# Patient Record
Sex: FEMALE | Race: BLACK OR AFRICAN AMERICAN | NOT HISPANIC OR LATINO | Employment: OTHER | URBAN - METROPOLITAN AREA
[De-identification: names, ages, dates, MRNs, and addresses within clinical notes are randomized per-mention and may not be internally consistent; named-entity substitution may affect disease eponyms.]

---

## 2017-08-01 ENCOUNTER — HOSPITAL ENCOUNTER (EMERGENCY)
Facility: OTHER | Age: 62
Discharge: HOME | End: 2017-08-02
Attending: EMERGENCY MEDICINE
Payer: MEDICAID

## 2017-08-01 DIAGNOSIS — M25.532 LEFT WRIST PAIN: Primary | ICD-10-CM

## 2017-08-01 PROCEDURE — 99283 EMERGENCY DEPT VISIT LOW MDM: CPT

## 2017-08-02 VITALS
OXYGEN SATURATION: 100 % | WEIGHT: 105 LBS | SYSTOLIC BLOOD PRESSURE: 110 MMHG | BODY MASS INDEX: 21.17 KG/M2 | TEMPERATURE: 99 F | HEART RATE: 86 BPM | DIASTOLIC BLOOD PRESSURE: 82 MMHG | HEIGHT: 59 IN | RESPIRATION RATE: 16 BRPM

## 2017-08-02 RX ORDER — OXAPROZIN 600 MG/1
600 TABLET, FILM COATED ORAL DAILY
Qty: 10 TABLET | Refills: 0 | Status: ON HOLD | OUTPATIENT
Start: 2017-08-02 | End: 2018-02-01 | Stop reason: HOSPADM

## 2017-08-02 NOTE — ED PROVIDER NOTES
Encounter Date: 8/1/2017       History     Chief Complaint   Patient presents with    Joint Swelling     swelling, redness, and warmth to L wrist .      Patient is a 62 y.o. female with a past medical history of cirrhosis, polysubstance abuse, presenting to the emergency department with complaints of left wrist pain. The patient reports it has been persistent for the past 4 days. She admits that she did a lot of cooking this weekend and thinks she aggravated it. She denies any fall or injury. She denies numbness, tingling, weakness.  She states her pain as an 8/10.  She states that she has tried taking 600 mg of ibuprofen with no relief.  She denies previous episode.      The history is provided by the patient.     Review of patient's allergies indicates:  No Known Allergies  Past Medical History:   Diagnosis Date    Cirrhosis      History reviewed. No pertinent surgical history.  History reviewed. No pertinent family history.  Social History   Substance Use Topics    Smoking status: Current Every Day Smoker     Types: Cigarettes    Smokeless tobacco: Never Used      Comment: 2 cigarettes a day    Alcohol use Yes      Comment: 1 pint a day     Review of Systems   Constitutional: Negative for activity change, appetite change, chills, fatigue and fever.   HENT: Negative for congestion, rhinorrhea and sore throat.    Eyes: Negative for photophobia and visual disturbance.   Respiratory: Negative for cough, shortness of breath and wheezing.    Cardiovascular: Negative for chest pain.   Gastrointestinal: Negative for abdominal pain, diarrhea, nausea and vomiting.   Genitourinary: Negative for dysuria, hematuria and urgency.   Musculoskeletal: Positive for arthralgias and joint swelling. Negative for back pain and neck pain.   Skin: Negative for color change and wound.   Neurological: Negative for weakness and headaches.   Psychiatric/Behavioral: Negative for agitation and confusion.       Physical Exam     Initial  Vitals [08/01/17 2129]   BP Pulse Resp Temp SpO2   100/79 85 18 99.1 °F (37.3 °C) 100 %      MAP       86         Physical Exam    Nursing note and vitals reviewed.  Constitutional: Vital signs are normal. She appears well-developed and well-nourished. She is not diaphoretic. She is cooperative.  Non-toxic appearance. She does not have a sickly appearance. She does not appear ill. No distress.   Elderly, -American female accompanied by her granddaughter who is also a patient in the emergency department.  She speaking clear and full sentences.  She is in no acute distress.  She ambulates without difficulty.   HENT:   Head: Normocephalic and atraumatic.   Right Ear: External ear normal.   Left Ear: External ear normal.   Nose: Nose normal.   Mouth/Throat: Oropharynx is clear and moist.   Eyes: Conjunctivae and EOM are normal.   Neck: Normal range of motion. Neck supple.   Cardiovascular: Normal rate, regular rhythm and normal heart sounds.   Pulmonary/Chest: Breath sounds normal. No respiratory distress. She has no wheezes.   Musculoskeletal: Normal range of motion.   Tenderness to palpation with mild edema of the left upper extremity along the ulnar aspect.  No palpable deformity, crepitus, step-off. Decreased range of motion.  Normal strength, sensation, pulses.  No overlying skin changes.   Neurological: She is alert and oriented to person, place, and time. GCS eye subscore is 4. GCS verbal subscore is 5. GCS motor subscore is 6.   Skin: Skin is warm.   Psychiatric: She has a normal mood and affect. Her behavior is normal. Judgment and thought content normal.         ED Course   Procedures  Labs Reviewed - No data to display     Imaging Results          X-Ray Wrist Complete Left (Final result)  Result time 08/01/17 22:30:44    Final result by Amandeep Figueroa MD (08/01/17 22:30:44)                 Impression:       1.  No acute process in the left wrist.  Diffuse osteopenia.    2.  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              Electronically signed by: EDWIGE OLEARY MD  Date:     08/01/17  Time:    22:30              Narrative:    Exam: [id number]  08/01/17  22:15:06 [id number] ([facility]) : XR WRIST COMPLETE 3 VIEWS LEFT    Technique:    Frontal, lateral, and oblique radiographs of the left wrist.    Comparison:     None     Findings:      There is diffuse demineralization of the osseous structures.  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  The reminder of the joint spaces are maintained.  No significant osteophytosis is noted.  No erosive changes are noted.  There is no evidence of a fracture or dislocation.  The soft tissues are within normal limits.                             X-Rays:   Independently Interpreted Readings:   Other Readings:  X-ray left wrist-no acute fracture or dislocation    Medical Decision Making:   Initial Assessment:   Urgent evaluation of a 62-year-old female with a past medical history of cirrhosis, presenting to the emergency department with complaints of pain to her left wrist.  Patient is afebrile, nontoxic appearing, hemodynamically stable, and neurovascularly intact.  Physical exam reveals tenderness to palpation with edema of the left wrist along the ulnar aspect.  Will plan for x-ray.   Independently Interpreted Test(s):   I have ordered and independently interpreted X-rays - see prior notes.  Clinical Tests:   Radiological Study: Ordered and Reviewed  ED Management:  X-ray shows no acute fracture dislocation.  Patient's signs are likely secondary to musculoskeletal etiology.  Will plan for Ace wrap.  Patient will be discharged home with a prescription for oxaprozin.  She did request a prescription for narcotics multiple times, however her chart review, patient has a history of polysubstance abuse.  I do not feel that she warrants narcotic medication at this time.  Stable for discharge home. The patient was instructed to follow up with a primary care provider in 2 days or  to return to the emergency department for worsening symptoms. The treatment plan was discussed with the patient who demonstrated understanding and comfort with plan. The patient's history, physical exam, and plan of care was discussed with and agreed upon with my supervising physician.    Other:   I have discussed this case with another health care provider.       <> Summary of the Discussion: Dr. Goodwni  This note was created using Dragon Medical Dictation. There may be typographical errors secondary to dictation.                    ED Course     Clinical Impression:     1. Left wrist pain         Disposition:   Disposition: Discharged  Condition: Stable                        Stacey Beth PA-C  08/02/17 0153

## 2017-08-02 NOTE — ED TRIAGE NOTES
Pt c/o swelling and pain L wrist.  States she woke up with it swollen on Sunday.  Denies any trauma, states she has been cooking a lot.  Pt states she used a cream, rubbing alcohol, and wrapped.  No relief.

## 2018-01-25 ENCOUNTER — HOSPITAL ENCOUNTER (INPATIENT)
Facility: OTHER | Age: 63
LOS: 7 days | Discharge: HOME | DRG: 432 | End: 2018-02-01
Attending: EMERGENCY MEDICINE | Admitting: EMERGENCY MEDICINE
Payer: MEDICAID

## 2018-01-25 DIAGNOSIS — D72.829 LEUKOCYTOSIS, UNSPECIFIED TYPE: ICD-10-CM

## 2018-01-25 DIAGNOSIS — D50.0 IRON DEFICIENCY ANEMIA DUE TO CHRONIC BLOOD LOSS: ICD-10-CM

## 2018-01-25 DIAGNOSIS — K70.31 ASCITES DUE TO ALCOHOLIC CIRRHOSIS: ICD-10-CM

## 2018-01-25 DIAGNOSIS — R18.8 ASCITES: ICD-10-CM

## 2018-01-25 DIAGNOSIS — F10.930 ALCOHOL WITHDRAWAL SYNDROME WITHOUT COMPLICATION: ICD-10-CM

## 2018-01-25 DIAGNOSIS — K92.2 ACUTE LOWER GI BLEEDING: ICD-10-CM

## 2018-01-25 DIAGNOSIS — R10.9 ABDOMINAL PAIN: Primary | ICD-10-CM

## 2018-01-25 DIAGNOSIS — K70.31 ALCOHOLIC CIRRHOSIS OF LIVER WITH ASCITES: ICD-10-CM

## 2018-01-25 DIAGNOSIS — E43 SEVERE MALNUTRITION: ICD-10-CM

## 2018-01-25 LAB
ABO + RH BLD: NORMAL
ALBUMIN SERPL BCP-MCNC: 1.9 G/DL
ALBUMIN SERPL BCP-MCNC: 1.9 G/DL
ALBUMIN SERPL BCP-MCNC: 2.1 G/DL
ALP SERPL-CCNC: 108 U/L
ALP SERPL-CCNC: 94 U/L
ALT SERPL W/O P-5'-P-CCNC: 11 U/L
ALT SERPL W/O P-5'-P-CCNC: 15 U/L
AMMONIA PLAS-SCNC: 29 UMOL/L
AMPHET+METHAMPHET UR QL: NEGATIVE
ANION GAP SERPL CALC-SCNC: 12 MMOL/L
ANION GAP SERPL CALC-SCNC: 7 MMOL/L
ANISOCYTOSIS BLD QL SMEAR: ABNORMAL
ANISOCYTOSIS BLD QL SMEAR: ABNORMAL
APPEARANCE FLD: NORMAL
APTT BLDCRRT: <21 SEC
AST SERPL-CCNC: 23 U/L
AST SERPL-CCNC: 24 U/L
BARBITURATES UR QL SCN>200 NG/ML: NEGATIVE
BASOPHILS # BLD AUTO: 0.01 K/UL
BASOPHILS # BLD AUTO: 0.04 K/UL
BASOPHILS NFR BLD: 0.1 %
BASOPHILS NFR BLD: 0.2 %
BENZODIAZ UR QL SCN>200 NG/ML: NEGATIVE
BILIRUB SERPL-MCNC: 0.3 MG/DL
BILIRUB SERPL-MCNC: 2.2 MG/DL
BILIRUB UR QL STRIP: NEGATIVE
BLD GP AB SCN CELLS X3 SERPL QL: NORMAL
BLD PROD TYP BPU: NORMAL
BLD PROD TYP BPU: NORMAL
BLOOD UNIT EXPIRATION DATE: NORMAL
BLOOD UNIT EXPIRATION DATE: NORMAL
BLOOD UNIT TYPE CODE: 5100
BLOOD UNIT TYPE CODE: 9500
BLOOD UNIT TYPE: NORMAL
BLOOD UNIT TYPE: NORMAL
BNP SERPL-MCNC: <10 PG/ML
BODY FLD TYPE: NORMAL
BUN SERPL-MCNC: 19 MG/DL
BUN SERPL-MCNC: 24 MG/DL
BURR CELLS BLD QL SMEAR: ABNORMAL
BZE UR QL SCN: NORMAL
CALCIUM SERPL-MCNC: 7.9 MG/DL
CALCIUM SERPL-MCNC: 8 MG/DL
CANNABINOIDS UR QL SCN: NEGATIVE
CHLORIDE SERPL-SCNC: 107 MMOL/L
CHLORIDE SERPL-SCNC: 111 MMOL/L
CLARITY UR: CLEAR
CO2 SERPL-SCNC: 19 MMOL/L
CO2 SERPL-SCNC: 21 MMOL/L
CODING SYSTEM: NORMAL
CODING SYSTEM: NORMAL
COLOR FLD: NORMAL
COLOR UR: YELLOW
CORTIS SERPL-MCNC: 18.9 UG/DL
CREAT SERPL-MCNC: 0.8 MG/DL
CREAT SERPL-MCNC: 1.1 MG/DL
CREAT UR-MCNC: 210.9 MG/DL
DIFFERENTIAL METHOD: ABNORMAL
DIFFERENTIAL METHOD: ABNORMAL
DISPENSE STATUS: NORMAL
DISPENSE STATUS: NORMAL
EOSINOPHIL # BLD AUTO: 0.1 K/UL
EOSINOPHIL # BLD AUTO: 0.1 K/UL
EOSINOPHIL NFR BLD: 0.3 %
EOSINOPHIL NFR BLD: 0.4 %
ERYTHROCYTE [DISTWIDTH] IN BLOOD BY AUTOMATED COUNT: 21.4 %
ERYTHROCYTE [DISTWIDTH] IN BLOOD BY AUTOMATED COUNT: 24.2 %
EST. GFR  (AFRICAN AMERICAN): >60 ML/MIN/1.73 M^2
EST. GFR  (AFRICAN AMERICAN): >60 ML/MIN/1.73 M^2
EST. GFR  (NON AFRICAN AMERICAN): 55 ML/MIN/1.73 M^2
EST. GFR  (NON AFRICAN AMERICAN): >60 ML/MIN/1.73 M^2
ETHANOL SERPL-MCNC: <10 MG/DL
GIANT PLATELETS BLD QL SMEAR: PRESENT
GIANT PLATELETS BLD QL SMEAR: PRESENT
GLUCOSE SERPL-MCNC: 108 MG/DL
GLUCOSE SERPL-MCNC: 136 MG/DL
GLUCOSE UR QL STRIP: NEGATIVE
GRAM STN SPEC: NORMAL
HCT VFR BLD AUTO: 13.2 %
HCT VFR BLD AUTO: 28.8 %
HGB BLD-MCNC: 10.1 G/DL
HGB BLD-MCNC: 4.4 G/DL
HGB UR QL STRIP: ABNORMAL
HYALINE CASTS #/AREA URNS LPF: 30 /LPF
HYPOCHROMIA BLD QL SMEAR: ABNORMAL
HYPOCHROMIA BLD QL SMEAR: ABNORMAL
INR PPP: 1.2
KETONES UR QL STRIP: ABNORMAL
LACTATE SERPL-SCNC: 2.9 MMOL/L
LEUKOCYTE ESTERASE UR QL STRIP: ABNORMAL
LIPASE SERPL-CCNC: 57 U/L
LYMPHOCYTES # BLD AUTO: 2.1 K/UL
LYMPHOCYTES # BLD AUTO: 2.8 K/UL
LYMPHOCYTES NFR BLD: 12.8 %
LYMPHOCYTES NFR BLD: 13.1 %
LYMPHOCYTES NFR FLD MANUAL: 45 %
MAGNESIUM SERPL-MCNC: 1.3 MG/DL
MCH RBC QN AUTO: 20.9 PG
MCH RBC QN AUTO: 25.8 PG
MCHC RBC AUTO-ENTMCNC: 33.3 G/DL
MCHC RBC AUTO-ENTMCNC: 35.1 G/DL
MCV RBC AUTO: 63 FL
MCV RBC AUTO: 74 FL
METHADONE UR QL SCN>300 NG/ML: NEGATIVE
MICROSCOPIC COMMENT: ABNORMAL
MONOCYTES # BLD AUTO: 0.8 K/UL
MONOCYTES # BLD AUTO: 1.1 K/UL
MONOCYTES NFR BLD: 3.7 %
MONOCYTES NFR BLD: 6.4 %
MONOS+MACROS NFR FLD MANUAL: 22 %
NEUTROPHILS # BLD AUTO: 13 K/UL
NEUTROPHILS # BLD AUTO: 17.1 K/UL
NEUTROPHILS NFR BLD: 80.3 %
NEUTROPHILS NFR BLD: 82.7 %
NEUTROPHILS NFR FLD MANUAL: 33 %
NITRITE UR QL STRIP: NEGATIVE
NUM UNITS TRANS PACKED RBC: NORMAL
OPIATES UR QL SCN: NEGATIVE
OVALOCYTES BLD QL SMEAR: ABNORMAL
OVALOCYTES BLD QL SMEAR: ABNORMAL
PCP UR QL SCN>25 NG/ML: NEGATIVE
PH UR STRIP: 6 [PH] (ref 5–8)
PHOSPHATE SERPL-MCNC: 3.1 MG/DL
PLATELET # BLD AUTO: 153 K/UL
PLATELET # BLD AUTO: 155 K/UL
PLATELET BLD QL SMEAR: ABNORMAL
PLATELET BLD QL SMEAR: ABNORMAL
PMV BLD AUTO: 9 FL
PMV BLD AUTO: ABNORMAL FL
POIKILOCYTOSIS BLD QL SMEAR: ABNORMAL
POIKILOCYTOSIS BLD QL SMEAR: ABNORMAL
POLYCHROMASIA BLD QL SMEAR: ABNORMAL
POLYCHROMASIA BLD QL SMEAR: ABNORMAL
POTASSIUM SERPL-SCNC: 3.9 MMOL/L
POTASSIUM SERPL-SCNC: 4.1 MMOL/L
PROCALCITONIN SERPL IA-MCNC: 0.23 NG/ML
PROT SERPL-MCNC: 6.6 G/DL
PROT SERPL-MCNC: 7 G/DL
PROT SERPL-MCNC: 7.2 G/DL
PROT UR QL STRIP: NEGATIVE
PROTHROMBIN TIME: 13 SEC
RBC # BLD AUTO: 2.11 M/UL
RBC # BLD AUTO: 3.91 M/UL
RBC #/AREA URNS HPF: 4 /HPF (ref 0–4)
SCHISTOCYTES BLD QL SMEAR: PRESENT
SCHISTOCYTES BLD QL SMEAR: PRESENT
SODIUM SERPL-SCNC: 138 MMOL/L
SODIUM SERPL-SCNC: 139 MMOL/L
SP GR UR STRIP: 1.02 (ref 1–1.03)
T4 FREE SERPL-MCNC: 1.19 NG/DL
TARGETS BLD QL SMEAR: ABNORMAL
TARGETS BLD QL SMEAR: ABNORMAL
TOXIC GRANULES BLD QL SMEAR: PRESENT
TOXIC GRANULES BLD QL SMEAR: PRESENT
TOXICOLOGY INFORMATION: NORMAL
TRANS ERYTHROCYTES VOL PATIENT: NORMAL ML
TROPONIN I SERPL DL<=0.01 NG/ML-MCNC: <0.006 NG/ML
TSH SERPL DL<=0.005 MIU/L-ACNC: 4.81 UIU/ML
URN SPEC COLLECT METH UR: ABNORMAL
UROBILINOGEN UR STRIP-ACNC: NEGATIVE EU/DL
WBC # BLD AUTO: 16.45 K/UL
WBC # BLD AUTO: 20.88 K/UL
WBC # BLD AUTO: 21.1 K/UL
WBC # FLD: 123 /CU MM
WBC #/AREA URNS HPF: 6 /HPF (ref 0–5)

## 2018-01-25 PROCEDURE — 87086 URINE CULTURE/COLONY COUNT: CPT

## 2018-01-25 PROCEDURE — 87070 CULTURE OTHR SPECIMN AEROBIC: CPT

## 2018-01-25 PROCEDURE — 63600175 PHARM REV CODE 636 W HCPCS: Performed by: EMERGENCY MEDICINE

## 2018-01-25 PROCEDURE — P9021 RED BLOOD CELLS UNIT: HCPCS

## 2018-01-25 PROCEDURE — 99900035 HC TECH TIME PER 15 MIN (STAT)

## 2018-01-25 PROCEDURE — 82140 ASSAY OF AMMONIA: CPT

## 2018-01-25 PROCEDURE — 84100 ASSAY OF PHOSPHORUS: CPT

## 2018-01-25 PROCEDURE — 84145 PROCALCITONIN (PCT): CPT

## 2018-01-25 PROCEDURE — 96375 TX/PRO/DX INJ NEW DRUG ADDON: CPT

## 2018-01-25 PROCEDURE — 89051 BODY FLUID CELL COUNT: CPT

## 2018-01-25 PROCEDURE — 80307 DRUG TEST PRSMV CHEM ANLYZR: CPT

## 2018-01-25 PROCEDURE — P9040 RBC LEUKOREDUCED IRRADIATED: HCPCS

## 2018-01-25 PROCEDURE — 99223 1ST HOSP IP/OBS HIGH 75: CPT | Mod: ,,, | Performed by: INTERNAL MEDICINE

## 2018-01-25 PROCEDURE — 63600175 PHARM REV CODE 636 W HCPCS: Performed by: INTERNAL MEDICINE

## 2018-01-25 PROCEDURE — 83880 ASSAY OF NATRIURETIC PEPTIDE: CPT

## 2018-01-25 PROCEDURE — 93010 ELECTROCARDIOGRAM REPORT: CPT | Mod: ,,, | Performed by: INTERNAL MEDICINE

## 2018-01-25 PROCEDURE — 82533 TOTAL CORTISOL: CPT

## 2018-01-25 PROCEDURE — 80320 DRUG SCREEN QUANTALCOHOLS: CPT

## 2018-01-25 PROCEDURE — 84443 ASSAY THYROID STIM HORMONE: CPT

## 2018-01-25 PROCEDURE — 25000003 PHARM REV CODE 250

## 2018-01-25 PROCEDURE — 85610 PROTHROMBIN TIME: CPT

## 2018-01-25 PROCEDURE — C9113 INJ PANTOPRAZOLE SODIUM, VIA: HCPCS | Performed by: EMERGENCY MEDICINE

## 2018-01-25 PROCEDURE — 36430 TRANSFUSION BLD/BLD COMPNT: CPT

## 2018-01-25 PROCEDURE — 87075 CULTR BACTERIA EXCEPT BLOOD: CPT

## 2018-01-25 PROCEDURE — 87205 SMEAR GRAM STAIN: CPT

## 2018-01-25 PROCEDURE — 25000003 PHARM REV CODE 250: Performed by: INTERNAL MEDICINE

## 2018-01-25 PROCEDURE — 81000 URINALYSIS NONAUTO W/SCOPE: CPT

## 2018-01-25 PROCEDURE — 20000000 HC ICU ROOM

## 2018-01-25 PROCEDURE — 83690 ASSAY OF LIPASE: CPT

## 2018-01-25 PROCEDURE — 84439 ASSAY OF FREE THYROXINE: CPT

## 2018-01-25 PROCEDURE — 36415 COLL VENOUS BLD VENIPUNCTURE: CPT

## 2018-01-25 PROCEDURE — 85730 THROMBOPLASTIN TIME PARTIAL: CPT

## 2018-01-25 PROCEDURE — 25500020 PHARM REV CODE 255: Performed by: EMERGENCY MEDICINE

## 2018-01-25 PROCEDURE — 80053 COMPREHEN METABOLIC PANEL: CPT

## 2018-01-25 PROCEDURE — 86920 COMPATIBILITY TEST SPIN: CPT

## 2018-01-25 PROCEDURE — 84484 ASSAY OF TROPONIN QUANT: CPT

## 2018-01-25 PROCEDURE — 84155 ASSAY OF PROTEIN SERUM: CPT

## 2018-01-25 PROCEDURE — 87040 BLOOD CULTURE FOR BACTERIA: CPT

## 2018-01-25 PROCEDURE — 83605 ASSAY OF LACTIC ACID: CPT

## 2018-01-25 PROCEDURE — 85025 COMPLETE CBC W/AUTO DIFF WBC: CPT | Mod: 91

## 2018-01-25 PROCEDURE — 25000003 PHARM REV CODE 250: Performed by: EMERGENCY MEDICINE

## 2018-01-25 PROCEDURE — 96361 HYDRATE IV INFUSION ADD-ON: CPT

## 2018-01-25 PROCEDURE — 82040 ASSAY OF SERUM ALBUMIN: CPT

## 2018-01-25 PROCEDURE — 85048 AUTOMATED LEUKOCYTE COUNT: CPT

## 2018-01-25 PROCEDURE — 80053 COMPREHEN METABOLIC PANEL: CPT | Mod: 91

## 2018-01-25 PROCEDURE — 86850 RBC ANTIBODY SCREEN: CPT

## 2018-01-25 PROCEDURE — 83735 ASSAY OF MAGNESIUM: CPT

## 2018-01-25 PROCEDURE — 96374 THER/PROPH/DIAG INJ IV PUSH: CPT

## 2018-01-25 PROCEDURE — 99291 CRITICAL CARE FIRST HOUR: CPT | Mod: 25

## 2018-01-25 RX ORDER — TRIPROLIDINE/PSEUDOEPHEDRINE 2.5MG-60MG
600 TABLET ORAL EVERY 8 HOURS PRN
Status: DISCONTINUED | OUTPATIENT
Start: 2018-01-25 | End: 2018-02-01 | Stop reason: HOSPADM

## 2018-01-25 RX ORDER — LANOLIN ALCOHOL/MO/W.PET/CERES
100 CREAM (GRAM) TOPICAL DAILY
COMMUNITY
End: 2018-04-13

## 2018-01-25 RX ORDER — SODIUM CHLORIDE 9 MG/ML
INJECTION, SOLUTION INTRAVENOUS CONTINUOUS
Status: DISCONTINUED | OUTPATIENT
Start: 2018-01-25 | End: 2018-01-31

## 2018-01-25 RX ORDER — MULTIVITAMIN
1 TABLET ORAL DAILY
Status: ON HOLD | COMMUNITY
End: 2019-11-26

## 2018-01-25 RX ORDER — MORPHINE SULFATE 2 MG/ML
6 INJECTION, SOLUTION INTRAMUSCULAR; INTRAVENOUS
Status: ACTIVE | OUTPATIENT
Start: 2018-01-25 | End: 2018-01-25

## 2018-01-25 RX ORDER — PANTOPRAZOLE SODIUM 40 MG/10ML
40 INJECTION, POWDER, LYOPHILIZED, FOR SOLUTION INTRAVENOUS ONCE
Status: DISCONTINUED | OUTPATIENT
Start: 2018-01-25 | End: 2018-01-25

## 2018-01-25 RX ORDER — IBUPROFEN 600 MG/1
600 TABLET ORAL 2 TIMES DAILY PRN
Status: ON HOLD | COMMUNITY
End: 2018-02-01 | Stop reason: HOSPADM

## 2018-01-25 RX ORDER — HYDROCODONE BITARTRATE AND ACETAMINOPHEN 500; 5 MG/1; MG/1
TABLET ORAL
Status: DISCONTINUED | OUTPATIENT
Start: 2018-01-25 | End: 2018-02-01 | Stop reason: HOSPADM

## 2018-01-25 RX ORDER — FOLIC ACID 1 MG/1
1 TABLET ORAL DAILY
COMMUNITY
End: 2018-04-13

## 2018-01-25 RX ORDER — THIAMINE HCL 100 MG
100 TABLET ORAL DAILY
Status: DISCONTINUED | OUTPATIENT
Start: 2018-01-26 | End: 2018-02-01 | Stop reason: HOSPADM

## 2018-01-25 RX ORDER — PANTOPRAZOLE SODIUM 40 MG/10ML
80 INJECTION, POWDER, LYOPHILIZED, FOR SOLUTION INTRAVENOUS
Status: COMPLETED | OUTPATIENT
Start: 2018-01-25 | End: 2018-01-25

## 2018-01-25 RX ORDER — FUROSEMIDE 40 MG/1
40 TABLET ORAL DAILY
COMMUNITY
End: 2019-04-04 | Stop reason: SDUPTHER

## 2018-01-25 RX ORDER — SPIRONOLACTONE 50 MG/1
50 TABLET, FILM COATED ORAL DAILY
Status: ON HOLD | COMMUNITY
End: 2018-02-01 | Stop reason: HOSPADM

## 2018-01-25 RX ORDER — IBUPROFEN 400 MG/1
800 TABLET ORAL EVERY 8 HOURS PRN
Status: DISCONTINUED | OUTPATIENT
Start: 2018-01-25 | End: 2018-01-25

## 2018-01-25 RX ORDER — AMOXICILLIN AND CLAVULANATE POTASSIUM 875; 125 MG/1; MG/1
1 TABLET, FILM COATED ORAL
Status: ON HOLD | COMMUNITY
End: 2018-02-01 | Stop reason: HOSPADM

## 2018-01-25 RX ORDER — SODIUM CHLORIDE 9 MG/ML
1000 INJECTION, SOLUTION INTRAVENOUS
Status: COMPLETED | OUTPATIENT
Start: 2018-01-25 | End: 2018-01-25

## 2018-01-25 RX ORDER — SODIUM CHLORIDE 0.9 % (FLUSH) 0.9 %
3 SYRINGE (ML) INJECTION
Status: DISCONTINUED | OUTPATIENT
Start: 2018-01-25 | End: 2018-02-01 | Stop reason: HOSPADM

## 2018-01-25 RX ORDER — ONDANSETRON 4 MG/1
4 TABLET, FILM COATED ORAL EVERY 6 HOURS PRN
COMMUNITY
End: 2018-04-13

## 2018-01-25 RX ORDER — LACTULOSE 10 G/15ML
10 SOLUTION ORAL; RECTAL 3 TIMES DAILY
COMMUNITY
End: 2018-04-13

## 2018-01-25 RX ORDER — IBUPROFEN 200 MG
1 TABLET ORAL DAILY
Status: DISCONTINUED | OUTPATIENT
Start: 2018-01-26 | End: 2018-02-01 | Stop reason: HOSPADM

## 2018-01-25 RX ORDER — FOLIC ACID 1 MG/1
1 TABLET ORAL DAILY
Status: DISCONTINUED | OUTPATIENT
Start: 2018-01-26 | End: 2018-02-01 | Stop reason: HOSPADM

## 2018-01-25 RX ORDER — ONDANSETRON 2 MG/ML
8 INJECTION INTRAMUSCULAR; INTRAVENOUS
Status: COMPLETED | OUTPATIENT
Start: 2018-01-25 | End: 2018-01-25

## 2018-01-25 RX ADMIN — IOHEXOL 75 ML: 350 INJECTION, SOLUTION INTRAVENOUS at 05:01

## 2018-01-25 RX ADMIN — CEFTRIAXONE 2 G: 2 INJECTION, SOLUTION INTRAVENOUS at 11:01

## 2018-01-25 RX ADMIN — SODIUM CHLORIDE 1000 ML: 0.9 INJECTION, SOLUTION INTRAVENOUS at 06:01

## 2018-01-25 RX ADMIN — SODIUM CHLORIDE 500 ML: 0.9 INJECTION, SOLUTION INTRAVENOUS at 04:01

## 2018-01-25 RX ADMIN — PANTOPRAZOLE SODIUM 80 MG: 40 INJECTION, POWDER, FOR SOLUTION INTRAVENOUS at 04:01

## 2018-01-25 RX ADMIN — OCTREOTIDE ACETATE 50 MCG/HR: 500 INJECTION, SOLUTION INTRAVENOUS; SUBCUTANEOUS at 02:01

## 2018-01-25 RX ADMIN — IBUPROFEN 600 MG: 100 SUSPENSION ORAL at 11:01

## 2018-01-25 RX ADMIN — SODIUM CHLORIDE 125 ML/HR: 0.9 INJECTION, SOLUTION INTRAVENOUS at 08:01

## 2018-01-25 RX ADMIN — OCTREOTIDE ACETATE 50 MCG/HR: 500 INJECTION, SOLUTION INTRAVENOUS; SUBCUTANEOUS at 11:01

## 2018-01-25 RX ADMIN — SODIUM CHLORIDE: 0.9 INJECTION, SOLUTION INTRAVENOUS at 05:01

## 2018-01-25 RX ADMIN — ONDANSETRON 8 MG: 2 INJECTION, SOLUTION INTRAMUSCULAR; INTRAVENOUS at 04:01

## 2018-01-25 NOTE — HPI
61 yo female with a PMH of alcoholic cirrhosis presents to the ED c/o bilateral lower abdominal pain and bloody bowel movements for 2 days.  Patient was admitted at [facility] the previous, underwent paracentesis, and was discharged with Augmentin.  She reports last hospitalization for paracentesis was 2-3 years before that.  In the ED she was found to be hypotensive with a systolic in the 80's, tachycardic at 124, and a hemoglobin of 4.4.  After 1 unit of blood, heart rate improved to 115 and systolic blood pressure improved to 100-110.  Patient states that pain has improved but still very tired.

## 2018-01-25 NOTE — OP NOTE
[facility]  Interventional Radiology  Procedure - Inpatient    Date: 01/25/2018 Time: 11:43 AM    Pre-Op Diagnosis: Ascites    Post-Op Diagnosis: Ascites    Procedure Performed by: Amor Millard MD    Assistant: none    Procedure: U/S guided paracentesis    Specimen/Tissue Removed: 2800 mL of dark maroon thin fluid    Estimated Blood Loss: Less than 5 mL    Procedure Note/Findings: U/S guided paracentesis performed with 5 Albanian centesis catheter with 2800 mL of thin dark maroon fluid removed.  No immediate post-procedure complications noted.    Please refer to dictated report for additional details.

## 2018-01-25 NOTE — SUBJECTIVE & OBJECTIVE
Past Medical History:   Diagnosis Date    Cirrhosis        History reviewed. No pertinent surgical history.    Review of patient's allergies indicates:  No Known Allergies    No current facility-administered medications on file prior to encounter.      Current Outpatient Prescriptions on File Prior to Encounter   Medication Sig    oxaprozin (DAYPRO) 600 mg tablet Take 1 tablet (600 mg total) by mouth once daily.     Family History     None        Social History Main Topics    Smoking status: Current Every Day Smoker     Types: Cigarettes    Smokeless tobacco: Never Used      Comment: 2 cigarettes a day    Alcohol use Yes      Comment: 1 pint a day    Drug use: Yes     Types: Cocaine    Sexual activity: Not on file     Review of Systems   Constitutional: Positive for appetite change and fatigue.   HENT: Negative.    Eyes: Negative.    Respiratory: Negative for chest tightness and shortness of breath.    Cardiovascular: Negative for chest pain and palpitations.   Gastrointestinal: Positive for abdominal distention, abdominal pain and blood in stool.   Endocrine: Negative.    Genitourinary: Negative for difficulty urinating and frequency.   Musculoskeletal: Negative for arthralgias.   Neurological: Negative for syncope and speech difficulty.   Psychiatric/Behavioral: Negative for agitation, behavioral problems and confusion.     Objective:     Vital Signs (Most Recent):  Temp: 98.5 °F (36.9 °C) (01/25/18 1500)  Pulse: 86 (01/25/18 1500)  Resp: 15 (01/25/18 1500)  BP: 100/67 (01/25/18 1500)  SpO2: 99 % (01/25/18 1500) Vital Signs (24h Range):  Temp:  [97.7 °F (36.5 °C)-98.5 °F (36.9 °C)] 98.5 °F (36.9 °C)  Pulse:  [] 86  Resp:  [11-30] 15  SpO2:  [99 %-100 %] 99 %  BP: ()/(51-80) 100/67     Weight: 40.4 kg (89 lb 1.1 oz)  Body mass index is 17.99 kg/m².    Physical Exam   Constitutional: She is oriented to person, place, and time. She appears cachectic. She has a sickly appearance. She appears ill.    HENT:   Head: Normocephalic and atraumatic.   Eyes: EOM are normal.   Neck: Normal range of motion.   Cardiovascular: Normal rate and regular rhythm.    Pulmonary/Chest: Effort normal and breath sounds normal.   Abdominal: Soft. Bowel sounds are normal. There is no tenderness. There is no guarding.   Musculoskeletal: Normal range of motion.   Neurological: She is alert and oriented to person, place, and time.   Skin: Skin is warm.   Psychiatric: She has a normal mood and affect. Her behavior is normal.   Vitals reviewed.        CRANIAL NERVES     CN III, IV, VI   Extraocular motions are normal.        Significant Labs:   CBC:   Recent Labs  Lab 01/25/18  0411 01/25/18  1214   WBC 16.45* 21.10*  20.88*   HGB 4.4* 10.1*   HCT 13.2* 28.8*    153     CMP:   Recent Labs  Lab 01/25/18  0411 01/25/18  1214    139   K 3.9 4.1    111*   CO2 19* 21*   * 108   BUN 24* 19   CREATININE 1.1 0.8   CALCIUM 7.9* 8.0*   PROT 7.0 7.2  6.6   ALBUMIN 1.9* 2.1*  1.9*   BILITOT 0.3 2.2*   ALKPHOS 94 108   AST 23 24   ALT 15 11   ANIONGAP 12 7*   EGFRNONAA 55* >60     Coagulation:   Recent Labs  Lab 01/25/18  0411   INR 1.2   APTT <21.0     Lactic Acid:   Recent Labs  Lab 01/25/18 0411   LACTATE 2.9*     Lipase:   Recent Labs  Lab 01/25/18  0411   LIPASE 57     All pertinent labs within the past 24 hours have been reviewed.    Significant Imaging: I have reviewed all pertinent imaging results/findings within the past 24 hours.   Imaging Results          IR Paracentesis with Imaging (Final result)  Result time 01/25/18 12:03:45    Final result by Amor Millard MD (01/25/18 12:03:45)                 Impression:      Ultrasound-guided paracentesis performed with 2800 mL of thin dark maroon fluid removed.  A sample of fluid was sent for the requested studies.      Electronically signed by: AMOR MILLARD MD  Date:     01/25/18  Time:    12:03              Narrative:    Procedure: Ultrasound-guided  paracentesis dated 1/25/18.    After obtaining informed consent from the patient, using sterile technique, 1% lidocaine local anesthetic, and ultrasound guidance a 5 Gabonese centesis needle was advanced into the ascites via a left lower quadrant abdominal approach.  2800 mL of thin dark maroon fluid was removed.  The catheter was then removed and hemostasis was achieved.  No immediate post procedure complications are noted.                             CT Abdomen Pelvis With Contrast (Final result)  Result time 01/25/18 06:49:19    Final result by Shelli Swift MD (01/25/18 06:49:19)                 Impression:        No acute intra-abdominal/pelvic CT abnormalities to account for the reported history of abdominal and back pain.    Findings include:  - Partially visualized airspace opacity within the left lower lobe concerning for aspiration or pneumonia.  - CT findings of cirrhosis.  - Suspected portal hypertension noting a large volume of abdominal ascites and a small recanalized umbilical vein.  - Cholelithiasis.      Electronically signed by: SHELLI SWIFT MD  Date:     01/25/18  Time:    06:49              Narrative:    Technique: 5-mm axial images were obtained through the abdomen and pelvis following administration of 75 cc of Omnipaque 350 IV contrast.  Images were submitted for coronal, and sagittal reformats.    Comparison: CT 02/03/2015.    Findings:    Visualized heart demonstrates trace coronary artery atherosclerosis.  No pericardial effusion.    Visualized lungs demonstrate a partially visualized airspace opacity within the superior segment of the left lower lobe, possibly pneumonia or aspiration.  Partially visualized nodular opacity within the right lower lobe not well characterized.  No pleural effusions.    The liver demonstrates a nodular contour and widened fissures.  Hepatic parenchyma demonstrates diffusely heterogeneous enhancement without discrete lesions.  No intrahepatic bile duct  dilatation.  Portal vein, splenic vein and SMV are patent.  There is a small recanalized umbilical vein.    Calcified stones noted within the gallbladder lumen.  No surrounding inflammatory changes.  Common bile duct is at the upper limit of normal in caliber with tip at the level of the ampulla.    There is a small sliding type hiatal hernia.  The stomach is otherwise decompressed and not well evaluated.    Duodenum, spleen, pancreas and adrenal glands are within normal limits.    Kidneys are normal in size and location.  No enhancing renal masses.  Subcentimeter nonenhancing right cortical lesion likely represents a cyst.  No nephrolithiasis.  No hydronephrosis or hydroureter.  Bladder is decompressed around a Garcia catheter and demonstrates scattered intraluminal gas.    The uterus and ovaries are within normal limits.    The small bowel is normal in caliber.  The colon is unremarkable.  The appendix is normal.  No obstruction or inflammatory changes.    There is a large volume of low attenuation abdominal and pelvic ascites.  There is diffuse mesenteric and omental edema.    No intra-abdominal free air. No abdominal or pelvic lymph node enlargement.  No focal mesenteric masses.    The abdominal aorta tapers normally with prominent atherosclerotic calcification.  IVC and common iliac veins are patent.    Subcutaneous soft tissues are within normal limits.    No acute fractures or osseous destruction.  Degenerative changes of the spine noted.                             X-Ray Chest AP Portable (Final result)  Result time 01/25/18 04:16:59    Final result by Fabian Fam MD (01/25/18 04:16:59)                 Impression:        Left basilar airspace disease or subsegmental atelectasis. Findings could reflect developing pneumonia or aspiration in the setting of sepsis.      Electronically signed by: Fabian Fam  Date:     01/25/18  Time:    04:16              Narrative:    CLINICAL INFORMATION:  Sepsis.    COMPARISON: None available.    FINDINGS: One view of the chest was obtained.  Cardiac wires overlie the chest. Cardiac silhouette is not enlarged.  Lungs demonstrate bilateral increased interstitial attenuation, with more focal airspace disease or subsegmental atelectasis at the left lung base.  No large pleural effusion. No pneumothorax.

## 2018-01-25 NOTE — H&P
[facility]  History & Physical - Short Stay  Interventional Radiology    SUBJECTIVE:     Chief Complaint/Reason for Admission: Cirrhosis, GI bleed, ascites    Informant(s):  self and Electronic Health Record    History of Present Illness:  Neena Marks is a 62 y.o. female with a history of ascites.    Patient presents for paracentesis.    Scheduled Meds:    cefTRIAXone (ROCEPHIN) IVPB  2 g Intravenous Q24H    morphine  6 mg Intravenous ED 1 Time     Continuous Infusions:    sodium chloride 0.9% 125 mL/hr (01/25/18 0831)    octreotide (SANDOSTATIN) infusion       PRN Meds: sodium chloride, sodium chloride 0.9%    Review of patient's allergies indicates:  No Known Allergies    Past Medical History:   Diagnosis Date    Cirrhosis      History reviewed. No pertinent surgical history.  History reviewed. No pertinent family history.  Social History   Substance Use Topics    Smoking status: Current Every Day Smoker     Types: Cigarettes    Smokeless tobacco: Never Used      Comment: 2 cigarettes a day    Alcohol use Yes      Comment: 1 pint a day        Review of Systems:  ROS not obtained    OBJECTIVE:     Vital Signs (Most Recent):  Temp: 98.3 °F (36.8 °C) (01/25/18 1115)  Pulse: 84 (01/25/18 1130)  Resp: 17 (01/25/18 1130)  BP: 109/65 (01/25/18 1130)  SpO2: 100 % (01/25/18 1130)    Physical Exam:  Lungs: No respiratory distress  Cardiac: regular rate and rhythm  Abdomen: distended    Laboratory  CBC:   Lab Results   Component Value Date/Time    WBC 16.45 (H) 01/25/2018 04:11 AM    RBC 2.11 (L) 01/25/2018 04:11 AM    HGB 4.4 (LL) 01/25/2018 04:11 AM    HCT 13.2 (LL) 01/25/2018 04:11 AM     01/25/2018 04:11 AM    MCV 63 (L) 01/25/2018 04:11 AM    MCH 20.9 (L) 01/25/2018 04:11 AM    MCHC 33.3 01/25/2018 04:11 AM     Coagulation:   Lab Results   Component Value Date/Time    INR 1.2 01/25/2018 04:11 AM    APTT <21.0 01/25/2018 04:11 AM         ASSESSMENT/PLAN:     Ascites.    Patient will  undergo paracentesis.    Sedation Plan: 1% lidocaine local anesthesia

## 2018-01-25 NOTE — PROCEDURES
Stat EKG ordered but not done. Per Nurse Dee Dee, EKG was done in ED at 06:30 and there was no need for another. RT was never called for STAT EKG.

## 2018-01-25 NOTE — PLAN OF CARE
Discharge Planning:  Patient admitted on 1-25-18  LOS-day 0  Chart reviewed  Care plan discussed  Discussed care plan with treatment team  Discussed care plan with the attending Dr White  Current dispo - pending  Paracentesis.(3 liters removed per ICU bedside nurse)  Spouse not at bedside at the time of assessment  Case management to follow  Consults following are: IR, case mgt., case mgt., GI

## 2018-01-25 NOTE — ED TRIAGE NOTES
"Pt presents to the ED with c/o abdominal pain starting today. Pt reports pain is 9/10, described as "cramping", + blood, + nausea, - vomiting. Denies any other symptoms. Pt denies hx of GI bleed. Pt has hx of cirrhosis.  "

## 2018-01-25 NOTE — PLAN OF CARE
01/25/18 1447   Readmission Questionnaire   At the time of your discharge, did someone talk to you about what your health problems were? Yes  (at [facility])   At the time of discharge, did someone talk to you about what to watch out for regarding worsening of your health problem? Yes   At the time of discharge, did someone talk to you about what to do if you experienced worsening of your health problem? Yes   At the time of discharge, did someone talk to you about which medication to take when you left the hospital and which ones to stop taking? Yes   At the time of discharge, did someone talk to you about when and where to follow up with a doctor after you left the hospital? Yes

## 2018-01-25 NOTE — PLAN OF CARE
01/25/18 1444   Discharge Assessment   Assessment Type Discharge Planning Assessment   Confirmed/corrected address and phone number on facesheet? Yes   Assessment information obtained from? Patient;Caregiver;Medical Record   Communicated expected length of stay with patient/caregiver no   Prior to hospitilization cognitive status: Alert/Oriented   Prior to hospitalization functional status: Independent   Current cognitive status: Not Oriented to Place;Not Oriented to Time   Current Functional Status: Assistive Equipment;Needs Assistance;Partially Dependent   Lives With significant other   Able to Return to Prior Arrangements unable to determine at this time (comments)   Is patient able to care for self after discharge? Unable to determine at this time (comments)   Do you have any problems affording any of your prescribed medications? TBD   Is the patient taking medications as prescribed? yes   Does the patient have transportation home? Yes   Transportation Available family or friend will provide   Discharge Plan A Home with family   Patient/Family In Agreement With Plan yes

## 2018-01-25 NOTE — ASSESSMENT & PLAN NOTE
-Suspect lower GI bleed  -GI consulted  -s/p transfusion of 2 Units of PRBC's  -Will check cbc 1 hour post transfusion for appropriate response

## 2018-01-25 NOTE — ASSESSMENT & PLAN NOTE
-secondary to alcohol  -Patient still drinks alcohol and uses cocaine  -will  on cessation when status improves

## 2018-01-25 NOTE — ASSESSMENT & PLAN NOTE
-PPI/Octreotide started  -GI may scope as patient becomes more stable  -No active bleeding at this time

## 2018-01-25 NOTE — ED NOTES
Report given to ICU RN. Called Dr. White and informed there are no orders for ICU. She will place orders within 5 min. Pt stable at this. Will transfer to ICU with blood infusing.

## 2018-01-25 NOTE — H&P
"[facility] Medicine  History & Physical    Patient Name: Neena Marks  MRN: [id number]  Admission Date: 1/25/2018  Attending Physician: Courtney Rodriguez, *   Primary Care Provider: [facility]         Patient information was obtained from patient and ER records.     Subjective:     Principal Problem:Acute lower GI bleeding    Chief Complaint:   Chief Complaint   Patient presents with    Abdominal Pain     Patient family called EMS for patient c/o abd pain, dizziness, lethargy, altered mental status and not "feeling well"        HPI: 61 yo female with a PMH of alcoholic cirrhosis presents to the ED c/o bilateral lower abdominal pain x 2 days.  Patient was admitted at [facility] last week, underwent paracentesis, and discharged with Augmentin.  Patient reports last hospitalization or paracentesis was 2-3 years ago. She admits to bloody bowel movements prior to this admission.  In Er, patient found to be hypotensive with a systolic in the 80's, tachycardic at 124, and a hemoglobin of 4.4.  After 1 pint of blood, heart rate improved to 115 and systolic blood pressure improved to 100-110.  Patient states that pain has improved but still very tired.     Past Medical History:   Diagnosis Date    Cirrhosis        History reviewed. No pertinent surgical history.    Review of patient's allergies indicates:  No Known Allergies    No current facility-administered medications on file prior to encounter.      Current Outpatient Prescriptions on File Prior to Encounter   Medication Sig    oxaprozin (DAYPRO) 600 mg tablet Take 1 tablet (600 mg total) by mouth once daily.     Family History     None        Social History Main Topics    Smoking status: Current Every Day Smoker     Types: Cigarettes    Smokeless tobacco: Never Used      Comment: 2 cigarettes a day    Alcohol use Yes      Comment: 1 pint a day    Drug use: Yes     Types: Cocaine    " Sexual activity: Not on file     Review of Systems   Constitutional: Positive for appetite change and fatigue.   HENT: Negative.    Eyes: Negative.    Respiratory: Negative for chest tightness and shortness of breath.    Cardiovascular: Negative for chest pain and palpitations.   Gastrointestinal: Positive for abdominal distention, abdominal pain and blood in stool.   Endocrine: Negative.    Genitourinary: Negative for difficulty urinating and frequency.   Musculoskeletal: Negative for arthralgias.   Neurological: Negative for syncope and speech difficulty.   Psychiatric/Behavioral: Negative for agitation, behavioral problems and confusion.     Objective:     Vital Signs (Most Recent):  Temp: 98.5 °F (36.9 °C) (01/25/18 1500)  Pulse: 86 (01/25/18 1500)  Resp: 15 (01/25/18 1500)  BP: 100/67 (01/25/18 1500)  SpO2: 99 % (01/25/18 1500) Vital Signs (24h Range):  Temp:  [97.7 °F (36.5 °C)-98.5 °F (36.9 °C)] 98.5 °F (36.9 °C)  Pulse:  [] 86  Resp:  [11-30] 15  SpO2:  [99 %-100 %] 99 %  BP: ()/(51-80) 100/67     Weight: 40.4 kg (89 lb 1.1 oz)  Body mass index is 17.99 kg/m².    Physical Exam   Constitutional: She is oriented to person, place, and time. She appears cachectic. She has a sickly appearance. She appears ill.   HENT:   Head: Normocephalic and atraumatic.   Eyes: EOM are normal.   Neck: Normal range of motion.   Cardiovascular: Normal rate and regular rhythm.    Pulmonary/Chest: Effort normal and breath sounds normal.   Abdominal: Soft. Bowel sounds are normal. There is no tenderness. There is no guarding.   Musculoskeletal: Normal range of motion.   Neurological: She is alert and oriented to person, place, and time.   Skin: Skin is warm.   Psychiatric: She has a normal mood and affect. Her behavior is normal.   Vitals reviewed.        CRANIAL NERVES     CN III, IV, VI   Extraocular motions are normal.        Significant Labs:   CBC:   Recent Labs  Lab 01/25/18  0411 01/25/18  1214   WBC 16.45* 21.10*   20.88*   HGB 4.4* 10.1*   HCT 13.2* 28.8*    153     CMP:   Recent Labs  Lab 01/25/18 0411 01/25/18  1214    139   K 3.9 4.1    111*   CO2 19* 21*   * 108   BUN 24* 19   CREATININE 1.1 0.8   CALCIUM 7.9* 8.0*   PROT 7.0 7.2  6.6   ALBUMIN 1.9* 2.1*  1.9*   BILITOT 0.3 2.2*   ALKPHOS 94 108   AST 23 24   ALT 15 11   ANIONGAP 12 7*   EGFRNONAA 55* >60     Coagulation:   Recent Labs  Lab 01/25/18 0411   INR 1.2   APTT <21.0     Lactic Acid:   Recent Labs  Lab 01/25/18 0411   LACTATE 2.9*     Lipase:   Recent Labs  Lab 01/25/18 0411   LIPASE 57     All pertinent labs within the past 24 hours have been reviewed.    Significant Imaging: I have reviewed all pertinent imaging results/findings within the past 24 hours.   Imaging Results          IR Paracentesis with Imaging (Final result)  Result time 01/25/18 12:03:45    Final result by Amor Millard MD (01/25/18 12:03:45)                 Impression:      Ultrasound-guided paracentesis performed with 2800 mL of thin dark maroon fluid removed.  A sample of fluid was sent for the requested studies.      Electronically signed by: AMOR MILLARD MD  Date:     01/25/18  Time:    12:03              Narrative:    Procedure: Ultrasound-guided paracentesis dated 1/25/18.    After obtaining informed consent from the patient, using sterile technique, 1% lidocaine local anesthetic, and ultrasound guidance a 5 Macedonian centesis needle was advanced into the ascites via a left lower quadrant abdominal approach.  2800 mL of thin dark maroon fluid was removed.  The catheter was then removed and hemostasis was achieved.  No immediate post procedure complications are noted.                             CT Abdomen Pelvis With Contrast (Final result)  Result time 01/25/18 06:49:19    Final result by Reed Swift MD (01/25/18 06:49:19)                 Impression:        No acute intra-abdominal/pelvic CT abnormalities to account for the reported history of  abdominal and back pain.    Findings include:  - Partially visualized airspace opacity within the left lower lobe concerning for aspiration or pneumonia.  - CT findings of cirrhosis.  - Suspected portal hypertension noting a large volume of abdominal ascites and a small recanalized umbilical vein.  - Cholelithiasis.      Electronically signed by: SHELLI MEZA MD  Date:     01/25/18  Time:    06:49              Narrative:    Technique: 5-mm axial images were obtained through the abdomen and pelvis following administration of 75 cc of Omnipaque 350 IV contrast.  Images were submitted for coronal, and sagittal reformats.    Comparison: CT 02/03/2015.    Findings:    Visualized heart demonstrates trace coronary artery atherosclerosis.  No pericardial effusion.    Visualized lungs demonstrate a partially visualized airspace opacity within the superior segment of the left lower lobe, possibly pneumonia or aspiration.  Partially visualized nodular opacity within the right lower lobe not well characterized.  No pleural effusions.    The liver demonstrates a nodular contour and widened fissures.  Hepatic parenchyma demonstrates diffusely heterogeneous enhancement without discrete lesions.  No intrahepatic bile duct dilatation.  Portal vein, splenic vein and SMV are patent.  There is a small recanalized umbilical vein.    Calcified stones noted within the gallbladder lumen.  No surrounding inflammatory changes.  Common bile duct is at the upper limit of normal in caliber with tip at the level of the ampulla.    There is a small sliding type hiatal hernia.  The stomach is otherwise decompressed and not well evaluated.    Duodenum, spleen, pancreas and adrenal glands are within normal limits.    Kidneys are normal in size and location.  No enhancing renal masses.  Subcentimeter nonenhancing right cortical lesion likely represents a cyst.  No nephrolithiasis.  No hydronephrosis or hydroureter.  Bladder is decompressed around a  Garcia catheter and demonstrates scattered intraluminal gas.    The uterus and ovaries are within normal limits.    The small bowel is normal in caliber.  The colon is unremarkable.  The appendix is normal.  No obstruction or inflammatory changes.    There is a large volume of low attenuation abdominal and pelvic ascites.  There is diffuse mesenteric and omental edema.    No intra-abdominal free air. No abdominal or pelvic lymph node enlargement.  No focal mesenteric masses.    The abdominal aorta tapers normally with prominent atherosclerotic calcification.  IVC and common iliac veins are patent.    Subcutaneous soft tissues are within normal limits.    No acute fractures or osseous destruction.  Degenerative changes of the spine noted.                             X-Ray Chest AP Portable (Final result)  Result time 01/25/18 04:16:59    Final result by Fabian Fam MD (01/25/18 04:16:59)                 Impression:        Left basilar airspace disease or subsegmental atelectasis. Findings could reflect developing pneumonia or aspiration in the setting of sepsis.      Electronically signed by: Fabian Fam  Date:     01/25/18  Time:    04:16              Narrative:    CLINICAL INFORMATION: Sepsis.    COMPARISON: None available.    FINDINGS: One view of the chest was obtained.  Cardiac wires overlie the chest. Cardiac silhouette is not enlarged.  Lungs demonstrate bilateral increased interstitial attenuation, with more focal airspace disease or subsegmental atelectasis at the left lung base.  No large pleural effusion. No pneumothorax.                            Assessment/Plan:     * Acute lower GI bleeding    -PPI/Octreotide started  -GI may scope as patient becomes more stable  -No active bleeding at this time        Anemia    -Suspect lower GI bleed  -GI consulted  -s/p transfusion of 2 Units of PRBC's  -Will check cbc 1 hour post transfusion for appropriate response          Ascites    -Patient noted to  have large amount of ascites on imaging  -Will go for paracentesis today  -Will test to rule out SBP        Cirrhosis    -secondary to alcohol  -Patient still drinks alcohol and uses cocaine  -will  on cessation when status improves          VTE Risk Mitigation         Ordered     Reason for No Pharmacological VTE Prophylaxis  Once      01/25/18 0725     Medium Risk of VTE  Once      01/25/18 0725     Place sequential compression device  Until discontinued      01/25/18 0725             Courtney Rodriguez MD  Department of Hospital Medicine   [facility]

## 2018-01-25 NOTE — CONSULTS
"Reason for Consult:  Anemia, ?GI bleed, cirrhosis    HPI:  Pt is a 62 y.o. female  w a h/o ETOH cirrhosis complicated by ascites. She presents to the ED c/o worsening abdominal pain over 2 days. She describes it as severe and diffuse. No aggravating or alleviating factors. She has noticed that her abdomen is increasingly protuberant. She has little appetite. She was recently seen at [facility] and had a paracentesis. Prior to that she had a paracentesis at this faciltiy almost 3 yrs ago. She is on no meds. She was rx diuretics at [facility], but she "ran out". On the day of admission she states that she had 4 bm with red blood. No melena. No vomiting.  She had a severe microcytic anemia upon presentation. She has a lifelong h/o anemia she states. She has never been told that she had sickle cell trait nor thalassemia.     Past Medical History:   Diagnosis Date    Cirrhosis        History reviewed. No pertinent surgical history.    History reviewed. No pertinent family history.    Social History     Social History    Marital status: Single     Spouse name: N/A    Number of children: N/A    Years of education: N/A     Occupational History    Not on file.     Social History Main Topics    Smoking status: Current Every Day Smoker     Types: Cigarettes    Smokeless tobacco: Never Used      Comment: 2 cigarettes a day    Alcohol use Yes      Comment: 1 pint a day    Drug use: Yes     Types: Cocaine    Sexual activity: Not on file     Other Topics Concern    Not on file     Social History Narrative    No narrative on file       Endoscopic History:  none    Review of patient's allergies indicates:  No Known Allergies    No current facility-administered medications on file prior to encounter.      Current Outpatient Prescriptions on File Prior to Encounter   Medication Sig Dispense Refill    oxaprozin (DAYPRO) 600 mg tablet Take 1 tablet (600 mg total) by mouth once daily. 10 tablet 0     Scheduled Meds:   morphine  6 " mg Intravenous ED 1 Time     Continuous Infusions:   sodium chloride 0.9% 125 mL/hr (01/25/18 0831)     PRN Meds:.sodium chloride, sodium chloride 0.9%    Review of Systems:  CONSTITUTIONAL: positive for weakness, no fever, weight loss, weight gain.  HEENT: Eyes: Negative for double/blurred vision. Ears: Negative pain or loss of hearing. Nose:Negative for nasal congestion. Negative for rhinorrhea Mouth: Negative for dry mouth/pain Throat: Negative for masses or hoarseness.  CARDIOVASCULAR: Negative for chest pain or palpitations.  RESPIRATORY: Negative for SOB, wheezes  GASTROINTESTINAL: See HPI  GENITOURINARY: Negative for dysuria/hematuria.  MUSCULOSKELETAL: Negative for osteoarthritis, muscle pain.  SKIN: Negative for rashes/lesions.  NEUROLOGIC: Negative for headaches, numbness/tingling.  ENDOCRINE: Negative for diabetes or thyroid abnormalities.  HEMATOLOGIC: positive for anemia or no blood dyscrasias.    Patient Vitals for the past 24 hrs:   BP Temp Temp src Pulse Resp SpO2 Height Weight   01/25/18 0823 - 98.2 °F (36.8 °C) Oral 100 (!) 21 100 % - -   01/25/18 0815 (!) 95/54 - - 94 (!) 28 100 % - -   01/25/18 0805 (!) 100/54 98.1 °F (36.7 °C) Oral 96 20 100 % - -   01/25/18 0745 (!) 110/58 98.3 °F (36.8 °C) Oral (!) 112 (!) 30 100 % - -   01/25/18 0714 - - - 106 15 100 % - -   01/25/18 0708 - 97.9 °F (36.6 °C) Oral - - - - -   01/25/18 0704 (!) 98/56 - - 106 15 100 % - -   01/25/18 0647 (!) 89/55 - - 106 11 100 % - -   01/25/18 0617 123/66 97.8 °F (36.6 °C) Oral (!) 114 18 100 % - -   01/25/18 0610 (!) 100/57 - - - - - - -   01/25/18 0536 120/65 97.8 °F (36.6 °C) Oral (!) 124 20 100 % - -   01/25/18 0519 (!) 97/52 97.7 °F (36.5 °C) Oral (!) 116 18 100 % - -   01/25/18 0433 (!) 83/52 - - (!) 114 20 100 % - -   01/25/18 0401 (!) 86/55 - - (!) 118 19 100 % - -   01/25/18 0345 - - - (!) 124 - - - -   01/25/18 0302 (!) 86/53 97.8 °F (36.6 °C) Oral (!) 124 16 100 % 5' (1.524 m) 45.4 kg (100 lb)       Gen: thin,  chronically ill appearing, no apparent distress, cooperative  HEENT: Anicteric, PERRLA, normocephalic, neck symmetrical  CV: S1, S2, no murmers/rubs, non-displaced PMI  Lungs: CTA-B, normal excursion  Abd: Soft but protuberant, TTP throughout, dull flanks   Ext: No c/c/e, 1+ DP pulses to BLE's  Neuro: CN II-XII grossly intact, no asterixis.  Skin: No rashes/lesions, normal texture  Psych: AA&O x 4, normal affect    Labs:    Recent Labs  Lab 01/25/18  0411   CALCIUM 7.9*   PROT 7.0      K 3.9   CO2 19*      BUN 24*   CREATININE 1.1   ALKPHOS 94   ALT 15   AST 23   BILITOT 0.3     Recent Results (from the past 336 hour(s))   CBC auto differential    Collection Time: 01/25/18  4:11 AM   Result Value Ref Range    WBC 16.45 (H) 3.90 - 12.70 K/uL    Hemoglobin 4.4 (LL) 12.0 - 16.0 g/dL    Hematocrit 13.2 (LL) 37.0 - 48.5 %    Platelets 155 150 - 350 K/uL       Recent Labs  Lab 01/25/18 0411   INR 1.2   APTT <21.0       Imaging:  CT abd  Impression         No acute intra-abdominal/pelvic CT abnormalities to account for the reported history of abdominal and back pain.    Findings include:  - Partially visualized airspace opacity within the left lower lobe concerning for aspiration or pneumonia.  - CT findings of cirrhosis.  - Suspected portal hypertension noting a large volume of abdominal ascites and a small recanalized umbilical vein.  - Cholelithiasis.         Assessment:  Pt. Is a 62 y.o. female with   1. ETOH cirrhosis  2. Ascites- concern for SBP with this degree of discomfort  3. Severe anemia  4. GI bleeding- not currently. Suspect lower source, also suspect  A significant amount is chronic in light of her relatively stable VS    Recommendations:  1. Paracentesis  2. Empiric antibx  3. No plans to scope today  unlesss therapeutic intervention anticipated as she needs a significant improvement in her Hgb  4. PPI/ octreotide    I would like to take this opportunity to thank you for this consult.  If you  have any questions or concerns, please do not hesitate to contact me.

## 2018-01-25 NOTE — ED NOTES
Received report from ASHA Lynch. First unit of blood infusing at 125 ml/hr to right forearm and  ml/hr infusing to left hand. Pt remains normothermic. Garcia in place secured to right leg with clear yellow urine noted to bag. Pt resting in stretcher but easily arouses to verbal stimulation. Pt denies any complaints at this time. Bed in lowest, locked position, side rails up x 2. Call light within reach.

## 2018-01-25 NOTE — ED PROVIDER NOTES
"Encounter Date: 1/25/2018    SCRIBE #1 NOTE: I, Anny Landrum, am scribing for, and in the presence of, Dr. Ruano.       History     Chief Complaint   Patient presents with    Abdominal Pain     Patient family called EMS for patient c/o abd pain, dizziness, lethargy, altered mental status and not "feeling well"     Time seen by provider: 3:39 AM    This is a 62 y.o. Female, with history of cirrhosis, who presents via EMS with complaint of abdominal pain that began two days ago. Her significant other states the pain worsened today and patient also was dizzy and had difficulty standing this evening when prompted this visit.  The patient's is over the entire abdomen and radiates to her back.  She has associated nausea, vomiting, and blood in stool that began prior to arrival.  Her  says that she isn't [employer] last week and had a paracentesis performed; she was also started on Augmentin 1/9/18.   Patient denies fever, chills, blood in vomitus, SOB, myalgias, or any urinary symptoms.  As per her partner she ran out of all her medications 2 days ago and they were not able to have them refilled. There are no additional complaints.     Additional past medical, surgical, and social history as outlined in the nursing assessment was reviewed by me.        The history is provided by a significant other and the patient.     Review of patient's allergies indicates:  No Known Allergies  Past Medical History:   Diagnosis Date    Cirrhosis      History reviewed. No pertinent surgical history.  History reviewed. No pertinent family history.  Social History   Substance Use Topics    Smoking status: Current Every Day Smoker     Types: Cigarettes    Smokeless tobacco: Never Used      Comment: 2 cigarettes a day    Alcohol use Yes      Comment: 1 pint a day     Review of Systems   Constitutional: Positive for appetite change. Negative for chills and fever.   HENT: Negative for congestion and sore throat.    Respiratory: " Negative for cough and shortness of breath.    Cardiovascular: Negative for chest pain.   Gastrointestinal: Positive for abdominal distention, abdominal pain, blood in stool, nausea and vomiting.        Negative for blood in vomitus.    Genitourinary: Negative for decreased urine volume, difficulty urinating, dysuria, frequency, hematuria and urgency.   Musculoskeletal: Positive for back pain. Negative for myalgias.   Skin: Negative for rash.   Neurological: Positive for dizziness. Negative for weakness, light-headedness and headaches.   Psychiatric/Behavioral: Negative for confusion.       Physical Exam     Initial Vitals   BP Pulse Resp Temp SpO2   -- -- -- -- --      MAP       --         Physical Exam    Nursing note and vitals reviewed.  Constitutional: She appears well-developed. She is not diaphoretic. She appears distressed (due to pain).   HENT:   Head: Normocephalic and atraumatic.   Right Ear: External ear normal.   Left Ear: External ear normal.   Mucous membranes are dry.   Eyes: Conjunctivae and EOM are normal. Right eye exhibits no discharge. Left eye exhibits no discharge. No scleral icterus.   Pupils are 2 mm and reactive.    Neck: Normal range of motion. Neck supple. No tracheal deviation present.   Cardiovascular: Regular rhythm, normal heart sounds and intact distal pulses. Tachycardia present.  Exam reveals no gallop and no friction rub.    No murmur heard.  Pulmonary/Chest: Breath sounds normal. No stridor. No respiratory distress. She has no wheezes. She has no rhonchi. She has no rales.   Lungs are clear with auscultation of the anterior chest wall.   Abdominal: Soft. She exhibits distension. There is tenderness. There is no rebound and no guarding.   Mildly distended abdomen. Diffuse tenderness. No caput medusae.   Musculoskeletal: Normal range of motion. She exhibits no edema or tenderness.   Neurological: She is alert and oriented to person, place, and time. No cranial nerve deficit.    Skin: Skin is warm and dry. Capillary refill takes less than 2 seconds. No rash noted. No erythema. No pallor.   Poor skin turgor.    Psychiatric: She has a normal mood and affect. Her behavior is normal. Judgment and thought content normal.         ED Course   Critical Care  Date/Time: 1/25/2018 4:40 AM  Performed by: TAYE GOVEA  Authorized by: TAYE GOVEA   Direct patient critical care time: 15 minutes  Additional history critical care time: 8 minutes  Ordering / reviewing critical care time: 10 minutes  Documentation critical care time: 10 minutes  Consulting other physicians critical care time: 2 minutes  Consult with family critical care time: 4 minutes  Total critical care time (exclusive of procedural time) : 49 minutes  Critical care time was exclusive of separately billable procedures and treating other patients.  Critical care was necessary to treat or prevent imminent or life-threatening deterioration of the following conditions: circulatory failure.  Critical care was time spent personally by me on the following activities: examination of patient, obtaining history from patient or surrogate, evaluation of patient's response to treatment, interpretation of cardiac output measurements, ordering and performing treatments and interventions, ordering and review of laboratory studies, ordering and review of radiographic studies, re-evaluation of patient's condition, review of old charts, discussions with consultants and pulse oximetry.        Labs Reviewed   CBC W/ AUTO DIFFERENTIAL - Abnormal; Notable for the following:        Result Value    WBC 16.45 (*)     RBC 2.11 (*)     Hemoglobin 4.4 (*)     Hematocrit 13.2 (*)     MCV 63 (*)     MCH 20.9 (*)     RDW 21.4 (*)     MPV 9.0 (*)     Gran # 13.0 (*)     Mono # 1.1 (*)     Gran% 80.3 (*)     Lymph% 12.8 (*)     All other components within normal limits    Narrative:      Hemoglobin & Hematocrit  critical result(s) called and verbal   readback  obtained from Dee Dee Brown, 01/25/2018 04:37   COMPREHENSIVE METABOLIC PANEL - Abnormal; Notable for the following:     CO2 19 (*)     Glucose 136 (*)     BUN, Bld 24 (*)     Calcium 7.9 (*)     Albumin 1.9 (*)     eGFR if non  55 (*)     All other components within normal limits   LACTIC ACID, PLASMA - Abnormal; Notable for the following:     Lactate (Lactic Acid) 2.9 (*)     All other components within normal limits   MAGNESIUM - Abnormal; Notable for the following:     Magnesium 1.3 (*)     All other components within normal limits   PROTIME-INR - Abnormal; Notable for the following:     Prothrombin Time 13.0 (*)     All other components within normal limits   TSH - Abnormal; Notable for the following:     TSH 4.812 (*)     All other components within normal limits   URINALYSIS - Abnormal; Notable for the following:     Ketones, UA Trace (*)     Occult Blood UA 2+ (*)     Leukocytes, UA Trace (*)     All other components within normal limits   URINALYSIS MICROSCOPIC - Abnormal; Notable for the following:     WBC, UA 6 (*)     Hyaline Casts, UA 30 (*)     All other components within normal limits   CULTURE, BLOOD   CULTURE, BLOOD   CULTURE, URINE   APTT   B-TYPE NATRIURETIC PEPTIDE   LIPASE   PHOSPHORUS   TROPONIN I   AMMONIA   ALCOHOL,MEDICAL (ETHANOL)   DRUG SCREEN PANEL, URINE EMERGENCY   T4, FREE   CORTISOL, RANDOM   PROCALCITONIN   TYPE & SCREEN   PREPARE RBC SOFT      Imaging Results          CT Abdomen Pelvis With Contrast (In process)                X-Ray Chest AP Portable (Final result)  Result time 01/25/18 04:16:59    Final result by Fabian Fam MD (01/25/18 04:16:59)                 Impression:        Left basilar airspace disease or subsegmental atelectasis. Findings could reflect developing pneumonia or aspiration in the setting of sepsis.      Electronically signed by: Fabian Fam  Date:     01/25/18  Time:    04:16              Narrative:    CLINICAL INFORMATION:  Sepsis.    COMPARISON: None available.    FINDINGS: One view of the chest was obtained.  Cardiac wires overlie the chest. Cardiac silhouette is not enlarged.  Lungs demonstrate bilateral increased interstitial attenuation, with more focal airspace disease or subsegmental atelectasis at the left lung base.  No large pleural effusion. No pneumothorax.                              EKG Readings: (Independently Interpreted)   Initial Reading: No STEMI.   Sinus tachycardia at 118.        X-Rays:   Independently Interpreted Readings:   Chest X-Ray: No consolidation or effusion. Perihilar cuffing. No pneumothorax.      Medical Decision Making:   Initial Assessment:   Patient presents with abdominal pain and lower GI bleed. Review of her medical records shows a history of cirrhosis. She does not appear to be encephalopathic at this time. I will check hemoglobin, look for signs of infection, give IV fluid, and monitor her BP closely. I will reassess.  Clinical Tests:   Lab Tests: Ordered and Reviewed  Radiological Study: Ordered and Reviewed  Medical Tests: Ordered and Reviewed  ED Management:  5:21 AM - patient is resting comfortably.  Her hemoglobin is 4.4.  She is provided verbal and written consent for transfusion of PRBCs.  They are running at this time.  Her platelets are within normal limits and her PT is minimally above normal.  There is no indication for FFP or Vitamin K at this time. Her lactate is 2.9 - I suspect this is probably due to her severe anemia more so than sepsis; however, urinalysis is still pending. She remains afebrile but tachycardic (though it has improved slightly with IVF). I will continue to monitor.    6:16 AM - Patient's diagnostic workup shows no signs of infection. I will admit for continued transfusion and evaluation by GI. Case discussed with Dr. Christopher Donohue of the hospitalist service who has accepted this admission and will assume care of the patient at this time. Patient still has CT  results pending - Dr. Smith will follow this up  to ensure no acute surgical process.               Scribe Attestation:   Scribe #1: I performed the above scribed service and the documentation accurately describes the services I performed. I attest to the accuracy of the note.    Attending Attestation:           Physician Attestation for Scribe:  Physician Attestation Statement for Scribe #1: I, Dr. Ruano , reviewed documentation, as scribed by Anny Landrum in my presence, and it is both accurate and complete.                 ED Course      Clinical Impression:     1. Abdominal pain    2. Acute lower GI bleeding                               Dee Dee Ruano MD  01/25/18 0622

## 2018-01-26 ENCOUNTER — ANESTHESIA (OUTPATIENT)
Dept: ENDOSCOPY | Facility: OTHER | Age: 63
DRG: 432 | End: 2018-01-26
Payer: MEDICAID

## 2018-01-26 ENCOUNTER — SURGERY (OUTPATIENT)
Age: 63
End: 2018-01-26

## 2018-01-26 ENCOUNTER — ANESTHESIA EVENT (OUTPATIENT)
Dept: ENDOSCOPY | Facility: OTHER | Age: 63
DRG: 432 | End: 2018-01-26
Payer: MEDICAID

## 2018-01-26 PROBLEM — Q39.4: Status: ACTIVE | Noted: 2018-01-26

## 2018-01-26 PROBLEM — R10.9 ABDOMINAL PAIN: Status: ACTIVE | Noted: 2018-01-26

## 2018-01-26 PROBLEM — E43 SEVERE MALNUTRITION: Status: ACTIVE | Noted: 2018-01-26

## 2018-01-26 LAB
ALBUMIN SERPL BCP-MCNC: 1.6 G/DL
ALP SERPL-CCNC: 85 U/L
ALT SERPL W/O P-5'-P-CCNC: 11 U/L
AMMONIA PLAS-SCNC: 56 UMOL/L
ANION GAP SERPL CALC-SCNC: 7 MMOL/L
AST SERPL-CCNC: 24 U/L
BACTERIA UR CULT: NORMAL
BILIRUB SERPL-MCNC: 0.9 MG/DL
BUN SERPL-MCNC: 13 MG/DL
CALCIUM SERPL-MCNC: 7.1 MG/DL
CHLORIDE SERPL-SCNC: 112 MMOL/L
CO2 SERPL-SCNC: 17 MMOL/L
CREAT SERPL-MCNC: 0.8 MG/DL
EST. GFR  (AFRICAN AMERICAN): >60 ML/MIN/1.73 M^2
EST. GFR  (NON AFRICAN AMERICAN): >60 ML/MIN/1.73 M^2
GLUCOSE SERPL-MCNC: 152 MG/DL
POTASSIUM SERPL-SCNC: 4.5 MMOL/L
PROT SERPL-MCNC: 5.7 G/DL
SODIUM SERPL-SCNC: 136 MMOL/L

## 2018-01-26 PROCEDURE — 25000003 PHARM REV CODE 250: Performed by: INTERNAL MEDICINE

## 2018-01-26 PROCEDURE — C9113 INJ PANTOPRAZOLE SODIUM, VIA: HCPCS | Performed by: INTERNAL MEDICINE

## 2018-01-26 PROCEDURE — 63600175 PHARM REV CODE 636 W HCPCS: Performed by: NURSE ANESTHETIST, CERTIFIED REGISTERED

## 2018-01-26 PROCEDURE — 99900035 HC TECH TIME PER 15 MIN (STAT)

## 2018-01-26 PROCEDURE — 97802 MEDICAL NUTRITION INDIV IN: CPT

## 2018-01-26 PROCEDURE — 82140 ASSAY OF AMMONIA: CPT

## 2018-01-26 PROCEDURE — 37000008 HC ANESTHESIA 1ST 15 MINUTES: Performed by: INTERNAL MEDICINE

## 2018-01-26 PROCEDURE — 25000003 PHARM REV CODE 250: Performed by: NURSE ANESTHETIST, CERTIFIED REGISTERED

## 2018-01-26 PROCEDURE — 43235 EGD DIAGNOSTIC BRUSH WASH: CPT | Performed by: INTERNAL MEDICINE

## 2018-01-26 PROCEDURE — S4991 NICOTINE PATCH NONLEGEND: HCPCS | Performed by: INTERNAL MEDICINE

## 2018-01-26 PROCEDURE — 80053 COMPREHEN METABOLIC PANEL: CPT

## 2018-01-26 PROCEDURE — 0D718ZZ DILATION OF UPPER ESOPHAGUS, VIA NATURAL OR ARTIFICIAL OPENING ENDOSCOPIC: ICD-10-PCS | Performed by: INTERNAL MEDICINE

## 2018-01-26 PROCEDURE — 63600175 PHARM REV CODE 636 W HCPCS: Performed by: INTERNAL MEDICINE

## 2018-01-26 PROCEDURE — 37000009 HC ANESTHESIA EA ADD 15 MINS: Performed by: INTERNAL MEDICINE

## 2018-01-26 PROCEDURE — 20000000 HC ICU ROOM

## 2018-01-26 PROCEDURE — 94761 N-INVAS EAR/PLS OXIMETRY MLT: CPT

## 2018-01-26 PROCEDURE — 36415 COLL VENOUS BLD VENIPUNCTURE: CPT

## 2018-01-26 PROCEDURE — 99233 SBSQ HOSP IP/OBS HIGH 50: CPT | Mod: ,,, | Performed by: INTERNAL MEDICINE

## 2018-01-26 RX ORDER — PROPOFOL 10 MG/ML
VIAL (ML) INTRAVENOUS
Status: DISCONTINUED | OUTPATIENT
Start: 2018-01-26 | End: 2018-01-26

## 2018-01-26 RX ORDER — SODIUM CHLORIDE, SODIUM LACTATE, POTASSIUM CHLORIDE, CALCIUM CHLORIDE 600; 310; 30; 20 MG/100ML; MG/100ML; MG/100ML; MG/100ML
INJECTION, SOLUTION INTRAVENOUS CONTINUOUS PRN
Status: DISCONTINUED | OUTPATIENT
Start: 2018-01-26 | End: 2018-01-26

## 2018-01-26 RX ORDER — PANTOPRAZOLE SODIUM 40 MG/10ML
40 INJECTION, POWDER, LYOPHILIZED, FOR SOLUTION INTRAVENOUS DAILY
Status: DISCONTINUED | OUTPATIENT
Start: 2018-01-26 | End: 2018-02-01 | Stop reason: HOSPADM

## 2018-01-26 RX ORDER — LIDOCAINE HCL/PF 100 MG/5ML
SYRINGE (ML) INTRAVENOUS
Status: DISCONTINUED | OUTPATIENT
Start: 2018-01-26 | End: 2018-01-26

## 2018-01-26 RX ADMIN — PROPOFOL 20 MG: 10 INJECTION, EMULSION INTRAVENOUS at 07:01

## 2018-01-26 RX ADMIN — Medication 100 MG: at 08:01

## 2018-01-26 RX ADMIN — SODIUM CHLORIDE, SODIUM LACTATE, POTASSIUM CHLORIDE, AND CALCIUM CHLORIDE: 600; 310; 30; 20 INJECTION, SOLUTION INTRAVENOUS at 07:01

## 2018-01-26 RX ADMIN — PROPOFOL 30 MG: 10 INJECTION, EMULSION INTRAVENOUS at 07:01

## 2018-01-26 RX ADMIN — PANTOPRAZOLE SODIUM 40 MG: 40 INJECTION, POWDER, FOR SOLUTION INTRAVENOUS at 08:01

## 2018-01-26 RX ADMIN — NICOTINE 1 PATCH: 21 PATCH, EXTENDED RELEASE TRANSDERMAL at 08:01

## 2018-01-26 RX ADMIN — FOLIC ACID 1 MG: 1 TABLET ORAL at 08:01

## 2018-01-26 RX ADMIN — CEFTRIAXONE 2 G: 2 INJECTION, SOLUTION INTRAVENOUS at 10:01

## 2018-01-26 RX ADMIN — LIDOCAINE HYDROCHLORIDE 50 MG: 20 INJECTION, SOLUTION INTRAVENOUS at 07:01

## 2018-01-26 RX ADMIN — IBUPROFEN 600 MG: 100 SUSPENSION ORAL at 06:01

## 2018-01-26 RX ADMIN — SODIUM CHLORIDE: 0.9 INJECTION, SOLUTION INTRAVENOUS at 02:01

## 2018-01-26 RX ADMIN — OCTREOTIDE ACETATE 50 MCG/HR: 500 INJECTION, SOLUTION INTRAVENOUS; SUBCUTANEOUS at 02:01

## 2018-01-26 NOTE — ANESTHESIA PREPROCEDURE EVALUATION
01/26/2018  Neena Marks is a 62 y.o., female.    Anesthesia Evaluation    I have reviewed the Patient Summary Reports.    I have reviewed the Nursing Notes.   I have reviewed the Medications.     Review of Systems  Social:  Smoker A few cigs per day.  Cocaine abuse.  Alcohol abuse   Cardiovascular:   Exercise tolerance: poor    Hepatic/GI:  Liver Disease, Alcoholic Liver Disease , Cirrhosis Portal Hypertension, Ascites, Esophageal Varices        Physical Exam  General:  Malnutrition, Cachexia      Dental:  Dental Findings: Edentulous             Anesthesia Plan  Type of Anesthesia, risks & benefits discussed:  Anesthesia Type:  general  Patient's Preference:   Intra-op Monitoring Plan:   Intra-op Monitoring Plan Comments:   Post Op Pain Control Plan:   Post Op Pain Control Plan Comments:   Induction:   IV  Beta Blocker:         Informed Consent: Patient understands risks and agrees with Anesthesia plan.  Questions answered. Anesthesia consent signed with patient.  ASA Score: 4     Day of Surgery Review of History & Physical:    H&P update referred to the surgeon.         Ready For Surgery From Anesthesia Perspective.

## 2018-01-26 NOTE — CHAPLAIN
Spiritual Care has been following this patient.  I was able to meet with her today and offer prayer, assessing her concerns.  SC will continue to follow.  Monet Ureña  [phone]

## 2018-01-26 NOTE — PLAN OF CARE
Problem: Patient Care Overview  Goal: Plan of Care Review  Outcome: Ongoing (interventions implemented as appropriate)  Recommendation/Intervention:   1. When medically feasible, advance to low Na mechanical soft diet as tolerated   2. When diet is advanced, recommend Boost Plus bid (chocolate or strawberry)   3. Assess diet education needs when diet is advanced     Goals:   1. Meet >85% EEN and EPN   2. Prevent further dry wt loss   3. Promote nutrition related labs wnl

## 2018-01-26 NOTE — CONSULTS
[facility]  Adult Nutrition  Consult Note    SUMMARY     Recommendations    Recommendation/Intervention:   1. When medically feasible, advance to low Na mechanical soft diet as tolerated   2. When diet is advanced, recommend Boost Plus bid (chocolate or strawberry)   3. Assess diet education needs when diet is advanced    Goals:   1. Meet >85% EEN and EPN   2. Prevent further dry wt loss   3. Promote nutrition related labs wnl    Nutrition Goal Status: new  Communication of RD Recs: other (comment) (care plan)    Reason for Assessment    Reason for Assessment: nurse/nurse practitioner consult  Diagnosis:   1. Abdominal pain    2. Acute lower GI bleeding      Past Medical History:   Diagnosis Date    Cirrhosis         Interdisciplinary Rounds: attended     General Information Comments: Pt is s/p paracentesis yesterday 2/2 ascites upon admission, s/p EGD today, esophageal varices noted. Pt endorses poor appetite PTA since October. Currently on clear liquids, tolerating. , 15% wt loss x 6 months per chart, confirmed with pt. Nutrition focused physical exam performed. Pt has severe temporal depletion and moderate muscle/fat depletion of triceps and clavicle.    Nutrition Discharge Planning: pending medical course    Nutrition Prescription Ordered    Current Diet Order: clear liquid    Evaluation of Received Nutrients/Fluid Intake    IV Fluid (mL): 1297  Other Fluid (mL): 300   Total Fluid Intake (mL): 1597  Fluid Required: meeting needs     % Intake of Estimated Energy Needs: 25 - 50 %  % Meal Intake: 25%     Nutrition Risk Screen     Nutrition Risk Screen: no indicators present    Nutrition/Diet History     Food Preferences: No cultural/spiritual prefs noted  Factors Affecting Nutritional Intake: altered gastrointestinal function, decreased appetite     Labs/Tests/Procedures/Meds    Diagnostic Test/Procedure Review: reviewed  Pertinent Labs Reviewed: reviewed  Lab Results   Component Value  "Date     2018    K 4.5 2018     (H) 2018    CO2 17 (L) 2018    BUN 13 2018    CREATININE 0.8 2018    CALCIUM 7.1 (L) 2018    PHOS 3.1 2018    MG 1.3 (L) 2018    ESTGFRAFRICA >60 2018    EGFRNONAA >60 2018    ALBUMIN 1.6 (L) 2018   *corrected Ca 9.02    Lab Results   Component Value Date    HCT 28.8 (L) 2018     Lab Results   Component Value Date    HGB 10.1 (L) 2018      Pertinent Medications Reviewed: reviewed     Scheduled Meds:   cefTRIAXone (ROCEPHIN) IVPB  2 g Intravenous Q24H    folic acid  1 mg Oral Daily    nicotine  1 patch Transdermal Daily    pantoprazole  40 mg Intravenous Daily    thiamine  100 mg Oral Daily     Continuous Infusions:   sodium chloride 0.9% 125 mL/hr at 18 0221     Physical Findings    Overall Physical Appearance: loss of muscle mass, loss of subcutaneous fat  Oral/Mouth Cavity: tooth/teeth missing  Skin: intact    Anthropometrics    Temp: 98.5 °F (36.9 °C)  Height: 4' 11" (149.9 cm)  Weight Method: Bed Scale  Weight: 40.4 kg (89 lb 1.1 oz)  Ideal Body Weight (IBW), Female: 95 lb  % Ideal Body Weight, Female (lb): 93.76 lb  BMI (Calculated): 18  BMI Grade: 17 - 18.4 protein-energy malnutrition grade I  Usual Body Weight (UBW), k.7 kg  % Usual Body Weight: 84.87  % Weight Change From Usual Weight: -15.3 %    Estimated/Assessed Needs    Weight Used For Calorie Calculations: 40.4 kg (89 lb 1.1 oz)   Energy Calorie Requirements (kcal): 0182-2319  Energy Need Method: Kcal/kg (30-35 kcal/kg)  RMR (Rockwell-St. Jeor Equation): 879.62     Weight Used For Protein Calculations: 40.4 kg (89 lb 1.1 oz)  Protein Requirements: 49-61 gm/d (1.2-1.5 gm/kg)    Fluid Requirements (mL): 1215 (or per team)  Fluid Need Method: RDA Method    Assessment and Plan    Severe malnutrition    Malnutrition in the context of Chronic Illness/Injury    Related to (etiology):  etoh cirrhosis    Signs and " Symptoms (as evidenced by):  15% wt loss x 6 months, severe temporal depletion and moderate muscle/fat depletion of triceps and clavicle, and ascites upon admission requiring paracentesis    Interventions/Recommendations (treatment strategy):  When medically feasible, advance to low Na mechanical soft diet as tolerated. When diet is advanced, recommend Boost Plus bid (chocolate or strawberry). See RD note 1/26/2018 for full recs.    Nutrition Diagnosis Status:  New          Monitor and Evaluation    Food and Nutrient Intake: energy intake, food and beverage intake  Food and Nutrient Adminstration: diet order  Physical Activity and Function: nutrition-related ADLs and IADLs  Anthropometric Measurements: weight, weight change  Biochemical Data, Medical Tests and Procedures: electrolyte and renal panel, gastrointestinal profile, glucose/endocrine profile, inflammatory profile  Nutrition-Focused Physical Findings: overall appearance, extremities, muscles and bones, skin    Nutrition Risk    Level of Risk: other (see comments) (f/u 2x/wk)    Nutrition Follow-Up    RD Follow-up?: Yes    Makayla Humphrey, MS, RD, LDN   Dietitian, [facility]  [phone]

## 2018-01-26 NOTE — TRANSFER OF CARE
"Anesthesia Transfer of Care Note    Patient: Neena Marks    Procedure(s) Performed: Procedure(s) (LRB):  ESOPHAGOGASTRODUODENOSCOPY (EGD) (N/A)    Patient location: ICU    Anesthesia Type: general    Transport from OR: Transported from OR on room air with adequate spontaneous ventilation    Post pain: adequate analgesia    Post assessment: no apparent anesthetic complications and tolerated procedure well    Post vital signs: stable    Level of consciousness: awake, alert and oriented    Nausea/Vomiting: no nausea/vomiting    Complications: none    Transfer of care protocol was followed      Last vitals:   Visit Vitals  BP (!) 82/52   Pulse 74   Temp 36.8 °C (98.2 °F) (Oral)   Resp 13   Ht 4' 11" (1.499 m)   Wt 40.4 kg (89 lb 1.1 oz)   SpO2 98%   Breastfeeding? No   BMI 17.99 kg/m²     "

## 2018-01-26 NOTE — ASSESSMENT & PLAN NOTE
Malnutrition in the context of Chronic Illness/Injury    Related to (etiology):  etoh cirrhosis    Signs and Symptoms (as evidenced by):  15% wt loss x 6 months, severe temporal depletion and moderate muscle/fat depletion of triceps and clavicle, and ascites upon admission requiring paracentesis    Interventions/Recommendations (treatment strategy):  When medically feasible, advance to low Na mechanical soft diet as tolerated. When diet is advanced, recommend Boost Plus bid (chocolate or strawberry). See RD note 1/26/2018 for full recs.    Nutrition Diagnosis Status:  New

## 2018-01-26 NOTE — PLAN OF CARE
Problem: Patient Care Overview  Goal: Plan of Care Review  Outcome: Ongoing (interventions implemented as appropriate)  Pt on RA. Sats 99%. No distress noted. Will continue to monitor.

## 2018-01-26 NOTE — PROGRESS NOTES
EGD today.  See Provation for full report.    Findings:  -cervical esophageal web s/p autodilation with the gastroscope  -Grade 2 esophageal varices without stigmata of bleeding  -normal stomach  -normal small bowel  -no source of bleeding identified.    Plan:  -Daily PPI  -Stop Octreotide  -Serial Hb/Hct, transfuse as needed with goal Hb 8-9  -No evidence of SBP on current ascites analysis, although bloody tap.  Can continue with empiric abx pending findal culture  -Daily non selective B blocker when able  -If continues to bleed, cscope on Monday.

## 2018-01-26 NOTE — ANESTHESIA POSTPROCEDURE EVALUATION
"Anesthesia Post Evaluation    Patient: Neena Marks    Procedure(s) Performed: Procedure(s) (LRB):  ESOPHAGOGASTRODUODENOSCOPY (EGD) (N/A)    Final Anesthesia Type: general  Patient location during evaluation: ICU  Patient participation: Yes- Able to Participate  Level of consciousness: awake  Post-procedure vital signs: reviewed and stable  Pain management: adequate  Airway patency: patent  PONV status at discharge: No PONV  Anesthetic complications: no      Cardiovascular status: blood pressure returned to baseline and stable  Respiratory status: unassisted and spontaneous ventilation  Hydration status: euvolemic  Follow-up not needed.        Visit Vitals  /74   Pulse 72   Temp 36.9 °C (98.5 °F) (Oral)   Resp (!) 22   Ht 4' 11" (1.499 m)   Wt 40.4 kg (89 lb 1.1 oz)   SpO2 100%   Breastfeeding? No   BMI 17.99 kg/m²       Pain/Kelly Score: Pain Assessment Performed: Yes (1/26/2018 11:15 AM)  Presence of Pain: denies (1/26/2018 11:15 AM)  Pain Rating Prior to Med Admin: 4 (1/25/2018 11:56 PM)  Pain Rating Post Med Admin: 3 (1/26/2018 12:37 AM)  Kelly Score: 9 (1/26/2018  7:43 AM)      "

## 2018-01-27 LAB
ALBUMIN SERPL BCP-MCNC: 1.8 G/DL
ALP SERPL-CCNC: 92 U/L
ALT SERPL W/O P-5'-P-CCNC: 15 U/L
AMMONIA PLAS-SCNC: 25 UMOL/L
ANION GAP SERPL CALC-SCNC: 6 MMOL/L
AST SERPL-CCNC: 27 U/L
BILIRUB SERPL-MCNC: 0.6 MG/DL
BUN SERPL-MCNC: 9 MG/DL
CALCIUM SERPL-MCNC: 7.9 MG/DL
CHLORIDE SERPL-SCNC: 113 MMOL/L
CO2 SERPL-SCNC: 19 MMOL/L
CREAT SERPL-MCNC: 0.8 MG/DL
EST. GFR  (AFRICAN AMERICAN): >60 ML/MIN/1.73 M^2
EST. GFR  (NON AFRICAN AMERICAN): >60 ML/MIN/1.73 M^2
GLUCOSE SERPL-MCNC: 107 MG/DL
POTASSIUM SERPL-SCNC: 4 MMOL/L
PROT SERPL-MCNC: 6.5 G/DL
SODIUM SERPL-SCNC: 138 MMOL/L

## 2018-01-27 PROCEDURE — 99233 SBSQ HOSP IP/OBS HIGH 50: CPT | Mod: ,,, | Performed by: INTERNAL MEDICINE

## 2018-01-27 PROCEDURE — S4991 NICOTINE PATCH NONLEGEND: HCPCS | Performed by: INTERNAL MEDICINE

## 2018-01-27 PROCEDURE — 11000001 HC ACUTE MED/SURG PRIVATE ROOM

## 2018-01-27 PROCEDURE — 80053 COMPREHEN METABOLIC PANEL: CPT

## 2018-01-27 PROCEDURE — 63600175 PHARM REV CODE 636 W HCPCS: Performed by: INTERNAL MEDICINE

## 2018-01-27 PROCEDURE — 25000003 PHARM REV CODE 250: Performed by: INTERNAL MEDICINE

## 2018-01-27 PROCEDURE — C9113 INJ PANTOPRAZOLE SODIUM, VIA: HCPCS | Performed by: INTERNAL MEDICINE

## 2018-01-27 PROCEDURE — 36415 COLL VENOUS BLD VENIPUNCTURE: CPT

## 2018-01-27 PROCEDURE — 82140 ASSAY OF AMMONIA: CPT

## 2018-01-27 RX ADMIN — FOLIC ACID 1 MG: 1 TABLET ORAL at 11:01

## 2018-01-27 RX ADMIN — IBUPROFEN 600 MG: 100 SUSPENSION ORAL at 05:01

## 2018-01-27 RX ADMIN — Medication 100 MG: at 11:01

## 2018-01-27 RX ADMIN — CEFTRIAXONE 2 G: 2 INJECTION, SOLUTION INTRAVENOUS at 11:01

## 2018-01-27 RX ADMIN — PANTOPRAZOLE SODIUM 40 MG: 40 INJECTION, POWDER, FOR SOLUTION INTRAVENOUS at 11:01

## 2018-01-27 RX ADMIN — NICOTINE 1 PATCH: 21 PATCH, EXTENDED RELEASE TRANSDERMAL at 11:01

## 2018-01-27 NOTE — PLAN OF CARE
Problem: Patient Care Overview  Goal: Plan of Care Review  Outcome: Ongoing (interventions implemented as appropriate)  Patient on RA. [id number]%. Pt. in no distress, will continue to monitor.

## 2018-01-27 NOTE — ASSESSMENT & PLAN NOTE
-Patient noted to have large amount of ascites on imaging  -s/p paracentesis  -No evidence of SBP  -Will cont abx empirically

## 2018-01-27 NOTE — PLAN OF CARE
Problem: Patient Care Overview  Goal: Plan of Care Review  Outcome: Ongoing (interventions implemented as appropriate)  Pt had an uneventful night. VSS. One brown bowel movement noted. PRN pain medication given for lower abdominal pain. Pt changed positions independently. Family remained at bedside. Q 2 hour rounds performed. Safety maintained.

## 2018-01-27 NOTE — PROGRESS NOTES
"Subjective:       Patient ID: Neena Marks is a 62 y.o. female.    Chief Complaint:  Abdominal Pain (Patient family called EMS for patient c/o abd pain, dizziness, lethargy, altered mental status and not "feeling well")       History of Present Illness  Pt denies any overt gi bleed    Review of Systems  Respiratory: negative      Objective:     Vitals:    01/27/18 0400 01/27/18 0500 01/27/18 0600 01/27/18 0700   BP: 131/78 133/75 (!) 135/91 (!) 147/67   Pulse: 76 80 94 66   Resp: (!) 23 (!) 26 (!) 25 17   Temp:       TempSrc:       SpO2: 100% 97% 98% 99%   Weight:       Height:          Abdomen: soft, non-tender; bowel sounds normal; no masses,  no organomegaly    Data Review   Recent Results (from the past 336 hour(s))   CBC auto differential    Collection Time: 01/25/18 12:14 PM   Result Value Ref Range    WBC 21.10 (H) 3.90 - 12.70 K/uL    Hemoglobin 10.1 (L) 12.0 - 16.0 g/dL    Hematocrit 28.8 (L) 37.0 - 48.5 %    Platelets 153 150 - 350 K/uL   CBC auto differential    Collection Time: 01/25/18  4:11 AM   Result Value Ref Range    WBC 16.45 (H) 3.90 - 12.70 K/uL    Hemoglobin 4.4 (LL) 12.0 - 16.0 g/dL    Hematocrit 13.2 (LL) 37.0 - 48.5 %    Platelets 155 150 - 350 K/uL        Recent Labs  Lab 01/25/18  0411   APTT <21.0   INR 1.2       Recent Labs  Lab 01/25/18  1214 01/26/18  0325 01/27/18  0318    136 138   K 4.1 4.5 4.0   * 112* 113*   CO2 21* 17* 19*   BUN 19 13 9   CREATININE 0.8 0.8 0.8   CALCIUM 8.0* 7.1* 7.9*   PROT 7.2  6.6 5.7* 6.5   BILITOT 2.2* 0.9 0.6   ALKPHOS 108 85 92   ALT 11 11 15   AST 24 24 27    Lab Results   Component Value Date    HEPAIGM Negative 02/04/2015    HEPBIGM Negative 02/04/2015    HEPCAB Negative 02/04/2015      Lab Results   Component Value Date    AFP 21 (H) 02/05/2015      Lab Results   Component Value Date    CEA 5.9 (H) 02/06/2015        Recent Labs  Lab 01/25/18  0411   APTT <21.0   INR 1.2        Assessment:     1. Abdominal pain    2. Acute lower GI " bleeding        Plan:     1) Serial h/h  2) Elective colonoscopy

## 2018-01-27 NOTE — PLAN OF CARE
Problem: Patient Care Overview  Goal: Plan of Care Review  Outcome: Ongoing (interventions implemented as appropriate)  Pt AAOx4. VSS. Underwent EGD this shift. Pt tolerating clear liquid diet as ordered; will to advance to low sodium dental soft per MD orders. BM x 1 this shift post EGD with no signs of occult blood visible. Will continue to monitor. Awaiting MS bed.

## 2018-01-27 NOTE — ASSESSMENT & PLAN NOTE
-PPI  -Octreotide discontinued today  -GI may scope as patient becomes more stable  -No active bleeding at this time

## 2018-01-27 NOTE — PROGRESS NOTES
[facility] Hospital Medicine  Progress Note    Patient Name: Neena Marks  MRN: [id number]  Patient Class: IP- Inpatient   Admission Date: 1/25/2018  Length of Stay: 1 days  Attending Physician: Courtney Rodriguez, *  Primary Care Provider: [facility]        Subjective:     Principal Problem:Acute lower GI bleeding    HPI:  63 yo female with a PMH of alcoholic cirrhosis presents to the ED c/o bilateral lower abdominal pain x 2 days.  Patient was admitted at [facility] last week, underwent paracentesis, and discharged with Augmentin.  Patient reports last hospitalization or paracentesis was 2-3 years ago. She admits to bloody bowel movements prior to this admission.  In Er, patient found to be hypotensive with a systolic in the 80's, tachycardic at 124, and a hemoglobin of 4.4.  After 1 pint of blood, heart rate improved to 115 and systolic blood pressure improved to 100-110.  Patient states that pain has improved but still very tired.     Hospital Course:  No notes on file    Interval History: Feels much better today, discussed plan of care and patient in agreement. Denies abdominal pain.     Review of Systems   Constitutional: Positive for appetite change and fatigue.   HENT: Negative.    Eyes: Negative.    Respiratory: Negative for chest tightness and shortness of breath.    Cardiovascular: Negative for chest pain and palpitations.   Gastrointestinal: Positive for abdominal distention and blood in stool. Negative for abdominal pain.   Endocrine: Negative.    Genitourinary: Negative for difficulty urinating and frequency.   Musculoskeletal: Negative for arthralgias.   Neurological: Negative for syncope and speech difficulty.   Psychiatric/Behavioral: Negative for agitation, behavioral problems and confusion.     Objective:     Vital Signs (Most Recent):  Temp: 100 °F (37.8 °C) (01/26/18 2000)  Pulse: 86 (01/26/18 2200)  Resp: (!) 23 (01/26/18  2200)  BP: 103/64 (01/26/18 2200)  SpO2: 100 % (01/26/18 2200) Vital Signs (24h Range):  Temp:  [97.9 °F (36.6 °C)-100 °F (37.8 °C)] 100 °F (37.8 °C)  Pulse:  [] 86  Resp:  [13-35] 23  SpO2:  [97 %-100 %] 100 %  BP: ()/(50-84) 103/64     Weight: 40.4 kg (89 lb 1.1 oz)  Body mass index is 17.99 kg/m².    Intake/Output Summary (Last 24 hours) at 01/26/18 2243  Last data filed at 01/26/18 1930   Gross per 24 hour   Intake             1735 ml   Output             1575 ml   Net              160 ml      Physical Exam   Constitutional: She is oriented to person, place, and time. She appears cachectic. She has a sickly appearance. She appears ill.   HENT:   Head: Normocephalic and atraumatic.   Eyes: EOM are normal.   Neck: Normal range of motion.   Cardiovascular: Normal rate and regular rhythm.    Pulmonary/Chest: Effort normal and breath sounds normal.   Abdominal: Soft. Bowel sounds are normal. There is no tenderness. There is no guarding.   Musculoskeletal: Normal range of motion.   Neurological: She is alert and oriented to person, place, and time.   Skin: Skin is warm.   Psychiatric: She has a normal mood and affect. Her behavior is normal.   Vitals reviewed.      Significant Labs:   CBC:   Recent Labs  Lab 01/25/18  0411 01/25/18  1214   WBC 16.45* 21.10*  20.88*   HGB 4.4* 10.1*   HCT 13.2* 28.8*    153     CMP:   Recent Labs  Lab 01/25/18  0411 01/25/18  1214 01/26/18  0325    139 136   K 3.9 4.1 4.5    111* 112*   CO2 19* 21* 17*   * 108 152*   BUN 24* 19 13   CREATININE 1.1 0.8 0.8   CALCIUM 7.9* 8.0* 7.1*   PROT 7.0 7.2  6.6 5.7*   ALBUMIN 1.9* 2.1*  1.9* 1.6*   BILITOT 0.3 2.2* 0.9   ALKPHOS 94 108 85   AST 23 24 24   ALT 15 11 11   ANIONGAP 12 7* 7*   EGFRNONAA 55* >60 >60     All pertinent labs within the past 24 hours have been reviewed.    Significant Imaging: I have reviewed all pertinent imaging results/findings within the past 24 hours.   Imaging Results           IR Paracentesis with Imaging (Final result)  Result time 01/25/18 12:03:45    Final result by Amor Millard MD (01/25/18 12:03:45)                 Impression:      Ultrasound-guided paracentesis performed with 2800 mL of thin dark maroon fluid removed.  A sample of fluid was sent for the requested studies.      Electronically signed by: AMOR MILLARD MD  Date:     01/25/18  Time:    12:03              Narrative:    Procedure: Ultrasound-guided paracentesis dated 1/25/18.    After obtaining informed consent from the patient, using sterile technique, 1% lidocaine local anesthetic, and ultrasound guidance a 5 Ecuadorean centesis needle was advanced into the ascites via a left lower quadrant abdominal approach.  2800 mL of thin dark maroon fluid was removed.  The catheter was then removed and hemostasis was achieved.  No immediate post procedure complications are noted.                             CT Abdomen Pelvis With Contrast (Final result)  Result time 01/25/18 06:49:19    Final result by Reed Swift MD (01/25/18 06:49:19)                 Impression:        No acute intra-abdominal/pelvic CT abnormalities to account for the reported history of abdominal and back pain.    Findings include:  - Partially visualized airspace opacity within the left lower lobe concerning for aspiration or pneumonia.  - CT findings of cirrhosis.  - Suspected portal hypertension noting a large volume of abdominal ascites and a small recanalized umbilical vein.  - Cholelithiasis.      Electronically signed by: REED SWIFT MD  Date:     01/25/18  Time:    06:49              Narrative:    Technique: 5-mm axial images were obtained through the abdomen and pelvis following administration of 75 cc of Omnipaque 350 IV contrast.  Images were submitted for coronal, and sagittal reformats.    Comparison: CT 02/03/2015.    Findings:    Visualized heart demonstrates trace coronary artery atherosclerosis.  No pericardial  effusion.    Visualized lungs demonstrate a partially visualized airspace opacity within the superior segment of the left lower lobe, possibly pneumonia or aspiration.  Partially visualized nodular opacity within the right lower lobe not well characterized.  No pleural effusions.    The liver demonstrates a nodular contour and widened fissures.  Hepatic parenchyma demonstrates diffusely heterogeneous enhancement without discrete lesions.  No intrahepatic bile duct dilatation.  Portal vein, splenic vein and SMV are patent.  There is a small recanalized umbilical vein.    Calcified stones noted within the gallbladder lumen.  No surrounding inflammatory changes.  Common bile duct is at the upper limit of normal in caliber with tip at the level of the ampulla.    There is a small sliding type hiatal hernia.  The stomach is otherwise decompressed and not well evaluated.    Duodenum, spleen, pancreas and adrenal glands are within normal limits.    Kidneys are normal in size and location.  No enhancing renal masses.  Subcentimeter nonenhancing right cortical lesion likely represents a cyst.  No nephrolithiasis.  No hydronephrosis or hydroureter.  Bladder is decompressed around a Garcia catheter and demonstrates scattered intraluminal gas.    The uterus and ovaries are within normal limits.    The small bowel is normal in caliber.  The colon is unremarkable.  The appendix is normal.  No obstruction or inflammatory changes.    There is a large volume of low attenuation abdominal and pelvic ascites.  There is diffuse mesenteric and omental edema.    No intra-abdominal free air. No abdominal or pelvic lymph node enlargement.  No focal mesenteric masses.    The abdominal aorta tapers normally with prominent atherosclerotic calcification.  IVC and common iliac veins are patent.    Subcutaneous soft tissues are within normal limits.    No acute fractures or osseous destruction.  Degenerative changes of the spine noted.                              X-Ray Chest AP Portable (Final result)  Result time 01/25/18 04:16:59    Final result by Fabian Fam MD (01/25/18 04:16:59)                 Impression:        Left basilar airspace disease or subsegmental atelectasis. Findings could reflect developing pneumonia or aspiration in the setting of sepsis.      Electronically signed by: Fabian Fam  Date:     01/25/18  Time:    04:16              Narrative:    CLINICAL INFORMATION: Sepsis.    COMPARISON: None available.    FINDINGS: One view of the chest was obtained.  Cardiac wires overlie the chest. Cardiac silhouette is not enlarged.  Lungs demonstrate bilateral increased interstitial attenuation, with more focal airspace disease or subsegmental atelectasis at the left lung base.  No large pleural effusion. No pneumothorax.                                Assessment/Plan:      * Acute lower GI bleeding    -PPI  -Octreotide discontinued today  -GI may scope as patient becomes more stable  -No active bleeding at this time        Anemia    -Suspect lower GI bleed  -GI consulted  -s/p transfusion of 2 Units of PRBC's  -Hgb now at 10.1          Severe malnutrition              Abdominal pain              Esophageal web determined by endoscopy              Ascites    -Patient noted to have large amount of ascites on imaging  -s/p paracentesis  -No evidence of SBP  -Will cont abx empirically        Cirrhosis    -secondary to alcohol  -Patient still drinks alcohol and uses cocaine  -will  on cessation when status improves          VTE Risk Mitigation         Ordered     Reason for No Pharmacological VTE Prophylaxis  Once      01/25/18 0725     Medium Risk of VTE  Once      01/25/18 0725     Place sequential compression device  Until discontinued      01/25/18 0725              Courtney Rodriguez MD  Department of Hospital Medicine   [facility]

## 2018-01-28 PROBLEM — D72.829 LEUKOCYTOSIS: Status: ACTIVE | Noted: 2018-01-28

## 2018-01-28 LAB
ALBUMIN SERPL BCP-MCNC: 1.6 G/DL
ALP SERPL-CCNC: 116 U/L
ALT SERPL W/O P-5'-P-CCNC: 14 U/L
AMMONIA PLAS-SCNC: 51 UMOL/L
ANION GAP SERPL CALC-SCNC: 6 MMOL/L
AST SERPL-CCNC: 29 U/L
BILIRUB SERPL-MCNC: 0.7 MG/DL
BUN SERPL-MCNC: 8 MG/DL
CALCIUM SERPL-MCNC: 7.4 MG/DL
CHLORIDE SERPL-SCNC: 113 MMOL/L
CO2 SERPL-SCNC: 17 MMOL/L
CREAT SERPL-MCNC: 0.7 MG/DL
ERYTHROCYTE [DISTWIDTH] IN BLOOD BY AUTOMATED COUNT: 26.2 %
ERYTHROCYTE [DISTWIDTH] IN BLOOD BY AUTOMATED COUNT: 26.4 %
EST. GFR  (AFRICAN AMERICAN): >60 ML/MIN/1.73 M^2
EST. GFR  (NON AFRICAN AMERICAN): >60 ML/MIN/1.73 M^2
GLUCOSE SERPL-MCNC: 90 MG/DL
HCT VFR BLD AUTO: 27.5 %
HCT VFR BLD AUTO: 27.9 %
HGB BLD-MCNC: 9.2 G/DL
HGB BLD-MCNC: 9.4 G/DL
LACTATE SERPL-SCNC: 2.1 MMOL/L
MCH RBC QN AUTO: 25.3 PG
MCH RBC QN AUTO: 25.3 PG
MCHC RBC AUTO-ENTMCNC: 33.5 G/DL
MCHC RBC AUTO-ENTMCNC: 33.7 G/DL
MCV RBC AUTO: 75 FL
MCV RBC AUTO: 76 FL
PLATELET # BLD AUTO: 148 K/UL
PLATELET # BLD AUTO: 172 K/UL
PMV BLD AUTO: ABNORMAL FL
PMV BLD AUTO: ABNORMAL FL
POTASSIUM SERPL-SCNC: 3.9 MMOL/L
PROT SERPL-MCNC: 5.8 G/DL
RBC # BLD AUTO: 3.64 M/UL
RBC # BLD AUTO: 3.72 M/UL
SODIUM SERPL-SCNC: 136 MMOL/L
WBC # BLD AUTO: 29 K/UL
WBC # BLD AUTO: 30.9 K/UL

## 2018-01-28 PROCEDURE — 82140 ASSAY OF AMMONIA: CPT

## 2018-01-28 PROCEDURE — 11000001 HC ACUTE MED/SURG PRIVATE ROOM

## 2018-01-28 PROCEDURE — 99900035 HC TECH TIME PER 15 MIN (STAT)

## 2018-01-28 PROCEDURE — 25000003 PHARM REV CODE 250: Performed by: PHYSICIAN ASSISTANT

## 2018-01-28 PROCEDURE — 63600175 PHARM REV CODE 636 W HCPCS: Performed by: INTERNAL MEDICINE

## 2018-01-28 PROCEDURE — 63600175 PHARM REV CODE 636 W HCPCS: Performed by: PHYSICIAN ASSISTANT

## 2018-01-28 PROCEDURE — 94799 UNLISTED PULMONARY SVC/PX: CPT

## 2018-01-28 PROCEDURE — 85027 COMPLETE CBC AUTOMATED: CPT | Mod: 91

## 2018-01-28 PROCEDURE — 36415 COLL VENOUS BLD VENIPUNCTURE: CPT

## 2018-01-28 PROCEDURE — 25000003 PHARM REV CODE 250: Performed by: INTERNAL MEDICINE

## 2018-01-28 PROCEDURE — 83605 ASSAY OF LACTIC ACID: CPT

## 2018-01-28 PROCEDURE — S4991 NICOTINE PATCH NONLEGEND: HCPCS | Performed by: INTERNAL MEDICINE

## 2018-01-28 PROCEDURE — C9113 INJ PANTOPRAZOLE SODIUM, VIA: HCPCS | Performed by: INTERNAL MEDICINE

## 2018-01-28 PROCEDURE — 80053 COMPREHEN METABOLIC PANEL: CPT

## 2018-01-28 PROCEDURE — 85027 COMPLETE CBC AUTOMATED: CPT

## 2018-01-28 PROCEDURE — 94761 N-INVAS EAR/PLS OXIMETRY MLT: CPT

## 2018-01-28 PROCEDURE — 99233 SBSQ HOSP IP/OBS HIGH 50: CPT | Mod: ,,, | Performed by: INTERNAL MEDICINE

## 2018-01-28 RX ORDER — ACETAMINOPHEN 10 MG/ML
15 INJECTION, SOLUTION INTRAVENOUS ONCE
Status: COMPLETED | OUTPATIENT
Start: 2018-01-28 | End: 2018-01-28

## 2018-01-28 RX ADMIN — Medication 100 MG: at 09:01

## 2018-01-28 RX ADMIN — ACETAMINOPHEN 610 MG: 10 INJECTION, SOLUTION INTRAVENOUS at 12:01

## 2018-01-28 RX ADMIN — CEFTRIAXONE 2 G: 2 INJECTION, SOLUTION INTRAVENOUS at 11:01

## 2018-01-28 RX ADMIN — SODIUM CHLORIDE: 0.9 INJECTION, SOLUTION INTRAVENOUS at 10:01

## 2018-01-28 RX ADMIN — PANTOPRAZOLE SODIUM 40 MG: 40 INJECTION, POWDER, FOR SOLUTION INTRAVENOUS at 09:01

## 2018-01-28 RX ADMIN — IBUPROFEN 600 MG: 100 SUSPENSION ORAL at 10:01

## 2018-01-28 RX ADMIN — NICOTINE 1 PATCH: 21 PATCH, EXTENDED RELEASE TRANSDERMAL at 09:01

## 2018-01-28 RX ADMIN — FOLIC ACID 1 MG: 1 TABLET ORAL at 09:01

## 2018-01-28 RX ADMIN — IBUPROFEN 600 MG: 100 SUSPENSION ORAL at 12:01

## 2018-01-28 RX ADMIN — SODIUM CHLORIDE: 0.9 INJECTION, SOLUTION INTRAVENOUS at 12:01

## 2018-01-28 NOTE — PROGRESS NOTES
"Subjective:       Patient ID: Neena Marks is a 62 y.o. female.    Chief Complaint:  Abdominal Pain (Patient family called EMS for patient c/o abd pain, dizziness, lethargy, altered mental status and not "feeling well")       History of Present Illness  Pt with fever overnight, no overt gi bleed    Review of Systems  Respiratory: negative      Objective:     Vitals:    01/28/18 0053 01/28/18 0216 01/28/18 0342 01/28/18 0736   BP:    (!) 91/52   BP Location:       Patient Position:    Lying   Pulse:   103 65   Resp:   18 18   Temp: (!) 103.4 °F (39.7 °C) (!) 101.2 °F (38.4 °C) 99.4 °F (37.4 °C) 98.8 °F (37.1 °C)   TempSrc:  Axillary Oral Oral   SpO2:   (!) 94% 100%   Weight:       Height:          Abdomen: soft mild tenderness  mild distesnion    Data Review   Recent Results (from the past 336 hour(s))   CBC auto differential    Collection Time: 01/25/18 12:14 PM   Result Value Ref Range    WBC 21.10 (H) 3.90 - 12.70 K/uL    Hemoglobin 10.1 (L) 12.0 - 16.0 g/dL    Hematocrit 28.8 (L) 37.0 - 48.5 %    Platelets 153 150 - 350 K/uL   CBC auto differential    Collection Time: 01/25/18  4:11 AM   Result Value Ref Range    WBC 16.45 (H) 3.90 - 12.70 K/uL    Hemoglobin 4.4 (LL) 12.0 - 16.0 g/dL    Hematocrit 13.2 (LL) 37.0 - 48.5 %    Platelets 155 150 - 350 K/uL        Recent Labs  Lab 01/25/18  0411   APTT <21.0   INR 1.2       Recent Labs  Lab 01/26/18  0325 01/27/18  0318 01/28/18  0453    138 136   K 4.5 4.0 3.9   * 113* 113*   CO2 17* 19* 17*   BUN 13 9 8   CREATININE 0.8 0.8 0.7   CALCIUM 7.1* 7.9* 7.4*   PROT 5.7* 6.5 5.8*   BILITOT 0.9 0.6 0.7   ALKPHOS 85 92 116   ALT 11 15 14   AST 24 27 29    Lab Results   Component Value Date    HEPAIGM Negative 02/04/2015    HEPBIGM Negative 02/04/2015    HEPCAB Negative 02/04/2015      Lab Results   Component Value Date    AFP 21 (H) 02/05/2015      Lab Results   Component Value Date    CEA 5.9 (H) 02/06/2015        Recent Labs  Lab 01/25/18  0411   APTT <21.0 "   INR 1.2        Assessment:     1. Abdominal pain    2. Acute lower GI bleeding        Plan:     1) Previous Ascites tap no evidence of SBP  2) check KUB  3) Discussed with Dr Donohue  4) Check ultrasound ? Repeat Tap

## 2018-01-28 NOTE — PLAN OF CARE
Problem: Patient Care Overview  Goal: Individualization & Mutuality  Outcome: Ongoing (interventions implemented as appropriate)  Bed in low and locked position and able to reposition per self and up with assist.  Remains free of injury during shift.

## 2018-01-28 NOTE — ASSESSMENT & PLAN NOTE
-Suspect lower GI bleed  -GI consulted  -s/p transfusion of 2 Units of PRBC's  -Hgb now at 10.1  -Check in AM and daily

## 2018-01-28 NOTE — PLAN OF CARE
Problem: Patient Care Overview  Goal: Plan of Care Review  Outcome: Ongoing (interventions implemented as appropriate)  Plan of care reviewed with pt. and all concerns addressed. NAD noted at this time. Denies any cp or sob. Resp even and unlabored. Incentive spirometer encouraged while awake. VSS, afebrile. Denies N/V. AAO x 4. PPP harriet. SCD's remain in place. Pain controlled with current pain meds. Garcia to bedside drainage. IVF infusing. Remains free from injury. Safety precautions remain in place. Call light in reach. Will continue to monitor.

## 2018-01-28 NOTE — ASSESSMENT & PLAN NOTE
-PPI  -Octreotide discontinued yesterday  -GI may scope as patient becomes more stable  -No active bleeding at this time

## 2018-01-28 NOTE — PROGRESS NOTES
[facility] Hospital Medicine  Progress Note    Patient Name: Neena Marks  MRN: [id number]  Patient Class: IP- Inpatient   Admission Date: 1/25/2018  Length of Stay: 2 days  Attending Physician: Courtney Rodriguez, *  [facility] Provider: [facility]        Subjective:     Principal Problem:Acute lower GI bleeding    HPI:  63 yo female with a PMH of alcoholic cirrhosis presents to the ED c/o bilateral lower abdominal pain x 2 days.  Patient was admitted at [facility] last week, underwent paracentesis, and discharged with Augmentin.  Patient reports last hospitalization or paracentesis was 2-3 years ago. She admits to bloody bowel movements prior to this admission.  In Er, patient found to be hypotensive with a systolic in the 80's, tachycardic at 124, and a hemoglobin of 4.4.  After 1 pint of blood, heart rate improved to 115 and systolic blood pressure improved to 100-110.  Patient states that pain has improved but still very tired.     Hospital Course:  No notes on file    Interval History: Alert and sitting up in bed.  Family at bedside.  Denies pain today.  Discussed plan of care and patient in agreement.    Review of Systems   Constitutional: Positive for appetite change and fatigue.   HENT: Negative.    Eyes: Negative.    Respiratory: Negative for chest tightness and shortness of breath.    Cardiovascular: Negative for chest pain and palpitations.   Gastrointestinal: Positive for abdominal distention and blood in stool. Negative for abdominal pain.   Endocrine: Negative.    Genitourinary: Negative for difficulty urinating and frequency.   Musculoskeletal: Negative for arthralgias.   Neurological: Negative for syncope and speech difficulty.   Psychiatric/Behavioral: Negative for agitation, behavioral problems and confusion.     Objective:     Vital Signs (Most Recent):  Temp: (!) 103.1 °F (39.5 °C) (01/27/18 2321)  Pulse: 103 (01/27/18  2321)  Resp: 18 (01/27/18 2321)  BP: 131/89 (01/27/18 2321)  SpO2: (!) 94 % (01/27/18 2321) Vital Signs (24h Range):  Temp:  [98.6 °F (37 °C)-103.1 °F (39.5 °C)] 103.1 °F (39.5 °C)  Pulse:  [] 103  Resp:  [17-33] 18  SpO2:  [94 %-100 %] 94 %  BP: (112-147)/(63-91) 131/89     Weight: 40.4 kg (89 lb 1.1 oz)  Body mass index is 17.99 kg/m².    Intake/Output Summary (Last 24 hours) at 01/27/18 2323  Last data filed at 01/27/18 2200   Gross per 24 hour   Intake              650 ml   Output             1580 ml   Net             -930 ml      Physical Exam   Constitutional: She is oriented to person, place, and time. She appears cachectic. Ill appearance: however, appearance has improved    HENT:   Head: Normocephalic and atraumatic.   Eyes: EOM are normal.   Neck: Normal range of motion.   Cardiovascular: Normal rate and regular rhythm.    Pulmonary/Chest: Effort normal and breath sounds normal.   Abdominal: Soft. Bowel sounds are normal. There is no tenderness. There is no guarding.   Musculoskeletal: Normal range of motion.   Neurological: She is alert and oriented to person, place, and time.   Skin: Skin is warm.   Psychiatric: She has a normal mood and affect. Her behavior is normal.   Vitals reviewed.      Significant Labs:   CBC: No results for input(s): WBC, HGB, HCT, PLT in the last 48 hours.  CMP:   Recent Labs  Lab 01/26/18  0325 01/27/18  0318    138   K 4.5 4.0   * 113*   CO2 17* 19*   * 107   BUN 13 9   CREATININE 0.8 0.8   CALCIUM 7.1* 7.9*   PROT 5.7* 6.5   ALBUMIN 1.6* 1.8*   BILITOT 0.9 0.6   ALKPHOS 85 92   AST 24 27   ALT 11 15   ANIONGAP 7* 6*   EGFRNONAA >60 >60     All pertinent labs within the past 24 hours have been reviewed.    Significant Imaging: I have reviewed all pertinent imaging results/findings within the past 24 hours.   Imaging Results          IR Paracentesis with Imaging (Final result)  Result time 01/25/18 12:03:45    Final result by Amor Millard MD  (01/25/18 12:03:45)                 Impression:      Ultrasound-guided paracentesis performed with 2800 mL of thin dark maroon fluid removed.  A sample of fluid was sent for the requested studies.      Electronically signed by: MYRON FAITH MD  Date:     01/25/18  Time:    12:03              Narrative:    Procedure: Ultrasound-guided paracentesis dated 1/25/18.    After obtaining informed consent from the patient, using sterile technique, 1% lidocaine local anesthetic, and ultrasound guidance a 5 Hebrew centesis needle was advanced into the ascites via a left lower quadrant abdominal approach.  2800 mL of thin dark maroon fluid was removed.  The catheter was then removed and hemostasis was achieved.  No immediate post procedure complications are noted.                             CT Abdomen Pelvis With Contrast (Final result)  Result time 01/25/18 06:49:19    Final result by Reed Swift MD (01/25/18 06:49:19)                 Impression:        No acute intra-abdominal/pelvic CT abnormalities to account for the reported history of abdominal and back pain.    Findings include:  - Partially visualized airspace opacity within the left lower lobe concerning for aspiration or pneumonia.  - CT findings of cirrhosis.  - Suspected portal hypertension noting a large volume of abdominal ascites and a small recanalized umbilical vein.  - Cholelithiasis.      Electronically signed by: REED SWIFT MD  Date:     01/25/18  Time:    06:49              Narrative:    Technique: 5-mm axial images were obtained through the abdomen and pelvis following administration of 75 cc of Omnipaque 350 IV contrast.  Images were submitted for coronal, and sagittal reformats.    Comparison: CT 02/03/2015.    Findings:    Visualized heart demonstrates trace coronary artery atherosclerosis.  No pericardial effusion.    Visualized lungs demonstrate a partially visualized airspace opacity within the superior segment of the left lower lobe,  possibly pneumonia or aspiration.  Partially visualized nodular opacity within the right lower lobe not well characterized.  No pleural effusions.    The liver demonstrates a nodular contour and widened fissures.  Hepatic parenchyma demonstrates diffusely heterogeneous enhancement without discrete lesions.  No intrahepatic bile duct dilatation.  Portal vein, splenic vein and SMV are patent.  There is a small recanalized umbilical vein.    Calcified stones noted within the gallbladder lumen.  No surrounding inflammatory changes.  Common bile duct is at the upper limit of normal in caliber with tip at the level of the ampulla.    There is a small sliding type hiatal hernia.  The stomach is otherwise decompressed and not well evaluated.    Duodenum, spleen, pancreas and adrenal glands are within normal limits.    Kidneys are normal in size and location.  No enhancing renal masses.  Subcentimeter nonenhancing right cortical lesion likely represents a cyst.  No nephrolithiasis.  No hydronephrosis or hydroureter.  Bladder is decompressed around a Garcia catheter and demonstrates scattered intraluminal gas.    The uterus and ovaries are within normal limits.    The small bowel is normal in caliber.  The colon is unremarkable.  The appendix is normal.  No obstruction or inflammatory changes.    There is a large volume of low attenuation abdominal and pelvic ascites.  There is diffuse mesenteric and omental edema.    No intra-abdominal free air. No abdominal or pelvic lymph node enlargement.  No focal mesenteric masses.    The abdominal aorta tapers normally with prominent atherosclerotic calcification.  IVC and common iliac veins are patent.    Subcutaneous soft tissues are within normal limits.    No acute fractures or osseous destruction.  Degenerative changes of the spine noted.                             X-Ray Chest AP Portable (Final result)  Result time 01/25/18 04:16:59    Final result by Fabian Fam MD  (01/25/18 04:16:59)                 Impression:        Left basilar airspace disease or subsegmental atelectasis. Findings could reflect developing pneumonia or aspiration in the setting of sepsis.      Electronically signed by: Fabina Fam  Date:     01/25/18  Time:    04:16              Narrative:    CLINICAL INFORMATION: Sepsis.    COMPARISON: None available.    FINDINGS: One view of the chest was obtained.  Cardiac wires overlie the chest. Cardiac silhouette is not enlarged.  Lungs demonstrate bilateral increased interstitial attenuation, with more focal airspace disease or subsegmental atelectasis at the left lung base.  No large pleural effusion. No pneumothorax.                                Assessment/Plan:      * Acute lower GI bleeding    -PPI  -Octreotide discontinued yesterday  -GI may scope as patient becomes more stable  -No active bleeding at this time        Anemia    -Suspect lower GI bleed  -GI consulted  -s/p transfusion of 2 Units of PRBC's  -Hgb now at 10.1  -Check in AM and daily        Severe malnutrition              Abdominal pain              Esophageal web determined by endoscopy              Ascites    -Patient noted to have large amount of ascites on imaging  -s/p paracentesis  -No evidence of SBP  -Will cont abx empirically        Cirrhosis    -secondary to alcohol  -Patient still drinks alcohol and uses cocaine  -will  on cessation when status improves          VTE Risk Mitigation         Ordered     Reason for No Pharmacological VTE Prophylaxis  Once      01/25/18 0725     Medium Risk of VTE  Once      01/25/18 0725     Place sequential compression device  Until discontinued      01/25/18 0725              Courtney Rodriguez MD  Department of Hospital Medicine   [facility]

## 2018-01-28 NOTE — SIGNIFICANT EVENT
RN called an patient had oral temp 103.1F. Ibuprofen 600 mg suspension given at midnight. Repeat axillary temp was 103.4F. The patient was administered tylenol 15 mg/kg IV (as normal 1g dose was noted to be too high given patient's weight), and her IV fluid rate was increased to 200mL/hour. Will plan for repeat temp 1 hour after tylenol administered. Also noted at this time that WBC count had increased from 21.10 to 30.90. Lactic acid ordered.

## 2018-01-28 NOTE — PLAN OF CARE
Problem: Patient Care Overview  Goal: Plan of Care Review  Outcome: Ongoing (interventions implemented as appropriate)  Pt on RA sat's 98% with no distress;IS instructed with fair effort.

## 2018-01-28 NOTE — PLAN OF CARE
Problem: Patient Care Overview  Goal: Plan of Care Review  Outcome: Ongoing (interventions implemented as appropriate)  Pt in no distress on Ra, no changes at this time.

## 2018-01-28 NOTE — SUBJECTIVE & OBJECTIVE
Interval History: Alert and sitting up in bed.  Family at bedside.  Denies pain today.  Discussed plan of care and patient in agreement.    Review of Systems   Constitutional: Positive for appetite change and fatigue.   HENT: Negative.    Eyes: Negative.    Respiratory: Negative for chest tightness and shortness of breath.    Cardiovascular: Negative for chest pain and palpitations.   Gastrointestinal: Positive for abdominal distention and blood in stool. Negative for abdominal pain.   Endocrine: Negative.    Genitourinary: Negative for difficulty urinating and frequency.   Musculoskeletal: Negative for arthralgias.   Neurological: Negative for syncope and speech difficulty.   Psychiatric/Behavioral: Negative for agitation, behavioral problems and confusion.     Objective:     Vital Signs (Most Recent):  Temp: (!) 103.1 °F (39.5 °C) (01/27/18 2321)  Pulse: 103 (01/27/18 2321)  Resp: 18 (01/27/18 2321)  BP: 131/89 (01/27/18 2321)  SpO2: (!) 94 % (01/27/18 2321) Vital Signs (24h Range):  Temp:  [98.6 °F (37 °C)-103.1 °F (39.5 °C)] 103.1 °F (39.5 °C)  Pulse:  [] 103  Resp:  [17-33] 18  SpO2:  [94 %-100 %] 94 %  BP: (112-147)/(63-91) 131/89     Weight: 40.4 kg (89 lb 1.1 oz)  Body mass index is 17.99 kg/m².    Intake/Output Summary (Last 24 hours) at 01/27/18 2323  Last data filed at 01/27/18 2200   Gross per 24 hour   Intake              650 ml   Output             1580 ml   Net             -930 ml      Physical Exam   Constitutional: She is oriented to person, place, and time. She appears cachectic. Ill appearance: however, appearance has improved    HENT:   Head: Normocephalic and atraumatic.   Eyes: EOM are normal.   Neck: Normal range of motion.   Cardiovascular: Normal rate and regular rhythm.    Pulmonary/Chest: Effort normal and breath sounds normal.   Abdominal: Soft. Bowel sounds are normal. There is no tenderness. There is no guarding.   Musculoskeletal: Normal range of motion.   Neurological: She is  alert and oriented to person, place, and time.   Skin: Skin is warm.   Psychiatric: She has a normal mood and affect. Her behavior is normal.   Vitals reviewed.      Significant Labs:   CBC: No results for input(s): WBC, HGB, HCT, PLT in the last 48 hours.  CMP:   Recent Labs  Lab 01/26/18  0325 01/27/18  0318    138   K 4.5 4.0   * 113*   CO2 17* 19*   * 107   BUN 13 9   CREATININE 0.8 0.8   CALCIUM 7.1* 7.9*   PROT 5.7* 6.5   ALBUMIN 1.6* 1.8*   BILITOT 0.9 0.6   ALKPHOS 85 92   AST 24 27   ALT 11 15   ANIONGAP 7* 6*   EGFRNONAA >60 >60     All pertinent labs within the past 24 hours have been reviewed.    Significant Imaging: I have reviewed all pertinent imaging results/findings within the past 24 hours.   Imaging Results          IR Paracentesis with Imaging (Final result)  Result time 01/25/18 12:03:45    Final result by Amor Millard MD (01/25/18 12:03:45)                 Impression:      Ultrasound-guided paracentesis performed with 2800 mL of thin dark maroon fluid removed.  A sample of fluid was sent for the requested studies.      Electronically signed by: AMOR MILLARD MD  Date:     01/25/18  Time:    12:03              Narrative:    Procedure: Ultrasound-guided paracentesis dated 1/25/18.    After obtaining informed consent from the patient, using sterile technique, 1% lidocaine local anesthetic, and ultrasound guidance a 5 Georgian centesis needle was advanced into the ascites via a left lower quadrant abdominal approach.  2800 mL of thin dark maroon fluid was removed.  The catheter was then removed and hemostasis was achieved.  No immediate post procedure complications are noted.                             CT Abdomen Pelvis With Contrast (Final result)  Result time 01/25/18 06:49:19    Final result by Reed Swift MD (01/25/18 06:49:19)                 Impression:        No acute intra-abdominal/pelvic CT abnormalities to account for the reported history of abdominal and  back pain.    Findings include:  - Partially visualized airspace opacity within the left lower lobe concerning for aspiration or pneumonia.  - CT findings of cirrhosis.  - Suspected portal hypertension noting a large volume of abdominal ascites and a small recanalized umbilical vein.  - Cholelithiasis.      Electronically signed by: SHELLI MEZA MD  Date:     01/25/18  Time:    06:49              Narrative:    Technique: 5-mm axial images were obtained through the abdomen and pelvis following administration of 75 cc of Omnipaque 350 IV contrast.  Images were submitted for coronal, and sagittal reformats.    Comparison: CT 02/03/2015.    Findings:    Visualized heart demonstrates trace coronary artery atherosclerosis.  No pericardial effusion.    Visualized lungs demonstrate a partially visualized airspace opacity within the superior segment of the left lower lobe, possibly pneumonia or aspiration.  Partially visualized nodular opacity within the right lower lobe not well characterized.  No pleural effusions.    The liver demonstrates a nodular contour and widened fissures.  Hepatic parenchyma demonstrates diffusely heterogeneous enhancement without discrete lesions.  No intrahepatic bile duct dilatation.  Portal vein, splenic vein and SMV are patent.  There is a small recanalized umbilical vein.    Calcified stones noted within the gallbladder lumen.  No surrounding inflammatory changes.  Common bile duct is at the upper limit of normal in caliber with tip at the level of the ampulla.    There is a small sliding type hiatal hernia.  The stomach is otherwise decompressed and not well evaluated.    Duodenum, spleen, pancreas and adrenal glands are within normal limits.    Kidneys are normal in size and location.  No enhancing renal masses.  Subcentimeter nonenhancing right cortical lesion likely represents a cyst.  No nephrolithiasis.  No hydronephrosis or hydroureter.  Bladder is decompressed around a Garcia  catheter and demonstrates scattered intraluminal gas.    The uterus and ovaries are within normal limits.    The small bowel is normal in caliber.  The colon is unremarkable.  The appendix is normal.  No obstruction or inflammatory changes.    There is a large volume of low attenuation abdominal and pelvic ascites.  There is diffuse mesenteric and omental edema.    No intra-abdominal free air. No abdominal or pelvic lymph node enlargement.  No focal mesenteric masses.    The abdominal aorta tapers normally with prominent atherosclerotic calcification.  IVC and common iliac veins are patent.    Subcutaneous soft tissues are within normal limits.    No acute fractures or osseous destruction.  Degenerative changes of the spine noted.                             X-Ray Chest AP Portable (Final result)  Result time 01/25/18 04:16:59    Final result by Fabian Fam MD (01/25/18 04:16:59)                 Impression:        Left basilar airspace disease or subsegmental atelectasis. Findings could reflect developing pneumonia or aspiration in the setting of sepsis.      Electronically signed by: Fabian Fam  Date:     01/25/18  Time:    04:16              Narrative:    CLINICAL INFORMATION: Sepsis.    COMPARISON: None available.    FINDINGS: One view of the chest was obtained.  Cardiac wires overlie the chest. Cardiac silhouette is not enlarged.  Lungs demonstrate bilateral increased interstitial attenuation, with more focal airspace disease or subsegmental atelectasis at the left lung base.  No large pleural effusion. No pneumothorax.

## 2018-01-28 NOTE — NURSING TRANSFER
Nursing Transfer Note      1/28/2018     Transfer To: 304    Transfer via wheelchair      Transported by ROXANA Oreilly RN    Medicines sent: N/A    Chart send with patient: Yes    Notified: spouse    Patient reassessed at: 1/27/18 @ 2100    Upon arrival to floor: patient oriented to room, call bell in reach and bed in lowest position

## 2018-01-29 LAB
ALBUMIN SERPL BCP-MCNC: 1.5 G/DL
ALP SERPL-CCNC: 102 U/L
ALT SERPL W/O P-5'-P-CCNC: 17 U/L
AMMONIA PLAS-SCNC: 35 UMOL/L
ANION GAP SERPL CALC-SCNC: 5 MMOL/L
ANISOCYTOSIS BLD QL SMEAR: ABNORMAL
AST SERPL-CCNC: 34 U/L
BACTERIA SPEC AEROBE CULT: NO GROWTH
BASOPHILS # BLD AUTO: ABNORMAL K/UL
BASOPHILS NFR BLD: 0 %
BILIRUB SERPL-MCNC: 0.4 MG/DL
BUN SERPL-MCNC: 5 MG/DL
CALCIUM SERPL-MCNC: 7.1 MG/DL
CHLORIDE SERPL-SCNC: 118 MMOL/L
CO2 SERPL-SCNC: 16 MMOL/L
CREAT SERPL-MCNC: 0.6 MG/DL
DIFFERENTIAL METHOD: ABNORMAL
EOSINOPHIL # BLD AUTO: ABNORMAL K/UL
EOSINOPHIL NFR BLD: 2 %
ERYTHROCYTE [DISTWIDTH] IN BLOOD BY AUTOMATED COUNT: 26.9 %
EST. GFR  (AFRICAN AMERICAN): >60 ML/MIN/1.73 M^2
EST. GFR  (NON AFRICAN AMERICAN): >60 ML/MIN/1.73 M^2
GLUCOSE SERPL-MCNC: 98 MG/DL
HCT VFR BLD AUTO: 24.4 %
HGB BLD-MCNC: 8.1 G/DL
LYMPHOCYTES # BLD AUTO: ABNORMAL K/UL
LYMPHOCYTES NFR BLD: 7 %
MCH RBC QN AUTO: 25.4 PG
MCHC RBC AUTO-ENTMCNC: 33.2 G/DL
MCV RBC AUTO: 77 FL
MONOCYTES # BLD AUTO: ABNORMAL K/UL
MONOCYTES NFR BLD: 3 %
NEUTROPHILS NFR BLD: 88 %
PLATELET # BLD AUTO: 186 K/UL
PLATELET BLD QL SMEAR: ABNORMAL
PMV BLD AUTO: ABNORMAL FL
POLYCHROMASIA BLD QL SMEAR: ABNORMAL
POTASSIUM SERPL-SCNC: 3.3 MMOL/L
PROT SERPL-MCNC: 5.6 G/DL
RBC # BLD AUTO: 3.19 M/UL
SODIUM SERPL-SCNC: 139 MMOL/L
WBC # BLD AUTO: 18.76 K/UL

## 2018-01-29 PROCEDURE — 36415 COLL VENOUS BLD VENIPUNCTURE: CPT

## 2018-01-29 PROCEDURE — 82140 ASSAY OF AMMONIA: CPT

## 2018-01-29 PROCEDURE — 63600175 PHARM REV CODE 636 W HCPCS: Performed by: INTERNAL MEDICINE

## 2018-01-29 PROCEDURE — 99233 SBSQ HOSP IP/OBS HIGH 50: CPT | Mod: ,,, | Performed by: INTERNAL MEDICINE

## 2018-01-29 PROCEDURE — S4991 NICOTINE PATCH NONLEGEND: HCPCS | Performed by: INTERNAL MEDICINE

## 2018-01-29 PROCEDURE — 85027 COMPLETE CBC AUTOMATED: CPT

## 2018-01-29 PROCEDURE — 97803 MED NUTRITION INDIV SUBSEQ: CPT

## 2018-01-29 PROCEDURE — 25000003 PHARM REV CODE 250: Performed by: PHYSICIAN ASSISTANT

## 2018-01-29 PROCEDURE — 80053 COMPREHEN METABOLIC PANEL: CPT

## 2018-01-29 PROCEDURE — 11000001 HC ACUTE MED/SURG PRIVATE ROOM

## 2018-01-29 PROCEDURE — C9113 INJ PANTOPRAZOLE SODIUM, VIA: HCPCS | Performed by: INTERNAL MEDICINE

## 2018-01-29 PROCEDURE — 25000003 PHARM REV CODE 250: Performed by: INTERNAL MEDICINE

## 2018-01-29 PROCEDURE — 99900035 HC TECH TIME PER 15 MIN (STAT)

## 2018-01-29 PROCEDURE — 85007 BL SMEAR W/DIFF WBC COUNT: CPT

## 2018-01-29 RX ADMIN — SODIUM CHLORIDE: 0.9 INJECTION, SOLUTION INTRAVENOUS at 01:01

## 2018-01-29 RX ADMIN — CEFTRIAXONE 2 G: 2 INJECTION, SOLUTION INTRAVENOUS at 09:01

## 2018-01-29 RX ADMIN — PANTOPRAZOLE SODIUM 40 MG: 40 INJECTION, POWDER, FOR SOLUTION INTRAVENOUS at 09:01

## 2018-01-29 RX ADMIN — SODIUM CHLORIDE: 0.9 INJECTION, SOLUTION INTRAVENOUS at 08:01

## 2018-01-29 RX ADMIN — FOLIC ACID 1 MG: 1 TABLET ORAL at 09:01

## 2018-01-29 RX ADMIN — IBUPROFEN 600 MG: 100 SUSPENSION ORAL at 08:01

## 2018-01-29 RX ADMIN — NICOTINE 1 PATCH: 21 PATCH, EXTENDED RELEASE TRANSDERMAL at 09:01

## 2018-01-29 RX ADMIN — SODIUM CHLORIDE: 0.9 INJECTION, SOLUTION INTRAVENOUS at 03:01

## 2018-01-29 RX ADMIN — Medication 100 MG: at 09:01

## 2018-01-29 NOTE — ASSESSMENT & PLAN NOTE
-Patient noted to have large amount of ascites on imaging  -s/p paracentesis 01/25  -No evidence of SBP  -Will cont Rocephin empirically

## 2018-01-29 NOTE — ASSESSMENT & PLAN NOTE
-Etiology unclear  -CXR in AM  -Clinically well  -May benefit from paracentesis and repeat studies for SBP  -Will monitor WBC daily

## 2018-01-29 NOTE — ASSESSMENT & PLAN NOTE
-Continue PPI  -Octreotide discontinued 01/26  -GI had plans to scope on tomorrow, but now with leukocytosis, scope delayed  -No active bleeding at this time

## 2018-01-29 NOTE — ASSESSMENT & PLAN NOTE
Malnutrition in the context of Chronic Illness/Injury    Related to (etiology):  etoh cirrhosis    Signs and Symptoms (as evidenced by):  15% wt loss x 6 months, severe temporal depletion and moderate muscle/fat depletion of triceps and clavicle, and ascites upon admission requiring paracentesis    Interventions/Recommendations (treatment strategy):  Continue low Na diet. Consider Boost Plus if PO intake declines. See RD note 1/29/2018 for full recs.    Nutrition Diagnosis Status:  Continues

## 2018-01-29 NOTE — PLAN OF CARE
Problem: Patient Care Overview  Goal: Plan of Care Review  Outcome: Ongoing (interventions implemented as appropriate)  Pt in no distress on Ra, IS done independently. Will continue to monitor.

## 2018-01-29 NOTE — PLAN OF CARE
Problem: Patient Care Overview  Goal: Plan of Care Review  Outcome: Ongoing (interventions implemented as appropriate)  Patient lying quietly in bed with eyes closed,  at bedside. Call light within reach and bed alarm on. Encouraged patient and  to call for any and all needs. Both verbalized understanding of instructions. Will continue to monitor patient.

## 2018-01-29 NOTE — SUBJECTIVE & OBJECTIVE
Interval History: Patient with episode of fever overnight, WBC increased to 30.  Discussed plan of care and patient in agreement.  She denies any chest pain, shortness of breath, nausea, vomiting, diarrhea.  Eating, ambulating, sleeping well.     Review of Systems   Constitutional: Positive for appetite change and fatigue.   HENT: Negative.    Eyes: Negative.    Respiratory: Negative for chest tightness and shortness of breath.    Cardiovascular: Negative for chest pain and palpitations.   Gastrointestinal: Positive for abdominal distention and blood in stool. Negative for abdominal pain.   Endocrine: Negative.    Genitourinary: Negative for difficulty urinating and frequency.   Musculoskeletal: Negative for arthralgias.   Neurological: Negative for syncope and speech difficulty.   Psychiatric/Behavioral: Negative for agitation, behavioral problems and confusion.     Objective:     Vital Signs (Most Recent):  Temp: 98.7 °F (37.1 °C) (01/28/18 1954)  Pulse: 76 (01/28/18 1954)  Resp: 16 (01/28/18 1954)  BP: 126/64 (01/28/18 1954)  SpO2: 100 % (01/28/18 1954) Vital Signs (24h Range):  Temp:  [98.7 °F (37.1 °C)-103.4 °F (39.7 °C)] 98.7 °F (37.1 °C)  Pulse:  [] 76  Resp:  [16-18] 16  SpO2:  [94 %-100 %] 100 %  BP: ()/(52-89) 126/64     Weight: 40.4 kg (89 lb 1.1 oz)  Body mass index is 17.99 kg/m².    Intake/Output Summary (Last 24 hours) at 01/28/18 2200  Last data filed at 01/28/18 1700   Gross per 24 hour   Intake             2781 ml   Output             1250 ml   Net             1531 ml      Physical Exam   Constitutional: She is oriented to person, place, and time. She appears cachectic. Ill appearance: however, appearance has improved    HENT:   Head: Normocephalic and atraumatic.   Eyes: EOM are normal.   Neck: Normal range of motion.   Cardiovascular: Normal rate and regular rhythm.    Pulmonary/Chest: Effort normal and breath sounds normal.   Abdominal: Soft. Bowel sounds are normal. She exhibits  distension. There is no tenderness. There is no guarding.   Musculoskeletal: Normal range of motion.   Neurological: She is alert and oriented to person, place, and time.   Skin: Skin is warm.   Psychiatric: She has a normal mood and affect. Her behavior is normal.   Vitals reviewed.      Significant Labs:   CBC:   Recent Labs  Lab 01/28/18  0024 01/28/18  0453   WBC 30.90* 29.00*   HGB 9.4* 9.2*   HCT 27.9* 27.5*    148*     CMP:   Recent Labs  Lab 01/27/18  0318 01/28/18  0453    136   K 4.0 3.9   * 113*   CO2 19* 17*    90   BUN 9 8   CREATININE 0.8 0.7   CALCIUM 7.9* 7.4*   PROT 6.5 5.8*   ALBUMIN 1.8* 1.6*   BILITOT 0.6 0.7   ALKPHOS 92 116   AST 27 29   ALT 15 14   ANIONGAP 6* 6*   EGFRNONAA >60 >60     All pertinent labs within the past 24 hours have been reviewed.    Significant Imaging: I have reviewed all pertinent imaging results/findings within the past 24 hours.   Imaging Results          X-Ray Abdomen AP with Left Decub (Final result)  Result time 01/28/18 11:01:07    Final result by Mark Rondon DO (01/28/18 11:01:07)                 Impression:     As above      Electronically signed by: MARK RONDON D.O.  Date:     01/28/18  Time:    11:01              Narrative:    AP and left lateral decubitus films of the abdomen    Findings: The bowel gas pattern is nonspecific.  No free air is suggested on the decubitus film.  No pathologic calcifications are suggested.  The bowel loops appear to be displaced centrally suggesting ascites.                             IR Paracentesis with Imaging (Final result)  Result time 01/25/18 12:03:45    Final result by Amor Millard MD (01/25/18 12:03:45)                 Impression:      Ultrasound-guided paracentesis performed with 2800 mL of thin dark maroon fluid removed.  A sample of fluid was sent for the requested studies.      Electronically signed by: AMOR MILLARD MD  Date:     01/25/18  Time:    12:03               Narrative:    Procedure: Ultrasound-guided paracentesis dated 1/25/18.    After obtaining informed consent from the patient, using sterile technique, 1% lidocaine local anesthetic, and ultrasound guidance a 5 [place]awk centesis needle was advanced into the ascites via a left lower quadrant abdominal approach.  2800 mL of thin dark maroon fluid was removed.  The catheter was then removed and hemostasis was achieved.  No immediate post procedure complications are noted.                             CT Abdomen Pelvis With Contrast (Final result)  Result time 01/25/18 06:49:19    Final result by Shelli Swift MD (01/25/18 06:49:19)                 Impression:        No acute intra-abdominal/pelvic CT abnormalities to account for the reported history of abdominal and back pain.    Findings include:  - Partially visualized airspace opacity within the left lower lobe concerning for aspiration or pneumonia.  - CT findings of cirrhosis.  - Suspected portal hypertension noting a large volume of abdominal ascites and a small recanalized umbilical vein.  - Cholelithiasis.      Electronically signed by: SHELLI SWIFT MD  Date:     01/25/18  Time:    06:49              Narrative:    Technique: 5-mm axial images were obtained through the abdomen and pelvis following administration of 75 cc of Omnipaque 350 IV contrast.  Images were submitted for coronal, and sagittal reformats.    Comparison: CT 02/03/2015.    Findings:    Visualized heart demonstrates trace coronary artery atherosclerosis.  No pericardial effusion.    Visualized lungs demonstrate a partially visualized airspace opacity within the superior segment of the left lower lobe, possibly pneumonia or aspiration.  Partially visualized nodular opacity within the right lower lobe not well characterized.  No pleural effusions.    The liver demonstrates a nodular contour and widened fissures.  Hepatic parenchyma demonstrates diffusely heterogeneous enhancement without  discrete lesions.  No intrahepatic bile duct dilatation.  Portal vein, splenic vein and SMV are patent.  There is a small recanalized umbilical vein.    Calcified stones noted within the gallbladder lumen.  No surrounding inflammatory changes.  Common bile duct is at the upper limit of normal in caliber with tip at the level of the ampulla.    There is a small sliding type hiatal hernia.  The stomach is otherwise decompressed and not well evaluated.    Duodenum, spleen, pancreas and adrenal glands are within normal limits.    Kidneys are normal in size and location.  No enhancing renal masses.  Subcentimeter nonenhancing right cortical lesion likely represents a cyst.  No nephrolithiasis.  No hydronephrosis or hydroureter.  Bladder is decompressed around a Garcia catheter and demonstrates scattered intraluminal gas.    The uterus and ovaries are within normal limits.    The small bowel is normal in caliber.  The colon is unremarkable.  The appendix is normal.  No obstruction or inflammatory changes.    There is a large volume of low attenuation abdominal and pelvic ascites.  There is diffuse mesenteric and omental edema.    No intra-abdominal free air. No abdominal or pelvic lymph node enlargement.  No focal mesenteric masses.    The abdominal aorta tapers normally with prominent atherosclerotic calcification.  IVC and common iliac veins are patent.    Subcutaneous soft tissues are within normal limits.    No acute fractures or osseous destruction.  Degenerative changes of the spine noted.                             X-Ray Chest AP Portable (Final result)  Result time 01/25/18 04:16:59    Final result by Fabian Fam MD (01/25/18 04:16:59)                 Impression:        Left basilar airspace disease or subsegmental atelectasis. Findings could reflect developing pneumonia or aspiration in the setting of sepsis.      Electronically signed by: Fabian Fam  Date:     01/25/18  Time:    04:16               Narrative:    CLINICAL INFORMATION: Sepsis.    COMPARISON: None available.    FINDINGS: One view of the chest was obtained.  Cardiac wires overlie the chest. Cardiac silhouette is not enlarged.  Lungs demonstrate bilateral increased interstitial attenuation, with more focal airspace disease or subsegmental atelectasis at the left lung base.  No large pleural effusion. No pneumothorax.

## 2018-01-29 NOTE — PROGRESS NOTES
[facility]  Adult Nutrition  Progress Note    SUMMARY     Recommendations    Recommendation/Intervention:   1. Continue low Na diet  2. Consider Boost Plus bid if PO intake declines  3. Replete electrolytes prn    Goals:   1. Meet >85% EEN and EPN   2. Prevent further dry wt loss   3. Promote nutrition related labs wnl    Nutrition Goal Status: progressing towards goal  Communication of RD Recs: discussed on rounds    Reason for Assessment    Reason for Assessment: RD follow-up  Diagnosis:   1. Abdominal pain    2. Acute lower GI bleeding    3. Alcohol withdrawal syndrome without complication      Past Medical History:   Diagnosis Date    Cirrhosis         Interdisciplinary Rounds: attended     General Information Comments: Pt endorses improving appetite, but c/o discomfort due to abdominal distention. Denies N/V/D/C, LBM today.    Nutrition Discharge Planning: d/c on low Na diet    Nutrition Prescription Ordered    Current Diet Order: low Na    Evaluation of Received Nutrients/Fluid Intake    Oral Fluid (mL): 720  IV Fluid (mL): 4950  Total Fluid Intake (mL): 5670  Fluid Required: meeting needs     % Intake of Estimated Energy Needs: 75 - 100 %  % Meal Intake: 75%     Nutrition Risk Screen     Nutrition Risk Screen: no indicators present    Nutrition/Diet History    Food Preferences: No cultural/spiritual prefs noted  Factors Affecting Nutritional Intake: altered gastrointestinal function, decreased appetite     Labs/Tests/Procedures/Meds    Pertinent Labs Reviewed: reviewed  Lab Results   Component Value Date     01/29/2018    K 3.3 (L) 01/29/2018     (H) 01/29/2018    CO2 16 (L) 01/29/2018    BUN 5 (L) 01/29/2018    CREATININE 0.6 01/29/2018    CALCIUM 7.1 (L) 01/29/2018    PHOS 3.1 01/25/2018    MG 1.3 (L) 01/25/2018    ESTGFRAFRICA >60 01/29/2018    EGFRNONAA >60 01/29/2018    ALBUMIN 1.5 (L) 01/29/2018   *corrected Ca 9.1    Lab Results   Component Value Date    HCT 24.4 (L)  "2018     Lab Results   Component Value Date    HGB 8.1 (L) 2018      Pertinent Medications Reviewed: reviewed     Scheduled Meds:   cefTRIAXone (ROCEPHIN) IVPB  2 g Intravenous Q24H    folic acid  1 mg Oral Daily    nicotine  1 patch Transdermal Daily    pantoprazole  40 mg Intravenous Daily    thiamine  100 mg Oral Daily     Continuous Infusions:   sodium chloride 0.9% 200 mL/hr at 18 1324     Physical Findings    Overall Physical Appearance: loss of muscle mass, loss of subcutaneous fat  Oral/Mouth Cavity: tooth/teeth missing  Skin: intact    Anthropometrics    Temp: 98.5 °F (36.9 °C)  Height: 4' 11" (149.9 cm)  Weight Method: Bed Scale  Weight: 40.4 kg (89 lb 1.1 oz)  Ideal Body Weight (IBW), Female: 95 lb  % Ideal Body Weight, Female (lb): 93.76 lb  BMI (Calculated): 18  BMI Grade: 17 - 18.4 protein-energy malnutrition grade I  Usual Body Weight (UBW), k.7 kg  % Usual Body Weight: 84.87  % Weight Change From Usual Weight: -15.3 %    Estimated/Assessed Needs    Weight Used For Calorie Calculations: 40.4 kg (89 lb 1.1 oz)   Energy Calorie Requirements (kcal): 0900-7403  Energy Need Method: Kcal/kg (30-35 kcal/kg)    Weight Used For Protein Calculations: 40.4 kg (89 lb 1.1 oz)  Protein Requirements: 49-61 gm/d (1.2-1.5 gm/kg)    Fluid Requirements (mL): 1215 (or per team)  Fluid Need Method: RDA Method    Assessment and Plan    Severe malnutrition    Malnutrition in the context of Chronic Illness/Injury    Related to (etiology):  etoh cirrhosis    Signs and Symptoms (as evidenced by):  15% wt loss x 6 months, severe temporal depletion and moderate muscle/fat depletion of triceps and clavicle, and ascites upon admission requiring paracentesis    Interventions/Recommendations (treatment strategy):  Continue low Na diet. Consider Boost Plus if PO intake declines. See RD note 2018 for full recs.    Nutrition Diagnosis Status:  Continues          Monitor and Evaluation    Food and " Nutrient Intake: energy intake, food and beverage intake  Food and Nutrient Adminstration: diet order  Physical Activity and Function: nutrition-related ADLs and IADLs  Anthropometric Measurements: weight, weight change  Biochemical Data, Medical Tests and Procedures: electrolyte and renal panel, gastrointestinal profile, glucose/endocrine profile, inflammatory profile  Nutrition-Focused Physical Findings: overall appearance, extremities, muscles and bones, skin    Nutrition Risk    Level of Risk: other (see comments) (f/u 2x/wk)    Nutrition Follow-Up    RD Follow-up?: Yes    Makayla Humphrey, MS, RD, LDN   Dietitian, [facility]  [phone]

## 2018-01-29 NOTE — PLAN OF CARE
Problem: Patient Care Overview  Goal: Plan of Care Review  Outcome: Ongoing (interventions implemented as appropriate)  Recommendation/Intervention:   1. Continue low Na diet  2. Consider Boost Plus bid if PO intake declines  3. Replete electrolytes prn     Goals:   1. Meet >85% EEN and EPN   2. Prevent further dry wt loss   3. Promote nutrition related labs wnl

## 2018-01-29 NOTE — PROGRESS NOTES
"Subjective:       Patient ID: Neena Marks is a 62 y.o. female.    Chief Complaint:  Abdominal Pain (Patient family called EMS for patient c/o abd pain, dizziness, lethargy, altered mental status and not "feeling well")       History of Present Illness  Pt with abd distension denies any n/v or gi bleed    Review of Systems  Respiratory: negative      Objective:     Vitals:    01/28/18 2326 01/29/18 0516 01/29/18 0517 01/29/18 0745   BP: 110/65  121/69 (!) 102/57   BP Location: Right arm  Right arm    Patient Position: Lying  Lying Lying   Pulse: 88 88 87 75   Resp: 20 20 18 18   Temp: 99.7 °F (37.6 °C)  98.9 °F (37.2 °C) 98.4 °F (36.9 °C)   TempSrc: Oral  Oral    SpO2: 96% 96% 95% 98%   Weight:       Height:          Abdomen: soft mild distension    Data Review   Recent Results (from the past 336 hour(s))   CBC auto differential    Collection Time: 01/29/18  4:48 AM   Result Value Ref Range    WBC 18.76 (H) 3.90 - 12.70 K/uL    Hemoglobin 8.1 (L) 12.0 - 16.0 g/dL    Hematocrit 24.4 (L) 37.0 - 48.5 %    Platelets 186 150 - 350 K/uL   CBC auto differential    Collection Time: 01/25/18 12:14 PM   Result Value Ref Range    WBC 21.10 (H) 3.90 - 12.70 K/uL    Hemoglobin 10.1 (L) 12.0 - 16.0 g/dL    Hematocrit 28.8 (L) 37.0 - 48.5 %    Platelets 153 150 - 350 K/uL   CBC auto differential    Collection Time: 01/25/18  4:11 AM   Result Value Ref Range    WBC 16.45 (H) 3.90 - 12.70 K/uL    Hemoglobin 4.4 (LL) 12.0 - 16.0 g/dL    Hematocrit 13.2 (LL) 37.0 - 48.5 %    Platelets 155 150 - 350 K/uL        Recent Labs  Lab 01/25/18  0411   APTT <21.0   INR 1.2       Recent Labs  Lab 01/27/18  0318 01/28/18  0453 01/29/18  0448    136 139   K 4.0 3.9 3.3*   * 113* 118*   CO2 19* 17* 16*   BUN 9 8 5*   CREATININE 0.8 0.7 0.6   CALCIUM 7.9* 7.4* 7.1*   PROT 6.5 5.8* 5.6*   BILITOT 0.6 0.7 0.4   ALKPHOS 92 116 102   ALT 15 14 17   AST 27 29 34    Lab Results   Component Value Date    HEPAIGM Negative 02/04/2015    HEPBIGM " Negative 02/04/2015    HEPCAB Negative 02/04/2015      Lab Results   Component Value Date    AFP 21 (H) 02/05/2015      Lab Results   Component Value Date    CEA 5.9 (H) 02/06/2015        Recent Labs  Lab 01/25/18  0411   APTT <21.0   INR 1.2        Assessment:     1. Abdominal pain    2. Acute lower GI bleeding        Plan:     1) Check KUB   2) ? repea CT abd/Pelvis  3) Hold off on colonoscopy for now

## 2018-01-29 NOTE — PROGRESS NOTES
[facility] Hospital Medicine  Progress Note    Patient Name: Neena Marks  MRN: [id number]  Patient Class: IP- Inpatient   Admission Date: 1/25/2018  Length of Stay: 3 days  Attending Physician: Courtney Rodriguez, *  [facility] Provider: [facility]        Subjective:     Principal Problem:Acute lower GI bleeding    HPI:  61 yo female with a PMH of alcoholic cirrhosis presents to the ED c/o bilateral lower abdominal pain x 2 days.  Patient was admitted at [facility] last week, underwent paracentesis, and discharged with Augmentin.  Patient reports last hospitalization or paracentesis was 2-3 years ago. She admits to bloody bowel movements prior to this admission.  In Er, patient found to be hypotensive with a systolic in the 80's, tachycardic at 124, and a hemoglobin of 4.4.  After 1 pint of blood, heart rate improved to 115 and systolic blood pressure improved to 100-110.  Patient states that pain has improved but still very tired.     Hospital Course:  No notes on file    Interval History: Patient with episode of fever overnight, WBC increased to 30.  Discussed plan of care and patient in agreement.  She denies any chest pain, shortness of breath, nausea, vomiting, diarrhea.  Eating, ambulating, sleeping well.     Review of Systems   Constitutional: Positive for appetite change and fatigue.   HENT: Negative.    Eyes: Negative.    Respiratory: Negative for chest tightness and shortness of breath.    Cardiovascular: Negative for chest pain and palpitations.   Gastrointestinal: Positive for abdominal distention and blood in stool. Negative for abdominal pain.   Endocrine: Negative.    Genitourinary: Negative for difficulty urinating and frequency.   Musculoskeletal: Negative for arthralgias.   Neurological: Negative for syncope and speech difficulty.   Psychiatric/Behavioral: Negative for agitation, behavioral problems and confusion.     Objective:      Vital Signs (Most Recent):  Temp: 98.7 °F (37.1 °C) (01/28/18 1954)  Pulse: 76 (01/28/18 1954)  Resp: 16 (01/28/18 1954)  BP: 126/64 (01/28/18 1954)  SpO2: 100 % (01/28/18 1954) Vital Signs (24h Range):  Temp:  [98.7 °F (37.1 °C)-103.4 °F (39.7 °C)] 98.7 °F (37.1 °C)  Pulse:  [] 76  Resp:  [16-18] 16  SpO2:  [94 %-100 %] 100 %  BP: ()/(52-89) 126/64     Weight: 40.4 kg (89 lb 1.1 oz)  Body mass index is 17.99 kg/m².    Intake/Output Summary (Last 24 hours) at 01/28/18 2200  Last data filed at 01/28/18 1700   Gross per 24 hour   Intake             2781 ml   Output             1250 ml   Net             1531 ml      Physical Exam   Constitutional: She is oriented to person, place, and time. She appears cachectic. Ill appearance: however, appearance has improved    HENT:   Head: Normocephalic and atraumatic.   Eyes: EOM are normal.   Neck: Normal range of motion.   Cardiovascular: Normal rate and regular rhythm.    Pulmonary/Chest: Effort normal and breath sounds normal.   Abdominal: Soft. Bowel sounds are normal. She exhibits distension. There is no tenderness. There is no guarding.   Musculoskeletal: Normal range of motion.   Neurological: She is alert and oriented to person, place, and time.   Skin: Skin is warm.   Psychiatric: She has a normal mood and affect. Her behavior is normal.   Vitals reviewed.      Significant Labs:   CBC:   Recent Labs  Lab 01/28/18  0024 01/28/18  0453   WBC 30.90* 29.00*   HGB 9.4* 9.2*   HCT 27.9* 27.5*    148*     CMP:   Recent Labs  Lab 01/27/18  0318 01/28/18  0453    136   K 4.0 3.9   * 113*   CO2 19* 17*    90   BUN 9 8   CREATININE 0.8 0.7   CALCIUM 7.9* 7.4*   PROT 6.5 5.8*   ALBUMIN 1.8* 1.6*   BILITOT 0.6 0.7   ALKPHOS 92 116   AST 27 29   ALT 15 14   ANIONGAP 6* 6*   EGFRNONAA >60 >60     All pertinent labs within the past 24 hours have been reviewed.    Significant Imaging: I have reviewed all pertinent imaging results/findings within  the past 24 hours.   Imaging Results          X-Ray Abdomen AP with Left Decub (Final result)  Result time 01/28/18 11:01:07    Final result by Mark Rondon DO (01/28/18 11:01:07)                 Impression:     As above      Electronically signed by: MARK RONDON D.O.  Date:     01/28/18  Time:    11:01              Narrative:    AP and left lateral decubitus films of the abdomen    Findings: The bowel gas pattern is nonspecific.  No free air is suggested on the decubitus film.  No pathologic calcifications are suggested.  The bowel loops appear to be displaced centrally suggesting ascites.                             IR Paracentesis with Imaging (Final result)  Result time 01/25/18 12:03:45    Final result by Amor Faith MD (01/25/18 12:03:45)                 Impression:      Ultrasound-guided paracentesis performed with 2800 mL of thin dark maroon fluid removed.  A sample of fluid was sent for the requested studies.      Electronically signed by: AMOR FAITH MD  Date:     01/25/18  Time:    12:03              Narrative:    Procedure: Ultrasound-guided paracentesis dated 1/25/18.    After obtaining informed consent from the patient, using sterile technique, 1% lidocaine local anesthetic, and ultrasound guidance a 5 Urdu centesis needle was advanced into the ascites via a left lower quadrant abdominal approach.  2800 mL of thin dark maroon fluid was removed.  The catheter was then removed and hemostasis was achieved.  No immediate post procedure complications are noted.                             CT Abdomen Pelvis With Contrast (Final result)  Result time 01/25/18 06:49:19    Final result by Reed Swift MD (01/25/18 06:49:19)                 Impression:        No acute intra-abdominal/pelvic CT abnormalities to account for the reported history of abdominal and back pain.    Findings include:  - Partially visualized airspace opacity within the left lower lobe concerning for aspiration or  pneumonia.  - CT findings of cirrhosis.  - Suspected portal hypertension noting a large volume of abdominal ascites and a small recanalized umbilical vein.  - Cholelithiasis.      Electronically signed by: SHELLI MEZA MD  Date:     01/25/18  Time:    06:49              Narrative:    Technique: 5-mm axial images were obtained through the abdomen and pelvis following administration of 75 cc of Omnipaque 350 IV contrast.  Images were submitted for coronal, and sagittal reformats.    Comparison: CT 02/03/2015.    Findings:    Visualized heart demonstrates trace coronary artery atherosclerosis.  No pericardial effusion.    Visualized lungs demonstrate a partially visualized airspace opacity within the superior segment of the left lower lobe, possibly pneumonia or aspiration.  Partially visualized nodular opacity within the right lower lobe not well characterized.  No pleural effusions.    The liver demonstrates a nodular contour and widened fissures.  Hepatic parenchyma demonstrates diffusely heterogeneous enhancement without discrete lesions.  No intrahepatic bile duct dilatation.  Portal vein, splenic vein and SMV are patent.  There is a small recanalized umbilical vein.    Calcified stones noted within the gallbladder lumen.  No surrounding inflammatory changes.  Common bile duct is at the upper limit of normal in caliber with tip at the level of the ampulla.    There is a small sliding type hiatal hernia.  The stomach is otherwise decompressed and not well evaluated.    Duodenum, spleen, pancreas and adrenal glands are within normal limits.    Kidneys are normal in size and location.  No enhancing renal masses.  Subcentimeter nonenhancing right cortical lesion likely represents a cyst.  No nephrolithiasis.  No hydronephrosis or hydroureter.  Bladder is decompressed around a Garcia catheter and demonstrates scattered intraluminal gas.    The uterus and ovaries are within normal limits.    The small bowel is normal  in caliber.  The colon is unremarkable.  The appendix is normal.  No obstruction or inflammatory changes.    There is a large volume of low attenuation abdominal and pelvic ascites.  There is diffuse mesenteric and omental edema.    No intra-abdominal free air. No abdominal or pelvic lymph node enlargement.  No focal mesenteric masses.    The abdominal aorta tapers normally with prominent atherosclerotic calcification.  IVC and common iliac veins are patent.    Subcutaneous soft tissues are within normal limits.    No acute fractures or osseous destruction.  Degenerative changes of the spine noted.                             X-Ray Chest AP Portable (Final result)  Result time 01/25/18 04:16:59    Final result by Fabian Fam MD (01/25/18 04:16:59)                 Impression:        Left basilar airspace disease or subsegmental atelectasis. Findings could reflect developing pneumonia or aspiration in the setting of sepsis.      Electronically signed by: Fabian Fam  Date:     01/25/18  Time:    04:16              Narrative:    CLINICAL INFORMATION: Sepsis.    COMPARISON: None available.    FINDINGS: One view of the chest was obtained.  Cardiac wires overlie the chest. Cardiac silhouette is not enlarged.  Lungs demonstrate bilateral increased interstitial attenuation, with more focal airspace disease or subsegmental atelectasis at the left lung base.  No large pleural effusion. No pneumothorax.                                  Assessment/Plan:      * Acute lower GI bleeding    -Continue PPI  -Octreotide discontinued 01/26  -GI had plans to scope on tomorrow, but now with leukocytosis, scope delayed  -No active bleeding at this time        Anemia    -Suspect lower GI bleed  -GI consulted  -s/p transfusion of 2 Units of PRBC's 01/25  -Hgb now at 9.2  -Check H/H daily        Leukocytosis    -Etiology unclear  -CXR in AM  -Clinically well  -May benefit from paracentesis and repeat studies for SBP  -Will monitor  WBC daily        Severe malnutrition    -Will consult Nutrition for further assessment          Esophageal web determined by endoscopy              Ascites    -Patient noted to have large amount of ascites on imaging  -s/p paracentesis 01/25  -No evidence of SBP  -Will cont Rocephin empirically        Cirrhosis    -secondary to alcohol  -Patient still drinks alcohol and uses cocaine  -will  on cessation when status improves          VTE Risk Mitigation         Ordered     Reason for No Pharmacological VTE Prophylaxis  Once      01/25/18 0725     Medium Risk of VTE  Once      01/25/18 0725     Place sequential compression device  Until discontinued      01/25/18 0725              Courtney Rodriguez MD  Department of Hospital Medicine   [facility]

## 2018-01-29 NOTE — ASSESSMENT & PLAN NOTE
-Suspect lower GI bleed  -GI consulted  -s/p transfusion of 2 Units of PRBC's 01/25  -Hgb now at 9.2  -Check H/H daily

## 2018-01-29 NOTE — PLAN OF CARE
Problem: Patient Care Overview  Goal: Plan of Care Review  Outcome: Ongoing (interventions implemented as appropriate)  Patient is awake, alert, oriented, sating at 97% with O2 at 2 LPM via [place]. Patient appears weak on BLE, with mild edema on BLE. No skin breakdown noted. Up in the chair from 9:00 am-2:00pm by PT, tolerated sitting up with no complaints. Observed to have poor appetite, 20% of the lunch was only consumed. Offered meal choices with the patient but declined. Advised patient to notify nurse if assistance is needed. Seen by AP and orders carried out. Garcia removed and voided x1 in the afternoon. Seen from time to time, NSR on monitor.

## 2018-01-30 ENCOUNTER — SURGERY (OUTPATIENT)
Age: 63
End: 2018-01-30

## 2018-01-30 LAB
ALBUMIN SERPL BCP-MCNC: 1.7 G/DL
ALP SERPL-CCNC: 130 U/L
ALT SERPL W/O P-5'-P-CCNC: 17 U/L
AMMONIA PLAS-SCNC: 43 UMOL/L
ANION GAP SERPL CALC-SCNC: 8 MMOL/L
ANISOCYTOSIS BLD QL SMEAR: SLIGHT
AST SERPL-CCNC: 35 U/L
BACTERIA BLD CULT: NORMAL
BACTERIA BLD CULT: NORMAL
BASOPHILS # BLD AUTO: 0.04 K/UL
BASOPHILS NFR BLD: 0.2 %
BILIRUB SERPL-MCNC: 0.4 MG/DL
BUN SERPL-MCNC: 5 MG/DL
BURR CELLS BLD QL SMEAR: ABNORMAL
CALCIUM SERPL-MCNC: 7.7 MG/DL
CHLORIDE SERPL-SCNC: 116 MMOL/L
CO2 SERPL-SCNC: 15 MMOL/L
CREAT SERPL-MCNC: 0.7 MG/DL
DIFFERENTIAL METHOD: ABNORMAL
EOSINOPHIL # BLD AUTO: 0.2 K/UL
EOSINOPHIL NFR BLD: 0.8 %
ERYTHROCYTE [DISTWIDTH] IN BLOOD BY AUTOMATED COUNT: 26.9 %
EST. GFR  (AFRICAN AMERICAN): >60 ML/MIN/1.73 M^2
EST. GFR  (NON AFRICAN AMERICAN): >60 ML/MIN/1.73 M^2
GLUCOSE SERPL-MCNC: 126 MG/DL
HCT VFR BLD AUTO: 28.1 %
HGB BLD-MCNC: 9.3 G/DL
HYPOCHROMIA BLD QL SMEAR: ABNORMAL
LYMPHOCYTES # BLD AUTO: 2.2 K/UL
LYMPHOCYTES NFR BLD: 9.1 %
MCH RBC QN AUTO: 25.2 PG
MCHC RBC AUTO-ENTMCNC: 33.1 G/DL
MCV RBC AUTO: 76 FL
MONOCYTES # BLD AUTO: 1.2 K/UL
MONOCYTES NFR BLD: 5 %
NEUTROPHILS # BLD AUTO: 20.6 K/UL
NEUTROPHILS NFR BLD: 84.9 %
OVALOCYTES BLD QL SMEAR: ABNORMAL
PLATELET # BLD AUTO: 204 K/UL
PMV BLD AUTO: ABNORMAL FL
POIKILOCYTOSIS BLD QL SMEAR: ABNORMAL
POLYCHROMASIA BLD QL SMEAR: ABNORMAL
POTASSIUM SERPL-SCNC: 3.1 MMOL/L
PROT SERPL-MCNC: 6.5 G/DL
RBC # BLD AUTO: 3.69 M/UL
SCHISTOCYTES BLD QL SMEAR: ABNORMAL
SCHISTOCYTES BLD QL SMEAR: PRESENT
SODIUM SERPL-SCNC: 139 MMOL/L
SPHEROCYTES BLD QL SMEAR: ABNORMAL
TARGETS BLD QL SMEAR: ABNORMAL
TOXIC GRANULES BLD QL SMEAR: PRESENT
WBC # BLD AUTO: 24.52 K/UL

## 2018-01-30 PROCEDURE — 25000003 PHARM REV CODE 250

## 2018-01-30 PROCEDURE — 99233 SBSQ HOSP IP/OBS HIGH 50: CPT | Mod: ,,, | Performed by: HOSPITALIST

## 2018-01-30 PROCEDURE — 11000001 HC ACUTE MED/SURG PRIVATE ROOM

## 2018-01-30 PROCEDURE — 82140 ASSAY OF AMMONIA: CPT

## 2018-01-30 PROCEDURE — 02HV33Z INSERTION OF INFUSION DEVICE INTO SUPERIOR VENA CAVA, PERCUTANEOUS APPROACH: ICD-10-PCS | Performed by: RADIOLOGY

## 2018-01-30 PROCEDURE — P9047 ALBUMIN (HUMAN), 25%, 50ML: HCPCS | Performed by: RADIOLOGY

## 2018-01-30 PROCEDURE — 25000003 PHARM REV CODE 250: Performed by: HOSPITALIST

## 2018-01-30 PROCEDURE — 76937 US GUIDE VASCULAR ACCESS: CPT

## 2018-01-30 PROCEDURE — 94799 UNLISTED PULMONARY SVC/PX: CPT

## 2018-01-30 PROCEDURE — 36415 COLL VENOUS BLD VENIPUNCTURE: CPT

## 2018-01-30 PROCEDURE — S4991 NICOTINE PATCH NONLEGEND: HCPCS | Performed by: INTERNAL MEDICINE

## 2018-01-30 PROCEDURE — 80053 COMPREHEN METABOLIC PANEL: CPT

## 2018-01-30 PROCEDURE — 63600175 PHARM REV CODE 636 W HCPCS: Performed by: INTERNAL MEDICINE

## 2018-01-30 PROCEDURE — 63600175 PHARM REV CODE 636 W HCPCS: Performed by: RADIOLOGY

## 2018-01-30 PROCEDURE — 0W9G3ZZ DRAINAGE OF PERITONEAL CAVITY, PERCUTANEOUS APPROACH: ICD-10-PCS | Performed by: RADIOLOGY

## 2018-01-30 PROCEDURE — 85025 COMPLETE CBC W/AUTO DIFF WBC: CPT

## 2018-01-30 PROCEDURE — C9113 INJ PANTOPRAZOLE SODIUM, VIA: HCPCS | Performed by: INTERNAL MEDICINE

## 2018-01-30 PROCEDURE — 25000003 PHARM REV CODE 250: Performed by: RADIOLOGY

## 2018-01-30 PROCEDURE — 25000003 PHARM REV CODE 250: Performed by: INTERNAL MEDICINE

## 2018-01-30 PROCEDURE — 25000003 PHARM REV CODE 250: Performed by: PHYSICIAN ASSISTANT

## 2018-01-30 PROCEDURE — C1751 CATH, INF, PER/CENT/MIDLINE: HCPCS

## 2018-01-30 PROCEDURE — 36569 INSJ PICC 5 YR+ W/O IMAGING: CPT

## 2018-01-30 RX ORDER — RAMELTEON 8 MG/1
8 TABLET ORAL NIGHTLY
Status: DISCONTINUED | OUTPATIENT
Start: 2018-01-30 | End: 2018-02-01 | Stop reason: HOSPADM

## 2018-01-30 RX ORDER — RAMELTEON 8 MG/1
8 TABLET ORAL NIGHTLY PRN
Status: DISCONTINUED | OUTPATIENT
Start: 2018-01-30 | End: 2018-01-30

## 2018-01-30 RX ORDER — LIDOCAINE HYDROCHLORIDE 10 MG/ML
INJECTION INFILTRATION; PERINEURAL
Status: DISCONTINUED | OUTPATIENT
Start: 2018-01-30 | End: 2018-02-01 | Stop reason: HOSPADM

## 2018-01-30 RX ORDER — ALBUMIN HUMAN 250 G/1000ML
50 SOLUTION INTRAVENOUS ONCE
Status: COMPLETED | OUTPATIENT
Start: 2018-01-30 | End: 2018-01-30

## 2018-01-30 RX ADMIN — IBUPROFEN 600 MG: 100 SUSPENSION ORAL at 04:01

## 2018-01-30 RX ADMIN — SODIUM CHLORIDE: 0.9 INJECTION, SOLUTION INTRAVENOUS at 05:01

## 2018-01-30 RX ADMIN — SODIUM CHLORIDE: 0.9 INJECTION, SOLUTION INTRAVENOUS at 11:01

## 2018-01-30 RX ADMIN — FOLIC ACID 1 MG: 1 TABLET ORAL at 08:01

## 2018-01-30 RX ADMIN — RAMELTEON 8 MG: 8 TABLET, FILM COATED ORAL at 11:01

## 2018-01-30 RX ADMIN — NICOTINE 1 PATCH: 21 PATCH, EXTENDED RELEASE TRANSDERMAL at 08:01

## 2018-01-30 RX ADMIN — Medication 100 MG: at 08:01

## 2018-01-30 RX ADMIN — PANTOPRAZOLE SODIUM 40 MG: 40 INJECTION, POWDER, FOR SOLUTION INTRAVENOUS at 08:01

## 2018-01-30 RX ADMIN — ALBUMIN (HUMAN) 50 G: 12.5 SOLUTION INTRAVENOUS at 12:01

## 2018-01-30 RX ADMIN — LIDOCAINE HYDROCHLORIDE 3 ML: 10 INJECTION, SOLUTION INFILTRATION; PERINEURAL at 11:01

## 2018-01-30 NOTE — ASSESSMENT & PLAN NOTE
- tiology unclear  - CXR with no evidence of pneumonia  - Repeat studies for SBP   - Monitor WBC daily  - Continue Rocephin empirically in the meantime.

## 2018-01-30 NOTE — PROCEDURES
Radiology Post-Procedure Note    Pre Op Diagnosis: ascites  Post Op Diagnosis: Same    Procedure: paracentesis    Procedure performed by: Everett Fitzpatrick MD    Written Informed Consent Obtained: Yes  Specimen Removed: YES 6.2 L serosanguinous ascites  Estimated Blood Loss: Minimal    Findings:   Successful paracentesis.    Patient tolerated procedure well.    @SIG@

## 2018-01-30 NOTE — ASSESSMENT & PLAN NOTE
-Continue PPI  -Octreotide discontinued 01/26  -GI had plans to scope on today, but now with leukocytosis, scope delayed  -No active bleeding at this time

## 2018-01-30 NOTE — PROGRESS NOTES
[facility] Hospital Medicine  Progress Note    Patient Name: Neena Marks  MRN: [id number]  Patient Class: IP- Inpatient   Admission Date: 1/25/2018  Length of Stay: 5 days  Attending Physician: Jayla De La Rosa MD  [facility] Provider: [facility]        Subjective:     Principal Problem:Acute lower GI bleeding    HPI:  61 yo female with a PMH of alcoholic cirrhosis presents to the ED c/o bilateral lower abdominal pain x 2 days.  Patient was admitted at [facility] last week, underwent paracentesis, and discharged with Augmentin.  Patient reports last hospitalization or paracentesis was 2-3 years ago. She admits to bloody bowel movements prior to this admission.  In Er, patient found to be hypotensive with a systolic in the 80's, tachycardic at 124, and a hemoglobin of 4.4.  After 1 pint of blood, heart rate improved to 115 and systolic blood pressure improved to 100-110.  Patient states that pain has improved but still very tired.     Hospital Course:  S/p 2 Units of PRBCs, responded well. Paracentesis performed, no evidence of SBP. WBC increased to 30 and febrile on 01/27/18, but now down to 18.  IR guided paracentesis done 1/30 with 6.2 liters removed.    Interval History:  Patient back from paracentesis.  She is feeling better and is breathing comfortably.  Requesting food, sleeping pill and discontinuation of her Garcia.  She would like to go home.  Albumin infusing.    Review of Systems   Constitutional: Negative for chills and fever.   Respiratory: Negative for cough and shortness of breath.    Cardiovascular: Negative for chest pain and palpitations.   Gastrointestinal: Positive for abdominal distention.     Objective:     Vital Signs (Most Recent):  Temp: 97.9 °F (36.6 °C) (01/30/18 1523)  Pulse: 76 (01/30/18 1523)  Resp: 18 (01/30/18 1523)  BP: 119/80 (01/30/18 1523)  SpO2: 96 % (01/30/18 1523) Vital Signs (24h Range):  Temp:  [97.8 °F (36.6  °C)-98.6 °F (37 °C)] 97.9 °F (36.6 °C)  Pulse:  [68-91] 76  Resp:  [16-20] 18  SpO2:  [94 %-100 %] 96 %  BP: (114-153)/(69-95) 119/80     Weight: 40.4 kg (89 lb 1.1 oz)  Body mass index is 17.99 kg/m².    Intake/Output Summary (Last 24 hours) at 01/30/18 1731  Last data filed at 01/30/18 1155   Gross per 24 hour   Intake              480 ml   Output             7150 ml   Net            -6670 ml      Physical Exam   Constitutional: She is oriented to person, place, and time. She appears cachectic. Ill appearance: however, appearance has improved    HENT:   Head: Normocephalic and atraumatic.   Eyes: EOM are normal.   Neck: Normal range of motion.   Cardiovascular: Normal rate and regular rhythm.    Pulmonary/Chest: Effort normal and breath sounds normal.   Abdominal: Soft. Bowel sounds are normal. There is no guarding.   Mild distension without tenderness noted.   Musculoskeletal: Normal range of motion.   Neurological: She is alert and oriented to person, place, and time.   Skin: Skin is warm.   Psychiatric: She has a normal mood and affect. Her behavior is normal.   Vitals reviewed.      Significant Labs: All pertinent labs within the past 24 hours have been reviewed.    Significant Imaging: I have reviewed all pertinent imaging results/findings within the past 24 hours.    Assessment/Plan:      * Acute lower GI bleeding    - Continue PPI  - Octreotide discontinued 01/26  - Scope delayed due to leukocytosis  - No active bleeding at this time  - She is on albumin infusions s/p large volume paracentesis.        Leukocytosis    - tiology unclear  - CXR with no evidence of pneumonia  - Repeat studies for SBP   - Monitor WBC daily  - Continue Rocephin empirically in the meantime.        Severe malnutrition    -Will consult Nutrition for further assessment          Esophageal web determined by endoscopy              Ascites    - Patient noted to have large amount of ascites on imaging  - s/p paracentesis 01/25 and again  today  - No evidence of SBP  - Will cont Rocephin empirically  - Will send studies again to eval for SBP        Cirrhosis    - secondary to alcohol  - Patient reports she stopped drinking recently but still uses cocaine  -  on cessation         Iron deficiency anemia due to chronic blood loss    - Suspect multifactorial - bloody ascites fluid, varices, possible malignancy all contributing factors.  - Improved s/p 2 units PRBCs 1/25.  - Hb stabilized after initial trend downward.            VTE Risk Mitigation         Ordered     Reason for No Pharmacological VTE Prophylaxis  Once      01/25/18 0725     Medium Risk of VTE  Once      01/25/18 0725     Place sequential compression device  Until discontinued      01/25/18 0725              Jayla Jeronimo MD  Department of Hospital Medicine   [facility]

## 2018-01-30 NOTE — PLAN OF CARE
Problem: Patient Care Overview  Goal: Plan of Care Review  Outcome: Ongoing (interventions implemented as appropriate)  Patient alert awake and oriented, with ongoing IV fluid of NS at 200 cc/hr. Breathing easy on RA at 93%, and with abdominal distention. IFC hooked to urine bag and draining to yellow colored urine output. Due medications given, needs attended and safety maintained. Transported to Imaging with no complaints, back in the room and positioned safely. Vitals WNL.

## 2018-01-30 NOTE — ASSESSMENT & PLAN NOTE
- Suspect multifactorial - bloody ascites fluid, varices, possible malignancy all contributing factors.  - Improved s/p 2 units PRBCs 1/25.  - Hb stabilized after initial trend downward.

## 2018-01-30 NOTE — PROGRESS NOTES
This lady feels decently though she has abdominal distention due to ascites. She has cirrhosis and suspect chronic alcohol use being the basis for this. She quit drinking alcohol 2 months ago. No blood loss or signs of cholangitis. The CT showed gallstones and no masses. Of concern is an elevated AFP. Her EGD showed esophogeal varices and she has PHRG. Her ascites is bloody. No acute chest pain or shortness of breath.    T<100 HR 90  Anicteric some temporal wasting  Neck supple no mass  Heart no gallops, RR  Chest clear no wheeze  Abdomen distended non-tender active sounds no masses some ascites noted. No peritoneal signs  Extremities no cyanosis or clubbing  Neurologic no encephalopathy no lateralizing signs or asterixis  Psyche normal no depression or anxiety    Labs as reported, significantly anemic with abnormal CEA and AFP. Await pathology reports for ascitic fluid  CT reviewed.    Plan: Check cytology, diuresis, fluid restriction. Avoid alcohol and no raw shellfish or undercooked meats.

## 2018-01-30 NOTE — ASSESSMENT & PLAN NOTE
- Continue PPI  - Octreotide discontinued 01/26  - Scope delayed due to leukocytosis  - No active bleeding at this time  - She is on albumin infusions s/p large volume paracentesis.

## 2018-01-30 NOTE — ASSESSMENT & PLAN NOTE
- Patient noted to have large amount of ascites on imaging  - s/p paracentesis 01/25 and again today  - No evidence of SBP  - Will cont Rocephin empirically  - Will send studies again to eval for SBP

## 2018-01-30 NOTE — ASSESSMENT & PLAN NOTE
-Patient noted to have large amount of ascites on imaging  -s/p paracentesis 01/25  -No evidence of SBP  -Will cont Rocephin empirically  -Ascites has returned, IR consulted for paracentesis in AM  -Will send studies again to eval for SBP

## 2018-01-30 NOTE — PROCEDURES
"Neena Marks is a 62 y.o. female patient.    Temp: 98.3 °F (36.8 °C) (01/30/18 0845)  Pulse: 77 (01/30/18 0845)  Resp: 18 (01/30/18 0845)  BP: 133/69 (01/30/18 0845)  SpO2: 97 % (01/30/18 0845)  Weight: 40.4 kg (89 lb 1.1 oz) (01/26/18 1200)  Height: 4' 11" (149.9 cm) (01/26/18 1200)    PICC  Date/Time: 1/30/2018 9:30 AM  Location procedure was performed: [facility] PICC LINE PLACEMENT  Performed by: NEIL KYLE  Consent Done: Yes  Time out: Immediately prior to procedure a time out was called to verify the correct patient, procedure, equipment, support staff and site/side marked as required  Indications: med administration and vascular access  Anesthesia: local infiltration  Local anesthetic: lidocaine 1% without epinephrine  Anesthetic Total (mL): 1  Preparation: skin prepped with ChloraPrep  Skin prep agent dried: skin prep agent completely dried prior to procedure  Sterile barriers: all five maximum sterile barriers used - cap, mask, sterile gown, sterile gloves, and large sterile sheet  Hand hygiene: hand hygiene performed prior to central venous catheter insertion  Location details: right basilic  Catheter type: double lumen  Catheter size: 5 Fr  Catheter Length: 33cm    Ultrasound guidance: yes  Vessel Caliber: small and patent, compressibility normal  Vascular Doppler: not done  Needle advanced into vessel with real time Ultrasound guidance.  Guidewire confirmed in vessel.  Image recorded and saved.  Sterile sheath used.  no esophageal manometryNumber of attempts: 1  Post-procedure: blood return through all ports, chlorhexidine patch and sterile dressing applied  Technical procedures used: microintroducer with ultrasound  Estimated blood loss (mL): 0  Specimens: No  Implants: No  Assessment: placement verified by x-ray, no pneumothorax on x-ray, tip termination and successful placement  Complications: none        Neil Kyle  1/30/2018    "

## 2018-01-30 NOTE — ASSESSMENT & PLAN NOTE
- secondary to alcohol  - Patient reports she stopped drinking recently but still uses cocaine  -  on cessation

## 2018-01-30 NOTE — ASSESSMENT & PLAN NOTE
-Suspect lower GI bleed  -GI consulted  -s/p transfusion of 2 Units of PRBC's 01/25  -Hgb now at 8.1 from 9.2 from 10.1, trending downward  -Check H/H daily

## 2018-01-30 NOTE — CONSULTS
Consult/H&P Note  Interventional Radiology    Consult Requested By: medicine    Reason for Consult: ascites    SUBJECTIVE:     Chief Complaint: GI bleed, abd distension    History of Present Illness: 61 yo F with cirrhosis and ascites. Had 2.8 L of sanguinous ascites removed on 1/25.    Past Medical History:   Diagnosis Date    Cirrhosis      History reviewed. No pertinent surgical history.  History reviewed. No pertinent family history.  Social History   Substance Use Topics    Smoking status: Current Every Day Smoker     Types: Cigarettes    Smokeless tobacco: Never Used      Comment: 2 cigarettes a day    Alcohol use Yes      Comment: 1 pint a day       Review of Systems:  As per H&P      OBJECTIVE:     Vital Signs Range (Last 24H):  Temp:  [97.8 °F (36.6 °C)-98.6 °F (37 °C)]   Pulse:  [75-91]   Resp:  [16-20]   BP: (114-147)/(69-86)   SpO2:  [94 %-100 %]     Physical Exam:  General- Patient alert and oriented x3  CV- Regular rate and rhythm  Resp-  No increased WOB  GI- Non tender/+distended  Neuro-  No focal deficits noted.     Physical Exam  Body mass index is 17.99 kg/m².    Scheduled Meds:    cefTRIAXone (ROCEPHIN) IVPB  2 g Intravenous Q24H    folic acid  1 mg Oral Daily    nicotine  1 patch Transdermal Daily    pantoprazole  40 mg Intravenous Daily    thiamine  100 mg Oral Daily     Continuous Infusions:    sodium chloride 0.9% 200 mL/hr at 01/30/18 0555     PRN Meds:sodium chloride, ibuprofen, sodium chloride 0.9%    Allergies: Review of patient's allergies indicates:  No Known Allergies    Labs:    Recent Labs  Lab 01/25/18  0411   INR 1.2       Recent Labs  Lab 01/30/18  0422   WBC 24.52*   HGB 9.3*   HCT 28.1*   MCV 76*         Recent Labs  Lab 01/25/18  0411  01/30/18  0422   *  < > 126*     < > 139   K 3.9  < > 3.1*     < > 116*   CO2 19*  < > 15*   BUN 24*  < > 5*   CREATININE 1.1  < > 0.7   CALCIUM 7.9*  < > 7.7*   MG 1.3*  --   --    ALT 15  < > 17   AST 23  < >  35   ALBUMIN 1.9*  < > 1.7*   BILITOT 0.3  < > 0.4   < > = values in this interval not displayed.    Vitals (Most Recent):  Temp: 98.3 °F (36.8 °C) (01/30/18 0845)  Pulse: 77 (01/30/18 0845)  Resp: 18 (01/30/18 0845)  BP: 133/69 (01/30/18 0845)  SpO2: 97 % (01/30/18 0845)    ASA: 3  Mallampati: 2    Consent obtained    ASSESSMENT/PLAN:     Paracentesis. Local anesthesia.    Active Hospital Problems    Diagnosis  POA    *Acute lower GI bleeding [K92.2]  Yes    Leukocytosis [D72.829]  Yes    Esophageal web determined by endoscopy [Q39.4]  Not Applicable    Severe malnutrition [E43]  Yes    Cirrhosis [K74.60]  Yes    Anemia [D64.9]  Yes    Ascites [R18.8]  Yes      Resolved Hospital Problems    Diagnosis Date Resolved POA   No resolved problems to display.           Everett Fitzpatrick MD

## 2018-01-30 NOTE — HOSPITAL COURSE
Patient responded well to a 2 units PRBC transfusion.  Paracentesis was performed, no evidence of SBP.  WBC increased to 30 and she became febrile on 01/27/18.  Despite the negative findings on paracentesis she had good improvement with antibiotics and these were continued.  IR guided paracentesis was done 1/30 with 6.2 liters removed and albumin infusions given.  She was feeling significantly better after the procedure and was eager to be discharged.  She will complete a 5 day course of Cipro for culture negative SBP as outpatient.  She is scheduled to follow up with the [facility] next week and will need a referral to Hepatology given recurrent ascites.

## 2018-01-30 NOTE — PROGRESS NOTES
[facility] Hospital Medicine  Progress Note    Patient Name: Neena Marks  MRN: [id number]  Patient Class: IP- Inpatient   Admission Date: 1/25/2018  Length of Stay: 4 days  Attending Physician: Courtney Rodriguez, *  Primary Care Provider: [facility]        Subjective:     Principal Problem:Acute lower GI bleeding    HPI:  61 yo female with a PMH of alcoholic cirrhosis presents to the ED c/o bilateral lower abdominal pain x 2 days.  Patient was admitted at [facility] last week, underwent paracentesis, and discharged with Augmentin.  Patient reports last hospitalization or paracentesis was 2-3 years ago. She admits to bloody bowel movements prior to this admission.  In Er, patient found to be hypotensive with a systolic in the 80's, tachycardic at 124, and a hemoglobin of 4.4.  After 1 pint of blood, heart rate improved to 115 and systolic blood pressure improved to 100-110.  Patient states that pain has improved but still very tired.     Hospital Course:  S/p 2 Units of PRBCs, responded well. Paracentesis performed, no evidence of SBP. WBC increased to 30 and febrile on 01/27/18, but now down to 18.  IR guided paracentesis planned for 01/30/18.     Interval History: Complains of abdominal distension, pain.  Eating and sleeping well.  Also complaining of pain at IV site.  Agreed to PICC line placement.     Review of Systems   Constitutional: Positive for appetite change and fatigue.   HENT: Negative.    Eyes: Negative.    Respiratory: Negative for chest tightness and shortness of breath.    Cardiovascular: Negative for chest pain and palpitations.   Gastrointestinal: Positive for abdominal distention and blood in stool. Negative for abdominal pain.   Endocrine: Negative.    Genitourinary: Negative for difficulty urinating and frequency.   Musculoskeletal: Negative for arthralgias.   Neurological: Negative for syncope and speech difficulty.    Psychiatric/Behavioral: Negative for agitation, behavioral problems and confusion.     Objective:     Vital Signs (Most Recent):  Temp: 98.6 °F (37 °C) (01/29/18 1939)  Pulse: 85 (01/29/18 1939)  Resp: 18 (01/29/18 1939)  BP: 134/74 (01/29/18 1939)  SpO2: 96 % (01/29/18 1939) Vital Signs (24h Range):  Temp:  [98.2 °F (36.8 °C)-99.7 °F (37.6 °C)] 98.6 °F (37 °C)  Pulse:  [75-88] 85  Resp:  [16-20] 18  SpO2:  [94 %-98 %] 96 %  BP: (102-147)/(57-86) 134/74     Weight: 40.4 kg (89 lb 1.1 oz)  Body mass index is 17.99 kg/m².    Intake/Output Summary (Last 24 hours) at 01/29/18 2151  Last data filed at 01/29/18 0645   Gross per 24 hour   Intake             2760 ml   Output              650 ml   Net             2110 ml      Physical Exam   Constitutional: She is oriented to person, place, and time. She appears cachectic. Ill appearance: however, appearance has improved    HENT:   Head: Normocephalic and atraumatic.   Eyes: EOM are normal.   Neck: Normal range of motion.   Cardiovascular: Normal rate and regular rhythm.    Pulmonary/Chest: Effort normal and breath sounds normal.   Abdominal: Soft. Bowel sounds are normal. She exhibits distension. There is tenderness. There is no guarding.   Musculoskeletal: Normal range of motion.   Neurological: She is alert and oriented to person, place, and time.   Skin: Skin is warm.   Psychiatric: She has a normal mood and affect. Her behavior is normal.   Vitals reviewed.      Significant Labs:   CBC:   Recent Labs  Lab 01/28/18  0024 01/28/18  0453 01/29/18  0448   WBC 30.90* 29.00* 18.76*   HGB 9.4* 9.2* 8.1*   HCT 27.9* 27.5* 24.4*    148* 186     CMP:   Recent Labs  Lab 01/28/18  0453 01/29/18  0448    139   K 3.9 3.3*   * 118*   CO2 17* 16*   GLU 90 98   BUN 8 5*   CREATININE 0.7 0.6   CALCIUM 7.4* 7.1*   PROT 5.8* 5.6*   ALBUMIN 1.6* 1.5*   BILITOT 0.7 0.4   ALKPHOS 116 102   AST 29 34   ALT 14 17   ANIONGAP 6* 5*   EGFRNONAA >60 >60     All pertinent labs  within the past 24 hours have been reviewed.    Significant Imaging: I have reviewed all pertinent imaging results/findings within the past 24 hours.   Imaging Results          X-Ray Abdomen Flat And Erect (Final result)  Result time 01/29/18 16:33:43    Final result by Osmar Way MD (01/29/18 16:33:43)                 Impression:      1.  Nonobstructive bowel gas pattern, noting diffuse abdominopelvic ascites and cholelithiasis.      Electronically signed by: OSMAR WAY MD  Date:     01/29/18  Time:    16:33              Narrative:    Abdomen flat and erect    Clinical history: Abdominal distention    Comparison: CT 1/25/2018, radiograph 1/28/2018    Findings:  2 upright views, one supine view.    No significant air fluid levels on upright view.  There is centralization of the bowel loops consistent with ascites.  There is cholelithiasis.  No focally dilated small bowel loops.  Air and stool is seen within the large bowel, and projected over the rectum.  There is ingested contents within the gastric lumen.  No nephrolithiasis.  No acute osseous abnormality.  No large volume free air or pneumatosis.                             X-Ray Chest AP Portable (Final result)  Result time 01/29/18 08:24:50    Final result by Steven Bhatti MD (01/29/18 08:24:50)                 Impression:     Limited however normal exam.      Electronically signed by: STEVEN BHATTI MD  Date:     01/29/18  Time:    08:24              Narrative:    Procedure: Chest one view.     Comparison:1/25/18.    Findings: This exam is limited by the lordotic positioning and radiographic technique.  AP radiograph of the chest reveals symmetrically inflated lungs with no mass, nodule, pneumothorax, infiltrate or effusion.  The cardiomediastinal silhouette, osseous and soft tissue structures are normal.                             X-Ray Abdomen AP with Left Decub (Final result)  Result time 01/28/18 11:01:07    Final result by Mark CHATMAN  DO Reid (01/28/18 11:01:07)                 Impression:     As above      Electronically signed by: MOLLY RONDON D.O.  Date:     01/28/18  Time:    11:01              Narrative:    AP and left lateral decubitus films of the abdomen    Findings: The bowel gas pattern is nonspecific.  No free air is suggested on the decubitus film.  No pathologic calcifications are suggested.  The bowel loops appear to be displaced centrally suggesting ascites.                             IR Paracentesis with Imaging (Final result)  Result time 01/25/18 12:03:45    Final result by Amor Millard MD (01/25/18 12:03:45)                 Impression:      Ultrasound-guided paracentesis performed with 2800 mL of thin dark maroon fluid removed.  A sample of fluid was sent for the requested studies.      Electronically signed by: AMOR MILLARD MD  Date:     01/25/18  Time:    12:03              Narrative:    Procedure: Ultrasound-guided paracentesis dated 1/25/18.    After obtaining informed consent from the patient, using sterile technique, 1% lidocaine local anesthetic, and ultrasound guidance a 5 [place] centesis needle was advanced into the ascites via a left lower quadrant abdominal approach.  2800 mL of thin dark maroon fluid was removed.  The catheter was then removed and hemostasis was achieved.  No immediate post procedure complications are noted.                             CT Abdomen Pelvis With Contrast (Final result)  Result time 01/25/18 06:49:19    Final result by Reed Swift MD (01/25/18 06:49:19)                 Impression:        No acute intra-abdominal/pelvic CT abnormalities to account for the reported history of abdominal and back pain.    Findings include:  - Partially visualized airspace opacity within the left lower lobe concerning for aspiration or pneumonia.  - CT findings of cirrhosis.  - Suspected portal hypertension noting a large volume of abdominal ascites and a small recanalized umbilical  vein.  - Cholelithiasis.      Electronically signed by: SHELLI MEZA MD  Date:     01/25/18  Time:    06:49              Narrative:    Technique: 5-mm axial images were obtained through the abdomen and pelvis following administration of 75 cc of Omnipaque 350 IV contrast.  Images were submitted for coronal, and sagittal reformats.    Comparison: CT 02/03/2015.    Findings:    Visualized heart demonstrates trace coronary artery atherosclerosis.  No pericardial effusion.    Visualized lungs demonstrate a partially visualized airspace opacity within the superior segment of the left lower lobe, possibly pneumonia or aspiration.  Partially visualized nodular opacity within the right lower lobe not well characterized.  No pleural effusions.    The liver demonstrates a nodular contour and widened fissures.  Hepatic parenchyma demonstrates diffusely heterogeneous enhancement without discrete lesions.  No intrahepatic bile duct dilatation.  Portal vein, splenic vein and SMV are patent.  There is a small recanalized umbilical vein.    Calcified stones noted within the gallbladder lumen.  No surrounding inflammatory changes.  Common bile duct is at the upper limit of normal in caliber with tip at the level of the ampulla.    There is a small sliding type hiatal hernia.  The stomach is otherwise decompressed and not well evaluated.    Duodenum, spleen, pancreas and adrenal glands are within normal limits.    Kidneys are normal in size and location.  No enhancing renal masses.  Subcentimeter nonenhancing right cortical lesion likely represents a cyst.  No nephrolithiasis.  No hydronephrosis or hydroureter.  Bladder is decompressed around a Garcia catheter and demonstrates scattered intraluminal gas.    The uterus and ovaries are within normal limits.    The small bowel is normal in caliber.  The colon is unremarkable.  The appendix is normal.  No obstruction or inflammatory changes.    There is a large volume of low attenuation  abdominal and pelvic ascites.  There is diffuse mesenteric and omental edema.    No intra-abdominal free air. No abdominal or pelvic lymph node enlargement.  No focal mesenteric masses.    The abdominal aorta tapers normally with prominent atherosclerotic calcification.  IVC and common iliac veins are patent.    Subcutaneous soft tissues are within normal limits.    No acute fractures or osseous destruction.  Degenerative changes of the spine noted.                             X-Ray Chest AP Portable (Final result)  Result time 01/25/18 04:16:59    Final result by Fabian Fam MD (01/25/18 04:16:59)                 Impression:        Left basilar airspace disease or subsegmental atelectasis. Findings could reflect developing pneumonia or aspiration in the setting of sepsis.      Electronically signed by: Fabian Fam  Date:     01/25/18  Time:    04:16              Narrative:    CLINICAL INFORMATION: Sepsis.    COMPARISON: None available.    FINDINGS: One view of the chest was obtained.  Cardiac wires overlie the chest. Cardiac silhouette is not enlarged.  Lungs demonstrate bilateral increased interstitial attenuation, with more focal airspace disease or subsegmental atelectasis at the left lung base.  No large pleural effusion. No pneumothorax.                                  Assessment/Plan:      * Acute lower GI bleeding    -Continue PPI  -Octreotide discontinued 01/26  -GI had plans to scope on today, but now with leukocytosis, scope delayed  -No active bleeding at this time        Anemia    -Suspect lower GI bleed  -GI consulted  -s/p transfusion of 2 Units of PRBC's 01/25  -Hgb now at 8.1 from 9.2 from 10.1, trending downward  -Check H/H daily        Leukocytosis    -Etiology unclear  -CXR this morning with no evidence of pneumonia  -May benefit from paracentesis and repeat studies for SBP in AM  -Will monitor WBC daily but decreased to 18 from 30  -Continue Rocpehin        Severe malnutrition     -Will consult Nutrition for further assessment          Esophageal web determined by endoscopy              Ascites    -Patient noted to have large amount of ascites on imaging  -s/p paracentesis 01/25  -No evidence of SBP  -Will cont Rocephin empirically  -Ascites has returned, IR consulted for paracentesis in AM  -Will send studies again to eval for SBP        Cirrhosis    -secondary to alcohol  -Patient still drinks alcohol and uses cocaine  -will  on cessation when status improves          VTE Risk Mitigation         Ordered     Reason for No Pharmacological VTE Prophylaxis  Once      01/25/18 0725     Medium Risk of VTE  Once      01/25/18 0725     Place sequential compression device  Until discontinued      01/25/18 0725              Courtney Rodriguez MD  Department of Hospital Medicine   [facility]

## 2018-01-30 NOTE — ASSESSMENT & PLAN NOTE
-Etiology unclear  -CXR this morning with no evidence of pneumonia  -May benefit from paracentesis and repeat studies for SBP in AM  -Will monitor WBC daily but decreased to 18 from 30  -Continue Rocpehin

## 2018-01-30 NOTE — SUBJECTIVE & OBJECTIVE
Interval History:  Patient back from paracentesis.  She is feeling better and is breathing comfortably.  Requesting food, sleeping pill and discontinuation of her Garcia.  She would like to go home.  Albumin infusing.    Review of Systems   Constitutional: Negative for chills and fever.   Respiratory: Negative for cough and shortness of breath.    Cardiovascular: Negative for chest pain and palpitations.   Gastrointestinal: Positive for abdominal distention.     Objective:     Vital Signs (Most Recent):  Temp: 97.9 °F (36.6 °C) (01/30/18 1523)  Pulse: 76 (01/30/18 1523)  Resp: 18 (01/30/18 1523)  BP: 119/80 (01/30/18 1523)  SpO2: 96 % (01/30/18 1523) Vital Signs (24h Range):  Temp:  [97.8 °F (36.6 °C)-98.6 °F (37 °C)] 97.9 °F (36.6 °C)  Pulse:  [68-91] 76  Resp:  [16-20] 18  SpO2:  [94 %-100 %] 96 %  BP: (114-153)/(69-95) 119/80     Weight: 40.4 kg (89 lb 1.1 oz)  Body mass index is 17.99 kg/m².    Intake/Output Summary (Last 24 hours) at 01/30/18 1731  Last data filed at 01/30/18 1155   Gross per 24 hour   Intake              480 ml   Output             7150 ml   Net            -6670 ml      Physical Exam   Constitutional: She is oriented to person, place, and time. She appears cachectic. Ill appearance: however, appearance has improved    HENT:   Head: Normocephalic and atraumatic.   Eyes: EOM are normal.   Neck: Normal range of motion.   Cardiovascular: Normal rate and regular rhythm.    Pulmonary/Chest: Effort normal and breath sounds normal.   Abdominal: Soft. Bowel sounds are normal. There is no guarding.   Mild distension without tenderness noted.   Musculoskeletal: Normal range of motion.   Neurological: She is alert and oriented to person, place, and time.   Skin: Skin is warm.   Psychiatric: She has a normal mood and affect. Her behavior is normal.   Vitals reviewed.      Significant Labs: All pertinent labs within the past 24 hours have been reviewed.    Significant Imaging: I have reviewed all pertinent  imaging results/findings within the past 24 hours.

## 2018-01-31 LAB
ALBUMIN SERPL BCP-MCNC: 1.7 G/DL
ALP SERPL-CCNC: 91 U/L
ALT SERPL W/O P-5'-P-CCNC: 18 U/L
ANION GAP SERPL CALC-SCNC: 4 MMOL/L
ANISOCYTOSIS BLD QL SMEAR: SLIGHT
AST SERPL-CCNC: 34 U/L
BASO STIPL BLD QL SMEAR: ABNORMAL
BASOPHILS # BLD AUTO: 0.02 K/UL
BASOPHILS NFR BLD: 0.1 %
BILIRUB SERPL-MCNC: 0.7 MG/DL
BUN SERPL-MCNC: 3 MG/DL
CALCIUM SERPL-MCNC: 7.4 MG/DL
CHLORIDE SERPL-SCNC: 117 MMOL/L
CO2 SERPL-SCNC: 18 MMOL/L
CREAT SERPL-MCNC: 0.6 MG/DL
DIFFERENTIAL METHOD: ABNORMAL
EOSINOPHIL # BLD AUTO: 0.2 K/UL
EOSINOPHIL NFR BLD: 1.3 %
ERYTHROCYTE [DISTWIDTH] IN BLOOD BY AUTOMATED COUNT: 26.5 %
EST. GFR  (AFRICAN AMERICAN): >60 ML/MIN/1.73 M^2
EST. GFR  (NON AFRICAN AMERICAN): >60 ML/MIN/1.73 M^2
GIANT PLATELETS BLD QL SMEAR: PRESENT
GLUCOSE SERPL-MCNC: 93 MG/DL
HCT VFR BLD AUTO: 23.4 %
HGB BLD-MCNC: 7.7 G/DL
LYMPHOCYTES # BLD AUTO: 1.9 K/UL
LYMPHOCYTES NFR BLD: 14.2 %
MCH RBC QN AUTO: 24.8 PG
MCHC RBC AUTO-ENTMCNC: 32.9 G/DL
MCV RBC AUTO: 76 FL
MONOCYTES # BLD AUTO: 0.8 K/UL
MONOCYTES NFR BLD: 5.8 %
NEUTROPHILS # BLD AUTO: 10.4 K/UL
NEUTROPHILS NFR BLD: 78.6 %
OVALOCYTES BLD QL SMEAR: ABNORMAL
PLATELET # BLD AUTO: 169 K/UL
PLATELET BLD QL SMEAR: ABNORMAL
PMV BLD AUTO: ABNORMAL FL
POIKILOCYTOSIS BLD QL SMEAR: SLIGHT
POTASSIUM SERPL-SCNC: 2.8 MMOL/L
PROT SERPL-MCNC: 5.3 G/DL
RBC # BLD AUTO: 3.1 M/UL
SCHISTOCYTES BLD QL SMEAR: ABNORMAL
SODIUM SERPL-SCNC: 139 MMOL/L
WBC # BLD AUTO: 13.39 K/UL

## 2018-01-31 PROCEDURE — S4991 NICOTINE PATCH NONLEGEND: HCPCS | Performed by: INTERNAL MEDICINE

## 2018-01-31 PROCEDURE — 85025 COMPLETE CBC W/AUTO DIFF WBC: CPT

## 2018-01-31 PROCEDURE — C9113 INJ PANTOPRAZOLE SODIUM, VIA: HCPCS | Performed by: INTERNAL MEDICINE

## 2018-01-31 PROCEDURE — 25000003 PHARM REV CODE 250: Performed by: PHYSICIAN ASSISTANT

## 2018-01-31 PROCEDURE — 63600175 PHARM REV CODE 636 W HCPCS: Performed by: INTERNAL MEDICINE

## 2018-01-31 PROCEDURE — 11000001 HC ACUTE MED/SURG PRIVATE ROOM

## 2018-01-31 PROCEDURE — 36415 COLL VENOUS BLD VENIPUNCTURE: CPT

## 2018-01-31 PROCEDURE — 25000003 PHARM REV CODE 250: Performed by: INTERNAL MEDICINE

## 2018-01-31 PROCEDURE — 80053 COMPREHEN METABOLIC PANEL: CPT

## 2018-01-31 PROCEDURE — 25000003 PHARM REV CODE 250: Performed by: HOSPITALIST

## 2018-01-31 PROCEDURE — 97165 OT EVAL LOW COMPLEX 30 MIN: CPT

## 2018-01-31 PROCEDURE — 99233 SBSQ HOSP IP/OBS HIGH 50: CPT | Mod: ,,, | Performed by: HOSPITALIST

## 2018-01-31 PROCEDURE — 97161 PT EVAL LOW COMPLEX 20 MIN: CPT

## 2018-01-31 PROCEDURE — 94761 N-INVAS EAR/PLS OXIMETRY MLT: CPT

## 2018-01-31 RX ORDER — POTASSIUM CHLORIDE 20 MEQ/1
40 TABLET, EXTENDED RELEASE ORAL
Status: COMPLETED | OUTPATIENT
Start: 2018-01-31 | End: 2018-01-31

## 2018-01-31 RX ADMIN — NICOTINE 1 PATCH: 21 PATCH, EXTENDED RELEASE TRANSDERMAL at 09:01

## 2018-01-31 RX ADMIN — POTASSIUM CHLORIDE 40 MEQ: 1500 TABLET, EXTENDED RELEASE ORAL at 01:01

## 2018-01-31 RX ADMIN — POTASSIUM CHLORIDE 40 MEQ: 1500 TABLET, EXTENDED RELEASE ORAL at 12:01

## 2018-01-31 RX ADMIN — Medication 100 MG: at 09:01

## 2018-01-31 RX ADMIN — SODIUM CHLORIDE: 0.9 INJECTION, SOLUTION INTRAVENOUS at 04:01

## 2018-01-31 RX ADMIN — PANTOPRAZOLE SODIUM 40 MG: 40 INJECTION, POWDER, FOR SOLUTION INTRAVENOUS at 09:01

## 2018-01-31 RX ADMIN — FOLIC ACID 1 MG: 1 TABLET ORAL at 09:01

## 2018-01-31 RX ADMIN — IBUPROFEN 600 MG: 100 SUSPENSION ORAL at 04:01

## 2018-01-31 RX ADMIN — CEFTRIAXONE 2 G: 2 INJECTION, SOLUTION INTRAVENOUS at 09:01

## 2018-01-31 RX ADMIN — SODIUM CHLORIDE: 0.9 INJECTION, SOLUTION INTRAVENOUS at 09:01

## 2018-01-31 NOTE — ASSESSMENT & PLAN NOTE
- Patient noted to have large amount of ascites on imaging  - s/p paracentesis 01/25 and again yesterday  - No evidence of SBP, although fluid was bloody and WBC improving with antibiotics - continue Rocephin empirically

## 2018-01-31 NOTE — PT/OT/SLP EVAL
Occupational Therapy   Evaluation and Discharge Note    Name: Neena Marks  MRN: [id number]  Admitting Diagnosis:  Acute lower GI bleeding 1 Day Post-Op    Recommendations:     Discharge Recommendations: home with home health  Discharge Equipment Recommendations:  none  Barriers to discharge:  None    History:     Occupational Profile:  Living Environment: Lives with spouse, son and 5 y.o. granddau in 1 story home with 1 step to enter in front and ramp at back entrance.  Previous level of function: Modified independent  Roles and Routines: Wife, mother, grandmother; takes care of home and granddau, as needed  Equipment Owned:  bath bench  Assistance upon Discharge: Spouse, son    Past Medical History:   Diagnosis Date    Cirrhosis        History reviewed. No pertinent surgical history.    Subjective     Chief Complaint: None  Patient/Family stated goals: Home  Communicated with: RN prior to session.  PICC IV alarm sounding, RN notified  Pain/Comfort:  · Pain Rating 1: 0/10  · Pain Rating Post-Intervention 1: 0/10    Patients cultural, spiritual, Congregational conflicts given the current situation:      Objective:     Patient found with: PICC line    General Precautions: Standard, fall, other (see comments) (low sodium diet)   Orthopedic Precautions:N/A   Braces: N/A     Occupational Performance:    Bed Mobility:    · Patient completed Scooting/Bridging with modified independence  · Patient completed Supine to Sit with modified independence  · Patient completed Sit to Supine with modified independence    Functional Mobility/Transfers:  · Patient completed Sit <> Stand Transfer with supervision  with  no assistive device   · Patient completed Bed <> Chair Transfer using Stand Pivot technique with supervision with no assistive device  · Patient completed Toilet Transfer Stand Pivot technique with supervision with  no AD  · Functional Mobility: Supervision within the room    Activities of Daily Living:  · Feeding:  modified  "independence with tray  · Grooming: supervision in standing at sink  · UB Dressing: minimum assistance secondary to PICC  · LB Dressing: supervision for gripper socks seated  · Toileting: modified independence from toilet    Cognitive/Visual Perceptual:  Cognitive/Psychosocial Skills:     -       Oriented to: Person, Place and Situation   -       Follows Commands/attention:Follows two-step commands  -       Communication: clear/fluent  -       Memory: No Deficits noted  -       Safety awareness/insight to disability: occasional cues secondary to PICC   -       Mood/Affect/Coping skills/emotional control: Appropriate to situation  Visual/Perceptual:      -Intact    Physical Exam:  Postural examination/scapula alignment:    -       Rounded shoulders  Sensation:    -       Intact  light/touch (B) UEs  Upper Extremity Range of Motion:     -       Right Upper Extremity: WFL  -       Left Upper Extremity: WFL  Upper Extremity Strength:    -       Right Upper Extremity: WFL  -       Left Upper Extremity: WFL   Strength:    -       Right Upper Extremity: WFL  -       Left Upper Extremity: WFL  Fine Motor Coordination:    -       Intact  Gross motor coordination:   WFL    Patient left HOB elevated with all lines intact, call button in reach and RN notified    AMPAC 6 Click:  AMPA Total Score: 20    Treatment & Education:  Educated to role of OT.  Demonstrated understanding.  Education:    Assessment:     Neena Marks is a 62 y.o. female with a medical diagnosis of Acute lower GI bleeding. At this time, patient is Supervision to Modified independent with basic ADL tasks and functional transfers and does not require further acute OT services.     Clinical Decision Makin.  OT Low:  "Pt evaluation falls under low complexity for evaluation coding due to performance deficits noted in 1-3 areas as stated above and 0 co-morbities affecting current functional status. Data obtained from problem focused assessments. No " "modifications or assistance was required for completion of evaluation. Only brief occupational profile and history review completed."     Plan:     During this hospitalization, patient does not require further acute OT services.  Please re-consult if situation changes.    · Plan of Care Reviewed with: patient    This Plan of care has been discussed with the patient who was involved in its development and understands and is in agreement with the identified goals and treatment plan    GOALS:    Occupational Therapy Goals     Not on file          Multidisciplinary Problems (Resolved)        Problem: Occupational Therapy Goal    Goal Priority Disciplines Outcome Interventions   Occupational Therapy Goal   (Resolved)     OT, PT/OT Outcome(s) achieved                    Time Tracking:     OT Date of Treatment: 01/31/18  OT Start Time: 0904  OT Stop Time: 0928  OT Total Time (min): 24 min    Billable Minutes:Evaluation 24    BENJAMIN Emerson  1/31/2018    "

## 2018-01-31 NOTE — SUBJECTIVE & OBJECTIVE
Interval History:  Abdominal fluid is re-accumulating.  IVF have been infusing - now discontinued.  She has no other complaints.    Review of Systems   Constitutional: Negative for chills and fever.   Respiratory: Negative for cough and shortness of breath.    Cardiovascular: Negative for chest pain and palpitations.   Gastrointestinal: Positive for abdominal distention.     Objective:     Vital Signs (Most Recent):  Temp: 98.2 °F (36.8 °C) (01/31/18 1321)  Pulse: 90 (01/31/18 1321)  Resp: 18 (01/31/18 0714)  BP: (!) 106/59 (01/31/18 1321)  SpO2: 100 % (01/31/18 1321) Vital Signs (24h Range):  Temp:  [97.9 °F (36.6 °C)-99.6 °F (37.6 °C)] 98.2 °F (36.8 °C)  Pulse:  [] 90  Resp:  [18-20] 18  SpO2:  [98 %-100 %] 100 %  BP: (106-137)/(59-79) 106/59     Weight: 40.4 kg (89 lb 1.1 oz)  Body mass index is 17.99 kg/m².    Intake/Output Summary (Last 24 hours) at 01/31/18 1751  Last data filed at 01/30/18 2330   Gross per 24 hour   Intake                0 ml   Output              200 ml   Net             -200 ml      Physical Exam   Constitutional: She is oriented to person, place, and time. She appears cachectic. Ill appearance: however, appearance has improved    HENT:   Head: Normocephalic and atraumatic.   Eyes: EOM are normal.   Neck: Normal range of motion.   Cardiovascular: Normal rate and regular rhythm.    Pulmonary/Chest: Effort normal and breath sounds normal.   Abdominal: Soft. Bowel sounds are normal. There is no guarding.   More distended than yesterday, fluid wave present.  Non tender.   Musculoskeletal: Normal range of motion.   Neurological: She is alert and oriented to person, place, and time.   Skin: Skin is warm.   Psychiatric: She has a normal mood and affect. Her behavior is normal.   Vitals reviewed.      Significant Labs:   CBC:   Recent Labs  Lab 01/30/18  0422 01/31/18  0418   WBC 24.52* 13.39*   HGB 9.3* 7.7*   HCT 28.1* 23.4*    169     CMP:   Recent Labs  Lab 01/30/18  0422  01/31/18  0418    139   K 3.1* 2.8*   * 117*   CO2 15* 18*   * 93   BUN 5* 3*   CREATININE 0.7 0.6   CALCIUM 7.7* 7.4*   PROT 6.5 5.3*   ALBUMIN 1.7* 1.7*   BILITOT 0.4 0.7   ALKPHOS 130 91   AST 35 34   ALT 17 18   ANIONGAP 8 4*   EGFRNONAA >60 >60       Significant Imaging: I have reviewed all pertinent imaging results/findings within the past 24 hours.

## 2018-01-31 NOTE — PT/OT/SLP EVAL
Physical Therapy Evaluation/Discharge    Patient Name:  Neena Marks   MRN:  [id number]    Recommendations:     Discharge Recommendations:  home   Discharge Equipment Recommendations: none   Barriers to discharge: None    Assessment:     Neena Marks is a 62 y.o. female admitted with a medical diagnosis of Acute lower GI bleeding.  She presents with the following impairments/functional limitations:   (abdominal distention) . Pt tolerated initial evaluation well. No further acute PT needs at this time. Pt educated on importance of ambulation 3 x daily and she verbalized understanding.      Rehab Prognosis:  Good; patient would benefit from acute skilled PT services to address these deficits and reach maximum level of function.      Recent Surgery: Procedure(s) (LRB):  PARACENTESIS (N/A) 1 Day Post-Op    Plan:     During this hospitalization, patient to be seen   to address the above listed problems via therapeutic activities, gait training, therapeutic exercises, neuromuscular re-education  · Plan of Care Expires:      Plan of Care Reviewed with: patient    Subjective     Communicated with RN prior to session.  Patient found Supine upon PT entry to room, agreeable to evaluation.      Chief Complaint: Pt wanting to ambulate  Patient comments/goals: To return to walking to  her 6 y.o. granddaughter at school  Pain/Comfort:  · Pain Rating 1: 0/10  · Pain Rating Post-Intervention 1: 0/10    Patients cultural, spiritual, Restoration conflicts given the current situation: no    Living Environment:  Pt lives with her spouse, son, and granddaughter in a 1 story house with 1 KEE.   Prior to admission, patients level of function was (I) with ambulation and enjoys walking 6 blocks to  her granddaughter from school.  Patient has the following equipment: bath bench.  DME owned (not currently used): none.  Upon discharge, patient will have assistance from .    Objective:     Patient found with: PICC line      General Precautions: Standard, fall   Orthopedic Precautions:N/A   Braces: N/A     Exams:  · Cognition: Patient is oriented to Person, Place, Time and Situation and follows approximately 100% of one step simple commands.    · Posture:    · -       Rounded shoulders  · Sensation: Intact to light touch bilateral LEs.   · Skin Integrity: Visible skin intact  · Edema: Abdominal distention   · Coordination: No coordination impairments identified with functional mobility. No formal testing performed.   · LE ROM/Strength: BLE WFL  · Tone: No tone impairments identified during functional mobility.     Functional Mobility:  · Bed Mobility:     · Supine to Sit: independence  · Transfers:     · Sit to Stand:  independence with no AD  · Gait: x 300' without AD supervision for IV pole managment    AM-PAC 6 CLICK MOBILITY  Total Score:24     Patient left up in chair with all lines intact, call button in reach, RN notified and  present.    GOALS:    Physical Therapy Goals     Not on file          Multidisciplinary Problems (Resolved)        Problem: Physical Therapy Goal    Goal Priority Disciplines Outcome Goal Variances Interventions   Physical Therapy Goal   (Resolved)     PT/OT, PT Outcome(s) achieved                     History:     Past Medical History:   Diagnosis Date    Cirrhosis        History reviewed. No pertinent surgical history.    Clinical Decision Making:     History  Co-morbidities and personal factors that may impact the plan of care Examination  Body Structures and Functions, activity limitations and participation restrictions that may impact the plan of care Clinical Presentation   Decision Making/ Complexity Score   Co-morbidities:   [] Time since onset of injury / illness / exacerbation  [] Status of current condition  []Patient's cognitive status and safety concerns    [] Multiple Medical Problems (see med hx)  Personal Factors:   [] Patient's age  [] Prior Level of function   [] Patient's home  situation (environment and family support)  [] Patient's level of motivation  [] Expected progression of patient      HISTORY:(criteria)    [] 58906 - no personal factors/history    [] 49432 - has 1-2 personal factor/comorbidity     [] 81099 - has >3 personal factor/comorbidity     Body Regions:  [] Objective examination findings  [] Head     []  Neck  [] Trunk   [] Upper Extremity  [] Lower Extremity    Body Systems:  [] For communication ability, affect, cognition, language, and learning style: the assessment of the ability to make needs known, consciousness, orientation (person, place, and time), expected emotional /behavioral responses, and learning preferences (eg, learning barriers, education  needs)  [] For the neuromuscular system: a general assessment of gross coordinated movement (eg, balance, gait, locomotion, transfers, and transitions) and motor function  (motor control and motor learning)  [] For the musculoskeletal system: the assessment of gross symmetry, gross range of motion, gross strength, height, and weight  [] For the integumentary system: the assessment of pliability(texture), presence of scar formation, skin color, and skin integrity  [] For cardiovascular/pulmonary system: the assessment of heart rate, respiratory rate, blood pressure, and edema     Activity limitations:    [] Patient's cognitive status and saf ety concerns          [] Status of current condition      [] Weight bearing restriction  [] Cardiopulmunary Restriction    Participation Restrictions:   [] Goals and goal agreement with the patient     [] Rehab potential (prognosis) and probable outcome      Examination of Body System: (criteria)    [] 28382 - addressing 1-2 elements    [] 99189 - addressing a total of 3 or more elements     [] 13659 -  Addressing a total of 4 or more elements         Clinical Presentation: (criteria)  Choose one     On examination of body system using standardized tests and measures patient presents  with (CHOOSE ONE) elements from any of the following: body structures and functions, activity limitations, and/or participation restrictions.  Leading to a clinical presentation that is considered (CHOOSE ONE)                              Clinical Decision Making  (Eval Complexity):  Choose One     Time Tracking:     PT Received On: 01/31/18  PT Start Time: 1438     PT Stop Time: 1448  PT Total Time (min): 10 min     Billable Minutes: Evaluation 10    Tarsha Odonnell, PT  01/31/2018

## 2018-01-31 NOTE — PLAN OF CARE
Problem: Physical Therapy Goal  Goal: Physical Therapy Goal  Outcome: Outcome(s) achieved Date Met: 01/31/18  PT evaluation completed. Please see progress note for details, POC, and recommendations.

## 2018-01-31 NOTE — PROGRESS NOTES
This lady is feeling somewhat weak though no radical change from yesterday. No acute visceral signs or suggestion of gross blood loss. She is awake and talkative and she stated she has ceased with alcohol use. She feels better with antibiotics and her leukocytosis is significantly better. Of concern is that she has lost weight and has severely compromised nutritional parameters. She had a chest film that commented on a 1 cm pulmonary nodule. Her risk for aspiration is significant as well.     T<100 HR 90  Anicteric with temporal wasting  Neck supple no mass  Heart RR no gallop  Chest few upper noises no wheeze  Abdomen softer active sounds no masses. Ascites present  No tenderness or significant tympany.  Extremities no cyanosis or clubbing      Labs: low albumin, elevated , WBC 13,000  Imaging reviewed.    Impression: suspect SBP though no growth on ascitic fluid. She is better with antibiotics although not certain what is being treated. Possible pulmonary issues at play. Tumor markers are elevated and will await AFP. Push calories. Not certain she will reliably follow up.

## 2018-01-31 NOTE — PLAN OF CARE
Progress Note     Patient Name: Neena Marks  MRN: [id number]  Patient Class: IP- Inpatient     Admission Date: 1/25/2018  Length of Stay: 6  days  Attending Physician: Jayla De La Rosa MD  Primary Care Provider: [facility]     D/c plan;  Per Dr De La Rosa, she will d/c soon  Follow up needs are:  PCP-in 1 week  [facility]- go to in 1 week  GI - follow up with GI clinic at [facility] as scheduled

## 2018-01-31 NOTE — PLAN OF CARE
Problem: Occupational Therapy Goal  Goal: Occupational Therapy Goal  Outcome: Outcome(s) achieved Date Met: 01/31/18  Initial OT evaluation completed.  Patient is Supervision to Modified independent with basic ADL tasks and functional transfers.  No further OT services warranted at this time.    Comments: BENJAMIN Emerson, 1/31/2018

## 2018-01-31 NOTE — PROGRESS NOTES
[facility] Hospital Medicine  Progress Note    Patient Name: Neena Marks  MRN: [id number]  Patient Class: IP- Inpatient   Admission Date: 1/25/2018  Length of Stay: 6 days  Attending Physician: Jayla De La Rosa MD  Primary Care Provider: [facility]        Subjective:     Principal Problem:Acute lower GI bleeding    HPI:  63 yo female with a PMH of alcoholic cirrhosis presents to the ED c/o bilateral lower abdominal pain x 2 days.  Patient was admitted at [facility] last week, underwent paracentesis, and discharged with Augmentin.  Patient reports last hospitalization or paracentesis was 2-3 years ago. She admits to bloody bowel movements prior to this admission.  In Er, patient found to be hypotensive with a systolic in the 80's, tachycardic at 124, and a hemoglobin of 4.4.  After 1 pint of blood, heart rate improved to 115 and systolic blood pressure improved to 100-110.  Patient states that pain has improved but still very tired.     Hospital Course:  S/p 2 Units of PRBCs, responded well. Paracentesis performed, no evidence of SBP. WBC increased to 30 and febrile on 01/27/18, but now down to 18.  IR guided paracentesis done 1/30 with 6.2 liters removed.    Interval History:  Abdominal fluid is re-accumulating.  IVF have been infusing - now discontinued.  She has no other complaints.    Review of Systems   Constitutional: Negative for chills and fever.   Respiratory: Negative for cough and shortness of breath.    Cardiovascular: Negative for chest pain and palpitations.   Gastrointestinal: Positive for abdominal distention.     Objective:     Vital Signs (Most Recent):  Temp: 98.2 °F (36.8 °C) (01/31/18 1321)  Pulse: 90 (01/31/18 1321)  Resp: 18 (01/31/18 0714)  BP: (!) 106/59 (01/31/18 1321)  SpO2: 100 % (01/31/18 1321) Vital Signs (24h Range):  Temp:  [97.9 °F (36.6 °C)-99.6 °F (37.6 °C)] 98.2 °F (36.8 °C)  Pulse:  [] 90  Resp:  [18-20]  18  SpO2:  [98 %-100 %] 100 %  BP: (106-137)/(59-79) 106/59     Weight: 40.4 kg (89 lb 1.1 oz)  Body mass index is 17.99 kg/m².    Intake/Output Summary (Last 24 hours) at 01/31/18 1751  Last data filed at 01/30/18 2330   Gross per 24 hour   Intake                0 ml   Output              200 ml   Net             -200 ml      Physical Exam   Constitutional: She is oriented to person, place, and time. She appears cachectic. Ill appearance: however, appearance has improved    HENT:   Head: Normocephalic and atraumatic.   Eyes: EOM are normal.   Neck: Normal range of motion.   Cardiovascular: Normal rate and regular rhythm.    Pulmonary/Chest: Effort normal and breath sounds normal.   Abdominal: Soft. Bowel sounds are normal. There is no guarding.   More distended than yesterday, fluid wave present.  Non tender.   Musculoskeletal: Normal range of motion.   Neurological: She is alert and oriented to person, place, and time.   Skin: Skin is warm.   Psychiatric: She has a normal mood and affect. Her behavior is normal.   Vitals reviewed.      Significant Labs:   CBC:   Recent Labs  Lab 01/30/18  0422 01/31/18  0418   WBC 24.52* 13.39*   HGB 9.3* 7.7*   HCT 28.1* 23.4*    169     CMP:   Recent Labs  Lab 01/30/18  0422 01/31/18  0418    139   K 3.1* 2.8*   * 117*   CO2 15* 18*   * 93   BUN 5* 3*   CREATININE 0.7 0.6   CALCIUM 7.7* 7.4*   PROT 6.5 5.3*   ALBUMIN 1.7* 1.7*   BILITOT 0.4 0.7   ALKPHOS 130 91   AST 35 34   ALT 17 18   ANIONGAP 8 4*   EGFRNONAA >60 >60       Significant Imaging: I have reviewed all pertinent imaging results/findings within the past 24 hours.    Assessment/Plan:      * Acute lower GI bleeding    - Continue PPI  - Octreotide discontinued 01/26  - Scope delayed due to leukocytosis  - No active bleeding at this time  - Completed albumin infusions s/p large volume paracentesis.        Leukocytosis    - tiology unclear  - CXR with no evidence of pneumonia  - Repeat studies  for SBP   - Monitor WBC daily  - Continue Rocephin empirically in the meantime.        Severe malnutrition    -Will consult Nutrition for further assessment          Esophageal web determined by endoscopy              Hypokalemia    - Replace          Ascites    - Patient noted to have large amount of ascites on imaging  - s/p paracentesis 01/25 and again yesterday  - No evidence of SBP, although fluid was bloody and WBC improving with antibiotics - continue Rocephin empirically          Cirrhosis    - secondary to alcohol  - Patient reports she stopped drinking recently but still uses cocaine  -  on cessation         Iron deficiency anemia due to chronic blood loss    - Suspect multifactorial - bloody ascites fluid, varices, possible malignancy all contributing factors.  - Improved s/p 2 units PRBCs 1/25.  - Hb stabilized after initial trend downward.            VTE Risk Mitigation         Ordered     Reason for No Pharmacological VTE Prophylaxis  Once      01/25/18 0725     Medium Risk of VTE  Once      01/25/18 0725     Place sequential compression device  Until discontinued      01/25/18 0725              Jayla Jeronimo MD  Department of Hospital Medicine   [facility]

## 2018-01-31 NOTE — ASSESSMENT & PLAN NOTE
- Continue PPI  - Octreotide discontinued 01/26  - Scope delayed due to leukocytosis  - No active bleeding at this time  - Completed albumin infusions s/p large volume paracentesis.

## 2018-01-31 NOTE — PLAN OF CARE
01/31/18 1717   Discharge Reassessment   Assessment Type Discharge Planning Reassessment   Provided patient/caregiver education on the expected discharge date and the discharge plan Yes   Do you have any problems affording any of your prescribed medications? TBD   Discharge Plan A Home with family   Patient choice form signed by patient/caregiver N/A   Can the patient answer the patient profile reliably? Yes, cognitively intact   How does the patient rate their overall health at the present time? Fair   Describe the patient's ability to walk at the present time. Minor restrictions or changes   How often would a person be available to care for the patient? Occasionally   Number of comorbid conditions (as recorded on the chart) Three

## 2018-02-01 VITALS
HEART RATE: 86 BPM | DIASTOLIC BLOOD PRESSURE: 56 MMHG | OXYGEN SATURATION: 99 % | WEIGHT: 89.06 LBS | HEIGHT: 59 IN | RESPIRATION RATE: 18 BRPM | SYSTOLIC BLOOD PRESSURE: 110 MMHG | TEMPERATURE: 98 F | BODY MASS INDEX: 17.96 KG/M2

## 2018-02-01 LAB
ALBUMIN SERPL BCP-MCNC: 1.6 G/DL
ALP SERPL-CCNC: 102 U/L
ALT SERPL W/O P-5'-P-CCNC: 21 U/L
ANION GAP SERPL CALC-SCNC: 3 MMOL/L
AST SERPL-CCNC: 32 U/L
BACTERIA SPEC ANAEROBE CULT: NORMAL
BASOPHILS # BLD AUTO: 0.02 K/UL
BASOPHILS NFR BLD: 0.1 %
BILIRUB SERPL-MCNC: 0.6 MG/DL
BUN SERPL-MCNC: 3 MG/DL
CALCIUM SERPL-MCNC: 7.6 MG/DL
CHLORIDE SERPL-SCNC: 117 MMOL/L
CO2 SERPL-SCNC: 18 MMOL/L
CREAT SERPL-MCNC: 0.6 MG/DL
DIFFERENTIAL METHOD: ABNORMAL
EOSINOPHIL # BLD AUTO: 0.1 K/UL
EOSINOPHIL NFR BLD: 1 %
ERYTHROCYTE [DISTWIDTH] IN BLOOD BY AUTOMATED COUNT: 26.2 %
EST. GFR  (AFRICAN AMERICAN): >60 ML/MIN/1.73 M^2
EST. GFR  (NON AFRICAN AMERICAN): >60 ML/MIN/1.73 M^2
GIANT PLATELETS BLD QL SMEAR: PRESENT
GLUCOSE SERPL-MCNC: 90 MG/DL
HCT VFR BLD AUTO: 24.5 %
HGB BLD-MCNC: 8.1 G/DL
LYMPHOCYTES # BLD AUTO: 1.8 K/UL
LYMPHOCYTES NFR BLD: 13.5 %
MCH RBC QN AUTO: 24.7 PG
MCHC RBC AUTO-ENTMCNC: 33.1 G/DL
MCV RBC AUTO: 75 FL
MONOCYTES # BLD AUTO: 0.9 K/UL
MONOCYTES NFR BLD: 6.4 %
NEUTROPHILS # BLD AUTO: 10.5 K/UL
NEUTROPHILS NFR BLD: 79 %
PLATELET # BLD AUTO: 190 K/UL
PLATELET BLD QL SMEAR: ABNORMAL
PMV BLD AUTO: ABNORMAL FL
POTASSIUM SERPL-SCNC: 3.1 MMOL/L
PROT SERPL-MCNC: 5.4 G/DL
RBC # BLD AUTO: 3.28 M/UL
SODIUM SERPL-SCNC: 138 MMOL/L
WBC # BLD AUTO: 13.38 K/UL

## 2018-02-01 PROCEDURE — S4991 NICOTINE PATCH NONLEGEND: HCPCS | Performed by: INTERNAL MEDICINE

## 2018-02-01 PROCEDURE — 63600175 PHARM REV CODE 636 W HCPCS: Performed by: INTERNAL MEDICINE

## 2018-02-01 PROCEDURE — 25000003 PHARM REV CODE 250: Performed by: HOSPITALIST

## 2018-02-01 PROCEDURE — 94761 N-INVAS EAR/PLS OXIMETRY MLT: CPT

## 2018-02-01 PROCEDURE — 80053 COMPREHEN METABOLIC PANEL: CPT

## 2018-02-01 PROCEDURE — C9113 INJ PANTOPRAZOLE SODIUM, VIA: HCPCS | Performed by: INTERNAL MEDICINE

## 2018-02-01 PROCEDURE — 25000003 PHARM REV CODE 250: Performed by: INTERNAL MEDICINE

## 2018-02-01 PROCEDURE — 97803 MED NUTRITION INDIV SUBSEQ: CPT

## 2018-02-01 PROCEDURE — 99238 HOSP IP/OBS DSCHRG MGMT 30/<: CPT | Mod: ,,, | Performed by: HOSPITALIST

## 2018-02-01 PROCEDURE — 99900035 HC TECH TIME PER 15 MIN (STAT)

## 2018-02-01 PROCEDURE — 85025 COMPLETE CBC W/AUTO DIFF WBC: CPT

## 2018-02-01 RX ORDER — SPIRONOLACTONE 50 MG/1
50 TABLET, FILM COATED ORAL DAILY
Qty: 30 TABLET | Refills: 3 | Status: SHIPPED | OUTPATIENT
Start: 2018-02-01 | End: 2018-04-29 | Stop reason: SDUPTHER

## 2018-02-01 RX ORDER — POTASSIUM CHLORIDE 20 MEQ/1
40 TABLET, EXTENDED RELEASE ORAL
Status: COMPLETED | OUTPATIENT
Start: 2018-02-01 | End: 2018-02-01

## 2018-02-01 RX ORDER — CIPROFLOXACIN 500 MG/1
500 TABLET ORAL EVERY 12 HOURS
Qty: 9 TABLET | Refills: 0 | Status: SHIPPED | OUTPATIENT
Start: 2018-02-01 | End: 2018-02-06

## 2018-02-01 RX ADMIN — CEFTRIAXONE 2 G: 2 INJECTION, SOLUTION INTRAVENOUS at 11:02

## 2018-02-01 RX ADMIN — PANTOPRAZOLE SODIUM 40 MG: 40 INJECTION, POWDER, FOR SOLUTION INTRAVENOUS at 08:02

## 2018-02-01 RX ADMIN — POTASSIUM CHLORIDE 40 MEQ: 1500 TABLET, EXTENDED RELEASE ORAL at 08:02

## 2018-02-01 RX ADMIN — NICOTINE 1 PATCH: 21 PATCH, EXTENDED RELEASE TRANSDERMAL at 08:02

## 2018-02-01 RX ADMIN — IBUPROFEN 600 MG: 100 SUSPENSION ORAL at 04:02

## 2018-02-01 RX ADMIN — POTASSIUM CHLORIDE 40 MEQ: 1500 TABLET, EXTENDED RELEASE ORAL at 11:02

## 2018-02-01 RX ADMIN — FOLIC ACID 1 MG: 1 TABLET ORAL at 08:02

## 2018-02-01 RX ADMIN — Medication 100 MG: at 08:02

## 2018-02-01 NOTE — PROGRESS NOTES
[facility]  Adult Nutrition  Progress Note    SUMMARY     Recommendations    Recommendation/Intervention:   1. Continue low Na diet   2. Consider Boost Plus bid if PO intake declines    Goals:   1. Meet >85% EEN and EPN   2. Prevent further dry wt loss   3. Promote nutrition related labs wnl    Nutrition Goal Status: progressing towards goal  Communication of RD Recs: discussed on rounds    Reason for Assessment    Reason for Assessment: RD follow-up  Diagnosis:  1. Abdominal pain    2. Acute lower GI bleeding    3. Alcohol withdrawal syndrome without complication    4. Ascites    5. Iron deficiency anemia due to chronic blood loss    6. Alcoholic cirrhosis of liver with ascites    7. Ascites due to alcoholic cirrhosis    8. Leukocytosis, unspecified type    9. Severe malnutrition      Past Medical History:   Diagnosis Date    Cirrhosis         Interdisciplinary Rounds: attended     General Information Comments: Pt endorses improved appetite, tolerating diet. Denies N/V/D/C.    Nutrition Discharge Planning: d/c on low Na diet    Nutrition Prescription Ordered    Current Diet Order: low Na    Evaluation of Received Nutrients/Fluid Intake    % Intake of Estimated Energy Needs: 50 - 75 %  % Meal Intake: 75%     Nutrition Risk Screen     Nutrition Risk Screen: no indicators present    Nutrition/Diet History    Food Preferences: No cultural/spiritual prefs noted  Factors Affecting Nutritional Intake: altered gastrointestinal function, decreased appetite    Labs/Tests/Procedures/Meds    Pertinent Labs Reviewed: reviewed  Lab Results   Component Value Date     02/01/2018    K 3.1 (L) 02/01/2018     (H) 02/01/2018    CO2 18 (L) 02/01/2018    BUN 3 (L) 02/01/2018    CREATININE 0.6 02/01/2018    CALCIUM 7.6 (L) 02/01/2018    PHOS 3.1 01/25/2018    MG 1.3 (L) 01/25/2018    ESTGFRAFRICA >60 02/01/2018    EGFRNONAA >60 02/01/2018    ALBUMIN 1.6 (L) 02/01/2018     Pertinent Medications Reviewed:  "reviewed     Scheduled Meds:   cefTRIAXone (ROCEPHIN) IVPB  2 g Intravenous Q24H    folic acid  1 mg Oral Daily    nicotine  1 patch Transdermal Daily    pantoprazole  40 mg Intravenous Daily    ramelteon  8 mg Oral QHS    thiamine  100 mg Oral Daily     Physical Findings    Overall Physical Appearance: loss of muscle mass, loss of subcutaneous fat  Oral/Mouth Cavity: tooth/teeth missing  Skin: intact    Anthropometrics    Temp: 98.4 °F (36.9 °C)  Height: 4' 11" (149.9 cm)  Weight Method: Bed Scale  Weight: 40.4 kg (89 lb 1.1 oz)  Ideal Body Weight (IBW), Female: 95 lb  % Ideal Body Weight, Female (lb): 93.76 lb  BMI (Calculated): 18  BMI Grade: 17 - 18.4 protein-energy malnutrition grade I  Usual Body Weight (UBW), k.7 kg  % Usual Body Weight: 84.87  % Weight Change From Usual Weight: -15.3 %     Estimated/Assessed Needs    Weight Used For Calorie Calculations: 40.4 kg (89 lb 1.1 oz)   Energy Calorie Requirements (kcal): 4498-6118  Energy Need Method: Kcal/kg (30-35 kcal/kg)     Weight Used For Protein Calculations: 40.4 kg (89 lb 1.1 oz)  Protein Requirements: 49-61 gm/d (1.2-1.5 gm/kg)    Fluid Requirements (mL): 1215 (or per team)  Fluid Need Method: RDA Method    Assessment and Plan    Severe malnutrition    Malnutrition in the context of Chronic Illness/Injury    Related to (etiology):  etoh cirrhosis    Signs and Symptoms (as evidenced by):  15% wt loss x 6 months, severe temporal depletion and moderate muscle/fat depletion of triceps and clavicle, and ascites upon admission requiring paracentesis    Interventions/Recommendations (treatment strategy):  Continue low Na diet. Consider Boost Plus if PO intake declines. See RD note 2018 for full recs.    Nutrition Diagnosis Status:  Continues          Monitor and Evaluation    Food and Nutrient Intake: energy intake, food and beverage intake  Food and Nutrient Adminstration: diet order  Physical Activity and Function: nutrition-related ADLs and " IADLs  Anthropometric Measurements: weight, weight change  Biochemical Data, Medical Tests and Procedures: electrolyte and renal panel, gastrointestinal profile, glucose/endocrine profile, inflammatory profile  Nutrition-Focused Physical Findings: overall appearance, extremities, muscles and bones, skin    Nutrition Risk    Level of Risk: other (see comments) (f/u 2x/wk)    Nutrition Follow-Up    RD Follow-up?: Yes

## 2018-02-01 NOTE — PLAN OF CARE
Problem: Patient Care Overview  Goal: Plan of Care Review  IS done per patient, no distress noted. Will continue to  Monitor.

## 2018-02-01 NOTE — PROGRESS NOTES
This lady actually is feeling better and expressed a desire to go home. No nausea vomiting or blood loss. No signs of cholangitis or peritonitis. She is tolerating a diet well.    T<100 HR 83  Anicteric with some temporal wasting  Neck supple no mass  Heart no gallops RR  Chest clear no wheeze  Abdomen distended but soft with ascites though not tight. No tenderness or masses  Extremities no cyanosis or clubbing  Neuro alert conversive appropriate and oriented no asterixis  Psyche pleasant today    Labs: low albumin, anemic, mild leukocytosis    Impression: SBP resolved clinically. Can start aldactone 50 mg daily for ascites. Push calories avoiding salt and undercooked meats and no shellfish that is raw. She will need to follow up in Hepatology preferably.

## 2018-02-01 NOTE — PLAN OF CARE
Problem: Patient Care Overview  Goal: Plan of Care Review  Outcome: Ongoing (interventions implemented as appropriate)  Patient received on room air with adequate saturation. IS done with encouragement. No acute distress noted. No change in current respiratory orders.

## 2018-02-01 NOTE — DISCHARGE INSTRUCTIONS
Thank you for choosing [facility] as your Health Care Provider. [facility] strives to provide the best healthcare available to you. In the next few days you may receive a Survey, either by mail or email,  asking you to rate our care that was provided to you during your stay.  Please return the survey to us, as your feedback is important. We aim to meet your expectations of safe, quality health care.    From your [facility] Team.      Cirrhosis    The liver is found on the right side of your belly (abdomen). It is just below the rib cage. The liver has many important jobs. It removes toxins from the blood. It also helps your blood clot to stop bleeding. Cirrhosis happens when the liver is scarred or injured. This damage is permanent. It can cause your liver to stop working (liver failure).   The two most common causes of cirrhosis are long-term heavy alcohol use and having hepatitis B or C. Other things that can damage the liver include toxins, certain medicines, and certain viruses.  Common symptoms of cirrhosis include:  · Tiredness or weakness  · Loss of appetite  · Nausea and vomiting  · Easy bleeding and bruising  · Swelling of the belly (abdomen)  · Weight loss  · Yellowing of the eyes or skin (jaundice)  · Itching  · Confusion  Treatment helps ease symptoms and prevent more liver damage. You may also get treatment to fight the hepatitis virus. Quitting alcohol will help slow down the disease getting worse. It may also prevent more complications. If cirrhosis gets worse and becomes life threatening, you may need a liver transplant.   Home care  · Don't take medicines that can make liver damage worse. Your healthcare provider will tell you if any of the medicines you now take need to be changed. Talk with your provider before taking any medicine not prescribed. These include dietary supplements and herbs. Some of these may make liver damage worse.  · Talk with your healthcare  provider about medicines that have acetaminophen or NSAIDs such as ibuprofen and naproxen. These can also harm your liver.   · Stop drinking alcohol. If you find it hard to stop drinking, seek professional help. Consider joining Alcoholics Anonymous or another type of treatment program for support.  · If you use IV drugs, you are at high risk for hepatitis B and C. Seek help to stop.   · Be sure to ask your healthcare provider about recommended vaccines. These include vaccines for viruses that can cause liver disease.  Follow-up care  Follow up with your healthcare provider or as advised by our staff.  For more information and to learn about support groups for people with liver disease, contact:  · [employer], www.liverfoundation.org, 717.835.5746  · [employer], www.hepfi.org, 579.108.2594  When to seek medical advice  Call your healthcare provider right away if you have any of the following:  · Rapid weight gain with increased size of your belly (abdomen) or leg swelling  · Yellow color of your skin or eyes (jaundice) gets worse  · Excess bleeding from cuts or injuries  Date Last Reviewed: 6/22/2015  © 3031-8931 [employer]. [address] [place] [address], [place] [address]. All rights reserved. This information is not intended as a substitute for professional medical care. Always follow your healthcare professional's instructions.

## 2018-02-01 NOTE — ASSESSMENT & PLAN NOTE
Malnutrition in the context of Chronic Illness/Injury    Related to (etiology):  etoh cirrhosis    Signs and Symptoms (as evidenced by):  15% wt loss x 6 months, severe temporal depletion and moderate muscle/fat depletion of triceps and clavicle, and ascites upon admission requiring paracentesis    Interventions/Recommendations (treatment strategy):  Continue low Na diet. Consider Boost Plus if PO intake declines. See RD note 2/1/2018 for full recs.    Nutrition Diagnosis Status:  Continues

## 2018-02-01 NOTE — DISCHARGE SUMMARY
[facility] Hospital Medicine  Discharge Summary      Patient Name: Neena Marks  MRN: [id number]  Admission Date: 1/25/2018  Hospital Length of Stay: 7 days  Discharge Date and Time: 2/1/2018  2:41 PM  Attending Physician: No att. providers found   Discharging Provider: Jayla Jeronimo MD  Primary Care Provider: [facility]      HPI:   63 yo female with a PMH of alcoholic cirrhosis presents to the ED c/o bilateral lower abdominal pain and bloody bowel movements for 2 days.  Patient was admitted at [facility] the previous, underwent paracentesis, and was discharged with Augmentin.  She reports last hospitalization for paracentesis was 2-3 years before that.  In the ED she was found to be hypotensive with a systolic in the 80's, tachycardic at 124, and a hemoglobin of 4.4.  After 1 unit of blood, heart rate improved to 115 and systolic blood pressure improved to 100-110.  Patient states that pain has improved but still very tired.     Procedure(s) (LRB):  PARACENTESIS (N/A)      Hospital Course:   Patient responded well to a 2 units PRBC transfusion.  Paracentesis was performed, no evidence of SBP.  WBC increased to 30 and she became febrile on 01/27/18.  Despite the negative findings on paracentesis she had good improvement with antibiotics and these were continued.  IR guided paracentesis was done 1/30 with 6.2 liters removed and albumin infusions given.  She was feeling significantly better after the procedure and was eager to be discharged.  She will complete a 5 day course of Cipro for culture negative SBP as outpatient.  She is scheduled to follow up with the [facility] next week and will need a referral to Hepatology given recurrent ascites.     Consults:   Consults         Status Ordering Provider     Inpatient consult to Gastroenterology  Once     Provider:  Mark Urbina MD    Completed STEVEN HAIR     Inpatient consult to  Interventional Cardiology  Once     Provider:  Amor Millard MD    Acknowledged JOHAN RODRIGUES     Inpatient consult to Interventional Radiology  Once     Provider:  Amor Millard MD    Completed STEVEN HAIR     Inpatient consult to PICC team ([facility])  Once     Provider:  (Not yet assigned)    Acknowledged STEVEN HAIR     Inpatient consult to Registered Dietitian/Nutritionist  Once     Provider:  (Not yet assigned)    Completed STEVEN HAIR            Final Active Diagnoses:    Diagnosis Date Noted POA    PRINCIPAL PROBLEM:  Acute lower GI bleeding [K92.2] 01/25/2018 Yes    Ascites due to alcoholic cirrhosis [K70.31]  Yes    Iron deficiency anemia due to chronic blood loss [D50.0] 02/04/2015 Yes    Hypokalemia [E87.6] 02/04/2015 Yes      Problems Resolved During this Admission:    Diagnosis Date Noted Date Resolved POA       Discharged Condition: stable    Disposition: Home or Self Care    Follow Up:  Follow-up Information     [facility].    Why:  As scheduled on Monday Feb 5th, 2018  Contact information:  [address] [place] [address]  [phone]             [facility] In 1 week.    Specialties:  Neurosurgery, Plastic Surgery, Podiatry, Surgical Oncology, Allergy, Cardiothoracic Surgery, Otolaryngology, Gastroenterology, Breast Surgery, Oral Surgery, Oral and Maxillofacial Surgery, Cardiology, Bariatrics, Internal Medicine, Family Medicine, Colon and Rectal Surgery, Dental General Practice, Gynecology, Orthopedic Surgery, Genetics, Endocrinology, Vascular Surgery, Physical Medicine and Rehabilitation, Urology, Neurology, Dermatology, Rheumatology, Occupational Therapy  Why:  Outpatient Services-hepatology clinic. First available appt.  Contact information:  [address] [place] [address]  [phone]                 Patient Instructions:     Diet Adult Regular   Order Specific Question Answer Comments    Na restriction, if any: 2gNa      Activity as tolerated         Medications:  Reconciled Home Medications:   Discharge Medication List as of 2/1/2018  1:07 PM      START taking these medications    Details   ciprofloxacin HCl (CIPRO) 500 MG tablet Take 1 tablet (500 mg total) by mouth every 12 (twelve) hours., Starting Thu 2/1/2018, Until Tue 2/6/2018, Normal         CONTINUE these medications which have CHANGED    Details   spironolactone (ALDACTONE) 50 MG tablet Take 1 tablet (50 mg total) by mouth once daily., Starting Thu 2/1/2018, Until Fri 2/1/2019, Normal         CONTINUE these medications which have NOT CHANGED    Details   folic acid (FOLVITE) 1 MG tablet Take 1 mg by mouth once daily., Historical Med      furosemide (LASIX) 20 MG tablet Take 20 mg by mouth once daily., Historical Med      lactulose (CHRONULAC) 10 gram/15 mL solution Take 10 g by mouth 3 (three) times daily. As needed to have 3 bowel movements per day, Historical Med      multivitamin (ONE DAILY MULTIVITAMIN) per tablet Take 1 tablet by mouth once daily., Historical Med      ondansetron (ZOFRAN) 4 MG tablet Take 4 mg by mouth every 6 (six) hours as needed for Nausea., Historical Med      thiamine (VITAMIN B-1) 100 MG tablet Take 100 mg by mouth once daily., Historical Med         STOP taking these medications       amoxicillin-clavulanate 875-125mg (AUGMENTIN) 875-125 mg per tablet Comments:   Reason for Stopping:         ibuprofen (ADVIL,MOTRIN) 600 MG tablet Comments:   Reason for Stopping:         oxaprozin (DAYPRO) 600 mg tablet Comments:   Reason for Stopping:               Time spent on the discharge of patient: <30 minutes  Patient was seen and examined on the date of discharge and determined to be suitable for discharge.         Jayla Jeronimo MD  Department of Hospital Medicine  [facility]

## 2018-02-01 NOTE — PLAN OF CARE
Problem: Fall Risk (Adult)  Goal: Absence of Falls  Patient will demonstrate the desired outcomes by discharge/transition of care.   Outcome: Ongoing (interventions implemented as appropriate)  Patient informed to call for any assistnce

## 2018-02-05 ENCOUNTER — PATIENT OUTREACH (OUTPATIENT)
Dept: ADMINISTRATIVE | Facility: CLINIC | Age: 63
End: 2018-02-05

## 2018-02-05 NOTE — PATIENT INSTRUCTIONS
When You Have Gastrointestinal (GI) Bleeding    Blood in your vomit or stool can be a sign of gastrointestinal (GI) bleeding. GI bleeding can be scary. But the cause may not be serious. You should always see a doctor if GI bleeding occurs.  The GI tract  The GI tract is the path through which food travels in the body. Food passes from the mouth down the esophagus (the tube from the mouth to the stomach). Food begins to break down in the stomach. It then moves through the duodenum, the first part of the small intestine. Nutrients are absorbed as food travels through the small intestine. What is left passes into the colon (large intestine) as waste. The colon removes water from the waste. Waste continues from the colon to the rectum (where stool is stored). Waste then leaves the body through the anus.  Causes of GI bleeding  GI bleeding can be caused by many different problems. Some of the more common causes include:  · Swollen veins in the anus (hemorrhoids)  · Swollen veins in the esophagus (varices)  · Sore on the lining of the GI tract (ulcer)  · Cuts or scrapes in the mouth or throat  · Infection caused by germs such as bacteria or parasites  · Food allergies, such as milk allergy in young children  · Medicines  · Inflammation of the GI tract (gastritis or esophagitis)  · Colitis (Crohn's disease or ulcerative colitis)  · Cancer (tumors or polyps)  · Abnormal pouches in the colon (diverticula)  · Tears in the esophagus or anus  · Nosebleed  · Abnormal blood vessels in the GI tract (angiodysplasia)  Diagnosing the cause of blood in stool  If blood is coming out in your stool, you may have a lower GI tract problem or a very fast upper GI tract bleed. Bleeding from the GI tract can be bright red. Or it may look dark and tarry. Tests may also find blood in your stool that cant be seen with the eye (occult blood). To find out the cause, tests that may be ordered include:  · Blood tests. A blood sample is taken and  sent to a lab for exam.  · Hemoccult test. Checks a stool sample for blood.  · Stool culture. Checks a stool sample for bacteria or parasites.  · X-ray, ultrasound, or CT scan. Imaging tests that take pictures of the digestive tract.  · Colonoscopy or sigmoidoscopy. This test uses a flexible tube with a tiny camera. The tube is inserted through your anus into your rectum to see the inside of your colon. Your provider can also take a tiny tissue sample (biopsy) and treat a bleeding source  Diagnosing the cause of blood in vomit  If you are vomiting blood or something that looks like coffee grounds, you may have an upper GI tract problem. To find the cause, tests that may be done include:  · Upper Endoscopy. A flexible tube with a tiny camera is inserted through your mouth and throat to see inside your upper GI tract. This lets your provider take a tiny tissue sample (biopsy) and treat a bleeding source.  · Nasogastric lavage. This can tell if you have upper GI or lower GI bleeding.  · X-ray, ultrasound, or CT scan. Imaging tests that take pictures of your digestive tract.  · Upper GI series. X-rays of the upper part of your GI tract taken from inside your body.  · Enteroscopy. This sends a flexible tube or a small, swallowed capsule camera into your small intestine.  When to call your healthcare provider  Call your healthcare provider right away if you have any of the following:  · Bleeding from your mouth or anus that can't be stopped  · Fever of 100.4°F (38.0°) or higher  · Bleeding along with feeling lightheaded or dizzy  · Signs of fluid loss (dehydration). These include a dry, sticky mouth, decreased urine output; and very dark urine.  · Belly (abdominal) pain   Date Last Reviewed: 7/1/2016  © 1452-8773 [employer]. [address] [place] [address], [place] [address]. All rights reserved. This information is not intended as a substitute for professional medical care. Always follow your healthcare  professional's instructions.        Paracentesis  Your healthcare provider recommends that you have paracentesis. This is a procedure to remove extra fluid from your belly (abdomen). A needle is used to drain the fluid. A small sample of fluid may be taken and tested for problems. If the fluid buildup is causing discomfort or pain, all of the fluid may be drained. To do this, a tube is attached to the needle. The fluid is drained into a container that sits outside of the body. If symptoms are severe, paracentesis may be done as an emergency procedure. Otherwise, it will be scheduled ahead of time. Read on to learn more about paracentesis and how it works.     Understanding ascites  Many of the bodys organs, including the liver and intestines, are inside the belly (abdomen). The organs are covered in a thin membrane called the peritoneum. The peritoneum has 2 layers. It makes a fluid that allows the layers to glide smoothly past each other. If this fluid builds up in the belly, the condition is called ascites. Ascites causes pain and discomfort. It can also make it hard to breathe. Fluid can build up for a number of reasons. These include chronic liver disease (cirrhosis), heart or kidney failure, and cancer. Your provider can tell you more about the cause of your ascites.  How paracentesis works  The goal of paracentesis may be to help diagnose the cause of the excess fluid. Or, the goal may be to drain excess fluid from the abdomen. In some cases, fluid returns and the procedure needs to be repeated.  Before the procedure  · Tell your provider about any medicines you are taking. This includes all prescription medicines, over-the-counter medicines, street drugs, herbs, vitamins, and other supplements.  · Tell your provider about any allergies you have.  · Before the procedure begins, youll be asked to empty your bladder. This helps prevent injury to the bladder during the procedure. If needed, a thin tube (Garcia  catheter) may be placed into your bladder to drain urine during the procedure. This tube is removed after the procedure.  · An IV (intravenous) line may be put into a vein in your arm or hand. This line supplies fluids and medicines.  During the procedure  · You are awake during the procedure.  · An imaging method called ultrasound may be used to guide the procedure. It shows live images of the inside of your belly on a video screen. This helps the provider find the site of the excess fluid inside your belly and decide where to insert the needle.  · A numbing medicine (local anesthesia) is injected into your belly where the needle will be inserted.  · Once the skin is numb, the provider carefully inserts the needle into the belly. This causes the needle to fill with fluid.  · The needle may be removed with only a small sample of fluid. This sample is sent to a lab for testing. Getting a sample takes about 10 to 15 minutes.  · Or, a tube may be attached to the needle so that more of the excess fluid can be drained. The tube may be taped or stitched into place. This keeps it from pulling the needle out of your belly.  · How long it takes to drain all of the fluid varies for each person. In most cases, it takes about 30 minutes. Your provider will let you know if the procedure is expected to take longer than usual.  · Once all of the fluid is drained, the needle and tube are removed.  · Pressure is put on the puncture site to stop any fluid leakage or bleeding.  · A small bandage is placed over the puncture site. Albumin may be given during or after the procedure to prevent low blood pressure or kidney problems.  After the procedure  You may be taken to a recovery room to rest after the procedure. If you are in pain, you will be given medicine as needed. You will likely be sent home 1 to 2 hours after the procedure is done. When you leave the hospital, have an adult family member or friend drive you home. If you are  staying in the hospital, you will return to your hospital room.  Risks and possible complications of paracentesis  This procedure is considered safe. But like all procedures, it carries some risks. These include the following:  · Bleeding  · Infection  · Injury to structures in the belly  · Fast drop in blood pressure   Recovering at home  · If needed, your provider can prescribe or recommend pain medicines for you to take at home. Take these exactly as directed. If you stopped taking other medicines before the procedure, ask your provider when you can start them again.  · You may remove the bandage 24 hours after the procedure.  · Take it easy for 24 hours after the procedure. Avoid physical activity until your provider says its OK.  Follow-up care  Make a follow-up appointment with your provider as directed. During your follow-up visit, your provider will check your healing. Let your provider know how you are feeling. You can also discuss the cause of your ascites and if any more treatment is needed.   When to seek medical care  Call your healthcare provider if you notice any of the following after the procedure:  · A fever of 100.4°F (38.0°C) or higher  · Trouble breathing  · Pain that does not go away even after taking pain medicine  · Belly pain not caused by having the skin punctured  · Bleeding from the puncture site  · More than a small amount of fluid leakage from the puncture site  · Swollen belly  · Signs of infection at the puncture site. These include increased pain, redness, or swelling, as well as warmth or bad-smelling drainage.  · Blood in your urine  · Dizziness, lightheadedness, or fainting   Date Last Reviewed: 7/1/2016 © 2000-2018 [employer]. [address] [place] [address], [place] [address]. All rights reserved. This information is not intended as a substitute for professional medical care. Always follow your healthcare professional's instructions.

## 2018-02-05 NOTE — PROGRESS NOTES
Please forward this important TCC information to your provider in order to maximize the post discharge care delivery of this patient.    C3 nurse spoke with Neena Marks  for a TCC post hospital discharge follow up call. The patient has a scheduled HOSFU appointment with  [facility] on 2\5\18 @ 9073.  Respectfully,  Roxana Anaya RN  [facility] C3    [email or web address]       Please do not reply to this message, as this inbox is not routinely monitored.

## 2018-02-15 ENCOUNTER — TELEPHONE (OUTPATIENT)
Dept: TRANSPLANT | Facility: CLINIC | Age: 63
End: 2018-02-15

## 2018-02-15 NOTE — TELEPHONE ENCOUNTER
----- Message from Karlee Monroe sent at 2/15/2018  4:27 PM CST -----  Regarding: External Referral and Records   Hello,  PT being referred Thuy Gordon for alcoholic cirrhosis with ascites. Referral and records in . Please call pt to schedule. Thank you!

## 2018-02-16 ENCOUNTER — TELEPHONE (OUTPATIENT)
Dept: TRANSPLANT | Facility: CLINIC | Age: 63
End: 2018-02-16

## 2018-02-16 NOTE — TELEPHONE ENCOUNTER
----- Message from Wisam Carreon sent at 2/16/2018  4:56 PM CST -----  Have medical recorders that where scanned into media from [facility]. Will call referring MD office if we need any additional information on the pt.    By: Wisam Carreon

## 2018-02-19 ENCOUNTER — TELEPHONE (OUTPATIENT)
Dept: TRANSPLANT | Facility: CLINIC | Age: 63
End: 2018-02-19

## 2018-02-19 ENCOUNTER — DOCUMENTATION ONLY (OUTPATIENT)
Dept: TRANSPLANT | Facility: CLINIC | Age: 63
End: 2018-02-19

## 2018-02-19 NOTE — NURSING
Pt records reviewed.  Pt will be referred to Hepatology due to alcohol cirrhosis MELD  8  Initial referral received  from Thuy Gordon NP  Referral letter sent to provider and patient.  Pt records reviewed.

## 2018-02-19 NOTE — LETTER
February 19, 2018    Thuy Ferrari, NP  [address] [place] [address]      Dear Dr. Ferrari    Patient: Neena Marks   MR Number: [id number]   YOB: 1955     Thank you for the referral of Neena Marks to the [facility] program. An initial appointment will be scheduled for your patient with one of our Hepatologists.      Thank you again for your trust in our program.  If there is anything we can do for you or your staff, please feel free to contact us.        Sincerely,        [facility]  [address] [place] [address]  [phone]

## 2018-02-19 NOTE — TELEPHONE ENCOUNTER
----- Message from Celestino Hawthorne sent at 2/19/2018 12:17 PM CST -----  Contact: [facility]  Calling to see if the pt can get scheduled and check on the status of the referral.      Call back number: [phone]

## 2018-02-19 NOTE — LETTER
February 19, 2018    Neena Marks  [address] [facility] [address] [facility] [address]      Dear Neena Marks:    Your doctor has referred you to the [facility]. You will be contacted by our office and an initial appointment will then be scheduled for you.    We look forward to seeing you soon. If you have any further questions, please contact us at [phone].       Sincerely,        [facility]   [facility] [place], [place] [address] [phone]

## 2018-03-26 ENCOUNTER — TELEPHONE (OUTPATIENT)
Dept: HEPATOLOGY | Facility: CLINIC | Age: 63
End: 2018-03-26

## 2018-03-26 NOTE — TELEPHONE ENCOUNTER
MA spoke to pt and rescheduled missed appt for April 13th. Pt confirmed. Mailed appt reminder. EMS

## 2018-04-13 ENCOUNTER — OFFICE VISIT (OUTPATIENT)
Dept: HEPATOLOGY | Facility: CLINIC | Age: 63
End: 2018-04-13
Payer: MEDICAID

## 2018-04-13 VITALS
BODY MASS INDEX: 18.18 KG/M2 | SYSTOLIC BLOOD PRESSURE: 115 MMHG | HEIGHT: 59 IN | TEMPERATURE: 99 F | WEIGHT: 90.19 LBS | HEART RATE: 82 BPM | RESPIRATION RATE: 19 BRPM | OXYGEN SATURATION: 96 % | DIASTOLIC BLOOD PRESSURE: 69 MMHG

## 2018-04-13 DIAGNOSIS — K70.31 ALCOHOLIC CIRRHOSIS OF LIVER WITH ASCITES: Primary | ICD-10-CM

## 2018-04-13 PROCEDURE — 99214 OFFICE O/P EST MOD 30 MIN: CPT | Mod: PBBFAC | Performed by: INTERNAL MEDICINE

## 2018-04-13 PROCEDURE — 99999 PR PBB SHADOW E&M-EST. PATIENT-LVL IV: CPT | Mod: PBBFAC,,, | Performed by: INTERNAL MEDICINE

## 2018-04-13 PROCEDURE — 99205 OFFICE O/P NEW HI 60 MIN: CPT | Mod: S$PBB,,, | Performed by: INTERNAL MEDICINE

## 2018-04-13 RX ORDER — CHOLECALCIFEROL (VITAMIN D3) 1250 MCG
125 TABLET ORAL
Status: ON HOLD | COMMUNITY
End: 2019-11-03 | Stop reason: HOSPADM

## 2018-04-13 RX ORDER — HYDROXYZINE HYDROCHLORIDE 10 MG/1
25 TABLET, FILM COATED ORAL DAILY PRN
COMMUNITY
End: 2019-02-13

## 2018-04-13 RX ORDER — FERROUS SULFATE 325(65) MG
325 TABLET ORAL
COMMUNITY
End: 2019-04-04 | Stop reason: SDUPTHER

## 2018-04-13 NOTE — PROGRESS NOTES
Hepatology Consult Note    Referring provider: Thuy Ferrari    Chief complaint: cirrhosis    HPI:  eNena Marks is a 63 y.o. female that presents to hepatology clinic for consultation of alcoholic cirrhosis.  She is alone.     Diagnosed with cirrhosis: patient reports diagnosis in  but per review of chart, presented with ascites in 2015 and diagnosed with alcoholic cirrhosis   Presenting symptom/s: ascites   Current symptoms of portal hypertension:   Ascites: yes, prior LVP soon after diagnosis, no history of SBP   LE edema: yes    HE: no   GI bleeding: yes, hematochezia in 2018; EGD performed but no colonoscopy in system   Jaundice: no    Pruritus: yes, on hydroxyzine reports that it is not effective     Cirrhosis HCM:  Hepatitis A vaccination: serology ordered   Hepatitis B vaccination: serology ordered   HCC screenin2018 - CT scan without focal lesion   Variceal screening: grade II esophageal varices 2018  Pneumococcal vaccination not discussed   Influenza vaccination:  not discussed     Despite evidence of alcoholic cirrhosis in , patient has continued to drink alcohol until 2018.  Reports that she has been sicker related to her liver disease and this has prompted her to discontinue alcohol use. She has not had alcohol rehab or relapse prevention.  No other etiologies of CLD known at this time.      Patient Active Problem List   Diagnosis    Iron deficiency anemia due to chronic blood loss    Elevated INR    Alcohol abuse    Hypokalemia    Thrombocytopenia    Alcohol withdrawal    Liver mass    UTI (urinary tract infection)    Acute lower GI bleeding    Esophageal web determined by endoscopy    Severe malnutrition    Leukocytosis    Ascites due to alcoholic cirrhosis       Past Medical History:   Diagnosis Date    Cirrhosis      PSH: none     FH: no liver disease, sister on HD     Social History     Social History    Marital status: Single     Spouse name: N/A     Number of children: N/A    Years of education: N/A     Social History Main Topics    Smoking status: Current Every Day Smoker     Types: Cigarettes    Smokeless tobacco: Never Used      Comment: 2 cigarettes a day    Alcohol use Yes      Comment: 1 pint a day    Drug use: Yes     Types: Cocaine    Sexual activity: Not on file     Other Topics Concern    Not on file     Social History Narrative    No narrative on file   lives with sandra, son and granddaughter, unemployed, on disability - she is not sure of the indication  Use cocaine - once or twice a month, last use Sunday   Reports alcohol discontinued in 1/2018      Current Outpatient Prescriptions   Medication Sig Dispense Refill    folic acid (FOLVITE) 1 MG tablet Take 1 mg by mouth once daily.      furosemide (LASIX) 20 MG tablet Take 20 mg by mouth once daily.      lactulose (CHRONULAC) 10 gram/15 mL solution Take 10 g by mouth 3 (three) times daily. As needed to have 3 bowel movements per day      multivitamin (ONE DAILY MULTIVITAMIN) per tablet Take 1 tablet by mouth once daily.      ondansetron (ZOFRAN) 4 MG tablet Take 4 mg by mouth every 6 (six) hours as needed for Nausea.      spironolactone (ALDACTONE) 50 MG tablet Take 1 tablet (50 mg total) by mouth once daily. 30 tablet 3    thiamine (VITAMIN B-1) 100 MG tablet Take 100 mg by mouth once daily.       No current facility-administered medications for this visit.        Review of patient's allergies indicates:  No Known Allergies    Review of Systems   Constitutional: Positive for malaise/fatigue and weight loss. Negative for chills and fever.   Eyes: Negative.    Respiratory: Negative for cough and shortness of breath.    Cardiovascular: Negative for chest pain and leg swelling.   Gastrointestinal: Positive for abdominal pain. Negative for heartburn, nausea and vomiting.   Musculoskeletal: Negative for joint pain and myalgias.   Skin: Negative for itching and rash.   Neurological:  "Negative for dizziness, focal weakness and headaches.   Endo/Heme/Allergies: Does not bruise/bleed easily.   Psychiatric/Behavioral: Negative for depression.       Vitals:    04/13/18 1533   BP: 115/69   Pulse: 82   Resp: 19   Temp: 99.1 °F (37.3 °C)   TempSrc: Oral   SpO2: 96%   Weight: 40.9 kg (90 lb 2.7 oz)   Height: 4' 11" (1.499 m)       Physical Exam   Constitutional: She is oriented to person, place, and time. She appears well-developed. No distress.   Thin female, temporal wasting present   HENT:   Head: Normocephalic and atraumatic.   Mouth/Throat: Oropharynx is clear and moist. No oropharyngeal exudate.   Eyes: EOM are normal. Pupils are equal, round, and reactive to light. No scleral icterus.   Neck: Normal range of motion. Neck supple. No thyromegaly present.   Cardiovascular: Normal rate, regular rhythm and normal heart sounds.  Exam reveals no gallop and no friction rub.    No murmur heard.  Pulmonary/Chest: Effort normal. No respiratory distress. She has no wheezes. She has no rales.   Abdominal: Soft. Bowel sounds are normal. She exhibits distension (mild). There is tenderness.   Musculoskeletal: Normal range of motion. She exhibits no edema.   Lymphadenopathy:     She has no cervical adenopathy.   Neurological: She is alert and oriented to person, place, and time. No cranial nerve deficit.   Skin: Skin is warm and dry. No rash noted.   Psychiatric: She has a normal mood and affect. Her behavior is normal.   Vitals reviewed.      Lab Results   Component Value Date    ALT 21 02/01/2018    AST 32 02/01/2018    ALKPHOS 102 02/01/2018    BILITOT 0.6 02/01/2018       Lab Results   Component Value Date    WBC 13.38 (H) 02/01/2018    HGB 8.1 (L) 02/01/2018    HCT 24.5 (L) 02/01/2018    MCV 75 (L) 02/01/2018     02/01/2018       Lab Results   Component Value Date     02/01/2018    K 3.1 (L) 02/01/2018     (H) 02/01/2018    CO2 18 (L) 02/01/2018    BUN 3 (L) 02/01/2018    CREATININE 0.6 " 02/01/2018    CALCIUM 7.6 (L) 02/01/2018    ANIONGAP 3 (L) 02/01/2018    ESTGFRAFRICA >60 02/01/2018    EGFRNONAA >60 02/01/2018     MELD-Na score: 8 at 1/27/2018  3:18 AM  MELD score: 8 at 1/27/2018  3:18 AM  Calculated from:  Serum Creatinine: 0.8 mg/dL (Rounded to 1) at 1/27/2018  3:18 AM  Serum Sodium: 138 mmol/L (Rounded to 137) at 1/27/2018  3:18 AM  Total Bilirubin: 0.6 mg/dL (Rounded to 1) at 1/27/2018  3:18 AM  INR(ratio): 1.2 at 1/25/2018  4:11 AM  Age: 62 years    Imaging: EMR and external imaging available reviewed     Assessment:  63 y.o. female presenting with alcoholic cirrhosis to establish hepatology care.    Plan:  *  Discussed importance of ongoing alcohol abstinence, patient reports commitment - not interested in relapse prevention at this time  *  Fluid pills at low dose, no change; given educational handouts for 2gm sodium diet; nutrition consult  *  Patient with grade II esophageal varices during recent EGD, blood pressure adequate but concern for hypotension with BB; recommend repeat EGD with consideration of banding as patient was bleeding at time of prior assessment and no intervention performed as this was not the source of bleeding. Will discuss with patient if she is amenable to procedure prior to scheduling  *  No colonoscopy in system, if this has not been performed - can schedule for E/C  *  Hep A and B serologies ordered and recommended vaccination if non-immune  *  Discussed importance of increased protein intake along with small frequent meals in the context of low sodium; will refer to nutrition for reinforcement.  Patient also requesting appetite stimulating medication but will await nutritional assessment prior to consideration   *  Labs ordered today but patient did not want to schedule for this time    Patient not a candidate for transplant due to low MELD, recent discontinuation of alcohol and prior use despite knowledge of liver disease and ongoing illicit drug use.   Discussed with patient these barriers if her liver were to decompensate further.  She expresses understanding and reports that he has previously been sober from drugs for more than 10 years.     HCM as documented above    RTC in 3 months

## 2018-04-13 NOTE — LETTER
April 14, 2018      Thuy Ferrari, NP  [address] [facility] [address]           [facility] - Hepatology  [address]  [place] [address]  Phone: [phone]  Fax: [phone]          Patient: Neena Marks   MR Number: [id number]   YOB: 1955   Date of Visit: 4/13/2018       Dear Thuy Ferrari:    Thank you for referring Neena Marks to me for evaluation. Attached you will find relevant portions of my assessment and plan of care.    If you have questions, please do not hesitate to call me. I look forward to following Neena Marks along with you.    Sincerely,    Josette Donohue MD    Enclosure  CC:  No Recipients    If you would like to receive this communication electronically, please contact [email or web address] or [phone] to request more information on [employer] access.    For providers and/or their staff who would like to refer a patient to [facility], please contact us through our one-stop-shop provider referral line, [facility] , at [phone].    If you feel you have received this communication in error or would no longer like to receive these types of communications, please e-mail [email or web address]

## 2018-04-13 NOTE — PATIENT INSTRUCTIONS
Please schedule labs for today.    Patient needs nutrition consult for low weight.  It is important to eat 5-6 small meals a day.  You should continue to avoid sodium to help manage fluid.    Continue lasix 40mg and spironolactone 50mg daily.    It is very important to continue to completely avoid alcohol.    Return to clinic in 3 months

## 2018-04-27 ENCOUNTER — LAB VISIT (OUTPATIENT)
Dept: LAB | Facility: OTHER | Age: 63
End: 2018-04-27
Attending: INTERNAL MEDICINE
Payer: MEDICAID

## 2018-04-27 DIAGNOSIS — K70.31 ALCOHOLIC CIRRHOSIS OF LIVER WITH ASCITES: ICD-10-CM

## 2018-04-27 LAB
A1AT SERPL-MCNC: 179 MG/DL
AFP SERPL-MCNC: 7.6 NG/ML
ALBUMIN SERPL BCP-MCNC: 3.3 G/DL
ALP SERPL-CCNC: 114 U/L
ALT SERPL W/O P-5'-P-CCNC: 11 U/L
ANION GAP SERPL CALC-SCNC: 13 MMOL/L
ANISOCYTOSIS BLD QL SMEAR: SLIGHT
AST SERPL-CCNC: 25 U/L
BASOPHILS # BLD AUTO: 0.02 K/UL
BASOPHILS NFR BLD: 0.4 %
BILIRUB SERPL-MCNC: 0.7 MG/DL
BUN SERPL-MCNC: 9 MG/DL
CALCIUM SERPL-MCNC: 9.7 MG/DL
CERULOPLASMIN SERPL-MCNC: 27 MG/DL
CHLORIDE SERPL-SCNC: 102 MMOL/L
CO2 SERPL-SCNC: 26 MMOL/L
CREAT SERPL-MCNC: 0.9 MG/DL
DIFFERENTIAL METHOD: ABNORMAL
EOSINOPHIL # BLD AUTO: 0.1 K/UL
EOSINOPHIL NFR BLD: 2.5 %
ERYTHROCYTE [DISTWIDTH] IN BLOOD BY AUTOMATED COUNT: 18.7 %
EST. GFR  (AFRICAN AMERICAN): >60 ML/MIN/1.73 M^2
EST. GFR  (NON AFRICAN AMERICAN): >60 ML/MIN/1.73 M^2
FERRITIN SERPL-MCNC: 82 NG/ML
GIANT PLATELETS BLD QL SMEAR: PRESENT
GLUCOSE SERPL-MCNC: 71 MG/DL
HCT VFR BLD AUTO: 32.8 %
HGB BLD-MCNC: 10.7 G/DL
HYPOCHROMIA BLD QL SMEAR: ABNORMAL
IGG SERPL-MCNC: 2648 MG/DL
INR PPP: 1.2
IRON SERPL-MCNC: 54 UG/DL
LYMPHOCYTES # BLD AUTO: 2.1 K/UL
LYMPHOCYTES NFR BLD: 40.8 %
MCH RBC QN AUTO: 21.6 PG
MCHC RBC AUTO-ENTMCNC: 32.6 G/DL
MCV RBC AUTO: 66 FL
MONOCYTES # BLD AUTO: 0.2 K/UL
MONOCYTES NFR BLD: 4.4 %
NEUTROPHILS # BLD AUTO: 2.7 K/UL
NEUTROPHILS NFR BLD: 51.9 %
PLATELET # BLD AUTO: 177 K/UL
PLATELET BLD QL SMEAR: ABNORMAL
PMV BLD AUTO: ABNORMAL FL
POIKILOCYTOSIS BLD QL SMEAR: SLIGHT
POLYCHROMASIA BLD QL SMEAR: ABNORMAL
POTASSIUM SERPL-SCNC: 2.8 MMOL/L
PROT SERPL-MCNC: 9.5 G/DL
PROTHROMBIN TIME: 13 SEC
RBC # BLD AUTO: 4.95 M/UL
SATURATED IRON: 10 %
SODIUM SERPL-SCNC: 141 MMOL/L
TARGETS BLD QL SMEAR: ABNORMAL
TOTAL IRON BINDING CAPACITY: 543 UG/DL
TRANSFERRIN SERPL-MCNC: 367 MG/DL
WBC # BLD AUTO: 5.22 K/UL

## 2018-04-27 PROCEDURE — 87340 HEPATITIS B SURFACE AG IA: CPT

## 2018-04-27 PROCEDURE — 85610 PROTHROMBIN TIME: CPT

## 2018-04-27 PROCEDURE — 82103 ALPHA-1-ANTITRYPSIN TOTAL: CPT

## 2018-04-27 PROCEDURE — 86704 HEP B CORE ANTIBODY TOTAL: CPT

## 2018-04-27 PROCEDURE — 86803 HEPATITIS C AB TEST: CPT

## 2018-04-27 PROCEDURE — 80321 ALCOHOLS BIOMARKERS 1OR 2: CPT

## 2018-04-27 PROCEDURE — 85025 COMPLETE CBC W/AUTO DIFF WBC: CPT

## 2018-04-27 PROCEDURE — 82728 ASSAY OF FERRITIN: CPT

## 2018-04-27 PROCEDURE — 86706 HEP B SURFACE ANTIBODY: CPT

## 2018-04-27 PROCEDURE — 36415 COLL VENOUS BLD VENIPUNCTURE: CPT

## 2018-04-27 PROCEDURE — 86038 ANTINUCLEAR ANTIBODIES: CPT

## 2018-04-27 PROCEDURE — 86790 VIRUS ANTIBODY NOS: CPT

## 2018-04-27 PROCEDURE — 86256 FLUORESCENT ANTIBODY TITER: CPT

## 2018-04-27 PROCEDURE — 83540 ASSAY OF IRON: CPT

## 2018-04-27 PROCEDURE — 82390 ASSAY OF CERULOPLASMIN: CPT

## 2018-04-27 PROCEDURE — 80053 COMPREHEN METABOLIC PANEL: CPT

## 2018-04-27 PROCEDURE — 82105 ALPHA-FETOPROTEIN SERUM: CPT

## 2018-04-27 PROCEDURE — 82784 ASSAY IGA/IGD/IGG/IGM EACH: CPT

## 2018-04-29 DIAGNOSIS — K70.31 ALCOHOLIC CIRRHOSIS OF LIVER WITH ASCITES: Primary | ICD-10-CM

## 2018-04-29 RX ORDER — SPIRONOLACTONE 100 MG/1
100 TABLET, FILM COATED ORAL DAILY
Qty: 30 TABLET | Refills: 5 | Status: SHIPPED | OUTPATIENT
Start: 2018-04-29 | End: 2019-06-14

## 2018-04-30 ENCOUNTER — TELEPHONE (OUTPATIENT)
Dept: HEPATOLOGY | Facility: CLINIC | Age: 63
End: 2018-04-30

## 2018-04-30 LAB
ANA SER QL IF: NORMAL
HBV CORE AB SERPL QL IA: NEGATIVE
HBV SURFACE AB SER-ACNC: NEGATIVE M[IU]/ML
HBV SURFACE AG SERPL QL IA: NEGATIVE
HCV AB SERPL QL IA: NEGATIVE
HEPATITIS A ANTIBODY, IGG: POSITIVE

## 2018-04-30 NOTE — TELEPHONE ENCOUNTER
----- Message from Josette Donohue MD sent at 4/29/2018 11:51 AM CDT -----  Please inform patient that liver tests have improved.  Potassium is low.  Increase spironolactone to 100mg daily.  Repeat BMP in 2 weeks

## 2018-05-01 ENCOUNTER — TELEPHONE (OUTPATIENT)
Dept: HEPATOLOGY | Facility: CLINIC | Age: 63
End: 2018-05-01

## 2018-05-01 LAB — SMOOTH MUSCLE AB TITR SER IF: ABNORMAL {TITER}

## 2018-05-02 ENCOUNTER — TELEPHONE (OUTPATIENT)
Dept: HEPATOLOGY | Facility: CLINIC | Age: 63
End: 2018-05-02

## 2018-05-02 NOTE — TELEPHONE ENCOUNTER
Called patient, who accepted + confirmed lab appointment on 05/16. Mailed appointment reminder to patient.

## 2018-05-04 LAB — PHOSPHATIDYLETHANOL (PETH): NEGATIVE NG/ML

## 2019-01-08 ENCOUNTER — ANESTHESIA EVENT (OUTPATIENT)
Dept: ENDOSCOPY | Facility: OTHER | Age: 64
DRG: 441 | End: 2019-01-08
Payer: MEDICAID

## 2019-01-08 ENCOUNTER — HOSPITAL ENCOUNTER (INPATIENT)
Facility: OTHER | Age: 64
LOS: 3 days | Discharge: HOME | DRG: 441 | End: 2019-01-11
Attending: EMERGENCY MEDICINE | Admitting: INTERNAL MEDICINE
Payer: MEDICAID

## 2019-01-08 DIAGNOSIS — K92.2 ACUTE GI BLEEDING: Primary | ICD-10-CM

## 2019-01-08 DIAGNOSIS — D64.9 ANEMIA, UNSPECIFIED TYPE: ICD-10-CM

## 2019-01-08 DIAGNOSIS — D50.0 IRON DEFICIENCY ANEMIA DUE TO CHRONIC BLOOD LOSS: ICD-10-CM

## 2019-01-08 DIAGNOSIS — N28.9 RENAL INSUFFICIENCY: ICD-10-CM

## 2019-01-08 DIAGNOSIS — R53.1 WEAKNESS: ICD-10-CM

## 2019-01-08 DIAGNOSIS — K62.5 RECTAL BLEEDING: ICD-10-CM

## 2019-01-08 DIAGNOSIS — R18.8 ASCITES: ICD-10-CM

## 2019-01-08 DIAGNOSIS — K92.2 ACUTE LOWER GI BLEEDING: ICD-10-CM

## 2019-01-08 DIAGNOSIS — R10.9 ABDOMINAL PAIN: ICD-10-CM

## 2019-01-08 PROBLEM — D62 ACUTE BLOOD LOSS ANEMIA: Status: ACTIVE | Noted: 2019-01-08

## 2019-01-08 PROBLEM — I85.10 ESOPHAGEAL VARICES IN ALCOHOLIC CIRRHOSIS: Chronic | Status: ACTIVE | Noted: 2019-01-08

## 2019-01-08 PROBLEM — Q39.4: Chronic | Status: ACTIVE | Noted: 2018-01-26

## 2019-01-08 PROBLEM — K70.30 ESOPHAGEAL VARICES IN ALCOHOLIC CIRRHOSIS: Chronic | Status: ACTIVE | Noted: 2019-01-08

## 2019-01-08 PROBLEM — N17.9 AKI (ACUTE KIDNEY INJURY): Status: ACTIVE | Noted: 2019-01-08

## 2019-01-08 PROBLEM — K70.30 ALCOHOLIC CIRRHOSIS: Chronic | Status: ACTIVE | Noted: 2019-01-08

## 2019-01-08 PROBLEM — D72.829 LEUKOCYTOSIS: Status: RESOLVED | Noted: 2018-01-28 | Resolved: 2019-01-08

## 2019-01-08 LAB
ABO GROUP BLD: NORMAL
ALBUMIN SERPL BCP-MCNC: 2.8 G/DL
ALP SERPL-CCNC: 107 U/L
ALT SERPL W/O P-5'-P-CCNC: 27 U/L
ANION GAP SERPL CALC-SCNC: 14 MMOL/L
ANISOCYTOSIS BLD QL SMEAR: ABNORMAL
AST SERPL-CCNC: 59 U/L
BASOPHILS # BLD AUTO: 0 K/UL
BASOPHILS # BLD AUTO: 0 K/UL
BASOPHILS # BLD AUTO: ABNORMAL K/UL
BASOPHILS NFR BLD: 0 %
BILIRUB SERPL-MCNC: 0.7 MG/DL
BLD GP AB SCN CELLS X3 SERPL QL: NORMAL
BLD PROD TYP BPU: NORMAL
BLOOD UNIT EXPIRATION DATE: NORMAL
BLOOD UNIT TYPE CODE: 7300
BLOOD UNIT TYPE: NORMAL
BUN SERPL-MCNC: 38 MG/DL
CALCIUM SERPL-MCNC: 8.7 MG/DL
CHLORIDE SERPL-SCNC: 103 MMOL/L
CO2 SERPL-SCNC: 18 MMOL/L
CODING SYSTEM: NORMAL
CREAT SERPL-MCNC: 2.6 MG/DL
DIFFERENTIAL METHOD: ABNORMAL
DISPENSE STATUS: NORMAL
EOSINOPHIL # BLD AUTO: 0 K/UL
EOSINOPHIL # BLD AUTO: 0 K/UL
EOSINOPHIL # BLD AUTO: ABNORMAL K/UL
EOSINOPHIL NFR BLD: 0 %
ERYTHROCYTE [DISTWIDTH] IN BLOOD BY AUTOMATED COUNT: 20.1 %
ERYTHROCYTE [DISTWIDTH] IN BLOOD BY AUTOMATED COUNT: 25.1 %
ERYTHROCYTE [DISTWIDTH] IN BLOOD BY AUTOMATED COUNT: 27.2 %
EST. GFR  (AFRICAN AMERICAN): 22 ML/MIN/1.73 M^2
EST. GFR  (NON AFRICAN AMERICAN): 19 ML/MIN/1.73 M^2
GIANT PLATELETS BLD QL SMEAR: PRESENT
GLUCOSE SERPL-MCNC: 138 MG/DL
HCT VFR BLD AUTO: 12 %
HCT VFR BLD AUTO: 17.2 %
HCT VFR BLD AUTO: 24.7 %
HGB BLD-MCNC: 3.6 G/DL
HGB BLD-MCNC: 5.5 G/DL
HGB BLD-MCNC: 8.2 G/DL
HYPOCHROMIA BLD QL SMEAR: ABNORMAL
HYPOCHROMIA BLD QL SMEAR: ABNORMAL
INR PPP: 1.2
LIPASE SERPL-CCNC: 83 U/L
LYMPHOCYTES # BLD AUTO: 1.5 K/UL
LYMPHOCYTES # BLD AUTO: 1.6 K/UL
LYMPHOCYTES # BLD AUTO: ABNORMAL K/UL
LYMPHOCYTES NFR BLD: 18.3 %
LYMPHOCYTES NFR BLD: 24.5 %
LYMPHOCYTES NFR BLD: 8 %
MAGNESIUM SERPL-MCNC: 2.3 MG/DL
MCH RBC QN AUTO: 17.3 PG
MCH RBC QN AUTO: 20.8 PG
MCH RBC QN AUTO: 22.5 PG
MCHC RBC AUTO-ENTMCNC: 30 G/DL
MCHC RBC AUTO-ENTMCNC: 32 G/DL
MCHC RBC AUTO-ENTMCNC: 33.2 G/DL
MCV RBC AUTO: 58 FL
MCV RBC AUTO: 65 FL
MCV RBC AUTO: 68 FL
MONOCYTES # BLD AUTO: 0.4 K/UL
MONOCYTES # BLD AUTO: 0.7 K/UL
MONOCYTES # BLD AUTO: ABNORMAL K/UL
MONOCYTES NFR BLD: 6 %
MONOCYTES NFR BLD: 6.2 %
MONOCYTES NFR BLD: 7.9 %
NEUTROPHILS # BLD AUTO: 4.1 K/UL
NEUTROPHILS # BLD AUTO: 6.3 K/UL
NEUTROPHILS NFR BLD: 69.3 %
NEUTROPHILS NFR BLD: 73.8 %
NEUTROPHILS NFR BLD: 86 %
NRBC BLD-RTO: 2 /100 WBC
NUM UNITS TRANS PACKED RBC: NORMAL
OVALOCYTES BLD QL SMEAR: ABNORMAL
PLATELET # BLD AUTO: 224 K/UL
PLATELET # BLD AUTO: 248 K/UL
PLATELET # BLD AUTO: 287 K/UL
PLATELET BLD QL SMEAR: ABNORMAL
PMV BLD AUTO: ABNORMAL FL
POIKILOCYTOSIS BLD QL SMEAR: ABNORMAL
POIKILOCYTOSIS BLD QL SMEAR: ABNORMAL
POIKILOCYTOSIS BLD QL SMEAR: SLIGHT
POLYCHROMASIA BLD QL SMEAR: ABNORMAL
POTASSIUM SERPL-SCNC: 5 MMOL/L
PROT SERPL-MCNC: 7.5 G/DL
PROTHROMBIN TIME: 13.8 SEC
RBC # BLD AUTO: 2.08 M/UL
RBC # BLD AUTO: 2.64 M/UL
RBC # BLD AUTO: 3.65 M/UL
RH BLD: NORMAL
SCHISTOCYTES BLD QL SMEAR: ABNORMAL
SCHISTOCYTES BLD QL SMEAR: ABNORMAL
SCHISTOCYTES BLD QL SMEAR: PRESENT
SODIUM SERPL-SCNC: 135 MMOL/L
SPHEROCYTES BLD QL SMEAR: ABNORMAL
TARGETS BLD QL SMEAR: ABNORMAL
WBC # BLD AUTO: 5.95 K/UL
WBC # BLD AUTO: 8.52 K/UL
WBC # BLD AUTO: 9.98 K/UL

## 2019-01-08 PROCEDURE — 63600175 PHARM REV CODE 636 W HCPCS: Performed by: EMERGENCY MEDICINE

## 2019-01-08 PROCEDURE — 82105 ALPHA-FETOPROTEIN SERUM: CPT

## 2019-01-08 PROCEDURE — 25000003 PHARM REV CODE 250: Performed by: INTERNAL MEDICINE

## 2019-01-08 PROCEDURE — 36430 TRANSFUSION BLD/BLD COMPNT: CPT

## 2019-01-08 PROCEDURE — 83735 ASSAY OF MAGNESIUM: CPT

## 2019-01-08 PROCEDURE — 63600175 PHARM REV CODE 636 W HCPCS: Performed by: PHYSICIAN ASSISTANT

## 2019-01-08 PROCEDURE — P9016 RBC LEUKOCYTES REDUCED: HCPCS

## 2019-01-08 PROCEDURE — 85027 COMPLETE CBC AUTOMATED: CPT

## 2019-01-08 PROCEDURE — 86920 COMPATIBILITY TEST SPIN: CPT

## 2019-01-08 PROCEDURE — 99233 SBSQ HOSP IP/OBS HIGH 50: CPT | Mod: ,,, | Performed by: INTERNAL MEDICINE

## 2019-01-08 PROCEDURE — 99233 PR SUBSEQUENT HOSPITAL CARE,LEVL III: ICD-10-PCS | Mod: ,,, | Performed by: INTERNAL MEDICINE

## 2019-01-08 PROCEDURE — 96375 TX/PRO/DX INJ NEW DRUG ADDON: CPT | Mod: 59

## 2019-01-08 PROCEDURE — 96365 THER/PROPH/DIAG IV INF INIT: CPT

## 2019-01-08 PROCEDURE — 25000003 PHARM REV CODE 250: Performed by: EMERGENCY MEDICINE

## 2019-01-08 PROCEDURE — 93010 EKG 12-LEAD: ICD-10-PCS | Mod: ,,, | Performed by: INTERNAL MEDICINE

## 2019-01-08 PROCEDURE — 85025 COMPLETE CBC W/AUTO DIFF WBC: CPT

## 2019-01-08 PROCEDURE — 86850 RBC ANTIBODY SCREEN: CPT

## 2019-01-08 PROCEDURE — 99285 EMERGENCY DEPT VISIT HI MDM: CPT | Mod: 25

## 2019-01-08 PROCEDURE — 36415 COLL VENOUS BLD VENIPUNCTURE: CPT

## 2019-01-08 PROCEDURE — 20000000 HC ICU ROOM

## 2019-01-08 PROCEDURE — C9113 INJ PANTOPRAZOLE SODIUM, VIA: HCPCS | Performed by: INTERNAL MEDICINE

## 2019-01-08 PROCEDURE — 94761 N-INVAS EAR/PLS OXIMETRY MLT: CPT

## 2019-01-08 PROCEDURE — 63600175 PHARM REV CODE 636 W HCPCS: Performed by: INTERNAL MEDICINE

## 2019-01-08 PROCEDURE — 85610 PROTHROMBIN TIME: CPT

## 2019-01-08 PROCEDURE — 85007 BL SMEAR W/DIFF WBC COUNT: CPT

## 2019-01-08 PROCEDURE — 93005 ELECTROCARDIOGRAM TRACING: CPT

## 2019-01-08 PROCEDURE — 83690 ASSAY OF LIPASE: CPT

## 2019-01-08 PROCEDURE — 93010 ELECTROCARDIOGRAM REPORT: CPT | Mod: ,,, | Performed by: INTERNAL MEDICINE

## 2019-01-08 PROCEDURE — 80053 COMPREHEN METABOLIC PANEL: CPT

## 2019-01-08 PROCEDURE — 96374 THER/PROPH/DIAG INJ IV PUSH: CPT | Mod: 59

## 2019-01-08 RX ORDER — MORPHINE SULFATE 2 MG/ML
2 INJECTION, SOLUTION INTRAMUSCULAR; INTRAVENOUS
Status: COMPLETED | OUTPATIENT
Start: 2019-01-08 | End: 2019-01-08

## 2019-01-08 RX ORDER — ACETAMINOPHEN 325 MG/1
650 TABLET ORAL EVERY 8 HOURS PRN
Status: DISCONTINUED | OUTPATIENT
Start: 2019-01-08 | End: 2019-01-08

## 2019-01-08 RX ORDER — ONDANSETRON 2 MG/ML
8 INJECTION INTRAMUSCULAR; INTRAVENOUS
Status: DISCONTINUED | OUTPATIENT
Start: 2019-01-08 | End: 2019-01-08

## 2019-01-08 RX ORDER — PANTOPRAZOLE SODIUM 40 MG/10ML
40 INJECTION, POWDER, LYOPHILIZED, FOR SOLUTION INTRAVENOUS
Status: DISCONTINUED | OUTPATIENT
Start: 2019-01-08 | End: 2019-01-08

## 2019-01-08 RX ORDER — ONDANSETRON 2 MG/ML
INJECTION INTRAMUSCULAR; INTRAVENOUS
Status: DISPENSED
Start: 2019-01-08 | End: 2019-01-08

## 2019-01-08 RX ORDER — CYPROHEPTADINE HYDROCHLORIDE 4 MG/1
4 TABLET ORAL 2 TIMES DAILY PRN
COMMUNITY
End: 2019-04-04 | Stop reason: SDUPTHER

## 2019-01-08 RX ORDER — ONDANSETRON 2 MG/ML
4 INJECTION INTRAMUSCULAR; INTRAVENOUS EVERY 8 HOURS PRN
Status: DISCONTINUED | OUTPATIENT
Start: 2019-01-08 | End: 2019-01-11 | Stop reason: HOSPADM

## 2019-01-08 RX ORDER — ONDANSETRON 2 MG/ML
8 INJECTION INTRAMUSCULAR; INTRAVENOUS ONCE
Status: DISCONTINUED | OUTPATIENT
Start: 2019-01-08 | End: 2019-01-08

## 2019-01-08 RX ORDER — HYDROMORPHONE HYDROCHLORIDE 1 MG/ML
0.5 INJECTION, SOLUTION INTRAMUSCULAR; INTRAVENOUS; SUBCUTANEOUS ONCE
Status: COMPLETED | OUTPATIENT
Start: 2019-01-08 | End: 2019-01-08

## 2019-01-08 RX ORDER — SODIUM CHLORIDE 0.9 % (FLUSH) 0.9 %
5 SYRINGE (ML) INJECTION
Status: CANCELLED | OUTPATIENT
Start: 2019-01-08

## 2019-01-08 RX ORDER — PANTOPRAZOLE SODIUM 40 MG/10ML
80 INJECTION, POWDER, LYOPHILIZED, FOR SOLUTION INTRAVENOUS ONCE
Status: COMPLETED | OUTPATIENT
Start: 2019-01-08 | End: 2019-01-08

## 2019-01-08 RX ORDER — SODIUM CHLORIDE 0.9 % (FLUSH) 0.9 %
3 SYRINGE (ML) INJECTION
Status: DISCONTINUED | OUTPATIENT
Start: 2019-01-08 | End: 2019-01-11 | Stop reason: HOSPADM

## 2019-01-08 RX ORDER — ONDANSETRON 2 MG/ML
8 INJECTION INTRAMUSCULAR; INTRAVENOUS ONCE
Status: COMPLETED | OUTPATIENT
Start: 2019-01-08 | End: 2019-01-08

## 2019-01-08 RX ORDER — HYDROMORPHONE HYDROCHLORIDE 1 MG/ML
0.2 INJECTION, SOLUTION INTRAMUSCULAR; INTRAVENOUS; SUBCUTANEOUS EVERY 6 HOURS PRN
Status: DISCONTINUED | OUTPATIENT
Start: 2019-01-08 | End: 2019-01-09

## 2019-01-08 RX ORDER — ACETAMINOPHEN 325 MG/1
650 TABLET ORAL EVERY 8 HOURS PRN
Status: DISCONTINUED | OUTPATIENT
Start: 2019-01-08 | End: 2019-01-11 | Stop reason: HOSPADM

## 2019-01-08 RX ORDER — HYDROCODONE BITARTRATE AND ACETAMINOPHEN 500; 5 MG/1; MG/1
TABLET ORAL
Status: DISCONTINUED | OUTPATIENT
Start: 2019-01-08 | End: 2019-01-10

## 2019-01-08 RX ADMIN — HYDROMORPHONE HYDROCHLORIDE 0.2 MG: 1 INJECTION, SOLUTION INTRAMUSCULAR; INTRAVENOUS; SUBCUTANEOUS at 05:01

## 2019-01-08 RX ADMIN — CEFTRIAXONE 1 G: 1 INJECTION, SOLUTION INTRAVENOUS at 08:01

## 2019-01-08 RX ADMIN — PANTOPRAZOLE SODIUM 8 MG/HR: 40 INJECTION, POWDER, FOR SOLUTION INTRAVENOUS at 03:01

## 2019-01-08 RX ADMIN — PANTOPRAZOLE SODIUM 8 MG/HR: 40 INJECTION, POWDER, FOR SOLUTION INTRAVENOUS at 08:01

## 2019-01-08 RX ADMIN — PANTOPRAZOLE SODIUM 80 MG: 40 INJECTION, POWDER, FOR SOLUTION INTRAVENOUS at 08:01

## 2019-01-08 RX ADMIN — MORPHINE SULFATE 2 MG: 2 INJECTION, SOLUTION INTRAMUSCULAR; INTRAVENOUS at 08:01

## 2019-01-08 RX ADMIN — ONDANSETRON 8 MG: 2 INJECTION INTRAMUSCULAR; INTRAVENOUS at 09:01

## 2019-01-08 RX ADMIN — OCTREOTIDE ACETATE 50 MCG/HR: 500 INJECTION, SOLUTION INTRAVENOUS; SUBCUTANEOUS at 08:01

## 2019-01-08 RX ADMIN — HYDROMORPHONE HYDROCHLORIDE 0.5 MG: 1 INJECTION, SOLUTION INTRAMUSCULAR; INTRAVENOUS; SUBCUTANEOUS at 10:01

## 2019-01-08 RX ADMIN — SODIUM CHLORIDE 1000 ML: 0.9 INJECTION, SOLUTION INTRAVENOUS at 07:01

## 2019-01-08 RX ADMIN — ONDANSETRON 4 MG: 2 INJECTION INTRAMUSCULAR; INTRAVENOUS at 05:01

## 2019-01-08 NOTE — HPI
Ms. Marks is a 63/F with PMH alcoholic cirrhosis with grade II varices, h/o GI bleeding (s/p EGD 01/2018) who presented to ED 01/08 with a one-day history of bright red blood per rectum and fatigue. She states that she noted blood in her stool the morning of admission, but reports having had some darker stools closer to [place] as well. With worsening fatigue and blood noted, she presented to ED for further evaluation. Initially denied hematemesis but during CT in ED had coffee-ground emesis and subsequently true hematemesis as well. Labwork performed in ED demonstrated Hgb of 3.6. She was subsequently admitted for further evaluation.

## 2019-01-08 NOTE — ED NOTES
Pt transported to ICU with no change in pt status during transport. Pt transferred to ICU bed from ED stretcher by RNs x 4. Pt AAOx4, calm, cooperative, responding appropriately to questions, speaking in full sentences, respirations even and unlabored, skin pale but warm and dry, pt appears in NAD.

## 2019-01-08 NOTE — ED NOTES
Apparent incompatibility running octreotide and pantoprazole concurrently through the same IV per Trissepedro's. Dr. Taylor states to start octreotide IV since pt received pantoprazole IV push.

## 2019-01-08 NOTE — PROGRESS NOTES
Spoke with Dr Deluna, Was unaware of bright red emesis with clots from ED. MD stated to place NG tube. Orders for MRI and paracentesis are not STAT per Dr Deluna. No episodes of bleeding since pt arrived to unit.

## 2019-01-08 NOTE — CONSULTS
Reason for Consult:  Severe anemia, GI bleeding  HPI:  Pt is a 63 y.o. female with a h/o ETOH cirrhosis who presented just under one year ago with abdominal pain and anemia. She received blood and had an EGD by Dr. Urbina that showed Grade II EV. After her d/c she stopped drinking completely. Over the past few months she has developed worsening epigastric pain that radiates to her back. She has treated with extra strength Tylenol and rojelio seltzer. She lost weight and was down to 86 pounds until she started taking her cousins periactin. Her ain is worsened with eating.     She has been having more constipation than usual. She has had small, dark stool - not black/ melenic. Over the past two days she has noticed bright red blood associated with several bowel movements. She denies that it was large volume/ filling the toilet. She presented to the ED bc she was feeling increasingly weak and her abdominal pain was worsening.     She has no h/o colonoscopy- she thought she had one last year at the time of EGD.     Past Medical History:   Diagnosis Date    Cirrhosis        Past Surgical History:   Procedure Laterality Date    ESOPHAGOGASTRODUODENOSCOPY (EGD) N/A 1/26/2018    [facility]    PARACENTESIS N/A 1/30/2018    Performed by vEerett Fitzpatrick [facility] at [facility] CATH LAB    PARACENTESIS N/A 2/6/2015    Performed by Alejandro Myrick MD at [facility]       History reviewed. No pertinent family history.    Social History     Socioeconomic History    Marital status: Single     Spouse name: Not on file    Number of children: Not on file    Years of education: Not on file    Highest education level: Not on file   Social Needs    Financial resource strain: Not on file    Food insecurity - worry: Not on file    Food insecurity - inability: Not on file    Transportation needs - medical: Not on file    Transportation needs - non-medical: Not on file   Occupational History     Not on file   Tobacco Use    Smoking status: Current Every Day Smoker     Types: Cigarettes    Smokeless tobacco: Never Used    Tobacco comment: 2 cigarettes a day   Substance and Sexual Activity    Alcohol use: No     Frequency: Never     Comment: Previously drank 1 pint a day    Drug use: Yes     Types: Cocaine    Sexual activity: Not on file   Other Topics Concern    Not on file   Social History Narrative    Not on file       Endoscopic History:  As above    Review of patient's allergies indicates:  No Known Allergies    No current facility-administered medications on file prior to encounter.      Current Outpatient Medications on File Prior to Encounter   Medication Sig Dispense Refill    cholecalciferol, vitamin D3, 50,000 unit Tab Take 125 mcg by mouth.      cyproheptadine (PERIACTIN) 4 mg tablet Take 4 mg by mouth 3 (three) times daily as needed.      ferrous sulfate 325 mg (65 mg iron) Tab tablet Take 325 mg by mouth daily with breakfast.      furosemide (LASIX) 20 MG tablet Take 40 mg by mouth once daily.      multivitamin (ONE DAILY MULTIVITAMIN) per tablet Take 1 tablet by mouth once daily.      spironolactone (ALDACTONE) 100 MG tablet Take 1 tablet (100 mg total) by mouth once daily. 30 tablet 5    hydrOXYzine HCl (ATARAX) 10 MG Tab Take 25 mg by mouth once daily.       Scheduled Meds:   (pyxis) gi cocktail (mylanta 30 mL, lidocaine 2 % viscous 10 mL, dicyclomine 10 mL) 50 mL   Oral ED 1 Time     Continuous Infusions:   octreotide (SANDOSTATIN) infusion 50 mcg/hr (01/08/19 0850)    pantoprazole 40 mg in dextrose 5 % 100 mL infusion (ready to mix system)       PRN Meds:.sodium chloride    Review of Systems:  CONSTITUTIONAL: positive for weakness and  weight loss, no fever no weight gain.  HEENT: Eyes: Negative for double/blurred vision. Ears: Negative pain or loss of hearing. Nose:Negative for nasal congestion. Negative for rhinorrhea Mouth: Negative for dry mouth/pain Throat:  "Negative for masses or hoarseness.  CARDIOVASCULAR: Negative for chest pain or palpitations.  RESPIRATORY: Negative for SOB, wheezes  GASTROINTESTINAL: See HPI  GENITOURINARY: Negative for dysuria/hematuria.  MUSCULOSKELETAL: Negative for osteoarthritis, muscle pain.  SKIN: Negative for rashes/lesions.  NEUROLOGIC: Negative for headaches, numbness/tingling.  ENDOCRINE: Negative for diabetes or thyroid abnormalities.  HEMATOLOGIC: positive for anemia or blood dyscrasias.    Patient Vitals for the past 24 hrs:   BP Temp Temp src Pulse Resp SpO2 Height Weight   01/08/19 0858 -- -- -- 80 18 100 % -- --   01/08/19 0856 (!) 102/54 -- -- -- -- -- -- --   01/08/19 0853 (!) 102/54 98.5 °F (36.9 °C) Oral 90 20 100 % -- --   01/08/19 0837 -- -- -- 92 20 100 % -- --   01/08/19 0835 (!) 96/51 -- -- -- -- -- -- --   01/08/19 0834 (!) 96/51 97.3 °F (36.3 °C) Oral 93 19 100 % -- --   01/08/19 0816 -- -- -- 98 15 100 % -- --   01/08/19 0811 (!) 105/59 98.2 °F (36.8 °C) Oral 94 20 100 % -- --   01/08/19 0804 (!) 105/59 -- -- 98 -- 100 % -- --   01/08/19 0749 -- -- -- 98 -- 100 % -- --   01/08/19 0734 (!) 102/59 -- -- 92 -- 100 % -- --   01/08/19 0722 (!) 94/58 -- -- 92 -- 100 % -- --   01/08/19 0713 -- -- -- 92 -- 100 % -- --   01/08/19 0705 -- -- -- 96 -- 100 % -- --   01/08/19 0704 120/60 -- -- -- -- -- -- --   01/08/19 0657 (!) 86/51 99.2 °F (37.3 °C) Oral 93 18 100 % 4' 11" (1.499 m) 45.4 kg (100 lb)       Gen:Thin, frail appearing no apparent distress, cooperative  HEENT: icteric sclera, PERRLA, normocephalic, neck symmetrical  CV: S1, S2, no murmers/rubs, non-displaced PMI  Lungs: CTA-B, normal excursion  Abd: Soft, NT, ND, normal BS's+ hepatomegaly  Ext: No c/c/e, 1+ DP pulses to BLE's  Neuro: CN II-XII grossly intact, no asterixis.  Skin: No rashes/lesions, normal texture  Psych: AA&O x 4, normal affect    Labs:  Recent Labs   Lab 01/08/19  0712   CALCIUM 8.7   PROT 7.5   *   K 5.0   CO2 18*      BUN 38* "   CREATININE 2.6*   ALKPHOS 107   ALT 27   AST 59*   BILITOT 0.7     Recent Results (from the past 336 hour(s))   CBC auto differential    Collection Time: 01/08/19  7:12 AM   Result Value Ref Range    WBC 5.95 3.90 - 12.70 K/uL    Hemoglobin 3.6 (LL) 12.0 - 16.0 g/dL    Hematocrit 12.0 (LL) 37.0 - 48.5 %    Platelets 287 150 - 350 K/uL     Recent Labs   Lab 01/08/19  0712   INR 1.2       Imaging:    Impression       Nodular contour to the liver in keeping with cirrhosis.  There is suggestion of a new large mass in the posterior right hepatic lobe, concerning for malignancy (HCC).  This is not well assessed on a noncontrast exam, recommend a triple phase liver protocol CT or MRI for further characterization.    Moderate volume of ascites, although notably decreased relative to the prior study of 01/25/2018.  Associated mesenteric and omental edema.     Mild colonic diverticulosis.    Cholelithiasis.     Assessment/ Recommendation  Pt. Is a 63 y.o. female with ETOH cirrhosis complicated by ascites and esophageal varices presents with profound anemia, abdominal pain with hepatic mass and renal insufficiency.    Doubt GI bleeding is variceal- she is too HD stable, and her blood loss would be much more ashwini and large volume. Agree however with octreotide and PPI for now. Will plan EGD once she is more stable    Recommend paracentesis and agree with Rocephin    MRI abd to eval hepatic mass. Will add AFP since we have a baseline from ~ 1 year ago      I would like to take this opportunity to thank you for this consult.  If you have any questions or concerns, please do not hesitate to contact me.

## 2019-01-08 NOTE — ED NOTES
Pt vomiting dark red blood with clots, approx 100ml, MD to bedside. Pt placed in high Bullard's position.

## 2019-01-08 NOTE — ED NOTES
Pt connected to portable cardiac, continuous pulse ox and BP monitoring for transport to ICU. Defib pads, ambu bag and portable suction available on stretcher. Pt is AAOx4, respirations 20 even and unlabored with equal bilateral chest expansion, skin warm and dry. Pt appears ill but in NAD at this time. Side rails x 2. Pt is ready for transport to ICU.

## 2019-01-08 NOTE — ED NOTES
Pt updated on POC, pending lab work, need for urine specimen, pt states she cannot void at this time. Pt verbalized understanding of POC and need for urine sepcimen. Blankets provided for comfort. Pt is connected to cardiac, continuous pulse ox and BP monitoring. Bed locked and in lowest position, side rails x 2, call light in reach. WCTM.

## 2019-01-08 NOTE — H&P
[facility] Medicine  History & Physical    Patient Name: Neena Marks  MRN: [id number]  Admission Date: 1/8/2019  Attending Physician: EVAN Taylor MD  Primary Care Provider: [facility]         Patient information was obtained from patient, past medical records and ER records.     Subjective:     Principal Problem:Acute GI bleeding    Chief Complaint:   Chief Complaint   Patient presents with    Rectal Bleeding     Per [facility] pt reports + intermittent constipation x several days w/ bright red rectal bleeding new onst last PM. Denies fatigue, weakness or dizziness.        HPI: Ms. Marks is a 63/F with PMH alcoholic cirrhosis with grade II varices, h/o GI bleeding (s/p EGD 01/2018) who presented to ED 01/08 with a one-day history of bright red blood per rectum and fatigue. She states that she noted blood in her stool the morning of admission, but reports having had some darker stools closer to [place] as well. With worsening fatigue and blood noted, she presented to ED for further evaluation. Initially denied hematemesis but during CT in ED had coffee-ground emesis and subsequently true hematemesis as well. Labwork performed in ED demonstrated Hgb of 3.6. She was subsequently admitted for further evaluation.    Past Medical History:   Diagnosis Date    Cirrhosis      Past Surgical History:   Procedure Laterality Date    ESOPHAGOGASTRODUODENOSCOPY (EGD) N/A 1/26/2018    Performed by [facility]ina [facility] at [facility] ENDO    PARACENTESIS N/A 1/30/2018    Performed by Everett Fitzpatrick MD at [facility] CATH LAB    PARACENTESIS N/A 2/6/2015    Performed by Alejandro Myrick MD at [facility] CATH LAB     Review of patient's allergies indicates:  No Known Allergies    No current facility-administered medications on file prior to encounter.      Current Outpatient Medications on File Prior to Encounter   Medication Sig    cholecalciferol, vitamin D3, 50,000 unit Tab Take 125  mcg by mouth.    cyproheptadine (PERIACTIN) 4 mg tablet Take 4 mg by mouth 3 (three) times daily as needed.    ferrous sulfate 325 mg (65 mg iron) Tab tablet Take 325 mg by mouth daily with breakfast.    furosemide (LASIX) 20 MG tablet Take 40 mg by mouth once daily.    multivitamin (ONE DAILY MULTIVITAMIN) per tablet Take 1 tablet by mouth once daily.    spironolactone (ALDACTONE) 100 MG tablet Take 1 tablet (100 mg total) by mouth once daily.    hydrOXYzine HCl (ATARAX) 10 MG Tab Take 25 mg by mouth once daily.     Family History     Noncontributory        Tobacco Use    Smoking status: Current Every Day Smoker     Types: Cigarettes    Smokeless tobacco: Never Used    Tobacco comment: 2 cigarettes a day   Substance and Sexual Activity    Alcohol use: No     Frequency: Never     Comment: Previously drank 1 pint a day    Drug use: Yes     Types: Cocaine    Sexual activity: Not on file     Review of Systems   Constitutional: Positive for fatigue. Negative for chills and fever.   HENT: Negative for sore throat and trouble swallowing.    Eyes: Negative for pain and visual disturbance.   Respiratory: Negative for cough and shortness of breath.    Cardiovascular: Negative for chest pain and palpitations.   Gastrointestinal: Positive for abdominal distention, abdominal pain, blood in stool, nausea and vomiting.   Genitourinary: Negative for difficulty urinating and dysuria.   Musculoskeletal: Negative for arthralgias and myalgias.   Skin: Negative for rash and wound.   Neurological: Negative for weakness and numbness.     Objective:     Vital Signs (Most Recent):  Temp: 98.3 °F (36.8 °C) (01/08/19 1315)  Pulse: 74 (01/08/19 1530)  Resp: 16 (01/08/19 1530)  BP: 135/69 (01/08/19 1500)  SpO2: 97 % (01/08/19 1530) Vital Signs (24h Range):  Temp:  [97.3 °F (36.3 °C)-99.2 °F (37.3 °C)] 98.3 °F (36.8 °C)  Pulse:  [74-98] 74  Resp:  [13-34] 16  SpO2:  [96 %-100 %] 97 %  BP: ()/(51-78) 135/69     Weight: 45.4 kg  (100 lb)  Body mass index is 20.2 kg/m².    Physical Exam   Constitutional: She is oriented to person, place, and time. She appears well-developed and well-nourished. No distress.   Chronically ill-appearing. Thin.   HENT:   Head: Normocephalic and atraumatic.   Eyes: Conjunctivae and EOM are normal.   Cardiovascular: Normal rate, regular rhythm, S1 normal, S2 normal and intact distal pulses.   Pulmonary/Chest: Effort normal and breath sounds normal.   Abdominal: Soft. She exhibits no distension. Bowel sounds are increased. There is tenderness.   Musculoskeletal: Normal range of motion. She exhibits no edema.   Neurological: She is alert and oriented to person, place, and time.   Skin: Skin is warm and dry.   Nursing note and vitals reviewed.     Significant Labs:   CBC:  Recent Labs   Lab 01/08/19  0712 01/08/19  1136   WBC 5.95 8.52   HGB 3.6* 5.5*   HCT 12.0* 17.2*    224   GRAN 69.3  4.1 73.8*  6.3   LYMPH 24.5  1.5 18.3  1.6   MONO 6.2  0.4 7.9  0.7   EOS 0.0 0.0   BASO 0.00 0.00     CMP:  Recent Labs   Lab 01/08/19  0712   *   K 5.0      CO2 18*   BUN 38*   CREATININE 2.6*   *   CALCIUM 8.7   MG 2.3   ALKPHOS 107   AST 59*   ALT 27   BILITOT 0.7   PROT 7.5   ALBUMIN 2.8*   ANIONGAP 14     Significant Imaging:   CT Abd/Pelvis WO Contrast 01/08/19:  Nodular contour to the liver in keeping with cirrhosis.  There is suggestion of a new large mass in the posterior right hepatic lobe, concerning for malignancy (HCC).  This is not well assessed on a noncontrast exam, recommend a triple phase liver protocol CT or MRI for further characterization. Moderate volume of ascites, although notably decreased relative to the prior study of 01/25/2018. Associated mesenteric and omental edema. Mild colonic diverticulosis. Cholelithiasis.    CXR 01/08/19:  Multiple overlying cardiac monitoring leads. The cardiomediastinal silhouette is normal in size and midline. Pulmonary vascularity appears  within normal limits. The lungs appear clear without confluent pulmonary parenchymal opacity. No pleural fluid or pneumothorax. Mild leftward curvature of the thoracic spine.    Assessment/Plan:     * Acute GI bleeding    - Acute GI bleeding, suspect from upper source. History of varices on prior endoscopy; may be bleeding from varices, giovanni. with hematemesis as well.  - Given significantly decreased Hgb suspect bleeding has been chronic and persistent up until now.  - Transfusing 3U pRBCs in ED; prepare additional 2U pRBCs and trend CBC q8hr.  - Give pantoprazole 80mg IV once in ED, continue with pantoprazole gtt; start octreotide gtt given h/o varices.  - Continuing ceftriaxone 1g IV q24hr as SBP prophylaxis in the setting of suspected variceal bleed.  - GI consult for potential endoscopy.     Esophageal varices in alcoholic cirrhosis    - As under GIB.     Alcoholic cirrhosis    - Cirrhosis with h/o alcohol abuse.  - CT performed in ED demonstrating large mass in posterior R hepatic lobe concerning for HCC.  - Pending MRI to further evaluate.  - Appreciate GI assistance.     PRACHI (acute kidney injury)    - PRACHI suspect secondary to blood loss anemia in the setting of GI bleed.  - Transfusion as above; monitor response.     Acute blood loss anemia    - As under GIB.       VTE Risk Mitigation (From admission, onward)        Ordered     IP VTE LOW RISK PATIENT  Once      01/08/19 1015     Place sequential compression device  Until discontinued      01/08/19 1015        Critical care time spent on the evaluation and treatment of severe organ dysfunction, review of pertinent labs and imaging studies, discussions with consulting providers and discussions with patient/family: 45 minutes.    LIANA Osullivan MD  Department of Hospital Medicine   [facility], [place], operated by [facility]  [phone]

## 2019-01-08 NOTE — ED PROVIDER NOTES
Encounter Date: 1/8/2019    SCRIBE #1 NOTE: I, Zain Plata, am scribing for, and in the presence of, Dr. Garcia .       History     Chief Complaint   Patient presents with    Rectal Bleeding     Per [facility] pt reports + intermittent constipation x several days w/ bright red rectal bleeding new onst last PM. Denies fatigue, weakness or dizziness.     Time seen by provider: 7:05 AM    This is a 63 y.o. female, with history of alcoholic cirrhosis and GI bleed, who presents via EMS with complaint of worsening, chronic, abdominal pain that began at 1:30 AM this morning. She notes the pain radiates to her back. She has complied with Tylenol (1000 mg a day) with temporary pain relief. Patient has history of constipation, and states she has been constipated for a few days. She notes her stools have been small, hard, droplets. Patient reports bright, red, stools while having a bowel movement last night. She reports six bowel movements since last night. She has been experiencing generalized weakness. She reportedly underwent an endoscopy one year ago and there were no acute findings. She has not been experiencing fevers, chills, fatigue, dizziness, light-headedness, congestion, rhinorrhea, sore throat, cough, SOB, chest pain, nausea, vomiting, diarrhea, dysuria, urinary frequency, or urinary urgency. She denies use of alcohol.       The history is provided by the patient.     Review of patient's allergies indicates:  No Known Allergies  Past Medical History:   Diagnosis Date    Cirrhosis      Past Surgical History:   Procedure Laterality Date    ESOPHAGOGASTRODUODENOSCOPY (EGD) N/A 1/26/2018    Performed by Mark Urbina MD at [facility] ENDO    PARACENTESIS N/A 1/30/2018    Performed by Everett Fitzpatrick MD at [facility] CATH LAB    PARACENTESIS N/A 2/6/2015    Performed by Alejandro Myrick MD at [facility] CATH LAB     History reviewed. No pertinent family history.  Social History     Tobacco Use    Smoking status:  Current Every Day Smoker     Types: Cigarettes    Smokeless tobacco: Never Used    Tobacco comment: 2 cigarettes a day   Substance Use Topics    Alcohol use: No     Frequency: Never     Comment: Previously drank 1 pint a day    Drug use: Yes     Types: Cocaine     Review of Systems   Constitutional: Negative for chills and fever.   HENT: Negative for congestion, rhinorrhea and sore throat.    Respiratory: Negative for cough and shortness of breath.    Cardiovascular: Negative for chest pain.   Gastrointestinal: Positive for abdominal pain, blood in stool and constipation. Negative for nausea and vomiting.   Genitourinary: Negative for dysuria, frequency and urgency.   Musculoskeletal: Positive for back pain.   Skin: Negative for rash.   Neurological: Positive for weakness. Negative for dizziness and light-headedness.   Psychiatric/Behavioral: Negative for confusion.       Physical Exam     Initial Vitals [01/08/19 0657]   BP Pulse Resp Temp SpO2   (!) 86/51 93 18 99.2 °F (37.3 °C) 100 %      MAP       --         Physical Exam    Nursing note and vitals reviewed.  Constitutional: She appears well-developed. No distress.   Chronically ill appearing. Mild temporal wasting.    HENT:   Head: Normocephalic and atraumatic.   Eyes: EOM are normal. Pupils are equal, round, and reactive to light. Scleral icterus is present.   Mild scleral icterus present.    Neck: Normal range of motion. Neck supple.   Cardiovascular: Regular rhythm, normal heart sounds and intact distal pulses. Tachycardia present.    Abdominal: Soft. She exhibits distension. There is tenderness. There is no rebound and no guarding.   Mild abdominal distension. Mild epigastric tenderness present. No fluid wave.    Genitourinary: Rectal exam shows guaiac positive stool. Guaiac positive stool. : Acceptable.  Genitourinary Comments: Mucoid stool present on rectal exam. Chaperone present.    Musculoskeletal: Normal range of motion. She  exhibits no edema.   No lower extremity edema.    Neurological: She is alert and oriented to person, place, and time. She has normal strength. No cranial nerve deficit.   Skin: Skin is warm and dry.   Psychiatric: She has a normal mood and affect. Her behavior is normal. Judgment and thought content normal.         ED Course   Critical Care  Date/Time: 1/8/2019 8:02 AM  Performed by: Lynn Garcia MD  Authorized by: Lynn Garcia MD   Direct patient critical care time: 25 minutes  Additional history critical care time: 5 minutes  Ordering / reviewing critical care time: 5 minutes  Documentation critical care time: 5 minutes  Consulting other physicians critical care time: 5 minutes  Total critical care time (exclusive of procedural time) : 45 minutes  Critical care time was exclusive of separately billable procedures and treating other patients.  Critical care was necessary to treat or prevent imminent or life-threatening deterioration of the following conditions: renal failure (GI bleed).  Critical care was time spent personally by me on the following activities: development of treatment plan with patient or surrogate, discussions with consultants, interpretation of cardiac output measurements, evaluation of patient's response to treatment, examination of patient, obtaining history from patient or surrogate, ordering and performing treatments and interventions, ordering and review of laboratory studies, ordering and review of radiographic studies, re-evaluation of patient's condition and review of old charts.        Labs Reviewed   CBC W/ AUTO DIFFERENTIAL - Abnormal; Notable for the following components:       Result Value    RBC 2.08 (*)     Hemoglobin 3.6 (*)     Hematocrit 12.0 (*)     MCV 58 (*)     MCH 17.3 (*)     MCHC 30.0 (*)     RDW 20.1 (*)     All other components within normal limits    Narrative:       H&H critical result(s) called and verbal readback obtained from   Val Apodaca RN,  01/08/2019 07:46   COMPREHENSIVE METABOLIC PANEL - Abnormal; Notable for the following components:    Sodium 135 (*)     CO2 18 (*)     Glucose 138 (*)     BUN, Bld 38 (*)     Creatinine 2.6 (*)     Albumin 2.8 (*)     AST 59 (*)     eGFR if  22 (*)     eGFR if non  19 (*)     All other components within normal limits   PROTIME-INR - Abnormal; Notable for the following components:    Prothrombin Time 13.8 (*)     All other components within normal limits   LIPASE - Abnormal; Notable for the following components:    Lipase 83 (*)     All other components within normal limits   MAGNESIUM   LIPASE   URINALYSIS, REFLEX TO URINE CULTURE   TYPE & SCREEN (MANUAL)   TYPE & SCREEN   PREPARE RBC SOFT   PREPARE RBC SOFT     EKG Readings: (Independently Interpreted)   Initial Reading: No STEMI.   NSR at a rate of 89. Normal axis. Narrow QRS.        Imaging Results          X-Ray Chest 1 View (Final result)  Result time 01/08/19 09:13:47    Final result by Josafat Romero MD (01/08/19 09:13:47)                 Impression:      No evidence of active cardiopulmonary disease.      Electronically signed by: Josafat Romero MD  Date:    01/08/2019  Time:    09:13             Narrative:    EXAMINATION:  XR CHEST 1 VIEW    CLINICAL HISTORY:  Unspecified abdominal pain    TECHNIQUE:  Frontal view of the chest was performed.    COMPARISON:  01/30/2018    FINDINGS:  Multiple overlying cardiac monitoring leads.  Right PICC line has been removed.    The cardiomediastinal silhouette is normal in size and midline. Pulmonary vascularity appears within normal limits.    The lungs appear clear without confluent pulmonary parenchymal opacity. No pleural fluid or pneumothorax.    Mild leftward curvature of the thoracic spine.                               CT Abdomen Pelvis  Without Contrast (Final result)     Abnormal  Result time 01/08/19 08:52:31    Final result by Josafat Romero MD (01/08/19 08:52:31)                  Impression:      Nodular contour to the liver in keeping with cirrhosis.  There is suggestion of a new large mass in the posterior right hepatic lobe, concerning for malignancy (HCC).  This is not well assessed on a noncontrast exam, recommend a triple phase liver protocol CT or MRI for further characterization.    Moderate volume of ascites, although notably decreased relative to the prior study of 01/25/2018.  Associated mesenteric and omental edema.  In    Mild colonic diverticulosis.    Cholelithiasis.    This report was flagged in Epic as abnormal.      Electronically signed by: Josafat Romero MD  Date:    01/08/2019  Time:    08:52             Narrative:    EXAMINATION:  CT ABDOMEN PELVIS WITHOUT CONTRAST    CLINICAL HISTORY:  Abd swelling, ascites suspected;    TECHNIQUE:  Low dose axial CT images obtained throughout the region of the abdomen and pelvis without the use of intravenous contrast.  Axial, sagittal and coronal reconstructions were performed.    COMPARISON:  01/25/2018    FINDINGS:  Lung bases are clear and the visualized portions of the heart and mediastinum are unremarkable.    Nodular contour to the liver, in keeping with cirrhosis.  Contours change since the prior exam, with suggestion of a large mass in the posterior inferior right hepatic lobe measuring over 8 cm.    Multiple gallstones.  Gallbladder not distended.  No biliary dilatation.  The spleen, pancreas and adrenal glands are unremarkable.    Kidneys appear normal. The ureters are decompressed. Urinary bladder, uterus and adnexal structures unremarkable.    The stomach, small bowel and colon demonstrate no evidence of obstruction or focal inflammation.  Mild colonic diverticulosis.    No free air.  Moderate volume of ascites, although notably decreased relative to the prior exam.  There is also mesenteric edema in some edema/nodularity in omentum.    Aorta tapers normally throughout its visualized course.  Scattered  atherosclerotic calcification.    Osseous structures exhibit mild degenerative changes. No fracture or focal osseous destructive lesion.                                 Medical Decision Making:   Initial Assessment:   Urgent evaluation of 63-year-old female with history of alcoholic cirrhosis here with complaints of bloody bowel movements overnight in setting of recent constipation.  Per epic review, patient was admitted here 1 year previously for symptomatic anemia after gi bleed, received rbc transfusion and endoscopy w esophageal varices without active bleeding, paracentesis performed and neg for SBP at that time as well. Pt reportedly follows with [facility] and nml last colonoscopy.  Initial sbp 80s, now 120/60, exam w mild scleral icterus, nml mental status, dry mm, mild abdominal distention and tenderness without gaurding or rebound, and guaic + stool. Pt is not encephalopathis at this time, and I have a low suspicion for SBP, but given hx will eval for lgib, metabolic disturbances, consider diverticulitis, anemia with labs, admin ivf and reassess.     Clinical Tests:   Lab Tests: Ordered and Reviewed  Radiological Study: Ordered and Reviewed  Medical Tests: Ordered and Reviewed  ED Management:  Labs notable for profound anemia H&H 3.6/12 from hgb 10 within last year.  Acute renal insufficiency creatinine 2.6, BUN 38, bicarb 18, INR 1.2. Will plan to transfuse patient, consult GI, admin empiric abx, PPI, place NPO and admit.     8:00 AM Case discussed with Dr. Taylor (Hospitalist). Will admit the patient to the ICU.   8:41 AM  Discussed w Dr Mcdonald- SCARLETT made aware of consult.   Seen at bedside  9:21 AM  Pt had  200 cc bright red blood emesis sp CT, currently receiving blood products w 3 units ordered in ED. Octroetide, zofran ordered. Endorsed update to Dr Taylor.             Scribe Attestation:   Scribe #1: I performed the above scribed service and the documentation accurately describes the services I  performed. I attest to the accuracy of the note.    Attending Attestation:           Physician Attestation for Scribe:  Physician Attestation Statement for Scribe #1: I, Dr. Garcia, reviewed documentation, as scribed by Zain Plata  in my presence, and it is both accurate and complete.                    Clinical Impression:     1. Anemia, unspecified type    2. Rectal bleeding    3. Renal insufficiency    4. Acute lower GI bleeding    5. Abdominal pain            Disposition:   Disposition: Admitted  Condition: Critical                        Lynn Garcia MD  01/08/19 2625

## 2019-01-09 ENCOUNTER — ANESTHESIA (OUTPATIENT)
Dept: ENDOSCOPY | Facility: OTHER | Age: 64
DRG: 441 | End: 2019-01-09
Payer: MEDICAID

## 2019-01-09 PROBLEM — D64.9 ANEMIA: Status: ACTIVE | Noted: 2019-01-09

## 2019-01-09 LAB
AFP SERPL-MCNC: 5.5 NG/ML
ALBUMIN FLD-MCNC: 1.2 G/DL
ALBUMIN SERPL BCP-MCNC: 2.8 G/DL
ALP SERPL-CCNC: 93 U/L
ALT SERPL W/O P-5'-P-CCNC: 29 U/L
ANION GAP SERPL CALC-SCNC: 8 MMOL/L
ANISOCYTOSIS BLD QL SMEAR: ABNORMAL
ANISOCYTOSIS BLD QL SMEAR: SLIGHT
ANISOCYTOSIS BLD QL SMEAR: SLIGHT
APPEARANCE FLD: NORMAL
AST SERPL-CCNC: 59 U/L
BASOPHILS # BLD AUTO: 0.03 K/UL
BASOPHILS # BLD AUTO: 0.04 K/UL
BASOPHILS # BLD AUTO: ABNORMAL K/UL
BASOPHILS NFR BLD: 0 %
BASOPHILS NFR BLD: 0.2 %
BASOPHILS NFR BLD: 0.3 %
BILIRUB SERPL-MCNC: 1.5 MG/DL
BODY FLD TYPE: NORMAL
BUN SERPL-MCNC: 40 MG/DL
CALCIUM SERPL-MCNC: 8.5 MG/DL
CHLORIDE SERPL-SCNC: 106 MMOL/L
CO2 SERPL-SCNC: 22 MMOL/L
COLOR FLD: YELLOW
CREAT SERPL-MCNC: 2 MG/DL
DIFFERENTIAL METHOD: ABNORMAL
EOSINOPHIL # BLD AUTO: 0.1 K/UL
EOSINOPHIL # BLD AUTO: 0.1 K/UL
EOSINOPHIL # BLD AUTO: ABNORMAL K/UL
EOSINOPHIL NFR BLD: 0 %
EOSINOPHIL NFR BLD: 0.7 %
EOSINOPHIL NFR BLD: 0.7 %
ERYTHROCYTE [DISTWIDTH] IN BLOOD BY AUTOMATED COUNT: 26.2 %
ERYTHROCYTE [DISTWIDTH] IN BLOOD BY AUTOMATED COUNT: 26.5 %
ERYTHROCYTE [DISTWIDTH] IN BLOOD BY AUTOMATED COUNT: 26.8 %
EST. GFR  (AFRICAN AMERICAN): 30 ML/MIN/1.73 M^2
EST. GFR  (NON AFRICAN AMERICAN): 26 ML/MIN/1.73 M^2
GIANT PLATELETS BLD QL SMEAR: PRESENT
GLUCOSE SERPL-MCNC: 116 MG/DL
GRAM STN SPEC: NORMAL
HCT VFR BLD AUTO: 29.6 %
HCT VFR BLD AUTO: 30.8 %
HCT VFR BLD AUTO: 31.4 %
HGB BLD-MCNC: 10 G/DL
HGB BLD-MCNC: 10.3 G/DL
HGB BLD-MCNC: 10.4 G/DL
HYPOCHROMIA BLD QL SMEAR: ABNORMAL
LYMPHOCYTES # BLD AUTO: 2.5 K/UL
LYMPHOCYTES # BLD AUTO: 2.9 K/UL
LYMPHOCYTES # BLD AUTO: ABNORMAL K/UL
LYMPHOCYTES NFR BLD: 20 %
LYMPHOCYTES NFR BLD: 20 %
LYMPHOCYTES NFR BLD: 20.5 %
LYMPHOCYTES NFR FLD MANUAL: 5 %
MCH RBC QN AUTO: 24 PG
MCH RBC QN AUTO: 24 PG
MCH RBC QN AUTO: 24.2 PG
MCHC RBC AUTO-ENTMCNC: 33.1 G/DL
MCHC RBC AUTO-ENTMCNC: 33.4 G/DL
MCHC RBC AUTO-ENTMCNC: 33.8 G/DL
MCV RBC AUTO: 72 FL
MESOTHL CELL NFR FLD MANUAL: 92 %
MONOCYTES # BLD AUTO: 1.1 K/UL
MONOCYTES # BLD AUTO: 1.5 K/UL
MONOCYTES # BLD AUTO: ABNORMAL K/UL
MONOCYTES NFR BLD: 10.9 %
MONOCYTES NFR BLD: 3 %
MONOCYTES NFR BLD: 8.8 %
MONOS+MACROS NFR FLD MANUAL: 2 %
MYELOCYTES NFR BLD MANUAL: 2 %
NEUTROPHILS # BLD AUTO: 8.6 K/UL
NEUTROPHILS # BLD AUTO: 9.3 K/UL
NEUTROPHILS # BLD AUTO: ABNORMAL K/UL
NEUTROPHILS NFR BLD: 67.6 %
NEUTROPHILS NFR BLD: 70.3 %
NEUTROPHILS NFR BLD: 73 %
NEUTROPHILS NFR FLD MANUAL: 1 %
NEUTS BAND NFR BLD MANUAL: 2 %
NRBC BLD-RTO: 3 /100 WBC
PLATELET # BLD AUTO: 194 K/UL
PLATELET # BLD AUTO: 209 K/UL
PLATELET # BLD AUTO: 216 K/UL
PLATELET BLD QL SMEAR: ABNORMAL
PMV BLD AUTO: ABNORMAL FL
POIKILOCYTOSIS BLD QL SMEAR: ABNORMAL
POIKILOCYTOSIS BLD QL SMEAR: ABNORMAL
POLYCHROMASIA BLD QL SMEAR: ABNORMAL
POTASSIUM SERPL-SCNC: 5 MMOL/L
PROT FLD-MCNC: 2.6 G/DL
PROT SERPL-MCNC: 7.3 G/DL
RBC # BLD AUTO: 4.14 M/UL
RBC # BLD AUTO: 4.3 M/UL
RBC # BLD AUTO: 4.34 M/UL
SCHISTOCYTES BLD QL SMEAR: ABNORMAL
SCHISTOCYTES BLD QL SMEAR: ABNORMAL
SCHISTOCYTES BLD QL SMEAR: PRESENT
SCHISTOCYTES BLD QL SMEAR: PRESENT
SMUDGE CELLS BLD QL SMEAR: PRESENT
SMUDGE CELLS BLD QL SMEAR: PRESENT
SODIUM SERPL-SCNC: 136 MMOL/L
SPECIMEN SOURCE: NORMAL
SPECIMEN SOURCE: NORMAL
TARGETS BLD QL SMEAR: ABNORMAL
TARGETS BLD QL SMEAR: ABNORMAL
WBC # BLD AUTO: 11.47 K/UL
WBC # BLD AUTO: 12.28 K/UL
WBC # BLD AUTO: 14.08 K/UL
WBC # FLD: 159 /CU MM

## 2019-01-09 PROCEDURE — 63600175 PHARM REV CODE 636 W HCPCS: Performed by: INTERNAL MEDICINE

## 2019-01-09 PROCEDURE — 37000009 HC ANESTHESIA EA ADD 15 MINS: Performed by: INTERNAL MEDICINE

## 2019-01-09 PROCEDURE — 85027 COMPLETE CBC AUTOMATED: CPT

## 2019-01-09 PROCEDURE — 25000003 PHARM REV CODE 250: Performed by: INTERNAL MEDICINE

## 2019-01-09 PROCEDURE — 87070 CULTURE OTHR SPECIMN AEROBIC: CPT

## 2019-01-09 PROCEDURE — 36415 COLL VENOUS BLD VENIPUNCTURE: CPT

## 2019-01-09 PROCEDURE — 25000003 PHARM REV CODE 250: Performed by: EMERGENCY MEDICINE

## 2019-01-09 PROCEDURE — 99233 SBSQ HOSP IP/OBS HIGH 50: CPT | Mod: ,,, | Performed by: INTERNAL MEDICINE

## 2019-01-09 PROCEDURE — 27201022: Performed by: INTERNAL MEDICINE

## 2019-01-09 PROCEDURE — 99900035 HC TECH TIME PER 15 MIN (STAT)

## 2019-01-09 PROCEDURE — 63600175 PHARM REV CODE 636 W HCPCS: Performed by: EMERGENCY MEDICINE

## 2019-01-09 PROCEDURE — C9113 INJ PANTOPRAZOLE SODIUM, VIA: HCPCS | Performed by: INTERNAL MEDICINE

## 2019-01-09 PROCEDURE — 80053 COMPREHEN METABOLIC PANEL: CPT

## 2019-01-09 PROCEDURE — 99233 PR SUBSEQUENT HOSPITAL CARE,LEVL III: ICD-10-PCS | Mod: ,,, | Performed by: INTERNAL MEDICINE

## 2019-01-09 PROCEDURE — 27202087 HC PROBE, APC: Performed by: INTERNAL MEDICINE

## 2019-01-09 PROCEDURE — 84157 ASSAY OF PROTEIN OTHER: CPT

## 2019-01-09 PROCEDURE — 82042 OTHER SOURCE ALBUMIN QUAN EA: CPT

## 2019-01-09 PROCEDURE — 27000221 HC OXYGEN, UP TO 24 HOURS

## 2019-01-09 PROCEDURE — 63600175 PHARM REV CODE 636 W HCPCS: Performed by: NURSE ANESTHETIST, CERTIFIED REGISTERED

## 2019-01-09 PROCEDURE — 37000008 HC ANESTHESIA 1ST 15 MINUTES: Performed by: INTERNAL MEDICINE

## 2019-01-09 PROCEDURE — 25000003 PHARM REV CODE 250: Performed by: NURSE ANESTHETIST, CERTIFIED REGISTERED

## 2019-01-09 PROCEDURE — 89051 BODY FLUID CELL COUNT: CPT

## 2019-01-09 PROCEDURE — 85025 COMPLETE CBC W/AUTO DIFF WBC: CPT | Mod: 91

## 2019-01-09 PROCEDURE — 43255 EGD CONTROL BLEEDING ANY: CPT | Performed by: INTERNAL MEDICINE

## 2019-01-09 PROCEDURE — 20000000 HC ICU ROOM

## 2019-01-09 PROCEDURE — 63600175 PHARM REV CODE 636 W HCPCS: Performed by: PHYSICIAN ASSISTANT

## 2019-01-09 PROCEDURE — 85007 BL SMEAR W/DIFF WBC COUNT: CPT

## 2019-01-09 PROCEDURE — 94761 N-INVAS EAR/PLS OXIMETRY MLT: CPT

## 2019-01-09 PROCEDURE — 87205 SMEAR GRAM STAIN: CPT

## 2019-01-09 RX ORDER — HYDROMORPHONE HYDROCHLORIDE 1 MG/ML
0.5 INJECTION, SOLUTION INTRAMUSCULAR; INTRAVENOUS; SUBCUTANEOUS ONCE
Status: COMPLETED | OUTPATIENT
Start: 2019-01-09 | End: 2019-01-09

## 2019-01-09 RX ORDER — ACETAMINOPHEN 500 MG
1000 TABLET ORAL
Status: ON HOLD | COMMUNITY
End: 2019-01-11 | Stop reason: HOSPADM

## 2019-01-09 RX ORDER — FENTANYL CITRATE 50 UG/ML
25 INJECTION, SOLUTION INTRAMUSCULAR; INTRAVENOUS EVERY 5 MIN PRN
Status: DISCONTINUED | OUTPATIENT
Start: 2019-01-09 | End: 2019-01-09

## 2019-01-09 RX ORDER — MELOXICAM 15 MG/1
15 TABLET ORAL NIGHTLY PRN
COMMUNITY
End: 2019-09-27

## 2019-01-09 RX ORDER — ONDANSETRON 2 MG/ML
4 INJECTION INTRAMUSCULAR; INTRAVENOUS DAILY PRN
Status: DISCONTINUED | OUTPATIENT
Start: 2019-01-09 | End: 2019-01-09

## 2019-01-09 RX ORDER — CYCLOBENZAPRINE HCL 10 MG
10 TABLET ORAL NIGHTLY PRN
COMMUNITY
End: 2019-08-28

## 2019-01-09 RX ORDER — HYDROMORPHONE HYDROCHLORIDE 1 MG/ML
0.5 INJECTION, SOLUTION INTRAMUSCULAR; INTRAVENOUS; SUBCUTANEOUS EVERY 6 HOURS PRN
Status: DISCONTINUED | OUTPATIENT
Start: 2019-01-09 | End: 2019-01-11 | Stop reason: HOSPADM

## 2019-01-09 RX ORDER — LACTULOSE 10 G/15ML
10 SOLUTION ORAL 2 TIMES DAILY
Status: ON HOLD | COMMUNITY
End: 2019-11-03 | Stop reason: SDUPTHER

## 2019-01-09 RX ORDER — PROPOFOL 10 MG/ML
VIAL (ML) INTRAVENOUS
Status: DISCONTINUED | OUTPATIENT
Start: 2019-01-09 | End: 2019-01-09

## 2019-01-09 RX ORDER — SODIUM CHLORIDE 0.9 % (FLUSH) 0.9 %
3 SYRINGE (ML) INJECTION
Status: DISCONTINUED | OUTPATIENT
Start: 2019-01-09 | End: 2019-01-09

## 2019-01-09 RX ORDER — OXYCODONE HYDROCHLORIDE 5 MG/1
5 TABLET ORAL
Status: DISCONTINUED | OUTPATIENT
Start: 2019-01-09 | End: 2019-01-09

## 2019-01-09 RX ORDER — SODIUM CHLORIDE 9 MG/ML
INJECTION, SOLUTION INTRAVENOUS CONTINUOUS PRN
Status: DISCONTINUED | OUTPATIENT
Start: 2019-01-09 | End: 2019-01-09

## 2019-01-09 RX ORDER — MEPERIDINE HYDROCHLORIDE 50 MG/ML
12.5 INJECTION INTRAMUSCULAR; INTRAVENOUS; SUBCUTANEOUS ONCE AS NEEDED
Status: DISCONTINUED | OUTPATIENT
Start: 2019-01-09 | End: 2019-01-09

## 2019-01-09 RX ORDER — PANTOPRAZOLE SODIUM 40 MG/10ML
40 INJECTION, POWDER, LYOPHILIZED, FOR SOLUTION INTRAVENOUS 2 TIMES DAILY
Status: DISCONTINUED | OUTPATIENT
Start: 2019-01-09 | End: 2019-01-11 | Stop reason: HOSPADM

## 2019-01-09 RX ADMIN — PROPOFOL 50 MG: 10 INJECTION, EMULSION INTRAVENOUS at 08:01

## 2019-01-09 RX ADMIN — PANTOPRAZOLE SODIUM 8 MG/HR: 40 INJECTION, POWDER, FOR SOLUTION INTRAVENOUS at 01:01

## 2019-01-09 RX ADMIN — OCTREOTIDE ACETATE 50 MCG/HR: 500 INJECTION, SOLUTION INTRAVENOUS; SUBCUTANEOUS at 03:01

## 2019-01-09 RX ADMIN — PROPOFOL 40 MG: 10 INJECTION, EMULSION INTRAVENOUS at 07:01

## 2019-01-09 RX ADMIN — HYDROMORPHONE HYDROCHLORIDE 0.5 MG: 1 INJECTION, SOLUTION INTRAMUSCULAR; INTRAVENOUS; SUBCUTANEOUS at 02:01

## 2019-01-09 RX ADMIN — OCTREOTIDE ACETATE 50 MCG/HR: 500 INJECTION, SOLUTION INTRAVENOUS; SUBCUTANEOUS at 05:01

## 2019-01-09 RX ADMIN — SODIUM CHLORIDE: 0.9 INJECTION, SOLUTION INTRAVENOUS at 07:01

## 2019-01-09 RX ADMIN — PANTOPRAZOLE SODIUM 40 MG: 40 INJECTION, POWDER, FOR SOLUTION INTRAVENOUS at 09:01

## 2019-01-09 RX ADMIN — HYDROMORPHONE HYDROCHLORIDE 0.5 MG: 1 INJECTION, SOLUTION INTRAMUSCULAR; INTRAVENOUS; SUBCUTANEOUS at 03:01

## 2019-01-09 RX ADMIN — ONDANSETRON 4 MG: 2 INJECTION INTRAMUSCULAR; INTRAVENOUS at 02:01

## 2019-01-09 RX ADMIN — HYDROMORPHONE HYDROCHLORIDE 0.5 MG: 1 INJECTION, SOLUTION INTRAMUSCULAR; INTRAVENOUS; SUBCUTANEOUS at 09:01

## 2019-01-09 RX ADMIN — ACETAMINOPHEN 650 MG: 325 TABLET, FILM COATED ORAL at 05:01

## 2019-01-09 RX ADMIN — CEFTRIAXONE 1 G: 1 INJECTION, SOLUTION INTRAVENOUS at 01:01

## 2019-01-09 NOTE — [employer] PATIENT INSTRUCTIONS
Discharge Summary/Instructions after an Endoscopic Procedure  Patient Name: Neena Marks  Patient MRN: [id number]  Patient YOB: 1955 Wednesday, January 09, 2019  Madyson Farrar MD  RESTRICTIONS:  During your procedure today, you received medications for sedation.  These   medications may affect your judgment, balance and coordination.  Therefore,   for 24 hours, you have the following restrictions:   - DO NOT drive a car, operate machinery, make legal/financial decisions,   sign important papers or drink alcohol.    ACTIVITY:  Today: no heavy lifting, straining or running due to procedural   sedation/anesthesia.  The following day: return to full activity including work.  DIET:  Eat and drink normally unless instructed otherwise.     TREATMENT FOR COMMON SIDE EFFECTS:  - Mild abdominal pain, nausea, belching, bloating or excessive gas:  rest,   eat lightly and use a heating pad.  - Sore Throat: treat with throat lozenges and/or gargle with warm salt   water.  - Because air was used during the procedure, expelling large amounts of air   from your rectum or belching is normal.  - If a bowel prep was taken, you may not have a bowel movement for 1-3 days.    This is normal.  SYMPTOMS TO WATCH FOR AND REPORT TO YOUR PHYSICIAN:  1. Abdominal pain or bloating, other than gas cramps.  2. Chest pain.  3. Back pain.  4. Signs of infection such as: chills or fever occurring within 24 hours   after the procedure.  5. Rectal bleeding, which would show as bright red, maroon, or black stools.   (A tablespoon of blood from the rectum is not serious, especially if   hemorrhoids are present.)  6. Vomiting.  7. Weakness or dizziness.  GO DIRECTLY TO THE NEAREST EMERGENCY ROOM IF YOU HAVE ANY OF THE FOLLOWING:      Difficulty breathing              Chills and/or fever over 101 F   Persistent vomiting and/or vomiting blood   Severe abdominal pain   Severe chest pain   Black, tarry stools   Bleeding- more than one tablespoon   Any  other symptom or condition that you feel may need urgent attention  Your doctor recommends these additional instructions:  If any biopsies were taken, your doctors clinic will contact you in 1 to 2   weeks with any results.  - Return patient to ICU for ongoing care.   - Clear liquid diet today. Okay to advance tomorrow if hemoglobin is stable   and there is no further bleeding.  - Use Protonix (pantoprazole) 40 mg IV (then PO when tolerating) daily for 2   weeks.   - Administer an IV bolus of 50 micrograms of octreotide followed by an   infusion of 50 micrograms per hour for 3 days then transition to   non-selective beta blocker (if BP can tolerate).   - Continue antibiotics for 7 days for SBP/infection prophylaxis in the   setting of GI bleeding.  - Repeat upper endoscopy in 1 month for endoscopic band ligation if varices   are not completely eradicated.   - Return to GI clinic in 1 month.   - Will need further evaluation of liver mass.  Follow up AFP.  Will need MRI   v. CT liver protocol once renal function improves.  - Follow up results of ascites fluid studies - if SBP present, will need SBP   prophylaxis on discharge as well as IV albumin on day 1 and day 3 while in   the hospital.  For questions, problems or results please call your physician - Madyson Farrar MD at Work:  ( [phone].  [facility], EMERGENCY ROOM PHONE NUMBER: [phone], [facility]   [phone].  IF A COMPLICATION OR EMERGENCY SITUATION ARISES AND YOU ARE UNABLE TO REACH   YOUR PHYSICIAN - GO DIRECTLY TO THE EMERGENCY ROOM.  Madyson Farrar MD  1/9/2019 8:45:19 AM  This report has been verified and signed electronically.  [employer]

## 2019-01-09 NOTE — PT/OT/SLP PROGRESS
Occupational Therapy      Patient Name:  Neena Marks   MRN:  [id number]    Patient not seen today secondary to nausea and vomiting per RN. Will follow-up later as time permits.    Susan Fisher, OT  1/9/2019

## 2019-01-09 NOTE — ASSESSMENT & PLAN NOTE
- PRACHI suspect secondary to blood loss anemia in the setting of GI bleed.  - Transfusion as above; monitor response.

## 2019-01-09 NOTE — ASSESSMENT & PLAN NOTE
- Acute GI bleeding, suspect from upper source. History of varices on prior endoscopy; may be bleeding from varices, giovanni. with hematemesis as well.   - EGD this morning to further evaluate bleed source.  - Hgb improved; monitor q12hr.  - Continuing pantoprazole gtt; octreotide gtt.  - Continuing ceftriaxone 1g IV q24hr as SBP prophylaxis in the setting of suspected variceal bleed.  - Appreciate GI assistance.

## 2019-01-09 NOTE — ANESTHESIA POSTPROCEDURE EVALUATION
"Anesthesia Post Evaluation    Patient: Neena Marks    Procedure(s) Performed: Procedure(s) (LRB):  EGD (ESOPHAGOGASTRODUODENOSCOPY) (Left)    Final Anesthesia Type: general  Patient location during evaluation: PACU  Patient participation: Yes- Able to Participate  Level of consciousness: awake and alert  Post-procedure vital signs: reviewed and stable  Pain management: adequate  Airway patency: patent  PONV status at discharge: No PONV  Anesthetic complications: no      Cardiovascular status: blood pressure returned to baseline  Respiratory status: unassisted, spontaneous ventilation and room air  Hydration status: euvolemic  Follow-up not needed.        Visit Vitals  /68   Pulse 66   Temp 36.9 °C (98.4 °F) (Oral)   Resp 13   Ht 4' 11" (1.499 m)   Wt 45.9 kg (101 lb 3.1 oz)   SpO2 95%   BMI 20.44 kg/m²       Pain/Kelly Score: Pain Rating Prior to Med Admin: 8 (1/9/2019  3:20 AM)  Pain Rating Post Med Admin: 6 (1/9/2019  3:50 AM)        "

## 2019-01-09 NOTE — ASSESSMENT & PLAN NOTE
- Acute GI bleeding, suspect from upper source. History of varices on prior endoscopy; may be bleeding from varices, giovanni. with hematemesis as well.  - Given significantly decreased Hgb suspect bleeding has been chronic and persistent up until now.  - Transfusing 3U pRBCs in ED; prepare additional 2U pRBCs and trend CBC q8hr.  - Give pantoprazole 80mg IV once in ED, continue with pantoprazole gtt; start octreotide gtt given h/o varices.  - Continuing ceftriaxone 1g IV q24hr as SBP prophylaxis in the setting of suspected variceal bleed.  - GI consult for potential endoscopy.

## 2019-01-09 NOTE — EICU
Called to room for timeout for Paracentesis by Dr. Fitzpatrick. Procedure completed without issue, and patient tolerated procedure well. 900cc of fluid removed. Site covered with band-aid.

## 2019-01-09 NOTE — NURSING
Pt AAOx4, VSS. Pt received 2 of 3 units PBRCs this shift. MD aware. Third unit prepared to be given at this time. Pt on protonix gtt and octreotide gtt and resting comfortably at this time.

## 2019-01-09 NOTE — ASSESSMENT & PLAN NOTE
- PRACHI suspect secondary to blood loss anemia in the setting of GI bleed.  - Improving with control of bleeding.

## 2019-01-09 NOTE — ASSESSMENT & PLAN NOTE
- Cirrhosis with h/o alcohol abuse.  - CT performed in ED demonstrating large mass in posterior R hepatic lobe concerning for HCC.  - Pending MRI to further evaluate.  - Appreciate GI assistance.

## 2019-01-09 NOTE — TRANSFER OF CARE
"Anesthesia Transfer of Care Note    Patient: Neena Marks    Procedure(s) Performed: Procedure(s) (LRB):  EGD (ESOPHAGOGASTRODUODENOSCOPY) (Left)    Patient location: ICU    Anesthesia Type: general    Transport from OR: Transported from OR on 100% O2 by closed face mask with adequate spontaneous ventilation    Post pain: adequate analgesia    Post assessment: no apparent anesthetic complications    Post vital signs: stable    Level of consciousness: awake    Nausea/Vomiting: no nausea/vomiting    Complications: none    Transfer of care protocol was followed      Last vitals:   Visit Vitals  /66   Pulse 68   Temp 36.8 °C (98.3 °F) (Oral)   Resp 10   Ht 4' 11" (1.499 m)   Wt 45.9 kg (101 lb 3.1 oz)   SpO2 (!) 92%   BMI 20.44 kg/m²     "

## 2019-01-09 NOTE — CONSULTS
Consult/H&P Note  Interventional Radiology    Consult Requested By: ICU    Reason for Consult: ascites    SUBJECTIVE:     Chief Complaint: GI bleed, distension    History of Present Illness: 62 yo cirrhotic female with ascites.  Underwent recent banding of esophageal varices.    Past Medical History:   Diagnosis Date    Cirrhosis      Past Surgical History:   Procedure Laterality Date    ESOPHAGOGASTRODUODENOSCOPY (EGD) N/A 1/26/2018    Performed by [facility] at [facility]    PARACENTESIS N/A 1/30/2018    Performed by Everett Fitzpatrick MD at [facility] CATH LAB    PARACENTESIS N/A 2/6/2015    Performed by Alejandro Myrick MD at [facility] CATH LAB     History reviewed. No pertinent family history.  Social History     Tobacco Use    Smoking status: Current Every Day Smoker     Types: Cigarettes    Smokeless tobacco: Never Used    Tobacco comment: 2 cigarettes a day   Substance Use Topics    Alcohol use: No     Frequency: Never     Comment: Previously drank 1 pint a day    Drug use: Yes     Types: Cocaine       Review of Systems:  ]per primary H&P      OBJECTIVE:     Vital Signs Range (Last 24H):  Temp:  [98.1 °F (36.7 °C)-98.4 °F (36.9 °C)]   Pulse:  [64-92]   Resp:  [10-39]   BP: (104-147)/(55-84)   SpO2:  [87 %-100 %]     Physical Exam:  General- Patient alert and oriented x3 in NAD  ENT- PERRLA,  Neck- No masses  CV- Regular rate and rhythm  Resp-  No increased WOB  GI- Non tender/+ distended  oted  Neuro-  No focal deficits noted.     Physical Exam  Body mass index is 20.44 kg/m².    Scheduled Meds:    pantoprazole  40 mg Intravenous BID     Continuous Infusions:    octreotide (SANDOSTATIN) infusion 50 mcg/hr (01/09/19 0320)     PRN Meds:sodium chloride, acetaminophen, fentaNYL, HYDROmorphone, meperidine, ondansetron, ondansetron, oxyCODONE, promethazine (PHENERGAN) IVPB, sodium chloride 0.9%, sodium chloride 0.9%    Allergies: Review of patient's allergies indicates:  No Known  Allergies    Labs:  Recent Labs   Lab 01/08/19 0712   INR 1.2       Recent Labs   Lab 01/09/19  0347   WBC 11.47   HGB 10.3*   HCT 30.8*   MCV 72*         Recent Labs   Lab 01/08/19 0712 01/09/19 0718   * 116*   * 136   K 5.0 5.0    106   CO2 18* 22*   BUN 38* 40*   CREATININE 2.6* 2.0*   CALCIUM 8.7 8.5*   MG 2.3  --    ALT 27 29   AST 59* 59*   ALBUMIN 2.8* 2.8*   BILITOT 0.7 1.5*       Vitals (Most Recent):  Temp: 98.3 °F (36.8 °C) (01/09/19 0315)  Pulse: 70 (01/09/19 0927)  Resp: 19 (01/09/19 0927)  BP: 124/66 (01/09/19 0500)  SpO2: (!) 93 % (01/09/19 0927)    ASA: 3  Mallampati: 2    Consent obtained    ASSESSMENT/PLAN:     Paracentesis.  Local anesthesia.    Active Hospital Problems    Diagnosis  POA    *Acute GI bleeding [K92.2]  Yes    Anemia [D64.9]  Yes    Acute blood loss anemia [D62]  Yes    PRACHI (acute kidney injury) [N17.9]  Yes    Alcoholic cirrhosis [K70.30]  Yes     Chronic    Esophageal varices in alcoholic cirrhosis [K70.30, I85.10]  Yes     Chronic      Resolved Hospital Problems   No resolved problems to display.           Everett Fitzpatrick MD

## 2019-01-09 NOTE — PT/OT/SLP PROGRESS
Occupational Therapy      Patient Name:  Neena Marks   MRN:  [id number]    Patient not seen today secondary to CHARI for MRI per nursing. Will follow-up later as time permits.    Susan Fisher, OT  1/9/2019

## 2019-01-09 NOTE — PT/OT/SLP PROGRESS
Physical Therapy      Patient Name:  Neena Marks   MRN:  [id number]    Patient not seen today secondary to nausea and vomiting at 1248 and CT/MRI at 1607. Will follow-up tomorrow 1/10/19.    Mandy Sun, PT

## 2019-01-09 NOTE — ANESTHESIA PREPROCEDURE EVALUATION
01/09/2019  Neena Marks is a 63 y.o., female.    Pre-op Assessment    I have reviewed the Patient Summary Reports.     I have reviewed the Nursing Notes.   I have reviewed the Medications.     Review of Systems  Social:  Smoker A few cigs per day.  Cocaine abuse.  Alcohol abuse   Cardiovascular:   Exercise tolerance: poor    Renal/:   Chronic Renal Disease    Hepatic/GI:   Liver Disease,  Liver Disease, Alcoholic Liver Disease , Cirrhosis Portal Hypertension, Ascites, Esophageal Varices    Psych:   Psychiatric History          Physical Exam  General:  Malnutrition, Cachexia      Dental:  Dental Findings: Edentulous             Anesthesia Plan  Type of Anesthesia, risks & benefits discussed:  Anesthesia Type:  general  Patient's Preference:   Intra-op Monitoring Plan: standard ASA monitors  Intra-op Monitoring Plan Comments:   Post Op Pain Control Plan: per primary service following discharge from PACU  Post Op Pain Control Plan Comments:   Induction:   IV  Beta Blocker:         Informed Consent: Patient understands risks and agrees with Anesthesia plan.  Questions answered. Anesthesia consent signed with patient.  ASA Score: 3     Day of Surgery Review of History & Physical:    H&P update referred to the surgeon.         Ready For Surgery From Anesthesia Perspective.

## 2019-01-09 NOTE — PLAN OF CARE
Chris from [facility] gave approval to pull straight cane from our supply - [employer] to deliver to bedside

## 2019-01-09 NOTE — SUBJECTIVE & OBJECTIVE
Past Medical History:   Diagnosis Date    Cirrhosis      Past Surgical History:   Procedure Laterality Date    ESOPHAGOGASTRODUODENOSCOPY (EGD) N/A 1/26/2018    Performed by [facility] at [facility] ENDO    PARACENTESIS N/A 1/30/2018    Performed by Everett Fitzpatrick MD at [facility] CATH LAB    PARACENTESIS N/A 2/6/2015    Performed by Alejandro Myrick MD at [facility] CATH LAB     Review of patient's allergies indicates:  No Known Allergies    No current facility-administered medications on file prior to encounter.      Current Outpatient Medications on File Prior to Encounter   Medication Sig    cholecalciferol, vitamin D3, 50,000 unit Tab Take 125 mcg by mouth.    cyproheptadine (PERIACTIN) 4 mg tablet Take 4 mg by mouth 3 (three) times daily as needed.    ferrous sulfate 325 mg (65 mg iron) Tab tablet Take 325 mg by mouth daily with breakfast.    furosemide (LASIX) 20 MG tablet Take 40 mg by mouth once daily.    multivitamin (ONE DAILY MULTIVITAMIN) per tablet Take 1 tablet by mouth once daily.    spironolactone (ALDACTONE) 100 MG tablet Take 1 tablet (100 mg total) by mouth once daily.    hydrOXYzine HCl (ATARAX) 10 MG Tab Take 25 mg by mouth once daily.     Family History     Noncontributory        Tobacco Use    Smoking status: Current Every Day Smoker     Types: Cigarettes    Smokeless tobacco: Never Used    Tobacco comment: 2 cigarettes a day   Substance and Sexual Activity    Alcohol use: No     Frequency: Never     Comment: Previously drank 1 pint a day    Drug use: Yes     Types: Cocaine    Sexual activity: Not on file     Review of Systems   Constitutional: Positive for fatigue. Negative for chills and fever.   HENT: Negative for sore throat and trouble swallowing.    Eyes: Negative for pain and visual disturbance.   Respiratory: Negative for cough and shortness of breath.    Cardiovascular: Negative for chest pain and palpitations.   Gastrointestinal: Positive for abdominal  distention, abdominal pain, blood in stool, nausea and vomiting.   Genitourinary: Negative for difficulty urinating and dysuria.   Musculoskeletal: Negative for arthralgias and myalgias.   Skin: Negative for rash and wound.   Neurological: Negative for weakness and numbness.     Objective:     Vital Signs (Most Recent):  Temp: 98.3 °F (36.8 °C) (01/08/19 1315)  Pulse: 74 (01/08/19 1530)  Resp: 16 (01/08/19 1530)  BP: 135/69 (01/08/19 1500)  SpO2: 97 % (01/08/19 1530) Vital Signs (24h Range):  Temp:  [97.3 °F (36.3 °C)-99.2 °F (37.3 °C)] 98.3 °F (36.8 °C)  Pulse:  [74-98] 74  Resp:  [13-34] 16  SpO2:  [96 %-100 %] 97 %  BP: ()/(51-78) 135/69     Weight: 45.4 kg (100 lb)  Body mass index is 20.2 kg/m².    Physical Exam   Constitutional: She is oriented to person, place, and time. She appears well-developed and well-nourished. No distress.   Chronically ill-appearing. Thin.   HENT:   Head: Normocephalic and atraumatic.   Eyes: Conjunctivae and EOM are normal.   Cardiovascular: Normal rate, regular rhythm, S1 normal, S2 normal and intact distal pulses.   Pulmonary/Chest: Effort normal and breath sounds normal.   Abdominal: Soft. She exhibits no distension. Bowel sounds are increased. There is tenderness.   Musculoskeletal: Normal range of motion. She exhibits no edema.   Neurological: She is alert and oriented to person, place, and time.   Skin: Skin is warm and dry.   Nursing note and vitals reviewed.     Significant Labs:   CBC:  Recent Labs   Lab 01/08/19  0712 01/08/19  1136   WBC 5.95 8.52   HGB 3.6* 5.5*   HCT 12.0* 17.2*    224   GRAN 69.3  4.1 73.8*  6.3   LYMPH 24.5  1.5 18.3  1.6   MONO 6.2  0.4 7.9  0.7   EOS 0.0 0.0   BASO 0.00 0.00     CMP:  Recent Labs   Lab 01/08/19  0712   *   K 5.0      CO2 18*   BUN 38*   CREATININE 2.6*   *   CALCIUM 8.7   MG 2.3   ALKPHOS 107   AST 59*   ALT 27   BILITOT 0.7   PROT 7.5   ALBUMIN 2.8*   ANIONGAP 14     Significant Imaging:   CT  Abd/Pelvis WO Contrast 01/08/19:  Nodular contour to the liver in keeping with cirrhosis.  There is suggestion of a new large mass in the posterior right hepatic lobe, concerning for malignancy (HCC).  This is not well assessed on a noncontrast exam, recommend a triple phase liver protocol CT or MRI for further characterization. Moderate volume of ascites, although notably decreased relative to the prior study of 01/25/2018. Associated mesenteric and omental edema. Mild colonic diverticulosis. Cholelithiasis.    CXR 01/08/19:  Multiple overlying cardiac monitoring leads. The cardiomediastinal silhouette is normal in size and midline. Pulmonary vascularity appears within normal limits. The lungs appear clear without confluent pulmonary parenchymal opacity. No pleural fluid or pneumothorax. Mild leftward curvature of the thoracic spine.

## 2019-01-09 NOTE — NURSING
Pt AAOx4, VSS on RA. EGD with Dr. Farrar this AM. Paracentesis with Dr. Fitzpatrick at  this afternoon. Pt will remain NPO until after MRI this shift. Pt c/o pain in abdomen relieved by hydromorphine 0.5mg. Pt updated on POC.

## 2019-01-09 NOTE — PROCEDURES
Radiology Post-Procedure Note    Pre Op Diagnosis: ascites  Post Op Diagnosis: Same    Procedure: paracentesis    Procedure performed by: Everett Fitzpatrick MD    Written Informed Consent Obtained: Yes  Specimen Removed: YES 0.9 L serous ascites  Estimated Blood Loss: Minimal    Findings:   Successful paracentesis.    Patient tolerated procedure well.    @SIG@

## 2019-01-09 NOTE — PLAN OF CARE
TAYO met with pt at bedside to complete discharge assessment, verified PCP and uses [facility] on [address].  Pt's 5 yo grandchildren lives with her.  Daughter, Viri will provide transportation home.  Pt requested straight cane.     01/09/19 1106   Discharge Assessment   Assessment Type Discharge Planning Assessment   Confirmed/corrected address and phone number on facesheet? Yes   Assessment information obtained from? Patient   Communicated expected length of stay with patient/caregiver no   Prior to hospitilization cognitive status: Alert/Oriented   Prior to hospitalization functional status: Independent   Current cognitive status: Alert/Oriented   Current Functional Status: Needs Assistance   Lives With grandchild(anika)   Able to Return to Prior Arrangements yes   Is patient able to care for self after discharge? Unable to determine at this time (comments)   Readmission Within the Last 30 Days no previous admission in last 30 days   Patient currently being followed by outpatient case management? No   Patient currently receives any other outside agency services? No   Equipment Currently Used at Home none   Do you have any problems affording any of your prescribed medications? No   Is the patient taking medications as prescribed? yes   Does the patient have transportation home? Yes   Transportation Anticipated family or friend will provide   Does the patient receive services at the [facility]? No   Discharge Plan A Home   DME Needed Upon Discharge  cane, straight   Patient/Family in Agreement with Plan yes

## 2019-01-09 NOTE — ASSESSMENT & PLAN NOTE
- Cirrhosis with h/o alcohol abuse.  - CT performed in ED demonstrating large mass in posterior R hepatic lobe concerning for HCC.  - Pending MRI, paracentesis to further evaluate.  - Appreciate GI assistance.

## 2019-01-09 NOTE — SUBJECTIVE & OBJECTIVE
Interval History: No further hematemesis overnight; several episodes of blood in stool. Feeling well this morning.     Review of Systems   Constitutional: Negative for chills and fever.   Respiratory: Negative for cough and shortness of breath.    Cardiovascular: Negative for chest pain and palpitations.   Gastrointestinal: Positive for abdominal pain and blood in stool. Negative for nausea and vomiting.     Objective:     Vital Signs (Most Recent):  Temp: 98.4 °F (36.9 °C) (01/09/19 1500)  Pulse: 68 (01/09/19 1500)  Resp: 11 (01/09/19 1500)  BP: 133/71 (01/09/19 1500)  SpO2: 98 % (01/09/19 1500) Vital Signs (24h Range):  Temp:  [98.2 °F (36.8 °C)-98.6 °F (37 °C)] 98.4 °F (36.9 °C)  Pulse:  [64-84] 68  Resp:  [10-40] 11  SpO2:  [87 %-100 %] 98 %  BP: ()/(54-85) 133/71     Weight: 45.9 kg (101 lb 3.1 oz)  Body mass index is 20.44 kg/m².    Intake/Output Summary (Last 24 hours) at 1/9/2019 1653  Last data filed at 1/9/2019 1359  Gross per 24 hour   Intake 1612.17 ml   Output 1300 ml   Net 312.17 ml      Physical Exam   Constitutional: She is oriented to person, place, and time. She appears well-developed and well-nourished. No distress.   Chronically ill-appearing. Thin.   HENT:   Head: Normocephalic and atraumatic.   Eyes: Conjunctivae and EOM are normal.   Cardiovascular: Normal rate, regular rhythm, S1 normal, S2 normal and intact distal pulses.   Pulmonary/Chest: Effort normal and breath sounds normal.   Abdominal: Soft. She exhibits no distension. Bowel sounds are increased. There is tenderness.   Musculoskeletal: Normal range of motion. She exhibits no edema.   Neurological: She is alert and oriented to person, place, and time.   Skin: Skin is warm and dry.   Nursing note and vitals reviewed.    Significant Labs:   CBC:  Recent Labs   Lab 01/08/19  1926 01/09/19  0347 01/09/19  1135   WBC 9.98 11.47 14.08*   HGB 8.2* 10.3* 10.0*   HCT 24.7* 30.8* 29.6*    209 194   GRAN 86.0* 73.0  CANCELED 67.6   9.3*   LYMPH 8.0*  CANCELED 20.0  CANCELED 20.5  2.9   MONO 6.0  CANCELED 3.0*  CANCELED 10.9  1.5*   EOS CANCELED CANCELED 0.1   BASO CANCELED CANCELED 0.04      BMP:  Recent Labs   Lab 01/08/19  0712 01/09/19  0718   * 136   K 5.0 5.0    106   CO2 18* 22*   BUN 38* 40*   CREATININE 2.6* 2.0*   * 116*   CALCIUM 8.7 8.5*   MG 2.3  --      Significant Imaging:   No new imaging this morning.

## 2019-01-10 PROBLEM — K76.6 PORTAL HYPERTENSIVE GASTROPATHY: Chronic | Status: ACTIVE | Noted: 2019-01-10

## 2019-01-10 PROBLEM — K31.89 PORTAL HYPERTENSIVE GASTROPATHY: Chronic | Status: ACTIVE | Noted: 2019-01-10

## 2019-01-10 LAB
ALBUMIN SERPL BCP-MCNC: 2.5 G/DL
ALP SERPL-CCNC: 91 U/L
ALT SERPL W/O P-5'-P-CCNC: 30 U/L
ANION GAP SERPL CALC-SCNC: 10 MMOL/L
ANISOCYTOSIS BLD QL SMEAR: ABNORMAL
ANISOCYTOSIS BLD QL SMEAR: ABNORMAL
AST SERPL-CCNC: 58 U/L
BASOPHILS # BLD AUTO: 0.02 K/UL
BASOPHILS # BLD AUTO: 0.03 K/UL
BASOPHILS NFR BLD: 0.2 %
BASOPHILS NFR BLD: 0.3 %
BILIRUB SERPL-MCNC: 1 MG/DL
BUN SERPL-MCNC: 32 MG/DL
CALCIUM SERPL-MCNC: 8.6 MG/DL
CHLORIDE SERPL-SCNC: 107 MMOL/L
CO2 SERPL-SCNC: 19 MMOL/L
CREAT SERPL-MCNC: 1.7 MG/DL
DACRYOCYTES BLD QL SMEAR: ABNORMAL
DIFFERENTIAL METHOD: ABNORMAL
DIFFERENTIAL METHOD: ABNORMAL
EOSINOPHIL # BLD AUTO: 0.2 K/UL
EOSINOPHIL # BLD AUTO: 0.2 K/UL
EOSINOPHIL NFR BLD: 1.5 %
EOSINOPHIL NFR BLD: 1.7 %
ERYTHROCYTE [DISTWIDTH] IN BLOOD BY AUTOMATED COUNT: 27.5 %
ERYTHROCYTE [DISTWIDTH] IN BLOOD BY AUTOMATED COUNT: 27.8 %
EST. GFR  (AFRICAN AMERICAN): 37 ML/MIN/1.73 M^2
EST. GFR  (NON AFRICAN AMERICAN): 32 ML/MIN/1.73 M^2
GIANT PLATELETS BLD QL SMEAR: PRESENT
GIANT PLATELETS BLD QL SMEAR: PRESENT
GLUCOSE SERPL-MCNC: 101 MG/DL
HCT VFR BLD AUTO: 30.2 %
HCT VFR BLD AUTO: 30.4 %
HGB BLD-MCNC: 9.8 G/DL
HGB BLD-MCNC: 9.9 G/DL
HYPOCHROMIA BLD QL SMEAR: ABNORMAL
HYPOCHROMIA BLD QL SMEAR: ABNORMAL
LYMPHOCYTES # BLD AUTO: 2.4 K/UL
LYMPHOCYTES # BLD AUTO: 2.8 K/UL
LYMPHOCYTES NFR BLD: 23.6 %
LYMPHOCYTES NFR BLD: 26.7 %
MCH RBC QN AUTO: 23.8 PG
MCH RBC QN AUTO: 23.8 PG
MCHC RBC AUTO-ENTMCNC: 32.5 G/DL
MCHC RBC AUTO-ENTMCNC: 32.6 G/DL
MCV RBC AUTO: 73 FL
MCV RBC AUTO: 73 FL
MONOCYTES # BLD AUTO: 0.9 K/UL
MONOCYTES # BLD AUTO: 1 K/UL
MONOCYTES NFR BLD: 8.3 %
MONOCYTES NFR BLD: 9.7 %
NEUTROPHILS # BLD AUTO: 6.4 K/UL
NEUTROPHILS # BLD AUTO: 6.8 K/UL
NEUTROPHILS NFR BLD: 61.6 %
NEUTROPHILS NFR BLD: 66.4 %
OVALOCYTES BLD QL SMEAR: ABNORMAL
OVALOCYTES BLD QL SMEAR: ABNORMAL
PLATELET # BLD AUTO: 188 K/UL
PLATELET # BLD AUTO: 196 K/UL
PLATELET BLD QL SMEAR: ABNORMAL
PLATELET BLD QL SMEAR: ABNORMAL
PMV BLD AUTO: ABNORMAL FL
PMV BLD AUTO: ABNORMAL FL
POIKILOCYTOSIS BLD QL SMEAR: ABNORMAL
POIKILOCYTOSIS BLD QL SMEAR: ABNORMAL
POLYCHROMASIA BLD QL SMEAR: ABNORMAL
POLYCHROMASIA BLD QL SMEAR: ABNORMAL
POTASSIUM SERPL-SCNC: 4.5 MMOL/L
PROT SERPL-MCNC: 7.2 G/DL
RBC # BLD AUTO: 4.12 M/UL
RBC # BLD AUTO: 4.16 M/UL
SCHISTOCYTES BLD QL SMEAR: ABNORMAL
SCHISTOCYTES BLD QL SMEAR: PRESENT
SCHISTOCYTES BLD QL SMEAR: PRESENT
SODIUM SERPL-SCNC: 136 MMOL/L
SPHEROCYTES BLD QL SMEAR: ABNORMAL
SPHEROCYTES BLD QL SMEAR: ABNORMAL
TARGETS BLD QL SMEAR: ABNORMAL
TARGETS BLD QL SMEAR: ABNORMAL
WBC # BLD AUTO: 10.32 K/UL
WBC # BLD AUTO: 10.49 K/UL

## 2019-01-10 PROCEDURE — 99233 PR SUBSEQUENT HOSPITAL CARE,LEVL III: ICD-10-PCS | Mod: ,,, | Performed by: INTERNAL MEDICINE

## 2019-01-10 PROCEDURE — 85025 COMPLETE CBC W/AUTO DIFF WBC: CPT | Mod: 91

## 2019-01-10 PROCEDURE — 25000003 PHARM REV CODE 250: Performed by: INTERNAL MEDICINE

## 2019-01-10 PROCEDURE — C9113 INJ PANTOPRAZOLE SODIUM, VIA: HCPCS | Performed by: INTERNAL MEDICINE

## 2019-01-10 PROCEDURE — 25000003 PHARM REV CODE 250: Performed by: EMERGENCY MEDICINE

## 2019-01-10 PROCEDURE — 94761 N-INVAS EAR/PLS OXIMETRY MLT: CPT

## 2019-01-10 PROCEDURE — P9047 ALBUMIN (HUMAN), 25%, 50ML: HCPCS | Performed by: INTERNAL MEDICINE

## 2019-01-10 PROCEDURE — 63600175 PHARM REV CODE 636 W HCPCS: Performed by: INTERNAL MEDICINE

## 2019-01-10 PROCEDURE — 97530 THERAPEUTIC ACTIVITIES: CPT

## 2019-01-10 PROCEDURE — 80053 COMPREHEN METABOLIC PANEL: CPT

## 2019-01-10 PROCEDURE — 36415 COLL VENOUS BLD VENIPUNCTURE: CPT

## 2019-01-10 PROCEDURE — 97161 PT EVAL LOW COMPLEX 20 MIN: CPT

## 2019-01-10 PROCEDURE — 99233 SBSQ HOSP IP/OBS HIGH 50: CPT | Mod: ,,, | Performed by: INTERNAL MEDICINE

## 2019-01-10 PROCEDURE — 11000001 HC ACUTE MED/SURG PRIVATE ROOM

## 2019-01-10 PROCEDURE — 97166 OT EVAL MOD COMPLEX 45 MIN: CPT

## 2019-01-10 RX ORDER — LIDOCAINE HYDROCHLORIDE 20 MG/ML
10 SOLUTION OROPHARYNGEAL EVERY 6 HOURS PRN
Status: DISCONTINUED | OUTPATIENT
Start: 2019-01-10 | End: 2019-01-11 | Stop reason: HOSPADM

## 2019-01-10 RX ORDER — PROPRANOLOL HYDROCHLORIDE 10 MG/1
10 TABLET ORAL 2 TIMES DAILY
Status: DISCONTINUED | OUTPATIENT
Start: 2019-01-10 | End: 2019-01-11 | Stop reason: HOSPADM

## 2019-01-10 RX ORDER — CIPROFLOXACIN 500 MG/1
500 TABLET ORAL EVERY 24 HOURS
Status: DISCONTINUED | OUTPATIENT
Start: 2019-01-11 | End: 2019-01-11 | Stop reason: HOSPADM

## 2019-01-10 RX ORDER — ALBUMIN HUMAN 250 G/1000ML
25 SOLUTION INTRAVENOUS ONCE
Status: COMPLETED | OUTPATIENT
Start: 2019-01-10 | End: 2019-01-10

## 2019-01-10 RX ORDER — SUCRALFATE 1 G/10ML
1 SUSPENSION ORAL EVERY 6 HOURS
Status: DISCONTINUED | OUTPATIENT
Start: 2019-01-10 | End: 2019-01-11 | Stop reason: HOSPADM

## 2019-01-10 RX ORDER — CIPROFLOXACIN 500 MG/1
500 TABLET ORAL EVERY 12 HOURS
Status: DISCONTINUED | OUTPATIENT
Start: 2019-01-11 | End: 2019-01-10

## 2019-01-10 RX ADMIN — PANTOPRAZOLE SODIUM 40 MG: 40 INJECTION, POWDER, FOR SOLUTION INTRAVENOUS at 08:01

## 2019-01-10 RX ADMIN — LIDOCAINE HYDROCHLORIDE 10 ML: 20 SOLUTION ORAL; TOPICAL at 06:01

## 2019-01-10 RX ADMIN — HYDROMORPHONE HYDROCHLORIDE 0.5 MG: 1 INJECTION, SOLUTION INTRAMUSCULAR; INTRAVENOUS; SUBCUTANEOUS at 07:01

## 2019-01-10 RX ADMIN — CEFTRIAXONE 1 G: 1 INJECTION, SOLUTION INTRAVENOUS at 02:01

## 2019-01-10 RX ADMIN — PROPRANOLOL HYDROCHLORIDE 10 MG: 10 TABLET ORAL at 08:01

## 2019-01-10 RX ADMIN — HYDROMORPHONE HYDROCHLORIDE 0.5 MG: 1 INJECTION, SOLUTION INTRAMUSCULAR; INTRAVENOUS; SUBCUTANEOUS at 04:01

## 2019-01-10 RX ADMIN — OCTREOTIDE ACETATE 50 MCG/HR: 500 INJECTION, SOLUTION INTRAVENOUS; SUBCUTANEOUS at 01:01

## 2019-01-10 RX ADMIN — ACETAMINOPHEN 650 MG: 325 TABLET, FILM COATED ORAL at 05:01

## 2019-01-10 RX ADMIN — LIDOCAINE HYDROCHLORIDE: 20 SOLUTION ORAL; TOPICAL at 11:01

## 2019-01-10 RX ADMIN — HYDROMORPHONE HYDROCHLORIDE 0.5 MG: 1 INJECTION, SOLUTION INTRAMUSCULAR; INTRAVENOUS; SUBCUTANEOUS at 11:01

## 2019-01-10 RX ADMIN — SUCRALFATE 1 G: 1 SUSPENSION ORAL at 06:01

## 2019-01-10 RX ADMIN — SUCRALFATE 1 G: 1 SUSPENSION ORAL at 12:01

## 2019-01-10 RX ADMIN — OCTREOTIDE ACETATE 50 MCG/HR: 500 INJECTION, SOLUTION INTRAVENOUS; SUBCUTANEOUS at 03:01

## 2019-01-10 RX ADMIN — ALBUMIN HUMAN 25 G: 0.25 SOLUTION INTRAVENOUS at 08:01

## 2019-01-10 NOTE — PLAN OF CARE
Problem: Adult Inpatient Plan of Care  Goal: Plan of Care Review  Outcome: Ongoing (interventions implemented as appropriate)  Patient on room air sat's 94% oxygen not in use.

## 2019-01-10 NOTE — PLAN OF CARE
Problem: Occupational Therapy Goal  Goal: Occupational Therapy Goal  Goals to be met by: 1/24/2019     Patient will increase functional independence with ADLs by performing:    UE Dressing with Modified [place].  LE Dressing with Supervision.  Grooming while standing at sink with Modified [place].  Toileting from toilet with Modified [place] for hygiene and clothing management.   Toilet transfer to toilet with Supervision.      Outcome: Ongoing (interventions implemented as appropriate)  Initial OT eval/treat complete.  SBA for standing ADL tasks due to impaired balance/stability.  Demos impulsivity throughout ADL/functional transfers.  Has shower chair at home.  Recommend home with family with no post-acute therapy needs anticipated.  To benefit from continued acute care OT services to increase independence in self-care/functional transfers.  OT to follow.

## 2019-01-10 NOTE — PLAN OF CARE
Problem: Physical Therapy Goal  Goal: Physical Therapy Goal  Goals to be met by: 19     Patient will increase functional independence with mobility by performin. Sit<>stand transfer with supervision with or without single point cane.   2. Gait > 150 feet with SBA with or without single point cane.   3. Ascend/descend ramp with unilateral handrails with CGA with or without single point cane.    Outcome: Ongoing (interventions implemented as appropriate)  Patient evaluated by PT. Pt demonstrated good participation in OOB mobility despite c/o feeling cold. She required min A x 1 due to LOB in standing, otherwise CGA throughout ambulation approximately 30 ft in room. She is slightly impulsive with good mobility. She declined gait training with cane today, but agreeable for next session.     PT will continue to follow and progress as tolerated. If we are able to perform gait training with cane in acute setting, she will likely not need further PT services after discharge.

## 2019-01-10 NOTE — PROGRESS NOTES
[facility] Hospital Medicine  Progress Note    Patient Name: Neena Marks  MRN: [id number]  Patient Class: IP- Inpatient   Admission Date: 1/8/2019  Length of Stay: 2 days  Attending Physician: LIANA Taylor MD  Primary Care Provider: [facility]    Subjective:     Principal Problem:Acute GI bleeding    HPI:  Ms. Marks is a 63/F with PMH alcoholic cirrhosis with grade II varices, h/o GI bleeding (s/p EGD 01/2018) who presented to ED 01/08 with a one-day history of bright red blood per rectum and fatigue. She states that she noted blood in her stool the morning of admission, but reports having had some darker stools closer to [place] as well. With worsening fatigue and blood noted, she presented to ED for further evaluation. Initially denied hematemesis but during CT in ED had coffee-ground emesis and subsequently true hematemesis as well. Labwork performed in ED demonstrated Hgb of 3.6. She was subsequently admitted for further evaluation.    Hospital Course:  No notes on file    Interval History: No acute events overnight. Complains of throat pain  No other concerns at this time.    Review of Systems   Constitutional: Negative for chills and fever.   HENT: Positive for sore throat.    Respiratory: Negative for cough and shortness of breath.    Cardiovascular: Negative for chest pain and palpitations.   Gastrointestinal: Positive for abdominal pain. Negative for nausea and vomiting.     Objective:     Vital Signs (Most Recent):  Temp: 98.9 °F (37.2 °C) (01/10/19 1700)  Pulse: 77 (01/10/19 1700)  Resp: 18 (01/10/19 1700)  BP: 132/76 (01/10/19 1700)  SpO2: 98 % (01/10/19 1700) Vital Signs (24h Range):  Temp:  [97.7 °F (36.5 °C)-98.9 °F (37.2 °C)] 98.9 °F (37.2 °C)  Pulse:  [64-88] 77  Resp:  [10-31] 18  SpO2:  [91 %-98 %] 98 %  BP: ()/(58-86) 132/76     Weight: 47.6 kg (104 lb 15 oz)  Body mass index is 21.2 kg/m².    Intake/Output Summary (Last 24 hours) at 1/10/2019  1743  Last data filed at 1/10/2019 1500  Gross per 24 hour   Intake 347.33 ml   Output 950 ml   Net -602.67 ml      Physical Exam   Constitutional: She is oriented to person, place, and time. She appears well-developed and well-nourished. No distress.   Chronically ill-appearing. Thin.   HENT:   Head: Normocephalic and atraumatic.   Eyes: Conjunctivae and EOM are normal.   Cardiovascular: Normal rate, regular rhythm, S1 normal, S2 normal and intact distal pulses.   Pulmonary/Chest: Effort normal and breath sounds normal.   Abdominal: Soft. She exhibits no distension. There is tenderness.   Musculoskeletal: Normal range of motion. She exhibits no edema.   Neurological: She is alert and oriented to person, place, and time.   Skin: Skin is warm and dry.   Nursing note and vitals reviewed.    Significant Labs:   CBC:  Recent Labs   Lab 01/09/19  1135 01/09/19  1752 01/10/19  0349   WBC 14.08* 12.28 10.49   HGB 10.0* 10.4* 9.9*   HCT 29.6* 31.4* 30.4*    216 196   GRAN 67.6  9.3* 70.3  8.6* 61.6  6.4   LYMPH 20.5  2.9 20.0  2.5 26.7  2.8   MONO 10.9  1.5* 8.8  1.1* 9.7  1.0   EOS 0.1 0.1 0.2   BASO 0.04 0.03 0.03      BMP:  Recent Labs   Lab 01/09/19  0718 01/10/19  0349    136   K 5.0 4.5    107   CO2 22* 19*   BUN 40* 32*   CREATININE 2.0* 1.7*   * 101   CALCIUM 8.5* 8.6*     Significant Imaging:   MRI Abdomen WO Contrast 01/09/19:  Cirrhosis with an 8.5 cm circumscribed mass in segment 6/7 of the right hepatic lobe.  The findings favor HCC, although lack of IV contrast limits detailed evaluation.  When the patient's condition permits, multiphase contrasted CT or MRI recommended.    Assessment/Plan:      * Acute GI bleeding    - Acute GI bleeding with portal hypertensive gastropathy and varices.  - EGD performed 01/09.  - Hgb stable; monitor q12hr.  - Continuing pantoprazole 40mg IV BID, octreotide gtt.  - Convert to ciprofloxacin 500mg PO daily from ceftriaxone as SBP prophylaxis in  the setting of variceal bleed.  - Appreciate GI assistance.     Portal hypertensive gastropathy    - As under GIB.     Esophageal varices in alcoholic cirrhosis    - As under GIB.     Alcoholic cirrhosis    - Cirrhosis with h/o alcohol abuse.  - CT, MRI demonstrating large mass in posterior R hepatic lobe concerning for HCC; AFP not significantly elevated, however. Will need outpatient liver biopsy and follow-up with hepatology at [facility].  - Appreciate GI assistance.     PRACHI (acute kidney injury)    - PRACHI suspect secondary to blood loss anemia in the setting of GI bleed.  - Improving with control of bleeding; will give albumin 25g IV once in addition.     Acute blood loss anemia    - As under GIB.       VTE Risk Mitigation (From admission, onward)        Ordered     IP VTE LOW RISK PATIENT  Once      01/08/19 1015     Place sequential compression device  Until discontinued      01/08/19 1015        LIANA Osullivan MD  Department of Hospital Medicine   [facility], [place], operated by [facility]  [phone]

## 2019-01-10 NOTE — PROGRESS NOTES
Gastroenterology Progress Note    Reason for Consult: cirrhosis, GI bleed    Subjective:  She underwent EGD yesterday which showed 2 columns of varices, one of which had a small ulcer/white spot at the GEJ (potential source of bleeding) - 2 bands were placed.  She also had PHG with oozing in 5 spots in the fundus treated with APC.  H/H has remained stable.  She also underwent paracentesis yesterday with no evidence of SBP.  High total protein in ascites fluid is more suggestive of CHF than cirrhosis.  She is having pain from the banding.  No further bleeding.  No bowel movements yesterday or this morning.  She is tolerating clear liquids.    ROS:  Gen: no F/C  CV: no palpitations, + substernal chest discomfort after banding of varices  Resp: no SOB/wheezing    Physical Exam  Vitals:    01/10/19 0800   BP: 128/60   Pulse: 78   Resp: (!) 24   Temp:      Physical Exam   Constitutional: She is oriented to person, place, and time. She appears well-developed. No distress.   malnourished   HENT:   Head: Normocephalic and atraumatic.   Mouth/Throat: Oropharynx is clear and moist.   Eyes: EOM are normal. Pupils are equal, round, and reactive to light. No scleral icterus.   Cardiovascular: Normal rate and regular rhythm.   No murmur heard.  Pulmonary/Chest: Effort normal and breath sounds normal. She has no wheezes.   Abdominal: Soft. Bowel sounds are normal. She exhibits no distension. There is tenderness (in epigastrium). There is no guarding.   Musculoskeletal: Normal range of motion. She exhibits no edema.   Neurological: She is alert and oriented to person, place, and time. No sensory deficit.   No asterixis   Skin: Skin is warm and dry. No rash noted.   Vitals reviewed.      Medications/Infusions:  Current Facility-Administered Medications   Medication Dose Route Frequency Provider Last Rate Last Dose    0.9%  NaCl infusion (for blood administration)   Intravenous Q24H PRN Lynn Garcia MD        acetaminophen  tablet 650 mg  650 mg Oral Q8H PRN Lynn Garcia MD   650 mg at 01/10/19 0511    cefTRIAXone (ROCEPHIN) 1 g in dextrose 5 % 50 mL IVPB  1 g Intravenous Q24H LIANA Taylor MD   1 g at 01/09/19 1359    HYDROmorphone injection 0.5 mg  0.5 mg Intravenous Q6H PRN LIANA Taylor MD   0.5 mg at 01/10/19 0403    octreotide (SANDOSTATIN) 500 mcg in sodium chloride 0.9% 100 mL infusion  50 mcg/hr Intravenous Continuous LIANA Taylor MD 10 mL/hr at 01/10/19 0357 50 mcg/hr at 01/10/19 0357    ondansetron injection 4 mg  4 mg Intravenous Q8H PRN Lynn Garcia MD   4 mg at 01/09/19 1401    pantoprazole injection 40 mg  40 mg Intravenous BID Madyson Farrar MD   40 mg at 01/10/19 0801    sodium chloride 0.9% flush 3 mL  3 mL Intravenous PRN LIANA Taylor MD           Intake and Output:    Intake/Output Summary (Last 24 hours) at 1/10/2019 0823  Last data filed at 1/10/2019 0626  Gross per 24 hour   Intake 397.33 ml   Output 1800 ml   Net -1402.67 ml       Labs:  Lab Results   Component Value Date/Time    WBC 10.49 01/10/2019 03:49 AM    HGB 9.9 (L) 01/10/2019 03:49 AM    HCT 30.4 (L) 01/10/2019 03:49 AM     01/10/2019 03:49 AM    MCV 73 (L) 01/10/2019 03:49 AM     01/10/2019 03:49 AM    K 4.5 01/10/2019 03:49 AM     01/10/2019 03:49 AM    CO2 19 (L) 01/10/2019 03:49 AM    BUN 32 (H) 01/10/2019 03:49 AM    CREATININE 1.7 (H) 01/10/2019 03:49 AM     01/10/2019 03:49 AM    CALCIUM 8.6 (L) 01/10/2019 03:49 AM    MG 2.3 01/08/2019 07:12 AM    PHOS 3.1 01/25/2018 04:11 AM    INR 1.2 01/08/2019 07:12 AM    APTT <21.0 01/25/2018 04:11 AM   ]  Lab Results   Component Value Date/Time    PROT 7.2 01/10/2019 03:49 AM    ALBUMIN 2.5 (L) 01/10/2019 03:49 AM    BILITOT 1.0 01/10/2019 03:49 AM    AST 58 (H) 01/10/2019 03:49 AM    ALT 30 01/10/2019 03:49 AM    ALKPHOS 91 01/10/2019 03:49 AM   ]    Ascites fluid:   Albumin 1.2  Total protein 2.6  Differential: 1 PMN (no SBP)    AFP: 5.5    Imaging and  Procedures:  Labs and imaging results were reviewed.  MRI Abdomen: pending    Assessment:  Neena Marks is a 63 y.o. female with a history of ETOH cirrhosis complicated by ascites and esophageal varices (no prior bleeding/banding) who presented epigastric pain, weight loss, and blood in the stool associated with weakness.      Plan:  ETOH cirrhosis - quit drinking 1 year ago after diagnosis  - reinforce need for abstinence    Upper GI bleed - due to actively oozing PHG as well as possibly the esophageal varices now s/p banding  - PPI BID for 2 weeks, then daily  - continue octreotide x 72 hours then transition to non-selective beta blocker (propranolol 10 mg BID if BP can tolerate)  - continue ceftriaxone (or PO ciprofloxacin) x 1 week for infection prophylaxis in the setting of UGI bleeding  - if she has significant chest discomfort from banding, can use liquid carafate 10 mL QID for 1-2 weeks and GI cocktail with lidocaine  - will need EGD with potential banding again in 1 month (can be done with [facility] or [facility] hepatology)  - would only transfuse blood if Hgb < 7 (goal 7-8)  - okay with floor transfer today    Ascites  - s/p paracentesis yesterday with no evidence of SBP  - once PRACHI resolves, could resume home diuretics (lasix 40 mg daily, spironolactone 100 mg daily)  - if volume expansion needed, would use 25% albumin to reduce third spacing of fluid  - low sodium diet as tolerated    Hepatic encephalopathy  - no evidence of encephalopathy currently, but appears she was on lactulose 10 g BID - resume this if needed    Liver mass  - follow up results of MRI  - if MRI findings are characteristic of HCC, may need oncology evaluation; if non-diagnostic, AFP is not suggestive of HCC at 5.5, so she may need this biopsied by IR (based on location should be easily accessible via percutaneous route)    PRACHI - likely secondary to volume depletion in setting of GI bleed  - continue volume expansion with  albumin over crystalloids  - PO as tolerated    Need for screening colonoscopy    - no prior colonoscopy - can be set up for this outpatient at time of surveillance EGD    Plan of care was discussed with Dr. Brandon Farrar MD

## 2019-01-10 NOTE — PLAN OF CARE
Problem: Adult Inpatient Plan of Care  Goal: Plan of Care Review  Outcome: Ongoing (interventions implemented as appropriate)  Patient in no apparent distress. Sat's  92 % on room air . Will continue to monitor.

## 2019-01-10 NOTE — PT/OT/SLP EVAL
Physical Therapy Evaluation and Treatment    Patient Name:  Neena Marks   MRN:  [id number]    Recommendations:     Discharge Recommendations:  other (see comments)(home; she likely will not need further PT services following gait training in acute setting)   Discharge Equipment Recommendations: (Pt has received single point cane this admission)   Barriers to discharge: None    Assessment:     Neena Marks is a 63 y.o. female admitted with a medical diagnosis of Acute GI bleeding. Per Dr. Taylor on 1/8/19:   Chief Complaint   Patient presents with    Rectal Bleeding       Per [facility] pt reports + intermittent constipation x several days w/ bright red rectal bleeding new onst last PM. Denies fatigue, weakness or dizziness.     Upon PT evaluation today she presents with the following impairments/functional limitations:  impaired balance, gait instability that affect her safety with ambulation activity and increase her risk for falls.    Pt demonstrated good participation in OOB mobility despite c/o feeling cold. She required min A x 1 due to LOB in standing, otherwise CGA throughout ambulation approximately 30 ft in room. She is slightly impulsive with good mobility. She declined gait training with cane today, but agreeable for next session.      PT will continue to follow and progress as tolerated. If we are able to perform gait training with cane in acute setting, she will likely not need further PT services after discharge.     Rehab Prognosis: Good; patient would benefit from acute skilled PT services to address these deficits and reach maximum level of function.    Recent Surgery: Procedure(s) (LRB):  EGD (ESOPHAGOGASTRODUODENOSCOPY) (Left) 1 Day Post-Op    Plan:     During this hospitalization, patient to be seen 3 x/week to address the identified rehab impairments via gait training, therapeutic activities, therapeutic exercises, neuromuscular re-education and progress toward the following goals:    · Plan of Care  Expires:  19    Subjective     Chief Complaint: feeling cold  Patient/Family Comments/goals: agreeable to gait training with cane next session; eager to use the toilet  Pain/Comfort:  · Pain Rating 1: 8/10  · Location 1: throat  · Pain Addressed 1: Pre-medicate for activity  · Pain Rating Post-Intervention 1: 8/10    Living Environment:  · Per patient: Pt lives with daughter, son and granddaughter in a 1 story house with ramp to enter.    · Pt has a tub/shower with shower chair and a standard height toilet.   · Upon discharge, patient will have assistance from son and daughter.  Prior level of function:  · Ambulation: independent with no device  · ADL's: mod I with shower chair for seated bathing  · IADLs: Enjoys cooking, performs cleaning and grocery shopping. Transportation provided by daughter or lift  · Falls: denies  Equipment used at home:   · shower chair    Objective:     Communicated with bedside nurse Barry prior to session.  Patient found supine in bed with HOB elevated with telemetry, pulse ox (continuous), blood pressure cuff, peripheral IV upon PT entry to room.    General Precautions: Standard, (fall risk)   Orthopedic Precautions:N/A   Braces: N/A     Exams:  Cognition:   Patient is oriented to name, , name of facility, reason for admission, day of week, day of month, month, year.   Pt follows approximately 100% of simple commands.    Mood: Cooperative, slightly impulsive  Musculoskeletal:  Posture:    -       Rounded shoulders  LE ROM/Strength: No impairments noted with functional mobility as below. (-) knee buckling  Neuromuscular:  Sensation: Pt reports she sometimes has tingling in bilateral feet, however none present during PT session.   Coordination: (-) finger to nose and finger thumb opposition bilaterally  Tone/Reflexes: No impairments identified with functional mobility. No formal testing performed.   Balance: supervision static and dynamic sitting; CGA for static and dynamic  standing with min A for LOB   Visual-vestibular: No impairments identified with functional mobility. No formal testing performed.  Integument: visible skin intact  Cardiopulmonary:  Vital signs at end session: SpO2 on room air: 95% Heart rate 89 bpm. Blood pressure 147/82  Edema: none noted    Functional Mobility:  Bed Mobility:     Supine to Sit: mod I with HOB elevated  Sit to Supine: indep bed flat  Transfers:     Sit to Stand:  SBA with no AD x 1 from bed. Pt did not sit on toilet, elected to squat over toilet due to toilet seat being cold. Verbal cues for technique.   Gait: Approximately 30 ft total in room (bed>toilet>sink>bed) on level tile with no AD and constant CGA with min A for LOB x 1. Verbal cues for safety and navigation of obstacles.      Therapeutic Activities and Exercises:   -PT educated patient re: PT plan of care, role of PT, safety with OOB mobility, use of single point cane.   - (+) voiding squatting over toilet with SBA; hand hygiene at sink with CGA<>SBA.       AM-PAC 6 CLICK MOBILITY  Total Score:20     Patient left supine with all lines intact, call button in reach, bed alarm on and bedside nurse notified.    GOALS:   Multidisciplinary Problems     Physical Therapy Goals        Problem: Physical Therapy Goal    Goal Priority Disciplines Outcome Goal Variances Interventions   Physical Therapy Goal     PT, PT/OT Ongoing (interventions implemented as appropriate)     Description:  Goals to be met by: 19     Patient will increase functional independence with mobility by performin. Sit<>stand transfer with supervision with or without single point cane.   2. Gait > 150 feet with SBA with or without single point cane.   3. Ascend/descend ramp with unilateral handrails with CGA with or without single point cane.                      History:     Past Medical History:   Diagnosis Date    Cirrhosis        Past Surgical History:   Procedure Laterality Date    EGD  (ESOPHAGOGASTRODUODENOSCOPY) Left 1/9/2019    Performed by Madyson F[facility]ar [facility] at [facility] ENDO    ESOPHAGOGASTRODUODENOSCOPY (EGD) N/A 1/26/2018    Performed by Mark Urbina MD at [facility] ENDO    PARACENTESIS N/A 1/30/2018    Performed by Everett Fitzpatrick MD at [facility] CATH LAB    PARACENTESIS N/A 2/6/2015    Performed by Alejandro Myrick MD at [facility] CATH LAB       Clinical Decision Making:     History  Co-morbidities and personal factors that may impact the plan of care Examination  Body Structures and Functions, activity limitations and participation restrictions that may impact the plan of care Clinical Presentation   Decision Making/ Complexity Score   Co-morbidities:   [] Time since onset of injury / illness / exacerbation  [] Status of current condition  []Patient's cognitive status and safety concerns    [] Multiple Medical Problems (see med hx)  Personal Factors:   [] Patient's age  [] Prior Level of function   [] Patient's home situation (environment and family support)  [] Patient's level of motivation  [] Expected progression of patient      HISTORY:(criteria)    [] 20151 - no personal factors/history    [] 68383 - has 1-2 personal factor/comorbidity     [] 85559 - has >3 personal factor/comorbidity     Body Regions:  [] Objective examination findings  [] Head     []  Neck  [] Trunk   [] Upper Extremity  [] Lower Extremity    Body Systems:  [] For communication ability, affect, cognition, language, and learning style: the assessment of the ability to make needs known, consciousness, orientation (person, place, and time), expected emotional /behavioral responses, and learning preferences (eg, learning barriers, education  needs)  [] For the neuromuscular system: a general assessment of gross coordinated movement (eg, balance, gait, locomotion, transfers, and transitions) and motor function  (motor control and motor learning)  [] For the musculoskeletal system: the assessment of gross symmetry, gross  range of motion, gross strength, height, and weight  [] For the integumentary system: the assessment of pliability(texture), presence of scar formation, skin color, and skin integrity  [] For cardiovascular/pulmonary system: the assessment of heart rate, respiratory rate, blood pressure, and edema     Activity limitations:    [] Patient's cognitive status and saf ety concerns          [] Status of current condition      [] Weight bearing restriction  [] Cardiopulmunary Restriction    Participation Restrictions:   [] Goals and goal agreement with the patient     [] Rehab potential (prognosis) and probable outcome      Examination of Body System: (criteria)    [] 89710 - addressing 1-2 elements    [] 49771 - addressing a total of 3 or more elements     [] 44551 -  Addressing a total of 4 or more elements         Clinical Presentation: (criteria)  Choose one     On examination of body system using standardized tests and measures patient presents with (CHOOSE ONE) elements from any of the following: body structures and functions, activity limitations, and/or participation restrictions.  Leading to a clinical presentation that is considered (CHOOSE ONE)                              Clinical Decision Making  (Eval Complexity):  Choose One     Time Tracking:     PT Received On: 01/10/19  PT Start Time: 1215     PT Stop Time: 1234  PT Total Time (min): 19 min     Billable Minutes: Evaluation 10 and Therapeutic Activity 9   Overlap with OT for evaluation.       Aziza Staley, PT  01/10/2019

## 2019-01-10 NOTE — SUBJECTIVE & OBJECTIVE
Interval History: No acute events overnight. Complains of throat pain  No other concerns at this time.    Review of Systems   Constitutional: Negative for chills and fever.   HENT: Positive for sore throat.    Respiratory: Negative for cough and shortness of breath.    Cardiovascular: Negative for chest pain and palpitations.   Gastrointestinal: Positive for abdominal pain. Negative for nausea and vomiting.     Objective:     Vital Signs (Most Recent):  Temp: 98.9 °F (37.2 °C) (01/10/19 1700)  Pulse: 77 (01/10/19 1700)  Resp: 18 (01/10/19 1700)  BP: 132/76 (01/10/19 1700)  SpO2: 98 % (01/10/19 1700) Vital Signs (24h Range):  Temp:  [97.7 °F (36.5 °C)-98.9 °F (37.2 °C)] 98.9 °F (37.2 °C)  Pulse:  [64-88] 77  Resp:  [10-31] 18  SpO2:  [91 %-98 %] 98 %  BP: ()/(58-86) 132/76     Weight: 47.6 kg (104 lb 15 oz)  Body mass index is 21.2 kg/m².    Intake/Output Summary (Last 24 hours) at 1/10/2019 1743  Last data filed at 1/10/2019 1500  Gross per 24 hour   Intake 347.33 ml   Output 950 ml   Net -602.67 ml      Physical Exam   Constitutional: She is oriented to person, place, and time. She appears well-developed and well-nourished. No distress.   Chronically ill-appearing. Thin.   HENT:   Head: Normocephalic and atraumatic.   Eyes: Conjunctivae and EOM are normal.   Cardiovascular: Normal rate, regular rhythm, S1 normal, S2 normal and intact distal pulses.   Pulmonary/Chest: Effort normal and breath sounds normal.   Abdominal: Soft. She exhibits no distension. There is tenderness.   Musculoskeletal: Normal range of motion. She exhibits no edema.   Neurological: She is alert and oriented to person, place, and time.   Skin: Skin is warm and dry.   Nursing note and vitals reviewed.    Significant Labs:   CBC:  Recent Labs   Lab 01/09/19  1135 01/09/19  1752 01/10/19  0349   WBC 14.08* 12.28 10.49   HGB 10.0* 10.4* 9.9*   HCT 29.6* 31.4* 30.4*    216 196   GRAN 67.6  9.3* 70.3  8.6* 61.6  6.4   LYMPH 20.5  2.9  20.0  2.5 26.7  2.8   MONO 10.9  1.5* 8.8  1.1* 9.7  1.0   EOS 0.1 0.1 0.2   BASO 0.04 0.03 0.03      BMP:  Recent Labs   Lab 01/09/19  0718 01/10/19  0349    136   K 5.0 4.5    107   CO2 22* 19*   BUN 40* 32*   CREATININE 2.0* 1.7*   * 101   CALCIUM 8.5* 8.6*     Significant Imaging:   MRI Abdomen WO Contrast 01/09/19:  Cirrhosis with an 8.5 cm circumscribed mass in segment 6/7 of the right hepatic lobe.  The findings favor HCC, although lack of IV contrast limits detailed evaluation.  When the patient's condition permits, multiphase contrasted CT or MRI recommended.

## 2019-01-10 NOTE — NURSING TRANSFER
Nursing Transfer Note      1/10/2019     Transfer To: [id number]    Transfer via wheelchair    Transfer with cardiac monitoring    Transported by Pravin BARRY     Medicines sent: Patient specific    Chart send with patient: Yes    Notified: spouse    Patient reassessed at: 1/10/19, 1630     Upon arrival to floor: cardiac monitor applied, patient oriented to room, call bell in reach and bed in lowest position

## 2019-01-10 NOTE — PLAN OF CARE
Attempted to schedule hepatology follow up appt with Dr Josette Donohue - message sent to nurse who will contact patient or patient's daughter with appt date/time - AVS updated

## 2019-01-10 NOTE — NURSING
"Patient resting comfortably. PRN pain medicine administered due to patient's complaints of pain "down her throat." No signs of bleeding; no vomit or BM. Patient able to tolerate clear liquid diet. Patient voided via bedpan. Vitals WNL. Will continue to monitor.  "

## 2019-01-10 NOTE — PT/OT/SLP EVAL
Occupational Therapy   Evaluation    Name: Neena Marks  MRN: [id number]  Admitting Diagnosis:  Acute GI bleeding 1 Day Post-Op    Recommendations:     Discharge Recommendations: other (see comments)(home )  Discharge Equipment Recommendations:  other (see comments)(No DME needs anticipated )  Barriers to discharge:  Inaccessible home environment(step-in tub)    Assessment:   Initial OT eval/treat complete.  SBA for standing ADL tasks due to impaired balance/stability.  Demos impulsivity throughout ADL/functional transfers.  Has shower chair at home.  Recommend home with family with no post-acute therapy needs anticipated.  To benefit from continued acute care OT services to increase independence in self-care/functional transfers.  OT to follow.     Neena Marks is a 63 y.o. female with a medical diagnosis of Acute GI bleeding.  She presents with below deficits decreasing independence in self-care and functional transfers. Performance deficits affecting function: impaired endurance, gait instability, impaired balance, impaired self care skills, impaired functional mobilty, pain, decreased safety awareness.      Rehab Prognosis: Good; patient would benefit from acute skilled OT services to address these deficits and reach maximum level of function.       Plan:     Patient to be seen 4 x/week to address the above listed problems via self-care/home management, therapeutic activities, therapeutic exercises  · Plan of Care Expires: 01/24/19  · Plan of Care Reviewed with: patient    Subjective     Chief Complaint: With c/o throat pain.   Patient/Family Comments/goals: To return home.     Occupational Profile:  Living Environment: Lives with daughter, son, and granddaughter in [facility] with ramp entry; standard toilet; tub/shower combo with shower chair  Previous level of function: Independent with ADL except for use of shower chair for bathing tasks, independent with IADL, ambulating without AD, cooking and cleaning.  Daughter  drives to grocery store and to MD appts. And when daughter is working will take LIFT to appts. If needed.   Equipment Used at Home:  shower chair  Assistance upon Discharge: Family able to assist.     Pain/Comfort:  · Pain Rating 1: 9/10  · Location - Orientation 1: midline  · Location 1: throat  · Pain Addressed 1: Pre-medicate for activity, Distraction  · Pain Rating Post-Intervention 1: 8/10    Patients cultural, spiritual, Rastafari conflicts given the current situation: other (see comments)(None stated. )    Objective:     Communicated with: Nursing prior to session.  Patient found HOB elevated with peripheral IV(ICU monitoring) upon OT entry to room.    General Precautions: Standard, fall   Orthopedic Precautions:N/A   Braces: N/A     Occupational Performance:    Bed Mobility:    · Patient completed Scooting/Bridging with independence  · Patient completed Supine to Sit with modified independence  · Patient completed Sit to Supine with modified independence    Functional Mobility/Transfers:  · Patient completed Sit <> Stand Transfer with stand by assistance  with  no assistive device   · Patient completed Toilet Transfer Step Transfer technique with stand by assistance with  no AD  · Functional Mobility: Demos 1 LOB incident with household ambulation without AD though able to self correct.      Activities of Daily Living:  · Grooming: stand by assistance in stance at sink for hand hygiene  · Toileting: stand by assistance in stance for clothing managment    Cognitive/Visual Perceptual:  Cognitive/Psychosocial Skills:  -       Oriented to: Person, Place, Time and Situation   -       Follows Commands/attention:Inattentive, Easily distracted and Follows one-step commands  -       Communication: clear/fluent  -       Memory: No Deficits noted  -       Safety awareness/insight to disability: impaired   -       Mood/Affect/Coping skills/emotional control: Appropriate to situation and Cooperative  Visual/Perceptual:   -grossly intact    Physical Exam:  Postural examination/scapula alignment: -       No postural abnormalities identified  Skin integrity: Visible skin intact  Edema:  None noted  Sensation: -       Intact  light/touch and denies paraesthesias  Dominant hand: -       Right  Upper Extremity Range of Motion:  -       Right Upper Extremity: WFL  -       Left Upper Extremity: WFL  Upper Extremity Strength: -       Right Upper Extremity: WFL  -       Left Upper Extremity: WFL   Strength: -       Right Upper Extremity: WFL  -       Left Upper Extremity: WFL  Fine Motor Coordination: -       Intact  Left hand, manipulation of objects and Right hand, manipulation of objects    AMPAC 6 Click ADL:  AMPAC Total Score: 21    Treatment & Education:  Educated on role of OT, POC, and functional transfer/ADL safety.  Education:    Patient left HOB elevated with all lines intact, call button in reach and nursing notified    GOALS:   Multidisciplinary Problems     Occupational Therapy Goals        Problem: Occupational Therapy Goal    Goal Priority Disciplines Outcome Interventions   Occupational Therapy Goal     OT, PT/OT Ongoing (interventions implemented as appropriate)    Description:  Goals to be met by: 1/24/2019     Patient will increase functional independence with ADLs by performing:    UE Dressing with Modified [place].  LE Dressing with Supervision.  Grooming while standing at sink with Modified [place].  Toileting from toilet with Modified [place] for hygiene and clothing management.   Toilet transfer to toilet with Supervision.                       History:     Past Medical History:   Diagnosis Date    Cirrhosis        Past Surgical History:   Procedure Laterality Date    EGD (ESOPHAGOGASTRODUODENOSCOPY) Left 1/9/2019    Performed by [facility]arrar [facility] at [facility] ENDO    ESOPHAGOGASTRODUODENOSCOPY (EGD) N/A 1/26/2018    Performed by Mark Urbina MD at [facility] ENDO    PARACENTESIS N/A 1/30/2018     "Performed by [facility]rett Fitzpatrick MD at [facility] CATH LAB    PARACENTESIS N/A 2015    Performed by Alejandro Myrick MD at [facility] CATH LAB       Clinical Decision Makin.  OT Mod:  "Pt evaluation falls under moderate complexity for evaluation coding due to identification of 3-5 performance deficits noted as stated above. Eval required Min/Mod assistance to complete on this date and detailed assessment(s) were utilized. Moreover, an expanded review of history and occupational profile obtained with additional review of cognitive, physical and psychosocial hx."     Time Tracking:     OT Date of Treatment: 01/10/19  OT Start Time: 1215  OT Stop Time: 1233  OT Total Time (min): 18 min    Billable Minutes:Evaluation 18  Total Time 18 (overlap with PT)    Susan Fihser, OT  1/10/2019    "

## 2019-01-11 VITALS
WEIGHT: 110 LBS | BODY MASS INDEX: 22.18 KG/M2 | SYSTOLIC BLOOD PRESSURE: 120 MMHG | OXYGEN SATURATION: 97 % | RESPIRATION RATE: 16 BRPM | HEIGHT: 59 IN | DIASTOLIC BLOOD PRESSURE: 73 MMHG | TEMPERATURE: 99 F | HEART RATE: 69 BPM

## 2019-01-11 LAB
ALBUMIN SERPL BCP-MCNC: 3 G/DL
ALP SERPL-CCNC: 79 U/L
ALT SERPL W/O P-5'-P-CCNC: 24 U/L
ANION GAP SERPL CALC-SCNC: 9 MMOL/L
ANISOCYTOSIS BLD QL SMEAR: ABNORMAL
AST SERPL-CCNC: 44 U/L
BASOPHILS # BLD AUTO: 0.03 K/UL
BASOPHILS NFR BLD: 0.4 %
BILIRUB SERPL-MCNC: 1.1 MG/DL
BUN SERPL-MCNC: 17 MG/DL
CALCIUM SERPL-MCNC: 8.7 MG/DL
CHLORIDE SERPL-SCNC: 105 MMOL/L
CO2 SERPL-SCNC: 22 MMOL/L
CREAT SERPL-MCNC: 1.2 MG/DL
DIFFERENTIAL METHOD: ABNORMAL
EOSINOPHIL # BLD AUTO: 0.2 K/UL
EOSINOPHIL NFR BLD: 1.9 %
ERYTHROCYTE [DISTWIDTH] IN BLOOD BY AUTOMATED COUNT: ABNORMAL %
EST. GFR  (AFRICAN AMERICAN): 56 ML/MIN/1.73 M^2
EST. GFR  (NON AFRICAN AMERICAN): 49 ML/MIN/1.73 M^2
GIANT PLATELETS BLD QL SMEAR: PRESENT
GLUCOSE SERPL-MCNC: 100 MG/DL
HCT VFR BLD AUTO: 29.7 %
HGB BLD-MCNC: 9.6 G/DL
HYPOCHROMIA BLD QL SMEAR: ABNORMAL
LYMPHOCYTES # BLD AUTO: 2.3 K/UL
LYMPHOCYTES NFR BLD: 28 %
MCH RBC QN AUTO: 23.8 PG
MCHC RBC AUTO-ENTMCNC: 32.3 G/DL
MCV RBC AUTO: 74 FL
MONOCYTES # BLD AUTO: 0.9 K/UL
MONOCYTES NFR BLD: 10.2 %
NEUTROPHILS # BLD AUTO: 4.9 K/UL
NEUTROPHILS NFR BLD: 59.5 %
NRBC BLD-RTO: 1 /100 WBC
PLATELET # BLD AUTO: 150 K/UL
PLATELET BLD QL SMEAR: ABNORMAL
PMV BLD AUTO: ABNORMAL FL
POIKILOCYTOSIS BLD QL SMEAR: ABNORMAL
POLYCHROMASIA BLD QL SMEAR: ABNORMAL
POTASSIUM SERPL-SCNC: 4.4 MMOL/L
PROT SERPL-MCNC: 6.9 G/DL
RBC # BLD AUTO: 4.04 M/UL
SCHISTOCYTES BLD QL SMEAR: ABNORMAL
SCHISTOCYTES BLD QL SMEAR: PRESENT
SODIUM SERPL-SCNC: 136 MMOL/L
SPHEROCYTES BLD QL SMEAR: ABNORMAL
TARGETS BLD QL SMEAR: ABNORMAL
WBC # BLD AUTO: 8.32 K/UL

## 2019-01-11 PROCEDURE — C9113 INJ PANTOPRAZOLE SODIUM, VIA: HCPCS | Performed by: INTERNAL MEDICINE

## 2019-01-11 PROCEDURE — 85025 COMPLETE CBC W/AUTO DIFF WBC: CPT

## 2019-01-11 PROCEDURE — 97116 GAIT TRAINING THERAPY: CPT

## 2019-01-11 PROCEDURE — 63600175 PHARM REV CODE 636 W HCPCS: Performed by: INTERNAL MEDICINE

## 2019-01-11 PROCEDURE — 99239 PR HOSPITAL DISCHARGE DAY,>30 MIN: ICD-10-PCS | Mod: ,,, | Performed by: INTERNAL MEDICINE

## 2019-01-11 PROCEDURE — 25000003 PHARM REV CODE 250: Performed by: INTERNAL MEDICINE

## 2019-01-11 PROCEDURE — 80053 COMPREHEN METABOLIC PANEL: CPT

## 2019-01-11 PROCEDURE — 99239 HOSP IP/OBS DSCHRG MGMT >30: CPT | Mod: ,,, | Performed by: INTERNAL MEDICINE

## 2019-01-11 PROCEDURE — 94760 N-INVAS EAR/PLS OXIMETRY 1: CPT

## 2019-01-11 PROCEDURE — 36415 COLL VENOUS BLD VENIPUNCTURE: CPT

## 2019-01-11 RX ORDER — PANTOPRAZOLE SODIUM 40 MG/1
40 TABLET, DELAYED RELEASE ORAL 2 TIMES DAILY
Status: DISCONTINUED | OUTPATIENT
Start: 2019-01-11 | End: 2019-01-11 | Stop reason: HOSPADM

## 2019-01-11 RX ORDER — CIPROFLOXACIN 500 MG/1
500 TABLET ORAL EVERY 12 HOURS
Qty: 8 TABLET | Refills: 0 | Status: SHIPPED | OUTPATIENT
Start: 2019-01-11 | End: 2019-01-15

## 2019-01-11 RX ORDER — SUCRALFATE 1 G/10ML
1 SUSPENSION ORAL EVERY 6 HOURS PRN
Qty: 240 ML | Refills: 0 | Status: SHIPPED | OUTPATIENT
Start: 2019-01-11 | End: 2019-01-17

## 2019-01-11 RX ORDER — PROPRANOLOL HYDROCHLORIDE 10 MG/1
10 TABLET ORAL 2 TIMES DAILY
Qty: 60 TABLET | Refills: 2 | Status: SHIPPED | OUTPATIENT
Start: 2019-01-11 | End: 2019-07-12 | Stop reason: SDUPTHER

## 2019-01-11 RX ADMIN — OCTREOTIDE ACETATE 50 MCG/HR: 500 INJECTION, SOLUTION INTRAVENOUS; SUBCUTANEOUS at 02:01

## 2019-01-11 RX ADMIN — CIPROFLOXACIN HYDROCHLORIDE 500 MG: 500 TABLET, FILM COATED ORAL at 09:01

## 2019-01-11 RX ADMIN — SUCRALFATE 1 G: 1 SUSPENSION ORAL at 05:01

## 2019-01-11 RX ADMIN — PANTOPRAZOLE SODIUM 40 MG: 40 INJECTION, POWDER, FOR SOLUTION INTRAVENOUS at 09:01

## 2019-01-11 RX ADMIN — SUCRALFATE 1 G: 1 SUSPENSION ORAL at 12:01

## 2019-01-11 RX ADMIN — PROPRANOLOL HYDROCHLORIDE 10 MG: 10 TABLET ORAL at 09:01

## 2019-01-11 NOTE — PROGRESS NOTES
Gastroenterology Progress Note    Reason for Consult: cirrhosis, GI bleed    Subjective:  No further GI bleeding.  She's tolerating a soft diet.  Continues to have epigastric abdominal pain as well as chest pain associated with swallowing.  No confusion.  Hoping to be discharged soon.    ROS:  Gen: no F/C  CV: no palpitations, + substernal chest discomfort after banding of varices  Resp: no SOB/wheezing    Physical Exam  Vitals:    01/11/19 0735   BP:    Pulse: 64   Resp: 18   Temp:      Physical Exam   Constitutional: She is oriented to person, place, and time. She appears well-developed. No distress.   malnourished   HENT:   Head: Normocephalic and atraumatic.   Mouth/Throat: Oropharynx is clear and moist.   Eyes: EOM are normal. Pupils are equal, round, and reactive to light. No scleral icterus.   Cardiovascular: Normal rate and regular rhythm.   No murmur heard.  Pulmonary/Chest: Effort normal and breath sounds normal. She has no wheezes.   Abdominal: Soft. Bowel sounds are normal. She exhibits distension (mild, but tympanic to percussion). There is tenderness (in epigastrium). There is no guarding.   Musculoskeletal: Normal range of motion. She exhibits no edema.   Neurological: She is alert and oriented to person, place, and time. No sensory deficit.   No asterixis   Skin: Skin is warm and dry. No rash noted.   Vitals reviewed.      Medications/Infusions:  Current Facility-Administered Medications   Medication Dose Route Frequency Provider Last Rate Last Dose    acetaminophen tablet 650 mg  650 mg Oral Q8H PRN Lynn Garcia MD   650 mg at 01/10/19 0511    ciprofloxacin HCl tablet 500 mg  500 mg Oral Daily LIANA Taylor MD        HYDROmorphone injection 0.5 mg  0.5 mg Intravenous Q6H PRN LIANA Taylor MD   0.5 mg at 01/10/19 1955    lidocaine HCl 2% oral solution 10 mL  10 mL Oral Q6H PRN LIANA Taylor MD   10 mL at 01/10/19 1805    octreotide (SANDOSTATIN) 500 mcg in sodium chloride 0.9% 100  mL infusion  50 mcg/hr Intravenous Continuous LIANA Taylor MD 10 mL/hr at 01/11/19 0246 50 mcg/hr at 01/11/19 0246    ondansetron injection 4 mg  4 mg Intravenous Q8H PRN Lynn Garcia MD   4 mg at 01/09/19 1401    pantoprazole injection 40 mg  40 mg Intravenous BID Madyson Farrar MD   40 mg at 01/10/19 2043    propranolol tablet 10 mg  10 mg Oral BID LIANA Taylor MD   10 mg at 01/10/19 2034    sodium chloride 0.9% flush 3 mL  3 mL Intravenous PRN LIANA Taylor MD        sucralfate 100 mg/mL suspension 1 g  1 g Oral Q6H Madyson Farrar MD   1 g at 01/11/19 0550       Intake and Output:    Intake/Output Summary (Last 24 hours) at 1/11/2019 0812  Last data filed at 1/11/2019 0600  Gross per 24 hour   Intake --   Output 850 ml   Net -850 ml       Labs:  Lab Results   Component Value Date/Time    WBC 8.32 01/11/2019 04:33 AM    HGB 9.6 (L) 01/11/2019 04:33 AM    HCT 29.7 (L) 01/11/2019 04:33 AM     01/11/2019 04:33 AM    MCV 74 (L) 01/11/2019 04:33 AM     01/11/2019 04:33 AM    K 4.4 01/11/2019 04:33 AM     01/11/2019 04:33 AM    CO2 22 (L) 01/11/2019 04:33 AM    BUN 17 01/11/2019 04:33 AM    CREATININE 1.2 01/11/2019 04:33 AM     01/11/2019 04:33 AM    CALCIUM 8.7 01/11/2019 04:33 AM    MG 2.3 01/08/2019 07:12 AM    PHOS 3.1 01/25/2018 04:11 AM    INR 1.2 01/08/2019 07:12 AM    APTT <21.0 01/25/2018 04:11 AM   ]  Lab Results   Component Value Date/Time    PROT 6.9 01/11/2019 04:33 AM    ALBUMIN 3.0 (L) 01/11/2019 04:33 AM    BILITOT 1.1 (H) 01/11/2019 04:33 AM    AST 44 (H) 01/11/2019 04:33 AM    ALT 24 01/11/2019 04:33 AM    ALKPHOS 79 01/11/2019 04:33 AM   ]    Ascites fluid:   Albumin 1.2  Total protein 2.6  Differential: 1 PMN (no SBP)    AFP: 5.5    Imaging and Procedures:  Labs and imaging results were reviewed.  MRI Abdomen:   Cirrhosis with an 8.5 cm circumscribed mass in segment 6/7 of the right hepatic lobe.  The findings favor HCC, although lack of IV contrast limits  detailed evaluation.  When the patient's condition permits, multiphase contrasted CT or MRI recommended.    Assessment:  Neena Marks is a 63 y.o. female with a history of ETOH cirrhosis complicated by ascites and esophageal varices (no prior bleeding/banding) who presented epigastric pain, weight loss, and blood in the stool associated with weakness.      Plan:  ETOH cirrhosis - quit drinking 1 year ago after diagnosis  - reinforce need for abstinence    Upper GI bleed - due to actively oozing PHG as well as possibly the esophageal varices now s/p banding  - PPI BID for 2 weeks, then daily  - can transition from ceftriaxone to propranolol today and monitor for further bleeding/stability of blood pressure  - continue ceftriaxone (or PO ciprofloxacin) x 1 week for infection prophylaxis in the setting of UGI bleeding (to finish on 1/15/19)  - liquid carafate 10 mL QID for 1-2 weeks and GI cocktail with lidocaine for post banding pain  - will need EGD with potential banding again in 1 month (can be done with [facility] or [facility] hepatology)  - would only transfuse blood if Hgb < 7 (goal 7-8)    Ascites  - s/p paracentesis with no evidence of SBP  - once PRACHI resolves, could resume home diuretics (lasix 40 mg daily, spironolactone 100 mg daily)  - if volume expansion needed, would use 25% albumin to reduce third spacing of fluid  - low sodium diet as tolerated    Hepatic encephalopathy  - no evidence of encephalopathy currently, but appears she was on lactulose 10 g BID - resume this if needed    Liver mass  - renal function continues to improve, almost back to baseline of ~0.8; could consider CT liver protocol tomorrow if Cr better; otherwise this could be set up outpatient prior to her visit with [facility] hepatology    PRACHI - likely secondary to volume depletion in setting of GI bleed  - volume expansion with albumin over crystalloids  - PO as tolerated    Need for screening colonoscopy    - no prior  colonoscopy - can be set up for this outpatient at time of surveillance EGD    Will sign off; please call back with any questions/concerns    Madyson Farrar MD

## 2019-01-11 NOTE — PLAN OF CARE
Met with patient at bedside - patient denied any DC needs       01/11/19 0944   Final Note   Assessment Type Final Discharge Note   Anticipated Discharge Disposition Home   What phone number can be called within the next 1-3 days to see how you are doing after discharge? [phone]   Discharge plans and expectations educations in teach back method with documentation complete? Yes   Right Care Referral Info   Post Acute Recommendation No Care

## 2019-01-11 NOTE — ASSESSMENT & PLAN NOTE
- Cirrhosis with h/o alcohol abuse.  - CT, MRI demonstrating large mass in posterior R hepatic lobe concerning for HCC; AFP not significantly elevated, however. Will need outpatient liver biopsy and follow-up with hepatology at [facility].  - Appreciate GI assistance.

## 2019-01-11 NOTE — NURSING
Pt discharged per MD orders. Discharge paperwork and instructions given. Pt verbalized 100% understanding. IV removed with catheter intact. No signs of redness or bleeding.

## 2019-01-11 NOTE — PLAN OF CARE
Problem: Adult Inpatient Plan of Care  Goal: Plan of Care Review  Outcome: Ongoing (interventions implemented as appropriate)  POC reviewed with patient. Patient free from falls or injury throughout shift. Purposeful rounding completed, no needs at this time. All safety measures in place. Will continue to monitor.

## 2019-01-11 NOTE — DISCHARGE INSTRUCTIONS
Follow up with PCP.   Activity as tolerated.   Return to ER with any worsening symptoms.         Hold the furosemide and spironolactone until Monday, 01/14/19. On Monday you can resume these as usually prescribed.        Thank you for choosing [facility] as your Health Care Provider. [facility] strives to provide the best healthcare available to you. In the next few days you may receive a Survey, either by mail or email,  asking you to rate our care that was provided to you during your stay.  Please return the survey to us, as your feedback is important. We aim to meet your expectations of safe, quality health care.    From your [facility] Team.

## 2019-01-11 NOTE — ASSESSMENT & PLAN NOTE
- PRACHI suspect secondary to blood loss anemia in the setting of GI bleed.  - Improving with control of bleeding; will give albumin 25g IV once in addition.

## 2019-01-11 NOTE — PT/OT/SLP PROGRESS
Physical Therapy Treatment and Discharge Summary    Patient Name:  Neena Marks   MRN:  [id number]    Recommendations:     Discharge Recommendations:  (home; no further PT services indicated after discharge)   Discharge Equipment Recommendations: (Pt has received single point cane)   Barriers to discharge: None    Assessment:     Neena Marks is a 63 y.o. female admitted with a medical diagnosis of Acute GI bleeding.  She presents with the following impairments/functional limitations:  impaired balance.    Patient completed gait training with her single point cane today. 2/3 PT goals met. No further acute PT needs with discharge home. Discussed face to face with Dr. Taylor and bedside nurse Viri. Do not anticipate pt will have difficulty ascending ramp into home. PT signing off.     Recent Surgery: Procedure(s) (LRB):  PARACENTESIS, ABDOMINAL (N/A) 2 Days Post-Op    Plan:      (no further acute PT)     · Plan of Care Expires:  02/09/19    Subjective     Chief Complaint: feeling cold, urinary urgency  Patient/Family Comments/goals: void, put on robe, practice with cane  Pain/Comfort:  · Pain Rating 1: 8/10  · Location 1: throat  · Pain Addressed 1: Other (see comments)(Pt reports she has been using throat spray and throat lozenges)  · Pain Rating Post-Intervention 1: 8/10      Objective:     Communicated with bedside nurse Viri prior to session.  Patient found supine in bed with HOB elevated with peripheral IV upon PT entry to room. Viri present.     General Precautions: Standard, (fall risk)   Orthopedic Precautions:N/A   Braces: N/A     Functional Mobility:  Bed Mobility:     Supine to Sit: indep  Sit to Supine: indep  Transfers:     Sit to Stand:  indep with no AD x 2. Verbal cues for technique.   Gait: Approximately 240 ft on level tile with single point cane and supervision progressing to mod I using modified 2 point gait pattern. Mild path deviation noted with horizontal head turns. No LOB    AM-PAC 6  CLICK MOBILITY  Turning over in bed (including adjusting bedclothes, sheets and blankets)?: 4  Sitting down on and standing up from a chair with arms (e.g., wheelchair, bedside commode, etc.): 4  Moving from lying on back to sitting on the side of the bed?: 4  Moving to and from a bed to a chair (including a wheelchair)?: 4  Need to walk in hospital room?: 4  Climbing 3-5 steps with a railing?: 4  Basic Mobility Total Score: 24       Therapeutic Activities and Exercises:   -Assist with adjustment of single point cane for proper fit. Affixed patient label to cane.   -Discussed progressing with PT goals and discontinuation of PT. Pt agreeable.     Patient left supine with all lines intact, call button in reach and bedside nurse notified..    GOALS:   Multidisciplinary Problems     Physical Therapy Goals     Not on file          Multidisciplinary Problems (Resolved)        Problem: Physical Therapy Goal    Goal Priority Disciplines Outcome Goal Variances Interventions   Physical Therapy Goal   (Resolved)     PT, PT/OT Outcome(s) achieved     Description:  Goals to be met by: 19     Patient will increase functional independence with mobility by performin. Sit<>stand transfer with supervision with or without single point cane. -- MET 19  2. Gait > 150 feet with SBA with or without single point cane. -- MET 19  3. Ascend/descend ramp with unilateral handrails with CGA with or without single point cane.                       Time Tracking:     PT Received On: 19  PT Start Time: 927     PT Stop Time: 944  PT Total Time (min): 17 min     Billable Minutes: Gait Training 17    Treatment Type: Treatment  PT/PTA: PT     PTA Visit Number: 0     Aziza Staley, PT  2019

## 2019-01-11 NOTE — DISCHARGE SUMMARY
[facility] Hospital Medicine  Discharge Summary      Patient Name: Neena Marks  MRN: [id number]  Admission Date: 1/8/2019  Hospital Length of Stay: 3 days  Discharge Date and Time: 1/11/2019 11:09 AM  Attending Physician: Emily att. providers found   Discharging Provider: EVAN Taylor MD  Primary Care Provider: [facility]    HPI:   Ms. Marks is a 63/F with PMH alcoholic cirrhosis with grade II varices, h/o GI bleeding (s/p EGD 01/2018) who presented to ED 01/08 with a one-day history of bright red blood per rectum and fatigue. She states that she noted blood in her stool the morning of admission, but reports having had some darker stools closer to [place] as well. With worsening fatigue and blood noted, she presented to ED for further evaluation. Initially denied hematemesis but during CT in ED had coffee-ground emesis and subsequently true hematemesis as well. Labwork performed in ED demonstrated Hgb of 3.6. She was subsequently admitted for further evaluation.    Procedure(s) (LRB):  PARACENTESIS, ABDOMINAL (N/A)      Hospital Course:   After admission GI was consulted and patient was transfused pRBCs with adequate response. EGD was performed 01/10 demonstrating oozing portal hypertensive gastropathy and varices, which were banded. She was transitioned from ceftriaxone to ciprofloxacin PO for SBP prophylaxis and to PO pantoprazole, with carafate for post-procedural discomfort. MRI was obtained to further characterize her liver lesion demonstrating a >8cm lesion; this was discussed with the patient in detail. AFP was negative, making HCC seem less likely. Paracentesis was performed with IR without evidence of SBP. She had PRACHI in the setting of GIB which was trending downward; she will resume her outpatient diuretics after several more days of abstaining. Arrangements were made for outpatient IR liver biopsy as well as CT triple phase to further evaluate her liver, and she  "will need follow-up with hepatology in the near future.    Consults:   Consults (From admission, onward)        Status Ordering Provider     Inpatient consult to Gastroenterology  Once     Provider:  (Not yet assigned)    Completed CHALO ARNOLD     Inpatient consult to Interventional Radiology  Once     Provider:  Everett Fitzpatrick MD    Completed LIANA ALVES          No new Assessment & Plan notes have been filed under this hospital service since the last note was generated.  Service: Hospital Medicine    Final Active Diagnoses:    Diagnosis Date Noted POA    PRINCIPAL PROBLEM:  Acute GI bleeding [K92.2] 01/08/2019 Yes    Portal hypertensive gastropathy [K76.6, K31.89] 01/10/2019 Yes     Chronic    Acute blood loss anemia [D62] 01/08/2019 Yes    PRACHI (acute kidney injury) [N17.9] 01/08/2019 Yes    Alcoholic cirrhosis [K70.30] 01/08/2019 Yes     Chronic    Esophageal varices in alcoholic cirrhosis [K70.30, I85.10] 01/08/2019 Yes     Chronic      Problems Resolved During this Admission:       Discharged Condition: fair    Disposition: Home or Self Care    Follow Up:  Follow-up Information     Josette Donohue MD.    Specialties:  Transplant, Hepatology  Why:  nurse will contact patient with appt date/time for hepatology follow up   Contact information:  [address] [facility] [address]  [phone]             [facility] In 1 week.    Why:  post-hospital follow-up  Contact information:  [address] [facility] [id number] [phone]                 Patient Instructions:      CANE FOR HOME USE     Order Specific Question Answer Comments   Type of Cane: Straight    Height: 4' 11" (1.499 m)    Weight: 45.9 kg (101 lb 3.1 oz)    Does patient have medical equipment at home? none    Length of need (1-99 months): 99    Please check all that apply: Patient's condition impairs ambulation.    Vendor: [employer] VIANEY Ok to pull from depot   Expected Date of Delivery: 1/9/2019  "     IR Biopsy Liver   Standing Status: Future Standing Exp. Date: 01/11/20     Order Specific Question Answer Comments   Has the patient had a prior contrast or iodine reaction? No    Is the patient currently on any blood thinners like Aspirin, Coumadin, or Plavix? No    Is the patient on any Metformin drug, such as Glucophage or Glucovance? No    May the Radiologist modify the order per protocol to meet the clinical needs of the patient? Yes      CT Abdomen Pelvis With Contrast   Standing Status: Future Standing Exp. Date: 01/11/20     Order Specific Question Answer Comments   Is the patient allergic to iodine or contrast? Has a steroid / antihistamine prep been administered? No    Is the patient on ANY Metformin drug such as Glucophage/Glucovance?           Should be off drug 48 hours after contrast. Check renal function before restart. No    History of Kidney Disease - including: decreased kidney function, dialysis, kidney transplay, single kidney, kidney cancer, kidney surgery? Decreased Kidney Function    Does the patient have high blood pressure requiring medical treatment? No    Diabetes? No    May the Radiologist modify the order per protocol to meet the clinical needs of the patient? Yes    Oral/Rectal Contrast instructions: NO Oral Contrast    Special CT ABD Protocol Request? Triple Phase Liver      Notify your health care provider if you experience any of the following:  severe uncontrolled pain     Notify your health care provider if you experience any of the following:  persistent nausea and vomiting or diarrhea     Notify your health care provider if you experience any of the following:  difficulty breathing or increased cough     Notify your health care provider if you experience any of the following:  temperature >100.4     Activity as tolerated     Significant Diagnostic Studies:  EGD 01/09/19:    - Grade II esophageal varices. Completely eradicated. Banded.                        - Small hiatal  hernia.                        - Portal hypertensive gastropathy. Treated with                         argon plasma coagulation (APC).                        - Normal examined duodenum.                        - No specimens collected.    MRI Abd/Pelvis WO Contrast 01/09/19:  Cirrhosis with an 8.5 cm circumscribed mass in segment 6/7 of the right hepatic lobe.  The findings favor HCC, although lack of IV contrast limits detailed evaluation.  When the patient's condition permits, multiphase contrasted CT or MRI recommended.     Recent Results (from the past 48 hour(s))   Comprehensive metabolic panel    Collection Time: 01/10/19  3:49 AM   Result Value Ref Range    Sodium 136 136 - 145 mmol/L    Potassium 4.5 3.5 - 5.1 mmol/L    Chloride 107 95 - 110 mmol/L    CO2 19 (L) 23 - 29 mmol/L    Glucose 101 70 - 110 mg/dL    BUN, Bld 32 (H) 8 - 23 mg/dL    Creatinine 1.7 (H) 0.5 - 1.4 mg/dL    Calcium 8.6 (L) 8.7 - 10.5 mg/dL    Total Protein 7.2 6.0 - 8.4 g/dL    Albumin 2.5 (L) 3.5 - 5.2 g/dL    Total Bilirubin 1.0 0.1 - 1.0 mg/dL    Alkaline Phosphatase 91 55 - 135 U/L    AST 58 (H) 10 - 40 U/L    ALT 30 10 - 44 U/L    Anion Gap 10 8 - 16 mmol/L    eGFR if African American 37 (A) >60 mL/min/1.73 m^2    eGFR if non African American 32 (A) >60 mL/min/1.73 m^2   CBC auto differential    Collection Time: 01/10/19  3:49 AM   Result Value Ref Range    WBC 10.49 3.90 - 12.70 K/uL    RBC 4.16 4.00 - 5.40 M/uL    Hemoglobin 9.9 (L) 12.0 - 16.0 g/dL    Hematocrit 30.4 (L) 37.0 - 48.5 %    MCV 73 (L) 82 - 98 fL    MCH 23.8 (L) 27.0 - 31.0 pg    MCHC 32.6 32.0 - 36.0 g/dL    RDW 27.5 (H) 11.5 - 14.5 %    Platelets 196 150 - 350 K/uL    MPV SEE COMMENT 9.2 - 12.9 fL    Gran # (ANC) 6.4 1.8 - 7.7 K/uL    Lymph # 2.8 1.0 - 4.8 K/uL    Mono # 1.0 0.3 - 1.0 K/uL    Eos # 0.2 0.0 - 0.5 K/uL    Baso # 0.03 0.00 - 0.20 K/uL    Gran% 61.6 38.0 - 73.0 %    Lymph% 26.7 18.0 - 48.0 %    Mono% 9.7 4.0 - 15.0 %    Eosinophil% 1.7 0.0 - 8.0 %     Basophil% 0.3 0.0 - 1.9 %    Platelet Estimate Appears normal     Aniso Moderate     Poik Moderate     Poly Occasional     Hypo Occasional     Ovalocytes Occasional     Target Cells Occasional     Spherocytes Occasional     Schistocytes Present     Large/Giant Platelets Present     Fragmented Cells Moderate     Differential Method Automated    CBC auto differential    Collection Time: 01/10/19  4:52 PM   Result Value Ref Range    WBC 10.32 3.90 - 12.70 K/uL    RBC 4.12 4.00 - 5.40 M/uL    Hemoglobin 9.8 (L) 12.0 - 16.0 g/dL    Hematocrit 30.2 (L) 37.0 - 48.5 %    MCV 73 (L) 82 - 98 fL    MCH 23.8 (L) 27.0 - 31.0 pg    MCHC 32.5 32.0 - 36.0 g/dL    RDW 27.8 (H) 11.5 - 14.5 %    Platelets 188 150 - 350 K/uL    MPV SEE COMMENT 9.2 - 12.9 fL    Gran # (ANC) 6.8 1.8 - 7.7 K/uL    Lymph # 2.4 1.0 - 4.8 K/uL    Mono # 0.9 0.3 - 1.0 K/uL    Eos # 0.2 0.0 - 0.5 K/uL    Baso # 0.02 0.00 - 0.20 K/uL    Gran% 66.4 38.0 - 73.0 %    Lymph% 23.6 18.0 - 48.0 %    Mono% 8.3 4.0 - 15.0 %    Eosinophil% 1.5 0.0 - 8.0 %    Basophil% 0.2 0.0 - 1.9 %    Platelet Estimate Clumped (A)     Aniso Moderate     Poik Moderate     Poly Occasional     Hypo Occasional     Ovalocytes Occasional     Target Cells Occasional     Tear Drop Cells Occasional     Spherocytes Occasional     Schistocytes Present     Large/Giant Platelets Present     Differential Method Automated    Comprehensive metabolic panel    Collection Time: 01/11/19  4:33 AM   Result Value Ref Range    Sodium 136 136 - 145 mmol/L    Potassium 4.4 3.5 - 5.1 mmol/L    Chloride 105 95 - 110 mmol/L    CO2 22 (L) 23 - 29 mmol/L    Glucose 100 70 - 110 mg/dL    BUN, Bld 17 8 - 23 mg/dL    Creatinine 1.2 0.5 - 1.4 mg/dL    Calcium 8.7 8.7 - 10.5 mg/dL    Total Protein 6.9 6.0 - 8.4 g/dL    Albumin 3.0 (L) 3.5 - 5.2 g/dL    Total Bilirubin 1.1 (H) 0.1 - 1.0 mg/dL    Alkaline Phosphatase 79 55 - 135 U/L    AST 44 (H) 10 - 40 U/L    ALT 24 10 - 44 U/L    Anion Gap 9 8 - 16 mmol/L    eGFR if   56 (A) >60 mL/min/1.73 m^2    eGFR if non African American 49 (A) >60 mL/min/1.73 m^2   CBC auto differential    Collection Time: 01/11/19  4:33 AM   Result Value Ref Range    WBC 8.32 3.90 - 12.70 K/uL    RBC 4.04 4.00 - 5.40 M/uL    Hemoglobin 9.6 (L) 12.0 - 16.0 g/dL    Hematocrit 29.7 (L) 37.0 - 48.5 %    MCV 74 (L) 82 - 98 fL    MCH 23.8 (L) 27.0 - 31.0 pg    MCHC 32.3 32.0 - 36.0 g/dL    RDW SEE COMMENT 11.5 - 14.5 %    Platelets 150 150 - 350 K/uL    MPV SEE COMMENT 9.2 - 12.9 fL    Gran # (ANC) 4.9 1.8 - 7.7 K/uL    Lymph # 2.3 1.0 - 4.8 K/uL    Mono # 0.9 0.3 - 1.0 K/uL    Eos # 0.2 0.0 - 0.5 K/uL    Baso # 0.03 0.00 - 0.20 K/uL    nRBC 1 (A) 0 /100 WBC    Gran% 59.5 38.0 - 73.0 %    Lymph% 28.0 18.0 - 48.0 %    Mono% 10.2 4.0 - 15.0 %    Eosinophil% 1.9 0.0 - 8.0 %    Basophil% 0.4 0.0 - 1.9 %    Platelet Estimate Appears normal     Aniso Moderate     Poik Moderate     Poly Occasional     Hypo Occasional     Target Cells Occasional     Spherocytes Occasional     Schistocytes Present     Large/Giant Platelets Present     Fragmented Cells Occasional     Differential Method Automated      Pending Diagnostic Studies:     Procedure Component Value Units Date/Time    Cytology Specimen-Medical Cytology (Fluid/Wash/Brush) [[id number]] Collected:  01/09/19 1358    Order Status:  Sent Lab Status:  No result     Specimen:  Other specimen location (comment)          Medications:  Reconciled Home Medications:      Medication List      START taking these medications    ciprofloxacin HCl 500 MG tablet  Commonly known as:  CIPRO  Take 1 tablet (500 mg total) by mouth every 12 (twelve) hours. for 4 days     propranolol 10 MG tablet  Commonly known as:  INDERAL  Take 1 tablet (10 mg total) by mouth 2 (two) times daily.     sucralfate 100 mg/mL suspension  Commonly known as:  CARAFATE  Take 10 mLs (1 g total) by mouth every 6 (six) hours as needed (Pain).        CONTINUE taking these medications     cholecalciferol (vitamin D3) 50,000 unit Tab  Take 125 mcg by mouth.     cyclobenzaprine 10 MG tablet  Commonly known as:  FLEXERIL  Take 10 mg by mouth nightly as needed for Muscle spasms.     cyproheptadine 4 mg tablet  Commonly known as:  PERIACTIN  Take 4 mg by mouth 2 (two) times daily as needed.     ferrous sulfate 325 mg (65 mg iron) Tab tablet  Commonly known as:  FEOSOL  Take 325 mg by mouth daily with breakfast.     furosemide 40 MG tablet  Commonly known as:  LASIX  Take 40 mg by mouth once daily.     hydrOXYzine HCl 10 MG Tab  Commonly known as:  ATARAX  Take 25 mg by mouth daily as needed.     lactulose 10 gram/15 ml 10 gram/15 mL (15 mL) solution  Commonly known as:  CHRONULAC  Take 10 g by mouth 2 (two) times daily.     meloxicam 15 MG tablet  Commonly known as:  MOBIC  Take 15 mg by mouth nightly as needed for Pain.     ONE DAILY MULTIVITAMIN per tablet  Generic drug:  multivitamin  Take 1 tablet by mouth once daily.     spironolactone 100 MG tablet  Commonly known as:  ALDACTONE  Take 1 tablet (100 mg total) by mouth once daily.        STOP taking these medications    acetaminophen 500 MG tablet  Commonly known as:  TYLENOL            Indwelling Lines/Drains at time of discharge:   Lines/Drains/Airways     Peripherally Inserted Central Catheter Line                 PICC Double Lumen 01/30/18 0931 right basilic 345 days          Airway                 Airway - Non-Surgical 01/26/18 0720 Nasal Cannula 349 days         Airway - Non-Surgical 01/09/19 0755  1 day                Time spent on the discharge of patient: 35 minutes  Patient was seen and examined on the date of discharge and determined to be suitable for discharge.    EVAN Taylor MD  Department of Hospital Medicine  [facility]

## 2019-01-11 NOTE — ASSESSMENT & PLAN NOTE
- Acute GI bleeding with portal hypertensive gastropathy and varices.  - EGD performed 01/09.  - Hgb stable; monitor q12hr.  - Continuing pantoprazole 40mg IV BID, octreotide gtt.  - Convert to ciprofloxacin 500mg PO daily from ceftriaxone as SBP prophylaxis in the setting of variceal bleed.  - Appreciate GI assistance.

## 2019-01-11 NOTE — PLAN OF CARE
Problem: Physical Therapy Goal  Goal: Physical Therapy Goal  Goals to be met by: 19     Patient will increase functional independence with mobility by performin. Sit<>stand transfer with supervision with or without single point cane. -- MET 19  2. Gait > 150 feet with SBA with or without single point cane. -- MET 19  3. Ascend/descend ramp with unilateral handrails with CGA with or without single point cane.     Outcome: Outcome(s) achieved Date Met: 19  2/3 PT goals met today. No further acute PT needs with discharge home. Discussed face to face with Dr. Taylor and bedside nurse Viri. Do not anticipate pt will have difficulty ascending steps into home. PT signing off.

## 2019-01-11 NOTE — PLAN OF CARE
Problem: Adult Inpatient Plan of Care  Goal: Plan of Care Review  Outcome: Ongoing (interventions implemented as appropriate)  Pt in no distress on room air, will continue to monitor.

## 2019-01-12 NOTE — PT/OT/SLP DISCHARGE
Occupational Therapy Discharge Summary    Neena Marks  MRN: [id number]   Principal Problem: Acute GI bleeding      Patient Discharged from acute Occupational Therapy on 1/11/19.  Please refer to prior OT note dated 1/10/19 for functional status.    Assessment:      Goals partially met.    Objective:     GOALS:   Multidisciplinary Problems     Occupational Therapy Goals     Not on file          Multidisciplinary Problems (Resolved)        Problem: Occupational Therapy Goal    Goal Priority Disciplines Outcome Interventions   Occupational Therapy Goal   (Resolved)     OT, PT/OT Outcome(s) achieved    Description:  Goals to be met by: 1/24/2019     Patient will increase functional independence with ADLs by performing:    UE Dressing with Modified [place].  LE Dressing with Supervision.  Grooming while standing at sink with Modified [place].  Toileting from toilet with Modified [place] for hygiene and clothing management.   Toilet transfer to toilet with Supervision.                       Reasons for Discontinuation of Therapy Services  hospital discharge      Plan:     Patient Discharged to: home or self-care; no therapy services recommended for discharge    BENJAMIN Deleon  1/12/2019

## 2019-01-14 ENCOUNTER — NURSE TRIAGE (OUTPATIENT)
Dept: ADMINISTRATIVE | Facility: CLINIC | Age: 64
End: 2019-01-14

## 2019-01-14 LAB — BACTERIA FLD CULT: NORMAL

## 2019-01-14 NOTE — TELEPHONE ENCOUNTER
Patient was c/o pain and discussing her hospital visit. She asked if I would hold on and no one returned to the phone after 5 minutes.    Reason for Disposition   Caller hangs up   Caller hangs up     Patient was c/o pain and discussing her hospital visit. She asked if I would hold on and no one returned to the phone after 5 minutes.    Additional Information   Negative: Patient sounds very sick or weak to the triager     Patient was c/o pain and discussing her hospital visit. She asked if I would hold on and no one returned to the phone after 5 minutes.    Answer Assessment - Initial Assessment Questions  1. SITUATION:  Document reason for call.      pain  2. BACKGROUND: Document any background information (e.g., prior calls, known psychiatric history)      unknown  3. ASSESSMENT: Document your nursing assessment.      Patient was c/o pain and discussing her hospital visit. She asked if I would hold on and no one returned to the phone after 5 minutes.  4. RESPONSE: Document what your response or recommendation was.      I hung up after 5 minutes    Protocols used: ST NO CONTACT OR DUPLICATE CONTACT CALL-A-, ST DIFFICULT CALLER-A-

## 2019-01-14 NOTE — TELEPHONE ENCOUNTER
Family is wanting to speak with MD about symptoms patient is having. Asking to speak with nurse or MD in clinic. Please contact to advise    Reason for Disposition   Nursing judgment    Protocols used: ST NO PROTOCOL CALL: INFORMATION ONLY-A-[place]

## 2019-01-15 ENCOUNTER — TELEPHONE (OUTPATIENT)
Dept: HEPATOLOGY | Facility: CLINIC | Age: 64
End: 2019-01-15

## 2019-01-15 NOTE — TELEPHONE ENCOUNTER
MA called patient schedule her CT-scan and follow up visit to see Dr. Dnoohue. Mailed appt reminder to patient.

## 2019-01-15 NOTE — TELEPHONE ENCOUNTER
----- Message from Josette Donohue MD sent at 1/14/2019  7:44 PM CST -----  This patient needs a tpCT and clinic visit for same day.  There is a significant concern for liver cancer so this should be scheduled ASAP.  CT can be scheduled before clinic visit it there are scheduling issues.    Thanks,  CB    ----- Message -----  From: LIANA Taylor MD  Sent: 1/11/2019   7:10 PM  To: MD Dr. Charanjit Merino,    MsHarpal Marks was admitted with GI bleed from 01/08 until 01/11; she was found to have portal hypertensive gastropathy as well as grade II varices. Of note, during her evaluation, she was found to have a ~8cm lesion in the liver that was not present on prior imaging. She has been ordered an IR liver biopsy as well as CT triple phase and we are attempting to get her back into clinic with you for follow-up; per chart review she had no prior history of hepatitis, and AFP was not significantly elevated, so our suspicion for HCC is somewhat lower, but we do believe she will need a biopsy in the near future.    Please let me know if you have any questions.    Thank you,  EVAN Osullivan MD

## 2019-01-21 ENCOUNTER — HOSPITAL ENCOUNTER (OUTPATIENT)
Dept: RADIOLOGY | Facility: HOSPITAL | Age: 64
Discharge: HOME | End: 2019-01-21
Attending: INTERNAL MEDICINE
Payer: MEDICAID

## 2019-01-21 DIAGNOSIS — K92.2 ACUTE GI BLEEDING: ICD-10-CM

## 2019-01-21 PROCEDURE — 25500020 PHARM REV CODE 255: Performed by: INTERNAL MEDICINE

## 2019-01-21 PROCEDURE — 74177 CT ABD & PELVIS W/CONTRAST: CPT | Mod: 26,,, | Performed by: RADIOLOGY

## 2019-01-21 PROCEDURE — 74177 CT ABDOMEN PELVIS WITH CONTRAST: ICD-10-PCS | Mod: 26,,, | Performed by: RADIOLOGY

## 2019-01-21 PROCEDURE — 74177 CT ABD & PELVIS W/CONTRAST: CPT | Mod: TC

## 2019-01-21 RX ADMIN — IOHEXOL 75 ML: 350 INJECTION, SOLUTION INTRAVENOUS at 05:01

## 2019-01-22 ENCOUNTER — TELEPHONE (OUTPATIENT)
Dept: HEPATOLOGY | Facility: CLINIC | Age: 64
End: 2019-01-22

## 2019-01-22 NOTE — TELEPHONE ENCOUNTER
Patient: Neena Marks       MRN: [id number]      : 1955     Age: 63 y.o.  [address]  [address]  [place] [address]    Provider: Hepatologist Ren Donohue    Urgency of review: urgent    Patient Transplant Status: Other new liver lesion    Reason for presentation: Indeterminate lesion    Clinical Summary: 64yo female with alcoholic cirrhosis lost to follow-up.  Presented to ED for rectal bleeding and during work-up had imaging concerning for liver mass but not proper protocol to determine HCC.  Following discharge, TPCT obtained and appears consistent with large HCC.  Not likely transplant candidate due to tumor burden but unclear social status, including alcohol intake, as patient has not been seen in follow-up at this time.  Scheduled to return to clinic in 3 weeks.  Preserved synthetic function and MELD 15 based on elevated creatinine, which has currently improved (Cr 1.2).      Imaging to be reviewed: CT 2019    HCC Treatment History: none    ABO: B    Platelets:   Lab Results   Component Value Date/Time     2019 04:33 AM     Creatinine:   Lab Results   Component Value Date/Time    CREATININE 1.2 2019 04:33 AM     Bilirubin:   Lab Results   Component Value Date/Time    BILITOT 1.1 (H) 2019 04:33 AM     AFP Last 3 each if available:   Lab Results   Component Value Date/Time    AFP 5.5 2019 07:26 PM    AFP 7.6 2018 12:29 PM    AFP 21 (H) 2015 06:42 PM       MELD: MELD-Na score: 15 at 1/10/2019  3:49 AM  MELD score: 14 at 1/10/2019  3:49 AM  Calculated from:  Serum Creatinine: 1.7 mg/dL at 1/10/2019  3:49 AM  Serum Sodium: 136 mmol/L at 1/10/2019  3:49 AM  Total Bilirubin: 1 mg/dL at 1/10/2019  3:49 AM  INR(ratio): 1.2 at 2019  7:12 AM  Age: 63 years    Plan:     Follow-up Provider:    5

## 2019-01-22 NOTE — TELEPHONE ENCOUNTER
Attempted to call patient regarding CT scan results and concern for HCC.  Did not answer and no voicemail left given sensitive nature of results.  Will review in IR conference for next week.  Will see if patient can come to earlier appointment in attempt to expedite treatment if locoregional options available.  If not, patient will be referred to oncology

## 2019-01-23 NOTE — TELEPHONE ENCOUNTER
Patient: Neena Marks       MRN: [id number]      : 1955     Age: 63 y.o.  [address]  [address]  [place] [address]    Provider: Hepatologist Ren Donohue    Urgency of review: urgent    Patient Transplant Status: Other new liver lesion    Reason for presentation: Indeterminate lesion    Clinical Summary: 64yo female with alcoholic cirrhosis lost to follow-up.  Presented to ED for rectal bleeding and during work-up had imaging concerning for liver mass but not proper protocol to determine HCC.  Following discharge, TPCT obtained and appears consistent with large HCC.  Not likely transplant candidate due to tumor burden but unclear social status, including alcohol intake, as patient has not been seen in follow-up at this time.  Scheduled to return to clinic in 3 weeks.  Preserved synthetic function and MELD 15 based on elevated creatinine, which has currently improved (Cr 1.2).      Imaging to be reviewed: CT 2019    HCC Treatment History: none    ABO: B    Platelets:   Lab Results   Component Value Date/Time     2019 04:33 AM     Creatinine:   Lab Results   Component Value Date/Time    CREATININE 1.2 2019 04:33 AM     Bilirubin:   Lab Results   Component Value Date/Time    BILITOT 1.1 (H) 2019 04:33 AM     AFP Last 3 each if available:   Lab Results   Component Value Date/Time    AFP 5.5 2019 07:26 PM    AFP 7.6 2018 12:29 PM    AFP 21 (H) 2015 06:42 PM       MELD: MELD-Na score: 15 at 1/10/2019  3:49 AM  MELD score: 14 at 1/10/2019  3:49 AM  Calculated from:  Serum Creatinine: 1.7 mg/dL at 1/10/2019  3:49 AM  Serum Sodium: 136 mmol/L at 1/10/2019  3:49 AM  Total Bilirubin: 1 mg/dL at 1/10/2019  3:49 AM  INR(ratio): 1.2 at 2019  7:12 AM  Age: 63 years    Plan: Large heterogeneous enhnacing lesion in right hepatic lobe with washout appearing to invade  IVC. Locoregional would be y90.     Hem/onc same day as clinic visit with Dr. Donohue, if possible. Systemic therapy  vs. Y90, depending on pt's wishes and tolerance for treatment.     Follow-up Provider: Josette Donohue MD

## 2019-01-29 ENCOUNTER — CONFERENCE (OUTPATIENT)
Dept: TRANSPLANT | Facility: CLINIC | Age: 64
End: 2019-01-29

## 2019-01-29 DIAGNOSIS — C22.0 HCC (HEPATOCELLULAR CARCINOMA): Primary | ICD-10-CM

## 2019-01-30 ENCOUNTER — TELEPHONE (OUTPATIENT)
Dept: HEPATOLOGY | Facility: CLINIC | Age: 64
End: 2019-01-30

## 2019-01-30 NOTE — TELEPHONE ENCOUNTER
I have attempted to contact patient multiple times to discuss CT scan results.  At this time there is unresectable HCC and falls outside of [facility] to consider liver transplant.  Therapeutic options are Y-90 vs systemic chemotherapy.  Original plan to have oncology on same day as hepatology to discuss results but patient is scheduled for oncology tomorrow.  I will attempt again to at least give results by phone but unable to notify patient of diagnosis up until this point.

## 2019-01-31 ENCOUNTER — HOSPITAL ENCOUNTER (EMERGENCY)
Facility: OTHER | Age: 64
Discharge: HOME | End: 2019-01-31
Attending: EMERGENCY MEDICINE
Payer: MEDICAID

## 2019-01-31 VITALS
WEIGHT: 104 LBS | BODY MASS INDEX: 20.96 KG/M2 | TEMPERATURE: 98 F | HEART RATE: 64 BPM | SYSTOLIC BLOOD PRESSURE: 108 MMHG | HEIGHT: 59 IN | OXYGEN SATURATION: 99 % | RESPIRATION RATE: 20 BRPM | DIASTOLIC BLOOD PRESSURE: 64 MMHG

## 2019-01-31 DIAGNOSIS — R74.8 ELEVATED LIPASE: ICD-10-CM

## 2019-01-31 DIAGNOSIS — C22.0 HEPATOCELLULAR CARCINOMA: Primary | ICD-10-CM

## 2019-01-31 DIAGNOSIS — R18.8 OTHER ASCITES: ICD-10-CM

## 2019-01-31 DIAGNOSIS — D64.9 ANEMIA, UNSPECIFIED TYPE: ICD-10-CM

## 2019-01-31 LAB
ALBUMIN SERPL BCP-MCNC: 2.8 G/DL
ALP SERPL-CCNC: 138 U/L
ALT SERPL W/O P-5'-P-CCNC: 22 U/L
AMMONIA PLAS-SCNC: 47 UMOL/L
ANION GAP SERPL CALC-SCNC: 11 MMOL/L
ANISOCYTOSIS BLD QL SMEAR: SLIGHT
AST SERPL-CCNC: 58 U/L
BASOPHILS # BLD AUTO: 0.02 K/UL
BASOPHILS NFR BLD: 0.3 %
BILIRUB SERPL-MCNC: 0.8 MG/DL
BUN SERPL-MCNC: 14 MG/DL
CALCIUM SERPL-MCNC: 9.6 MG/DL
CHLORIDE SERPL-SCNC: 105 MMOL/L
CO2 SERPL-SCNC: 25 MMOL/L
CREAT SERPL-MCNC: 1.2 MG/DL
DIFFERENTIAL METHOD: ABNORMAL
EOSINOPHIL # BLD AUTO: 0.2 K/UL
EOSINOPHIL NFR BLD: 3.2 %
ERYTHROCYTE [DISTWIDTH] IN BLOOD BY AUTOMATED COUNT: 29.7 %
EST. GFR  (AFRICAN AMERICAN): 56 ML/MIN/1.73 M^2
EST. GFR  (NON AFRICAN AMERICAN): 49 ML/MIN/1.73 M^2
GIANT PLATELETS BLD QL SMEAR: PRESENT
GLUCOSE SERPL-MCNC: 86 MG/DL
HCT VFR BLD AUTO: 27.8 %
HGB BLD-MCNC: 8.7 G/DL
HYPOCHROMIA BLD QL SMEAR: ABNORMAL
LIPASE SERPL-CCNC: 70 U/L
LYMPHOCYTES # BLD AUTO: 1.9 K/UL
LYMPHOCYTES NFR BLD: 31.8 %
MCH RBC QN AUTO: 21.9 PG
MCHC RBC AUTO-ENTMCNC: 31.3 G/DL
MCV RBC AUTO: 70 FL
MONOCYTES # BLD AUTO: 0.9 K/UL
MONOCYTES NFR BLD: 14.6 %
NEUTROPHILS # BLD AUTO: 2.9 K/UL
NEUTROPHILS NFR BLD: 50.1 %
PLATELET # BLD AUTO: 382 K/UL
PLATELET BLD QL SMEAR: ABNORMAL
PMV BLD AUTO: ABNORMAL FL
POTASSIUM SERPL-SCNC: 4 MMOL/L
PROT SERPL-MCNC: 8 G/DL
RBC # BLD AUTO: 3.98 M/UL
SODIUM SERPL-SCNC: 141 MMOL/L
WBC # BLD AUTO: 5.88 K/UL

## 2019-01-31 PROCEDURE — 63600175 PHARM REV CODE 636 W HCPCS: Performed by: EMERGENCY MEDICINE

## 2019-01-31 PROCEDURE — 82140 ASSAY OF AMMONIA: CPT

## 2019-01-31 PROCEDURE — 80053 COMPREHEN METABOLIC PANEL: CPT

## 2019-01-31 PROCEDURE — 99284 EMERGENCY DEPT VISIT MOD MDM: CPT | Mod: 25

## 2019-01-31 PROCEDURE — 83690 ASSAY OF LIPASE: CPT

## 2019-01-31 PROCEDURE — 96375 TX/PRO/DX INJ NEW DRUG ADDON: CPT

## 2019-01-31 PROCEDURE — 96374 THER/PROPH/DIAG INJ IV PUSH: CPT

## 2019-01-31 PROCEDURE — 85025 COMPLETE CBC W/AUTO DIFF WBC: CPT

## 2019-01-31 RX ORDER — ONDANSETRON 2 MG/ML
4 INJECTION INTRAMUSCULAR; INTRAVENOUS
Status: COMPLETED | OUTPATIENT
Start: 2019-01-31 | End: 2019-01-31

## 2019-01-31 RX ORDER — OXYCODONE HCL 5 MG/5 ML
5 SOLUTION, ORAL ORAL EVERY 6 HOURS PRN
Qty: 150 ML | Refills: 0 | Status: SHIPPED | OUTPATIENT
Start: 2019-01-31 | End: 2019-02-22

## 2019-01-31 RX ORDER — MORPHINE SULFATE 2 MG/ML
2 INJECTION, SOLUTION INTRAMUSCULAR; INTRAVENOUS
Status: COMPLETED | OUTPATIENT
Start: 2019-01-31 | End: 2019-01-31

## 2019-01-31 RX ORDER — ONDANSETRON 2 MG/ML
INJECTION INTRAMUSCULAR; INTRAVENOUS
Status: DISCONTINUED
Start: 2019-01-31 | End: 2019-01-31 | Stop reason: HOSPADM

## 2019-01-31 RX ORDER — FUROSEMIDE 10 MG/ML
20 INJECTION INTRAMUSCULAR; INTRAVENOUS
Status: COMPLETED | OUTPATIENT
Start: 2019-01-31 | End: 2019-01-31

## 2019-01-31 RX ADMIN — ONDANSETRON 4 MG: 2 INJECTION INTRAMUSCULAR; INTRAVENOUS at 03:01

## 2019-01-31 RX ADMIN — FUROSEMIDE 20 MG: 10 INJECTION, SOLUTION INTRAVENOUS at 02:01

## 2019-01-31 RX ADMIN — MORPHINE SULFATE 2 MG: 2 INJECTION, SOLUTION INTRAMUSCULAR; INTRAVENOUS at 02:01

## 2019-01-31 NOTE — ED NOTES
Pt. Had 1 bout of vomiting. No blood in emesis. Given IV antiemetics per order. No acute distress noted, no airway compromise. Continues to sat 100% RA.

## 2019-01-31 NOTE — ED NOTES
Pt. Denies questions or concerns regarding discharge instructions or fu. No acute distress noted. Ambulatory to lobby with steady gait.

## 2019-01-31 NOTE — ED TRIAGE NOTES
Pt. Presents to ED today with c/o abd distention and tenderness x1 day. Last paracentesis on 1/11, drained 1/2 L of fluid. Pt. States tenderness worse with palpation, states pain 8/10 at this time. Pt. Also states sob with rest. Otherwise speaking full sentences, non labored breathing, sating 100% RA. Denies fevers/chills, cp, n/v/d. No other acute distress noted per this RN, +ambulatory with steady gait.

## 2019-01-31 NOTE — ED PROVIDER NOTES
Encounter Date: 1/31/2019    SCRIBE #1 NOTE: I, Dr. Charles, am scribing for, and in the presence of, Aguila Emery.       History     Chief Complaint   Patient presents with    Ascites     here to have fluid drained from abd. last paracentesis on 1/11/19     Time seen by provider: 2:05 PM    This is a 63 y.o. female with a history of sorosis of the liver and ascites  who presents with complaint of abdominal distension with associated discomfort. Patient states that she was supposed to have an oncology appointment today at 1 PM, but got lost on the way. The patient denies fever, sore throat, shortness of breath, nausea, vomiting, chills, constipation, and diarrhea. The patient has a history of paracentesis which was preformed on 1/11/19.       The history is provided by the patient.     Review of patient's allergies indicates:  No Known Allergies  Past Medical History:   Diagnosis Date    Cirrhosis      Past Surgical History:   Procedure Laterality Date    EGD (ESOPHAGOGASTRODUODENOSCOPY) Left 1/9/2019    Performed by Madyson Farrar MD at [facility], [place] ENDO    ESOPHAGOGASTRODUODENOSCOPY (EGD) N/A 1/26/2018    Performed by Mark Urbina MD at [facility] [place] ENDO    PARACENTESIS N/A 1/30/2018    Performed by Everett Fitzpatrick MD at [facility] [place] CATH LAB    PARACENTESIS N/A 2/6/2015    Performed by Alejandro Myrick MD at [facility] [place] CATH LAB    PARACENTESIS, ABDOMINAL N/A 1/9/2019    Performed by Everett Fitzpatrick MD at [facility], [place] CATH LAB     No family history on file.  Social History     Tobacco Use    Smoking status: Current Every Day Smoker     Types: Cigarettes    Smokeless tobacco: Never Used    Tobacco comment: 2 cigarettes a day   Substance Use Topics    Alcohol use: No     Frequency: Never     Comment: Previously drank 1 pint a day    Drug use: Yes     Types: Cocaine     Review of Systems   Constitutional: Negative for fever.   HENT: Negative for sore throat.    Respiratory: Negative for shortness of breath.     Cardiovascular: Negative for chest pain.   Gastrointestinal: Positive for abdominal distention. Negative for constipation, diarrhea, nausea and vomiting.   Genitourinary: Negative for dysuria.   Musculoskeletal: Negative for back pain.   Skin: Negative for rash.   Neurological: Negative for weakness.   Hematological: Does not bruise/bleed easily.       Physical Exam     Initial Vitals [01/31/19 1322]   BP Pulse Resp Temp SpO2   (!) 110/53 71 18 98.3 °F (36.8 °C) 100 %      MAP       --         Physical Exam    Nursing note and vitals reviewed.  Constitutional: She appears well-developed and well-nourished. She is not diaphoretic. No distress.   HENT:   Head: Normocephalic and atraumatic.   Mouth/Throat: Oropharynx is clear and moist.   Eyes: Conjunctivae and EOM are normal. Pupils are equal, round, and reactive to light.   Neck: Normal range of motion.   Cardiovascular: Normal rate, regular rhythm and normal heart sounds. Exam reveals no gallop and no friction rub.    No murmur heard.  Pulmonary/Chest: Breath sounds normal. No respiratory distress. She has no wheezes. She has no rhonchi. She has no rales.   Abdominal: Soft. She exhibits distension and fluid wave. There is no tenderness. There is no rebound and no guarding.   Musculoskeletal: Normal range of motion. She exhibits no edema or tenderness.   Neurological: She is alert and oriented to person, place, and time.   Skin: Skin is warm and dry. No rash and no abscess noted. No erythema. No pallor.   Psychiatric: She has a normal mood and affect. Her behavior is normal. Judgment and thought content normal.         ED Course   Procedures  Labs Reviewed   CBC W/ AUTO DIFFERENTIAL - Abnormal; Notable for the following components:       Result Value    RBC 3.98 (*)     Hemoglobin 8.7 (*)     Hematocrit 27.8 (*)     MCV 70 (*)     MCH 21.9 (*)     MCHC 31.3 (*)     RDW 29.7 (*)     Platelets 382 (*)     All other components within normal limits   AMMONIA    COMPREHENSIVE METABOLIC PANEL   LIPASE          Imaging Results    None          Medical Decision Making:   Clinical Tests:   Lab Tests: Ordered and Reviewed            Scribe Attestation:   Scribe #1: I performed the above scribed service and the documentation accurately describes the services I performed. I attest to the accuracy of the note.    Attending Attestation:           Physician Attestation for Scribe:  Physician Attestation Statement for Scribe #1: I, Aguila Emery, reviewed documentation, as scribed by Dr. Charles in my presence, and it is both accurate and complete.         Attending ED Notes:   Emergent evaluation of a 63-year-old female with complaint of abdominal swelling secondary to ascites.  Patient has ascites is not acute.  Patient has a history of hepato cellular carcinoma of the liver.  No clinical evidence of SBP.  Patient's abdomen is soft and nontender. There is a small fluid wave on examination. On further evaluation patient states she missed her clinic appointment today because she did know where she was post ago so she came to the emergency room.  Patient is afebrile, nontoxic-appearing stable vital signs. No acute findings on CMP except for elevation in liver enzymes.  Lipase is 70.  Patient has no elevation of white blood cell count.  H&H is 8.7 and 27.8.  The patient is extensively counseled on her diagnosis and treatment including all physical exam and laboratory findings.  I do not feel that patient has is the need for emergent paracentesis.  The patient discharged in good condition and directed to follow up with her already scheduled appointments with hepatology and oncology this February.              Clinical Impression:   No diagnosis found.                               Adrian Charles MD  01/31/19 1910

## 2019-02-13 ENCOUNTER — OFFICE VISIT (OUTPATIENT)
Dept: HEPATOLOGY | Facility: CLINIC | Age: 64
End: 2019-02-13
Payer: MEDICAID

## 2019-02-13 ENCOUNTER — LAB VISIT (OUTPATIENT)
Dept: LAB | Facility: HOSPITAL | Age: 64
End: 2019-02-13
Attending: INTERNAL MEDICINE
Payer: MEDICAID

## 2019-02-13 VITALS
OXYGEN SATURATION: 97 % | BODY MASS INDEX: 21.77 KG/M2 | HEART RATE: 76 BPM | WEIGHT: 108 LBS | DIASTOLIC BLOOD PRESSURE: 56 MMHG | HEIGHT: 59 IN | SYSTOLIC BLOOD PRESSURE: 117 MMHG | TEMPERATURE: 99 F

## 2019-02-13 DIAGNOSIS — C22.0 HCC (HEPATOCELLULAR CARCINOMA): ICD-10-CM

## 2019-02-13 DIAGNOSIS — K70.31 ALCOHOLIC CIRRHOSIS OF LIVER WITH ASCITES: Primary | ICD-10-CM

## 2019-02-13 DIAGNOSIS — K70.31 ALCOHOLIC CIRRHOSIS OF LIVER WITH ASCITES: ICD-10-CM

## 2019-02-13 LAB
AFP SERPL-MCNC: 5.1 NG/ML
ANISOCYTOSIS BLD QL SMEAR: SLIGHT
BASOPHILS # BLD AUTO: 0.04 K/UL
BASOPHILS NFR BLD: 0.6 %
DIFFERENTIAL METHOD: ABNORMAL
EOSINOPHIL # BLD AUTO: 0.3 K/UL
EOSINOPHIL NFR BLD: 4 %
ERYTHROCYTE [DISTWIDTH] IN BLOOD BY AUTOMATED COUNT: 29.4 %
GIANT PLATELETS BLD QL SMEAR: PRESENT
HCT VFR BLD AUTO: 23.5 %
HGB BLD-MCNC: 7.1 G/DL
HYPOCHROMIA BLD QL SMEAR: ABNORMAL
IMM GRANULOCYTES # BLD AUTO: 0.04 K/UL
IMM GRANULOCYTES NFR BLD AUTO: 0.6 %
LYMPHOCYTES # BLD AUTO: 1.7 K/UL
LYMPHOCYTES NFR BLD: 25.2 %
MCH RBC QN AUTO: 21.1 PG
MCHC RBC AUTO-ENTMCNC: 30.2 G/DL
MCV RBC AUTO: 70 FL
MONOCYTES # BLD AUTO: 0.9 K/UL
MONOCYTES NFR BLD: 12.4 %
NEUTROPHILS # BLD AUTO: 3.9 K/UL
NEUTROPHILS NFR BLD: 57.2 %
NRBC BLD-RTO: 0 /100 WBC
OVALOCYTES BLD QL SMEAR: ABNORMAL
PLATELET # BLD AUTO: 160 K/UL
PLATELET BLD QL SMEAR: ABNORMAL
PMV BLD AUTO: ABNORMAL FL
POIKILOCYTOSIS BLD QL SMEAR: SLIGHT
RBC # BLD AUTO: 3.36 M/UL
SCHISTOCYTES BLD QL SMEAR: ABNORMAL
TARGETS BLD QL SMEAR: ABNORMAL
WBC # BLD AUTO: 6.83 K/UL

## 2019-02-13 PROCEDURE — 99999 PR PBB SHADOW E&M-EST. PATIENT-LVL IV: CPT | Mod: PBBFAC,,, | Performed by: INTERNAL MEDICINE

## 2019-02-13 PROCEDURE — 99999 PR PBB SHADOW E&M-EST. PATIENT-LVL IV: ICD-10-PCS | Mod: PBBFAC,,, | Performed by: INTERNAL MEDICINE

## 2019-02-13 PROCEDURE — 85025 COMPLETE CBC W/AUTO DIFF WBC: CPT

## 2019-02-13 PROCEDURE — 99214 OFFICE O/P EST MOD 30 MIN: CPT | Mod: PBBFAC | Performed by: INTERNAL MEDICINE

## 2019-02-13 PROCEDURE — 82105 ALPHA-FETOPROTEIN SERUM: CPT

## 2019-02-13 PROCEDURE — 36415 COLL VENOUS BLD VENIPUNCTURE: CPT

## 2019-02-13 PROCEDURE — 99215 OFFICE O/P EST HI 40 MIN: CPT | Mod: S$PBB,,, | Performed by: INTERNAL MEDICINE

## 2019-02-13 PROCEDURE — 99215 PR OFFICE/OUTPT VISIT, EST, LEVL V, 40-54 MIN: ICD-10-PCS | Mod: S$PBB,,, | Performed by: INTERNAL MEDICINE

## 2019-02-13 RX ORDER — HYDROXYZINE HYDROCHLORIDE 25 MG/1
25 TABLET, FILM COATED ORAL 3 TIMES DAILY PRN
Qty: 90 TABLET | Refills: 3 | Status: SHIPPED | OUTPATIENT
Start: 2019-02-13 | End: 2019-04-04 | Stop reason: ALTCHOICE

## 2019-02-13 RX ORDER — CIPROFLOXACIN 500 MG/1
500 TABLET ORAL
COMMUNITY
End: 2019-03-27 | Stop reason: ALTCHOICE

## 2019-02-13 RX ORDER — SUCRALFATE 1 G/1
1 TABLET ORAL EVERY 6 HOURS
COMMUNITY
End: 2019-04-04 | Stop reason: ALTCHOICE

## 2019-02-13 NOTE — PATIENT INSTRUCTIONS
Please obtain labs today or Friday.    You have liver cancer.    It is important to follow with the cancer doctors and radiologists on Friday to discuss treatment options.    Continue fluid pills.    You may use hydroxyzine for itching.    Return to clinic in 2-3 months

## 2019-02-13 NOTE — PROGRESS NOTES
Hepatology Consult Note    Chief complaint: cirrhosis    HPI:  Neena Marks is a 63 y.o. female that presents to hepatology clinic for consultation of alcoholic cirrhosis.  She is alone.     Diagnosed with cirrhosis: patient reports diagnosis in 2017 but per review of chart, presented with ascites in Feb 2015 and diagnosed with alcoholic cirrhosis   Presenting symptom/s: ascites   Current symptoms of portal hypertension:   Ascites: yes, prior LVP soon after diagnosis, no history of SBP   LE edema: yes    HE: no   GI bleeding: yes, hematochezia in 1/2018; EGD performed but no colonoscopy in system   Jaundice: no    Pruritus: yes, on hydroxyzine reports that it is not effective     Cirrhosis HCM:  Hepatitis A vaccination: immune  Hepatitis B vaccination: non-immune  HCC screening: HCC confirmed 1/2019  Variceal screening: grade II esophageal varices 1/2018  Pneumococcal vaccination not discussed   Influenza vaccination:  not discussed     Despite evidence of alcoholic cirrhosis in 2015, patient has continued to drink alcohol until 1/2018.  Reports that she has been sicker related to her liver disease and this has prompted her to discontinue alcohol use. She has not had alcohol rehab or relapse prevention.  No other etiologies of CLD known at this time.      Patient presented to ED for assessment of abdominal pain and swelling.  During evaluation, found to have liver lesion concerning for HCC with normal AFP.    Discharged on diuretic therapy, ongoing ascites.    Triple phase imaging obtained and HCC confirmed with IVC compression   Patient also complains of pruritus.    Patient Active Problem List   Diagnosis    Iron deficiency anemia due to chronic blood loss    Alcohol abuse    Hypokalemia    Liver mass    Esophageal web determined by endoscopy    Severe malnutrition    Acute blood loss anemia    Acute GI bleeding    PRACHI (acute kidney injury)    Alcoholic cirrhosis    Esophageal varices in alcoholic  cirrhosis    Portal hypertensive gastropathy       Past Medical History:   Diagnosis Date    Cirrhosis      PSH: none     FH: no liver disease, sister on HD     Social History     Socioeconomic History    Marital status: Single     Spouse name: None    Number of children: None    Years of education: None    Highest education level: None   Social Needs    Financial resource strain: None    Food insecurity - worry: None    Food insecurity - inability: None    Transportation needs - medical: None    Transportation needs - non-medical: None   Occupational History    None   Tobacco Use    Smoking status: Current Every Day Smoker     Types: Cigarettes    Smokeless tobacco: Never Used    Tobacco comment: 2 cigarettes a day   Substance and Sexual Activity    Alcohol use: No     Frequency: Never     Comment: Previously drank 1 pint a day    Drug use: Yes     Types: Cocaine    Sexual activity: None   Other Topics Concern    None   Social History Narrative    None   lives with sandra, son and granddaughter, unemployed, on disability - she is not sure of the indication  Use cocaine - once or twice a month  Reports alcohol discontinued in 1/2018      Current Outpatient Medications   Medication Sig Dispense Refill    cholecalciferol, vitamin D3, 50,000 unit Tab Take 125 mcg by mouth.      ciprofloxacin HCl (CIPRO) 500 MG tablet Take 500 mg by mouth every 12 (twelve) hours.      cyproheptadine (PERIACTIN) 4 mg tablet Take 4 mg by mouth 2 (two) times daily as needed.       ferrous sulfate 325 mg (65 mg iron) Tab tablet Take 325 mg by mouth daily with breakfast.      furosemide (LASIX) 40 MG tablet Take 40 mg by mouth once daily.      hydrOXYzine HCl (ATARAX) 10 MG Tab Take 25 mg by mouth daily as needed.       lactulose 10 gram/15 ml (CHRONULAC) 10 gram/15 mL (15 mL) solution Take 10 g by mouth 2 (two) times daily.      meloxicam (MOBIC) 15 MG tablet Take 15 mg by mouth nightly as needed for Pain.       "multivitamin (ONE DAILY MULTIVITAMIN) per tablet Take 1 tablet by mouth once daily.      oxyCODONE (ROXICODONE) 5 mg/5 mL Soln Take 5 mLs (5 mg total) by mouth every 6 (six) hours as needed (Pain). 150 mL 0    propranolol (INDERAL) 10 MG tablet Take 1 tablet (10 mg total) by mouth 2 (two) times daily. 60 tablet 2    spironolactone (ALDACTONE) 100 MG tablet Take 1 tablet (100 mg total) by mouth once daily. 30 tablet 5    sucralfate (CARAFATE) 1 gram tablet Take 1 g by mouth every 6 (six) hours.      cyclobenzaprine (FLEXERIL) 10 MG tablet Take 10 mg by mouth nightly as needed for Muscle spasms.       No current facility-administered medications for this visit.        Review of patient's allergies indicates:  No Known Allergies    Review of Systems   Constitutional: Positive for malaise/fatigue and weight loss. Negative for chills and fever.   Eyes: Negative.    Respiratory: Negative for cough and shortness of breath.    Cardiovascular: Negative for chest pain and leg swelling.   Gastrointestinal: Positive for abdominal pain. Negative for heartburn, nausea and vomiting.   Musculoskeletal: Negative for joint pain and myalgias.   Skin: Negative for itching and rash.   Neurological: Negative for dizziness, focal weakness and headaches.   Endo/Heme/Allergies: Does not bruise/bleed easily.   Psychiatric/Behavioral: Negative for depression.       Vitals:    02/13/19 1427   BP: (!) 117/56   Pulse: 76   Temp: 98.9 °F (37.2 °C)   TempSrc: Oral   SpO2: 97%   Weight: 49 kg (108 lb 0.4 oz)   Height: 4' 11" (1.499 m)       Physical Exam   Constitutional: She is oriented to person, place, and time. She appears well-developed. No distress.   Thin female, temporal wasting present   HENT:   Head: Normocephalic and atraumatic.   Mouth/Throat: Oropharynx is clear and moist. No oropharyngeal exudate.   Eyes: EOM are normal. Pupils are equal, round, and reactive to light. No scleral icterus.   Neck: Normal range of motion. Neck " supple. No thyromegaly present.   Cardiovascular: Normal rate, regular rhythm and normal heart sounds. Exam reveals no gallop and no friction rub.   No murmur heard.  Pulmonary/Chest: Effort normal. No respiratory distress. She has no wheezes. She has no rales.   Abdominal: Soft. Bowel sounds are normal. She exhibits distension (mild). There is tenderness.   Musculoskeletal: Normal range of motion. She exhibits no edema.   Lymphadenopathy:     She has no cervical adenopathy.   Neurological: She is alert and oriented to person, place, and time. No cranial nerve deficit.   Skin: Skin is warm and dry. No rash noted.   Psychiatric: She has a normal mood and affect. Her behavior is normal.   Vitals reviewed.      Lab Results   Component Value Date    ALT 22 01/31/2019    AST 58 (H) 01/31/2019    ALKPHOS 138 (H) 01/31/2019    BILITOT 0.8 01/31/2019       Lab Results   Component Value Date    WBC 5.88 01/31/2019    HGB 8.7 (L) 01/31/2019    HCT 27.8 (L) 01/31/2019    MCV 70 (L) 01/31/2019     (H) 01/31/2019       Lab Results   Component Value Date     01/31/2019    K 4.0 01/31/2019     01/31/2019    CO2 25 01/31/2019    BUN 14 01/31/2019    CREATININE 1.2 01/31/2019    CALCIUM 9.6 01/31/2019    ANIONGAP 11 01/31/2019    ESTGFRAFRICA 56 (A) 01/31/2019    EGFRNONAA 49 (A) 01/31/2019     MELD-Na score: 15 at 1/10/2019  3:49 AM  MELD score: 14 at 1/10/2019  3:49 AM  Calculated from:  Serum Creatinine: 1.7 mg/dL at 1/10/2019  3:49 AM  Serum Sodium: 136 mmol/L at 1/10/2019  3:49 AM  Total Bilirubin: 1 mg/dL at 1/10/2019  3:49 AM  INR(ratio): 1.2 at 1/8/2019  7:12 AM  Age: 63 years    Imaging: EMR and external imaging available reviewed     Assessment:  63 y.o. female presenting with alcoholic cirrhosis and new liver lesion consistent with HCC.    Plan:  *  Patient scheduled for IR and oncology consultation, previously discussed in IR conference and recommended for locoregional therapy.  Despite review, patient  missed initial IR consult and needs to be rescheduled  *  Ongoing diuretic therapy for ascites  *  Update labs to establish hepatic function in consideration of treatment  *  Hydroxyzine for pruritus  *  Recommend continued alcohol abstinence    Patient is not transplant candidate due to tumor burden outside [facility] and [facility].      HCM as documented above, given advanced disease - will update accordingly    RTC in 2-3 months

## 2019-02-15 ENCOUNTER — INITIAL CONSULT (OUTPATIENT)
Dept: HEMATOLOGY/ONCOLOGY | Facility: CLINIC | Age: 64
End: 2019-02-15
Payer: MEDICAID

## 2019-02-15 VITALS
HEART RATE: 75 BPM | SYSTOLIC BLOOD PRESSURE: 104 MMHG | WEIGHT: 108.44 LBS | TEMPERATURE: 98 F | RESPIRATION RATE: 16 BRPM | HEIGHT: 59 IN | DIASTOLIC BLOOD PRESSURE: 57 MMHG | BODY MASS INDEX: 21.86 KG/M2 | OXYGEN SATURATION: 98 %

## 2019-02-15 DIAGNOSIS — K70.30 ESOPHAGEAL VARICES IN ALCOHOLIC CIRRHOSIS: ICD-10-CM

## 2019-02-15 DIAGNOSIS — D62 ACUTE BLOOD LOSS ANEMIA: ICD-10-CM

## 2019-02-15 DIAGNOSIS — K70.30 ALCOHOLIC CIRRHOSIS, UNSPECIFIED WHETHER ASCITES PRESENT: ICD-10-CM

## 2019-02-15 DIAGNOSIS — I85.10 ESOPHAGEAL VARICES IN ALCOHOLIC CIRRHOSIS: ICD-10-CM

## 2019-02-15 DIAGNOSIS — E43 SEVERE MALNUTRITION: ICD-10-CM

## 2019-02-15 DIAGNOSIS — N17.9 AKI (ACUTE KIDNEY INJURY): ICD-10-CM

## 2019-02-15 DIAGNOSIS — C22.0 HCC (HEPATOCELLULAR CARCINOMA): Primary | ICD-10-CM

## 2019-02-15 PROCEDURE — 99999 PR PBB SHADOW E&M-EST. PATIENT-LVL IV: ICD-10-PCS | Mod: PBBFAC,,, | Performed by: STUDENT IN AN ORGANIZED HEALTH CARE EDUCATION/TRAINING PROGRAM

## 2019-02-15 PROCEDURE — 99205 OFFICE O/P NEW HI 60 MIN: CPT | Mod: S$PBB,,, | Performed by: STUDENT IN AN ORGANIZED HEALTH CARE EDUCATION/TRAINING PROGRAM

## 2019-02-15 PROCEDURE — 99999 PR PBB SHADOW E&M-EST. PATIENT-LVL IV: CPT | Mod: PBBFAC,,, | Performed by: STUDENT IN AN ORGANIZED HEALTH CARE EDUCATION/TRAINING PROGRAM

## 2019-02-15 PROCEDURE — 99214 OFFICE O/P EST MOD 30 MIN: CPT | Mod: PBBFAC | Performed by: STUDENT IN AN ORGANIZED HEALTH CARE EDUCATION/TRAINING PROGRAM

## 2019-02-15 PROCEDURE — 99205 PR OFFICE/OUTPT VISIT, NEW, LEVL V, 60-74 MIN: ICD-10-PCS | Mod: S$PBB,,, | Performed by: STUDENT IN AN ORGANIZED HEALTH CARE EDUCATION/TRAINING PROGRAM

## 2019-02-15 NOTE — Clinical Note
Follow up in 1 week, next fridayOrdered CT Chest with contrastShe need to be scheduled for IR for Y90 mapping

## 2019-02-15 NOTE — PROGRESS NOTES
PATIENT: Neena Marks  MRN: [id number]  DATE: 2/17/2019      Diagnosis:   1. HCC (hepatocellular carcinoma)    2. Alcoholic cirrhosis, unspecified whether ascites present    3. Esophageal varices in alcoholic cirrhosis    4. Severe malnutrition    5. Acute blood loss anemia    6. PRACHI (acute kidney injury)        Chief Complaint: No chief complaint on file.      Neena Marks is a 63 y.o. female with PMHx of alcoholic cirrhosis since 2015, Portal HTN, ascites, variceal bleeding, newly diagnosed with HCC now presented to oncology clinic for further evaluation.    Patient was a chronic alcoholic for the last 40 years and was diagnosed with alcoholic cirrhosis in 2015 however she continued to drink alcohol until January 2018 and has quit since then. She had history of upper GI bleeding with hematochezia in January 2018 s/p banding. She also had ascites since the last 1.5 to 2 years as is getting paracentesis q 2-3 months. She had no history of SBP in the past. She is currently on lasix and spironolactone for ascites and lower extremity edema.  She does not have jaundice but complaints of severe pruritis which is moderately controlled with hydroxyzine.     She currently has abdominal distention and abdominal discomfort.    CMP normal bilirubin and creatinine. Albumin of 2.8. Alk phos 138, AST 58   CBC- anemia with Hb of 8.7    AFP tumor marker is normal     Hep C and B testing on 4/27/2018- negative    Endoscopy- 1/9/19 - Grade II esophageal varices. Completely eradicated. Banded.                                  - Small hiatal hernia.                                  - Portal hypertensive gastropathy. Treated with argon plasma coagulation (APC).                           CT abdomen- 1/21/2019  1. Large well-circumscribed heterogeneously enhancing right hepatic lobe mass with characteristics consistent with HCC.  Several additional indeterminate subcentimeter hyperenhancing lesions throughout the liver which become  isodense.  Mass compresses and narrows the IVC, cannot exclude tumor involvement.  2. Liver cirrhosis.  3. Moderate volume abdominopelvic ascites.  4. Small paraesophageal varices present.  5. Cholelithiasis.     Patient had an appointment today, 2/15/2019, IR consult for Y90 but she missed her appointment.       Subjective:    Initial History: Ms. Marks is a 63 y.o. female who returns for follow up.     Past Medical History:   Past Medical History:   Diagnosis Date    Cirrhosis        Past Surgical HIstory:   Past Surgical History:   Procedure Laterality Date    EGD (ESOPHAGOGASTRODUODENOSCOPY) Left 1/9/2019    Performed by [facility]arrar [facility] at [facility] ENDO    ESOPHAGOGASTRODUODENOSCOPY (EGD) N/A 1/26/2018    Performed by Mark Urbina MD at [facility] ENDO    PARACENTESIS N/A 1/30/2018    Performed by Everett Fitzpatrick MD at [facility] CATH LAB    PARACENTESIS N/A 2/6/2015    Performed by Alejandro Myrick MD at [facility] CATH LAB    PARACENTESIS, ABDOMINAL N/A 1/9/2019    Performed by Everett Fitzpatrick MD at [facility] CATH LAB       Family History: History reviewed. No pertinent family history.    Social History:  reports that she has been smoking cigarettes.  she has never used smokeless tobacco. She reports that she uses drugs. Drug: Cocaine. She reports that she does not drink alcohol.    Allergies:  Review of patient's allergies indicates:  No Known Allergies    Medications:  Current Outpatient Medications   Medication Sig Dispense Refill    cholecalciferol, vitamin D3, 50,000 unit Tab Take 125 mcg by mouth.      ciprofloxacin HCl (CIPRO) 500 MG tablet Take 500 mg by mouth every 12 (twelve) hours.      cyclobenzaprine (FLEXERIL) 10 MG tablet Take 10 mg by mouth nightly as needed for Muscle spasms.      cyproheptadine (PERIACTIN) 4 mg tablet Take 4 mg by mouth 2 (two) times daily as needed.       ferrous sulfate 325 mg (65 mg iron) Tab tablet Take 325 mg by mouth daily with breakfast.      furosemide (LASIX)  40 MG tablet Take 40 mg by mouth once daily.      hydrOXYzine HCl (ATARAX) 25 MG tablet Take 1 tablet (25 mg total) by mouth 3 (three) times daily as needed for Itching. 90 tablet 3    lactulose 10 gram/15 ml (CHRONULAC) 10 gram/15 mL (15 mL) solution Take 10 g by mouth 2 (two) times daily.      meloxicam (MOBIC) 15 MG tablet Take 15 mg by mouth nightly as needed for Pain.      multivitamin (ONE DAILY MULTIVITAMIN) per tablet Take 1 tablet by mouth once daily.      oxyCODONE (ROXICODONE) 5 mg/5 mL Soln Take 5 mLs (5 mg total) by mouth every 6 (six) hours as needed (Pain). 150 mL 0    propranolol (INDERAL) 10 MG tablet Take 1 tablet (10 mg total) by mouth 2 (two) times daily. 60 tablet 2    spironolactone (ALDACTONE) 100 MG tablet Take 1 tablet (100 mg total) by mouth once daily. 30 tablet 5    sucralfate (CARAFATE) 1 gram tablet Take 1 g by mouth every 6 (six) hours.       No current facility-administered medications for this visit.        Review of Systems   Constitutional: Positive for appetite change and unexpected weight change. Negative for chills, diaphoresis, fatigue and fever.   HENT: Negative for nosebleeds and sore throat.    Respiratory: Positive for shortness of breath. Negative for cough.    Cardiovascular: Negative for chest pain and palpitations.   Gastrointestinal: Positive for abdominal distention. Negative for blood in stool, diarrhea, nausea and vomiting.   Genitourinary: Negative for dysuria and flank pain.   Musculoskeletal: Negative for back pain, joint swelling and neck pain.   Skin:        pruritis   Neurological: Negative for seizures, syncope and headaches.   Hematological: Negative for adenopathy. Does not bruise/bleed easily.   Psychiatric/Behavioral: Negative for agitation and behavioral problems.       ECOG Performance Status: 1   Objective:      Vitals:   Vitals:    02/15/19 1154   BP: (!) 104/57   Pulse: 75   Resp: 16   Temp: 98.4 °F (36.9 °C)   SpO2: 98%   Weight: 49.2 kg  "(108 lb 7.5 oz)   Height: 4' 11" (1.499 m)     BMI: Body mass index is 21.91 kg/m².    Physical Exam   Constitutional: She is oriented to person, place, and time.   Thin cachectic female   HENT:   Head: Normocephalic and atraumatic.   Eyes: EOM are normal. Pupils are equal, round, and reactive to light. No scleral icterus.   Neck: Normal range of motion. Neck supple.   Cardiovascular: Normal rate and regular rhythm.   Pulmonary/Chest: Effort normal and breath sounds normal.   Abdominal: Soft. She exhibits distension. She exhibits no mass. There is no tenderness. There is no guarding.   Musculoskeletal: Normal range of motion. She exhibits edema. She exhibits no tenderness or deformity.   Lymphadenopathy:     She has no cervical adenopathy.   Neurological: She is alert and oriented to person, place, and time.   Skin: Skin is warm. Capillary refill takes less than 2 seconds.   scrath marks seen on belly and extremities   Psychiatric: She has a normal mood and affect. Her behavior is normal.       Laboratory Data:  Lab Visit on 02/13/2019   Component Date Value Ref Range Status    AFP 02/13/2019 5.1  0.0 - 8.4 ng/mL Final    WBC 02/13/2019 6.83  3.90 - 12.70 K/uL Final    RBC 02/13/2019 3.36* 4.00 - 5.40 M/uL Final    Hemoglobin 02/13/2019 7.1* 12.0 - 16.0 g/dL Final    Hematocrit 02/13/2019 23.5* 37.0 - 48.5 % Final    MCV 02/13/2019 70* 82 - 98 fL Final    MCH 02/13/2019 21.1* 27.0 - 31.0 pg Final    MCHC 02/13/2019 30.2* 32.0 - 36.0 g/dL Final    RDW 02/13/2019 29.4* 11.5 - 14.5 % Final    Platelets 02/13/2019 160  150 - 350 K/uL Final    Platelets are clumped on smear.Platelet count may be affected.    MPV 02/13/2019 SEE COMMENT  9.2 - 12.9 fL Final    Result not available.    Immature Granulocytes 02/13/2019 0.6* 0.0 - 0.5 % Final    Gran # (ANC) 02/13/2019 3.9  1.8 - 7.7 K/uL Final    Immature Grans (Abs) 02/13/2019 0.04  0.00 - 0.04 K/uL Final    Comment: Mild elevation in immature granulocytes is " non specific and   can be seen in a variety of conditions including stress response,   acute inflammation, trauma and pregnancy. Correlation with other   laboratory and clinical findings is essential.      Lymph # 02/13/2019 1.7  1.0 - 4.8 K/uL Final    Mono # 02/13/2019 0.9  0.3 - 1.0 K/uL Final    Eos # 02/13/2019 0.3  0.0 - 0.5 K/uL Final    Baso # 02/13/2019 0.04  0.00 - 0.20 K/uL Final    nRBC 02/13/2019 0  0 /100 WBC Final    Gran% 02/13/2019 57.2  38.0 - 73.0 % Final    Lymph% 02/13/2019 25.2  18.0 - 48.0 % Final    Mono% 02/13/2019 12.4  4.0 - 15.0 % Final    Eosinophil% 02/13/2019 4.0  0.0 - 8.0 % Final    Basophil% 02/13/2019 0.6  0.0 - 1.9 % Final    Platelet Estimate 02/13/2019 Appears normal   Final    Aniso 02/13/2019 Slight   Final    Poik 02/13/2019 Slight   Final    Hypo 02/13/2019 Occasional   Final    Ovalocytes 02/13/2019 Occasional   Final    Target Cells 02/13/2019 Occasional   Final    Large/Giant Platelets 02/13/2019 Present   Final    Fragmented Cells 02/13/2019 Occasional   Final    Differential Method 02/13/2019 Automated   Final         Imaging:       Endoscopy- 1/9/19 - Grade II esophageal varices. Completely eradicated. Banded.                                  - Small hiatal hernia.                                  - Portal hypertensive gastropathy. Treated with argon plasma coagulation (APC).                           CT abdomen- 1/21/2019  1. Large well-circumscribed heterogeneously enhancing right hepatic lobe mass with characteristics consistent with HCC.  Several additional indeterminate subcentimeter hyperenhancing lesions throughout the liver which become isodense.  Mass compresses and narrows the IVC, cannot exclude tumor involvement.  2. Liver cirrhosis.  3. Moderate volume abdominopelvic ascites.  4. Small paraesophageal varices present.  5. Cholelithiasis.      Assessment:       1. HCC (hepatocellular carcinoma)    2. Alcoholic cirrhosis, unspecified  whether ascites present    3. Esophageal varices in alcoholic cirrhosis    4. Severe malnutrition    5. Acute blood loss anemia    6. PRACHI (acute kidney injury)      She currently has abdominal distention and abdominal discomfort/pain   CMP normal bilirubin and creatinine. Albumin of 2.8. Alk phos 138, AST 58   CBC- anemia with Hb of 8.7    AFP tumor marker is normal     8 points- Child Class B    CT abdomen revealed Large well-circumscribed heterogeneously enhancing right hepatic lobe mass measuring 8.9 x 7.6 cm which demonstrates washout on venous and delayed phase imaging consistent with HCC.    - Several additional scattered subcentimeter foci of hyperenhancement without washout.    - Main and left portal vein are patent.    - Mass compresses and narrows the right portal vein.    - Mass compresses and significantly narrows the IVC, cannot exclude tumor involvement.         Plan:     1. She is not a surgical candidate but might be a candidate for IR embolization. We will discuss at next liver tumor board on 2/19/2019  2. Will order CT chest with contrast for staging purpose  3. Will prescribe oxycodone for abdominal pain/discomfort  4. Continue aldactone and lasix for Renin angiotensin system for ascites and pedal edema  5. Continue periactin for appetite stimulation and atarax for pruritis    Plan of care discussed with Dr. Mariangel Roberts MD PGY-IV  Hematology and Oncology  Pager:[phone]    Distress Screening Results: Psychosocial Distress screening score of Distress Score: 0 noted and reviewed. No intervention indicated.

## 2019-02-19 ENCOUNTER — CONFERENCE (OUTPATIENT)
Dept: TRANSPLANT | Facility: CLINIC | Age: 64
End: 2019-02-19

## 2019-02-22 ENCOUNTER — HOSPITAL ENCOUNTER (OUTPATIENT)
Dept: RADIOLOGY | Facility: HOSPITAL | Age: 64
Discharge: HOME | End: 2019-02-22
Attending: STUDENT IN AN ORGANIZED HEALTH CARE EDUCATION/TRAINING PROGRAM
Payer: MEDICAID

## 2019-02-22 ENCOUNTER — OFFICE VISIT (OUTPATIENT)
Dept: HEMATOLOGY/ONCOLOGY | Facility: CLINIC | Age: 64
End: 2019-02-22
Payer: MEDICAID

## 2019-02-22 VITALS
DIASTOLIC BLOOD PRESSURE: 56 MMHG | BODY MASS INDEX: 21.07 KG/M2 | HEART RATE: 76 BPM | OXYGEN SATURATION: 100 % | WEIGHT: 104.5 LBS | SYSTOLIC BLOOD PRESSURE: 113 MMHG | HEIGHT: 59 IN | RESPIRATION RATE: 18 BRPM | TEMPERATURE: 98 F

## 2019-02-22 DIAGNOSIS — K70.30 ESOPHAGEAL VARICES IN ALCOHOLIC CIRRHOSIS: ICD-10-CM

## 2019-02-22 DIAGNOSIS — G89.3 CANCER ASSOCIATED PAIN: ICD-10-CM

## 2019-02-22 DIAGNOSIS — K70.31 ALCOHOLIC CIRRHOSIS OF LIVER WITH ASCITES: ICD-10-CM

## 2019-02-22 DIAGNOSIS — D50.0 IRON DEFICIENCY ANEMIA DUE TO CHRONIC BLOOD LOSS: ICD-10-CM

## 2019-02-22 DIAGNOSIS — C22.0 HCC (HEPATOCELLULAR CARCINOMA): Primary | ICD-10-CM

## 2019-02-22 DIAGNOSIS — I85.10 ESOPHAGEAL VARICES IN ALCOHOLIC CIRRHOSIS: ICD-10-CM

## 2019-02-22 DIAGNOSIS — C22.0 HCC (HEPATOCELLULAR CARCINOMA): ICD-10-CM

## 2019-02-22 DIAGNOSIS — N17.9 AKI (ACUTE KIDNEY INJURY): ICD-10-CM

## 2019-02-22 PROCEDURE — 99999 PR PBB SHADOW E&M-EST. PATIENT-LVL IV: ICD-10-PCS | Mod: PBBFAC,,, | Performed by: STUDENT IN AN ORGANIZED HEALTH CARE EDUCATION/TRAINING PROGRAM

## 2019-02-22 PROCEDURE — 99214 PR OFFICE/OUTPT VISIT, EST, LEVL IV, 30-39 MIN: ICD-10-PCS | Mod: S$PBB,,, | Performed by: STUDENT IN AN ORGANIZED HEALTH CARE EDUCATION/TRAINING PROGRAM

## 2019-02-22 PROCEDURE — 99999 PR PBB SHADOW E&M-EST. PATIENT-LVL IV: CPT | Mod: PBBFAC,,, | Performed by: STUDENT IN AN ORGANIZED HEALTH CARE EDUCATION/TRAINING PROGRAM

## 2019-02-22 PROCEDURE — 99214 OFFICE O/P EST MOD 30 MIN: CPT | Mod: S$PBB,,, | Performed by: STUDENT IN AN ORGANIZED HEALTH CARE EDUCATION/TRAINING PROGRAM

## 2019-02-22 PROCEDURE — 99214 OFFICE O/P EST MOD 30 MIN: CPT | Mod: PBBFAC,25 | Performed by: STUDENT IN AN ORGANIZED HEALTH CARE EDUCATION/TRAINING PROGRAM

## 2019-02-22 PROCEDURE — 71250 CT THORAX DX C-: CPT | Mod: 26,,, | Performed by: RADIOLOGY

## 2019-02-22 PROCEDURE — 71250 CT THORAX DX C-: CPT | Mod: TC

## 2019-02-22 PROCEDURE — 71250 CT CHEST WITHOUT CONTRAST: ICD-10-PCS | Mod: 26,,, | Performed by: RADIOLOGY

## 2019-02-22 RX ORDER — OXYCODONE HYDROCHLORIDE 5 MG/1
5 TABLET ORAL EVERY 8 HOURS PRN
Qty: 90 TABLET | Refills: 0 | Status: SHIPPED | OUTPATIENT
Start: 2019-02-22 | End: 2019-03-24

## 2019-02-22 NOTE — Clinical Note
She needs to be scheduled for CT chest, she missed her previous appointmentShe need to be scheduled for IR consult for Y 90 mappingLabs today- Josh Montes call her with results

## 2019-02-22 NOTE — PROGRESS NOTES
PATIENT: Neena Marks  MRN: [id number]  DATE: 2/22/2019      Diagnosis:   1. HCC (hepatocellular carcinoma)    2. Esophageal varices in alcoholic cirrhosis    3. Alcoholic cirrhosis of liver with ascites    4. Severe malnutrition    5. PRACHI (acute kidney injury)    6. Iron deficiency anemia due to chronic blood loss        Chief Complaint: HCC (hepatocellular carcinoma)    Neena Marks is a 63 y.o. female with PMHx of alcoholic cirrhosis since 2015, Portal HTN, ascites, variceal bleeding, newly diagnosed with HCC now presented to oncology clinic for further evaluation.     Patient was a chronic alcoholic for the last 40 years and was diagnosed with alcoholic cirrhosis in 2015 however she continued to drink alcohol until January 2018 and has quit since then. She had history of upper GI bleeding with hematochezia in January 2018 s/p banding. She also had ascites since the last 1.5 to 2 years as is getting paracentesis q 2-3 months. She had no history of SBP in the past. She is currently on lasix and spironolactone for ascites and lower extremity edema.  She does not have jaundice but complaints of severe pruritis which is moderately controlled with hydroxyzine.      She currently has abdominal distention and abdominal discomfort.     CMP normal bilirubin and creatinine. Albumin of 2.8. Alk phos 138, AST 58   CBC- anemia with Hb of 8.7     AFP tumor marker is normal     Hep C and B testing on 4/27/2018- negative     Endoscopy- 1/9/19 - Grade II esophageal varices. Completely eradicated. Banded.                                  - Small hiatal hernia.                                  - Portal hypertensive gastropathy. Treated with argon plasma coagulation (APC).                            CT abdomen- 1/21/2019  1. Large well-circumscribed heterogeneously enhancing right hepatic lobe mass with characteristics consistent with HCC.  Several additional indeterminate subcentimeter hyperenhancing lesions throughout the liver  which become isodense.  Mass compresses and narrows the IVC, cannot exclude tumor involvement.  2. Liver cirrhosis.  3. Moderate volume abdominopelvic ascites.  4. Small paraesophageal varices present.  5. Cholelithiasis.     Patient had an appointment on 2/15/2019, IR consult for Y90 but she missed her appointment.     Follow up 2/22/2019  - She missed her appointment for the CT chest scheduled on 2/20/2019. She said she did not knew about the appointment  - She c/o more abdominal pain and she said she ran out of oxycodone. She takes 2 tablespoons of oxycodone 5mg/5 ml syrup every 6 hours  - Her pruritis is better with atarax.   - Periactin is helping for her appetite.       Subjective:    Initial History: Ms. Marks is a 63 y.o. female who returns for follow up.     Past Medical History:   Past Medical History:   Diagnosis Date    Cirrhosis        Past Surgical HIstory:   Past Surgical History:   Procedure Laterality Date    EGD (ESOPHAGOGASTRODUODENOSCOPY) Left 1/9/2019    Performed by [facility]ar [facility] ENDO    ESOPHAGOGASTRODUODENOSCOPY (EGD) N/A 1/26/2018    Performed by Mark Urbina MD at [facility] ENDO    PARACENTESIS N/A 1/30/2018    Performed by Everett Fitzpatrick MD at [facility] CATH LAB    PARACENTESIS N/A 2/6/2015    Performed by Alejandro Myrick MD at [facility] CATH LAB    PARACENTESIS, ABDOMINAL N/A 1/9/2019    Performed by Everett Fitzpatrick MD at [facility] CATH LAB       Family History: History reviewed. No pertinent family history.    Social History:  reports that she has been smoking cigarettes.  she has never used smokeless tobacco. She reports that she uses drugs. Drug: Cocaine. She reports that she does not drink alcohol.    Allergies:  Review of patient's allergies indicates:  No Known Allergies    Medications:  Current Outpatient Medications   Medication Sig Dispense Refill    cholecalciferol, vitamin D3, 50,000 unit Tab Take 125 mcg by mouth.      ciprofloxacin HCl (CIPRO) 500 MG  tablet Take 500 mg by mouth every 12 (twelve) hours.      cyclobenzaprine (FLEXERIL) 10 MG tablet Take 10 mg by mouth nightly as needed for Muscle spasms.      cyproheptadine (PERIACTIN) 4 mg tablet Take 4 mg by mouth 2 (two) times daily as needed.       ferrous sulfate 325 mg (65 mg iron) Tab tablet Take 325 mg by mouth daily with breakfast.      furosemide (LASIX) 40 MG tablet Take 40 mg by mouth once daily.      hydrOXYzine HCl (ATARAX) 25 MG tablet Take 1 tablet (25 mg total) by mouth 3 (three) times daily as needed for Itching. 90 tablet 3    lactulose 10 gram/15 ml (CHRONULAC) 10 gram/15 mL (15 mL) solution Take 10 g by mouth 2 (two) times daily.      meloxicam (MOBIC) 15 MG tablet Take 15 mg by mouth nightly as needed for Pain.      multivitamin (ONE DAILY MULTIVITAMIN) per tablet Take 1 tablet by mouth once daily.      oxyCODONE (ROXICODONE) 5 mg/5 mL Soln Take 5 mLs (5 mg total) by mouth every 6 (six) hours as needed (Pain). 150 mL 0    propranolol (INDERAL) 10 MG tablet Take 1 tablet (10 mg total) by mouth 2 (two) times daily. 60 tablet 2    spironolactone (ALDACTONE) 100 MG tablet Take 1 tablet (100 mg total) by mouth once daily. 30 tablet 5    sucralfate (CARAFATE) 1 gram tablet Take 1 g by mouth every 6 (six) hours.       No current facility-administered medications for this visit.        Review of Systems   Constitutional: Positive for appetite change, fatigue and unexpected weight change. Negative for chills and fever.   HENT: Negative for nosebleeds and trouble swallowing.    Respiratory: Negative for cough, choking and shortness of breath.    Cardiovascular: Negative for chest pain and leg swelling.   Gastrointestinal: Positive for abdominal distention and abdominal pain. Negative for blood in stool, diarrhea, nausea and vomiting.   Genitourinary: Negative for dysuria, flank pain and frequency.   Musculoskeletal: Negative for back pain and joint swelling.   Neurological: Negative for  "seizures, syncope and headaches.   Hematological: Negative for adenopathy. Does not bruise/bleed easily.   Psychiatric/Behavioral: Negative for agitation and behavioral problems.       ECOG Performance Status: 1   Objective:      Vitals:   Vitals:    02/22/19 0924   BP: (!) 113/56   BP Location: Left arm   Patient Position: Sitting   BP Method: Large (Automatic)   Pulse: 76   Resp: 18   Temp: 97.9 °F (36.6 °C)   TempSrc: Oral   SpO2: 100%   Weight: 47.4 kg (104 lb 8 oz)   Height: 4' 11" (1.499 m)     BMI: Body mass index is 21.11 kg/m².    Physical Exam   Constitutional: She is oriented to person, place, and time. She appears well-developed.   Frail lady   HENT:   Head: Normocephalic and atraumatic.   Eyes: EOM are normal. Pupils are equal, round, and reactive to light.   Neck: Normal range of motion. Neck supple.   Cardiovascular: Normal rate and regular rhythm.   No murmur heard.  Pulmonary/Chest: Effort normal and breath sounds normal.   Abdominal: Soft. Bowel sounds are normal. She exhibits distension. She exhibits no mass. There is tenderness. There is no rebound and no guarding.   Musculoskeletal: Normal range of motion. She exhibits edema. She exhibits no deformity.   Neurological: She is alert and oriented to person, place, and time.   Skin: Skin is warm and dry. Capillary refill takes less than 2 seconds. No pallor.       Laboratory Data:  No visits with results within 1 Week(s) from this visit.   Latest known visit with results is:   Lab Visit on 02/13/2019   Component Date Value Ref Range Status    AFP 02/13/2019 5.1  0.0 - 8.4 ng/mL Final    WBC 02/13/2019 6.83  3.90 - 12.70 K/uL Final    RBC 02/13/2019 3.36* 4.00 - 5.40 M/uL Final    Hemoglobin 02/13/2019 7.1* 12.0 - 16.0 g/dL Final    Hematocrit 02/13/2019 23.5* 37.0 - 48.5 % Final    MCV 02/13/2019 70* 82 - 98 fL Final    MCH 02/13/2019 21.1* 27.0 - 31.0 pg Final    MCHC 02/13/2019 30.2* 32.0 - 36.0 g/dL Final    RDW 02/13/2019 29.4* 11.5 - " 14.5 % Final    Platelets 02/13/2019 160  150 - 350 K/uL Final    Platelets are clumped on smear.Platelet count may be affected.    MPV 02/13/2019 SEE COMMENT  9.2 - 12.9 fL Final    Result not available.    Immature Granulocytes 02/13/2019 0.6* 0.0 - 0.5 % Final    Gran # (ANC) 02/13/2019 3.9  1.8 - 7.7 K/uL Final    Immature Grans (Abs) 02/13/2019 0.04  0.00 - 0.04 K/uL Final    Comment: Mild elevation in immature granulocytes is non specific and   can be seen in a variety of conditions including stress response,   acute inflammation, trauma and pregnancy. Correlation with other   laboratory and clinical findings is essential.      Lymph # 02/13/2019 1.7  1.0 - 4.8 K/uL Final    Mono # 02/13/2019 0.9  0.3 - 1.0 K/uL Final    Eos # 02/13/2019 0.3  0.0 - 0.5 K/uL Final    Baso # 02/13/2019 0.04  0.00 - 0.20 K/uL Final    nRBC 02/13/2019 0  0 /100 WBC Final    Gran% 02/13/2019 57.2  38.0 - 73.0 % Final    Lymph% 02/13/2019 25.2  18.0 - 48.0 % Final    Mono% 02/13/2019 12.4  4.0 - 15.0 % Final    Eosinophil% 02/13/2019 4.0  0.0 - 8.0 % Final    Basophil% 02/13/2019 0.6  0.0 - 1.9 % Final    Platelet Estimate 02/13/2019 Appears normal   Final    Aniso 02/13/2019 Slight   Final    Poik 02/13/2019 Slight   Final    Hypo 02/13/2019 Occasional   Final    Ovalocytes 02/13/2019 Occasional   Final    Target Cells 02/13/2019 Occasional   Final    Large/Giant Platelets 02/13/2019 Present   Final    Fragmented Cells 02/13/2019 Occasional   Final    Differential Method 02/13/2019 Automated   Final         Imaging:     Endoscopy- 1/9/19 - Grade II esophageal varices. Completely eradicated. Banded.                                  - Small hiatal hernia.                                  - Portal hypertensive gastropathy. Treated with argon plasma coagulation (APC).                            CT abdomen- 1/21/2019  1. Large well-circumscribed heterogeneously enhancing right hepatic lobe mass with  characteristics consistent with HCC.  Several additional indeterminate subcentimeter hyperenhancing lesions throughout the liver which become isodense.  Mass compresses and narrows the IVC, cannot exclude tumor involvement.  2. Liver cirrhosis.  3. Moderate volume abdominopelvic ascites.  4. Small paraesophageal varices present.  5. Cholelithiasis.      Assessment:       1. HCC (hepatocellular carcinoma)    2. Esophageal varices in alcoholic cirrhosis    3. Alcoholic cirrhosis of liver with ascites    4. Severe malnutrition    5. PRACHI (acute kidney injury)    6. Iron deficiency anemia due to chronic blood loss      She currently has abdominal distention and abdominal discomfort/pain   CMP normal bilirubin and creatinine. Albumin of 2.8. Alk phos 138, AST 58   CBC- anemia with Hb of 8.7     AFP tumor marker is normal     8 points- Child Class B     CT abdomen revealed Large well-circumscribed heterogeneously enhancing right hepatic lobe mass measuring 8.9 x 7.6 cm which demonstrates washout on venous and delayed phase imaging consistent with HCC.    - Several additional scattered subcentimeter foci of hyperenhancement without washout.    - Main and left portal vein are patent.    - Mass compresses and narrows the right portal vein.    - Mass compresses and significantly narrows the IVC, cannot exclude tumor involvement.         Plan:     1. Discussed at Liver tumor board and will again consult IR for Y 90 (patient missed her prior appointment with IR). Message sent to schedulers.  2. Will schedule for CT chest with contrast for staging purpose  3. Will prescribe oxycodone for abdominal pain/discomfort  4. Continue aldactone and lasix for Renin angiotensin system for ascites and pedal edema  5. Continue periactin for appetite stimulation and atarax for pruritis     Plan of care discussed with Dr. Delfina Roberts MD PGY-IV  Hematology and Oncology  Pager:[phone]    Distress Screening Results:  Psychosocial Distress screening score of Distress Score: 0 noted and reviewed. No intervention indicated.

## 2019-02-25 ENCOUNTER — TELEPHONE (OUTPATIENT)
Dept: HEPATOLOGY | Facility: CLINIC | Age: 64
End: 2019-02-25

## 2019-02-25 NOTE — TELEPHONE ENCOUNTER
----- Message from Josette Donohue MD sent at 2/23/2019  7:11 PM CST -----  Please mail results.  Patient needs to follow up with radiology to discuss treatment option

## 2019-03-27 ENCOUNTER — OFFICE VISIT (OUTPATIENT)
Dept: INTERVENTIONAL RADIOLOGY/VASCULAR | Facility: CLINIC | Age: 64
End: 2019-03-27
Payer: MEDICAID

## 2019-03-27 VITALS
BODY MASS INDEX: 21.51 KG/M2 | HEIGHT: 59 IN | DIASTOLIC BLOOD PRESSURE: 68 MMHG | WEIGHT: 106.69 LBS | SYSTOLIC BLOOD PRESSURE: 110 MMHG | HEART RATE: 82 BPM

## 2019-03-27 DIAGNOSIS — C22.0 HCC (HEPATOCELLULAR CARCINOMA): Primary | ICD-10-CM

## 2019-03-27 PROCEDURE — 99214 PR OFFICE/OUTPT VISIT, EST, LEVL IV, 30-39 MIN: ICD-10-PCS | Mod: S$PBB,,, | Performed by: FAMILY MEDICINE

## 2019-03-27 PROCEDURE — 99999 PR PBB SHADOW E&M-EST. PATIENT-LVL III: ICD-10-PCS | Mod: PBBFAC,,, | Performed by: FAMILY MEDICINE

## 2019-03-27 PROCEDURE — 99999 PR PBB SHADOW E&M-EST. PATIENT-LVL III: CPT | Mod: PBBFAC,,, | Performed by: FAMILY MEDICINE

## 2019-03-27 PROCEDURE — 99213 OFFICE O/P EST LOW 20 MIN: CPT | Mod: PBBFAC | Performed by: FAMILY MEDICINE

## 2019-03-27 PROCEDURE — 99214 OFFICE O/P EST MOD 30 MIN: CPT | Mod: S$PBB,,, | Performed by: FAMILY MEDICINE

## 2019-03-27 RX ORDER — FAMOTIDINE 20 MG/1
20 TABLET, FILM COATED ORAL 2 TIMES DAILY
COMMUNITY
End: 2019-04-04 | Stop reason: SDUPTHER

## 2019-03-27 RX ORDER — OXYCODONE HYDROCHLORIDE 5 MG/1
5 TABLET ORAL EVERY 8 HOURS PRN
COMMUNITY
End: 2019-04-04 | Stop reason: SDUPTHER

## 2019-03-27 NOTE — PROGRESS NOTES
Subjective:       Patient ID: Neena Marks is a 63 y.o. female.    Chief Complaint: Cancer    Patient here to discuss treatment of a liver mass recently identified during a hospital stay workup for rectal bleeding. She has a history of alcoholic cirrhosis, but stopped drinking in January 2019. She presented to the ED on 1/8/2019 with complaints of bright red bleeding from the rectum. She was admitted and banded for oozing esophageal varices. She also underwent paracentesis during this hospital stay. A CT scan obtained on 1/8/2019 revealed a large liver lesion. This was confirmed with MRI on 1/9/2019. She was discharged on 1/11/2019 with recommendations to follow up with hepatology and interventional radiology. She presented to the ED again on 1/31/2019 with complaints of abdominal pain and distention. She was discharged same day with recommendations to keep her appointments with hepatology and hem/onc. Since that time she has followed up with hepatology and hem/onc, and was reviewed in liver conference. Another CT scan was obtained on 1/21/2019 which noted the lesion measuring 8.9 x 7.6 cm with characteristics consistent with hepatocellular carcinoma. She has complaints of fatigue, decreased appetite, abdominal distention and intermittent abdominal pain.    Review of Systems   Constitutional: Positive for activity change (decreased), appetite change (decreased x3 weeks) and fatigue. Negative for chills and fever.   HENT: Negative for congestion, drooling, ear discharge, postnasal drip, sneezing and trouble swallowing.    Eyes: Negative for pain, discharge, redness and itching.   Respiratory: Negative for cough, shortness of breath, wheezing and stridor.    Cardiovascular: Positive for leg swelling. Negative for chest pain and palpitations.   Gastrointestinal: Positive for abdominal distention, abdominal pain, constipation (occasional) and nausea (sometimes). Negative for diarrhea and vomiting.   Genitourinary:  Negative for difficulty urinating, dysuria, frequency and urgency.   Musculoskeletal: Negative for arthralgias, back pain, gait problem, joint swelling, myalgias and neck pain.   Skin: Negative for color change, pallor and rash.   Neurological: Positive for dizziness (sometimes) and weakness. Negative for headaches.       Objective:      Physical Exam   Constitutional: She is oriented to person, place, and time. She appears well-developed and well-nourished. No distress.   HENT:   Head: Normocephalic and atraumatic.   Right Ear: External ear normal.   Left Ear: External ear normal.   Nose: Nose normal.   Mouth/Throat: Oropharynx is clear and moist. No oropharyngeal exudate.   Eyes: Pupils are equal, round, and reactive to light. Conjunctivae are normal. Right eye exhibits no discharge. Left eye exhibits no discharge. No scleral icterus.   Neck: Neck supple. No JVD present. No tracheal deviation present. No thyromegaly present.   Cardiovascular: Normal rate, regular rhythm, normal heart sounds and intact distal pulses. Exam reveals no gallop and no friction rub.   No murmur heard.  Pulmonary/Chest: Effort normal and breath sounds normal. No stridor. No respiratory distress. She has no wheezes. She has no rales.   Abdominal: Soft. Bowel sounds are normal. She exhibits distension. She exhibits no mass. There is no hepatosplenomegaly. There is no tenderness. There is no rebound and no guarding.   Musculoskeletal: She exhibits no edema.   Lymphadenopathy:     She has no cervical adenopathy.   Neurological: She is alert and oriented to person, place, and time. Gait normal.   Skin: Skin is warm and dry. She is not diaphoretic. No cyanosis. Nails show no clubbing.   Psychiatric: She has a normal mood and affect.   Vitals reviewed.      ECO  MELD-Na score: 15 at 1/10/2019  3:49 AM  MELD score: 14 at 1/10/2019  3:49 AM  Calculated from:  Serum Creatinine: 1.7 mg/dL at 1/10/2019  3:49 AM  Serum Sodium: 136 mmol/L at  1/10/2019  3:49 AM  Total Bilirubin: 1.0 mg/dL at 1/10/2019  3:49 AM  INR(ratio): 1.2 at 1/8/2019  7:12 AM  Age: 63 years  Child Barros: Class B  Transplant Status: not a candidate    Imaging:  CT scan 1/21/2019  Impression       1. Large well-circumscribed heterogeneously enhancing right hepatic lobe mass with characteristics consistent with HCC.  Several additional indeterminate subcentimeter hyperenhancing lesions throughout the liver which become isodense.  Mass compresses and narrows the IVC, cannot exclude tumor involvement.  2. Liver cirrhosis.  3. Moderate volume abdominopelvic ascites.  4. Small paraesophageal varices present.  5. Cholelithiasis.     Assessment:       1. HCC (hepatocellular carcinoma)        Plan:         Reviewed recent CT scan with patient. Pointed out target lesion. Explained recommendation is to treat with radioembolization. Yttrium 90 Radioembolization discussed in detail with the patient including risks, benefits, potential complications, usual pre and post procedure course.  Discussed the need for initial Angiogram mapping study prior to scheduling the actual Radioembolization procedure. Utilized images from patient's CT scan, the internet, and illustrations to help explain procedure. Patient verbalized understanding and agreement. Patient scheduled for April 10, 2019. Pre-procedure handout with clinic phone number provided.

## 2019-03-27 NOTE — LETTER
PRE-PROCEDURE INSTRUCTIONS    Your procedure with Interventional Radiology is scheduled for April 10, 2019. Please arrive by 7:00am.    You must check-in and receive a wristband before going to your procedure. Your check-in location is [facility].    ONLY TAKE propranolol the morning of your procedure with a sip of water.    **Do not eat or drink anything between midnight and the time of your procedure. This includes gum, mints, and dandy lemon drops.    **Do not smoke or drink alcoholic beverages 24 hours prior to your procedure.    **If you wear contact lenses, dentures, hearing aids, or glasses, bring a container to put them in during the procedure and give them to a family member for safekeeping.    **If you have been diagnosed with sleep apnea please bring your CPAP machine.    **If your doctor has scheduled you for an overnight stay, bring a small overnight bag with any personal items that you may need.    **Make arrangements in advance for transportation home by a responsible adult. It is not safe to drive a vehicle during the 24 hours following the procedure.    **All [facility] facilities and properties are tobacco free. Smoking is NOT allowed.    PLEASE NOTE: The procedure schedule has many variables which affect the time of your procedure. Family members should be available if your surgery time changes.    If you have any questions about these instructions call Interventional Radiology at [phone] Monday - Friday between 8:00am and 4:00pm or [phone] for after hours.

## 2019-04-04 ENCOUNTER — HOSPITAL ENCOUNTER (EMERGENCY)
Facility: OTHER | Age: 64
Discharge: HOME | End: 2019-04-04
Attending: EMERGENCY MEDICINE
Payer: MEDICAID

## 2019-04-04 VITALS
DIASTOLIC BLOOD PRESSURE: 80 MMHG | TEMPERATURE: 98 F | SYSTOLIC BLOOD PRESSURE: 142 MMHG | RESPIRATION RATE: 18 BRPM | HEIGHT: 59 IN | WEIGHT: 108 LBS | HEART RATE: 70 BPM | BODY MASS INDEX: 21.77 KG/M2 | OXYGEN SATURATION: 98 %

## 2019-04-04 DIAGNOSIS — K70.31 ALCOHOLIC CIRRHOSIS OF LIVER WITH ASCITES: ICD-10-CM

## 2019-04-04 DIAGNOSIS — C22.0 HCC (HEPATOCELLULAR CARCINOMA): Primary | ICD-10-CM

## 2019-04-04 LAB
ALBUMIN SERPL BCP-MCNC: 3 G/DL (ref 3.5–5.2)
ALP SERPL-CCNC: 112 U/L (ref 55–135)
ALT SERPL W/O P-5'-P-CCNC: 14 U/L (ref 10–44)
AMMONIA PLAS-SCNC: 36 UMOL/L (ref 10–50)
ANION GAP SERPL CALC-SCNC: 11 MMOL/L (ref 8–16)
ANISOCYTOSIS BLD QL SMEAR: SLIGHT
AST SERPL-CCNC: 71 U/L (ref 10–40)
BASOPHILS # BLD AUTO: 0.03 K/UL (ref 0–0.2)
BASOPHILS NFR BLD: 0.6 % (ref 0–1.9)
BILIRUB SERPL-MCNC: 0.8 MG/DL (ref 0.1–1)
BUN SERPL-MCNC: 10 MG/DL (ref 8–23)
BURR CELLS BLD QL SMEAR: ABNORMAL
CALCIUM SERPL-MCNC: 9.4 MG/DL (ref 8.7–10.5)
CHLORIDE SERPL-SCNC: 104 MMOL/L (ref 95–110)
CO2 SERPL-SCNC: 19 MMOL/L (ref 23–29)
CREAT SERPL-MCNC: 1.1 MG/DL (ref 0.5–1.4)
DIFFERENTIAL METHOD: ABNORMAL
EOSINOPHIL # BLD AUTO: 0.2 K/UL (ref 0–0.5)
EOSINOPHIL NFR BLD: 3.3 % (ref 0–8)
ERYTHROCYTE [DISTWIDTH] IN BLOOD BY AUTOMATED COUNT: 22.4 % (ref 11.5–14.5)
EST. GFR  (AFRICAN AMERICAN): >60 ML/MIN/1.73 M^2
EST. GFR  (NON AFRICAN AMERICAN): 54 ML/MIN/1.73 M^2
GLUCOSE SERPL-MCNC: 80 MG/DL (ref 70–110)
HCT VFR BLD AUTO: 24 % (ref 37–48.5)
HGB BLD-MCNC: 7.7 G/DL (ref 12–16)
HYPOCHROMIA BLD QL SMEAR: ABNORMAL
LIPASE SERPL-CCNC: 31 U/L (ref 4–60)
LYMPHOCYTES # BLD AUTO: 1.9 K/UL (ref 1–4.8)
LYMPHOCYTES NFR BLD: 35.5 % (ref 18–48)
MCH RBC QN AUTO: 18.9 PG (ref 27–31)
MCHC RBC AUTO-ENTMCNC: 32.1 G/DL (ref 32–36)
MCV RBC AUTO: 59 FL (ref 82–98)
MONOCYTES # BLD AUTO: 0.6 K/UL (ref 0.3–1)
MONOCYTES NFR BLD: 10.1 % (ref 4–15)
NEUTROPHILS # BLD AUTO: 2.7 K/UL (ref 1.8–7.7)
NEUTROPHILS NFR BLD: 50.5 % (ref 38–73)
OVALOCYTES BLD QL SMEAR: ABNORMAL
PLATELET # BLD AUTO: 253 K/UL (ref 150–350)
PLATELET BLD QL SMEAR: ABNORMAL
PMV BLD AUTO: ABNORMAL FL (ref 9.2–12.9)
POIKILOCYTOSIS BLD QL SMEAR: SLIGHT
POTASSIUM SERPL-SCNC: 5.1 MMOL/L (ref 3.5–5.1)
PROT SERPL-MCNC: 8.9 G/DL (ref 6–8.4)
RBC # BLD AUTO: 4.08 M/UL (ref 4–5.4)
SCHISTOCYTES BLD QL SMEAR: ABNORMAL
SCHISTOCYTES BLD QL SMEAR: PRESENT
SODIUM SERPL-SCNC: 134 MMOL/L (ref 136–145)
TARGETS BLD QL SMEAR: ABNORMAL
WBC # BLD AUTO: 5.43 K/UL (ref 3.9–12.7)

## 2019-04-04 PROCEDURE — 83690 ASSAY OF LIPASE: CPT

## 2019-04-04 PROCEDURE — 80053 COMPREHEN METABOLIC PANEL: CPT

## 2019-04-04 PROCEDURE — 85025 COMPLETE CBC W/AUTO DIFF WBC: CPT

## 2019-04-04 PROCEDURE — 82140 ASSAY OF AMMONIA: CPT

## 2019-04-04 PROCEDURE — 96375 TX/PRO/DX INJ NEW DRUG ADDON: CPT

## 2019-04-04 PROCEDURE — 99284 EMERGENCY DEPT VISIT MOD MDM: CPT | Mod: 25

## 2019-04-04 PROCEDURE — 63600175 PHARM REV CODE 636 W HCPCS: Performed by: PHYSICIAN ASSISTANT

## 2019-04-04 PROCEDURE — 96374 THER/PROPH/DIAG INJ IV PUSH: CPT

## 2019-04-04 RX ORDER — MORPHINE SULFATE 2 MG/ML
2 INJECTION, SOLUTION INTRAMUSCULAR; INTRAVENOUS
Status: COMPLETED | OUTPATIENT
Start: 2019-04-04 | End: 2019-04-04

## 2019-04-04 RX ORDER — FERROUS SULFATE 325(65) MG
325 TABLET ORAL
Qty: 30 TABLET | Refills: 0 | Status: SHIPPED | OUTPATIENT
Start: 2019-04-04 | End: 2019-05-17 | Stop reason: SDUPTHER

## 2019-04-04 RX ORDER — FAMOTIDINE 20 MG/1
20 TABLET, FILM COATED ORAL DAILY
Qty: 30 TABLET | Refills: 0 | Status: SHIPPED | OUTPATIENT
Start: 2019-04-04 | End: 2019-06-06 | Stop reason: SDUPTHER

## 2019-04-04 RX ORDER — FUROSEMIDE 10 MG/ML
40 INJECTION INTRAMUSCULAR; INTRAVENOUS
Status: COMPLETED | OUTPATIENT
Start: 2019-04-04 | End: 2019-04-04

## 2019-04-04 RX ORDER — FUROSEMIDE 40 MG/1
40 TABLET ORAL DAILY
Qty: 30 TABLET | Refills: 0 | Status: SHIPPED | OUTPATIENT
Start: 2019-04-04 | End: 2019-05-17 | Stop reason: SDUPTHER

## 2019-04-04 RX ORDER — OXYCODONE HYDROCHLORIDE 5 MG/1
5 TABLET ORAL EVERY 8 HOURS PRN
Qty: 15 TABLET | Refills: 0 | Status: SHIPPED | OUTPATIENT
Start: 2019-04-04 | End: 2019-04-16 | Stop reason: SDUPTHER

## 2019-04-04 RX ORDER — CYPROHEPTADINE HYDROCHLORIDE 4 MG/1
4 TABLET ORAL 2 TIMES DAILY PRN
Qty: 30 TABLET | Refills: 0 | Status: SHIPPED | OUTPATIENT
Start: 2019-04-04 | End: 2019-04-26

## 2019-04-04 RX ADMIN — FUROSEMIDE 40 MG: 10 INJECTION, SOLUTION INTRAVENOUS at 06:04

## 2019-04-04 RX ADMIN — MORPHINE SULFATE 2 MG: 2 INJECTION, SOLUTION INTRAMUSCULAR; INTRAVENOUS at 06:04

## 2019-04-04 NOTE — ED PROVIDER NOTES
Encounter Date: 4/4/2019       History     Chief Complaint   Patient presents with    Ascites     Swelling to the abd for the past week. Pt has a hx of cirrhosis and states she is suppose to have surgery in the near future.      Patient is a 63-year-old female with hepatocellular carcinoma and alcoholic cirrhosis who presents to the ED with swelling. Patient reports increased swelling to her abdomen.  She states this has worsened over the past week.  She states she is also noticing veins to her abdomen.  She reports generalized pain that is worse in the right lower quadrant.  She denies fever.  She reports one episode of emesis last night.  She reports decreased appetite secondary to early satiety.  She states the swelling is causing shortness of breath that is worse with exertion.  She states she had leg swelling for the past 2 days has improved today.  She has radio embolization scheduled on April 10th.  Patient states she ran out of her medication last night.    The history is provided by the patient.     Review of patient's allergies indicates:  No Known Allergies  Past Medical History:   Diagnosis Date    Cirrhosis      Past Surgical History:   Procedure Laterality Date    EGD (ESOPHAGOGASTRODUODENOSCOPY) Left 1/9/2019    Performed [facility] at [facility] ENDO    ESOPHAGOGASTRODUODENOSCOPY (EGD) N/A 1/26/2018    Performed by Mark Urbina [facility] at [facility] ENDO    PARACENTESIS N/A 1/30/2018    Performed by Everett Fitzpatrick [facility] at [facility] CATH LAB    PARACENTESIS N/A 2/6/2015    Performed by Alejandro Myrick MD at [facility] CATH LAB    PARACENTESIS, ABDOMINAL N/A 1/9/2019    Performed by [facility]rett [facility]patrick [facility] at [facility] CATH LAB     No family history on file.  Social History     Tobacco Use    Smoking status: Current Every Day Smoker     Types: Cigarettes    Smokeless tobacco: Never Used    Tobacco comment: 2 cigarettes a day   Substance Use Topics    Alcohol use: No     Frequency: Never     Comment:  Previously drank 1 pint a day    Drug use: Yes     Types: Cocaine     Review of Systems   Constitutional: Positive for activity change. Negative for chills and fever.   HENT: Negative for congestion and sore throat.    Eyes: Negative for pain.   Respiratory: Positive for shortness of breath (with exertion).    Cardiovascular: Negative for chest pain.   Gastrointestinal: Positive for abdominal distention, abdominal pain and vomiting. Negative for diarrhea and nausea.   Genitourinary: Negative for dysuria.   Musculoskeletal: Negative for arthralgias.   Skin: Negative for rash.   Neurological: Negative for headaches.       Physical Exam     Initial Vitals [04/04/19 1817]   BP Pulse Resp Temp SpO2   (!) 148/64 80 18 98.3 °F (36.8 °C) 99 %      MAP       --         Physical Exam    Constitutional: Vital signs are normal. She is cooperative.   HENT:   Head: Normocephalic and atraumatic.   Eyes: EOM are normal. Pupils are equal, round, and reactive to light.   Neck: Normal range of motion. Neck supple.   Cardiovascular: Normal rate, regular rhythm and intact distal pulses.   Abdominal: Soft. Bowel sounds are normal. She exhibits distension and ascites. There is tenderness in the right lower quadrant. A hernia is present. Hernia confirmed positive in the ventral area (Reducible).   Abdominal varicose veins present   Musculoskeletal: Normal range of motion.   No lower extremity edema   Neurological: She is alert and oriented to person, place, and time. GCS eye subscore is 4. GCS verbal subscore is 5. GCS motor subscore is 6.   Skin: Skin is warm and dry. No rash noted.         ED Course   Procedures  Labs Reviewed - No data to display       Imaging Results    None          Medical Decision Making:   Initial Assessment:   Urgent evaluation of a 63 y.o. female with HCC and liver cirrhosis presenting to the emergency department complaining of abdominal distension and pain. Patient is afebrile, nontoxic appearing and  hemodynamically stable.  Abdomen is soft with ascites noted. Ascites is not tense.  Mild right-sided, lower abdominal pain present.  No peritoneal signs.  Patient also ran out of the majority of her medications yesterday.  Will obtain lab work and provide patient with diuresis and pain medicine.  ED Management:  CBC reveals anemia that is chronic and stable at her baseline.  No leukocytosis.  Chemistry reveals bicarb of 19.  No anion gap.  Renal function has improved from past.  Lipase is normal.  Patient does not need emergent paracentesis.  She will be sent home with refill of her medications.  She is encouraged to follow up with her doctors return for new worsening symptoms.  She has no other complaints at this time is stable for discharge.  Other:   I have discussed this case with another health care provider.                      Clinical Impression:     1. HCC (hepatocellular carcinoma)    2. Alcoholic cirrhosis of liver with ascites                               Ganga Tran PA-C  04/04/19 1959

## 2019-04-04 NOTE — ED TRIAGE NOTES
"Pt arrives to ED with c/o ascites. Pt abdomen round and distended.  Pt reports hx of asciies. Pt reports "everytime I eat something by belly swells up."  Pt reports abdominal swelling since Monday.  Pt pain of 8 on left side of abdomen. Pt. Complains of SOB during ambulation.  Pt had episode of vomiting last night.  Pt denies fever, diarrhea, or constipation. Pt lying in stretcher, respirations even and unlabored, eyes open spontaneously.  NAD noted, AAOx4, answering questions appropriately.  Pt placed on BP cycling, pulse ox, and cardiac monitoring q30min.  Bed locked and in lowest position, SRx2 up, call light within reach  "

## 2019-04-05 ENCOUNTER — TELEPHONE (OUTPATIENT)
Dept: INTERVENTIONAL RADIOLOGY/VASCULAR | Facility: HOSPITAL | Age: 64
End: 2019-04-05

## 2019-04-09 ENCOUNTER — TELEPHONE (OUTPATIENT)
Dept: INTERVENTIONAL RADIOLOGY/VASCULAR | Facility: HOSPITAL | Age: 64
End: 2019-04-09

## 2019-04-09 DIAGNOSIS — C22.0 HCC (HEPATOCELLULAR CARCINOMA): Primary | ICD-10-CM

## 2019-04-09 RX ORDER — MIDAZOLAM HYDROCHLORIDE 1 MG/ML
1 INJECTION INTRAMUSCULAR; INTRAVENOUS
Status: CANCELLED | OUTPATIENT
Start: 2019-04-09

## 2019-04-09 RX ORDER — FENTANYL CITRATE 50 UG/ML
50 INJECTION, SOLUTION INTRAMUSCULAR; INTRAVENOUS
Status: CANCELLED | OUTPATIENT
Start: 2019-04-09

## 2019-04-09 RX ORDER — HEPARIN SODIUM 200 [USP'U]/100ML
500 INJECTION, SOLUTION INTRAVENOUS CONTINUOUS
Status: CANCELLED | OUTPATIENT
Start: 2019-04-09

## 2019-04-10 ENCOUNTER — HOSPITAL ENCOUNTER (OUTPATIENT)
Dept: RADIOLOGY | Facility: HOSPITAL | Age: 64
Discharge: HOME | End: 2019-04-10
Attending: FAMILY MEDICINE | Admitting: RADIOLOGY
Payer: MEDICAID

## 2019-04-10 ENCOUNTER — HOSPITAL ENCOUNTER (OUTPATIENT)
Facility: HOSPITAL | Age: 64
Discharge: HOME | End: 2019-04-10
Attending: RADIOLOGY | Admitting: RADIOLOGY
Payer: MEDICAID

## 2019-04-10 VITALS
WEIGHT: 105 LBS | RESPIRATION RATE: 16 BRPM | TEMPERATURE: 99 F | HEIGHT: 59 IN | SYSTOLIC BLOOD PRESSURE: 124 MMHG | DIASTOLIC BLOOD PRESSURE: 68 MMHG | BODY MASS INDEX: 21.17 KG/M2 | OXYGEN SATURATION: 99 % | HEART RATE: 79 BPM

## 2019-04-10 DIAGNOSIS — C22.0 HCC (HEPATOCELLULAR CARCINOMA): ICD-10-CM

## 2019-04-10 PROCEDURE — 63600175 PHARM REV CODE 636 W HCPCS: Performed by: RADIOLOGY

## 2019-04-10 PROCEDURE — 63600175 PHARM REV CODE 636 W HCPCS: Performed by: FAMILY MEDICINE

## 2019-04-10 PROCEDURE — 78201 LIVER IMAGING STATIC ONLY: CPT | Mod: 26,,, | Performed by: RADIOLOGY

## 2019-04-10 PROCEDURE — 78201 LIVER IMAGING STATIC ONLY: CPT | Mod: TC

## 2019-04-10 PROCEDURE — 25000003 PHARM REV CODE 250: Performed by: FAMILY MEDICINE

## 2019-04-10 PROCEDURE — A9540 TC99M MAA: HCPCS

## 2019-04-10 PROCEDURE — 25000003 PHARM REV CODE 250: Performed by: RADIOLOGY

## 2019-04-10 PROCEDURE — 25500020 PHARM REV CODE 255: Performed by: RADIOLOGY

## 2019-04-10 PROCEDURE — 78201 NM LIVER IMAGING STATIC PRE Y-90 EMBOLIZATION: ICD-10-PCS | Mod: 26,,, | Performed by: RADIOLOGY

## 2019-04-10 RX ORDER — MIDAZOLAM HYDROCHLORIDE 1 MG/ML
INJECTION INTRAMUSCULAR; INTRAVENOUS CODE/TRAUMA/SEDATION MEDICATION
Status: COMPLETED | OUTPATIENT
Start: 2019-04-10 | End: 2019-04-10

## 2019-04-10 RX ORDER — FENTANYL CITRATE 50 UG/ML
INJECTION, SOLUTION INTRAMUSCULAR; INTRAVENOUS CODE/TRAUMA/SEDATION MEDICATION
Status: COMPLETED | OUTPATIENT
Start: 2019-04-10 | End: 2019-04-10

## 2019-04-10 RX ORDER — LIDOCAINE HYDROCHLORIDE 10 MG/ML
1 INJECTION, SOLUTION EPIDURAL; INFILTRATION; INTRACAUDAL; PERINEURAL ONCE AS NEEDED
Status: COMPLETED | OUTPATIENT
Start: 2019-04-10 | End: 2019-04-10

## 2019-04-10 RX ORDER — SODIUM CHLORIDE 9 MG/ML
INJECTION, SOLUTION INTRAVENOUS CONTINUOUS
Status: DISCONTINUED | OUTPATIENT
Start: 2019-04-10 | End: 2019-04-10 | Stop reason: HOSPADM

## 2019-04-10 RX ADMIN — SODIUM CHLORIDE: 0.9 INJECTION, SOLUTION INTRAVENOUS at 09:04

## 2019-04-10 RX ADMIN — MIDAZOLAM HYDROCHLORIDE 1 MG: 1 INJECTION, SOLUTION INTRAMUSCULAR; INTRAVENOUS at 10:04

## 2019-04-10 RX ADMIN — AMPICILLIN SODIUM AND SULBACTAM SODIUM 3 G: 2; 1 INJECTION, POWDER, FOR SOLUTION INTRAMUSCULAR; INTRAVENOUS at 08:04

## 2019-04-10 RX ADMIN — LIDOCAINE HYDROCHLORIDE 1 MG: 10 INJECTION, SOLUTION EPIDURAL; INFILTRATION; INTRACAUDAL; PERINEURAL at 09:04

## 2019-04-10 RX ADMIN — FENTANYL CITRATE 50 MCG: 50 INJECTION, SOLUTION INTRAMUSCULAR; INTRAVENOUS at 10:04

## 2019-04-10 RX ADMIN — IOHEXOL 130 ML: 300 INJECTION, SOLUTION INTRAVENOUS at 11:04

## 2019-04-10 RX ADMIN — SODIUM CHLORIDE: 0.9 INJECTION, SOLUTION INTRAVENOUS at 11:04

## 2019-04-10 NOTE — PROGRESS NOTES
Hemostasis achieved with use of 5 [place] XO seal closure device. HOB to be elevated at 1307. Right groin site CDI. Pt to nuclear medicine  Report called to JAY.. Pt transported.to nuclear medicine with ASHA Harman..

## 2019-04-10 NOTE — PLAN OF CARE
Pt discharged to home.  Discharge instructions given, pt stated understanding.  Dressing to groin dry and intact, IV removed.  Pt left via wheelchair with daughter to home.

## 2019-04-10 NOTE — PLAN OF CARE
This writer took over for Maricarmen BARRY. The patient is stable and in no apparent distress.. This writer attempted to call patient's daughter Yuridia Huitron in regards to driving the patient home after the procedure. Her daughter did not answer. The patient states her daughter will be able to drive her home after the procedure.

## 2019-04-10 NOTE — PLAN OF CARE
The patient called her daughter on speaker phone and her daughter verbalized she would drive the patient home after the procedure.

## 2019-04-10 NOTE — PROGRESS NOTES
Pt arrived to [facility], [facility]. Report received from ASHA Lance. Dressing to groin dry and intact.

## 2019-04-10 NOTE — DISCHARGE INSTRUCTIONS
For scheduling: Call Mar at [phone]    For questions or concerns call: JAY MON-FRI 8 AM- 5PM [phone]. Radiology resident on call [phone].    For immediate concerns that are not emergent, you may call our radiology clinic at: [phone]

## 2019-04-10 NOTE — PROGRESS NOTES
Pt arrived  for   pre-Y90 mapping with possible intervention. No acute distress noted. Orders ,labs and consents reviewed. MD notified.

## 2019-04-10 NOTE — H&P
Radiology History & Physical      SUBJECTIVE:     Chief Complaint: HCC    History of Present Illness:  Neena Marks is a 64 y.o. female who presents for pre-Y90 mapping with possible intervention.    Past Medical History:   Diagnosis Date    Cirrhosis      Past Surgical History:   Procedure Laterality Date    EGD (ESOPHAGOGASTRODUODENOSCOPY) Left 1/9/2019    [facility] ENDO    ESOPHAGOGASTRODUODENOSCOPY (EGD) N/A 1/26/2018    Performed by [facility]rbina MD at [facility] ENDO    PARACENTESIS N/A 1/30/2018    Performed by [facility]patrick [facility] at [facility] CATH LAB    PARACENTESIS N/A 2/6/2015    Performed by [facility]jandro Myrick MD at [facility] CATH LAB    PARACENTESIS, ABDOMINAL N/A 1/9/2019    Performed by [facility]rett Fitzpatrick MD at [facility] CATH LAB       Home Meds:   Prior to Admission medications    Medication Sig Start Date End Date Taking? Authorizing Provider   cholecalciferol, vitamin D3, 50,000 unit Tab Take 125 mcg by mouth.   Yes Historical Provider, MD   cyclobenzaprine (FLEXERIL) 10 MG tablet Take 10 mg by mouth nightly as needed for Muscle spasms.   Yes Historical Provider, MD   cyproheptadine (PERIACTIN) 4 mg tablet Take 1 tablet (4 mg total) by mouth 2 (two) times daily as needed. 4/4/19  Yes Ganga Tran PA-C   famotidine (PEPCID) 20 MG tablet Take 1 tablet (20 mg total) by mouth once daily. 4/4/19  Yes Ganga Tran PA-C   ferrous sulfate (FEOSOL) 325 mg (65 mg iron) Tab tablet Take 1 tablet (325 mg total) by mouth daily with breakfast. 4/4/19  Yes Ganga Tran PA-C   furosemide (LASIX) 40 MG tablet Take 1 tablet (40 mg total) by mouth once daily. 4/4/19  Yes Ganga Tran PA-C   lactulose 10 gram/15 ml (CHRONULAC) 10 gram/15 mL (15 mL) solution Take 10 g by mouth 2 (two) times daily.   Yes Historical Provider, MD   meloxicam (MOBIC) 15 MG tablet Take 15 mg by mouth nightly as needed for Pain.   Yes Historical Provider, MD   multivitamin (ONE DAILY  MULTIVITAMIN) per tablet Take 1 tablet by mouth once daily.   Yes Historical Provider, MD   oxyCODONE (ROXICODONE) 5 MG immediate release tablet Take 1 tablet (5 mg total) by mouth every 8 (eight) hours as needed for Pain. 4/4/19  Yes Ganga Tran PA-C   propranolol (INDERAL) 10 MG tablet Take 1 tablet (10 mg total) by mouth 2 (two) times daily. 1/11/19 1/11/20 Yes LIANA Taylor MD   spironolactone (ALDACTONE) 100 MG tablet Take 1 tablet (100 mg total) by mouth once daily. 4/29/18 4/29/19 Yes Josette Donohue MD     Anticoagulants/Antiplatelets: no anticoagulation    Allergies: Review of patient's allergies indicates:  No Known Allergies  Sedation History:  no adverse reactions    Review of Systems:   Hematological: no known coagulopathies  Respiratory: no shortness of breath  Cardiovascular: no chest pain  Gastrointestinal: mild abdominal pain  Genito-Urinary: no dysuria  Musculoskeletal: negative  Neurological: no TIA or stroke symptoms         OBJECTIVE:     Vital Signs (Most Recent)  Temp: 98.9 °F (37.2 °C) (04/10/19 0855)  Pulse: 74 (04/10/19 0855)  Resp: 20 (04/10/19 0855)  BP: 116/69 (04/10/19 0855)  SpO2: 100 % (04/10/19 0855)    Physical Exam:  ASA: 2  Mallampati: 2    General: no acute distress  Mental Status: alert and oriented to person, place and time  HEENT: normocephalic, atraumatic  Chest: unlabored breathing  Abdomen: nondistended  Extremity: moves all extremities    Laboratory  Lab Results   Component Value Date    INR 1.3 (H) 04/10/2019       Lab Results   Component Value Date    WBC 7.08 04/10/2019    HGB 8.2 (L) 04/10/2019    HCT 25.3 (L) 04/10/2019    MCV 59 (L) 04/10/2019     04/10/2019      Lab Results   Component Value Date    GLU 93 04/10/2019     (L) 04/10/2019    K 4.3 04/10/2019    CL 99 04/10/2019    CO2 24 04/10/2019    BUN 16 04/10/2019    CREATININE 1.4 04/10/2019    CALCIUM 9.6 04/10/2019    MG 2.3 01/08/2019    ALT 23 04/10/2019    AST 88 (H) 04/10/2019     ALBUMIN 3.2 (L) 04/10/2019    BILITOT 1.8 (H) 04/10/2019    BILIDIR 0.7 (H) 04/10/2019       ASSESSMENT/PLAN:     Sedation Plan: Up to moderate  Patient will undergo pre-Y90 mapping with possible intervention.    Jose Hussein M.D.  PGY-3 Radiology

## 2019-04-10 NOTE — PROCEDURES
Radiology Post-Procedure Note    Pre Op Diagnosis: Hepatocellular carcinoma    Post Op Diagnosis: Same    Procedure: Yttrium planning    Procedure performed by: Quentin Conte MD    Written Informed Consent Obtained: Yes    Specimen Removed: No    Estimated Blood Loss: less than 50     Findings: y 90 mapping procedure performed. Please see imaging report for full details.  Patient tolerated procedure well.    Quentin Conte MD  Department of Radiology  Pager: [phone]

## 2019-04-16 DIAGNOSIS — C22.0 HCC (HEPATOCELLULAR CARCINOMA): Primary | ICD-10-CM

## 2019-04-16 NOTE — TELEPHONE ENCOUNTER
----- Message from Susan John sent at 4/16/2019  9:08 AM CDT -----  Contact: Patient  Rx Refill/Request     Is this a Refill or New Rx: Refill    Rx Name and Strength: oxyCODONE (ROXICODONE) 5 MG immediate release tablet      Preferred Pharmacy with phone number: [facility] [address] [facility] [address]    PH: [phone]/ FAX: [phone]      Communication Preference: [phone]     Additional Information: Pls give pt a callback

## 2019-04-17 RX ORDER — OXYCODONE HYDROCHLORIDE 5 MG/1
5 TABLET ORAL EVERY 8 HOURS PRN
Qty: 60 TABLET | Refills: 0 | OUTPATIENT
Start: 2019-04-17 | End: 2019-04-18

## 2019-04-18 ENCOUNTER — HOSPITAL ENCOUNTER (EMERGENCY)
Facility: OTHER | Age: 64
Discharge: HOME | End: 2019-04-18
Attending: EMERGENCY MEDICINE
Payer: MEDICAID

## 2019-04-18 VITALS
BODY MASS INDEX: 21.17 KG/M2 | HEIGHT: 59 IN | WEIGHT: 105 LBS | TEMPERATURE: 98 F | SYSTOLIC BLOOD PRESSURE: 149 MMHG | OXYGEN SATURATION: 98 % | RESPIRATION RATE: 18 BRPM | DIASTOLIC BLOOD PRESSURE: 79 MMHG | HEART RATE: 76 BPM

## 2019-04-18 DIAGNOSIS — N18.9 CHRONIC RENAL IMPAIRMENT, UNSPECIFIED CKD STAGE: ICD-10-CM

## 2019-04-18 DIAGNOSIS — E80.6 HYPERBILIRUBINEMIA: ICD-10-CM

## 2019-04-18 DIAGNOSIS — D64.9 ANEMIA, UNSPECIFIED TYPE: ICD-10-CM

## 2019-04-18 DIAGNOSIS — R10.9 CHRONIC ABDOMINAL PAIN: Primary | ICD-10-CM

## 2019-04-18 DIAGNOSIS — E87.1 HYPONATREMIA: ICD-10-CM

## 2019-04-18 DIAGNOSIS — G89.29 CHRONIC ABDOMINAL PAIN: Primary | ICD-10-CM

## 2019-04-18 DIAGNOSIS — C22.0 HCC (HEPATOCELLULAR CARCINOMA): Primary | ICD-10-CM

## 2019-04-18 LAB
ALBUMIN SERPL BCP-MCNC: 2.9 G/DL (ref 3.5–5.2)
ALP SERPL-CCNC: 100 U/L (ref 55–135)
ALT SERPL W/O P-5'-P-CCNC: 19 U/L (ref 10–44)
ANION GAP SERPL CALC-SCNC: 11 MMOL/L (ref 8–16)
ANISOCYTOSIS BLD QL SMEAR: SLIGHT
AST SERPL-CCNC: 73 U/L (ref 10–40)
BASOPHILS # BLD AUTO: 0.04 K/UL (ref 0–0.2)
BASOPHILS NFR BLD: 0.8 % (ref 0–1.9)
BILIRUB SERPL-MCNC: 0.6 MG/DL (ref 0.1–1)
BUN SERPL-MCNC: 15 MG/DL (ref 8–23)
CALCIUM SERPL-MCNC: 9.5 MG/DL (ref 8.7–10.5)
CHLORIDE SERPL-SCNC: 105 MMOL/L (ref 95–110)
CO2 SERPL-SCNC: 23 MMOL/L (ref 23–29)
CREAT SERPL-MCNC: 1.1 MG/DL (ref 0.5–1.4)
DACRYOCYTES BLD QL SMEAR: ABNORMAL
DIFFERENTIAL METHOD: ABNORMAL
EOSINOPHIL # BLD AUTO: 0.2 K/UL (ref 0–0.5)
EOSINOPHIL NFR BLD: 3.9 % (ref 0–8)
ERYTHROCYTE [DISTWIDTH] IN BLOOD BY AUTOMATED COUNT: 22.9 % (ref 11.5–14.5)
EST. GFR  (AFRICAN AMERICAN): >60 ML/MIN/1.73 M^2
EST. GFR  (NON AFRICAN AMERICAN): 54 ML/MIN/1.73 M^2
GIANT PLATELETS BLD QL SMEAR: PRESENT
GLUCOSE SERPL-MCNC: 100 MG/DL (ref 70–110)
HCT VFR BLD AUTO: 24.3 % (ref 37–48.5)
HGB BLD-MCNC: 7.8 G/DL (ref 12–16)
HYPOCHROMIA BLD QL SMEAR: ABNORMAL
LYMPHOCYTES # BLD AUTO: 1.5 K/UL (ref 1–4.8)
LYMPHOCYTES NFR BLD: 29.2 % (ref 18–48)
MCH RBC QN AUTO: 18.8 PG (ref 27–31)
MCHC RBC AUTO-ENTMCNC: 32.1 G/DL (ref 32–36)
MCV RBC AUTO: 59 FL (ref 82–98)
MONOCYTES # BLD AUTO: 0.6 K/UL (ref 0.3–1)
MONOCYTES NFR BLD: 11.8 % (ref 4–15)
NEUTROPHILS # BLD AUTO: 2.7 K/UL (ref 1.8–7.7)
NEUTROPHILS NFR BLD: 54.3 % (ref 38–73)
OVALOCYTES BLD QL SMEAR: ABNORMAL
PLATELET # BLD AUTO: 302 K/UL (ref 150–350)
PLATELET BLD QL SMEAR: ABNORMAL
PMV BLD AUTO: ABNORMAL FL (ref 9.2–12.9)
POTASSIUM SERPL-SCNC: 3.9 MMOL/L (ref 3.5–5.1)
PROT SERPL-MCNC: 8.8 G/DL (ref 6–8.4)
RBC # BLD AUTO: 4.15 M/UL (ref 4–5.4)
SCHISTOCYTES BLD QL SMEAR: ABNORMAL
SCHISTOCYTES BLD QL SMEAR: PRESENT
SODIUM SERPL-SCNC: 139 MMOL/L (ref 136–145)
TARGETS BLD QL SMEAR: ABNORMAL
WBC # BLD AUTO: 5.17 K/UL (ref 3.9–12.7)

## 2019-04-18 PROCEDURE — 63600175 PHARM REV CODE 636 W HCPCS: Performed by: EMERGENCY MEDICINE

## 2019-04-18 PROCEDURE — 36415 COLL VENOUS BLD VENIPUNCTURE: CPT

## 2019-04-18 PROCEDURE — 99284 EMERGENCY DEPT VISIT MOD MDM: CPT | Mod: 25

## 2019-04-18 PROCEDURE — 85025 COMPLETE CBC W/AUTO DIFF WBC: CPT

## 2019-04-18 PROCEDURE — 96374 THER/PROPH/DIAG INJ IV PUSH: CPT

## 2019-04-18 PROCEDURE — 80053 COMPREHEN METABOLIC PANEL: CPT

## 2019-04-18 RX ORDER — MORPHINE SULFATE 2 MG/ML
2 INJECTION, SOLUTION INTRAMUSCULAR; INTRAVENOUS
Status: COMPLETED | OUTPATIENT
Start: 2019-04-18 | End: 2019-04-18

## 2019-04-18 RX ORDER — OXYCODONE HYDROCHLORIDE 5 MG/1
5 CAPSULE ORAL EVERY 8 HOURS PRN
Qty: 10 CAPSULE | Refills: 0 | Status: SHIPPED | OUTPATIENT
Start: 2019-04-18 | End: 2019-05-03 | Stop reason: SDUPTHER

## 2019-04-18 RX ADMIN — MORPHINE SULFATE 2 MG: 2 INJECTION, SOLUTION INTRAMUSCULAR; INTRAVENOUS at 07:04

## 2019-04-18 NOTE — DISCHARGE SUMMARY
Radiology Discharge Summary      Hospital Course: No complications    Admit Date: 4/10/2019  Discharge Date: 04/18/2019     Instructions Given to Patient: Yes  Diet: Resume prior diet  Activity: activity as tolerated and no driving for today    Description of Condition on Discharge: Stable  Vital Signs (Most Recent): Temp: 98.9 °F (37.2 °C) (04/10/19 0855)  Pulse: 79 (04/10/19 1400)  Resp: 16 (04/10/19 1400)  BP: 124/68 (04/10/19 1400)  SpO2: 99 % (04/10/19 1400)    Discharge Disposition: Home    Discharge Diagnosis:     HCC    y 90 mapping     Follow-up:   Follow up Nuc Med lung shunt study    Quentin Conte MD  Department of Radiology  Pager: [phone]

## 2019-04-19 NOTE — ED NOTES
Pt unable to tolerate morphine, pt reports light headedness while administering medication. Admin 1 mg of morphine

## 2019-04-19 NOTE — ED TRIAGE NOTES
Pt arrives to ED with c/o abdominal pain with onset Monday. Pt reports right upper and lower quadrant pain, reports sharp and dull cramping pain. Pt reports having history of cirrhosis, reports not having anymore pain medication. Pt denies N/V/D, chest pain, SOB. Pt placed on BP cycling and pulse ox. Pt lying in bed, respirations even, unlabored, eyes open spontaneously, NAD noted, call bell within reach, AAOx4, answering questions appropriately.

## 2019-04-19 NOTE — ED PROVIDER NOTES
Encounter Date: 4/18/2019    SCRIBE #1 NOTE: I, Zain Plata, am scribing for, and in the presence of, Dr. Mccullough .       History     Chief Complaint   Patient presents with    Abdominal Pain     Pt CO abd swelling, and pain secondary to liver disease, and is unable to get pain Rx filled.      Time seen by provider: 7:36 PM    This is a 64 y.o. female, with history of hepatocellular carcinoma and alcoholic cirrhosis, who presents with complaint of worsening chronic abdominal pain with swelling for a few days. She states this pain is similar to her usual pain. Patient states oxycodone provides pain relief, but she ran out the medication a few days ago. She is requesting refill and states she would not be here if she had her pain medication. Per medical record, she was prescribed thirty tablets of oxycodone on 4/4 and was advised to take one a day. She denies fevers, chills, headaches, dizziness, congestion, rhinorrhea, sore throat, cough, SOB, chest pain, N/V/D, dysuria, urinary frequency, or urinary urgency. She admits intermittent use of tobacco, but denies use of alcohol or illicit drugs. NKDA.       The history is provided by the patient.     Review of patient's allergies indicates:  No Known Allergies  Past Medical History:   Diagnosis Date    Cirrhosis      Past Surgical History:   Procedure Laterality Date    EGD (ESOPHAGOGASTRODUODENOSCOPY) Left 1/9/2019    Performed by Madyson Farrar MD at [facility] ENDO    EMBOLIZATION, YTTRIUM THERAPY N/A 4/10/2019    Performed by [facility] Diagnostic Provider at [facility] OR [address] [place] [address]    ESOPHAGOGASTRODUODENOSCOPY (EGD) N/A 1/26/2018    Performed by Mark Urbina MD at [facility] ENDO    PARACENTESIS N/A 1/30/2018    Performed by Everett Fitzpatrick MD at [facility] CATH LAB    PARACENTESIS N/A 2/6/2015    Performed by Alejandro Myrick MD at [facility] CATH LAB    PARACENTESIS, ABDOMINAL N/A 1/9/2019    Performed by Everett Fitzpatrick MD at [facility] CATH LAB     History reviewed. No  pertinent family history.  Social History     Tobacco Use    Smoking status: Current Every Day Smoker     Types: Cigarettes    Smokeless tobacco: Never Used    Tobacco comment: 2 cigarettes a day   Substance Use Topics    Alcohol use: No     Frequency: Never     Comment: Previously drank 1 pint a day    Drug use: Yes     Types: Cocaine     Review of Systems   Constitutional: Negative for chills and fever.   HENT: Negative for congestion, rhinorrhea and sore throat.    Respiratory: Negative for cough and shortness of breath.    Cardiovascular: Negative for chest pain.   Gastrointestinal: Positive for abdominal distention and abdominal pain. Negative for diarrhea, nausea and vomiting.   Genitourinary: Negative for dysuria, frequency and urgency.   Musculoskeletal: Negative for back pain.   Skin: Negative for rash.   Neurological: Negative for dizziness and headaches.   Psychiatric/Behavioral: Negative for confusion.       Physical Exam     Initial Vitals [04/18/19 1902]   BP Pulse Resp Temp SpO2   (!) 149/71 80 18 98.1 °F (36.7 °C) 100 %      MAP       --         Physical Exam    Nursing note and vitals reviewed.  Constitutional: She appears well-developed and well-nourished. No distress.   HENT:   Head: Normocephalic and atraumatic.   Mouth/Throat: Oropharynx is clear and moist.   Eyes: Conjunctivae and EOM are normal. Pupils are equal, round, and reactive to light.   Neck: Normal range of motion. Neck supple.   Cardiovascular: Normal rate, regular rhythm, S1 normal, S2 normal, normal heart sounds and intact distal pulses. Exam reveals no gallop and no friction rub.    No murmur heard.  Pulmonary/Chest: Breath sounds normal. No respiratory distress. She has no wheezes. She has no rhonchi. She has no rales.   Abdominal: Soft. She exhibits distension. There is no tenderness.   Easily reducible umbilical hernia present.    Musculoskeletal: Normal range of motion. She exhibits no edema.   No lower extremity edema.     Neurological: She is alert and oriented to person, place, and time. She has normal strength.   Skin: Skin is warm and dry.   Psychiatric: She has a normal mood and affect. Her behavior is normal. Judgment and thought content normal.         ED Course   Procedures  Labs Reviewed   CBC W/ AUTO DIFFERENTIAL - Abnormal; Notable for the following components:       Result Value    Hemoglobin 7.8 (*)     Hematocrit 24.3 (*)     Mean Corpuscular Volume 59 (*)     Mean Corpuscular Hemoglobin 18.8 (*)     RDW 22.9 (*)     All other components within normal limits   COMPREHENSIVE METABOLIC PANEL - Abnormal; Notable for the following components:    Total Protein 8.8 (*)     Albumin 2.9 (*)     AST 73 (*)     eGFR if non  54 (*)     All other components within normal limits          Imaging Results    None          Medical Decision Making:   Clinical Tests:   Lab Tests: Ordered and Reviewed            Scribe Attestation:   Scribe #1: I performed the above scribed service and the documentation accurately describes the services I performed. I attest to the accuracy of the note.    Attending Attestation:           Physician Attestation for Scribe:  Physician Attestation Statement for Scribe #1: I, Dr. Mccullough, reviewed documentation, as scribed by Zain Plata  in my presence, and it is both accurate and complete.         Attending ED Notes:   Emergent evaluation a 68-year-old female with a past medical history of hepatocellular carcinoma and ascites presents to the emergency room with complaint of chronic abdominal pain and is requesting a refill on her pain medications.  Patient states there is no acute change in the character or quality of her typical and usual abdominal pain. Patient is afebrile, nontoxic appearing with stable vital signs.  Abdominal exam is benign except for mild fluid wave and distension.  Abdomen is soft.  No rebound or guarding. Patient has no elevation of white blood cell count.  H&H 7.8  and 24.3.  Patient has history of chronic anemia.  Albumin is 2.9.  Total protein 8.8.  Patient has mild renal insufficiency.  The patient is extensively counseled her diagnosis and treatment including all laboratory and physical exam findings.  The patient discharged good condition and directed to follow up with her hematology/oncology physician in the next 24-48 hours.          ED Course as of May 01 0618   Thu Apr 18, 2019   2100 Patient states she is ready to go home.     [CL]      ED Course User Index  [CL] Zain Hardwicku     Clinical Impression:     1. Chronic abdominal pain    2. Anemia, unspecified type    3. Hyponatremia    4. Chronic renal impairment, unspecified CKD stage    5. Hyperbilirubinemia                                 Adrian Charles MD  04/18/19 9033       Adrian Charles MD  05/01/19 4979

## 2019-04-26 ENCOUNTER — OFFICE VISIT (OUTPATIENT)
Dept: HEMATOLOGY/ONCOLOGY | Facility: CLINIC | Age: 64
End: 2019-04-26
Payer: MEDICAID

## 2019-04-26 ENCOUNTER — LAB VISIT (OUTPATIENT)
Dept: LAB | Facility: HOSPITAL | Age: 64
End: 2019-04-26
Attending: INTERNAL MEDICINE
Payer: MEDICAID

## 2019-04-26 VITALS
WEIGHT: 101.19 LBS | RESPIRATION RATE: 20 BRPM | HEIGHT: 59 IN | DIASTOLIC BLOOD PRESSURE: 65 MMHG | SYSTOLIC BLOOD PRESSURE: 142 MMHG | BODY MASS INDEX: 20.4 KG/M2 | HEART RATE: 90 BPM | TEMPERATURE: 98 F | OXYGEN SATURATION: 93 %

## 2019-04-26 DIAGNOSIS — C22.0 HCC (HEPATOCELLULAR CARCINOMA): ICD-10-CM

## 2019-04-26 DIAGNOSIS — E43 SEVERE MALNUTRITION: ICD-10-CM

## 2019-04-26 DIAGNOSIS — K70.31 ALCOHOLIC CIRRHOSIS OF LIVER WITH ASCITES: ICD-10-CM

## 2019-04-26 DIAGNOSIS — D50.0 IRON DEFICIENCY ANEMIA DUE TO CHRONIC BLOOD LOSS: ICD-10-CM

## 2019-04-26 DIAGNOSIS — K70.30 ALCOHOLIC CIRRHOSIS, UNSPECIFIED WHETHER ASCITES PRESENT: ICD-10-CM

## 2019-04-26 DIAGNOSIS — K70.30 ESOPHAGEAL VARICES IN ALCOHOLIC CIRRHOSIS: ICD-10-CM

## 2019-04-26 DIAGNOSIS — C22.0 HCC (HEPATOCELLULAR CARCINOMA): Primary | ICD-10-CM

## 2019-04-26 DIAGNOSIS — I85.10 ESOPHAGEAL VARICES IN ALCOHOLIC CIRRHOSIS: ICD-10-CM

## 2019-04-26 LAB
AFP SERPL-MCNC: 5.1 NG/ML (ref 0–8.4)
ALBUMIN SERPL BCP-MCNC: 3.1 G/DL (ref 3.5–5.2)
ALP SERPL-CCNC: 106 U/L (ref 55–135)
ALT SERPL W/O P-5'-P-CCNC: 22 U/L (ref 10–44)
ANION GAP SERPL CALC-SCNC: 11 MMOL/L (ref 8–16)
AST SERPL-CCNC: 78 U/L (ref 10–40)
BASOPHILS # BLD AUTO: 0.04 K/UL (ref 0–0.2)
BASOPHILS NFR BLD: 0.8 % (ref 0–1.9)
BILIRUB SERPL-MCNC: 0.6 MG/DL (ref 0.1–1)
BUN SERPL-MCNC: 18 MG/DL (ref 8–23)
CALCIUM SERPL-MCNC: 9.5 MG/DL (ref 8.7–10.5)
CHLORIDE SERPL-SCNC: 102 MMOL/L (ref 95–110)
CO2 SERPL-SCNC: 25 MMOL/L (ref 23–29)
CREAT SERPL-MCNC: 1.3 MG/DL (ref 0.5–1.4)
DIFFERENTIAL METHOD: ABNORMAL
EOSINOPHIL # BLD AUTO: 0.3 K/UL (ref 0–0.5)
EOSINOPHIL NFR BLD: 4.9 % (ref 0–8)
ERYTHROCYTE [DISTWIDTH] IN BLOOD BY AUTOMATED COUNT: 25.9 % (ref 11.5–14.5)
EST. GFR  (AFRICAN AMERICAN): 50.8 ML/MIN/1.73 M^2
EST. GFR  (NON AFRICAN AMERICAN): 44.1 ML/MIN/1.73 M^2
GLUCOSE SERPL-MCNC: 91 MG/DL (ref 70–110)
HCT VFR BLD AUTO: 23.3 % (ref 37–48.5)
HGB BLD-MCNC: 7 G/DL (ref 12–16)
IMM GRANULOCYTES # BLD AUTO: 0.02 K/UL (ref 0–0.04)
IMM GRANULOCYTES NFR BLD AUTO: 0.4 % (ref 0–0.5)
INR PPP: 1.2 (ref 0.8–1.2)
LYMPHOCYTES # BLD AUTO: 1.6 K/UL (ref 1–4.8)
LYMPHOCYTES NFR BLD: 29.8 % (ref 18–48)
MCH RBC QN AUTO: 18.9 PG (ref 27–31)
MCHC RBC AUTO-ENTMCNC: 30 G/DL (ref 32–36)
MCV RBC AUTO: 63 FL (ref 82–98)
MONOCYTES # BLD AUTO: 0.6 K/UL (ref 0.3–1)
MONOCYTES NFR BLD: 10.4 % (ref 4–15)
NEUTROPHILS # BLD AUTO: 2.9 K/UL (ref 1.8–7.7)
NEUTROPHILS NFR BLD: 53.7 % (ref 38–73)
NRBC BLD-RTO: 0 /100 WBC
PLATELET # BLD AUTO: 283 K/UL (ref 150–350)
PMV BLD AUTO: ABNORMAL FL (ref 9.2–12.9)
POTASSIUM SERPL-SCNC: 4.2 MMOL/L (ref 3.5–5.1)
PROT SERPL-MCNC: 8.8 G/DL (ref 6–8.4)
PROTHROMBIN TIME: 12.3 SEC (ref 9–12.5)
RBC # BLD AUTO: 3.7 M/UL (ref 4–5.4)
SODIUM SERPL-SCNC: 138 MMOL/L (ref 136–145)
WBC # BLD AUTO: 5.31 K/UL (ref 3.9–12.7)

## 2019-04-26 PROCEDURE — 99999 PR PBB SHADOW E&M-EST. PATIENT-LVL IV: CPT | Mod: PBBFAC,,, | Performed by: STUDENT IN AN ORGANIZED HEALTH CARE EDUCATION/TRAINING PROGRAM

## 2019-04-26 PROCEDURE — 82105 ALPHA-FETOPROTEIN SERUM: CPT

## 2019-04-26 PROCEDURE — 99214 OFFICE O/P EST MOD 30 MIN: CPT | Mod: PBBFAC | Performed by: STUDENT IN AN ORGANIZED HEALTH CARE EDUCATION/TRAINING PROGRAM

## 2019-04-26 PROCEDURE — 36415 COLL VENOUS BLD VENIPUNCTURE: CPT

## 2019-04-26 PROCEDURE — 85610 PROTHROMBIN TIME: CPT

## 2019-04-26 PROCEDURE — 99999 PR PBB SHADOW E&M-EST. PATIENT-LVL IV: ICD-10-PCS | Mod: PBBFAC,,, | Performed by: STUDENT IN AN ORGANIZED HEALTH CARE EDUCATION/TRAINING PROGRAM

## 2019-04-26 PROCEDURE — 85025 COMPLETE CBC W/AUTO DIFF WBC: CPT

## 2019-04-26 PROCEDURE — 99214 OFFICE O/P EST MOD 30 MIN: CPT | Mod: S$PBB,,, | Performed by: STUDENT IN AN ORGANIZED HEALTH CARE EDUCATION/TRAINING PROGRAM

## 2019-04-26 PROCEDURE — 99214 PR OFFICE/OUTPT VISIT, EST, LEVL IV, 30-39 MIN: ICD-10-PCS | Mod: S$PBB,,, | Performed by: STUDENT IN AN ORGANIZED HEALTH CARE EDUCATION/TRAINING PROGRAM

## 2019-04-26 PROCEDURE — 80053 COMPREHEN METABOLIC PANEL: CPT

## 2019-04-26 RX ORDER — CYPROHEPTADINE HYDROCHLORIDE 4 MG/1
4 TABLET ORAL 3 TIMES DAILY PRN
Qty: 90 TABLET | Refills: 2 | Status: ON HOLD | OUTPATIENT
Start: 2019-04-26 | End: 2019-11-26
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
## 2019-04-26 NOTE — Clinical Note
Follow up in 1 week with labs- Iron studies, retic count, hb electrophoresis, LDH, retic count, Bone scan ordered

## 2019-05-03 ENCOUNTER — TELEPHONE (OUTPATIENT)
Dept: PHARMACY | Facility: CLINIC | Age: 64
End: 2019-05-03

## 2019-05-03 ENCOUNTER — HOSPITAL ENCOUNTER (EMERGENCY)
Facility: OTHER | Age: 64
Discharge: HOME | End: 2019-05-03
Attending: EMERGENCY MEDICINE
Payer: MEDICAID

## 2019-05-03 ENCOUNTER — OFFICE VISIT (OUTPATIENT)
Dept: HEMATOLOGY/ONCOLOGY | Facility: CLINIC | Age: 64
End: 2019-05-03
Payer: MEDICAID

## 2019-05-03 VITALS
OXYGEN SATURATION: 100 % | BODY MASS INDEX: 20.36 KG/M2 | HEART RATE: 92 BPM | HEIGHT: 59 IN | DIASTOLIC BLOOD PRESSURE: 65 MMHG | WEIGHT: 101 LBS | RESPIRATION RATE: 18 BRPM | TEMPERATURE: 98 F | SYSTOLIC BLOOD PRESSURE: 122 MMHG

## 2019-05-03 VITALS
TEMPERATURE: 99 F | DIASTOLIC BLOOD PRESSURE: 74 MMHG | BODY MASS INDEX: 20.36 KG/M2 | OXYGEN SATURATION: 100 % | SYSTOLIC BLOOD PRESSURE: 125 MMHG | HEART RATE: 90 BPM | HEIGHT: 59 IN | WEIGHT: 101 LBS | RESPIRATION RATE: 18 BRPM

## 2019-05-03 DIAGNOSIS — Z76.0 ENCOUNTER FOR MEDICATION REFILL: ICD-10-CM

## 2019-05-03 DIAGNOSIS — Q39.4 ESOPHAGEAL WEB DETERMINED BY ENDOSCOPY: ICD-10-CM

## 2019-05-03 DIAGNOSIS — D50.0 IRON DEFICIENCY ANEMIA DUE TO CHRONIC BLOOD LOSS: ICD-10-CM

## 2019-05-03 DIAGNOSIS — G89.29 CHRONIC GENERALIZED ABDOMINAL PAIN: Primary | ICD-10-CM

## 2019-05-03 DIAGNOSIS — R10.84 CHRONIC GENERALIZED ABDOMINAL PAIN: Primary | ICD-10-CM

## 2019-05-03 DIAGNOSIS — C22.0 HCC (HEPATOCELLULAR CARCINOMA): Primary | ICD-10-CM

## 2019-05-03 DIAGNOSIS — E43 SEVERE MALNUTRITION: ICD-10-CM

## 2019-05-03 DIAGNOSIS — G25.81 RESTLESS LEG SYNDROME: ICD-10-CM

## 2019-05-03 DIAGNOSIS — F10.10 ALCOHOL ABUSE: ICD-10-CM

## 2019-05-03 DIAGNOSIS — E87.6 HYPOKALEMIA: ICD-10-CM

## 2019-05-03 DIAGNOSIS — K70.30 ALCOHOLIC CIRRHOSIS, UNSPECIFIED WHETHER ASCITES PRESENT: ICD-10-CM

## 2019-05-03 PROCEDURE — 99999 PR PBB SHADOW E&M-EST. PATIENT-LVL IV: ICD-10-PCS | Mod: PBBFAC,,, | Performed by: STUDENT IN AN ORGANIZED HEALTH CARE EDUCATION/TRAINING PROGRAM

## 2019-05-03 PROCEDURE — 99214 OFFICE O/P EST MOD 30 MIN: CPT | Mod: S$PBB,,, | Performed by: STUDENT IN AN ORGANIZED HEALTH CARE EDUCATION/TRAINING PROGRAM

## 2019-05-03 PROCEDURE — 25000003 PHARM REV CODE 250: Performed by: NURSE PRACTITIONER

## 2019-05-03 PROCEDURE — 99283 EMERGENCY DEPT VISIT LOW MDM: CPT | Mod: 27

## 2019-05-03 PROCEDURE — 99214 OFFICE O/P EST MOD 30 MIN: CPT | Mod: PBBFAC | Performed by: STUDENT IN AN ORGANIZED HEALTH CARE EDUCATION/TRAINING PROGRAM

## 2019-05-03 PROCEDURE — 99214 PR OFFICE/OUTPT VISIT, EST, LEVL IV, 30-39 MIN: ICD-10-PCS | Mod: S$PBB,,, | Performed by: STUDENT IN AN ORGANIZED HEALTH CARE EDUCATION/TRAINING PROGRAM

## 2019-05-03 PROCEDURE — 99999 PR PBB SHADOW E&M-EST. PATIENT-LVL IV: CPT | Mod: PBBFAC,,, | Performed by: STUDENT IN AN ORGANIZED HEALTH CARE EDUCATION/TRAINING PROGRAM

## 2019-05-03 RX ORDER — OXYCODONE HYDROCHLORIDE 5 MG/1
5 CAPSULE ORAL EVERY 8 HOURS PRN
Qty: 15 CAPSULE | Refills: 0 | Status: SHIPPED | OUTPATIENT
Start: 2019-05-03 | End: 2019-05-08

## 2019-05-03 RX ORDER — TRAZODONE HYDROCHLORIDE 50 MG/1
TABLET ORAL
Refills: 2 | COMMUNITY
Start: 2019-04-24 | End: 2019-11-08

## 2019-05-03 RX ORDER — OXYCODONE HYDROCHLORIDE 5 MG/1
5 TABLET ORAL
Status: COMPLETED | OUTPATIENT
Start: 2019-05-03 | End: 2019-05-03

## 2019-05-03 RX ORDER — SORAFENIB 200 MG/1
400 TABLET, FILM COATED ORAL 2 TIMES DAILY
Qty: 120 TABLET | Refills: 11 | Status: ON HOLD | OUTPATIENT
Start: 2019-05-03 | End: 2019-08-29 | Stop reason: SDUPTHER

## 2019-05-03 RX ADMIN — OXYCODONE HYDROCHLORIDE 5 MG: 5 TABLET ORAL at 08:05

## 2019-05-03 NOTE — TELEPHONE ENCOUNTER
LVM for callback to inform patient that [facility] received prescription for Nexavar and prior authorization is required.  [facility] will be back in touch once insurance determination is received.
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
## 2019-05-03 NOTE — Clinical Note
IV Iron Injectafer need to be scheduled Please schedule for ECHO, EKG, weekly CBC and CMP for 4 weeksStarted on Sorafenib 400mg BID, script sent to pharmacyFollow up in 2 weeks

## 2019-05-04 NOTE — ED TRIAGE NOTES
Pt came to the ED tonight c.o. Abdominal pain. Pt has chronic abdominal pain for this and sees a pain specialist and was not given her prescription tablets at her last visit. Pt is aaox4 and in no acute distress at this time. Pt will be monitored

## 2019-05-04 NOTE — ED PROVIDER NOTES
Encounter Date: 5/3/2019       History     Chief Complaint   Patient presents with    Medication Refill     Pt out of percocet and is here because her stomach hurts, she would have taken a pain pill if she had them and not come to the ER     Patient is a 64-year-old female with medical history of cirrhosis, HCC, iron deficiency anemia, malnutrition presenting to the ED for chronic abdominal pain. Patient states she saw her oncologist today and did not get her oxycodone refilled.  Patient states her abdominal pain is typical for her chronic abdominal pain. Patient states she is scheduled pair for a paracentesis.    The history is provided by the patient and medical records.     Review of patient's allergies indicates:  No Known Allergies  Past Medical History:   Diagnosis Date    Cirrhosis      Past Surgical History:   Procedure Laterality Date    EGD (ESOPHAGOGASTRODUODENOSCOPY) Left 1/9/2019    Performed by Madyson Farrar MD at [facility], [place], operated by [facility] ENDO    EMBOLIZATION, YTTRIUM THERAPY N/A 4/10/2019    Performed by [facility] Provider at [facility]    ESOPHAGOGASTRODUODENOSCOPY (EGD) N/A 1/26/2018    Performed by Mark Urbina MD at [facility], [place], operated by [facility] ENDO    PARACENTESIS N/A 1/30/2018    Performed by Everett Fitzpatrick MD at [facility], [place], operated by [facility] CATH LAB    PARACENTESIS N/A 2/6/2015    Performed by Alejandro Myrick MD at [facility], [place], operated by [facility] CATH LAB    PARACENTESIS, ABDOMINAL N/A 1/9/2019    Performed by Everett Fitzpatrick MD at [facility], [place], operated by [facility] CATH LAB     History reviewed. No pertinent family history.  Social History     Tobacco Use    Smoking status: Current Every Day Smoker     Types: Cigarettes    Smokeless tobacco: Never Used    Tobacco comment: 2 cigarettes a day   Substance Use Topics    Alcohol use: No     Frequency: Never     Comment: Previously drank 1 pint a day    Drug use: Yes     Types: Cocaine     Review of Systems   Constitutional: Negative for activity change, appetite change, chills, fatigue and fever.   HENT: Negative for  congestion and sore throat.    Eyes: Negative for photophobia and visual disturbance.   Respiratory: Negative for cough, chest tightness, shortness of breath and wheezing.    Cardiovascular: Negative for chest pain, palpitations and leg swelling.   Gastrointestinal: Positive for abdominal distention and abdominal pain. Negative for constipation, diarrhea, nausea and vomiting.   Endocrine: Negative.    Genitourinary: Negative for decreased urine volume, difficulty urinating, dysuria and urgency.   Musculoskeletal: Positive for myalgias. Negative for arthralgias and back pain.   Skin: Negative for color change, pallor, rash and wound.   Allergic/Immunologic: Positive for immunocompromised state.   Neurological: Negative for dizziness, syncope, weakness, numbness and headaches.   Hematological: Does not bruise/bleed easily.   All other systems reviewed and are negative.      Physical Exam     Initial Vitals [05/03/19 1948]   BP Pulse Resp Temp SpO2   125/74 90 18 98.7 °F (37.1 °C) 100 %      MAP       --         Physical Exam    Nursing note and vitals reviewed.  Constitutional: Vital signs are normal. She appears well-developed and well-nourished. She is cooperative. She appears ill ( Chronically). No distress.   HENT:   Head: Normocephalic and atraumatic.   Mouth/Throat: Uvula is midline, oropharynx is clear and moist and mucous membranes are normal.   Eyes: Conjunctivae, EOM and lids are normal. Pupils are equal, round, and reactive to light. No scleral icterus.   Neck: Trachea normal, normal range of motion, full passive range of motion without pain and phonation normal. Neck supple. No spinous process tenderness and no muscular tenderness present. No JVD present.   Cardiovascular: Normal rate and regular rhythm.   Pulses:       Radial pulses are 2+ on the right side, and 2+ on the left side.   Pulmonary/Chest: Effort normal and breath sounds normal.   Abdominal: Soft. Normal appearance and bowel sounds are normal.  She exhibits distension and fluid wave. She exhibits no shifting dullness and no abdominal bruit. There is generalized tenderness. There is no rigidity, no rebound and no guarding.   Musculoskeletal: Normal range of motion.   Neurological: She is alert and oriented to person, place, and time. She has normal strength. No cranial nerve deficit or sensory deficit. She displays a negative Romberg sign. GCS eye subscore is 4. GCS verbal subscore is 5. GCS motor subscore is 6.   Skin: Skin is warm, dry and intact. Capillary refill takes less than 2 seconds. No abrasion and no rash noted. No cyanosis. Nails show no clubbing.         ED Course   Procedures  Labs Reviewed - No data to display       Imaging Results    None          Medical Decision Making:   Initial Assessment:   Emergent evaluation of a 63 yo female patient presenting to the ER with chief complaint of chronic abdominal pain. Patient states that she was seen by her oncologist today and did not get a refill on her oxycodone.  Patient states this is her typical abdominal pain.  Patient is scheduled for paracentesis next week.  On exam patient is A&O x3. Pupils equal round reactive 3-2 mm.  Breath sounds clear bilaterally.  Abdomen is distended with a fluid wave.  Bowel sounds within normal limits.  Differential Diagnosis:   Differential diagnoses include but are not limited to cirrhosis, abdominal pain, hepatocellular carcinoma.  ED Management:  I do not feel labs or imaging are pertinent for the care this patient.  I reviewed the note from patient's appointment today.  Notes clearly states that they would refill her oxycodone.  Patient does not have a prescription for this medication.  Patient states she contacted [facility] and no prescription was sent to the pharmacy.  Advised patient we will fill this medication for 5 days.  Patient advised to contact her physician on Monday for a 30 day refill.  Patient verbalized understanding of this plan of care.  All  questions and concerns addressed.  Patient is hemodynamically stable, vital signs are normal. Discharge instructions given. Prescription for oxycodone given and explained. Return to ED precautions discussed. Follow up as directed. Pt verbalized understanding of this plan. Pt is stable for discharge.                         Clinical Impression:       ICD-10-CM ICD-9-CM   1. Chronic generalized abdominal pain R10.84 789.07    G89.29 338.29   2. Encounter for medication refill Z76.0 V68.1         Disposition:   Disposition: Discharged  Condition: Stable                        Joanne Lino NP  05/03/19 [id number]

## 2019-05-07 NOTE — TELEPHONE ENCOUNTER
Contacted the patient in regards to initial Nexavar consultation and shipment. She stated she is having a difficult time filling her pain medication at a local pharmacy and would like her Nexavar sent to [facility]. Informed her Nexavar is a specialty medication which is why it was sent to [facility]. She did not seem to be aware of the medication but was agreeable for us to ship to her on 5/8 for her to receive 5/9. $0 copay, address confirmed. The patient requested I call her back around 1-1:30p this afternoon to review the medication.

## 2019-05-07 NOTE — TELEPHONE ENCOUNTER
LVM for the patient to review Nexavar. It is scheduled to ship tomorrow - will re-attempt to call the patient tomorrow.

## 2019-05-08 NOTE — TELEPHONE ENCOUNTER
LVM for patient in regards to initial Nexavar consultation - attempt #2. The medication will be shipped to her today. Will continue to attempt to reach her for the consult.

## 2019-05-09 NOTE — TELEPHONE ENCOUNTER
LVM for patient in regards to initial Nexavar consultation - attempt #3. Per [employer] tracking information, the patient received her first shipment today. Will attempt to f/u with the patient in one week to review the medication and provide [facility] background.

## 2019-05-14 ENCOUNTER — TELEPHONE (OUTPATIENT)
Dept: HEMATOLOGY/ONCOLOGY | Facility: CLINIC | Age: 64
End: 2019-05-14

## 2019-05-14 NOTE — TELEPHONE ENCOUNTER
----- Message from Daja Ellison sent at 5/14/2019 10:52 AM CDT -----  Contact: pt   Pt called to speak with nurse have some questions   Callback#[phone]  Thank You  Lizet

## 2019-05-14 NOTE — TELEPHONE ENCOUNTER
Spoke with patient.  She is calling to reschedule her labs/ekg/dietician appointments from last Friday, as she missed them d/t bad weather.  Nurse informed patient she would ask  to contact her in the morning to reschedule the appointments with her, as the patient needs to speak to people this evening about days she can get rides.      Message routed to schedulers

## 2019-05-15 ENCOUNTER — TELEPHONE (OUTPATIENT)
Dept: INFUSION THERAPY | Facility: HOSPITAL | Age: 64
End: 2019-05-15

## 2019-05-17 ENCOUNTER — HOSPITAL ENCOUNTER (OUTPATIENT)
Dept: CARDIOLOGY | Facility: CLINIC | Age: 64
Discharge: HOME | End: 2019-05-17
Payer: MEDICAID

## 2019-05-17 ENCOUNTER — OFFICE VISIT (OUTPATIENT)
Dept: HEMATOLOGY/ONCOLOGY | Facility: CLINIC | Age: 64
End: 2019-05-17
Payer: MEDICAID

## 2019-05-17 VITALS
HEIGHT: 59 IN | TEMPERATURE: 98 F | SYSTOLIC BLOOD PRESSURE: 173 MMHG | BODY MASS INDEX: 18.89 KG/M2 | HEART RATE: 81 BPM | DIASTOLIC BLOOD PRESSURE: 78 MMHG | OXYGEN SATURATION: 100 % | WEIGHT: 93.69 LBS

## 2019-05-17 DIAGNOSIS — K70.30 ALCOHOLIC CIRRHOSIS, UNSPECIFIED WHETHER ASCITES PRESENT: ICD-10-CM

## 2019-05-17 DIAGNOSIS — D50.0 IRON DEFICIENCY ANEMIA DUE TO CHRONIC BLOOD LOSS: Primary | ICD-10-CM

## 2019-05-17 DIAGNOSIS — C22.0 HCC (HEPATOCELLULAR CARCINOMA): ICD-10-CM

## 2019-05-17 DIAGNOSIS — E43 SEVERE MALNUTRITION: ICD-10-CM

## 2019-05-17 DIAGNOSIS — G25.81 RESTLESS LEG SYNDROME: ICD-10-CM

## 2019-05-17 DIAGNOSIS — K70.31 ALCOHOLIC CIRRHOSIS OF LIVER WITH ASCITES: ICD-10-CM

## 2019-05-17 DIAGNOSIS — D50.0 IRON DEFICIENCY ANEMIA DUE TO CHRONIC BLOOD LOSS: ICD-10-CM

## 2019-05-17 DIAGNOSIS — G89.3 CANCER ASSOCIATED PAIN: ICD-10-CM

## 2019-05-17 PROCEDURE — 93010 EKG 12-LEAD: ICD-10-PCS | Mod: S$PBB,,, | Performed by: INTERNAL MEDICINE

## 2019-05-17 PROCEDURE — 99214 PR OFFICE/OUTPT VISIT, EST, LEVL IV, 30-39 MIN: ICD-10-PCS | Mod: S$PBB,,, | Performed by: STUDENT IN AN ORGANIZED HEALTH CARE EDUCATION/TRAINING PROGRAM

## 2019-05-17 PROCEDURE — 93010 ELECTROCARDIOGRAM REPORT: CPT | Mod: S$PBB,,, | Performed by: INTERNAL MEDICINE

## 2019-05-17 PROCEDURE — 99214 OFFICE O/P EST MOD 30 MIN: CPT | Mod: PBBFAC,25 | Performed by: STUDENT IN AN ORGANIZED HEALTH CARE EDUCATION/TRAINING PROGRAM

## 2019-05-17 PROCEDURE — 99214 OFFICE O/P EST MOD 30 MIN: CPT | Mod: S$PBB,,, | Performed by: STUDENT IN AN ORGANIZED HEALTH CARE EDUCATION/TRAINING PROGRAM

## 2019-05-17 PROCEDURE — 99999 PR PBB SHADOW E&M-EST. PATIENT-LVL IV: ICD-10-PCS | Mod: PBBFAC,,, | Performed by: STUDENT IN AN ORGANIZED HEALTH CARE EDUCATION/TRAINING PROGRAM

## 2019-05-17 PROCEDURE — 99999 PR PBB SHADOW E&M-EST. PATIENT-LVL IV: CPT | Mod: PBBFAC,,, | Performed by: STUDENT IN AN ORGANIZED HEALTH CARE EDUCATION/TRAINING PROGRAM

## 2019-05-17 PROCEDURE — 93005 ELECTROCARDIOGRAM TRACING: CPT | Mod: PBBFAC | Performed by: INTERNAL MEDICINE

## 2019-05-17 RX ORDER — OXYCODONE HYDROCHLORIDE 5 MG/1
5 TABLET ORAL EVERY 6 HOURS PRN
Qty: 30 TABLET | Refills: 0 | Status: SHIPPED | OUTPATIENT
Start: 2019-05-17 | End: 2019-07-12 | Stop reason: SDUPTHER

## 2019-05-17 RX ORDER — FERROUS SULFATE 325(65) MG
325 TABLET ORAL 2 TIMES DAILY
Qty: 30 TABLET | Refills: 5 | Status: SHIPPED | OUTPATIENT
Start: 2019-05-17 | End: 2019-05-21 | Stop reason: SDUPTHER

## 2019-05-17 RX ORDER — FUROSEMIDE 40 MG/1
40 TABLET ORAL DAILY
Qty: 30 TABLET | Refills: 4 | Status: SHIPPED | OUTPATIENT
Start: 2019-05-17 | End: 2019-05-21 | Stop reason: SDUPTHER

## 2019-05-17 NOTE — Clinical Note
Please schedule for follow up visit in 2 weeks, May 31, 2019Please schedule for IV Iron therapy, approved by insurance

## 2019-05-17 NOTE — Clinical Note
Please schedule for EKG today if possiblePlease schedule for ECHO, Nutrition consultPlease schedule for IV Iron infusionFollow up in 2 weeks with labs

## 2019-05-17 NOTE — PROGRESS NOTES
PATIENT: Neena Marks  MRN: [id number]  DATE: 5/17/2019      Diagnosis:   1. Iron deficiency anemia due to chronic blood loss    2. Alcoholic cirrhosis of liver with ascites    3. Alcoholic cirrhosis, unspecified whether ascites present    4. HCC (hepatocellular carcinoma)    5. Severe malnutrition    6. Cancer associated pain        Chief Complaint: No chief complaint on file.    Neena Marks is a 63 y.o. female with PMHx of alcoholic cirrhosis since 2015, Portal HTN, ascites, variceal bleeding, newly diagnosed with HCC now presented to oncology clinic for further evaluation.     Patient was a chronic alcoholic for the last 40 years and was diagnosed with alcoholic cirrhosis in 2015 however she continued to drink alcohol until January 2018 and has quit since then. She had history of upper GI bleeding with hematochezia in January 2018 s/p banding. She also had ascites since the last 1.5 to 2 years as is getting paracentesis q 2-3 months. She had no history of SBP in the past. She is currently on lasix and spironolactone for ascites and lower extremity edema.  She does not have jaundice but complaints of severe pruritis which is moderately controlled with hydroxyzine.      She currently has abdominal distention and abdominal discomfort.     CMP normal bilirubin and creatinine. Albumin of 2.8. Alk phos 138, AST 58   CBC- anemia with Hb of 8.7     AFP tumor marker is normal     Hep C and B testing on 4/27/2018- negative     Endoscopy- 1/9/19 - Grade II esophageal varices. Completely eradicated. Banded.                                  - Small hiatal hernia.                                  - Portal hypertensive gastropathy. Treated with argon plasma coagulation (APC).                            CT abdomen- 1/21/2019  1. Large well-circumscribed heterogeneously enhancing right hepatic lobe mass with characteristics consistent with HCC.  Several additional indeterminate subcentimeter hyperenhancing lesions throughout the  liver which become isodense.  Mass compresses and narrows the IVC, cannot exclude tumor involvement.  2. Liver cirrhosis.  3. Moderate volume abdominopelvic ascites.  4. Small paraesophageal varices present.  5. Cholelithiasis.     Patient had an appointment on 2/15/2019, IR consult for Y90 but she missed her appointment.   - She had IR mapping and Hepatic angiography demonstrates a large hypervascular lesion in the right hepatic lobe supplied by branches of the right hepatic artery and accessory left hepatic artery..  Technetium MAA was injected aorta determined lung shunt fraction.  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  - Nuclear medicine scan revealed that majority of tracer goes to the lungs with a liver lung shunt fraction of 68.9%.       Follow up on 5/17/2019   is not a candidate for Y90 or other liver directed therapies.   Started taking Sorafenib from 5/9/2019. She took TID (6 pills of sorafenib/day) for 2 days and then took BID dosing since then. She said she just want to get medication in the system.  She is taking periactin TID  She missed Nutrition consult on 5/10  She is no no show to EKG and ECHO which are scheduled on 5/10 and 5/14  She takes Iron pills but ran out of them  Labs stable, mildly elevated LFts  Her abdominal pain is mainly at night. Abdominal distention is better.         Subjective:    Initial History: Ms. Marks is a 64 y.o. female who returns for follow up.     Past Medical History:   Past Medical History:   Diagnosis Date    Cirrhosis        Past Surgical HIstory:   Past Surgical History:   Procedure Laterality Date    EGD (ESOPHAGOGASTRODUODENOSCOPY) Left 1/9/2019    Performed by [facility]ar [facility] at [facility] ENDO    EMBOLIZATION, YTTRIUM THERAPY N/A 4/10/2019    [facility]     ESOPHAGOGASTRODUODENOSCOPY (EGD) N/A 1/26/2018    Performed by [facility] at [facility] ENDO    PARACENTESIS N/A 1/30/2018    Performed by Everett Fitzpatrick MD at [facility] CATH LAB    PARACENTESIS N/A 2/6/2015    Performed by Alejandro Myrick MD at [facility] CATH LAB    PARACENTESIS, ABDOMINAL N/A 1/9/2019    Performed by Everett Fitzpatrick MD at [facility] CATH LAB       Family History: History reviewed. No pertinent family history.    Social History:  reports that she has been smoking cigarettes.  She has never used smokeless tobacco. She reports that she has current or past drug history. Drug: Cocaine. She reports that she does not drink alcohol.    Allergies:  Review of patient's allergies indicates:  No Known Allergies    Medications:  Current Outpatient Medications   Medication Sig Dispense Refill    cholecalciferol, vitamin D3, 50,000 unit Tab Take 125 mcg by mouth.      cyclobenzaprine (FLEXERIL) 10 MG tablet Take 10 mg by mouth nightly as needed for Muscle spasms.      cyproheptadine (PERIACTIN) 4 mg tablet Take 1 tablet (4 mg total) by mouth 3 (three) times daily as needed. 90 tablet 2    famotidine (PEPCID) 20 MG tablet Take 1 tablet (20 mg total) by mouth once daily. 30 tablet 0    ferrous sulfate (FEOSOL) 325 mg (65 mg iron) Tab tablet Take 1 tablet (325 mg total) by mouth 2 (two) times daily. 30 tablet 5    furosemide (LASIX) 40 MG tablet Take 1 tablet (40 mg total) by mouth once daily. 30 tablet 4    lactulose 10 gram/15 ml (CHRONULAC) 10 gram/15 mL (15 mL) solution Take 10 g by mouth 2 (two) times daily.      meloxicam (MOBIC) 15 MG tablet Take 15 mg by mouth nightly as needed for Pain.      multivitamin (ONE DAILY MULTIVITAMIN) per tablet Take 1 tablet by mouth once daily.      propranolol (INDERAL) 10 MG tablet Take 1 tablet (10 mg total) by mouth 2 (two) times daily. 60 tablet 2    SORAfenib (NEXAVAR) 200 mg tablet Take 2 tablets (400 mg total) by mouth 2 (two) times daily. 120  "tablet 11    traZODone (DESYREL) 50 MG tablet TK 1 T PO  HS PRN INSOMNIA  2    oxyCODONE (ROXICODONE) 5 MG immediate release tablet Take 1 tablet (5 mg total) by mouth every 6 (six) hours as needed for Pain. 30 tablet 0    spironolactone (ALDACTONE) 100 MG tablet Take 1 tablet (100 mg total) by mouth once daily. 30 tablet 5     No current facility-administered medications for this visit.        Review of Systems   Constitutional: Positive for fatigue. Negative for appetite change, chills and fever.   HENT: Negative for mouth sores and trouble swallowing.    Respiratory: Negative for cough, chest tightness and shortness of breath.    Cardiovascular: Negative for chest pain and palpitations.   Gastrointestinal: Positive for abdominal distention and abdominal pain. Negative for constipation, diarrhea, nausea and vomiting.   Genitourinary: Negative for dysuria, flank pain and frequency.   Musculoskeletal: Negative for back pain and joint swelling.   Skin: Negative for pallor and rash.   Neurological: Negative for seizures, syncope and headaches.   Hematological: Negative for adenopathy.   Psychiatric/Behavioral: Negative for agitation and behavioral problems.       ECOG Performance Status: 1   Objective:      Vitals:   Vitals:    05/17/19 0914   BP: (!) 173/78   Pulse: 81   Temp: 98 °F (36.7 °C)   SpO2: 100%   Weight: 42.5 kg (93 lb 11.1 oz)   Height: 4' 11" (1.499 m)     BMI: Body mass index is 18.92 kg/m².    Physical Exam   Constitutional: She is oriented to person, place, and time.   Thin female with abdominal distention   HENT:   Head: Normocephalic and atraumatic.   Eyes: Pupils are equal, round, and reactive to light. EOM are normal.   Neck: Normal range of motion. Neck supple.   Cardiovascular: Normal rate, regular rhythm and normal heart sounds.   Pulmonary/Chest: Effort normal and breath sounds normal. No respiratory distress. She has no wheezes.   Abdominal: Soft. Bowel sounds are normal. She exhibits " distension. There is no tenderness.   Musculoskeletal: Normal range of motion. She exhibits no edema or deformity.   Neurological: She is alert and oriented to person, place, and time. No cranial nerve deficit.   Skin: Skin is warm and dry. Capillary refill takes less than 2 seconds. No erythema. No pallor.   Psychiatric: She has a normal mood and affect.       Laboratory Data:  Lab Visit on 05/17/2019   Component Date Value Ref Range Status    WBC 05/17/2019 5.21  3.90 - 12.70 K/uL Final    RBC 05/17/2019 4.40  4.00 - 5.40 M/uL Final    Hemoglobin 05/17/2019 8.6* 12.0 - 16.0 g/dL Final    Hematocrit 05/17/2019 28.3* 37.0 - 48.5 % Final    Mean Corpuscular Volume 05/17/2019 64* 82 - 98 fL Final    Mean Corpuscular Hemoglobin 05/17/2019 19.5* 27.0 - 31.0 pg Final    Mean Corpuscular Hemoglobin Conc 05/17/2019 30.4* 32.0 - 36.0 g/dL Final    RDW 05/17/2019 25.5* 11.5 - 14.5 % Final    Platelets 05/17/2019 244  150 - 350 K/uL Final    MPV 05/17/2019 SEE COMMENT  9.2 - 12.9 fL Final    Result not available.    Immature Granulocytes 05/17/2019 0.2  0.0 - 0.5 % Final    Gran # (ANC) 05/17/2019 3.0  1.8 - 7.7 K/uL Final    Immature Grans (Abs) 05/17/2019 0.01  0.00 - 0.04 K/uL Final    Comment: Mild elevation in immature granulocytes is non specific and   can be seen in a variety of conditions including stress response,   acute inflammation, trauma and pregnancy. Correlation with other   laboratory and clinical findings is essential.      Lymph # 05/17/2019 1.5  1.0 - 4.8 K/uL Final    Mono # 05/17/2019 0.4  0.3 - 1.0 K/uL Final    Eos # 05/17/2019 0.3  0.0 - 0.5 K/uL Final    Baso # 05/17/2019 0.06  0.00 - 0.20 K/uL Final    nRBC 05/17/2019 0  0 /100 WBC Final    Gran% 05/17/2019 57.3  38.0 - 73.0 % Final    Lymph% 05/17/2019 27.8  18.0 - 48.0 % Final    Mono% 05/17/2019 8.1  4.0 - 15.0 % Final    Eosinophil% 05/17/2019 5.4  0.0 - 8.0 % Final    Basophil% 05/17/2019 1.2  0.0 - 1.9 % Final     Differential Method 05/17/2019 Automated   Final    Sodium 05/17/2019 139  136 - 145 mmol/L Final    Potassium 05/17/2019 4.3  3.5 - 5.1 mmol/L Final    Chloride 05/17/2019 110  95 - 110 mmol/L Final    CO2 05/17/2019 20* 23 - 29 mmol/L Final    Glucose 05/17/2019 104  70 - 110 mg/dL Final    BUN, Bld 05/17/2019 10  8 - 23 mg/dL Final    Creatinine 05/17/2019 1.0  0.5 - 1.4 mg/dL Final    Calcium 05/17/2019 9.2  8.7 - 10.5 mg/dL Final    Total Protein 05/17/2019 9.5* 6.0 - 8.4 g/dL Final    Albumin 05/17/2019 3.3* 3.5 - 5.2 g/dL Final    Total Bilirubin 05/17/2019 0.7  0.1 - 1.0 mg/dL Final    Comment: For infants and newborns, interpretation of results should be based  on gestational age, weight and in agreement with clinical  observations.  Premature Infant recommended reference ranges:  Up to 24 hours.............<8.0 mg/dL  Up to 48 hours............<12.0 mg/dL  3-5 days..................<15.0 mg/dL  6-29 days.................<15.0 mg/dL      Alkaline Phosphatase 05/17/2019 145* 55 - 135 U/L Final    AST 05/17/2019 93* 10 - 40 U/L Final    ALT 05/17/2019 28  10 - 44 U/L Final    Anion Gap 05/17/2019 9  8 - 16 mmol/L Final    eGFR if African American 05/17/2019 >60.0  >60 mL/min/1.73 m^2 Final    eGFR if non African American 05/17/2019 >60.0  >60 mL/min/1.73 m^2 Final    Comment: Calculation used to obtain the estimated glomerular filtration  rate (eGFR) is the CKD-EPI equation.            Imaging:     Endoscopy- 1/9/19 - Grade II esophageal varices. Completely eradicated. Banded.                                  - Small hiatal hernia.                                  - Portal hypertensive gastropathy. Treated with argon plasma coagulation (APC).                            CT abdomen- 1/21/2019  1. Large well-circumscribed heterogeneously enhancing right hepatic lobe mass with characteristics consistent with HCC.  Several additional indeterminate subcentimeter hyperenhancing lesions throughout  the liver which become isodense.  Mass compresses and narrows the IVC, cannot exclude tumor involvement.  2. Liver cirrhosis.  3. Moderate volume abdominopelvic ascites.  4. Small paraesophageal varices present.  5. Cholelithiasis.     CT Chest  1.  No evidence of metastatic disease in this patient with history of HCC.  There are no measurable lesions per RECIST criteria.    2.  Mild centrilobular emphysematous changes with upper lung zone predominance.     IR Embolization for Tumor Organ Ischemia  Hepatic angiography demonstrates a large hypervascular lesion in the right hepatic lobe supplied by branches of the right hepatic artery and accessory left hepatic artery..  Technetium MAA was injected aorta determined lung shunt fraction.  There was a large arterial portal shunt with hypervascularity extending into the IVC tumor thrombus.    Plan:    Patient will be sent to nuclear medicine for determination of lung shunt fraction.     Nuclear medicine scan  1.  The majority of tracer goes to the lungs with a liver lung shunt fraction of 68.9%.    2.  No other significant extrahepatic activity  Assessment:       1. Iron deficiency anemia due to chronic blood loss    2. Alcoholic cirrhosis of liver with ascites    3. Alcoholic cirrhosis, unspecified whether ascites present    4. HCC (hepatocellular carcinoma)    5. Severe malnutrition    6. Cancer associated pain        She currently has abdominal distention and abdominal discomfort/pain   CMP normal bilirubin and creatinine. Albumin of 2.8. Alk phos 138, AST 58   CBC- anemia with Hb of 8.7     AFP tumor marker is normal     7 points- Child Class B     CT abdomen revealed Large well-circumscribed heterogeneously enhancing right hepatic lobe mass measuring 8.9 x 7.6 cm which demonstrates washout on venous and delayed phase imaging consistent with HCC.    - Several additional scattered subcentimeter foci of hyperenhancement without washout.    - Main and left portal vein  are patent.    - Mass compresses and narrows the right portal vein.    - Mass compresses and significantly narrows the IVC, cannot exclude tumor involvement.     - IR Y90 mapping- Hepatic angiography demonstrates a large hypervascular lesion in the right hepatic lobe supplied by branches of the right hepatic artery and accessory left hepatic artery..  Technetium MAA was injected aorta determined lung shunt fraction.  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  - Nuclear medicine scan revealed that majority of tracer goes to the lungs with a liver lung shunt fraction of 68.9%.     is not a candidate for Y90 or other liver directed therapies.   Started taking Sorafenib from 5/9/2019. She took TID (6 pills of sorafenib/day) for 2 days and then took BID dosing since then. She said she just want to get medication in the system.       Plan:     1. Counselled and educated on the dosage, directions and compliance with her chemo pill, Sorafenib.  2. EKG scheduled for today  3. ECHO scheduled for 5/24  4. Dietitian consult scheduled for June 3rd  5.  Authorized for IV injectafor, need to be scheduled  6. Will get weekly labs for 2 more weeks and then labs every 2 weeks for 4 weeks   7.  Follow-up in 2 weeks     Plan of care discussed with Dr. Mariangel Roberts MD PGY-IV  Hematology and Oncology  Pager:[phone]    Distress Screening Results: Psychosocial Distress screening score of Distress Score: 0 noted and reviewed. No intervention indicated.

## 2019-05-20 NOTE — TELEPHONE ENCOUNTER
Patient reached for initial follow up of Nexavar therapy.  Reinforced several of the following initial consultation points as we have had little opportunity to speak with her in detail about her medication.      Confirmed Ms. Marks is taking her Nexavar as follows:  -Take 2 tablets by mouth twice daily on an empty stomach.  She is currently taking the medication at 9am and 6pm and reinforced that an empty stomach is 1 hour before food or 2 hours after food.  -Do not chew, crush, or break the tablets.   If possible, patient was instructed tip the tablets from the RX bottle to the cap, and take directly from the cap to the mouth.  Patient may handle the medication with their hands if they wear with a latex or nitrile glove and wash their hands before and after handling the tablets.    -Advised of the above handling precautions, however, she reported touching the tablets directly with her hands and is washing well with soap and water after.  Discussed reasons for handling precautions, patient expressed understanding.    Patient had read medication information provided by the physician and denied any of the following side effects:  Skin changes including rash, discoloration of palms of hands or bottoms of feet, no tenderness, swelling or broken skin.  She denies any hair loss, major issues with N/V or infections.  She is aware of the appetite loss, however, she currently feels like her appetite is slowly getting better.    DDIs:  Patient has an increased risk of QTc prolongation - newest EKG 5/17/19 - QTc 448 msec (decreased from 463 msec in January of 2018).  Will reinforce s/sx of CV side effects at next follow up.    Patient was given 2 patient education handouts on how to handle oral chemotherapy and specific recommendations- do's and don'ts. Instructed the patient that if they have any remaining oral chemotherapy, not to flush down the toilet or throw away in the trash; the patient or caregiver should return the  unused oral chemotherapy to either the clinic or to myself in the Pharmacy where the oral chemotherapy can be disposed of properly.     Patient confirmed understanding. Patient did not have additional questions.      Patient started Nexavar on 5/9/19 . OSP will follow up with patient on/around 5/30/19 for first refill.

## 2019-05-21 DIAGNOSIS — K70.31 ALCOHOLIC CIRRHOSIS OF LIVER WITH ASCITES: ICD-10-CM

## 2019-05-21 DIAGNOSIS — D50.0 IRON DEFICIENCY ANEMIA DUE TO CHRONIC BLOOD LOSS: ICD-10-CM

## 2019-05-21 RX ORDER — FERROUS SULFATE 325(65) MG
325 TABLET ORAL 2 TIMES DAILY
Qty: 30 TABLET | Refills: 5 | Status: ON HOLD | OUTPATIENT
Start: 2019-05-21 | End: 2019-12-05 | Stop reason: HOSPADM

## 2019-05-21 RX ORDER — FUROSEMIDE 40 MG/1
40 TABLET ORAL DAILY
Qty: 30 TABLET | Refills: 4 | Status: SHIPPED | OUTPATIENT
Start: 2019-05-21 | End: 2019-07-12 | Stop reason: SDUPTHER

## 2019-05-22 ENCOUNTER — TELEPHONE (OUTPATIENT)
Dept: PHARMACY | Facility: CLINIC | Age: 64
End: 2019-05-22

## 2019-05-22 NOTE — TELEPHONE ENCOUNTER
"Called to inform patient that her prescriptions for furosemide and ferrous sulfate were re-sent to her preferred [facility].  When Ms. Marks answered the phone, she sounded distressed.  I asked her if she was ok and she informed me that she did not feel ok and could not feel her legs or move her legs on the left side of her body.  She reports that she called the "doctor" but was waiting for them to call her back.  I requested that she call 911 and have and ambulance come to her home and assess her situation.  When asked if she wanted me to call her daughter, she said her daughter was at work and wouldn't be by the phone.      I asked Ms. Marks again to call 911 and she said she would when we hung up the phone.  Instructed her to take her phone with her so that I could try to reach her this afternoon to follow up.  Will inform the providers office as well and continue to follow up with Ms. Marks.  "

## 2019-05-22 NOTE — TELEPHONE ENCOUNTER
Right  side hurting and feels she cannot move her right leg.  States yesterday she was walking around [id number] but she feels today she is not able to move.  Her daughter will bring her to the ER.  Patient has had no falls  Just doesn't know why she cannot move.

## 2019-05-22 NOTE — TELEPHONE ENCOUNTER
Followed up with patient this afternoon.  She sounded less distressed and noted that she spoke with her friend, who was coming by this afternoon to take her to get checked out.  She did not call anyone earlier as recommended.  Advised that I would reach out to her tomorrow to see if she was examined by a physician and follow up with her.  She expressed understanding.

## 2019-05-24 ENCOUNTER — HOSPITAL ENCOUNTER (OUTPATIENT)
Dept: CARDIOLOGY | Facility: OTHER | Age: 64
Discharge: HOME | End: 2019-05-24
Attending: STUDENT IN AN ORGANIZED HEALTH CARE EDUCATION/TRAINING PROGRAM | Admitting: STUDENT IN AN ORGANIZED HEALTH CARE EDUCATION/TRAINING PROGRAM
Payer: MEDICAID

## 2019-05-24 ENCOUNTER — INFUSION (OUTPATIENT)
Dept: INFUSION THERAPY | Facility: HOSPITAL | Age: 64
End: 2019-05-24
Attending: STUDENT IN AN ORGANIZED HEALTH CARE EDUCATION/TRAINING PROGRAM
Payer: MEDICAID

## 2019-05-24 VITALS
BODY MASS INDEX: 18.75 KG/M2 | SYSTOLIC BLOOD PRESSURE: 173 MMHG | DIASTOLIC BLOOD PRESSURE: 78 MMHG | WEIGHT: 93 LBS | HEIGHT: 59 IN

## 2019-05-24 VITALS
DIASTOLIC BLOOD PRESSURE: 64 MMHG | TEMPERATURE: 98 F | RESPIRATION RATE: 18 BRPM | WEIGHT: 93.69 LBS | HEIGHT: 59 IN | BODY MASS INDEX: 18.89 KG/M2 | SYSTOLIC BLOOD PRESSURE: 127 MMHG | HEART RATE: 75 BPM

## 2019-05-24 DIAGNOSIS — G25.81 RESTLESS LEG SYNDROME: Primary | ICD-10-CM

## 2019-05-24 DIAGNOSIS — C22.0 HCC (HEPATOCELLULAR CARCINOMA): ICD-10-CM

## 2019-05-24 DIAGNOSIS — G25.81 RESTLESS LEG SYNDROME: ICD-10-CM

## 2019-05-24 DIAGNOSIS — D50.0 IRON DEFICIENCY ANEMIA DUE TO CHRONIC BLOOD LOSS: ICD-10-CM

## 2019-05-24 DIAGNOSIS — D62 ACUTE BLOOD LOSS ANEMIA: ICD-10-CM

## 2019-05-24 PROCEDURE — 96365 THER/PROPH/DIAG IV INF INIT: CPT

## 2019-05-24 PROCEDURE — 93306 TTE W/DOPPLER COMPLETE: CPT | Mod: 26,,, | Performed by: INTERNAL MEDICINE

## 2019-05-24 PROCEDURE — 93306 TRANSTHORACIC ECHO (TTE) COMPLETE (CUPID ONLY): ICD-10-PCS | Mod: 26,,, | Performed by: INTERNAL MEDICINE

## 2019-05-24 PROCEDURE — 25000003 PHARM REV CODE 250: Performed by: INTERNAL MEDICINE

## 2019-05-24 PROCEDURE — 93306 TTE W/DOPPLER COMPLETE: CPT

## 2019-05-24 PROCEDURE — 63600175 PHARM REV CODE 636 W HCPCS: Mod: JG | Performed by: INTERNAL MEDICINE

## 2019-05-24 RX ORDER — HEPARIN 100 UNIT/ML
500 SYRINGE INTRAVENOUS
OUTPATIENT
Start: 2019-05-25

## 2019-05-24 RX ORDER — SODIUM CHLORIDE 0.9 % (FLUSH) 0.9 %
10 SYRINGE (ML) INJECTION
Status: CANCELLED | OUTPATIENT
Start: 2019-05-24

## 2019-05-24 RX ORDER — SODIUM CHLORIDE 0.9 % (FLUSH) 0.9 %
10 SYRINGE (ML) INJECTION
OUTPATIENT
Start: 2019-05-25

## 2019-05-24 RX ORDER — SODIUM CHLORIDE 0.9 % (FLUSH) 0.9 %
10 SYRINGE (ML) INJECTION
Status: DISCONTINUED | OUTPATIENT
Start: 2019-05-24 | End: 2019-05-24 | Stop reason: HOSPADM

## 2019-05-24 RX ORDER — HEPARIN 100 UNIT/ML
500 SYRINGE INTRAVENOUS
Status: DISCONTINUED | OUTPATIENT
Start: 2019-05-24 | End: 2019-05-24 | Stop reason: HOSPADM

## 2019-05-24 RX ORDER — HEPARIN 100 UNIT/ML
500 SYRINGE INTRAVENOUS
Status: CANCELLED | OUTPATIENT
Start: 2019-05-24

## 2019-05-24 RX ADMIN — FERRIC CARBOXYMALTOSE INJECTION 750 MG: 50 INJECTION, SOLUTION INTRAVENOUS at 12:05

## 2019-05-24 RX ADMIN — SODIUM CHLORIDE: 0.9 INJECTION, SOLUTION INTRAVENOUS at 12:05

## 2019-05-25 LAB
AORTIC ROOT ANNULUS: 2.61 CM
AORTIC VALVE CUSP SEPERATION: 1.44 CM
ASCENDING AORTA: 2.3 CM
AV INDEX (PROSTH): 0.75
AV MEAN GRADIENT: 5.86 MMHG
AV PEAK GRADIENT: 10.76 MMHG
AV VALVE AREA: 2.32 CM2
AV VELOCITY RATIO: 0.64
BSA FOR ECHO PROCEDURE: 1.33 M2
CV ECHO LV RWT: 0.44 CM
DOP CALC AO PEAK VEL: 1.64 M/S
DOP CALC AO VTI: 35.59 CM
DOP CALC LVOT AREA: 3.11 CM2
DOP CALC LVOT DIAMETER: 1.99 CM
DOP CALC LVOT PEAK VEL: 1.06 M/S
DOP CALC LVOT STROKE VOLUME: 82.6 CM3
DOP CALCLVOT PEAK VEL VTI: 26.57 CM
E WAVE DECELERATION TIME: 242.86 MSEC
E/A RATIO: 1
E/E' RATIO: 13.54
ECHO LV POSTERIOR WALL: 0.88 CM (ref 0.6–1.1)
FRACTIONAL SHORTENING: 36 % (ref 28–44)
INTERVENTRICULAR SEPTUM: 0.9 CM (ref 0.6–1.1)
IVRT: 0.07 MSEC
LA MAJOR: 4.87 CM
LA MINOR: 5.01 CM
LA WIDTH: 4.69 CM
LEFT ATRIUM SIZE: 3.2 CM
LEFT ATRIUM VOLUME INDEX: 47.3 ML/M2
LEFT ATRIUM VOLUME: 63.01 CM3
LEFT INTERNAL DIMENSION IN SYSTOLE: 2.52 CM (ref 2.1–4)
LEFT VENTRICLE DIASTOLIC VOLUME INDEX: 51.29 ML/M2
LEFT VENTRICLE DIASTOLIC VOLUME: 68.3 ML
LEFT VENTRICLE MASS INDEX: 79.8 G/M2
LEFT VENTRICLE SYSTOLIC VOLUME INDEX: 17.1 ML/M2
LEFT VENTRICLE SYSTOLIC VOLUME: 22.8 ML
LEFT VENTRICULAR INTERNAL DIMENSION IN DIASTOLE: 3.96 CM (ref 3.5–6)
LEFT VENTRICULAR MASS: 106.28 G
LV LATERAL E/E' RATIO: 12.57
LV SEPTAL E/E' RATIO: 14.67
MV PEAK A VEL: 0.88 M/S
MV PEAK E VEL: 0.88 M/S
PISA TR MAX VEL: 2.51 M/S
PULM VEIN S/D RATIO: 1.86
PV PEAK D VEL: 0.28 M/S
PV PEAK S VEL: 0.52 M/S
PV PEAK VELOCITY: 0.82 CM/S
RA MAJOR: 4.07 CM
RA WIDTH: 3.19 CM
RIGHT VENTRICULAR END-DIASTOLIC DIMENSION: 3.39 CM
RV TISSUE DOPPLER FREE WALL SYSTOLIC VELOCITY 1 (APICAL 4 CHAMBER VIEW): 12.12 M/S
SINUS: 2.68 CM
STJ: 1.97 CM
TDI LATERAL: 0.07
TDI SEPTAL: 0.06
TDI: 0.07
TR MAX PG: 25.2 MMHG
TRICUSPID ANNULAR PLANE SYSTOLIC EXCURSION: 2.23 CM

## 2019-05-27 NOTE — TELEPHONE ENCOUNTER
Called patient for follow up on previous issue concerning her legs. Patient previously noted that she could not feel her legs or move them. LVM.

## 2019-05-30 ENCOUNTER — TELEPHONE (OUTPATIENT)
Dept: PHARMACY | Facility: CLINIC | Age: 64
End: 2019-05-30

## 2019-05-31 ENCOUNTER — LAB VISIT (OUTPATIENT)
Dept: LAB | Facility: HOSPITAL | Age: 64
End: 2019-05-31
Attending: INTERNAL MEDICINE
Payer: MEDICAID

## 2019-05-31 DIAGNOSIS — D50.0 IRON DEFICIENCY ANEMIA DUE TO CHRONIC BLOOD LOSS: ICD-10-CM

## 2019-05-31 DIAGNOSIS — C22.0 HCC (HEPATOCELLULAR CARCINOMA): ICD-10-CM

## 2019-05-31 LAB
ACANTHOCYTES BLD QL SMEAR: PRESENT
ALBUMIN SERPL BCP-MCNC: 3 G/DL (ref 3.5–5.2)
ALP SERPL-CCNC: 127 U/L (ref 55–135)
ALT SERPL W/O P-5'-P-CCNC: 26 U/L (ref 10–44)
ANION GAP SERPL CALC-SCNC: 8 MMOL/L (ref 8–16)
ANISOCYTOSIS BLD QL SMEAR: SLIGHT
AST SERPL-CCNC: 99 U/L (ref 10–40)
BASOPHILS # BLD AUTO: 0.09 K/UL (ref 0–0.2)
BASOPHILS NFR BLD: 1.7 % (ref 0–1.9)
BILIRUB SERPL-MCNC: 0.9 MG/DL (ref 0.1–1)
BUN SERPL-MCNC: 10 MG/DL (ref 8–23)
BURR CELLS BLD QL SMEAR: ABNORMAL
CALCIUM SERPL-MCNC: 8.9 MG/DL (ref 8.7–10.5)
CHLORIDE SERPL-SCNC: 110 MMOL/L (ref 95–110)
CO2 SERPL-SCNC: 18 MMOL/L (ref 23–29)
CREAT SERPL-MCNC: 1 MG/DL (ref 0.5–1.4)
DACRYOCYTES BLD QL SMEAR: ABNORMAL
DIFFERENTIAL METHOD: ABNORMAL
EOSINOPHIL # BLD AUTO: 0.2 K/UL (ref 0–0.5)
EOSINOPHIL NFR BLD: 4.5 % (ref 0–8)
ERYTHROCYTE [DISTWIDTH] IN BLOOD BY AUTOMATED COUNT: 25.9 % (ref 11.5–14.5)
EST. GFR  (AFRICAN AMERICAN): >60 ML/MIN/1.73 M^2
EST. GFR  (NON AFRICAN AMERICAN): >60 ML/MIN/1.73 M^2
GLUCOSE SERPL-MCNC: 114 MG/DL (ref 70–110)
HCT VFR BLD AUTO: 28.2 % (ref 37–48.5)
HGB BLD-MCNC: 8.8 G/DL (ref 12–16)
HYPOCHROMIA BLD QL SMEAR: ABNORMAL
IMM GRANULOCYTES # BLD AUTO: 0.11 K/UL (ref 0–0.04)
IMM GRANULOCYTES NFR BLD AUTO: 2.1 % (ref 0–0.5)
LYMPHOCYTES # BLD AUTO: 2.2 K/UL (ref 1–4.8)
LYMPHOCYTES NFR BLD: 41.2 % (ref 18–48)
MCH RBC QN AUTO: 19.5 PG (ref 27–31)
MCHC RBC AUTO-ENTMCNC: 31.2 G/DL (ref 32–36)
MCV RBC AUTO: 63 FL (ref 82–98)
MONOCYTES # BLD AUTO: 0.3 K/UL (ref 0.3–1)
MONOCYTES NFR BLD: 6.4 % (ref 4–15)
NEUTROPHILS # BLD AUTO: 2.3 K/UL (ref 1.8–7.7)
NEUTROPHILS NFR BLD: 44.1 % (ref 38–73)
NRBC BLD-RTO: 1 /100 WBC
PLATELET # BLD AUTO: 178 K/UL (ref 150–350)
PLATELET BLD QL SMEAR: ABNORMAL
PMV BLD AUTO: ABNORMAL FL (ref 9.2–12.9)
POIKILOCYTOSIS BLD QL SMEAR: ABNORMAL
POLYCHROMASIA BLD QL SMEAR: ABNORMAL
POTASSIUM SERPL-SCNC: 4.9 MMOL/L (ref 3.5–5.1)
PROT SERPL-MCNC: 8.8 G/DL (ref 6–8.4)
RBC # BLD AUTO: 4.51 M/UL (ref 4–5.4)
SCHISTOCYTES BLD QL SMEAR: ABNORMAL
SCHISTOCYTES BLD QL SMEAR: PRESENT
SODIUM SERPL-SCNC: 136 MMOL/L (ref 136–145)
TARGETS BLD QL SMEAR: ABNORMAL
TOXIC GRANULES BLD QL SMEAR: PRESENT
WBC # BLD AUTO: 5.31 K/UL (ref 3.9–12.7)

## 2019-05-31 PROCEDURE — 80053 COMPREHEN METABOLIC PANEL: CPT

## 2019-05-31 PROCEDURE — 85025 COMPLETE CBC W/AUTO DIFF WBC: CPT

## 2019-05-31 PROCEDURE — 36415 COLL VENOUS BLD VENIPUNCTURE: CPT

## 2019-06-04 ENCOUNTER — TELEPHONE (OUTPATIENT)
Dept: HEPATOLOGY | Facility: CLINIC | Age: 64
End: 2019-06-04

## 2019-06-04 DIAGNOSIS — C22.0 HCC (HEPATOCELLULAR CARCINOMA): Primary | ICD-10-CM

## 2019-06-04 NOTE — TELEPHONE ENCOUNTER
----- Message from Yumiko Rahman sent at 5/11/2019 11:50 PM CDT -----  Regarding: f/u with Charanjit  Ms Marks was last seen by Dr. Donohue Feb 2019 with plan for RTC in 2-3 months.  Please review and schedule accordingly

## 2019-06-04 NOTE — TELEPHONE ENCOUNTER
Spoke with Ms. Marks. Overall she seemed very confused about her medication. Phone conversation was broken/hard to hear, but we discussed the following:    Refills:   Patient thought she had no refills on any of her medications. Has been out of Lasix x1 week and her feet are so swollen she can't walk. Advised her most of her medications have refills and were sent to the correct [facility]. Called [facility] to have furosemide, cyproheptadine, famotidine, and ferrous sulfate refilled. Asked them to put her on automatic refill program to prevent her from running out in the future. Also asked that a pharmacist  her when she picks up today    Nexavar:  Patient reports starting on 5/9. Has 80 tablets left (if taking appropriately, should have been out by end of this week). [facility] tried several times to call for  and have been unable to reach patient. She reports pain and swelling in her ear, sore gums and swollen tongue, and being unable to eat d/t her mouth being so sore. She has not been taking it regularly    Support:  Patient reports her daughter helps some with her medications, but is currently looking for stable housing and works during the day. She has not other family support. Attempted to contact patient's daughter - number in chart is incorrect. Patient gave me [phone] which may also be incorrect - [place] HIPAA compliant VM on [place] voicemail. Patient gave verbal permission for [facility] to discuss medication information with her daughter.    Sending message to Liliana Donohue and [facility]'s staff with updates. Patient currently scheduled for appts on 6/14 with both.

## 2019-06-04 NOTE — TELEPHONE ENCOUNTER
Patient called to schedule 3 month follow up appt.  Appt will be scheduled on a day when the patient has appts already scheduled on 6/14/19, due to transportation issues.  [facility] 10 [facility].  All appt letters placed in the mail.

## 2019-06-05 ENCOUNTER — TELEPHONE (OUTPATIENT)
Dept: HEMATOLOGY/ONCOLOGY | Facility: CLINIC | Age: 64
End: 2019-06-05

## 2019-06-05 NOTE — TELEPHONE ENCOUNTER
----- Message from Sondra Carrington sent at 6/5/2019 12:08 PM CDT -----  Contact: self  Pt called to speak with nurse in office about prescription that was not called in.            Pt callback number [phone]

## 2019-06-06 DIAGNOSIS — K21.9 GASTROESOPHAGEAL REFLUX DISEASE, ESOPHAGITIS PRESENCE NOT SPECIFIED: Primary | ICD-10-CM

## 2019-06-06 RX ORDER — FAMOTIDINE 20 MG/1
20 TABLET, FILM COATED ORAL DAILY
Qty: 30 TABLET | Refills: 0 | Status: SHIPPED | OUTPATIENT
Start: 2019-06-06 | End: 2019-06-14 | Stop reason: SDUPTHER

## 2019-06-07 ENCOUNTER — TELEPHONE (OUTPATIENT)
Dept: HEMATOLOGY/ONCOLOGY | Facility: CLINIC | Age: 64
End: 2019-06-07

## 2019-06-07 NOTE — TELEPHONE ENCOUNTER
Call made to patient. No answer. Voicemail left to please call back to clinic to discuss medications      ----- Message from Daja Ellison sent at 6/7/2019  9:35 AM CDT -----  Pt called to speak with nurse about medication have some questions   Callback#[phone]  Thank You  JOSE J Ellison

## 2019-06-07 NOTE — TELEPHONE ENCOUNTER
LVM for patient to follow up on previous phone call.   Trying to get a bit more information on how patient is taking her medication and what side effects she has been experiencing. Also want to see if patient was able to  refills of her medications. At previous call she stated she has been out of her lasix for 1 week and had a lot of swelling in her feet.

## 2019-06-13 DIAGNOSIS — D50.0 IRON DEFICIENCY ANEMIA DUE TO CHRONIC BLOOD LOSS: ICD-10-CM

## 2019-06-13 DIAGNOSIS — C22.0 HCC (HEPATOCELLULAR CARCINOMA): Primary | ICD-10-CM

## 2019-06-13 DIAGNOSIS — K70.31 ALCOHOLIC CIRRHOSIS OF LIVER WITH ASCITES: ICD-10-CM

## 2019-06-14 ENCOUNTER — OFFICE VISIT (OUTPATIENT)
Dept: HEMATOLOGY/ONCOLOGY | Facility: CLINIC | Age: 64
End: 2019-06-14
Payer: MEDICAID

## 2019-06-14 ENCOUNTER — OFFICE VISIT (OUTPATIENT)
Dept: HEPATOLOGY | Facility: CLINIC | Age: 64
End: 2019-06-14
Payer: MEDICAID

## 2019-06-14 ENCOUNTER — LAB VISIT (OUTPATIENT)
Dept: LAB | Facility: HOSPITAL | Age: 64
End: 2019-06-14
Attending: INTERNAL MEDICINE
Payer: MEDICAID

## 2019-06-14 ENCOUNTER — TELEPHONE (OUTPATIENT)
Dept: PHARMACY | Facility: CLINIC | Age: 64
End: 2019-06-14

## 2019-06-14 VITALS
BODY MASS INDEX: 18.04 KG/M2 | DIASTOLIC BLOOD PRESSURE: 70 MMHG | WEIGHT: 89.5 LBS | HEART RATE: 74 BPM | SYSTOLIC BLOOD PRESSURE: 135 MMHG | OXYGEN SATURATION: 96 % | RESPIRATION RATE: 16 BRPM | HEIGHT: 59 IN | TEMPERATURE: 98 F

## 2019-06-14 VITALS
HEIGHT: 59 IN | WEIGHT: 89.75 LBS | BODY MASS INDEX: 18.09 KG/M2 | SYSTOLIC BLOOD PRESSURE: 166 MMHG | HEART RATE: 70 BPM | DIASTOLIC BLOOD PRESSURE: 75 MMHG | OXYGEN SATURATION: 95 %

## 2019-06-14 DIAGNOSIS — E43 SEVERE MALNUTRITION: ICD-10-CM

## 2019-06-14 DIAGNOSIS — K70.30 ESOPHAGEAL VARICES IN ALCOHOLIC CIRRHOSIS: ICD-10-CM

## 2019-06-14 DIAGNOSIS — E87.6 HYPOKALEMIA: ICD-10-CM

## 2019-06-14 DIAGNOSIS — G89.3 CANCER ASSOCIATED PAIN: ICD-10-CM

## 2019-06-14 DIAGNOSIS — D50.0 IRON DEFICIENCY ANEMIA DUE TO CHRONIC BLOOD LOSS: ICD-10-CM

## 2019-06-14 DIAGNOSIS — K70.31 ALCOHOLIC CIRRHOSIS OF LIVER WITH ASCITES: ICD-10-CM

## 2019-06-14 DIAGNOSIS — C22.0 HCC (HEPATOCELLULAR CARCINOMA): ICD-10-CM

## 2019-06-14 DIAGNOSIS — C22.0 HCC (HEPATOCELLULAR CARCINOMA): Primary | ICD-10-CM

## 2019-06-14 DIAGNOSIS — F10.10 ALCOHOL ABUSE: ICD-10-CM

## 2019-06-14 DIAGNOSIS — K21.9 GASTROESOPHAGEAL REFLUX DISEASE, ESOPHAGITIS PRESENCE NOT SPECIFIED: ICD-10-CM

## 2019-06-14 DIAGNOSIS — K70.30 ALCOHOLIC CIRRHOSIS, UNSPECIFIED WHETHER ASCITES PRESENT: ICD-10-CM

## 2019-06-14 DIAGNOSIS — G25.81 RESTLESS LEG SYNDROME: ICD-10-CM

## 2019-06-14 DIAGNOSIS — I85.10 ESOPHAGEAL VARICES IN ALCOHOLIC CIRRHOSIS: ICD-10-CM

## 2019-06-14 LAB
AFP SERPL-MCNC: 4 NG/ML (ref 0–8.4)
ALBUMIN SERPL BCP-MCNC: 3 G/DL (ref 3.5–5.2)
ALP SERPL-CCNC: 143 U/L (ref 55–135)
ALT SERPL W/O P-5'-P-CCNC: 25 U/L (ref 10–44)
ANION GAP SERPL CALC-SCNC: 10 MMOL/L (ref 8–16)
AST SERPL-CCNC: 76 U/L (ref 10–40)
BASOPHILS # BLD AUTO: 0.06 K/UL (ref 0–0.2)
BASOPHILS NFR BLD: 1.6 % (ref 0–1.9)
BILIRUB SERPL-MCNC: 0.9 MG/DL (ref 0.1–1)
BUN SERPL-MCNC: 11 MG/DL (ref 8–23)
CALCIUM SERPL-MCNC: 9.3 MG/DL (ref 8.7–10.5)
CHLORIDE SERPL-SCNC: 104 MMOL/L (ref 95–110)
CO2 SERPL-SCNC: 23 MMOL/L (ref 23–29)
CREAT SERPL-MCNC: 1 MG/DL (ref 0.5–1.4)
DIFFERENTIAL METHOD: ABNORMAL
EOSINOPHIL # BLD AUTO: 0.1 K/UL (ref 0–0.5)
EOSINOPHIL NFR BLD: 2.7 % (ref 0–8)
ERYTHROCYTE [DISTWIDTH] IN BLOOD BY AUTOMATED COUNT: 26.8 % (ref 11.5–14.5)
EST. GFR  (AFRICAN AMERICAN): >60 ML/MIN/1.73 M^2
EST. GFR  (NON AFRICAN AMERICAN): >60 ML/MIN/1.73 M^2
GLUCOSE SERPL-MCNC: 122 MG/DL (ref 70–110)
HCT VFR BLD AUTO: 34.8 % (ref 37–48.5)
HGB BLD-MCNC: 10.4 G/DL (ref 12–16)
IMM GRANULOCYTES # BLD AUTO: 0.01 K/UL (ref 0–0.04)
IMM GRANULOCYTES NFR BLD AUTO: 0.3 % (ref 0–0.5)
INR PPP: 1.2 (ref 0.8–1.2)
LYMPHOCYTES # BLD AUTO: 1.1 K/UL (ref 1–4.8)
LYMPHOCYTES NFR BLD: 30.1 % (ref 18–48)
MAGNESIUM SERPL-MCNC: 2.3 MG/DL (ref 1.6–2.6)
MCH RBC QN AUTO: 20.1 PG (ref 27–31)
MCHC RBC AUTO-ENTMCNC: 29.9 G/DL (ref 32–36)
MCV RBC AUTO: 67 FL (ref 82–98)
MONOCYTES # BLD AUTO: 0.3 K/UL (ref 0.3–1)
MONOCYTES NFR BLD: 8.9 % (ref 4–15)
NEUTROPHILS # BLD AUTO: 2.1 K/UL (ref 1.8–7.7)
NEUTROPHILS NFR BLD: 56.4 % (ref 38–73)
NRBC BLD-RTO: 0 /100 WBC
PLATELET # BLD AUTO: 188 K/UL (ref 150–350)
PMV BLD AUTO: ABNORMAL FL (ref 9.2–12.9)
POTASSIUM SERPL-SCNC: 4.4 MMOL/L (ref 3.5–5.1)
PROT SERPL-MCNC: 9.5 G/DL (ref 6–8.4)
PROTHROMBIN TIME: 12.4 SEC (ref 9–12.5)
RBC # BLD AUTO: 5.17 M/UL (ref 4–5.4)
SODIUM SERPL-SCNC: 137 MMOL/L (ref 136–145)
WBC # BLD AUTO: 3.69 K/UL (ref 3.9–12.7)

## 2019-06-14 PROCEDURE — 99215 OFFICE O/P EST HI 40 MIN: CPT | Mod: S$PBB,,, | Performed by: STUDENT IN AN ORGANIZED HEALTH CARE EDUCATION/TRAINING PROGRAM

## 2019-06-14 PROCEDURE — 36415 COLL VENOUS BLD VENIPUNCTURE: CPT

## 2019-06-14 PROCEDURE — 99214 OFFICE O/P EST MOD 30 MIN: CPT | Mod: S$PBB,,, | Performed by: INTERNAL MEDICINE

## 2019-06-14 PROCEDURE — 99215 PR OFFICE/OUTPT VISIT, EST, LEVL V, 40-54 MIN: ICD-10-PCS | Mod: S$PBB,,, | Performed by: STUDENT IN AN ORGANIZED HEALTH CARE EDUCATION/TRAINING PROGRAM

## 2019-06-14 PROCEDURE — 80053 COMPREHEN METABOLIC PANEL: CPT

## 2019-06-14 PROCEDURE — 85610 PROTHROMBIN TIME: CPT

## 2019-06-14 PROCEDURE — 99999 PR PBB SHADOW E&M-EST. PATIENT-LVL IV: CPT | Mod: PBBFAC,,, | Performed by: STUDENT IN AN ORGANIZED HEALTH CARE EDUCATION/TRAINING PROGRAM

## 2019-06-14 PROCEDURE — 85025 COMPLETE CBC W/AUTO DIFF WBC: CPT

## 2019-06-14 PROCEDURE — 99999 PR PBB SHADOW E&M-EST. PATIENT-LVL IV: ICD-10-PCS | Mod: PBBFAC,,, | Performed by: STUDENT IN AN ORGANIZED HEALTH CARE EDUCATION/TRAINING PROGRAM

## 2019-06-14 PROCEDURE — 99214 OFFICE O/P EST MOD 30 MIN: CPT | Mod: PBBFAC,27 | Performed by: STUDENT IN AN ORGANIZED HEALTH CARE EDUCATION/TRAINING PROGRAM

## 2019-06-14 PROCEDURE — 83735 ASSAY OF MAGNESIUM: CPT

## 2019-06-14 PROCEDURE — 82105 ALPHA-FETOPROTEIN SERUM: CPT

## 2019-06-14 PROCEDURE — 99214 PR OFFICE/OUTPT VISIT, EST, LEVL IV, 30-39 MIN: ICD-10-PCS | Mod: S$PBB,,, | Performed by: INTERNAL MEDICINE

## 2019-06-14 PROCEDURE — 99999 PR PBB SHADOW E&M-EST. PATIENT-LVL III: ICD-10-PCS | Mod: PBBFAC,,, | Performed by: INTERNAL MEDICINE

## 2019-06-14 PROCEDURE — 99999 PR PBB SHADOW E&M-EST. PATIENT-LVL III: CPT | Mod: PBBFAC,,, | Performed by: INTERNAL MEDICINE

## 2019-06-14 PROCEDURE — 99213 OFFICE O/P EST LOW 20 MIN: CPT | Mod: PBBFAC | Performed by: INTERNAL MEDICINE

## 2019-06-14 RX ORDER — FAMOTIDINE 20 MG/1
20 TABLET, FILM COATED ORAL DAILY
Qty: 30 TABLET | Refills: 0 | Status: SHIPPED | OUTPATIENT
Start: 2019-06-14 | End: 2019-09-27 | Stop reason: SDUPTHER

## 2019-06-14 NOTE — PROGRESS NOTES
Hepatology Follow-up Note    Chief complaint: alcoholic cirrhosis complicated by HCC    HPI:  Neena Marks is a 64 y.o. female that presents to hepatology clinic for consultation of alcoholic cirrhosis.  She is alone.     Diagnosed with cirrhosis: patient reports diagnosis in 2017 but per review of chart, presented with ascites in Feb 2015 and diagnosed with alcoholic cirrhosis   Presenting symptom/s: ascites   Current symptoms of portal hypertension:   Ascites: yes, prior LVP soon after diagnosis, no history of SBP   LE edema: yes    HE: no   GI bleeding: yes, hematochezia in 1/2018; EGD performed but no colonoscopy in system   Jaundice: no    Pruritus: yes, on hydroxyzine     Cirrhosis HCM:  Hepatitis A vaccination: immune  Hepatitis B vaccination: non-immune  HCC screening: HCC confirmed 1/2019  Variceal screening: grade II esophageal varices 1/2018  Pneumococcal vaccination not discussed   Influenza vaccination:  not discussed     Despite evidence of alcoholic cirrhosis in 2015, patient continued to drink alcohol until 1/2018.  Reports that she became sicker related to her liver disease and this prompted her to discontinue alcohol use. She has not had alcohol rehab or relapse prevention.  No other etiologies of CLD known at this time.      Patient presented to ED for assessment of abdominal pain and swelling.  During evaluation, found to have liver lesion concerning for HCC with normal AFP.    Discharged on diuretic therapy.    Triple phase imaging obtained and HCC confirmed with IVC compression     Interval history:  Patient last seen in clinic on 2/13/2019.  She reports that abdominal distention has improved with use of diuretic therapy.  She is taking lasix 40mg daily, not taking spironolactone.  Denies GI bleeding, HE.  Reports side effects from Nexavar including tongue swelling and discomfort.  Painful hands and feet that improved when she held medication for 2 days.  There have been recent concerns for  the patient's compliance and extensive education with pharmacy.  She brings medications to clinic today and appears more knowledgeable about medications.      Patient Active Problem List   Diagnosis    Iron deficiency anemia due to chronic blood loss    Alcohol abuse    Hypokalemia    Liver mass    Esophageal web determined by endoscopy    Severe malnutrition    Acute blood loss anemia    Acute GI bleeding    PRACHI (acute kidney injury)    Alcoholic cirrhosis    Esophageal varices in alcoholic cirrhosis    Portal hypertensive gastropathy    HCC (hepatocellular carcinoma)    Restless leg syndrome       Past Medical History:   Diagnosis Date    Cirrhosis      PSH: none     FH: no liver disease, sister on HD     Social History     Socioeconomic History    Marital status: Single     Spouse name: Not on file    Number of children: Not on file    Years of education: Not on file    Highest education level: Not on file   Occupational History    Not on file   Social Needs    Financial resource strain: Not on file    Food insecurity:     Worry: Not on file     Inability: Not on file    Transportation needs:     Medical: Not on file     Non-medical: Not on file   Tobacco Use    Smoking status: Current Every Day Smoker     Types: Cigarettes    Smokeless tobacco: Never Used    Tobacco comment: 2 cigarettes a day   Substance and Sexual Activity    Alcohol use: No     Frequency: Never     Comment: Previously drank 1 pint a day    Drug use: Yes     Types: Cocaine    Sexual activity: Not on file   Lifestyle    Physical activity:     Days per week: Not on file     Minutes per session: Not on file    Stress: Not on file   Relationships    Social connections:     Talks on phone: Not on file     Gets together: Not on file     Attends Nondenominational service: Not on file     Active member of club or organization: Not on file     Attends meetings of clubs or organizations: Not on file     Relationship status: Not  on file   Other Topics Concern    Not on file   Social History Narrative    Not on file   lives with sandra, son and granddaughter, unemployed, on disability - she is not sure of the indication  Use cocaine - once or twice a month  Reports alcohol discontinued in 1/2018    Current Outpatient Medications   Medication Sig Dispense Refill    cholecalciferol, vitamin D3, 50,000 unit Tab Take 125 mcg by mouth.      cyclobenzaprine (FLEXERIL) 10 MG tablet Take 10 mg by mouth nightly as needed for Muscle spasms.      cyproheptadine (PERIACTIN) 4 mg tablet Take 1 tablet (4 mg total) by mouth 3 (three) times daily as needed. 90 tablet 2    famotidine (PEPCID) 20 MG tablet Take 1 tablet (20 mg total) by mouth once daily. 30 tablet 0    ferrous sulfate (FEOSOL) 325 mg (65 mg iron) Tab tablet Take 1 tablet (325 mg total) by mouth 2 (two) times daily. 30 tablet 5    furosemide (LASIX) 40 MG tablet Take 1 tablet (40 mg total) by mouth once daily. 30 tablet 4    lactulose 10 gram/15 ml (CHRONULAC) 10 gram/15 mL (15 mL) solution Take 10 g by mouth 2 (two) times daily.      meloxicam (MOBIC) 15 MG tablet Take 15 mg by mouth nightly as needed for Pain.      multivitamin (ONE DAILY MULTIVITAMIN) per tablet Take 1 tablet by mouth once daily.      oxyCODONE (ROXICODONE) 5 MG immediate release tablet Take 1 tablet (5 mg total) by mouth every 6 (six) hours as needed for Pain. 30 tablet 0    propranolol (INDERAL) 10 MG tablet Take 1 tablet (10 mg total) by mouth 2 (two) times daily. 60 tablet 2    SORAfenib (NEXAVAR) 200 mg tablet Take 2 tablets (400 mg total) by mouth 2 (two) times daily. 120 tablet 11    traZODone (DESYREL) 50 MG tablet TK 1 T PO  HS PRN INSOMNIA  2    (Magic mouthwash) 1:1:1 Benadryl 12.5mg/5ml liq, aluminum & magnesium hydroxide-simehticone (Maalox), lidocaine viscous 2% Swish and spit 10 mLs every 4 (four) hours as needed. for mouth sores 450 mL 5     No current facility-administered medications for  "this visit.        Review of patient's allergies indicates:  No Known Allergies    Review of Systems   Constitutional: Positive for malaise/fatigue and weight loss. Negative for chills and fever.   HENT:        Tongue pain  Jaw swelling   Eyes: Negative.    Respiratory: Negative for cough and shortness of breath.    Cardiovascular: Negative for chest pain and leg swelling.   Gastrointestinal: Positive for abdominal pain. Negative for blood in stool, heartburn, melena, nausea and vomiting.   Musculoskeletal: Negative for joint pain and myalgias.   Skin: Negative for itching and rash.   Neurological: Negative for dizziness, focal weakness and headaches.   Endo/Heme/Allergies: Does not bruise/bleed easily.   Psychiatric/Behavioral: Negative for depression.       Vitals:    06/14/19 1124   BP: (!) 166/75   Pulse: 70   SpO2: 95%   Weight: 40.7 kg (89 lb 11.6 oz)   Height: 4' 11" (1.499 m)       Physical Exam   Constitutional: She is oriented to person, place, and time. She appears well-developed. No distress.   Thin female, temporal wasting present   HENT:   Head: Normocephalic and atraumatic.   Mouth/Throat: Oropharynx is clear and moist. No oropharyngeal exudate.   Eyes: Pupils are equal, round, and reactive to light. EOM are normal. No scleral icterus.   Neck: Normal range of motion. Neck supple. No thyromegaly present.   Cardiovascular: Normal rate, regular rhythm and normal heart sounds. Exam reveals no gallop and no friction rub.   No murmur heard.  Pulmonary/Chest: Effort normal. No respiratory distress. She has no wheezes. She has no rales.   Abdominal: Soft. Bowel sounds are normal. She exhibits distension (mild). There is tenderness.   Abdominal varices present   Musculoskeletal: Normal range of motion. She exhibits no edema.   Lymphadenopathy:     She has no cervical adenopathy.   Neurological: She is alert and oriented to person, place, and time. No cranial nerve deficit.   Skin: Skin is warm and dry. No rash " noted.   Psychiatric: She has a normal mood and affect. Her behavior is normal.   Vitals reviewed.      Lab Results   Component Value Date    ALT 25 06/14/2019    AST 76 (H) 06/14/2019    ALKPHOS 143 (H) 06/14/2019    BILITOT 0.9 06/14/2019       Lab Results   Component Value Date    WBC 3.69 (L) 06/14/2019    HGB 10.4 (L) 06/14/2019    HCT 34.8 (L) 06/14/2019    MCV 67 (L) 06/14/2019     06/14/2019       Lab Results   Component Value Date     06/14/2019    K 4.4 06/14/2019     06/14/2019    CO2 23 06/14/2019    BUN 11 06/14/2019    CREATININE 1.0 06/14/2019    CALCIUM 9.3 06/14/2019    ANIONGAP 10 06/14/2019    ESTGFRAFRICA >60.0 06/14/2019    EGFRNONAA >60.0 06/14/2019     MELD-Na score: 8 at 6/14/2019  9:09 AM  MELD score: 8 at 6/14/2019  9:09 AM  Calculated from:  Serum Creatinine: 1.0 mg/dL at 6/14/2019  9:09 AM  Serum Sodium: 137 mmol/L at 6/14/2019  9:09 AM  Total Bilirubin: 0.9 mg/dL (Rounded to 1 mg/dL) at 6/14/2019  9:09 AM  INR(ratio): 1.2 at 6/14/2019  9:09 AM  Age: 64 years    Imaging: EMR and external imaging available reviewed     Assessment:  64 y.o. female presenting with decompensated alcoholic cirrhosis and complicated by HCC.  She is undergoing systemic therapy with Nexavar due to not being a candidate for Y-90 with elevated lung shunt fraction on mapping.     Plan:  HCC: Patient is following with oncology, seen in clinic today.  She is ordered for a bone scan and due for surveillance imaging.  Although she has side effects of Nexavar, plan to continue therapy and ordered for Magic mouthwash in addition to pain medication and H2 blocker which was provided by oncology today.      Ascites:  Appears better controlled with lasix.  Renal function is normal.  Continue therapy without changes.  No indication for spironolactone at this time.    HE:  No overt HE at this time but continues on lactulose     Patient is not transplant candidate due to tumor burden outside [facility] and [facility].       HCM as documented above, given advanced disease - will update accordingly    RTC in 3 months

## 2019-06-14 NOTE — PROGRESS NOTES
PATIENT: Neena Marks  MRN: [id number]  DATE: 6/13/2019      Diagnosis:   1. HCC (hepatocellular carcinoma)    2. Alcoholic cirrhosis of liver with ascites    3. Iron deficiency anemia due to chronic blood loss    4. Severe malnutrition    5. Cancer associated pain    6. Alcohol abuse    7. Hypokalemia    8. Esophageal varices in alcoholic cirrhosis        Chief Complaint: No chief complaint on file.    Neena Marks is a 63 y.o. female with PMHx of alcoholic cirrhosis since 2015, Portal HTN, ascites, variceal bleeding, newly diagnosed with HCC now presented to oncology clinic for further evaluation.     Patient was a chronic alcoholic for the last 40 years and was diagnosed with alcoholic cirrhosis in 2015 however she continued to drink alcohol until January 2018 and has quit since then. She had history of upper GI bleeding with hematochezia in January 2018 s/p banding. She also had ascites since the last 1.5 to 2 years as is getting paracentesis q 2-3 months. She had no history of SBP in the past. She is currently on lasix and spironolactone for ascites and lower extremity edema.  She does not have jaundice but complaints of severe pruritis which is moderately controlled with hydroxyzine.      She currently has abdominal distention and abdominal discomfort.     CMP normal bilirubin and creatinine. Albumin of 2.8. Alk phos 138, AST 58   CBC- anemia with Hb of 8.7     AFP tumor marker is normal     Hep C and B testing on 4/27/2018- negative     Endoscopy- 1/9/19 - Grade II esophageal varices. Completely eradicated. Banded.                                  - Small hiatal hernia.                                  - Portal hypertensive gastropathy. Treated with argon plasma coagulation (APC).                            CT abdomen- 1/21/2019  1. Large well-circumscribed heterogeneously enhancing right hepatic lobe mass with characteristics consistent with HCC.  Several additional indeterminate subcentimeter hyperenhancing  lesions throughout the liver which become isodense.  Mass compresses and narrows the IVC, cannot exclude tumor involvement.  2. Liver cirrhosis.  3. Moderate volume abdominopelvic ascites.  4. Small paraesophageal varices present.  5. Cholelithiasis.     Patient had an appointment on 2/15/2019, IR consult for Y90 but she missed her appointment.   - She had IR mapping and Hepatic angiography demonstrates a large hypervascular lesion in the right hepatic lobe supplied by branches of the right hepatic artery and accessory left hepatic artery.  Technetium MAA was injected aorta determined lung shunt fraction.  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  - Nuclear medicine scan revealed that majority of tracer goes to the lungs with a liver lung shunt fraction of 68.9%.    Follow up on 6/14/2019   is not a candidate for Y90 or other liver directed therapies.   Started taking Sorafenib 400mg BID from 5/9/2019.   She is taking periactin TID  She missed Nutrition consult on 5/10  She takes Iron pills but ran out of them  Labs stable, mildly elevated LFts  Her abdominal pain is mainly at night. Abdominal distention is better.  Smokes 2 cig/day    Subjective:    Initial History: Ms. Marks is a 64 y.o. female who returns for follow up.     Past Medical History:   Past Medical History:   Diagnosis Date    Cirrhosis        Past Surgical HIstory:   Past Surgical History:   Procedure Laterality Date    EGD (ESOPHAGOGASTRODUODENOSCOPY) Left 1/9/2019    Performed by Madyson Farrar [facility] at [facility] ENDO    EMBOLIZATION, YTTRIUM THERAPY N/A 4/10/2019    [facility] 2ND F[facility]    ESOPHAGOGASTRODUODENOSCOPY (EGD) N/A 1/26/2018    Performed by Mark Urbina MD at [facility] ENDO    PARACENTESIS N/A 1/30/2018    Performed by Everett Fitzpatrick MD at [facility] CATH  LAB    PARACENTESIS N/A 2/6/2015    Performed by [facility]jandro [facility]rick [facility] at [facility] CATH LAB    PARACENTESIS, ABDOMINAL N/A 1/9/2019    Performed by Everett Fitzpatrick [facility] at [facility] LAB       Family History: No family history on file.    Social History:  reports that she has been smoking cigarettes.  She has never used smokeless tobacco. She reports that she has current or past drug history. Drug: Cocaine. She reports that she does not drink alcohol.    Allergies:  Review of patient's allergies indicates:  No Known Allergies    Medications:  Current Outpatient Medications   Medication Sig Dispense Refill    cholecalciferol, vitamin D3, 50,000 unit Tab Take 125 mcg by mouth.      cyclobenzaprine (FLEXERIL) 10 MG tablet Take 10 mg by mouth nightly as needed for Muscle spasms.      cyproheptadine (PERIACTIN) 4 mg tablet Take 1 tablet (4 mg total) by mouth 3 (three) times daily as needed. 90 tablet 2    famotidine (PEPCID) 20 MG tablet Take 1 tablet (20 mg total) by mouth once daily. 30 tablet 0    ferrous sulfate (FEOSOL) 325 mg (65 mg iron) Tab tablet Take 1 tablet (325 mg total) by mouth 2 (two) times daily. 30 tablet 5    furosemide (LASIX) 40 MG tablet Take 1 tablet (40 mg total) by mouth once daily. 30 tablet 4    lactulose 10 gram/15 ml (CHRONULAC) 10 gram/15 mL (15 mL) solution Take 10 g by mouth 2 (two) times daily.      meloxicam (MOBIC) 15 MG tablet Take 15 mg by mouth nightly as needed for Pain.      multivitamin (ONE DAILY MULTIVITAMIN) per tablet Take 1 tablet by mouth once daily.      oxyCODONE (ROXICODONE) 5 MG immediate release tablet Take 1 tablet (5 mg total) by mouth every 6 (six) hours as needed for Pain. 30 tablet 0    propranolol (INDERAL) 10 MG tablet Take 1 tablet (10 mg total) by mouth 2 (two) times daily. 60 tablet 2    SORAfenib (NEXAVAR) 200 mg tablet Take 2 tablets (400 mg total) by mouth 2 (two) times daily. 120 tablet 11    spironolactone (ALDACTONE) 100 MG tablet Take 1  tablet (100 mg total) by mouth once daily. 30 tablet 5    traZODone (DESYREL) 50 MG tablet TK 1 T PO  HS PRN INSOMNIA  2     No current facility-administered medications for this visit.        Review of Systems   Constitutional: Negative for chills, fatigue and fever.   HENT: Negative for nosebleeds.    Respiratory: Negative for shortness of breath.    Cardiovascular: Negative for chest pain and leg swelling.   Gastrointestinal: Negative for abdominal pain, blood in stool, diarrhea and nausea.   Genitourinary: Negative for dysuria, flank pain and frequency.   Musculoskeletal: Negative for back pain and myalgias.   Skin: Negative for rash.   Neurological: Negative for seizures, syncope and headaches.   Hematological: Negative for adenopathy.       ECOG Performance Status: 1   Objective:      Vitals: There were no vitals filed for this visit.  BMI: There is no height or weight on file to calculate BMI.    Physical Exam   Constitutional: She is oriented to person, place, and time. She appears well-developed and well-nourished.   HENT:   Head: Normocephalic and atraumatic.   Eyes: Pupils are equal, round, and reactive to light. EOM are normal.   Neck: Normal range of motion. Neck supple.   Cardiovascular: Normal rate, regular rhythm and normal heart sounds.   Pulmonary/Chest: Effort normal and breath sounds normal. No respiratory distress.   Abdominal: Soft. Bowel sounds are normal. She exhibits no distension. There is no tenderness.   Musculoskeletal: Normal range of motion. She exhibits no edema or deformity.   Neurological: She is alert and oriented to person, place, and time. No cranial nerve deficit.   Skin: Skin is warm and dry. Capillary refill takes less than 2 seconds. No pallor.       Laboratory Data:  No visits with results within 1 Week(s) from this visit.   Latest known visit with results is:   Lab Visit on 05/31/2019   Component Date Value Ref Range Status    WBC 05/31/2019 5.31  3.90 - 12.70 K/uL Final     RBC 05/31/2019 4.51  4.00 - 5.40 M/uL Final    Hemoglobin 05/31/2019 8.8* 12.0 - 16.0 g/dL Final    Hematocrit 05/31/2019 28.2* 37.0 - 48.5 % Final    Mean Corpuscular Volume 05/31/2019 63* 82 - 98 fL Final    Mean Corpuscular Hemoglobin 05/31/2019 19.5* 27.0 - 31.0 pg Final    Mean Corpuscular Hemoglobin Conc 05/31/2019 31.2* 32.0 - 36.0 g/dL Final    RDW 05/31/2019 25.9* 11.5 - 14.5 % Final    Platelets 05/31/2019 178  150 - 350 K/uL Final    MPV 05/31/2019 SEE COMMENT  9.2 - 12.9 fL Final    Result not available.    Immature Granulocytes 05/31/2019 2.1* 0.0 - 0.5 % Final    Gran # (ANC) 05/31/2019 2.3  1.8 - 7.7 K/uL Final    Immature Grans (Abs) 05/31/2019 0.11* 0.00 - 0.04 K/uL Final    Comment: Mild elevation in immature granulocytes is non specific and   can be seen in a variety of conditions including stress response,   acute inflammation, trauma and pregnancy. Correlation with other   laboratory and clinical findings is essential.      Lymph # 05/31/2019 2.2  1.0 - 4.8 K/uL Final    Mono # 05/31/2019 0.3  0.3 - 1.0 K/uL Final    Eos # 05/31/2019 0.2  0.0 - 0.5 K/uL Final    Baso # 05/31/2019 0.09  0.00 - 0.20 K/uL Final    nRBC 05/31/2019 1* 0 /100 WBC Final    Gran% 05/31/2019 44.1  38.0 - 73.0 % Final    Lymph% 05/31/2019 41.2  18.0 - 48.0 % Final    Mono% 05/31/2019 6.4  4.0 - 15.0 % Final    Eosinophil% 05/31/2019 4.5  0.0 - 8.0 % Final    Basophil% 05/31/2019 1.7  0.0 - 1.9 % Final    Platelet Estimate 05/31/2019 Appears normal   Final    Aniso 05/31/2019 Slight   Final    Poik 05/31/2019 Moderate   Final    Poly 05/31/2019 Occasional   Final    Hypo 05/31/2019 Moderate   Final    Target Cells 05/31/2019 Occasional   Final    Tear Drop Cells 05/31/2019 Occasional   Final    [place] Cells 05/31/2019 Occasional   Final    Acanthocytes 05/31/2019 Present   Final    Schistocytes 05/31/2019 Present   Final    Toxic Granulation 05/31/2019 Present   Final    Fragmented Cells  05/31/2019 Occasional   Final    Differential Method 05/31/2019 Automated   Final    Sodium 05/31/2019 136  136 - 145 mmol/L Final    Potassium 05/31/2019 4.9  3.5 - 5.1 mmol/L Final    Chloride 05/31/2019 110  95 - 110 mmol/L Final    CO2 05/31/2019 18* 23 - 29 mmol/L Final    Glucose 05/31/2019 114* 70 - 110 mg/dL Final    BUN, Bld 05/31/2019 10  8 - 23 mg/dL Final    Creatinine 05/31/2019 1.0  0.5 - 1.4 mg/dL Final    Calcium 05/31/2019 8.9  8.7 - 10.5 mg/dL Final    Total Protein 05/31/2019 8.8* 6.0 - 8.4 g/dL Final    Albumin 05/31/2019 3.0* 3.5 - 5.2 g/dL Final    Total Bilirubin 05/31/2019 0.9  0.1 - 1.0 mg/dL Final    Comment: For infants and newborns, interpretation of results should be based  on gestational age, weight and in agreement with clinical  observations.  Premature Infant recommended reference ranges:  Up to 24 hours.............<8.0 mg/dL  Up to 48 hours............<12.0 mg/dL  3-5 days..................<15.0 mg/dL  6-29 days.................<15.0 mg/dL      Alkaline Phosphatase 05/31/2019 127  55 - 135 U/L Final    AST 05/31/2019 99* 10 - 40 U/L Final    ALT 05/31/2019 26  10 - 44 U/L Final    Anion Gap 05/31/2019 8  8 - 16 mmol/L Final    eGFR if African American 05/31/2019 >60.0  >60 mL/min/1.73 m^2 Final    eGFR if non African American 05/31/2019 >60.0  >60 mL/min/1.73 m^2 Final    Comment: Calculation used to obtain the estimated glomerular filtration  rate (eGFR) is the CKD-EPI equation.            Imaging:     Endoscopy- 1/9/19 - Grade II esophageal varices. Completely eradicated. Banded.                                  - Small hiatal hernia.                                  - Portal hypertensive gastropathy. Treated with argon plasma coagulation (APC).                            CT abdomen- 1/21/2019  1. Large well-circumscribed heterogeneously enhancing right hepatic lobe mass with characteristics consistent with HCC.  Several additional indeterminate subcentimeter  hyperenhancing lesions throughout the liver which become isodense.  Mass compresses and narrows the IVC, cannot exclude tumor involvement.  2. Liver cirrhosis.  3. Moderate volume abdominopelvic ascites.  4. Small paraesophageal varices present.  5. Cholelithiasis.     CT Chest  1.  No evidence of metastatic disease in this patient with history of HCC.  There are no measurable lesions per RECIST criteria.    2.  Mild centrilobular emphysematous changes with upper lung zone predominance.     IR Embolization for Tumor Organ Ischemia  Hepatic angiography demonstrates a large hypervascular lesion in the right hepatic lobe supplied by branches of the right hepatic artery and accessory left hepatic artery..  Technetium MAA was injected aorta determined lung shunt fraction.  There was a large arterial portal shunt with hypervascularity extending into the IVC tumor thrombus.    Plan:    Patient will be sent to nuclear medicine for determination of lung shunt fraction.     Nuclear medicine scan  1.  The majority of tracer goes to the lungs with a liver lung shunt fraction of 68.9%.    2.  No other significant extrahepatic activity    ECHO- 5/24/19  · Increased (hyperdynamic) left ventricular systolic function. The estimated ejection fraction is 76%  · Grade I (mild) left ventricular diastolic dysfunction consistent with impaired relaxation.  · Mild left atrial enlargement    EKG  Normal sinus rhythm  Normal ECG  When compared with ECG of 08-JAN-2019 07:22,  No significant change was found  Assessment:       1. HCC (hepatocellular carcinoma)    2. Alcoholic cirrhosis of liver with ascites    3. Iron deficiency anemia due to chronic blood loss    4. Severe malnutrition    5. Cancer associated pain    6. Alcohol abuse    7. Hypokalemia    8. Esophageal varices in alcoholic cirrhosis             She currently has abdominal distention and abdominal discomfort/pain   CMP normal bilirubin and creatinine. Albumin of 2.8. Alk phos  138, AST 58   CBC- anemia with Hb of 8.7     AFP tumor marker is normal     7 points- Child Class B     CT abdomen revealed Large well-circumscribed heterogeneously enhancing right hepatic lobe mass measuring 8.9 x 7.6 cm which demonstrates washout on venous and delayed phase imaging consistent with HCC.    - Several additional scattered subcentimeter foci of hyperenhancement without washout.    - Main and left portal vein are patent.    - Mass compresses and narrows the right portal vein.    - Mass compresses and significantly narrows the IVC, cannot exclude tumor involvement.     - IR Y90 mapping- Hepatic angiography demonstrates a large hypervascular lesion in the right hepatic lobe supplied by branches of the right hepatic artery and accessory left hepatic artery..  Technetium MAA was injected aorta determined lung shunt fraction.  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  - Nuclear medicine scan revealed that majority of tracer goes to the lungs with a liver lung shunt fraction of 68.9%.      is not a candidate for Y90 or other liver directed therapies.   Started taking Sorafenib from 5/9/2019. She took TID (6 pills of sorafenib/day) for 2 days and then took BID dosing since then. She said she just want to get medication in the system.       Plan:     1. Counselled and educated on the dosage, directions and compliance with Sorafenib. Patient understood  2. Needs to be scheduled for IV iron infusion Injectafer#2   3. Will get labs every 2 weeks  4.  Has to be scheduled for a bone scan  5.  Ordered Pepcid for acid reflux    Follow up in 2 weeks with labs    Plan of care discussed with Dr. Claude Roberts MD PGY-IV  Hematology and Oncology  Pager:[phone]    Distress Screening Results: Psychosocial Distress screening score of Distress Score: 0  noted and reviewed. No intervention indicated.

## 2019-06-14 NOTE — PATIENT INSTRUCTIONS
Your liver function remains stable.    Mouthwash for the tongue soreness associated with medication.    Continue lasix 40mg daily.    Return to clinic in 3 months

## 2019-06-14 NOTE — TELEPHONE ENCOUNTER
Patient confirms the need of a refill for Nexavar.  [facility] will ship overnight via [facility] on  with patient consent.  Copay $0 at 004. Patient reports 5 days supply remaining.  Next dose needed by: .  Patient has started any new medications, reports no missed doses, and denies new side effects.   Patient is taking medication as prescribed with no changes in direction. Patient did not have any concerns or questions at this time.  & address verified.    Dosing, how takin tabs in the morning and evening.  Storage: room temp  Handling: uses gloves    Side effects: Previously discussed feet swelling/pain and mouth soreness. She was able to  all of her medications from [facility]. Her belly/feet swelling has gotten better since she went back on her lasix. She uses the eucerin cream, but wants to try icy hot on her feet - advised her this could potentially be more irritating to the skin and not to use on broken skin.     For her mouth soreness (which has been affecting her ability to eat), she was prescribed magic mouthwash, which she picked up today after her appts. Reviewed directions: use dosing cup with 10 mL line, swish for about 30 seconds then spit up to 6 times/day as needed. Advised to avoid eating/drinking right after medication to allow maximum effect.    Pain: Significant in feet. Takes oxycodone once per day every 2-3 days PRN  Appetite: has had trouble eating d/t mouth soreness, also takes cyproheptadine  Energy, fatigue: patient seems active - goes to her appts, cleans her house regularly  Health, mood, QOL: Patient was much more coherent and in much better spirits today.   ED/UC visits: none  Next clinical follow up: At next refill to discuss HFS and tongue soreness. She mentioned [facility] has not called her daughter - verified the number is [phone] (last time number seemed incorrect, but will try again at next refill)

## 2019-06-20 ENCOUNTER — TELEPHONE (OUTPATIENT)
Dept: HEMATOLOGY/ONCOLOGY | Facility: CLINIC | Age: 64
End: 2019-06-20

## 2019-06-20 NOTE — TELEPHONE ENCOUNTER
"----- Message from Judith Mahmood sent at 6/20/2019  9:43 AM CDT -----  Pt called and advised that she has been breaking out and has itchy rashes that are coving numerous parts of her body. She has a cream called 'Eucrin' and a medicated one that she received from another physician. Please contact her and advise is she's clear to use either of them or if the rashes are a concern requiring a emergency visit scheduled.  She can be reached at [phone].    "Thank you for all that you do for our patients'"  "

## 2019-06-21 NOTE — TELEPHONE ENCOUNTER
----- Message from Valeriy Weiss sent at 6/21/2019 12:13 PM CDT -----  Contact: pt   Pt would like a call back regarding rescheduling appointment     Pt can be reached \at [phone]

## 2019-06-28 ENCOUNTER — OFFICE VISIT (OUTPATIENT)
Dept: HEMATOLOGY/ONCOLOGY | Facility: CLINIC | Age: 64
End: 2019-06-28
Payer: MEDICAID

## 2019-06-28 ENCOUNTER — LAB VISIT (OUTPATIENT)
Dept: LAB | Facility: HOSPITAL | Age: 64
End: 2019-06-28
Payer: MEDICAID

## 2019-06-28 VITALS
HEART RATE: 78 BPM | HEIGHT: 59 IN | WEIGHT: 90.19 LBS | RESPIRATION RATE: 20 BRPM | BODY MASS INDEX: 18.18 KG/M2 | OXYGEN SATURATION: 96 % | DIASTOLIC BLOOD PRESSURE: 58 MMHG | SYSTOLIC BLOOD PRESSURE: 122 MMHG

## 2019-06-28 DIAGNOSIS — E43 SEVERE MALNUTRITION: ICD-10-CM

## 2019-06-28 DIAGNOSIS — F10.10 ALCOHOL ABUSE: ICD-10-CM

## 2019-06-28 DIAGNOSIS — G89.3 CANCER ASSOCIATED PAIN: ICD-10-CM

## 2019-06-28 DIAGNOSIS — C22.0 HCC (HEPATOCELLULAR CARCINOMA): ICD-10-CM

## 2019-06-28 DIAGNOSIS — D50.0 IRON DEFICIENCY ANEMIA DUE TO CHRONIC BLOOD LOSS: ICD-10-CM

## 2019-06-28 DIAGNOSIS — K21.9 GASTROESOPHAGEAL REFLUX DISEASE, ESOPHAGITIS PRESENCE NOT SPECIFIED: ICD-10-CM

## 2019-06-28 DIAGNOSIS — I85.10 ESOPHAGEAL VARICES IN ALCOHOLIC CIRRHOSIS: ICD-10-CM

## 2019-06-28 DIAGNOSIS — K70.30 ESOPHAGEAL VARICES IN ALCOHOLIC CIRRHOSIS: ICD-10-CM

## 2019-06-28 DIAGNOSIS — C22.0 HCC (HEPATOCELLULAR CARCINOMA): Primary | ICD-10-CM

## 2019-06-28 DIAGNOSIS — L27.1 HAND FOOT SYNDROME: ICD-10-CM

## 2019-06-28 DIAGNOSIS — K70.31 ALCOHOLIC CIRRHOSIS OF LIVER WITH ASCITES: ICD-10-CM

## 2019-06-28 DIAGNOSIS — E87.6 HYPOKALEMIA: ICD-10-CM

## 2019-06-28 LAB
ALBUMIN SERPL BCP-MCNC: 3 G/DL (ref 3.5–5.2)
ALP SERPL-CCNC: 142 U/L (ref 55–135)
ALT SERPL W/O P-5'-P-CCNC: 24 U/L (ref 10–44)
ANION GAP SERPL CALC-SCNC: 8 MMOL/L (ref 8–16)
AST SERPL-CCNC: 80 U/L (ref 10–40)
BASOPHILS # BLD AUTO: 0.04 K/UL (ref 0–0.2)
BASOPHILS NFR BLD: 1 % (ref 0–1.9)
BILIRUB SERPL-MCNC: 0.7 MG/DL (ref 0.1–1)
BUN SERPL-MCNC: 12 MG/DL (ref 8–23)
CALCIUM SERPL-MCNC: 9.3 MG/DL (ref 8.7–10.5)
CHLORIDE SERPL-SCNC: 105 MMOL/L (ref 95–110)
CO2 SERPL-SCNC: 25 MMOL/L (ref 23–29)
CREAT SERPL-MCNC: 1 MG/DL (ref 0.5–1.4)
DIFFERENTIAL METHOD: ABNORMAL
EOSINOPHIL # BLD AUTO: 0.2 K/UL (ref 0–0.5)
EOSINOPHIL NFR BLD: 5.9 % (ref 0–8)
ERYTHROCYTE [DISTWIDTH] IN BLOOD BY AUTOMATED COUNT: 26.1 % (ref 11.5–14.5)
EST. GFR  (AFRICAN AMERICAN): >60 ML/MIN/1.73 M^2
EST. GFR  (NON AFRICAN AMERICAN): >60 ML/MIN/1.73 M^2
FOLATE SERPL-MCNC: 6.4 NG/ML (ref 4–24)
GLUCOSE SERPL-MCNC: 87 MG/DL (ref 70–110)
HCT VFR BLD AUTO: 32.7 % (ref 37–48.5)
HGB BLD-MCNC: 9.9 G/DL (ref 12–16)
IMM GRANULOCYTES # BLD AUTO: 0.02 K/UL (ref 0–0.04)
IMM GRANULOCYTES NFR BLD AUTO: 0.5 % (ref 0–0.5)
LYMPHOCYTES # BLD AUTO: 1.3 K/UL (ref 1–4.8)
LYMPHOCYTES NFR BLD: 30.8 % (ref 18–48)
MAGNESIUM SERPL-MCNC: 2 MG/DL (ref 1.6–2.6)
MCH RBC QN AUTO: 20.6 PG (ref 27–31)
MCHC RBC AUTO-ENTMCNC: 30.3 G/DL (ref 32–36)
MCV RBC AUTO: 68 FL (ref 82–98)
MONOCYTES # BLD AUTO: 0.4 K/UL (ref 0.3–1)
MONOCYTES NFR BLD: 9.1 % (ref 4–15)
NEUTROPHILS # BLD AUTO: 2.1 K/UL (ref 1.8–7.7)
NEUTROPHILS NFR BLD: 52.7 % (ref 38–73)
NRBC BLD-RTO: 0 /100 WBC
PHOSPHATE SERPL-MCNC: 3.4 MG/DL (ref 2.7–4.5)
PLATELET # BLD AUTO: 161 K/UL (ref 150–350)
PMV BLD AUTO: ABNORMAL FL (ref 9.2–12.9)
POTASSIUM SERPL-SCNC: 4.6 MMOL/L (ref 3.5–5.1)
PROT SERPL-MCNC: 9.3 G/DL (ref 6–8.4)
RBC # BLD AUTO: 4.8 M/UL (ref 4–5.4)
SODIUM SERPL-SCNC: 138 MMOL/L (ref 136–145)
WBC # BLD AUTO: 4.06 K/UL (ref 3.9–12.7)

## 2019-06-28 PROCEDURE — 99999 PR PBB SHADOW E&M-EST. PATIENT-LVL IV: CPT | Mod: PBBFAC,,, | Performed by: STUDENT IN AN ORGANIZED HEALTH CARE EDUCATION/TRAINING PROGRAM

## 2019-06-28 PROCEDURE — 84100 ASSAY OF PHOSPHORUS: CPT

## 2019-06-28 PROCEDURE — 99214 PR OFFICE/OUTPT VISIT, EST, LEVL IV, 30-39 MIN: ICD-10-PCS | Mod: S$PBB,,, | Performed by: STUDENT IN AN ORGANIZED HEALTH CARE EDUCATION/TRAINING PROGRAM

## 2019-06-28 PROCEDURE — 99214 OFFICE O/P EST MOD 30 MIN: CPT | Mod: PBBFAC | Performed by: STUDENT IN AN ORGANIZED HEALTH CARE EDUCATION/TRAINING PROGRAM

## 2019-06-28 PROCEDURE — 80053 COMPREHEN METABOLIC PANEL: CPT

## 2019-06-28 PROCEDURE — 83735 ASSAY OF MAGNESIUM: CPT

## 2019-06-28 PROCEDURE — 36415 COLL VENOUS BLD VENIPUNCTURE: CPT

## 2019-06-28 PROCEDURE — 82746 ASSAY OF FOLIC ACID SERUM: CPT

## 2019-06-28 PROCEDURE — 85025 COMPLETE CBC W/AUTO DIFF WBC: CPT

## 2019-06-28 PROCEDURE — 99214 OFFICE O/P EST MOD 30 MIN: CPT | Mod: S$PBB,,, | Performed by: STUDENT IN AN ORGANIZED HEALTH CARE EDUCATION/TRAINING PROGRAM

## 2019-06-28 PROCEDURE — 99999 PR PBB SHADOW E&M-EST. PATIENT-LVL IV: ICD-10-PCS | Mod: PBBFAC,,, | Performed by: STUDENT IN AN ORGANIZED HEALTH CARE EDUCATION/TRAINING PROGRAM

## 2019-06-28 RX ORDER — HYDROCORTISONE 1 %
CREAM (GRAM) TOPICAL
Qty: 120 G | Refills: 1 | Status: SHIPPED | OUTPATIENT
Start: 2019-06-28 | End: 2019-08-16 | Stop reason: SDUPTHER

## 2019-06-28 NOTE — PROGRESS NOTES
PATIENT: Neena Marks  MRN: [id number]  DATE: 6/28/2019      Diagnosis:   1. HCC (hepatocellular carcinoma)    2. Alcoholic cirrhosis of liver with ascites    3. Iron deficiency anemia due to chronic blood loss    4. Severe malnutrition    5. Cancer associated pain    6. Hypokalemia    7. Esophageal varices in alcoholic cirrhosis    8. Gastroesophageal reflux disease, esophagitis presence not specified        Chief Complaint: No chief complaint on file.       Neena Marks is a 63 y.o. female with PMHx of alcoholic cirrhosis since 2015, Portal HTN, ascites, variceal bleeding, newly diagnosed with HCC now presented to oncology clinic for further evaluation.     Patient was a chronic alcoholic for the last 40 years and was diagnosed with alcoholic cirrhosis in 2015 however she continued to drink alcohol until January 2018 and has quit since then. She had history of upper GI bleeding with hematochezia in January 2018 s/p banding. She also had ascites since the last 1.5 to 2 years as is getting paracentesis q 2-3 months. She had no history of SBP in the past. She is currently on lasix and spironolactone for ascites and lower extremity edema.  She does not have jaundice but complaints of severe pruritis which is moderately controlled with hydroxyzine.      She currently has abdominal distention and abdominal discomfort.     CMP normal bilirubin and creatinine. Albumin of 2.8. Alk phos 138, AST 58   CBC- anemia with Hb of 8.7     AFP tumor marker is normal     Hep C and B testing on 4/27/2018- negative     Endoscopy- 1/9/19 - Grade II esophageal varices. Completely eradicated. Banded.                                  - Small hiatal hernia.                                  - Portal hypertensive gastropathy. Treated with argon plasma coagulation (APC).                            CT abdomen- 1/21/2019  1. Large well-circumscribed heterogeneously enhancing right hepatic lobe mass with characteristics consistent with HCC.   Several additional indeterminate subcentimeter hyperenhancing lesions throughout the liver which become isodense.  Mass compresses and narrows the IVC, cannot exclude tumor involvement.  2. Liver cirrhosis.  3. Moderate volume abdominopelvic ascites.  4. Small paraesophageal varices present.  5. Cholelithiasis.     Patient had an appointment on 2/15/2019, IR consult for Y90 but she missed her appointment.   - She had IR mapping and Hepatic angiography demonstrates a large hypervascular lesion in the right hepatic lobe supplied by branches of the right hepatic artery and accessory left hepatic artery.  Technetium MAA was injected aorta determined lung shunt fraction.  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  - Nuclear medicine scan revealed that majority of tracer goes to the lungs with a liver lung shunt fraction of 68.9%.     Follow up on 6/28/2019   is not a candidate for Y90 or other liver directed therapies.   Started taking Sorafenib 400mg BID from 5/9/2019.   Labs stable, mildly elevated LFts  Her abdominal pain is better. Abdominal distention is better.  Smokes 2 cig/day  B/L rash on her feet- multiple lesions on plantar surface from the last week, highly suggestive of at least grade 2 or grade 3 hand-foot skin reaction from sorafenib.   Patient also complained of mild diarrhea, about 1-2 episodes of loose stools per day.  Did not have a bowel movement today yet      Subjective:    Initial History: Ms. Marks is a 64 y.o. female who returns for follow up.     Past Medical History:   Past Medical History:   Diagnosis Date    Cirrhosis        Past Surgical HIstory:   Past Surgical History:   Procedure Laterality Date    EGD (ESOPHAGOGASTRODUODENOSCOPY) Left 1/9/2019    Performed [facility]O    EMBOLIZATION, YTTRIUM THERAPY N/A 4/10/2019     [facility]    ESOPHAGOGASTRODUODENOSCOPY (EGD) N/A 1/26/2018    Performed by [facility] at [facility] ENDO    PARACENTESIS N/A 1/30/2018    Performed by Everett Fitzpatrick MD at [facility] CATH LAB    PARACENTESIS N/A 2/6/2015    Performed by Alejandro Myrick MD at [facility] CATH LAB    PARACENTESIS, ABDOMINAL N/A 1/9/2019    Performed by Everett Fitzpatrick MD at [facility] CATH LAB       Family History: No family history on file.    Social History:  reports that she has been smoking cigarettes.  She has never used smokeless tobacco. She reports that she has current or past drug history. Drug: Cocaine. She reports that she does not drink alcohol.    Allergies:  Review of patient's allergies indicates:  No Known Allergies    Medications:  Current Outpatient Medications   Medication Sig Dispense Refill    (Magic mouthwash) 1:1:1 Benadryl 12.5mg/5ml liq, aluminum & magnesium hydroxide-simehticone (Maalox), lidocaine viscous 2% Swish and spit 10 mLs every 4 (four) hours as needed. for mouth sores 450 mL 5    cholecalciferol, vitamin D3, 50,000 unit Tab Take 125 mcg by mouth.      cyclobenzaprine (FLEXERIL) 10 MG tablet Take 10 mg by mouth nightly as needed for Muscle spasms.      cyproheptadine (PERIACTIN) 4 mg tablet Take 1 tablet (4 mg total) by mouth 3 (three) times daily as needed. 90 tablet 2    famotidine (PEPCID) 20 MG tablet Take 1 tablet (20 mg total) by mouth once daily. 30 tablet 0    ferrous sulfate (FEOSOL) 325 mg (65 mg iron) Tab tablet Take 1 tablet (325 mg total) by mouth 2 (two) times daily. 30 tablet 5    furosemide (LASIX) 40 MG tablet Take 1 tablet (40 mg total) by mouth once daily. 30 tablet 4    lactulose 10 gram/15 ml (CHRONULAC) 10 gram/15 mL (15 mL) solution Take 10 g by mouth 2 (two) times daily.      meloxicam (MOBIC) 15 MG tablet Take 15 mg by mouth nightly as needed for Pain.      multivitamin (ONE DAILY MULTIVITAMIN) per tablet  Take 1 tablet by mouth once daily.      oxyCODONE (ROXICODONE) 5 MG immediate release tablet Take 1 tablet (5 mg total) by mouth every 6 (six) hours as needed for Pain. 30 tablet 0    propranolol (INDERAL) 10 MG tablet Take 1 tablet (10 mg total) by mouth 2 (two) times daily. 60 tablet 2    SORAfenib (NEXAVAR) 200 mg tablet Take 2 tablets (400 mg total) by mouth 2 (two) times daily. 120 tablet 11    traZODone (DESYREL) 50 MG tablet TK 1 T PO  HS PRN INSOMNIA  2     No current facility-administered medications for this visit.        Review of Systems   Constitutional: Negative for chills, fever and unexpected weight change.   HENT: Negative for nosebleeds and sore throat.    Respiratory: Negative for cough and shortness of breath.    Cardiovascular: Negative for chest pain and leg swelling.   Gastrointestinal: Positive for diarrhea. Negative for abdominal pain, anal bleeding, nausea and vomiting.   Genitourinary: Negative for dysuria, flank pain, frequency and hematuria.   Musculoskeletal: Positive for gait problem. Negative for back pain.   Skin: Positive for rash.        B/L feet, plantar side- multiple skin lesions   Neurological: Negative for seizures, syncope and headaches.   Hematological: Negative for adenopathy.       ECOG Performance Status: 1   Objective:      Vitals: There were no vitals filed for this visit.  BMI: There is no height or weight on file to calculate BMI.    Physical Exam   Constitutional: She is oriented to person, place, and time. She appears well-developed and well-nourished.   HENT:   Head: Normocephalic and atraumatic.   Eyes: Pupils are equal, round, and reactive to light. EOM are normal. No scleral icterus.   Neck: Normal range of motion. Neck supple.   Cardiovascular: Normal rate and regular rhythm.   No murmur heard.  Pulmonary/Chest: Effort normal and breath sounds normal. No respiratory distress.   Abdominal: Soft. Bowel sounds are normal. She exhibits distension. There is  tenderness.   Musculoskeletal: Normal range of motion. She exhibits no edema.   Lymphadenopathy:     She has no cervical adenopathy.   Neurological: She is alert and oriented to person, place, and time.   Skin: Skin is warm. Rash noted.   Multiple skin lesions on plantar surface B/L feet   Psychiatric: She has a normal mood and affect.       Laboratory Data:  No visits with results within 1 Week(s) from this visit.   Latest known visit with results is:   Lab Visit on 06/14/2019   Component Date Value Ref Range Status    WBC 06/14/2019 3.69* 3.90 - 12.70 K/uL Final    RBC 06/14/2019 5.17  4.00 - 5.40 M/uL Final    Hemoglobin 06/14/2019 10.4* 12.0 - 16.0 g/dL Final    Hematocrit 06/14/2019 34.8* 37.0 - 48.5 % Final    Mean Corpuscular Volume 06/14/2019 67* 82 - 98 fL Final    Mean Corpuscular Hemoglobin 06/14/2019 20.1* 27.0 - 31.0 pg Final    Mean Corpuscular Hemoglobin Conc 06/14/2019 29.9* 32.0 - 36.0 g/dL Final    RDW 06/14/2019 26.8* 11.5 - 14.5 % Final    Platelets 06/14/2019 188  150 - 350 K/uL Final    MPV 06/14/2019 SEE COMMENT  9.2 - 12.9 fL Final    Result not available.    Immature Granulocytes 06/14/2019 0.3  0.0 - 0.5 % Final    Gran # (ANC) 06/14/2019 2.1  1.8 - 7.7 K/uL Final    Immature Grans (Abs) 06/14/2019 0.01  0.00 - 0.04 K/uL Final    Comment: Mild elevation in immature granulocytes is non specific and   can be seen in a variety of conditions including stress response,   acute inflammation, trauma and pregnancy. Correlation with other   laboratory and clinical findings is essential.      Lymph # 06/14/2019 1.1  1.0 - 4.8 K/uL Final    Mono # 06/14/2019 0.3  0.3 - 1.0 K/uL Final    Eos # 06/14/2019 0.1  0.0 - 0.5 K/uL Final    Baso # 06/14/2019 0.06  0.00 - 0.20 K/uL Final    nRBC 06/14/2019 0  0 /100 WBC Final    Gran% 06/14/2019 56.4  38.0 - 73.0 % Final    Lymph% 06/14/2019 30.1  18.0 - 48.0 % Final    Mono% 06/14/2019 8.9  4.0 - 15.0 % Final    Eosinophil% 06/14/2019 2.7   0.0 - 8.0 % Final    Basophil% 06/14/2019 1.6  0.0 - 1.9 % Final    Differential Method 06/14/2019 Automated   Final    Sodium 06/14/2019 137  136 - 145 mmol/L Final    Potassium 06/14/2019 4.4  3.5 - 5.1 mmol/L Final    Chloride 06/14/2019 104  95 - 110 mmol/L Final    CO2 06/14/2019 23  23 - 29 mmol/L Final    Glucose 06/14/2019 122* 70 - 110 mg/dL Final    BUN, Bld 06/14/2019 11  8 - 23 mg/dL Final    Creatinine 06/14/2019 1.0  0.5 - 1.4 mg/dL Final    Calcium 06/14/2019 9.3  8.7 - 10.5 mg/dL Final    Total Protein 06/14/2019 9.5* 6.0 - 8.4 g/dL Final    Albumin 06/14/2019 3.0* 3.5 - 5.2 g/dL Final    Total Bilirubin 06/14/2019 0.9  0.1 - 1.0 mg/dL Final    Comment: For infants and newborns, interpretation of results should be based  on gestational age, weight and in agreement with clinical  observations.  Premature Infant recommended reference ranges:  Up to 24 hours.............<8.0 mg/dL  Up to 48 hours............<12.0 mg/dL  3-5 days..................<15.0 mg/dL  6-29 days.................<15.0 mg/dL      Alkaline Phosphatase 06/14/2019 143* 55 - 135 U/L Final    AST 06/14/2019 76* 10 - 40 U/L Final    ALT 06/14/2019 25  10 - 44 U/L Final    Anion Gap 06/14/2019 10  8 - 16 mmol/L Final    eGFR if African American 06/14/2019 >60.0  >60 mL/min/1.73 m^2 Final    eGFR if non African American 06/14/2019 >60.0  >60 mL/min/1.73 m^2 Final    Comment: Calculation used to obtain the estimated glomerular filtration  rate (eGFR) is the CKD-EPI equation.       Magnesium 06/14/2019 2.3  1.6 - 2.6 mg/dL Final    AFP 06/14/2019 4.0  0.0 - 8.4 ng/mL Final    Prothrombin Time 06/14/2019 12.4  9.0 - 12.5 sec Final    INR 06/14/2019 1.2  0.8 - 1.2 Final    Comment: Coumadin Therapy:  2.0 - 3.0 for INR for all indicators except mechanical heart valves  and antiphospholipid syndromes which should use 2.5 - 3.5.           Imaging:     Endoscopy- 1/9/19 - Grade II esophageal varices. Completely eradicated.  Banded.                                  - Small hiatal hernia.                                  - Portal hypertensive gastropathy. Treated with argon plasma coagulation (APC).                            CT abdomen- 1/21/2019  1. Large well-circumscribed heterogeneously enhancing right hepatic lobe mass with characteristics consistent with HCC.  Several additional indeterminate subcentimeter hyperenhancing lesions throughout the liver which become isodense.  Mass compresses and narrows the IVC, cannot exclude tumor involvement.  2. Liver cirrhosis.  3. Moderate volume abdominopelvic ascites.  4. Small paraesophageal varices present.  5. Cholelithiasis.     CT Chest  1.  No evidence of metastatic disease in this patient with history of HCC.  There are no measurable lesions per RECIST criteria.    2.  Mild centrilobular emphysematous changes with upper lung zone predominance.     IR Embolization for Tumor Organ Ischemia  Hepatic angiography demonstrates a large hypervascular lesion in the right hepatic lobe supplied by branches of the right hepatic artery and accessory left hepatic artery..  Technetium MAA was injected aorta determined lung shunt fraction.  There was a large arterial portal shunt with hypervascularity extending into the IVC tumor thrombus.    Plan:    Patient will be sent to nuclear medicine for determination of lung shunt fraction.     Nuclear medicine scan  1.  The majority of tracer goes to the lungs with a liver lung shunt fraction of 68.9%.    2.  No other significant extrahepatic activity     ECHO- 5/24/19  · Increased (hyperdynamic) left ventricular systolic function. The estimated ejection fraction is 76%  · Grade I (mild) left ventricular diastolic dysfunction consistent with impaired relaxation.  · Mild left atrial enlargement     EKG  Normal sinus rhythm  Normal ECG  When compared with ECG of 08-JAN-2019 07:22,  No significant change was found  Assessment:       1. HCC (hepatocellular  carcinoma)    2. Alcoholic cirrhosis of liver with ascites    3. Iron deficiency anemia due to chronic blood loss    4. Severe malnutrition    5. Cancer associated pain    6. Hypokalemia    7. Esophageal varices in alcoholic cirrhosis    8. Gastroesophageal reflux disease, esophagitis presence not specified      She currently has abdominal distention and abdominal discomfort/pain   CMP normal bilirubin and creatinine. Albumin of 2.8. Alk phos 138, AST 58   CBC- anemia with Hb of 8.7     AFP tumor marker is normal     7 points- Child Class B     CT abdomen revealed Large well-circumscribed heterogeneously enhancing right hepatic lobe mass measuring 8.9 x 7.6 cm which demonstrates washout on venous and delayed phase imaging consistent with HCC.    - Several additional scattered subcentimeter foci of hyperenhancement without washout.    - Main and left portal vein are patent.    - Mass compresses and narrows the right portal vein.    - Mass compresses and significantly narrows the IVC, cannot exclude tumor involvement.     - IR Y90 mapping- Hepatic angiography demonstrates a large hypervascular lesion in the right hepatic lobe supplied by branches of the right hepatic artery and accessory left hepatic artery..  Technetium MAA was injected aorta determined lung shunt fraction.  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  - Nuclear medicine scan revealed that majority of tracer goes to the lungs with a liver lung shunt fraction of 68.9%.      is not a candidate for Y90 or other liver directed therapies.   Started taking Sorafenib from 5/9/2019. She took TID (6 pills of sorafenib/day) for 2 days and then took BID dosing since then. She said she just want to get medication in the system    She has at least grade 2- moderate foot skin reaction - skin changes include  hyperkeratosis, blisters seen on the plantar surface of her bilateral foot with pain slightly limiting her activities of daily living.    Dermatologic toxicity: Hand-foot skin reaction and rash (generally grades 1 or 2) are the most common drug-related adverse events, and typically appear within the first 6 weeks of treatment; usually managed with topical treatment, treatment delays, and/or dose reductions. Consider permanently discontinuing with severe or persistent dermatological toxicities. The risk for hand-foot skin reaction increased with cumulative doses of sorafenib (Azayasmeen 2009). Severe dermatologic toxicities, including Franco-Lauri syndrome (SJS) and toxic epidermal necrolysis (TEN), have been reported; may be life-threatening. Discontinue sorafenib for suspected SJS or TEN.    Dermatologic: Palmar-plantar erythrodysesthesia (21% to 69%; grade 3: 6% to 8%; grades 3/4: 19%), alopecia (14% to 67%), skin rash (including desquamation; 19% to 40%; grade 3: ?1%; grades 3/4: 5%), pruritus (14% to 20%), xeroderma (10% to 13%), erythema (?10%)    Gastrointestinal: Diarrhea (43% to 68%; grade 3: 2% to 10%; grade 4: <1%), increased serum lipase (40% to 41% [usually transient]), abdominal pain (11% to 31%), decreased appetite (30%), anorexia (16% to 29%), stomatitis (24%), nausea (21% to 24%), constipation (14% to 16%), vomiting (11% to 16%)       Plan:     1. Recommended to hold sorafenib for 1 week and restarted to lower dose of 200 mg b.i.d.  2. Recommended to use topical corticosteroids at the affected areas, scripts sent to her pharmacy.  Apply topical steroid twice daily to erythematous areas of grade 2 HFSR  3. Recommended to take Imodium for diarrhea  4. Patient is yet to be scheduled for a bone scan  5. Continue Pepcid for acid reflux  6.  Refilled oxycodone for pain  7.  Patient counseled and educated to avoid vigorous exercise/activities which may stress hands or feet. She should reduce exposure to hot  water (may exacerbate hand-foot symptoms); avoid constrictive footwear and excessive skin friction. Patients may also wear thick cotton gloves or socks and should wear shoes with padded insoles    Labs today- CBC, CMP, Mg, Phos, folate     Follow up in 2 weeks with labs     Plan of care discussed with Dr. Mariangel Roberts MD PGY-IV  Hematology and Oncology  Pager:[phone]    Distress Screening Results: Psychosocial Distress screening score of Distress Score: 0 noted and reviewed. No intervention indicated.

## 2019-06-28 NOTE — Clinical Note
Labs today- CBC, CMP, Mg, Phos, folateHold Sorafenib for 1 week, restart with lower dose of 200mg BIDSteroid [facility] diarrheaFollow up in 2 week with labs

## 2019-07-10 ENCOUNTER — TELEPHONE (OUTPATIENT)
Dept: TRANSPLANT | Facility: HOSPITAL | Age: 64
End: 2019-07-10

## 2019-07-10 NOTE — TELEPHONE ENCOUNTER
Called Ms. Marks, unable to reach her left voicemail.  I will be seeing her on Friday July 12, 2019 in the clinic.

## 2019-07-11 ENCOUNTER — TELEPHONE (OUTPATIENT)
Dept: TRANSPLANT | Facility: HOSPITAL | Age: 64
End: 2019-07-11

## 2019-07-11 ENCOUNTER — TELEPHONE (OUTPATIENT)
Dept: HEMATOLOGY/ONCOLOGY | Facility: CLINIC | Age: 64
End: 2019-07-11

## 2019-07-11 NOTE — TELEPHONE ENCOUNTER
----- Message from Daja Ellison sent at 7/11/2019  8:14 AM CDT -----  Contact: Pt  Type:  RX Refill Request    Who Called:Pt   Refill or New Rx: refill  RX Name and Strength: furosemide (LASIX) 40 MG tablet & propranolol (INDERAL) 10 MG tablet & oxyCODONE (ROXICODONE) 5 MG immediate release tablet  How is the patient currently taking it? (ex. 1XDay):   Is this a 30 day or 90 day RX:   Preferred Pharmacy with phone number:  [facility] [id number] - [place] [id number] AT [facility] [address]   [phone] (Phone)  [phone] (Fax)  Local or Mail Order: Local  Ordering Provider:   Best Call Back Number: [phone]  Additional Information:   Thank You   JOSE J Ellison

## 2019-07-11 NOTE — TELEPHONE ENCOUNTER
Contacted patient to confirm that clinic will be opening tomorrow. Clarified if patient still planning to attend appointment. Patient verified she will be able to make it. No further questions/concerns at this time. She expressed gratitude.

## 2019-07-12 ENCOUNTER — OFFICE VISIT (OUTPATIENT)
Dept: HEMATOLOGY/ONCOLOGY | Facility: CLINIC | Age: 64
End: 2019-07-12
Payer: MEDICAID

## 2019-07-12 ENCOUNTER — LAB VISIT (OUTPATIENT)
Dept: LAB | Facility: HOSPITAL | Age: 64
End: 2019-07-12
Payer: MEDICAID

## 2019-07-12 VITALS
HEART RATE: 81 BPM | DIASTOLIC BLOOD PRESSURE: 64 MMHG | HEIGHT: 59 IN | SYSTOLIC BLOOD PRESSURE: 133 MMHG | WEIGHT: 91.69 LBS | TEMPERATURE: 98 F | OXYGEN SATURATION: 99 % | BODY MASS INDEX: 18.48 KG/M2 | RESPIRATION RATE: 18 BRPM

## 2019-07-12 DIAGNOSIS — J01.90 ACUTE NON-RECURRENT SINUSITIS, UNSPECIFIED LOCATION: ICD-10-CM

## 2019-07-12 DIAGNOSIS — G89.3 CANCER ASSOCIATED PAIN: ICD-10-CM

## 2019-07-12 DIAGNOSIS — D50.0 IRON DEFICIENCY ANEMIA DUE TO CHRONIC BLOOD LOSS: ICD-10-CM

## 2019-07-12 DIAGNOSIS — L27.1 HAND FOOT SYNDROME: ICD-10-CM

## 2019-07-12 DIAGNOSIS — I85.10 ESOPHAGEAL VARICES IN ALCOHOLIC CIRRHOSIS: Chronic | ICD-10-CM

## 2019-07-12 DIAGNOSIS — C22.0 HCC (HEPATOCELLULAR CARCINOMA): ICD-10-CM

## 2019-07-12 DIAGNOSIS — C22.0 HCC (HEPATOCELLULAR CARCINOMA): Primary | ICD-10-CM

## 2019-07-12 DIAGNOSIS — K70.30 ESOPHAGEAL VARICES IN ALCOHOLIC CIRRHOSIS: Chronic | ICD-10-CM

## 2019-07-12 DIAGNOSIS — N17.9 AKI (ACUTE KIDNEY INJURY): ICD-10-CM

## 2019-07-12 DIAGNOSIS — K70.31 ALCOHOLIC CIRRHOSIS OF LIVER WITH ASCITES: ICD-10-CM

## 2019-07-12 DIAGNOSIS — E87.6 HYPOKALEMIA: ICD-10-CM

## 2019-07-12 DIAGNOSIS — E43 SEVERE MALNUTRITION: ICD-10-CM

## 2019-07-12 LAB
AFP SERPL-MCNC: 5.1 NG/ML (ref 0–8.4)
ALBUMIN SERPL BCP-MCNC: 3 G/DL (ref 3.5–5.2)
ALP SERPL-CCNC: 140 U/L (ref 55–135)
ALT SERPL W/O P-5'-P-CCNC: 26 U/L (ref 10–44)
ANION GAP SERPL CALC-SCNC: 11 MMOL/L (ref 8–16)
AST SERPL-CCNC: 81 U/L (ref 10–40)
BASOPHILS # BLD AUTO: 0.05 K/UL (ref 0–0.2)
BASOPHILS NFR BLD: 1.1 % (ref 0–1.9)
BILIRUB SERPL-MCNC: 0.6 MG/DL (ref 0.1–1)
BUN SERPL-MCNC: 10 MG/DL (ref 8–23)
CALCIUM SERPL-MCNC: 9.1 MG/DL (ref 8.7–10.5)
CHLORIDE SERPL-SCNC: 104 MMOL/L (ref 95–110)
CO2 SERPL-SCNC: 21 MMOL/L (ref 23–29)
CREAT SERPL-MCNC: 0.9 MG/DL (ref 0.5–1.4)
DIFFERENTIAL METHOD: ABNORMAL
EOSINOPHIL # BLD AUTO: 0.3 K/UL (ref 0–0.5)
EOSINOPHIL NFR BLD: 7.3 % (ref 0–8)
ERYTHROCYTE [DISTWIDTH] IN BLOOD BY AUTOMATED COUNT: 24.8 % (ref 11.5–14.5)
EST. GFR  (AFRICAN AMERICAN): >60 ML/MIN/1.73 M^2
EST. GFR  (NON AFRICAN AMERICAN): >60 ML/MIN/1.73 M^2
GLUCOSE SERPL-MCNC: 84 MG/DL (ref 70–110)
HCT VFR BLD AUTO: 34.1 % (ref 37–48.5)
HGB BLD-MCNC: 10.3 G/DL (ref 12–16)
IMM GRANULOCYTES # BLD AUTO: 0.02 K/UL (ref 0–0.04)
IMM GRANULOCYTES NFR BLD AUTO: 0.4 % (ref 0–0.5)
INR PPP: 1.2 (ref 0.8–1.2)
LYMPHOCYTES # BLD AUTO: 1.4 K/UL (ref 1–4.8)
LYMPHOCYTES NFR BLD: 31.8 % (ref 18–48)
MAGNESIUM SERPL-MCNC: 2 MG/DL (ref 1.6–2.6)
MCH RBC QN AUTO: 20.9 PG (ref 27–31)
MCHC RBC AUTO-ENTMCNC: 30.2 G/DL (ref 32–36)
MCV RBC AUTO: 69 FL (ref 82–98)
MONOCYTES # BLD AUTO: 0.4 K/UL (ref 0.3–1)
MONOCYTES NFR BLD: 9.6 % (ref 4–15)
NEUTROPHILS # BLD AUTO: 2.2 K/UL (ref 1.8–7.7)
NEUTROPHILS NFR BLD: 49.8 % (ref 38–73)
NRBC BLD-RTO: 0 /100 WBC
PHOSPHATE SERPL-MCNC: 3.5 MG/DL (ref 2.7–4.5)
PLATELET # BLD AUTO: 207 K/UL (ref 150–350)
PMV BLD AUTO: ABNORMAL FL (ref 9.2–12.9)
POTASSIUM SERPL-SCNC: 4.6 MMOL/L (ref 3.5–5.1)
PROT SERPL-MCNC: 9.5 G/DL (ref 6–8.4)
PROTHROMBIN TIME: 12.3 SEC (ref 9–12.5)
RBC # BLD AUTO: 4.94 M/UL (ref 4–5.4)
SODIUM SERPL-SCNC: 136 MMOL/L (ref 136–145)
WBC # BLD AUTO: 4.49 K/UL (ref 3.9–12.7)

## 2019-07-12 PROCEDURE — 99999 PR PBB SHADOW E&M-EST. PATIENT-LVL V: CPT | Mod: PBBFAC,,, | Performed by: STUDENT IN AN ORGANIZED HEALTH CARE EDUCATION/TRAINING PROGRAM

## 2019-07-12 PROCEDURE — 99999 PR PBB SHADOW E&M-EST. PATIENT-LVL V: ICD-10-PCS | Mod: PBBFAC,,, | Performed by: STUDENT IN AN ORGANIZED HEALTH CARE EDUCATION/TRAINING PROGRAM

## 2019-07-12 PROCEDURE — 36415 COLL VENOUS BLD VENIPUNCTURE: CPT

## 2019-07-12 PROCEDURE — 82105 ALPHA-FETOPROTEIN SERUM: CPT

## 2019-07-12 PROCEDURE — 83735 ASSAY OF MAGNESIUM: CPT

## 2019-07-12 PROCEDURE — 85025 COMPLETE CBC W/AUTO DIFF WBC: CPT

## 2019-07-12 PROCEDURE — 99214 PR OFFICE/OUTPT VISIT, EST, LEVL IV, 30-39 MIN: ICD-10-PCS | Mod: S$PBB,,, | Performed by: STUDENT IN AN ORGANIZED HEALTH CARE EDUCATION/TRAINING PROGRAM

## 2019-07-12 PROCEDURE — 80053 COMPREHEN METABOLIC PANEL: CPT

## 2019-07-12 PROCEDURE — 99215 OFFICE O/P EST HI 40 MIN: CPT | Mod: PBBFAC | Performed by: STUDENT IN AN ORGANIZED HEALTH CARE EDUCATION/TRAINING PROGRAM

## 2019-07-12 PROCEDURE — 85610 PROTHROMBIN TIME: CPT

## 2019-07-12 PROCEDURE — 99214 OFFICE O/P EST MOD 30 MIN: CPT | Mod: S$PBB,,, | Performed by: STUDENT IN AN ORGANIZED HEALTH CARE EDUCATION/TRAINING PROGRAM

## 2019-07-12 PROCEDURE — 84100 ASSAY OF PHOSPHORUS: CPT

## 2019-07-12 RX ORDER — AZITHROMYCIN 250 MG/1
TABLET, FILM COATED ORAL
Qty: 6 TABLET | Refills: 0 | Status: SHIPPED | OUTPATIENT
Start: 2019-07-12 | End: 2019-07-17

## 2019-07-12 RX ORDER — PROPRANOLOL HYDROCHLORIDE 10 MG/1
10 TABLET ORAL 2 TIMES DAILY
Qty: 60 TABLET | Refills: 2 | Status: ON HOLD | OUTPATIENT
Start: 2019-07-12 | End: 2019-12-05 | Stop reason: HOSPADM

## 2019-07-12 RX ORDER — FUROSEMIDE 40 MG/1
40 TABLET ORAL DAILY
Qty: 30 TABLET | Refills: 5 | Status: SHIPPED | OUTPATIENT
Start: 2019-07-12 | End: 2019-11-06 | Stop reason: SDUPTHER

## 2019-07-12 RX ORDER — OXYCODONE HYDROCHLORIDE 5 MG/1
5 TABLET ORAL EVERY 6 HOURS PRN
Qty: 60 TABLET | Refills: 0 | Status: SHIPPED | OUTPATIENT
Start: 2019-07-12 | End: 2019-08-16 | Stop reason: SDUPTHER

## 2019-07-12 NOTE — TELEPHONE ENCOUNTER
Called Ms. Marks, unable to reach her, left voicemail.  She has Lasix 40 mg oral daily tablets with 4 refills and propanolol 10 mg tablets b.i.d. with 2 more refills.  Oxycodone was refilled in the last visit.  I will be seeing her tomorrow morning on July 11, 2019 for a follow-up visit.

## 2019-07-12 NOTE — PROGRESS NOTES
PATIENT: Neena Marks  MRN: [id number]  DATE: 7/12/2019      Diagnosis:   1. HCC (hepatocellular carcinoma)    2. Hand foot syndrome    3. Cancer associated pain    4. Iron deficiency anemia due to chronic blood loss    5. Severe malnutrition    6. Hypokalemia    7. Alcoholic cirrhosis of liver with ascites    8. PRACHI (acute kidney injury)        Chief Complaint: HCC (hepatocellular carcinoma)    Neena Marks is a 63 y.o. female with PMHx of alcoholic cirrhosis since 2015, Portal HTN, ascites, variceal bleeding, newly diagnosed with HCC now presented to oncology clinic for further evaluation.     Patient was a chronic alcoholic for the last 40 years and was diagnosed with alcoholic cirrhosis in 2015 however she continued to drink alcohol until January 2018 and has quit since then. She had history of upper GI bleeding with hematochezia in January 2018 s/p banding. She also had ascites since the last 1.5 to 2 years as is getting paracentesis q 2-3 months. She had no history of SBP in the past. She is currently on lasix and spironolactone for ascites and lower extremity edema.  She does not have jaundice but complaints of severe pruritis which is moderately controlled with hydroxyzine.      She currently has abdominal distention and abdominal discomfort.     CMP normal bilirubin and creatinine. Albumin of 2.8. Alk phos 138, AST 58   CBC- anemia with Hb of 8.7     AFP tumor marker is normal     Hep C and B testing on 4/27/2018- negative     Endoscopy- 1/9/19 - Grade II esophageal varices. Completely eradicated. Banded.                                  - Small hiatal hernia.                                  - Portal hypertensive gastropathy. Treated with argon plasma coagulation (APC).                            CT abdomen- 1/21/2019  1. Large well-circumscribed heterogeneously enhancing right hepatic lobe mass with characteristics consistent with HCC.  Several additional indeterminate subcentimeter hyperenhancing lesions  throughout the liver which become isodense.  Mass compresses and narrows the IVC, cannot exclude tumor involvement.  2. Liver cirrhosis.  3. Moderate volume abdominopelvic ascites.  4. Small paraesophageal varices present.  5. Cholelithiasis.     Patient had an appointment on 2/15/2019, IR consult for Y90 but she missed her appointment.   - She had IR mapping and Hepatic angiography demonstrates a large hypervascular lesion in the right hepatic lobe supplied by branches of the right hepatic artery and accessory left hepatic artery.  Technetium MAA was injected aorta determined lung shunt fraction.  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  - Nuclear medicine scan revealed that majority of tracer goes to the lungs with a liver lung shunt fraction of 68.9%.     Follow up on 7/12/2019   is not a candidate for Y90 or other liver directed therapies.   Started taking Sorafenib 400mg BID from 5/9/2019.  Now cut down to 200 mg BID since 7/6/2019 due to hand foot syndrome  Labs stable, mildly elevated LFts  C/o abdominal pain Abdominal distention is better.  Smokes 2 cig/day  B/L rash on her feet- multiple lesions on plantar surface from the last week, highly suggestive of at least grade 2 or grade 3 hand-foot skin reaction from sorafenib.   Diarrhea improved      Subjective:    Initial History: Ms. Marks is a 64 y.o. female who returns for follow up.     Past Medical History:   Past Medical History:   Diagnosis Date    Cirrhosis        Past Surgical HIstory:   Past Surgical History:   Procedure Laterality Date    EGD (ESOPHAGOGASTRODUODENOSCOPY) Left 1/9/2019    Performed by Madyson Farrar MD at [facility] ENDO    EMBOLIZATION, YTTRIUM THERAPY N/A 4/10/2019    Performed by [facility] Diagnostic Provider at [facility] OR [address] [place] [address]    ESOPHAGOGASTRODUODENOSCOPY (EGD) N/A 1/26/2018    Performed  by Mark Urbina MD at [facility] ENDO    PARACENTESIS N/A 1/30/2018    Performed by Everett Fitzpatrick MD at [facility] CATH LAB    PARACENTESIS N/A 2/6/2015    Performed by Alejandro Myrick MD at [facility] CATH LAB    PARACENTESIS, ABDOMINAL N/A 1/9/2019    Performed by Everett Fitzpatrick MD at [facility] CATH LAB       Family History: History reviewed. No pertinent family history.    Social History:  reports that she has been smoking cigarettes.  She has never used smokeless tobacco. She reports that she has current or past drug history. Drug: Cocaine. She reports that she does not drink alcohol.    Allergies:  Review of patient's allergies indicates:  No Known Allergies    Medications:  Current Outpatient Medications   Medication Sig Dispense Refill    (Magic mouthwash) 1:1:1 Benadryl 12.5mg/5ml liq, aluminum & magnesium hydroxide-simehticone (Maalox), lidocaine viscous 2% Swish and spit 10 mLs every 4 (four) hours as needed. for mouth sores 450 mL 5    cholecalciferol, vitamin D3, 50,000 unit Tab Take 125 mcg by mouth.      cyclobenzaprine (FLEXERIL) 10 MG tablet Take 10 mg by mouth nightly as needed for Muscle spasms.      cyproheptadine (PERIACTIN) 4 mg tablet Take 1 tablet (4 mg total) by mouth 3 (three) times daily as needed. 90 tablet 2    famotidine (PEPCID) 20 MG tablet Take 1 tablet (20 mg total) by mouth once daily. 30 tablet 0    ferrous sulfate (FEOSOL) 325 mg (65 mg iron) Tab tablet Take 1 tablet (325 mg total) by mouth 2 (two) times daily. 30 tablet 5    furosemide (LASIX) 40 MG tablet Take 1 tablet (40 mg total) by mouth once daily. 30 tablet 4    hydrocortisone 1 % cream Apply to affected area 2 times daily 120 g 1    lactulose 10 gram/15 ml (CHRONULAC) 10 gram/15 mL (15 mL) solution Take 10 g by mouth 2 (two) times daily.      meloxicam (MOBIC) 15 MG tablet Take 15 mg by mouth nightly as needed for Pain.      multivitamin (ONE DAILY MULTIVITAMIN) per tablet Take 1 tablet by mouth once  "daily.      oxyCODONE (ROXICODONE) 5 MG immediate release tablet Take 1 tablet (5 mg total) by mouth every 6 (six) hours as needed for Pain. 60 tablet 0    propranolol (INDERAL) 10 MG tablet Take 1 tablet (10 mg total) by mouth 2 (two) times daily. 60 tablet 2    SORAfenib (NEXAVAR) 200 mg tablet Take 2 tablets (400 mg total) by mouth 2 (two) times daily. 120 tablet 11    traZODone (DESYREL) 50 MG tablet TK 1 T PO  HS PRN INSOMNIA  2     No current facility-administered medications for this visit.        Review of Systems   Constitutional: Negative for appetite change, chills, fatigue, fever and unexpected weight change.   HENT: Positive for postnasal drip and rhinorrhea. Negative for nosebleeds and trouble swallowing.    Respiratory: Positive for cough. Negative for shortness of breath.    Gastrointestinal: Positive for abdominal pain. Negative for blood in stool, diarrhea, nausea and vomiting.   Genitourinary: Negative for flank pain, frequency and hematuria.   Musculoskeletal: Negative for back pain and joint swelling.   Skin: Positive for rash.   Neurological: Negative for seizures, syncope and headaches.   Hematological: Negative for adenopathy.       ECOG Performance Status: 1   Objective:      Vitals:   Vitals:    07/12/19 0950   BP: 133/64   BP Location: Left arm   Patient Position: Sitting   BP Method: Large (Automatic)   Pulse: 81   Resp: 18   Temp: 98.2 °F (36.8 °C)   TempSrc: Oral   SpO2: 99%   Weight: 41.6 kg (91 lb 11.4 oz)   Height: 4' 11" (1.499 m)     BMI: Body mass index is 18.52 kg/m².    Physical Exam   Constitutional: She is oriented to person, place, and time. She appears well-developed. No distress.   Thin    HENT:   Head: Normocephalic and atraumatic.   Eyes: Pupils are equal, round, and reactive to light. EOM are normal. Right eye exhibits no discharge. Left eye exhibits no discharge.   Neck: Normal range of motion. Neck supple.   Cardiovascular: Normal rate and regular rhythm.   No " murmur heard.  Pulmonary/Chest: Effort normal and breath sounds normal. No respiratory distress.   Abdominal: Soft. Bowel sounds are normal. She exhibits no distension. There is no tenderness.   Musculoskeletal: Normal range of motion. She exhibits no edema, tenderness or deformity.   Lymphadenopathy:     She has no cervical adenopathy.   Neurological: She is alert and oriented to person, place, and time. No cranial nerve deficit.   Skin: Skin is warm. No rash noted. No pallor.   Psychiatric: She has a normal mood and affect.       Laboratory Data:  Lab Visit on 07/12/2019   Component Date Value Ref Range Status    WBC 07/12/2019 4.49  3.90 - 12.70 K/uL Final    RBC 07/12/2019 4.94  4.00 - 5.40 M/uL Final    Hemoglobin 07/12/2019 10.3* 12.0 - 16.0 g/dL Final    Hematocrit 07/12/2019 34.1* 37.0 - 48.5 % Final    Mean Corpuscular Volume 07/12/2019 69* 82 - 98 fL Final    Mean Corpuscular Hemoglobin 07/12/2019 20.9* 27.0 - 31.0 pg Final    Mean Corpuscular Hemoglobin Conc 07/12/2019 30.2* 32.0 - 36.0 g/dL Final    RDW 07/12/2019 24.8* 11.5 - 14.5 % Final    Platelets 07/12/2019 207  150 - 350 K/uL Final    MPV 07/12/2019 SEE COMMENT  9.2 - 12.9 fL Final    Result not available.    Immature Granulocytes 07/12/2019 0.4  0.0 - 0.5 % Final    Gran # (ANC) 07/12/2019 2.2  1.8 - 7.7 K/uL Final    Immature Grans (Abs) 07/12/2019 0.02  0.00 - 0.04 K/uL Final    Comment: Mild elevation in immature granulocytes is non specific and   can be seen in a variety of conditions including stress response,   acute inflammation, trauma and pregnancy. Correlation with other   laboratory and clinical findings is essential.      Lymph # 07/12/2019 1.4  1.0 - 4.8 K/uL Final    Mono # 07/12/2019 0.4  0.3 - 1.0 K/uL Final    Eos # 07/12/2019 0.3  0.0 - 0.5 K/uL Final    Baso # 07/12/2019 0.05  0.00 - 0.20 K/uL Final    nRBC 07/12/2019 0  0 /100 WBC Final    Gran% 07/12/2019 49.8  38.0 - 73.0 % Final    Lymph% 07/12/2019 31.8   18.0 - 48.0 % Final    Mono% 07/12/2019 9.6  4.0 - 15.0 % Final    Eosinophil% 07/12/2019 7.3  0.0 - 8.0 % Final    Basophil% 07/12/2019 1.1  0.0 - 1.9 % Final    Differential Method 07/12/2019 Automated   Final    Prothrombin Time 07/12/2019 12.3  9.0 - 12.5 sec Final    INR 07/12/2019 1.2  0.8 - 1.2 Final    Comment: Coumadin Therapy:  2.0 - 3.0 for INR for all indicators except mechanical heart valves  and antiphospholipid syndromes which should use 2.5 - 3.5.           Imaging:     Endoscopy- 1/9/19 - Grade II esophageal varices. Completely eradicated. Banded.                                  - Small hiatal hernia.                                  - Portal hypertensive gastropathy. Treated with argon plasma coagulation (APC).                            CT abdomen- 1/21/2019  1. Large well-circumscribed heterogeneously enhancing right hepatic lobe mass with characteristics consistent with HCC.  Several additional indeterminate subcentimeter hyperenhancing lesions throughout the liver which become isodense.  Mass compresses and narrows the IVC, cannot exclude tumor involvement.  2. Liver cirrhosis.  3. Moderate volume abdominopelvic ascites.  4. Small paraesophageal varices present.  5. Cholelithiasis.     CT Chest  1.  No evidence of metastatic disease in this patient with history of HCC.  There are no measurable lesions per RECIST criteria.    2.  Mild centrilobular emphysematous changes with upper lung zone predominance.     IR Embolization for Tumor Organ Ischemia  Hepatic angiography demonstrates a large hypervascular lesion in the right hepatic lobe supplied by branches of the right hepatic artery and accessory left hepatic artery..  Technetium MAA was injected aorta determined lung shunt fraction.  There was a large arterial portal shunt with hypervascularity extending into the IVC tumor thrombus.    Plan:    Patient will be sent to nuclear medicine for determination of lung shunt  fraction.     Nuclear medicine scan  1.  The majority of tracer goes to the lungs with a liver lung shunt fraction of 68.9%.    2.  No other significant extrahepatic activity     ECHO- 5/24/19  · Increased (hyperdynamic) left ventricular systolic function. The estimated ejection fraction is 76%  · Grade I (mild) left ventricular diastolic dysfunction consistent with impaired relaxation.  · Mild left atrial enlargement     EKG  Normal sinus rhythm  Normal ECG  When compared with ECG of 08-JAN-2019 07:22,  No significant change was found    Assessment:       1. HCC (hepatocellular carcinoma)    2. Hand foot syndrome    3. Cancer associated pain    4. Iron deficiency anemia due to chronic blood loss    5. Severe malnutrition    6. Hypokalemia    7. Alcoholic cirrhosis of liver with ascites    8. PRACHI (acute kidney injury)      She currently has abdominal distention and abdominal discomfort/pain   CMP normal bilirubin and creatinine. Albumin of 2.8. Alk phos 138, AST 58   CBC- anemia with Hb of 8.7     AFP tumor marker is normal     7 points- Child Class B     CT abdomen revealed Large well-circumscribed heterogeneously enhancing right hepatic lobe mass measuring 8.9 x 7.6 cm which demonstrates washout on venous and delayed phase imaging consistent with HCC.    - Several additional scattered subcentimeter foci of hyperenhancement without washout.    - Main and left portal vein are patent.    - Mass compresses and narrows the right portal vein.    - Mass compresses and significantly narrows the IVC, cannot exclude tumor involvement.     - IR Y90 mapping- Hepatic angiography demonstrates a large hypervascular lesion in the right hepatic lobe supplied by branches of the right hepatic artery and accessory left hepatic artery..  Technetium MAA was injected aorta determined lung shunt fraction.  There was a large arterial portal shunt with hypervascularity extending into the IVC tumor thrombus and patient will be sent to  nuclear medicine for determination of lung shunt fraction.  - Nuclear medicine scan revealed that majority of tracer goes to the lungs with a liver lung shunt fraction of 68.9%.      is not a candidate for Y90 or other liver directed therapies.   Started taking Sorafenib from 5/9/2019. She took TID (6 pills of sorafenib/day) for 2 days and then took BID dosing since then. She said she just want to get medication in the system     She has at least grade 2- moderate foot skin reaction - skin changes include hyperkeratosis, blisters seen on the plantar surface of her bilateral foot with pain slightly limiting her activities of daily living.     Dermatologic toxicity: Hand-foot skin reaction and rash (generally grades 1 or 2) are the most common drug-related adverse events, and typically appear within the first 6 weeks of treatment; usually managed with topical treatment, treatment delays, and/or dose reductions. Consider permanently discontinuing with severe or persistent dermatological toxicities. The risk for hand-foot skin reaction increased with cumulative doses of sorafenib (Sourav 2009). Severe dermatologic toxicities, including Franco-Lauri syndrome (SJS) and toxic epidermal necrolysis (TEN), have been reported; may be life-threatening. Discontinue sorafenib for suspected SJS or TEN.     Dermatologic: Palmar-plantar erythrodysesthesia (21% to 69%; grade 3: 6% to 8%; grades 3/4: 19%), alopecia (14% to 67%), skin rash (including desquamation; 19% to 40%; grade 3: ?1%; grades 3/4: 5%), pruritus (14% to 20%), xeroderma (10% to 13%), erythema (?10%)     Gastrointestinal: Diarrhea (43% to 68%; grade 3: 2% to 10%; grade 4: <1%), increased serum lipase (40% to 41% [usually transient]), abdominal pain (11% to 31%), decreased appetite (30%), anorexia (16% to 29%), stomatitis (24%), nausea (21% to 24%), constipation (14% to 16%), vomiting (11% to 16%)     Plan:     1. Recommended to continue sorafenib at lower dose  of 200 mg b.i.d. Hand foot skin reaction is improving, less pain and good mobility  2. Recommended to use topical corticosteroids at the affected areas.  Apply topical steroid twice daily to erythematous areas of grade 2 HFSR  3. Recommended to take PRN Imodium for diarrhea  4. Schedule for CT abdomen for HCC restaging on 7/18/2019  5. Scheduled for IV Injectafer #2 on 7/17/2019  6. Refilled oxycodone for pain- 7/12/2019  7. Patient counseled and educated to avoid vigorous exercise/activities which may stress hands or feet. She should reduce exposure to hot water (may exacerbate hand-foot symptoms); avoid constrictive footwear and excessive skin friction. Patients may also wear thick cotton gloves or socks and should wear shoes with padded insoles  8. Lasix, propanolol and Azithromycin script sent to Pharmacy      Follow up in 1 month with labs- CBC, CMP, Mag and phos     Plan of care discussed with Dr. Dewey Roberts MD PGY-5  Hematology and Oncology  Pager:[phone]    Distress Screening Results: Psychosocial Distress screening score of Distress Score: 0 noted and reviewed. No intervention indicated.

## 2019-07-12 NOTE — Clinical Note
Please schedule for CT abdomen for HCC  Restaging in 2-3 weeksFollow up in 1 month with labsOxycodone script gevenplease schedule for IV iron infusion which she missed on 6/28Lasix, propanolol and Azithromycin script sent to Pharmacy

## 2019-07-15 ENCOUNTER — TELEPHONE (OUTPATIENT)
Dept: PHARMACY | Facility: CLINIC | Age: 64
End: 2019-07-15

## 2019-07-15 NOTE — TELEPHONE ENCOUNTER
LVM for the patient in regards to Nexavar. Notes indicate she held Nexavar from 7/1 to 7/8 d/t HF syndrome and resumed at 50% of dose. Patient likely has several weeks on hand. Please confirm the patient is only taking Nexavar 200mg - 1 tab BID and f/u regarding HF syndrome; she has previously had confusion with dosing regimen.

## 2019-07-15 NOTE — TELEPHONE ENCOUNTER
"Spoke with Ms. Marks. She confirms she restarted Nexavar at 200 mg (1 tab) BID on 7/7. She has #90 tabs on hand. She reports her feet feel "much better" and she can now walk mostly normally. The magic mouthwash has also helped her mouth soreness. She was having some pain and had run out of pain medication (tramadol?) so she was taking OTC aleve - advised patient to DC aleve. She recently picked up oxycodone, which has been helping with her pain.  "

## 2019-08-07 ENCOUNTER — TELEPHONE (OUTPATIENT)
Dept: HEMATOLOGY/ONCOLOGY | Facility: CLINIC | Age: 64
End: 2019-08-07

## 2019-08-07 NOTE — TELEPHONE ENCOUNTER
----- Message from Valeriy Weiss sent at 8/7/2019 12:27 PM CDT -----  Contact: pt   Pt would like a call back regarding rescheduling appointment    Pt can be reached at  [phone]

## 2019-08-08 ENCOUNTER — TELEPHONE (OUTPATIENT)
Dept: HEMATOLOGY/ONCOLOGY | Facility: CLINIC | Age: 64
End: 2019-08-08

## 2019-08-08 NOTE — TELEPHONE ENCOUNTER
----- Message from Pato Donohue RN sent at 8/8/2019  3:30 PM CDT -----  Contact: self, [phone] (M)  Looks like you were trying to reach her  ----- Message -----  From: Suad Plata  Sent: 8/7/2019   5:01 PM  To: Alejandra William Staff    Patient called in returning your call. Please advise.

## 2019-08-12 ENCOUNTER — TELEPHONE (OUTPATIENT)
Dept: PHARMACY | Facility: CLINIC | Age: 64
End: 2019-08-12

## 2019-08-12 NOTE — TELEPHONE ENCOUNTER
LVM for Nexavar follow up (on reduced dose of 200mg bid) and to see if her H-F syndrome has improved since being on reduced dose.

## 2019-08-12 NOTE — TELEPHONE ENCOUNTER
Nexavar follow up:  Spoke with Ms. Marks regarding her current Nexavar refill.  Ms. Marks had difficulty tolerating the full dose of Nexavar due to severe fatigue, loss of QOL and H-F syndrome.  She was reduced in early July 2019 to Nexavar 200mg tablet twice daily.      Dosing, how taking: She is currently taking 200mg twice daily at 7am and 7pm each day.  She does not recall missing any doses at the time and has plenty of medication on hand due to reduced dose.    Storage: Room temperature, with other medications out of reach of children and/or pets.     Handling: Ms. Marks confirms that she is careful not to touch the pills directly and uses the medication bottle cap as a dosing cup.    Side effects: She reports fatigue, however, its much better than when she was on the full dose.  Previously, she could not get out of the bed all day.  The medication made her feel as if she couldn't move.  Now she does feel tired, but she is able to complete daily tasks and run errands with fatigue causing minimal impact.  She dose have a reduced appetite and feels that she has lost about 25-30 lbs overall since beginning treatment with Nexavar.  She continues to eat but only reports eating small amounts like a sandwich twice daily.  She used Periactin in the past to help with her appetite but does not feel that it works.  She does not complain of any diarrhea, and her H-F syndrome seems to have resolved completely.  She will have occasional pain in both of her great toes if she stands too long, but nothing extremely bothersome.      Pain: Her biggest complaint is from stomach pain that is resolved with doses of oxycodone as needed.  Her most recent prescription was written on 7/12/19 for 60 pills, however, due to low inventory, only a 7 day supply was dispensed.  She has been without pain medication for about 3 weeks and feels like her pain is effecting her desire to eat.  When her stomach is in pain, she doesn't feel like eating.   She has multiple liver lesions and pain in her abdomen is a chronic problem they seem to contribute to her disease.      Next clinical follow up: Will follow up with patient in 2 months due to her poor appetite and pain control issues.  Will reach out to provider to see if they will authorize another fill on her pain medication.

## 2019-08-15 DIAGNOSIS — G89.3 CANCER ASSOCIATED PAIN: ICD-10-CM

## 2019-08-16 ENCOUNTER — LAB VISIT (OUTPATIENT)
Dept: LAB | Facility: HOSPITAL | Age: 64
End: 2019-08-16
Attending: STUDENT IN AN ORGANIZED HEALTH CARE EDUCATION/TRAINING PROGRAM
Payer: MEDICAID

## 2019-08-16 ENCOUNTER — OFFICE VISIT (OUTPATIENT)
Dept: HEMATOLOGY/ONCOLOGY | Facility: CLINIC | Age: 64
End: 2019-08-16
Payer: MEDICAID

## 2019-08-16 VITALS
SYSTOLIC BLOOD PRESSURE: 132 MMHG | OXYGEN SATURATION: 99 % | BODY MASS INDEX: 17.87 KG/M2 | HEIGHT: 59 IN | TEMPERATURE: 98 F | HEART RATE: 82 BPM | WEIGHT: 88.63 LBS | DIASTOLIC BLOOD PRESSURE: 60 MMHG | RESPIRATION RATE: 20 BRPM

## 2019-08-16 DIAGNOSIS — K70.31 ALCOHOLIC CIRRHOSIS OF LIVER WITH ASCITES: ICD-10-CM

## 2019-08-16 DIAGNOSIS — E87.6 HYPOKALEMIA: ICD-10-CM

## 2019-08-16 DIAGNOSIS — E43 SEVERE MALNUTRITION: ICD-10-CM

## 2019-08-16 DIAGNOSIS — L27.1 HAND FOOT SYNDROME: ICD-10-CM

## 2019-08-16 DIAGNOSIS — C22.0 HCC (HEPATOCELLULAR CARCINOMA): ICD-10-CM

## 2019-08-16 DIAGNOSIS — N17.9 AKI (ACUTE KIDNEY INJURY): ICD-10-CM

## 2019-08-16 DIAGNOSIS — I85.10 ESOPHAGEAL VARICES IN ALCOHOLIC CIRRHOSIS: ICD-10-CM

## 2019-08-16 DIAGNOSIS — G89.3 CANCER ASSOCIATED PAIN: ICD-10-CM

## 2019-08-16 DIAGNOSIS — G25.81 RESTLESS LEG SYNDROME: ICD-10-CM

## 2019-08-16 DIAGNOSIS — C22.0 HCC (HEPATOCELLULAR CARCINOMA): Primary | ICD-10-CM

## 2019-08-16 DIAGNOSIS — E83.42 HYPOMAGNESEMIA: ICD-10-CM

## 2019-08-16 DIAGNOSIS — K21.9 GASTROESOPHAGEAL REFLUX DISEASE, ESOPHAGITIS PRESENCE NOT SPECIFIED: ICD-10-CM

## 2019-08-16 DIAGNOSIS — D50.0 IRON DEFICIENCY ANEMIA DUE TO CHRONIC BLOOD LOSS: ICD-10-CM

## 2019-08-16 DIAGNOSIS — K70.30 ESOPHAGEAL VARICES IN ALCOHOLIC CIRRHOSIS: ICD-10-CM

## 2019-08-16 LAB
ALBUMIN SERPL BCP-MCNC: 2.8 G/DL (ref 3.5–5.2)
ALP SERPL-CCNC: 115 U/L (ref 55–135)
ALT SERPL W/O P-5'-P-CCNC: 17 U/L (ref 10–44)
ANION GAP SERPL CALC-SCNC: 10 MMOL/L (ref 8–16)
AST SERPL-CCNC: 50 U/L (ref 10–40)
BASOPHILS # BLD AUTO: 0.05 K/UL (ref 0–0.2)
BASOPHILS NFR BLD: 1 % (ref 0–1.9)
BILIRUB SERPL-MCNC: 1.4 MG/DL (ref 0.1–1)
BUN SERPL-MCNC: 12 MG/DL (ref 8–23)
CALCIUM SERPL-MCNC: 8.8 MG/DL (ref 8.7–10.5)
CHLORIDE SERPL-SCNC: 100 MMOL/L (ref 95–110)
CO2 SERPL-SCNC: 27 MMOL/L (ref 23–29)
CREAT SERPL-MCNC: 1.2 MG/DL (ref 0.5–1.4)
DIFFERENTIAL METHOD: ABNORMAL
EOSINOPHIL # BLD AUTO: 0.1 K/UL (ref 0–0.5)
EOSINOPHIL NFR BLD: 1.6 % (ref 0–8)
ERYTHROCYTE [DISTWIDTH] IN BLOOD BY AUTOMATED COUNT: 20.8 % (ref 11.5–14.5)
EST. GFR  (AFRICAN AMERICAN): 56 ML/MIN/1.73 M^2
EST. GFR  (NON AFRICAN AMERICAN): 48.6 ML/MIN/1.73 M^2
GLUCOSE SERPL-MCNC: 109 MG/DL (ref 70–110)
HCT VFR BLD AUTO: 34.5 % (ref 37–48.5)
HGB BLD-MCNC: 10.8 G/DL (ref 12–16)
IMM GRANULOCYTES # BLD AUTO: 0.02 K/UL (ref 0–0.04)
IMM GRANULOCYTES NFR BLD AUTO: 0.4 % (ref 0–0.5)
LYMPHOCYTES # BLD AUTO: 1.6 K/UL (ref 1–4.8)
LYMPHOCYTES NFR BLD: 31.6 % (ref 18–48)
MAGNESIUM SERPL-MCNC: 1.4 MG/DL (ref 1.6–2.6)
MCH RBC QN AUTO: 20.9 PG (ref 27–31)
MCHC RBC AUTO-ENTMCNC: 31.3 G/DL (ref 32–36)
MCV RBC AUTO: 67 FL (ref 82–98)
MONOCYTES # BLD AUTO: 0.5 K/UL (ref 0.3–1)
MONOCYTES NFR BLD: 9.6 % (ref 4–15)
NEUTROPHILS # BLD AUTO: 2.8 K/UL (ref 1.8–7.7)
NEUTROPHILS NFR BLD: 55.8 % (ref 38–73)
NRBC BLD-RTO: 0 /100 WBC
PHOSPHATE SERPL-MCNC: 2.5 MG/DL (ref 2.7–4.5)
PLATELET # BLD AUTO: 181 K/UL (ref 150–350)
PMV BLD AUTO: ABNORMAL FL (ref 9.2–12.9)
POTASSIUM SERPL-SCNC: 3.6 MMOL/L (ref 3.5–5.1)
PROT SERPL-MCNC: 8.9 G/DL (ref 6–8.4)
RBC # BLD AUTO: 5.16 M/UL (ref 4–5.4)
SODIUM SERPL-SCNC: 137 MMOL/L (ref 136–145)
WBC # BLD AUTO: 5.09 K/UL (ref 3.9–12.7)

## 2019-08-16 PROCEDURE — 99214 OFFICE O/P EST MOD 30 MIN: CPT | Mod: PBBFAC | Performed by: STUDENT IN AN ORGANIZED HEALTH CARE EDUCATION/TRAINING PROGRAM

## 2019-08-16 PROCEDURE — 99214 PR OFFICE/OUTPT VISIT, EST, LEVL IV, 30-39 MIN: ICD-10-PCS | Mod: S$PBB,,, | Performed by: STUDENT IN AN ORGANIZED HEALTH CARE EDUCATION/TRAINING PROGRAM

## 2019-08-16 PROCEDURE — 99999 PR PBB SHADOW E&M-EST. PATIENT-LVL IV: CPT | Mod: PBBFAC,,, | Performed by: STUDENT IN AN ORGANIZED HEALTH CARE EDUCATION/TRAINING PROGRAM

## 2019-08-16 PROCEDURE — 85025 COMPLETE CBC W/AUTO DIFF WBC: CPT

## 2019-08-16 PROCEDURE — 99214 OFFICE O/P EST MOD 30 MIN: CPT | Mod: S$PBB,,, | Performed by: STUDENT IN AN ORGANIZED HEALTH CARE EDUCATION/TRAINING PROGRAM

## 2019-08-16 PROCEDURE — 80053 COMPREHEN METABOLIC PANEL: CPT

## 2019-08-16 PROCEDURE — 83735 ASSAY OF MAGNESIUM: CPT

## 2019-08-16 PROCEDURE — 84100 ASSAY OF PHOSPHORUS: CPT

## 2019-08-16 PROCEDURE — 36415 COLL VENOUS BLD VENIPUNCTURE: CPT

## 2019-08-16 PROCEDURE — 99999 PR PBB SHADOW E&M-EST. PATIENT-LVL IV: ICD-10-PCS | Mod: PBBFAC,,, | Performed by: STUDENT IN AN ORGANIZED HEALTH CARE EDUCATION/TRAINING PROGRAM

## 2019-08-16 RX ORDER — OXYCODONE HYDROCHLORIDE 5 MG/1
5 TABLET ORAL EVERY 6 HOURS PRN
Qty: 60 TABLET | Refills: 0 | Status: SHIPPED | OUTPATIENT
Start: 2019-08-16 | End: 2019-10-21

## 2019-08-16 RX ORDER — HYDROCORTISONE 1 %
CREAM (GRAM) TOPICAL
Qty: 140 G | Refills: 1 | Status: ON HOLD | OUTPATIENT
Start: 2019-08-16 | End: 2019-08-29 | Stop reason: SDUPTHER

## 2019-08-16 RX ORDER — LANOLIN ALCOHOL/MO/W.PET/CERES
400 CREAM (GRAM) TOPICAL DAILY
Qty: 200 TABLET | Refills: 1 | Status: SHIPPED | OUTPATIENT
Start: 2019-08-16 | End: 2019-11-08

## 2019-08-16 NOTE — PROGRESS NOTES
PATIENT: Neena Marks  MRN: [id number]  DATE: 8/16/2019      Diagnosis:   1. HCC (hepatocellular carcinoma)    2. Hand foot syndrome    3. Cancer associated pain    4. Iron deficiency anemia due to chronic blood loss    5. Severe malnutrition    6. Hypokalemia    7. Alcoholic cirrhosis of liver with ascites    8. PRACHI (acute kidney injury)    9. Esophageal varices in alcoholic cirrhosis    10. Gastroesophageal reflux disease, esophagitis presence not specified    11. Restless leg syndrome    12. Hypomagnesemia        Chief Complaint: Results (labs)    Neena Marks is a 63 y.o. female with PMHx of alcoholic cirrhosis since 2015, Portal HTN, ascites, variceal bleeding, newly diagnosed with HCC now presented to oncology clinic for further evaluation.     Patient was a chronic alcoholic for the last 40 years and was diagnosed with alcoholic cirrhosis in 2015 however she continued to drink alcohol until January 2018 and has quit since then. She had history of upper GI bleeding with hematochezia in January 2018 s/p banding. She also had ascites since the last 1.5 to 2 years as is getting paracentesis q 2-3 months. She had no history of SBP in the past. She is currently on lasix and spironolactone for ascites and lower extremity edema.  She does not have jaundice but complaints of severe pruritis which is moderately controlled with hydroxyzine.      She currently has abdominal distention and abdominal discomfort.     CMP normal bilirubin and creatinine. Albumin of 2.8. Alk phos 138, AST 58   CBC- anemia with Hb of 8.7     AFP tumor marker is normal     Hep C and B testing on 4/27/2018- negative     Endoscopy- 1/9/19 - Grade II esophageal varices. Completely eradicated. Banded, Small hiatal hernia., Portal hypertensive gastropathy. Treated with argon plasma coagulation (APC).     Patient had an appointment on 2/15/2019, IR consult for Y90 but she missed her appointment.   - She had IR mapping and Hepatic angiography  demonstrates a large hypervascular lesion in the right hepatic lobe supplied by branches of the right hepatic artery and accessory left hepatic artery.  Technetium MAA was injected aorta determined lung shunt fraction.  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  - Nuclear medicine scan revealed that majority of tracer goes to the lungs with a liver lung shunt fraction of 68.9%.     Follow up on 8/16/2019   is not a candidate for Y90 or other liver directed therapies.   Started taking Sorafenib 400mg BID from 5/9/2019.  Now cut down to 200 mg BID since 7/6/2019 due to hand foot syndrome  No show to IV injectafer X2 appointments and for staging CT chest/abdomen/pelvis  Labs pending  C/o abdominal pain Abdominal distention is better.  Smokes 2 cig/day  B/L rash on her feet- multiple lesions on plantar surface from the last week, highly suggestive of at least grade 2 or grade 3 hand-foot skin reaction from sorafenib.     Subjective:    Initial History: Ms. Marks is a 64 y.o. female who returns for follow up.     Past Medical History:   Past Medical History:   Diagnosis Date    Cirrhosis        Past Surgical HIstory:   Past Surgical History:   Procedure Laterality Date    EGD (ESOPHAGOGASTRODUODENOSCOPY) Left 1/9/2019    Performed by [facility]ar [facility] at [facility] ENDO    EMBOLIZATION, YTTRIUM THERAPY N/A 4/10/2019    [facility]    ESOPHAGOGASTRODUODENOSCOPY (EGD) N/A 1/26/2018    Performed by Mark Urbina MD at [facility] ENDO    PARACENTESIS N/A 1/30/2018    Performed by Everett Fitzpatrick MD at [facility] CATH LAB    PARACENTESIS N/A 2/6/2015    Performed by Alejandro Myrick MD at [facility] CATH LAB    PARACENTESIS, ABDOMINAL N/A 1/9/2019    Performed by Everett Fitzpatrick MD at [facility] CATH LAB       Family History: History  reviewed. No pertinent family history.    Social History:  reports that she has been smoking cigarettes.  She has never used smokeless tobacco. She reports that she has current or past drug history. Drug: Cocaine. She reports that she does not drink alcohol.    Allergies:  Review of patient's allergies indicates:  No Known Allergies    Medications:  Current Outpatient Medications   Medication Sig Dispense Refill    (Magic mouthwash) 1:1:1 Benadryl 12.5mg/5ml liq, aluminum & magnesium hydroxide-simehticone (Maalox), lidocaine viscous 2% Swish and spit 10 mLs every 4 (four) hours as needed. for mouth sores 450 mL 5    cholecalciferol, vitamin D3, 50,000 unit Tab Take 125 mcg by mouth.      cyclobenzaprine (FLEXERIL) 10 MG tablet Take 10 mg by mouth nightly as needed for Muscle spasms.      cyproheptadine (PERIACTIN) 4 mg tablet Take 1 tablet (4 mg total) by mouth 3 (three) times daily as needed. 90 tablet 2    famotidine (PEPCID) 20 MG tablet Take 1 tablet (20 mg total) by mouth once daily. 30 tablet 0    ferrous sulfate (FEOSOL) 325 mg (65 mg iron) Tab tablet Take 1 tablet (325 mg total) by mouth 2 (two) times daily. 30 tablet 5    furosemide (LASIX) 40 MG tablet Take 1 tablet (40 mg total) by mouth once daily. 30 tablet 5    hydrocortisone 1 % cream Apply to affected area 2 times daily 120 g 1    lactulose 10 gram/15 ml (CHRONULAC) 10 gram/15 mL (15 mL) solution Take 10 g by mouth 2 (two) times daily.      magnesium oxide (MAGOX) 400 mg (241.3 mg magnesium) tablet Take 1 tablet (400 mg total) by mouth once daily. 200 tablet 1    meloxicam (MOBIC) 15 MG tablet Take 15 mg by mouth nightly as needed for Pain.      multivitamin (ONE DAILY MULTIVITAMIN) per tablet Take 1 tablet by mouth once daily.      oxyCODONE (ROXICODONE) 5 MG immediate release tablet Take 1 tablet (5 mg total) by mouth every 6 (six) hours as needed for Pain. Greater than 1 week supply medically indicated 60 tablet 0    propranolol  "(INDERAL) 10 MG tablet Take 1 tablet (10 mg total) by mouth 2 (two) times daily. 60 tablet 2    SORAfenib (NEXAVAR) 200 mg tablet Take 2 tablets (400 mg total) by mouth 2 (two) times daily. 120 tablet 11    traZODone (DESYREL) 50 MG tablet TK 1 T PO  HS PRN INSOMNIA  2     No current facility-administered medications for this visit.        Review of Systems   Constitutional: Positive for appetite change and fatigue. Negative for activity change, chills, fever and unexpected weight change.   HENT: Negative for nosebleeds, sore throat and trouble swallowing.    Respiratory: Negative for choking and shortness of breath.    Cardiovascular: Negative for chest pain.   Gastrointestinal: Positive for abdominal pain. Negative for blood in stool, diarrhea, nausea and vomiting.   Genitourinary: Negative for dysuria, flank pain, frequency and hematuria.   Musculoskeletal: Negative for back pain, joint swelling and neck pain.   Skin: Positive for rash.   Neurological: Negative for dizziness, seizures, syncope and headaches.   Hematological: Negative for adenopathy. Does not bruise/bleed easily.   Psychiatric/Behavioral: Negative for agitation and behavioral problems.       ECOG Performance Status: 1   Objective:      Vitals:   Vitals:    08/16/19 1058   BP: 132/60   BP Location: Left arm   Patient Position: Sitting   BP Method: Medium (Automatic)   Pulse: 82   Resp: 20   Temp: 97.9 °F (36.6 °C)   TempSrc: Oral   SpO2: 99%   Weight: 40.2 kg (88 lb 10 oz)   Height: 4' 11" (1.499 m)     BMI: Body mass index is 17.9 kg/m².    Physical Exam   Constitutional: She is oriented to person, place, and time. She appears well-developed.   Small frail lady   HENT:   Head: Normocephalic and atraumatic.   Eyes: Pupils are equal, round, and reactive to light. EOM are normal. No scleral icterus.   Neck: Normal range of motion. Neck supple. No JVD present.   Cardiovascular: Normal rate and regular rhythm.   No murmur heard.  Pulmonary/Chest: " Effort normal and breath sounds normal. No respiratory distress.   Abdominal: Soft. Bowel sounds are normal. She exhibits distension. There is no tenderness.   Musculoskeletal: Normal range of motion. She exhibits no edema, tenderness or deformity.   Lymphadenopathy:     She has no cervical adenopathy.   Neurological: She is alert and oriented to person, place, and time. No cranial nerve deficit.   Skin: Skin is warm and dry. Capillary refill takes less than 2 seconds. Rash noted. No pallor.   B/L desquamating rash on her feet- multiple lesions on plantar surface slightly worse than before       Laboratory Data:  Lab Visit on 08/16/2019   Component Date Value Ref Range Status    WBC 08/16/2019 5.09  3.90 - 12.70 K/uL Final    RBC 08/16/2019 5.16  4.00 - 5.40 M/uL Final    Hemoglobin 08/16/2019 10.8* 12.0 - 16.0 g/dL Final    Hematocrit 08/16/2019 34.5* 37.0 - 48.5 % Final    Mean Corpuscular Volume 08/16/2019 67* 82 - 98 fL Final    Mean Corpuscular Hemoglobin 08/16/2019 20.9* 27.0 - 31.0 pg Final    Mean Corpuscular Hemoglobin Conc 08/16/2019 31.3* 32.0 - 36.0 g/dL Final    RDW 08/16/2019 20.8* 11.5 - 14.5 % Final    Platelets 08/16/2019 181  150 - 350 K/uL Final    MPV 08/16/2019 SEE COMMENT  9.2 - 12.9 fL Final    Result not available.    Immature Granulocytes 08/16/2019 0.4  0.0 - 0.5 % Final    Gran # (ANC) 08/16/2019 2.8  1.8 - 7.7 K/uL Final    Immature Grans (Abs) 08/16/2019 0.02  0.00 - 0.04 K/uL Final    Comment: Mild elevation in immature granulocytes is non specific and   can be seen in a variety of conditions including stress response,   acute inflammation, trauma and pregnancy. Correlation with other   laboratory and clinical findings is essential.      Lymph # 08/16/2019 1.6  1.0 - 4.8 K/uL Final    Mono # 08/16/2019 0.5  0.3 - 1.0 K/uL Final    Eos # 08/16/2019 0.1  0.0 - 0.5 K/uL Final    Baso # 08/16/2019 0.05  0.00 - 0.20 K/uL Final    nRBC 08/16/2019 0  0 /100 WBC Final     Gran% 08/16/2019 55.8  38.0 - 73.0 % Final    Lymph% 08/16/2019 31.6  18.0 - 48.0 % Final    Mono% 08/16/2019 9.6  4.0 - 15.0 % Final    Eosinophil% 08/16/2019 1.6  0.0 - 8.0 % Final    Basophil% 08/16/2019 1.0  0.0 - 1.9 % Final    Differential Method 08/16/2019 Automated   Final    Sodium 08/16/2019 137  136 - 145 mmol/L Final    Potassium 08/16/2019 3.6  3.5 - 5.1 mmol/L Final    Chloride 08/16/2019 100  95 - 110 mmol/L Final    CO2 08/16/2019 27  23 - 29 mmol/L Final    Glucose 08/16/2019 109  70 - 110 mg/dL Final    BUN, Bld 08/16/2019 12  8 - 23 mg/dL Final    Creatinine 08/16/2019 1.2  0.5 - 1.4 mg/dL Final    Calcium 08/16/2019 8.8  8.7 - 10.5 mg/dL Final    Total Protein 08/16/2019 8.9* 6.0 - 8.4 g/dL Final    Albumin 08/16/2019 2.8* 3.5 - 5.2 g/dL Final    Total Bilirubin 08/16/2019 1.4* 0.1 - 1.0 mg/dL Final    Comment: For infants and newborns, interpretation of results should be based  on gestational age, weight and in agreement with clinical  observations.  Premature Infant recommended reference ranges:  Up to 24 hours.............<8.0 mg/dL  Up to 48 hours............<12.0 mg/dL  3-5 days..................<15.0 mg/dL  6-29 days.................<15.0 mg/dL      Alkaline Phosphatase 08/16/2019 115  55 - 135 U/L Final    AST 08/16/2019 50* 10 - 40 U/L Final    ALT 08/16/2019 17  10 - 44 U/L Final    Anion Gap 08/16/2019 10  8 - 16 mmol/L Final    eGFR if  08/16/2019 56.0* >60 mL/min/1.73 m^2 Final    eGFR if non  08/16/2019 48.6* >60 mL/min/1.73 m^2 Final    Comment: Calculation used to obtain the estimated glomerular filtration  rate (eGFR) is the CKD-EPI equation.       Magnesium 08/16/2019 1.4* 1.6 - 2.6 mg/dL Final    Phosphorus 08/16/2019 2.5* 2.7 - 4.5 mg/dL Final         Imaging:       Endoscopy- 1/9/19 - Grade II esophageal varices. Completely eradicated. Banded.                                  - Small hiatal hernia.                                   - Portal hypertensive gastropathy. Treated with argon plasma coagulation (APC).                            CT abdomen- 1/21/2019  1. Large well-circumscribed heterogeneously enhancing right hepatic lobe mass with characteristics consistent with HCC.  Several additional indeterminate subcentimeter hyperenhancing lesions throughout the liver which become isodense.  Mass compresses and narrows the IVC, cannot exclude tumor involvement.  2. Liver cirrhosis.  3. Moderate volume abdominopelvic ascites.  4. Small paraesophageal varices present.  5. Cholelithiasis.     CT Chest  1.  No evidence of metastatic disease in this patient with history of HCC.  There are no measurable lesions per RECIST criteria.    2.  Mild centrilobular emphysematous changes with upper lung zone predominance.     IR Embolization for Tumor Organ Ischemia  Hepatic angiography demonstrates a large hypervascular lesion in the right hepatic lobe supplied by branches of the right hepatic artery and accessory left hepatic artery..  Technetium MAA was injected aorta determined lung shunt fraction.  There was a large arterial portal shunt with hypervascularity extending into the IVC tumor thrombus.    Plan:    Patient will be sent to nuclear medicine for determination of lung shunt fraction.     Nuclear medicine scan  1.  The majority of tracer goes to the lungs with a liver lung shunt fraction of 68.9%.    2.  No other significant extrahepatic activity     ECHO- 5/24/19  · Increased (hyperdynamic) left ventricular systolic function. The estimated ejection fraction is 76%  · Grade I (mild) left ventricular diastolic dysfunction consistent with impaired relaxation.  · Mild left atrial enlargement     EKG  Normal sinus rhythm  Normal ECG  When compared with ECG of 08-JAN-2019 07:22,  No significant change was found    Assessment:       1. HCC (hepatocellular carcinoma)    2. Hand foot syndrome    3. Cancer associated pain    4. Iron deficiency  anemia due to chronic blood loss    5. Severe malnutrition    6. Hypokalemia    7. Alcoholic cirrhosis of liver with ascites    8. PRACHI (acute kidney injury)    9. Esophageal varices in alcoholic cirrhosis    10. Gastroesophageal reflux disease, esophagitis presence not specified    11. Restless leg syndrome    12. Hypomagnesemia         She currently has abdominal distention and abdominal discomfort/pain   CMP normal bilirubin and creatinine. Albumin of 2.8. Alk phos 138, AST 58   CBC- anemia with Hb of 8.7     AFP tumor marker is normal     7 points- Child Class B     CT abdomen revealed Large well-circumscribed heterogeneously enhancing right hepatic lobe mass measuring 8.9 x 7.6 cm which demonstrates washout on venous and delayed phase imaging consistent with HCC.    - Several additional scattered subcentimeter foci of hyperenhancement without washout.    - Main and left portal vein are patent.    - Mass compresses and narrows the right portal vein.    - Mass compresses and significantly narrows the IVC, cannot exclude tumor involvement.     - IR Y90 mapping- Hepatic angiography demonstrates a large hypervascular lesion in the right hepatic lobe supplied by branches of the right hepatic artery and accessory left hepatic artery..  Technetium MAA was injected aorta determined lung shunt fraction.  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  - Nuclear medicine scan revealed that majority of tracer goes to the lungs with a liver lung shunt fraction of 68.9%.      is not a candidate for Y90 or other liver directed therapies.   Started taking Sorafenib from 5/9/2019. She took TID (6 pills of sorafenib/day) for 2 days and then took BID dosing since then. She said she just want to get medication in the system     She has at least grade 2- moderate foot skin reaction - skin  changes include hyperkeratosis, blisters seen on the plantar surface of her bilateral foot with pain slightly limiting her activities of daily living.    Hypomagnesemia- Magnesium 1.4        Plan:        1. Hold sorafenib for now. Hand foot skin reaction is mildly worse.   2. Recommended to use topical corticosteroids at the affected areas.  Apply topical steroid twice daily to erythematous areas of grade 2 HFSR. Hydrocortisone sent to pharmacy  3. Recommended to take PRN Imodium for diarrhea  4. Schedule for CT chest abdomen for HCC restaging  5. Mag oxide pills sent to pharmacy. Patient aware that Mag oxide can cause diarrhea  6. Refilled oxycodone for pain- 8/16/2019  7. Patient counseled and educated to avoid vigorous exercise/activities which may stress hands or feet. She should reduce exposure to hot water (may exacerbate hand-foot symptoms); avoid constrictive footwear and excessive skin friction. Patients may also wear thick cotton gloves or socks and should wear shoes with padded insoles    Follow up in 2 weeks with labs and Imaging- CT chest abdomen and pelvis     Plan of care discussed with Dr. Dewey Roberts MD PGY-5  Hematology and Oncology  Pager:[phone]

## 2019-08-20 NOTE — TELEPHONE ENCOUNTER
Called patient again today & left a voicemail requesting she returns our call to schedule BMP.   [phone]

## 2019-08-22 DIAGNOSIS — C22.0 HCC (HEPATOCELLULAR CARCINOMA): ICD-10-CM

## 2019-08-22 DIAGNOSIS — L27.1 HAND FOOT SYNDROME: ICD-10-CM

## 2019-08-22 RX ORDER — HYDROCORTISONE 1 %
CREAM (GRAM) TOPICAL
Qty: 140 G | Refills: 1 | Status: CANCELLED | OUTPATIENT
Start: 2019-08-22 | End: 2020-08-21

## 2019-08-22 NOTE — TELEPHONE ENCOUNTER
----- Message from Sandy Acosta sent at 8/22/2019 12:04 PM CDT -----  Contact: self [phone]  .Rx Refill/Request     Is this a Refill or New Rx: refill   Rx Name and Strength:  hydrocortisone 1 % cream       Preferred Pharmacy with phone number: [id number] - [place] [address] AT [facility] [address]  Communication Preference:phone  Additional Information:

## 2019-08-26 ENCOUNTER — HOSPITAL ENCOUNTER (EMERGENCY)
Facility: OTHER | Age: 64
Discharge: HOME | End: 2019-08-26
Attending: EMERGENCY MEDICINE
Payer: MEDICAID

## 2019-08-26 VITALS
HEART RATE: 90 BPM | WEIGHT: 90.63 LBS | RESPIRATION RATE: 18 BRPM | BODY MASS INDEX: 18.27 KG/M2 | HEIGHT: 59 IN | TEMPERATURE: 99 F | SYSTOLIC BLOOD PRESSURE: 120 MMHG | OXYGEN SATURATION: 99 % | DIASTOLIC BLOOD PRESSURE: 69 MMHG

## 2019-08-26 DIAGNOSIS — M79.671 FOOT PAIN, RIGHT: ICD-10-CM

## 2019-08-26 DIAGNOSIS — M79.673 FOOT PAIN: ICD-10-CM

## 2019-08-26 DIAGNOSIS — H61.22 IMPACTED CERUMEN OF LEFT EAR: Primary | ICD-10-CM

## 2019-08-26 PROCEDURE — 99283 EMERGENCY DEPT VISIT LOW MDM: CPT | Mod: 25

## 2019-08-26 NOTE — ED NOTES
Erythema R dorsal foot. Denies trauma, reports chemotherapy since august. Weight bearing, no deformity noted, DP and PT pulses intact. AAOx3, respirations even and unlabored.

## 2019-08-26 NOTE — ED PROVIDER NOTES
Encounter Date: 8/26/2019    SCRIBE #1 NOTE: Val KHAN, am scribing for, and in the presence of, Dr. Garner.       History     Chief Complaint   Patient presents with    Leg Swelling     R foot swelling x 1 wk. discoloration noted. deneis any injury.      Time seen by provider: 11:32 AM    This is a 64 y.o. female with hx of hepatocellular carcinoma and alcoholic cirrhosis who presents with complaint of right foot swelling and discoloration that began after applying cream one week ago. She also reports left ear fullness. This is the extent of the patient's complaints at this time.    The history is provided by the patient and medical records.     Review of patient's allergies indicates:  No Known Allergies  Past Medical History:   Diagnosis Date    Cirrhosis      Past Surgical History:   Procedure Laterality Date    EGD (ESOPHAGOGASTRODUODENOSCOPY) Left 1/9/2019    Performed by [facility]ar [facility] at [facility] ENDO    EMBOLIZATION, YTTRIUM THERAPY N/A 4/10/2019    Performed by [facility] Provider at [facility] OR [address] [place] [address]    ESOPHAGOGASTRODUODENOSCOPY (EGD) N/A 1/26/2018    Performed by Mark Urbina MD at [facility] ENDO    PARACENTESIS N/A 1/30/2018    Performed by Everett Fitzpatrick MD at [facility] CATH LAB    PARACENTESIS N/A 2/6/2015    Performed by Alejandro Myrick MD at [facility] CATH LAB    PARACENTESIS, ABDOMINAL N/A 1/9/2019    Performed by Everett Fitzpatrick MD at [facility] CATH LAB     History reviewed. No pertinent family history.  Social History     Tobacco Use    Smoking status: Current Every Day Smoker     Types: Cigarettes    Smokeless tobacco: Never Used    Tobacco comment: 2 cigarettes a day   Substance Use Topics    Alcohol use: No     Frequency: Never     Comment: Previously drank 1 pint a day    Drug use: Yes     Types: Cocaine     Review of Systems   Constitutional: Negative for fever.   HENT: Negative for ear discharge, ear pain and sore throat.         Positive for ear fullness.    Respiratory: Negative for shortness of breath.    Cardiovascular: Negative for chest pain.   Gastrointestinal: Negative for nausea.   Genitourinary: Negative for dysuria.   Musculoskeletal: Negative for back pain.        Positive for foot swelling.   Skin: Positive for color change. Negative for rash.   Neurological: Negative for weakness.   Hematological: Does not bruise/bleed easily.       Physical Exam     Initial Vitals [08/26/19 1014]   BP Pulse Resp Temp SpO2   120/69 90 18 98.8 °F (37.1 °C) 99 %      MAP       --         Physical Exam    Nursing note and vitals reviewed.  Constitutional: She appears well-developed and well-nourished. She is not diaphoretic. No distress.   HENT:   Head: Normocephalic and atraumatic.   Left ear with cerumen impaction. Normal right TM.   Eyes: Conjunctivae and EOM are normal. Pupils are equal, round, and reactive to light. No scleral icterus.   Neck: Normal range of motion. Neck supple.   Cardiovascular: Normal rate, regular rhythm and normal heart sounds. Exam reveals no gallop and no friction rub.    No murmur heard.  Strong dorsalis pedis pulse on right.   Pulmonary/Chest: Breath sounds normal. No respiratory distress. She has no wheezes. She has no rhonchi. She has no rales.   Musculoskeletal: Normal range of motion. She exhibits no edema or tenderness.   Right Foot: No edema. No tenderness.   Neurological: She is alert and oriented to person, place, and time.   Skin: Skin is warm and dry.   Right Foot: No erythema. Darkish skin discoloration.   Psychiatric: She has a normal mood and affect. Her behavior is normal. Judgment and thought content normal.         ED Course   Procedures  Labs Reviewed - No data to display       Imaging Results          X-Ray Foot Complete Right (Final result)  Result time 08/26/19 11:16:39    Final result by Everett Fitzpatrick MD (08/26/19 11:16:39)                 Impression:      No acute findings.      Electronically signed by: Everett  MD Amari  Date:    08/26/2019  Time:    11:16             Narrative:    EXAMINATION:  XR FOOT COMPLETE 3 VIEW RIGHT    CLINICAL HISTORY:  . Pain in unspecified foot    TECHNIQUE:  AP, lateral, and oblique views of the right foot were performed.    COMPARISON:  None    FINDINGS:  No evidence of an acute fracture or dislocation. No radiopaque foreign bodies. Joint spaces are relatively well maintained.                              X-Rays:   Independently Interpreted Readings:   Other Readings:  Right Foot: No fracture.    Medical Decision Making:   History:   Old Medical Records: I decided to obtain old medical records.  Initial Assessment:   Patient with skin discoloration after using cream. I doubt infection, spontaneous bruising, or other medical emergency. X-ray negative for fracture. Will discharge home.  Independently Interpreted Test(s):   I have ordered and independently interpreted X-rays - see prior notes.  Clinical Tests:   Radiological Study: Reviewed            Scribe Attestation:   Scribe #1: I performed the above scribed service and the documentation accurately describes the services I performed. I attest to the accuracy of the note.    Attending Attestation:           Physician Attestation for Scribe:  Physician Attestation Statement for Scribe #1: I, Dr. Garner, reviewed documentation, as scribed by Val Deleon in my presence, and it is both accurate and complete.                    Clinical Impression:     1. Impacted cerumen of left ear    2. Foot pain    3. Foot pain, right          Disposition:   Disposition: Discharged  Condition: Stable                        Greg Garner MD  08/26/19 [id number]

## 2019-08-28 ENCOUNTER — HOSPITAL ENCOUNTER (INPATIENT)
Facility: HOSPITAL | Age: 64
LOS: 1 days | Discharge: HOME | DRG: 176 | End: 2019-08-29
Attending: EMERGENCY MEDICINE | Admitting: INTERNAL MEDICINE
Payer: MEDICAID

## 2019-08-28 ENCOUNTER — HOSPITAL ENCOUNTER (OUTPATIENT)
Dept: RADIOLOGY | Facility: HOSPITAL | Age: 64
Discharge: HOME | DRG: 176 | End: 2019-08-28
Attending: STUDENT IN AN ORGANIZED HEALTH CARE EDUCATION/TRAINING PROGRAM
Payer: MEDICAID

## 2019-08-28 ENCOUNTER — TELEPHONE (OUTPATIENT)
Dept: HEMATOLOGY/ONCOLOGY | Facility: HOSPITAL | Age: 64
End: 2019-08-28

## 2019-08-28 DIAGNOSIS — I26.99 OTHER PULMONARY EMBOLISM WITHOUT ACUTE COR PULMONALE, UNSPECIFIED CHRONICITY: ICD-10-CM

## 2019-08-28 DIAGNOSIS — C22.0 HCC (HEPATOCELLULAR CARCINOMA): ICD-10-CM

## 2019-08-28 DIAGNOSIS — D50.0 IRON DEFICIENCY ANEMIA DUE TO CHRONIC BLOOD LOSS: ICD-10-CM

## 2019-08-28 DIAGNOSIS — K70.31 ASCITES DUE TO ALCOHOLIC CIRRHOSIS: ICD-10-CM

## 2019-08-28 DIAGNOSIS — I26.99 OTHER ACUTE PULMONARY EMBOLISM WITHOUT ACUTE COR PULMONALE: ICD-10-CM

## 2019-08-28 DIAGNOSIS — R16.0 LIVER MASS: ICD-10-CM

## 2019-08-28 DIAGNOSIS — I26.99 PULMONARY EMBOLISM: Primary | ICD-10-CM

## 2019-08-28 DIAGNOSIS — L27.1 HAND FOOT SYNDROME: ICD-10-CM

## 2019-08-28 DIAGNOSIS — K70.30 ALCOHOLIC CIRRHOSIS, UNSPECIFIED WHETHER ASCITES PRESENT: Chronic | ICD-10-CM

## 2019-08-28 LAB
ALBUMIN SERPL BCP-MCNC: 2.9 G/DL (ref 3.5–5.2)
ALP SERPL-CCNC: 102 U/L (ref 55–135)
ALT SERPL W/O P-5'-P-CCNC: 12 U/L (ref 10–44)
ANION GAP SERPL CALC-SCNC: 10 MMOL/L (ref 8–16)
APTT BLDCRRT: 26.2 SEC (ref 21–32)
AST SERPL-CCNC: 53 U/L (ref 10–40)
BASOPHILS # BLD AUTO: 0.02 K/UL (ref 0–0.2)
BASOPHILS NFR BLD: 0.5 % (ref 0–1.9)
BILIRUB SERPL-MCNC: 0.9 MG/DL (ref 0.1–1)
BUN SERPL-MCNC: 12 MG/DL (ref 8–23)
CALCIUM SERPL-MCNC: 9.1 MG/DL (ref 8.7–10.5)
CHLORIDE SERPL-SCNC: 101 MMOL/L (ref 95–110)
CO2 SERPL-SCNC: 24 MMOL/L (ref 23–29)
CREAT SERPL-MCNC: 1.1 MG/DL (ref 0.5–1.4)
DIFFERENTIAL METHOD: ABNORMAL
EOSINOPHIL # BLD AUTO: 0.1 K/UL (ref 0–0.5)
EOSINOPHIL NFR BLD: 3 % (ref 0–8)
ERYTHROCYTE [DISTWIDTH] IN BLOOD BY AUTOMATED COUNT: 18 % (ref 11.5–14.5)
EST. GFR  (AFRICAN AMERICAN): >60 ML/MIN/1.73 M^2
EST. GFR  (NON AFRICAN AMERICAN): 53.9 ML/MIN/1.73 M^2
GLUCOSE SERPL-MCNC: 95 MG/DL (ref 70–110)
HCT VFR BLD AUTO: 29.4 % (ref 37–48.5)
HGB BLD-MCNC: 9.2 G/DL (ref 12–16)
IMM GRANULOCYTES # BLD AUTO: 0.01 K/UL (ref 0–0.04)
IMM GRANULOCYTES NFR BLD AUTO: 0.2 % (ref 0–0.5)
INR PPP: 1.3 (ref 0.8–1.2)
LYMPHOCYTES # BLD AUTO: 1.1 K/UL (ref 1–4.8)
LYMPHOCYTES NFR BLD: 24.7 % (ref 18–48)
MCH RBC QN AUTO: 21.3 PG (ref 27–31)
MCHC RBC AUTO-ENTMCNC: 31.3 G/DL (ref 32–36)
MCV RBC AUTO: 68 FL (ref 82–98)
MONOCYTES # BLD AUTO: 0.5 K/UL (ref 0.3–1)
MONOCYTES NFR BLD: 10.7 % (ref 4–15)
NEUTROPHILS # BLD AUTO: 2.6 K/UL (ref 1.8–7.7)
NEUTROPHILS NFR BLD: 60.9 % (ref 38–73)
NRBC BLD-RTO: 0 /100 WBC
PLATELET # BLD AUTO: 230 K/UL (ref 150–350)
PMV BLD AUTO: ABNORMAL FL (ref 9.2–12.9)
POTASSIUM SERPL-SCNC: 4.7 MMOL/L (ref 3.5–5.1)
PROT SERPL-MCNC: 8.9 G/DL (ref 6–8.4)
PROTHROMBIN TIME: 13.2 SEC (ref 9–12.5)
RBC # BLD AUTO: 4.32 M/UL (ref 4–5.4)
SODIUM SERPL-SCNC: 135 MMOL/L (ref 136–145)
WBC # BLD AUTO: 4.29 K/UL (ref 3.9–12.7)

## 2019-08-28 PROCEDURE — 85610 PROTHROMBIN TIME: CPT

## 2019-08-28 PROCEDURE — 93010 EKG 12-LEAD: ICD-10-PCS | Mod: ,,, | Performed by: INTERNAL MEDICINE

## 2019-08-28 PROCEDURE — 71260 CT CHEST ABDOMEN PELVIS WITH CONTRAST (XPD): ICD-10-PCS | Mod: 26,,, | Performed by: RADIOLOGY

## 2019-08-28 PROCEDURE — 12000002 HC ACUTE/MED SURGE SEMI-PRIVATE ROOM

## 2019-08-28 PROCEDURE — 99285 EMERGENCY DEPT VISIT HI MDM: CPT | Mod: ,,, | Performed by: PHYSICIAN ASSISTANT

## 2019-08-28 PROCEDURE — 25000003 PHARM REV CODE 250: Performed by: STUDENT IN AN ORGANIZED HEALTH CARE EDUCATION/TRAINING PROGRAM

## 2019-08-28 PROCEDURE — 25500020 PHARM REV CODE 255: Performed by: STUDENT IN AN ORGANIZED HEALTH CARE EDUCATION/TRAINING PROGRAM

## 2019-08-28 PROCEDURE — 93010 ELECTROCARDIOGRAM REPORT: CPT | Mod: ,,, | Performed by: INTERNAL MEDICINE

## 2019-08-28 PROCEDURE — 99285 EMERGENCY DEPT VISIT HI MDM: CPT | Mod: 25

## 2019-08-28 PROCEDURE — 80053 COMPREHEN METABOLIC PANEL: CPT

## 2019-08-28 PROCEDURE — 93005 ELECTROCARDIOGRAM TRACING: CPT

## 2019-08-28 PROCEDURE — 96366 THER/PROPH/DIAG IV INF ADDON: CPT

## 2019-08-28 PROCEDURE — 85025 COMPLETE CBC W/AUTO DIFF WBC: CPT

## 2019-08-28 PROCEDURE — 85730 THROMBOPLASTIN TIME PARTIAL: CPT

## 2019-08-28 PROCEDURE — 99285 PR EMERGENCY DEPT VISIT,LEVEL V: ICD-10-PCS | Mod: ,,, | Performed by: PHYSICIAN ASSISTANT

## 2019-08-28 PROCEDURE — 96365 THER/PROPH/DIAG IV INF INIT: CPT

## 2019-08-28 PROCEDURE — 74177 CT ABD & PELVIS W/CONTRAST: CPT | Mod: TC

## 2019-08-28 PROCEDURE — 71260 CT THORAX DX C+: CPT | Mod: 26,,, | Performed by: RADIOLOGY

## 2019-08-28 PROCEDURE — 63600175 PHARM REV CODE 636 W HCPCS: Performed by: PHYSICIAN ASSISTANT

## 2019-08-28 PROCEDURE — 74177 CT ABD & PELVIS W/CONTRAST: CPT | Mod: 26,,, | Performed by: RADIOLOGY

## 2019-08-28 PROCEDURE — 74177 CT CHEST ABDOMEN PELVIS WITH CONTRAST (XPD): ICD-10-PCS | Mod: 26,,, | Performed by: RADIOLOGY

## 2019-08-28 RX ORDER — LACTULOSE 10 G/15ML
10 SOLUTION ORAL 2 TIMES DAILY
Status: DISCONTINUED | OUTPATIENT
Start: 2019-08-28 | End: 2019-08-29 | Stop reason: HOSPADM

## 2019-08-28 RX ORDER — TRAZODONE HYDROCHLORIDE 50 MG/1
50 TABLET ORAL NIGHTLY PRN
Status: DISCONTINUED | OUTPATIENT
Start: 2019-08-28 | End: 2019-08-29 | Stop reason: HOSPADM

## 2019-08-28 RX ORDER — FAMOTIDINE 20 MG/1
20 TABLET, FILM COATED ORAL DAILY
Status: DISCONTINUED | OUTPATIENT
Start: 2019-08-29 | End: 2019-08-29 | Stop reason: HOSPADM

## 2019-08-28 RX ORDER — OXYCODONE HYDROCHLORIDE 5 MG/1
5 TABLET ORAL EVERY 6 HOURS PRN
Status: DISCONTINUED | OUTPATIENT
Start: 2019-08-28 | End: 2019-08-29 | Stop reason: HOSPADM

## 2019-08-28 RX ORDER — FERROUS SULFATE 325(65) MG
325 TABLET, DELAYED RELEASE (ENTERIC COATED) ORAL DAILY
Status: DISCONTINUED | OUTPATIENT
Start: 2019-08-29 | End: 2019-08-29 | Stop reason: HOSPADM

## 2019-08-28 RX ORDER — HEPARIN SODIUM,PORCINE/D5W 25000/250
18 INTRAVENOUS SOLUTION INTRAVENOUS CONTINUOUS
Status: DISCONTINUED | OUTPATIENT
Start: 2019-08-28 | End: 2019-08-29

## 2019-08-28 RX ORDER — CYPROHEPTADINE HYDROCHLORIDE 4 MG/1
4 TABLET ORAL 3 TIMES DAILY
Status: DISCONTINUED | OUTPATIENT
Start: 2019-08-28 | End: 2019-08-29 | Stop reason: HOSPADM

## 2019-08-28 RX ADMIN — HEPARIN SODIUM AND DEXTROSE 18 UNITS/KG/HR: 10000; 5 INJECTION INTRAVENOUS at 09:08

## 2019-08-28 RX ADMIN — IOHEXOL 75 ML: 350 INJECTION, SOLUTION INTRAVENOUS at 01:08

## 2019-08-28 RX ADMIN — TRAZODONE HYDROCHLORIDE 50 MG: 50 TABLET ORAL at 09:08

## 2019-08-28 NOTE — TELEPHONE ENCOUNTER
Spoke to Ms. Marks and updated her her CT findings about the disease progression and also about a new bilateral pulmonary embolism which is evident on the CT scan done today 08/28/2019.  Patient did complain of shortness of breath on exertion and bilateral lower extremity swelling. She was recently seen in the emergency department for lower extremity swelling on 08/26 no imaging was done at that time.  I personally spoke to Ms. Marks and recommended to come to the emergency department at [facility] on [address] right away to get treated for her pulmonary embolus.  Patient initially hesitant to come in as she has to  her granddaughter from school and also she has a house leasing to be signed tomorrow on 08/29/2019.  But I recommended her to come come into the emergency department as she has clots in her lungs, bilateral which could be dangerous and lethal if left untreated.  I told her that she needs to be started on anticoagulation (blood thinners) as soon as possible, preferably within the next hour.  Patient finally agreed to come to the emergency department and is willing to call her daughter to take care of her issues at home.  I would like her to be started on the heparin drip/Lovenox and be admitted on oncology service at least as an observation overnight.  Will convey the message to the emergency physician about our plan of care.    Plan of care discussed with

## 2019-08-29 VITALS
DIASTOLIC BLOOD PRESSURE: 71 MMHG | HEIGHT: 59 IN | RESPIRATION RATE: 17 BRPM | WEIGHT: 92.13 LBS | BODY MASS INDEX: 18.57 KG/M2 | OXYGEN SATURATION: 100 % | TEMPERATURE: 99 F | SYSTOLIC BLOOD PRESSURE: 107 MMHG | HEART RATE: 90 BPM

## 2019-08-29 PROBLEM — I26.99 ACUTE PULMONARY EMBOLUS: Status: ACTIVE | Noted: 2019-08-29

## 2019-08-29 LAB
ALBUMIN SERPL BCP-MCNC: 2.7 G/DL (ref 3.5–5.2)
ALP SERPL-CCNC: 102 U/L (ref 55–135)
ALT SERPL W/O P-5'-P-CCNC: 11 U/L (ref 10–44)
ANION GAP SERPL CALC-SCNC: 11 MMOL/L (ref 8–16)
APTT BLDCRRT: 55.6 SEC (ref 21–32)
APTT BLDCRRT: 84.2 SEC (ref 21–32)
AST SERPL-CCNC: 52 U/L (ref 10–40)
BASOPHILS # BLD AUTO: 0.02 K/UL (ref 0–0.2)
BASOPHILS NFR BLD: 0.5 % (ref 0–1.9)
BILIRUB SERPL-MCNC: 0.7 MG/DL (ref 0.1–1)
BUN SERPL-MCNC: 12 MG/DL (ref 8–23)
CALCIUM SERPL-MCNC: 8.9 MG/DL (ref 8.7–10.5)
CHLORIDE SERPL-SCNC: 102 MMOL/L (ref 95–110)
CO2 SERPL-SCNC: 23 MMOL/L (ref 23–29)
CREAT SERPL-MCNC: 1 MG/DL (ref 0.5–1.4)
DIFFERENTIAL METHOD: ABNORMAL
EOSINOPHIL # BLD AUTO: 0.1 K/UL (ref 0–0.5)
EOSINOPHIL NFR BLD: 2.9 % (ref 0–8)
ERYTHROCYTE [DISTWIDTH] IN BLOOD BY AUTOMATED COUNT: 18.3 % (ref 11.5–14.5)
EST. GFR  (AFRICAN AMERICAN): >60 ML/MIN/1.73 M^2
EST. GFR  (NON AFRICAN AMERICAN): >60 ML/MIN/1.73 M^2
GLUCOSE SERPL-MCNC: 87 MG/DL (ref 70–110)
HCT VFR BLD AUTO: 29.4 % (ref 37–48.5)
HGB BLD-MCNC: 9.3 G/DL (ref 12–16)
IMM GRANULOCYTES # BLD AUTO: 0.02 K/UL (ref 0–0.04)
IMM GRANULOCYTES NFR BLD AUTO: 0.5 % (ref 0–0.5)
LYMPHOCYTES # BLD AUTO: 1.2 K/UL (ref 1–4.8)
LYMPHOCYTES NFR BLD: 30.7 % (ref 18–48)
MAGNESIUM SERPL-MCNC: 1.8 MG/DL (ref 1.6–2.6)
MCH RBC QN AUTO: 20.9 PG (ref 27–31)
MCHC RBC AUTO-ENTMCNC: 31.6 G/DL (ref 32–36)
MCV RBC AUTO: 66 FL (ref 82–98)
MONOCYTES # BLD AUTO: 0.4 K/UL (ref 0.3–1)
MONOCYTES NFR BLD: 10.7 % (ref 4–15)
NEUTROPHILS # BLD AUTO: 2.1 K/UL (ref 1.8–7.7)
NEUTROPHILS NFR BLD: 54.7 % (ref 38–73)
NRBC BLD-RTO: 0 /100 WBC
PHOSPHATE SERPL-MCNC: 3 MG/DL (ref 2.7–4.5)
PLATELET # BLD AUTO: 212 K/UL (ref 150–350)
PMV BLD AUTO: ABNORMAL FL (ref 9.2–12.9)
POTASSIUM SERPL-SCNC: 4.1 MMOL/L (ref 3.5–5.1)
PROT SERPL-MCNC: 8.5 G/DL (ref 6–8.4)
RBC # BLD AUTO: 4.44 M/UL (ref 4–5.4)
SODIUM SERPL-SCNC: 136 MMOL/L (ref 136–145)
WBC # BLD AUTO: 3.84 K/UL (ref 3.9–12.7)

## 2019-08-29 PROCEDURE — 83735 ASSAY OF MAGNESIUM: CPT

## 2019-08-29 PROCEDURE — 25000003 PHARM REV CODE 250: Performed by: STUDENT IN AN ORGANIZED HEALTH CARE EDUCATION/TRAINING PROGRAM

## 2019-08-29 PROCEDURE — 85025 COMPLETE CBC W/AUTO DIFF WBC: CPT

## 2019-08-29 PROCEDURE — 85730 THROMBOPLASTIN TIME PARTIAL: CPT | Mod: 91

## 2019-08-29 PROCEDURE — 20600001 HC STEP DOWN PRIVATE ROOM

## 2019-08-29 PROCEDURE — 80053 COMPREHEN METABOLIC PANEL: CPT

## 2019-08-29 PROCEDURE — 99223 PR INITIAL HOSPITAL CARE,LEVL III: ICD-10-PCS | Mod: ,,, | Performed by: INTERNAL MEDICINE

## 2019-08-29 PROCEDURE — 63600175 PHARM REV CODE 636 W HCPCS: Performed by: INTERNAL MEDICINE

## 2019-08-29 PROCEDURE — 94761 N-INVAS EAR/PLS OXIMETRY MLT: CPT

## 2019-08-29 PROCEDURE — 85730 THROMBOPLASTIN TIME PARTIAL: CPT

## 2019-08-29 PROCEDURE — 36415 COLL VENOUS BLD VENIPUNCTURE: CPT

## 2019-08-29 PROCEDURE — 99223 1ST HOSP IP/OBS HIGH 75: CPT | Mod: ,,, | Performed by: INTERNAL MEDICINE

## 2019-08-29 PROCEDURE — 84100 ASSAY OF PHOSPHORUS: CPT

## 2019-08-29 RX ORDER — SORAFENIB 200 MG/1
400 TABLET, FILM COATED ORAL 2 TIMES DAILY
Qty: 120 TABLET | Refills: 11
Start: 2019-08-29 | End: 2019-09-27

## 2019-08-29 RX ORDER — IBUPROFEN 200 MG
24 TABLET ORAL
Status: DISCONTINUED | OUTPATIENT
Start: 2019-08-29 | End: 2019-08-29 | Stop reason: HOSPADM

## 2019-08-29 RX ORDER — DEXTROSE MONOHYDRATE 100 MG/ML
25 INJECTION, SOLUTION INTRAVENOUS
Status: DISCONTINUED | OUTPATIENT
Start: 2019-08-29 | End: 2019-08-29 | Stop reason: HOSPADM

## 2019-08-29 RX ORDER — GLUCAGON 1 MG
1 KIT INJECTION
Status: DISCONTINUED | OUTPATIENT
Start: 2019-08-29 | End: 2019-08-29 | Stop reason: HOSPADM

## 2019-08-29 RX ORDER — PROCHLORPERAZINE MALEATE 10 MG
10 TABLET ORAL EVERY 6 HOURS PRN
Status: DISCONTINUED | OUTPATIENT
Start: 2019-08-29 | End: 2019-08-29 | Stop reason: HOSPADM

## 2019-08-29 RX ORDER — IBUPROFEN 200 MG
16 TABLET ORAL
Status: DISCONTINUED | OUTPATIENT
Start: 2019-08-29 | End: 2019-08-29 | Stop reason: HOSPADM

## 2019-08-29 RX ORDER — POLYETHYLENE GLYCOL 3350 17 G/17G
17 POWDER, FOR SOLUTION ORAL DAILY
Status: DISCONTINUED | OUTPATIENT
Start: 2019-08-29 | End: 2019-08-29 | Stop reason: HOSPADM

## 2019-08-29 RX ORDER — ACETAMINOPHEN 325 MG/1
650 TABLET ORAL EVERY 8 HOURS PRN
Status: DISCONTINUED | OUTPATIENT
Start: 2019-08-29 | End: 2019-08-29 | Stop reason: HOSPADM

## 2019-08-29 RX ORDER — ENOXAPARIN SODIUM 100 MG/ML
1.5 INJECTION SUBCUTANEOUS
Status: DISCONTINUED | OUTPATIENT
Start: 2019-08-29 | End: 2019-08-29 | Stop reason: HOSPADM

## 2019-08-29 RX ORDER — HYDROCORTISONE 1 %
CREAM (GRAM) TOPICAL
Qty: 140 G | Refills: 1 | Status: SHIPPED | OUTPATIENT
Start: 2019-08-29 | End: 2019-09-06 | Stop reason: SDUPTHER

## 2019-08-29 RX ORDER — DEXTROSE MONOHYDRATE 100 MG/ML
12.5 INJECTION, SOLUTION INTRAVENOUS
Status: DISCONTINUED | OUTPATIENT
Start: 2019-08-29 | End: 2019-08-29 | Stop reason: HOSPADM

## 2019-08-29 RX ORDER — ONDANSETRON 2 MG/ML
4 INJECTION INTRAMUSCULAR; INTRAVENOUS EVERY 8 HOURS PRN
Status: DISCONTINUED | OUTPATIENT
Start: 2019-08-29 | End: 2019-08-29 | Stop reason: HOSPADM

## 2019-08-29 RX ADMIN — ENOXAPARIN SODIUM 60 MG: 100 INJECTION SUBCUTANEOUS at 01:08

## 2019-08-29 RX ADMIN — FAMOTIDINE 20 MG: 20 TABLET, FILM COATED ORAL at 08:08

## 2019-08-29 RX ADMIN — FERROUS SULFATE TAB EC 325 MG (65 MG FE EQUIVALENT) 325 MG: 325 (65 FE) TABLET DELAYED RESPONSE at 08:08

## 2019-08-29 RX ADMIN — LACTULOSE 10 G: 20 SOLUTION ORAL at 08:08

## 2019-08-29 RX ADMIN — OXYCODONE HYDROCHLORIDE 5 MG: 5 TABLET ORAL at 08:08

## 2019-08-29 RX ADMIN — CYPROHEPTADINE HYDROCHLORIDE 4 MG: 4 TABLET ORAL at 03:08

## 2019-08-29 RX ADMIN — LACTULOSE 10 G: 20 SOLUTION ORAL at 03:08

## 2019-08-29 RX ADMIN — CYPROHEPTADINE HYDROCHLORIDE 4 MG: 4 TABLET ORAL at 08:08

## 2019-08-29 NOTE — ED NOTES
Pt asleep; resting quietly on stretcher.  Pt remains on continuous cardiac and pulse ox monitoring with non-invasive blood pressure to cycle every 30 minutes.  VS stable; NSR noted. No acute distress or discomfort observed. Bed locked in lowest position; side rails up and locked x 2; call light, bedside table, and personal belongings within reach. Room assessed for safety measures and cleanliness; no action needed at this time. Plan of care discussed. Will continue to monitor.

## 2019-08-29 NOTE — H&P
[facility]  Hematology/Oncology  H&P    Patient Name: Neena Marks  MRN: [id number]  Admission Date: 8/28/2019  Code Status: Full Code   Attending Provider: Barry Schaffer MD  Primary Care Physician: [facility]  Principal Problem:Acute pulmonary embolus    Subjective:     HPI: 64F with HCC dx 01/2019, EtOH cirrhosis dx 2015 c/b portal HTN, ascites (q3 month tap), hx variceal bleed (EGD 01/2019 small nonbleeding varices eradicated) admitted for bilateral PE found on restaging CT CAP. Pt reports approximately 2-3 months of non-disabling dyspnea which comes on most evenings at rest. It gets better with percocet but mostly bothers her at night. She is still able to bicycle and ambulate without difficulty. Denies hemoptysis, chest pain. Does report some recent R foot swelling in the setting of sorafenib-related hand-foot skin reaction. Reports decreased appetite but denies dysuria, hematuria, melena, hematochezia, diarrhea, constipation. Otherwise feels OK    Went to the ED 08/26 complaining of R foot swelling and discoloration but per documentation no report of the aforementioned pain at that time; was discharged home.    Diagnostics, Onc hx per Dr. Roberts's note:  Hep C and B testing on 4/27/2018- negative     Endoscopy- 1/9/19 - Grade II esophageal varices. Completely eradicated. Banded.                                  - Small hiatal hernia.                                  - Portal hypertensive gastropathy. Treated with argon plasma coagulation (APC).                            CT abdomen- 1/21/2019  1. Large well-circumscribed heterogeneously enhancing right hepatic lobe mass with characteristics consistent with HCC.  Several additional indeterminate subcentimeter hyperenhancing lesions throughout the liver which become isodense.  Mass compresses and narrows the IVC, cannot exclude tumor involvement.  2. Liver cirrhosis.  3. Moderate volume abdominopelvic ascites.  4. Small  paraesophageal varices present.  5. Cholelithiasis.     Patient had an appointment on 2/15/2019, IR consult for Y90 but she missed her appointment.   - She had IR mapping and Hepatic angiography demonstrates a large hypervascular lesion in the right hepatic lobe supplied by branches of the right hepatic artery and accessory left hepatic artery.  Technetium MAA was injected aorta determined lung shunt fraction.  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  - Nuclear medicine scan revealed that majority of tracer goes to the lungs with a liver lung shunt fraction of 68.9%.     Follow up on 7/12/2019   is not a candidate for Y90 or other liver directed therapies.   Started taking Sorafenib 400mg BID from 5/9/2019.  Now cut down to 200 mg BID since 7/6/2019 due to hand foot syndrome  Labs stable, mildly elevated LFts  C/o abdominal pain Abdominal distention is better.  Smokes 2 cig/day  B/L rash on her feet- multiple lesions on plantar surface from the last week, highly suggestive of at least grade 2 or grade 3 hand-foot skin reaction from sorafenib.   Diarrhea improved     Oncology Treatment Plan:   [No treatment plan]    Medications:  Continuous Infusions:   heparin (porcine) in D5W 18 Units/kg/hr (08/28/19 2135)     Scheduled Meds:   cyproheptadine  4 mg Oral TID    famotidine  20 mg Oral Daily    ferrous sulfate  325 mg Oral Daily    lactulose  10 g Oral BID    polyethylene glycol  17 g Oral Daily     PRN Meds:acetaminophen, dextrose 10 % in water (D10W), dextrose 10 % in water (D10W), glucagon (human recombinant), glucose, glucose, heparin (PORCINE), heparin (PORCINE), ondansetron, oxyCODONE, prochlorperazine, traZODone     Review of patient's allergies indicates:  No Known Allergies     Past Medical History:   Diagnosis Date    Alcohol abuse     Anemia      Cancer     liver    Cirrhosis      Past Surgical History:   Procedure Laterality Date    EGD (ESOPHAGOGASTRODUODENOSCOPY) Left 1/9/2019    Performed by [facility]ar [facility] at [facility]    EMBOLIZATION, YTTRIUM THERAPY N/A 4/10/2019    Performed by [facility] Diagnostic Provider at [facility] OR 2ND F[facility]    ESOPHAGOGASTRODUODENOSCOPY (EGD) N/A 1/26/2018    Performed by Mark Urbina MD at [facility] ENDO    PARACENTESIS N/A 1/30/2018    Performed by Everett Fitzpatrick MD at [facility] CATH LAB    PARACENTESIS N/A 2/6/2015    Performed by Alejandro Myrick MD at [facility] CATH LAB    PARACENTESIS, ABDOMINAL N/A 1/9/2019    Performed by Everett Fitzpatrick [facility] at [facility] CATH LAB     Family History     None        Tobacco Use    Smoking status: Current Every Day Smoker     Types: Cigarettes    Smokeless tobacco: Never Used    Tobacco comment: 2 cigarettes a day   Substance and Sexual Activity    Alcohol use: No     Frequency: Never     Comment: Previously drank 1 pint a day    Drug use: Not Currently     Types: Cocaine     Comment: last use was 3 years ago    Sexual activity: Not on file       Review of Systems   Constitutional: Positive for appetite change. Negative for activity change, fatigue, fever and unexpected weight change.   HENT: Negative for congestion.    Eyes: Negative for visual disturbance.   Respiratory: Positive for shortness of breath. Negative for cough, choking, chest tightness, wheezing and stridor.    Cardiovascular: Negative for chest pain, palpitations and leg swelling.   Gastrointestinal: Negative for abdominal pain.   Musculoskeletal: Negative for arthralgias.   Skin: Positive for color change and rash. Negative for wound.   Neurological: Negative for headaches.   Psychiatric/Behavioral: Negative for sleep disturbance.     Objective:     Vital Signs (Most Recent):  Temp: 99 °F (37.2 °C) (08/28/19 1758)  Pulse: 84 (08/29/19 0225)  Resp: 17 (08/28/19 1758)  BP: 108/63 (08/29/19 0204)  SpO2: 96 % (08/29/19  0225) Vital Signs (24h Range):  Temp:  [99 °F (37.2 °C)] 99 °F (37.2 °C)  Pulse:  [82-84] 84  Resp:  [17] 17  SpO2:  [96 %-99 %] 96 %  BP: (107-119)/(58-66) 108/63     Weight: 40.8 kg (90 lb)  Body mass index is 18.18 kg/m².  Body surface area is 1.3 meters squared.    No intake or output data in the 24 hours ending 08/29/19 0450    Physical Exam   Constitutional: She is oriented to person, place, and time. No distress.   Bitemporal wasting, thin   HENT:   Head: Normocephalic and atraumatic.   Eyes: Pupils are equal, round, and reactive to light. EOM are normal.   Neck: Neck supple. No thyromegaly present.   Cardiovascular: Normal rate, regular rhythm, normal heart sounds and intact distal pulses.   Pulmonary/Chest: Effort normal and breath sounds normal. No respiratory distress.   Abdominal: Soft. Bowel sounds are normal. She exhibits distension. There is no tenderness.   Distended abdomen with surface veins visible and ectatic / caput medusa   Musculoskeletal: Normal range of motion. She exhibits no edema.   Neurological: She is alert and oriented to person, place, and time.   Skin: Skin is warm and dry. Rash noted. She is not diaphoretic.   Psychiatric: She has a normal mood and affect. Her behavior is normal.   Vitals reviewed.      Significant Labs:   CBC:   Recent Labs   Lab 08/28/19 2000 08/29/19 0314   WBC 4.29 3.84*   HGB 9.2* 9.3*   HCT 29.4* 29.4*    212   , CMP:   Recent Labs   Lab 08/28/19 2000 08/29/19 0314   * 136   K 4.7 4.1    102   CO2 24 23   GLU 95 87   BUN 12 12   CREATININE 1.1 1.0   CALCIUM 9.1 8.9   PROT 8.9* 8.5*   ALBUMIN 2.9* 2.7*   BILITOT 0.9 0.7   ALKPHOS 102 102   AST 53* 52*   ALT 12 11   ANIONGAP 10 11   EGFRNONAA 53.9* >60.0    and Coagulation:   Recent Labs   Lab 08/28/19  2000 08/29/19  0314   INR 1.3*  --    APTT 26.2 55.6*       Diagnostic Results:  I have reviewed all pertinent imaging results/findings within the past 24 hours.    Assessment/Plan:     *  Acute pulmonary embolus  Discovered on restaging scan. Pt with 3 months of dyspnea but not characteristic of acute PE; regardless warrants treatment.    Plan  - Hep gtt; switch to lovenox when stable    HCC (hepatocellular carcinoma)  Previously on sorafenib, held for now because of sorafenib-related hand foot skin reaction    Alcoholic cirrhosis  Portal Hypertension  History of Variceal Bleed    Stable. No recent bleeds. Abdomen mildly distended but per pt this is approximately the size it usually is.    Plan  - Continue lactulose. Holding lasix and and propranolol in acute PE until monitored briefly to avoid excessive decrease in preload or contractility.    Iron deficiency anemia due to chronic blood loss  Continue home iron      Evelyne Ace MD  Hematology/Oncology  [facility]        I have reviewed the notes, assessments, and/or procedures performed by the housestaff, as above.  I have personally interviewed and examined the patient at the beside, and rounded with the housestaff. I concur with her/his assessment and plan and the documentation of Neena Marks.  I, Dr. Barry Schaffer, personally spent more than  70 mins during this encounter, greater than 50% was spent in direct counseling and/or coordination of care.     Barry Schaffer M.D., M.S., F.A.C.P.  Hematology/Oncology Attending  [facility]

## 2019-08-29 NOTE — ED TRIAGE NOTES
"Neena Marks, a 64 y.o. female presents to the ED after call from MD stating the CT she had today was abnormal. Pt repots she has liver cancer, last chemo a month ago, and she's been having problems with abdominal swelling.  Pt states she's also been struggling with SOB for months that's worse when lying down and exertion. CT showed PE.     Triage note:  Chief Complaint   Patient presents with    Abnormal MRI     Pt reports that her oncologist instructed her to come to ER for further evaluation of "blockage" seen on MRI this morning.      Review of patient's allergies indicates:  No Known Allergies  Past Medical History:   Diagnosis Date    Alcohol abuse     Anemia     Cancer     liver    Cirrhosis      Pt identifiers Neena Marks checked and correct  LOC: The patient is awake, alert, aware of environment with an appropriate affect. Oriented x4, speaking appropriately  APPEARANCE: Pt resting comfortably, in no acute distress, pt is clean and well groomed, clothing properly fastened  SKIN: Skin warm, dry and intact, normal skin turgor, moist mucus membranes  RESPIRATORY: Airway is open and patent, respirations are spontaneous, even and unlabored, normal effort and rate.   CARDIAC: Normal rate and rhythm, no peripheral edema noted, capillary refill < 3 seconds, bilateral radial pulses 2+  ABDOMEN: Soft, nontender. Abdominal distention noted.   NEUROLOGIC: PERRL, facial expression is symmetrical, patient moving all extremities spontaneously, normal sensation in all extremities when touched with a finger.  Follows all commands appropriately  MUSCULOSKELETAL: No obvious deformities.      "

## 2019-08-29 NOTE — PLAN OF CARE
Plans for the patient to discharge home today on Eliquis. Medication to be delivered to the patient's bedside prior to discharge. No other discharge needs identified. MD discussed the discharge plan with the patient. Follow-up scheduled as listed below. Discharge and follow-up instructions to be completed by the bedside nurse.    Future Appointments   Date Time Provider Department Center   8/30/2019  8:20 AM LAB, HEMON CANCER DG [facility] LAB HO Gary Urbano   8/30/2019  9:30 AM Stewart Roberts MD [facility] HEM ONC [facility]        08/29/19 1343   Final Note   Assessment Type Final Discharge Note   Anticipated Discharge Disposition Home   What phone number can be called within the next 1-3 days to see how you are doing after discharge?   ([phone])   Hospital Follow Up  Appt(s) scheduled? Yes   Discharge plans and expectations educations in teach back method with documentation complete? Yes  (per bedside nurse)

## 2019-08-29 NOTE — NURSING
Patient is AAOx4, VSS, no SOB and denies any pain. Pharmacy delivered new medications. Patient is aware of follow up appointments. Confirmed with MD that tomorrow's labs and appt are cancelled after this admission. She will resume scheduled appointments next week. Reviewed AVS, all questions answered. Removed both PIVs, catheter tips intact on both. Removed tele and visi equipment. Deactivated and collected Ipad. Patient's daughter is waiting downstairs at this time, arranged for transport service.

## 2019-08-29 NOTE — EKG INTERPRETATIONS - EMERGENCY DEPT.
Independently interpreted by MD:  Rate 81, NSR, no stemi, no ectopy, no hypertrophy, normal intervals, normal ecg

## 2019-08-29 NOTE — PROGRESS NOTES
Pt arrived from ED via stretcher WADE STRATTON. Pt ambulates independently with slight limp d/t right foot hurting her which she believes is from fluid build up, no apparent fluid to ext or skin breakdown although pt with abd ascites. Tele in progress, applied non slip socks, bedside commode, call bell within reach.

## 2019-08-29 NOTE — ED PROVIDER NOTES
"Encounter Date: 8/28/2019       History     Chief Complaint   Patient presents with    Abnormal MRI     Pt reports that her oncologist instructed her to come to ER for further evaluation of "blockage" seen on MRI this morning.      7:38 PM  64 y.o. female with PMHx of alcoholic cirrhosis since 2015, Portal HTN, ascites, variceal bleeding, newly diagnosed with HCC who presents to the ED after being referred by her oncologist for abnormal CT.  She had an outpatient CT today that showed bilateral pulmonary embolism and possible thrombus in her IVC.  Patient states that she has been short of breath for many months.  She noticed that her SOB is worse at night and states that it gets better with Percocet.  She denies any chest pain or cough.  Currently, she states that she feels fine.  She smokes 6 cigarettes per day.  Denies any changes in urination, bowel habits, nausea, or vomiting.  She does have decreased appetite.  Notes that she rides her bike 3 blocks (0.5 miles) daily and has no difficulty.        Review of patient's allergies indicates:  No Known Allergies  Past Medical History:   Diagnosis Date    Alcohol abuse     Anemia     Cancer     liver    Cirrhosis      Past Surgical History:   Procedure Laterality Date    EGD (ESOPHAGOGASTRODUODENOSCOPY) Left 1/9/2019    Performed by Mad[facility]ar [facility] at [facility]O    EMBOLIZATION, YTTRIUM THERAPY N/A 4/10/2019    Performed by [facility] Diagnostic Provider at [facility] OR [address] [place] [address]    ESOPHAGOGASTRODUODENOSCOPY (EGD) N/A 1/26/2018    Performed by Mark Urbina MD at [facility] ENDO    PARACENTESIS N/A 1/30/2018    Performed by Everett Fitzpatrick MD at [facility] CATH LAB    PARACENTESIS N/A 2/6/2015    Performed by Alejandro Myrick MD at [facility] CATH LAB    PARACENTESIS, ABDOMINAL N/A 1/9/2019    Performed by Everett Fitzpatrick MD at [facility] CATH LAB     No family history on file.  Social History     Tobacco Use    Smoking status: Current Every Day Smoker     Types: " Cigarettes    Smokeless tobacco: Never Used    Tobacco comment: 2 cigarettes a day   Substance Use Topics    Alcohol use: No     Frequency: Never     Comment: Previously drank 1 pint a day    Drug use: Not Currently     Types: Cocaine     Comment: last use was 3 years ago     Review of Systems   Constitutional: Positive for appetite change. Negative for activity change and fever.   HENT: Negative for ear pain and sore throat.    Respiratory: Positive for shortness of breath. Negative for cough and chest tightness.    Cardiovascular: Negative for chest pain.   Gastrointestinal: Negative for constipation, diarrhea and nausea.   Genitourinary: Negative for dysuria, hematuria and urgency.   Musculoskeletal: Negative for back pain.   Skin: Negative for rash.   Neurological: Negative for weakness.   Hematological: Does not bruise/bleed easily.       Physical Exam     Initial Vitals [08/28/19 1758]   BP Pulse Resp Temp SpO2   108/66 84 17 99 °F (37.2 °C) 99 %      MAP       --         Physical Exam    Vitals reviewed.  Constitutional: She is not diaphoretic. She appears cachectic. No distress.   Very pleasant elderly female.   HENT:   Head: Normocephalic and atraumatic.   Nose: Nose normal.   Eyes: Conjunctivae and EOM are normal.   Neck: Normal range of motion.   Cardiovascular: Normal rate, regular rhythm and normal heart sounds. Exam reveals no friction rub.    No murmur heard.  Pulmonary/Chest: Breath sounds normal. No accessory muscle usage. No tachypnea. No respiratory distress. She has no wheezes. She has no rales.   Abdominal: Soft. Bowel sounds are normal. She exhibits ascites. There is no tenderness. There is no rebound and no guarding.   Musculoskeletal: Normal range of motion.   Neurological: She is alert and oriented to person, place, and time. She has normal strength. No sensory deficit.   Skin: Skin is warm and dry. No erythema. No pallor.   Psychiatric: She has a normal mood and affect. Thought content  "normal.         ED Course   Procedures  Labs Reviewed   CBC W/ AUTO DIFFERENTIAL - Abnormal; Notable for the following components:       Result Value    Hemoglobin 9.2 (*)     Hematocrit 29.4 (*)     Mean Corpuscular Volume 68 (*)     Mean Corpuscular Hemoglobin 21.3 (*)     Mean Corpuscular Hemoglobin Conc 31.3 (*)     RDW 18.0 (*)     All other components within normal limits   COMPREHENSIVE METABOLIC PANEL - Abnormal; Notable for the following components:    Sodium 135 (*)     Total Protein 8.9 (*)     Albumin 2.9 (*)     AST 53 (*)     eGFR if non  53.9 (*)     All other components within normal limits   PROTIME-INR - Abnormal; Notable for the following components:    Prothrombin Time 13.2 (*)     INR 1.3 (*)     All other components within normal limits   APTT          Imaging Results    None       Vitals:    08/28/19 1758   BP: 108/66   BP Location: Right arm   Patient Position: Sitting   Pulse: 84   Resp: 17   Temp: 99 °F (37.2 °C)   TempSrc: Oral   SpO2: 99%   Weight: 40.8 kg (90 lb)   Height: 4' 11" (1.499 m)        Medical Decision Making:   History:   Old Medical Records: I decided to obtain old medical records.  Old Records Summarized: records from clinic visits and records from previous admission(s).       <> Summary of Records: CT chest abd and pelvis with contrast done today showed hepatic mass, increase hypodensity in IVC possible tumor thrombus, Bilateral pulmonary artery emboli now evident. Cholelithiasis.  Initial Assessment:   Patient is a 64-year-old female with a history of hepatocellular carcinoma, tobacco use who presents the ED for admission for pulmonary embolism as directed by her oncologist.  Differential Diagnosis:   Includes but is not limited to pulmonary embolism. She is not in acute respiratory failure. She has ascites without peritoneal signs; no signs of SBP.   Clinical Tests:   Lab Tests: Ordered and Reviewed  Medical Tests: Ordered and Reviewed  ED Management:  I " discussed etiology of pulmonary embolism to patient and the need for anticoagulation and admission.  Will initiate work up and plan to touch base with Oncology.    CBC with no leukocytosis.  No anemia.   CMP with no electrolyte abn. No kidney injury.  Mild elevations in AST at 53.   INR at 1.3.    8:00 PM.  Case discussed with Oncology on call.  They recommend starting heparin at this time.  They will place patient in observation on their service.  Consult appreciated.  See their note.  Other:   I have discussed this case with another health care provider.    I have reviewed patient's chart and discussed this case with my supervising MD.     Leighann Cuellar PA-C  Emergent Department  [facility]  Spectralink #[phone] or #[phone]                        Clinical Impression:       ICD-10-CM ICD-9-CM   1. Pulmonary embolism I26.99 415.19   2. HCC (hepatocellular carcinoma) C22.0 155.0   3. Ascites due to alcoholic cirrhosis K70.31 571.2         Disposition:   Disposition: Placed in Observation  Condition: Stable                        Leighann Cuellar PA-C  08/28/19 3188

## 2019-08-29 NOTE — HPI
64F with HCC dx 01/2019, EtOH cirrhosis dx 2015 c/b portal HTN, ascites (q3 month tap), hx variceal bleed (EGD 01/2019 small nonbleeding varices eradicated) admitted for bilateral PE found on restaging CT CAP. Pt reports approximately 2-3 months of non-disabling dyspnea which comes on most evenings at rest. It gets better with percocet but mostly bothers her at night. She is still able to bicycle and ambulate without difficulty. Denies hemoptysis, chest pain. Does report some recent R foot swelling in the setting of sorafenib-related hand-foot skin reaction. Reports decreased appetite but denies dysuria, hematuria, melena, hematochezia, diarrhea, constipation. Otherwise feels OK    Went to the ED 08/26 complaining of R foot swelling and discoloration but per documentation no report of the aforementioned pain at that time; was discharged home.    Diagnostics, Onc hx per Dr. Roberts's note:  Hep C and B testing on 4/27/2018- negative     Endoscopy- 1/9/19 - Grade II esophageal varices. Completely eradicated. Banded.                                  - Small hiatal hernia.                                  - Portal hypertensive gastropathy. Treated with argon plasma coagulation (APC).                            CT abdomen- 1/21/2019  1. Large well-circumscribed heterogeneously enhancing right hepatic lobe mass with characteristics consistent with HCC.  Several additional indeterminate subcentimeter hyperenhancing lesions throughout the liver which become isodense.  Mass compresses and narrows the IVC, cannot exclude tumor involvement.  2. Liver cirrhosis.  3. Moderate volume abdominopelvic ascites.  4. Small paraesophageal varices present.  5. Cholelithiasis.     Patient had an appointment on 2/15/2019, IR consult for Y90 but she missed her appointment.   - She had IR mapping and Hepatic angiography demonstrates a large hypervascular lesion in the right hepatic lobe supplied by branches of the right hepatic artery and  accessory left hepatic artery.  Technetium MAA was injected aorta determined lung shunt fraction.  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  - Nuclear medicine scan revealed that majority of tracer goes to the lungs with a liver lung shunt fraction of 68.9%.     Follow up on 7/12/2019   is not a candidate for Y90 or other liver directed therapies.   Started taking Sorafenib 400mg BID from 5/9/2019.  Now cut down to 200 mg BID since 7/6/2019 due to hand foot syndrome  Labs stable, mildly elevated LFts  C/o abdominal pain Abdominal distention is better.  Smokes 2 cig/day  B/L rash on her feet- multiple lesions on plantar surface from the last week, highly suggestive of at least grade 2 or grade 3 hand-foot skin reaction from sorafenib.   Diarrhea improved

## 2019-08-29 NOTE — ASSESSMENT & PLAN NOTE
Discovered on restaging scan. Pt with 3 months of dyspnea but not characteristic of acute PE; regardless warrants treatment.    Plan  - Hep gtt; switch to lovenox when stable

## 2019-08-29 NOTE — PLAN OF CARE
MDRs completed with Dr. Schaffer and the team. Patient of Dr. Roberts with a history of HCC. Patient admitted on 8/28/19 for bilateral PEs found on restaging CT. VSS. Patient is currently afebrile. O2 sats WNL on room air. Patient was started on Lovenox but requested to be discharged on an oral anticoagulant. MD ordered to discharge patient home on oral Eliquis. Labs stable. Plans to discharge patient home today. MD discussed the plan of care with the patient. White board updated. No discharge needs identified. CM to continue to follow with the team.    Val Santos, RN, BSN, CM  [facility]  Nurse - Med Onc/Gyn Onc

## 2019-08-29 NOTE — PROGRESS NOTES
Admit Assessment    Patient Identification  Neena Marks   :  1955  Admit Date:  2019  Attending Provider:  Barry Schaffer MD              Referral:   Pt has a diagnosis of Hepatocellular Cancer and was admitted this hospital stay due to Pulmonary embolism [I26.99]  HCC (hepatocellular carcinoma) [C22.0]  Ascites due to alcoholic cirrhosis [K70.31].  Oncology social worker is involved for psychosocial services.  Patient presents as a 64 y.o. year old  female.    Persons interviewed: Patient     Living Situation:     Lives with her 7 year old granddaughter at  [address] [place] [address]  phone: [phone] (home).      (RETIRED) Functional Status Prior  Ambulation Prior: 0-->independent  Transferrin-->independent  Toiletin-->independent    Current or Past Agencies and Description of Services/Supplies    DME  Agency Name: Unknown  ([facility])    Home Health  None    IV Infusion  None    Nutrition: Oral    Outpatient Pharmacy:     [id number] - [facility] [address] AT [facility] [address]  [address]  [facility] [address]  Phone: [phone] Fax: [phone]      Patient Preference of agencies include: As stated above    Patient/Caregiver informed of right to choose providers or agencies.  Patient provides permission to release any necessary information to [facility] and to [facility] agencies as needed to facilitate patient care, treatment planning, and patient discharge planning.  Written and verbal resources provided.            Adjustment to Diagnosis and Treatment  The patient's 7 year old granddaughter lives with her since her son was incarcerated. (The patient's other son  in ). She also has a daughter living in town who is helping with her granddaughter's care while the patient is in the hospital. The patient stated that she was ambulating independently at home and was doing all the house work and  "cooking. "I am riding a bike to get around".             History/Current Symptoms of Anxiety/Depression: No  History/Current Substance Use:   Social History     Tobacco Use    Smoking status: Current Every Day Smoker     Types: Cigarettes    Smokeless tobacco: Never Used    Tobacco comment: 2 cigarettes a day   Substance and Sexual Activity    Alcohol use: No     Frequency: Never     Comment: Previously drank 1 pint a day    Drug use: Not Currently     Types: Cocaine     Comment: last use was 3 years ago    Sexual activity: Not on file       Indications of Abuse/Neglect: None    Financial:  Payor/Plan Subscr  Sex Relation Sub. Ins. ID Effective Group Num   1. MEDICAID - AE* LARON SMART RANJEET 1955 Female  [id number]* 10/1/14                                    [address] [address], [place] [address]                            Other identified concerns/needs: None at present    Plan:    Interventions/Referrals: Offered services to the patient - she is scheduled for discharge today. No discharge needs indicated at present.   Patient/caregiver engaged in treatment planning process.     providing psychosocial and supportive counseling, resources, education, assistance and discharge planning as appropriate.  Patient/caregiver state understanding of  available resources,  following, remains available.                           "

## 2019-08-29 NOTE — SUBJECTIVE & OBJECTIVE
Oncology Treatment Plan:   [No treatment plan]    Medications:  Continuous Infusions:   heparin (porcine) in D5W 18 Units/kg/hr (08/28/19 2135)     Scheduled Meds:   cyproheptadine  4 mg Oral TID    famotidine  20 mg Oral Daily    ferrous sulfate  325 mg Oral Daily    lactulose  10 g Oral BID    polyethylene glycol  17 g Oral Daily     PRN Meds:acetaminophen, dextrose 10 % in water (D10W), dextrose 10 % in water (D10W), glucagon (human recombinant), glucose, glucose, heparin (PORCINE), heparin (PORCINE), ondansetron, oxyCODONE, prochlorperazine, traZODone     Review of patient's allergies indicates:  No Known Allergies     Past Medical History:   Diagnosis Date    Alcohol abuse     Anemia     Cancer     liver    Cirrhosis      Past Surgical History:   Procedure Laterality Date    EGD (ESOPHAGOGASTRODUODENOSCOPY) Left 1/9/2019    Performed by Madyson Farrar MD at [facility] ENDO    EMBOLIZATION, YTTRIUM THERAPY N/A 4/10/2019    Performed by [facility] Diagnostic Provider at [facility] OR [address] [place] [address]    ESOPHAGOGASTRODUODENOSCOPY (EGD) N/A 1/26/2018    Performed by Mark Urbina MD at [facility] ENDO    PARACENTESIS N/A 1/30/2018    Performed by Everett Fitzpatrick MD at [facility] CATH LAB    PARACENTESIS N/A 2/6/2015    Performed by Alejandro Myrick MD at [facility] CATH LAB    PARACENTESIS, ABDOMINAL N/A 1/9/2019    Performed by Everett Fitzpatrick MD at [facility] CATH LAB     Family History     None        Tobacco Use    Smoking status: Current Every Day Smoker     Types: Cigarettes    Smokeless tobacco: Never Used    Tobacco comment: 2 cigarettes a day   Substance and Sexual Activity    Alcohol use: No     Frequency: Never     Comment: Previously drank 1 pint a day    Drug use: Not Currently     Types: Cocaine     Comment: last use was 3 years ago    Sexual activity: Not on file       Review of Systems   Constitutional: Positive for appetite change. Negative for activity change, fatigue, fever and unexpected weight change.    HENT: Negative for congestion.    Eyes: Negative for visual disturbance.   Respiratory: Positive for shortness of breath. Negative for cough, choking, chest tightness, wheezing and stridor.    Cardiovascular: Negative for chest pain, palpitations and leg swelling.   Gastrointestinal: Negative for abdominal pain.   Musculoskeletal: Negative for arthralgias.   Skin: Positive for color change and rash. Negative for wound.   Neurological: Negative for headaches.   Psychiatric/Behavioral: Negative for sleep disturbance.     Objective:     Vital Signs (Most Recent):  Temp: 99 °F (37.2 °C) (08/28/19 1758)  Pulse: 84 (08/29/19 0225)  Resp: 17 (08/28/19 1758)  BP: 108/63 (08/29/19 0204)  SpO2: 96 % (08/29/19 0225) Vital Signs (24h Range):  Temp:  [99 °F (37.2 °C)] 99 °F (37.2 °C)  Pulse:  [82-84] 84  Resp:  [17] 17  SpO2:  [96 %-99 %] 96 %  BP: (107-119)/(58-66) 108/63     Weight: 40.8 kg (90 lb)  Body mass index is 18.18 kg/m².  Body surface area is 1.3 meters squared.    No intake or output data in the 24 hours ending 08/29/19 0450    Physical Exam   Constitutional: She is oriented to person, place, and time. No distress.   Bitemporal wasting, thin   HENT:   Head: Normocephalic and atraumatic.   Eyes: Pupils are equal, round, and reactive to light. EOM are normal.   Neck: Neck supple. No thyromegaly present.   Cardiovascular: Normal rate, regular rhythm, normal heart sounds and intact distal pulses.   Pulmonary/Chest: Effort normal and breath sounds normal. No respiratory distress.   Abdominal: Soft. Bowel sounds are normal. She exhibits distension. There is no tenderness.   Distended abdomen with surface veins visible and ectatic / caput medusa   Musculoskeletal: Normal range of motion. She exhibits no edema.   Neurological: She is alert and oriented to person, place, and time.   Skin: Skin is warm and dry. Rash noted. She is not diaphoretic.   Psychiatric: She has a normal mood and affect. Her behavior is normal.    Vitals reviewed.      Significant Labs:   CBC:   Recent Labs   Lab 08/28/19 2000 08/29/19 0314   WBC 4.29 3.84*   HGB 9.2* 9.3*   HCT 29.4* 29.4*    212   , CMP:   Recent Labs   Lab 08/28/19 2000 08/29/19 0314   * 136   K 4.7 4.1    102   CO2 24 23   GLU 95 87   BUN 12 12   CREATININE 1.1 1.0   CALCIUM 9.1 8.9   PROT 8.9* 8.5*   ALBUMIN 2.9* 2.7*   BILITOT 0.9 0.7   ALKPHOS 102 102   AST 53* 52*   ALT 12 11   ANIONGAP 10 11   EGFRNONAA 53.9* >60.0    and Coagulation:   Recent Labs   Lab 08/28/19 2000 08/29/19 0314   INR 1.3*  --    APTT 26.2 55.6*       Diagnostic Results:  I have reviewed all pertinent imaging results/findings within the past 24 hours.

## 2019-08-29 NOTE — ASSESSMENT & PLAN NOTE
Portal Hypertension  History of Variceal Bleed    Stable. No recent bleeds. Abdomen mildly distended but per pt this is approximately the size it usually is.    Plan  - Continue lactulose. Holding lasix and and propranolol in acute PE until monitored briefly to avoid excessive decrease in preload or contractility.

## 2019-08-30 ENCOUNTER — TELEPHONE (OUTPATIENT)
Dept: HEMATOLOGY/ONCOLOGY | Facility: CLINIC | Age: 64
End: 2019-08-30

## 2019-08-30 NOTE — TELEPHONE ENCOUNTER
----- Message from Keith Ramsey sent at 8/30/2019  1:25 PM CDT -----  Pt would like to speak with nurse to get some clarification on blood thinners dosage.  Contact number [phone]

## 2019-08-30 NOTE — TELEPHONE ENCOUNTER
Spoke to patient discussed Eliquis dosing. She will take 2 pills in morning and 2 in the evening for 7 days. She will then take 1 pill twice a day. She will call with any other needs.

## 2019-08-30 NOTE — PLAN OF CARE
On 8/30/19 at 0700: CM received a secure chat message from Rosalba Corrales LPN stating that the patient requested to have her appointments (scheduled for today 8/30/19) moved to next week. Dr. Roberts ok'd the patient's request via secure chat.    CM messaged Dr. Roberts's clinic and requested the appts scheduled for 8/30/19 be rescheduled for next week.    Val Santos, RN, BSN, CM [facility]  Nurse - Med Onc/Gyn Onc

## 2019-08-30 NOTE — DISCHARGE SUMMARY
[facility]  Hematology/Oncology  Discharge Summary      Patient Name: Neena Marks  MRN: [id number]  Admission Date: 8/28/2019  Hospital Length of Stay: 1 days  Discharge Date and Time:  08/29/2019 7:59 PM  Attending Physician: No att. providers found   Discharging Provider: Stewart Roberts MD  Primary Care Provider: [facility]    HPI: 64F with HCC dx 01/2019, EtOH cirrhosis dx 2015 c/b portal HTN, ascites (q3 month tap), hx variceal bleed (EGD 01/2019 small nonbleeding varices eradicated) admitted for bilateral PE found on restaging CT CAP. Pt reports approximately 2-3 months of non-disabling dyspnea which comes on most evenings at rest. It gets better with percocet but mostly bothers her at night. She is still able to bicycle and ambulate without difficulty. Denies hemoptysis, chest pain. Does report some recent R foot swelling in the setting of sorafenib-related hand-foot skin reaction. Reports decreased appetite but denies dysuria, hematuria, melena, hematochezia, diarrhea, constipation. Otherwise feels OK    Went to the ED 08/26 complaining of R foot swelling and discoloration but per documentation no report of the aforementioned pain at that time; was discharged home.    Diagnostics, Onc hx per Dr. Roberts's note:  Hep C and B testing on 4/27/2018- negative     Endoscopy- 1/9/19 - Grade II esophageal varices. Completely eradicated. Banded.                                  - Small hiatal hernia.                                  - Portal hypertensive gastropathy. Treated with argon plasma coagulation (APC).                            CT abdomen- 1/21/2019  1. Large well-circumscribed heterogeneously enhancing right hepatic lobe mass with characteristics consistent with HCC.  Several additional indeterminate subcentimeter hyperenhancing lesions throughout the liver which become isodense.  Mass compresses and narrows the IVC, cannot exclude tumor involvement.  2. Liver  cirrhosis.  3. Moderate volume abdominopelvic ascites.  4. Small paraesophageal varices present.  5. Cholelithiasis.     Patient had an appointment on 2/15/2019, IR consult for Y90 but she missed her appointment.   - She had IR mapping and Hepatic angiography demonstrates a large hypervascular lesion in the right hepatic lobe supplied by branches of the right hepatic artery and accessory left hepatic artery.  Technetium MAA was injected aorta determined lung shunt fraction.  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  - Nuclear medicine scan revealed that majority of tracer goes to the lungs with a liver lung shunt fraction of 68.9%.     Follow up on 7/12/2019   is not a candidate for Y90 or other liver directed therapies.   Started taking Sorafenib 400mg BID from 5/9/2019.  Now cut down to 200 mg BID since 7/6/2019 due to hand foot syndrome  Labs stable, mildly elevated LFts  C/o abdominal pain Abdominal distention is better.  Smokes 2 cig/day  B/L rash on her feet- multiple lesions on plantar surface from the last week, highly suggestive of at least grade 2 or grade 3 hand-foot skin reaction from sorafenib.   Diarrhea improved    * No surgery found *     Hospital Course: 8/28/2019: 64F with HCC dx 01/2019, EtOH cirrhosis dx 2015 c/b portal HTN, ascites (q3 month tap), hx variceal bleed (EGD 01/2019 small nonbleeding varices eradicated) admitted for bilateral PE found on restaging CT CAP.  She was started on heparin drip in the emergency department  08/29/2019:  Patient's heparin has been discontinued and recommended to start her on Lovenox.  Patient declined to take Lovenox every day and preferred to be on oral medication.  She was started on Eliquis starting today and medications have been dispensed at the bedside.  She was recommended to hold off on sorafenib  until she sees her primary oncologist in the clinic.  Patient discharged home in stable condition and recommended to follow up in the clinic in 1 week     Physical Exam   Constitutional: She is oriented to person, place, and time. She appears well-developed.   Thin female lying in bed   HENT:   Head: Normocephalic and atraumatic.   Mouth/Throat: No oropharyngeal exudate.   Eyes: Pupils are equal, round, and reactive to light. EOM are normal. No scleral icterus.   Neck: Normal range of motion. Neck supple. No JVD present.   Cardiovascular: Normal rate and regular rhythm.   Murmur heard.  Pulmonary/Chest: Effort normal and breath sounds normal.   Decreased breath sounds at the bases   Abdominal: Soft. Bowel sounds are normal. She exhibits distension. There is tenderness. There is no guarding.   Musculoskeletal: Normal range of motion. She exhibits edema and tenderness. She exhibits no deformity.   Lymphadenopathy:     She has no cervical adenopathy.   Neurological: She is alert and oriented to person, place, and time. No cranial nerve deficit.   Skin: Capillary refill takes less than 2 seconds. Rash noted.   B/L rash on her feet- multiple lesions on plantar surface  highly suggestive of at least grade 2 or grade 3 hand-foot skin reaction from sorafenib   Psychiatric: She has a normal mood and affect.       Consults:   Consults (From admission, onward)        Status Ordering Provider     Inpatient consult to Hematology/Oncology  Once     Provider:  (Not yet assigned)    SERGIO Espinoza          Significant Diagnostic Studies: Labs:   CMP   Recent Labs   Lab 08/28/19 2000 08/29/19  0314   * 136   K 4.7 4.1    102   CO2 24 23   GLU 95 87   BUN 12 12   CREATININE 1.1 1.0   CALCIUM 9.1 8.9   PROT 8.9* 8.5*   ALBUMIN 2.9* 2.7*   BILITOT 0.9 0.7   ALKPHOS 102 102   AST 53* 52*   ALT 12 11   ANIONGAP 10 11   ESTGFRAFRICA >60.0 >60.0   EGFRNONAA 53.9* >60.0   , CBC   Recent Labs   Lab 08/28/19 2000  08/29/19  0314   WBC 4.29 3.84*   HGB 9.2* 9.3*   HCT 29.4* 29.4*    212    and INR   Lab Results   Component Value Date    INR 1.3 (H) 08/28/2019    INR 1.2 07/12/2019    INR 1.2 06/14/2019       Pending Diagnostic Studies:     None        Final Active Diagnoses:    Diagnosis Date Noted POA    PRINCIPAL PROBLEM:  Acute pulmonary embolus [I26.99] 08/29/2019 Yes    Ascites due to alcoholic cirrhosis [K70.31]  Yes    Hand foot syndrome [L27.1]  Yes    Pulmonary embolism [I26.99]  Yes    HCC (hepatocellular carcinoma) [C22.0] 02/22/2019 Yes    Alcoholic cirrhosis [K70.30] 01/08/2019 Yes     Chronic    Iron deficiency anemia due to chronic blood loss [D50.0] 02/04/2015 Yes      Problems Resolved During this Admission:      Discharged Condition: stable    Disposition: Home or Self Care    Follow Up:  Follow-up Information     Stewart Roberts MD.    Specialty:  Hematology and Oncology  Why:  Follow-Up Appointment as scheduled by the clinic  Contact information:  [address] [facility] [address]  [phone]                 Patient Instructions:      Diet Adult Regular     Notify your health care provider if you experience any of the following:  temperature >100.4     Notify your health care provider if you experience any of the following:  persistent nausea and vomiting or diarrhea     Notify your health care provider if you experience any of the following:  severe uncontrolled pain     Notify your health care provider if you experience any of the following:  redness, tenderness, or signs of infection (pain, swelling, redness, odor or green/yellow discharge around incision site)     Notify your health care provider if you experience any of the following:  difficulty breathing or increased cough     Activity as tolerated     Medications:  Reconciled Home Medications:      Medication List      START taking these medications    ELIQUIS 5 mg (74 tabs) Dspk  Generic drug:  apixaban  For the first 7  days take two 5 mg tablets twice daily.  After 7 days take one 5 mg tablet twice daily.        CHANGE how you take these medications    SORAfenib 200 mg tablet  Commonly known as:  NEXAVAR  Take 2 tablets (400 mg total) by mouth 2 (two) times daily Hold until MD says to restart.  What changed:  additional instructions        CONTINUE taking these medications    (MAGIC MOUTHWASH) 1:1:1 BENADRYL 12.5MG/5ML LIQ, ALUMINUM & MAGNESIUM  Swish and spit 10 mLs every 4 (four) hours as needed. for mouth sores     cholecalciferol (vitamin D3) 50,000 unit Tab  Take 125 mcg by mouth.     cyproheptadine 4 mg tablet  Commonly known as:  PERIACTIN  Take 1 tablet (4 mg total) by mouth 3 (three) times daily as needed.     famotidine 20 MG tablet  Commonly known as:  PEPCID  Take 1 tablet (20 mg total) by mouth once daily.     ferrous sulfate 325 mg (65 mg iron) Tab tablet  Commonly known as:  FEOSOL  Take 1 tablet (325 mg total) by mouth 2 (two) times daily.     furosemide 40 MG tablet  Commonly known as:  LASIX  Take 1 tablet (40 mg total) by mouth once daily.     hydrocortisone 1 % cream  Apply to affected area 2 times daily     lactulose 10 gram/15 ml 10 gram/15 mL (15 mL) solution  Commonly known as:  CHRONULAC  Take 10 g by mouth 2 (two) times daily.     magnesium oxide 400 mg (241.3 mg magnesium) tablet  Commonly known as:  MAG-OX  Take 1 tablet (400 mg total) by mouth once daily.     meloxicam 15 MG tablet  Commonly known as:  MOBIC  Take 15 mg by mouth nightly as needed for Pain.     ONE DAILY MULTIVITAMIN per tablet  Generic drug:  multivitamin  Take 1 tablet by mouth once daily.     oxyCODONE 5 MG immediate release tablet  Commonly known as:  ROXICODONE  Take 1 tablet (5 mg total) by mouth every 6 (six) hours as needed for Pain. Greater than 1 week supply medically indicated     propranolol 10 MG tablet  Commonly known as:  INDERAL  Take 1 tablet (10 mg total) by mouth 2 (two) times daily.     traZODone 50 MG  tablet  Commonly known as:  DESYREL  TK 1 T PO  HS PRN INSOMNIA        STOP taking these medications    cyclobenzaprine 10 MG tablet  Commonly known as:  KAL Roberts MD  Hematology/Oncology  [facility]      I have reviewed the notes, assessments, and/or procedures performed by the housestaff, as above.  I have personally interviewed and examined the patient at the beside, and rounded with the housestaff. I concur with her/his assessment and plan and the documentation of Neena Marks.  I, Dr. Barry Schaffer, personally spent more than  35 mins during this encounter, greater than 50% was spent in direct counseling and/or coordination of care.     Barry Schaffer M.D., M.S., F.A.C.P.  Hematology/Oncology Attending  [facility]

## 2019-08-30 NOTE — HOSPITAL COURSE
8/28/2019: 64F with HCC dx 01/2019, EtOH cirrhosis dx 2015 c/b portal HTN, ascites (q3 month tap), hx variceal bleed (EGD 01/2019 small nonbleeding varices eradicated) admitted for bilateral PE found on restaging CT CAP.  She was started on heparin drip in the emergency department  08/29/2019:  Patient's heparin has been discontinued and recommended to start her on Lovenox.  Patient declined to take Lovenox every day and preferred to be on oral medication.  She was started on Eliquis starting today and medications have been dispensed at the bedside.  She was recommended to hold off on sorafenib until she sees her primary oncologist in the clinic.  Patient discharged home in stable condition and recommended to follow up in the clinic in 1 week

## 2019-09-03 DIAGNOSIS — C22.0 HCC (HEPATOCELLULAR CARCINOMA): Primary | ICD-10-CM

## 2019-09-05 ENCOUNTER — TELEPHONE (OUTPATIENT)
Dept: HEMATOLOGY/ONCOLOGY | Facility: CLINIC | Age: 64
End: 2019-09-05

## 2019-09-05 NOTE — TELEPHONE ENCOUNTER
Spoke with patient about this and she is asking that the doctor please give her a call to discuss these side effects and what exactly may be causing them. Informed patient would make Dr Roberts aware and see if he would be able to reach out to her to discuss further.     ----- Message from Yenifer Park RN sent at 9/5/2019 11:36 AM CDT -----  Hello,    This patient is seeing Dr. Roberts with Dr. Palomo.    -Yenifer   ----- Message -----  From: Stella Torres RN  Sent: 9/5/2019  10:25 AM  To: Stewart Roberts MD, Yenifer Park RN        ----- Message -----  From: Ling Alves  Sent: 9/5/2019  10:11 AM  To: Alejandra William Staff    Pt called regarding her appt scheduled for today and that she can't make it in. Pt stated her feet are swollen she cant walk on them and her face has turn black. Pt is unsure if it's from the medication or not but she asked that someone from the Dr office give her call back.      Pt contact # [phone]    Thanks

## 2019-09-06 ENCOUNTER — LAB VISIT (OUTPATIENT)
Dept: LAB | Facility: HOSPITAL | Age: 64
End: 2019-09-06
Attending: STUDENT IN AN ORGANIZED HEALTH CARE EDUCATION/TRAINING PROGRAM
Payer: MEDICAID

## 2019-09-06 ENCOUNTER — OFFICE VISIT (OUTPATIENT)
Dept: HEMATOLOGY/ONCOLOGY | Facility: CLINIC | Age: 64
End: 2019-09-06
Payer: MEDICAID

## 2019-09-06 VITALS
WEIGHT: 90.63 LBS | OXYGEN SATURATION: 98 % | RESPIRATION RATE: 16 BRPM | SYSTOLIC BLOOD PRESSURE: 122 MMHG | HEIGHT: 59 IN | DIASTOLIC BLOOD PRESSURE: 75 MMHG | TEMPERATURE: 98 F | HEART RATE: 97 BPM | BODY MASS INDEX: 18.27 KG/M2

## 2019-09-06 DIAGNOSIS — D50.0 IRON DEFICIENCY ANEMIA DUE TO CHRONIC BLOOD LOSS: ICD-10-CM

## 2019-09-06 DIAGNOSIS — K70.31 ALCOHOLIC CIRRHOSIS OF LIVER WITH ASCITES: ICD-10-CM

## 2019-09-06 DIAGNOSIS — E43 SEVERE MALNUTRITION: ICD-10-CM

## 2019-09-06 DIAGNOSIS — E87.6 HYPOKALEMIA: ICD-10-CM

## 2019-09-06 DIAGNOSIS — G25.81 RESTLESS LEG SYNDROME: ICD-10-CM

## 2019-09-06 DIAGNOSIS — L27.1 HAND FOOT SYNDROME: ICD-10-CM

## 2019-09-06 DIAGNOSIS — E83.42 HYPOMAGNESEMIA: ICD-10-CM

## 2019-09-06 DIAGNOSIS — N17.9 AKI (ACUTE KIDNEY INJURY): ICD-10-CM

## 2019-09-06 DIAGNOSIS — K21.9 GASTROESOPHAGEAL REFLUX DISEASE, ESOPHAGITIS PRESENCE NOT SPECIFIED: ICD-10-CM

## 2019-09-06 DIAGNOSIS — C22.0 HCC (HEPATOCELLULAR CARCINOMA): Primary | ICD-10-CM

## 2019-09-06 DIAGNOSIS — G89.3 CANCER ASSOCIATED PAIN: ICD-10-CM

## 2019-09-06 DIAGNOSIS — C22.0 HCC (HEPATOCELLULAR CARCINOMA): ICD-10-CM

## 2019-09-06 DIAGNOSIS — I85.10 ESOPHAGEAL VARICES IN ALCOHOLIC CIRRHOSIS: ICD-10-CM

## 2019-09-06 DIAGNOSIS — K70.30 ESOPHAGEAL VARICES IN ALCOHOLIC CIRRHOSIS: ICD-10-CM

## 2019-09-06 LAB
AFP SERPL-MCNC: 7.8 NG/ML (ref 0–8.4)
ALBUMIN SERPL BCP-MCNC: 3 G/DL (ref 3.5–5.2)
ALP SERPL-CCNC: 97 U/L (ref 55–135)
ALT SERPL W/O P-5'-P-CCNC: 22 U/L (ref 10–44)
ANION GAP SERPL CALC-SCNC: 10 MMOL/L (ref 8–16)
AST SERPL-CCNC: 99 U/L (ref 10–40)
BASOPHILS # BLD AUTO: 0.03 K/UL (ref 0–0.2)
BASOPHILS NFR BLD: 0.6 % (ref 0–1.9)
BILIRUB SERPL-MCNC: 1.1 MG/DL (ref 0.1–1)
BUN SERPL-MCNC: 8 MG/DL (ref 8–23)
CALCIUM SERPL-MCNC: 9.4 MG/DL (ref 8.7–10.5)
CHLORIDE SERPL-SCNC: 101 MMOL/L (ref 95–110)
CO2 SERPL-SCNC: 25 MMOL/L (ref 23–29)
CREAT SERPL-MCNC: 1 MG/DL (ref 0.5–1.4)
DIFFERENTIAL METHOD: ABNORMAL
EOSINOPHIL # BLD AUTO: 0.1 K/UL (ref 0–0.5)
EOSINOPHIL NFR BLD: 1.8 % (ref 0–8)
ERYTHROCYTE [DISTWIDTH] IN BLOOD BY AUTOMATED COUNT: 20.2 % (ref 11.5–14.5)
EST. GFR  (AFRICAN AMERICAN): >60 ML/MIN/1.73 M^2
EST. GFR  (NON AFRICAN AMERICAN): >60 ML/MIN/1.73 M^2
GLUCOSE SERPL-MCNC: 120 MG/DL (ref 70–110)
HCT VFR BLD AUTO: 30.9 % (ref 37–48.5)
HGB BLD-MCNC: 9.8 G/DL (ref 12–16)
IMM GRANULOCYTES # BLD AUTO: 0.02 K/UL (ref 0–0.04)
IMM GRANULOCYTES NFR BLD AUTO: 0.4 % (ref 0–0.5)
INR PPP: 1.6 (ref 0.8–1.2)
LYMPHOCYTES # BLD AUTO: 1.1 K/UL (ref 1–4.8)
LYMPHOCYTES NFR BLD: 22.2 % (ref 18–48)
MAGNESIUM SERPL-MCNC: 1.7 MG/DL (ref 1.6–2.6)
MCH RBC QN AUTO: 21.4 PG (ref 27–31)
MCHC RBC AUTO-ENTMCNC: 31.7 G/DL (ref 32–36)
MCV RBC AUTO: 67 FL (ref 82–98)
MONOCYTES # BLD AUTO: 0.5 K/UL (ref 0.3–1)
MONOCYTES NFR BLD: 9.4 % (ref 4–15)
NEUTROPHILS # BLD AUTO: 3.2 K/UL (ref 1.8–7.7)
NEUTROPHILS NFR BLD: 65.6 % (ref 38–73)
NRBC BLD-RTO: 0 /100 WBC
PHOSPHATE SERPL-MCNC: 3.7 MG/DL (ref 2.7–4.5)
PLATELET # BLD AUTO: 266 K/UL (ref 150–350)
PMV BLD AUTO: ABNORMAL FL (ref 9.2–12.9)
POTASSIUM SERPL-SCNC: 3.8 MMOL/L (ref 3.5–5.1)
PROT SERPL-MCNC: 9.4 G/DL (ref 6–8.4)
PROTHROMBIN TIME: 15.3 SEC (ref 9–12.5)
RBC # BLD AUTO: 4.59 M/UL (ref 4–5.4)
SODIUM SERPL-SCNC: 136 MMOL/L (ref 136–145)
WBC # BLD AUTO: 4.91 K/UL (ref 3.9–12.7)

## 2019-09-06 PROCEDURE — 85610 PROTHROMBIN TIME: CPT

## 2019-09-06 PROCEDURE — 99214 OFFICE O/P EST MOD 30 MIN: CPT | Mod: PBBFAC | Performed by: STUDENT IN AN ORGANIZED HEALTH CARE EDUCATION/TRAINING PROGRAM

## 2019-09-06 PROCEDURE — 84100 ASSAY OF PHOSPHORUS: CPT

## 2019-09-06 PROCEDURE — 99215 OFFICE O/P EST HI 40 MIN: CPT | Mod: S$PBB,,, | Performed by: STUDENT IN AN ORGANIZED HEALTH CARE EDUCATION/TRAINING PROGRAM

## 2019-09-06 PROCEDURE — 82105 ALPHA-FETOPROTEIN SERUM: CPT

## 2019-09-06 PROCEDURE — 99999 PR PBB SHADOW E&M-EST. PATIENT-LVL IV: CPT | Mod: PBBFAC,,, | Performed by: STUDENT IN AN ORGANIZED HEALTH CARE EDUCATION/TRAINING PROGRAM

## 2019-09-06 PROCEDURE — 36415 COLL VENOUS BLD VENIPUNCTURE: CPT

## 2019-09-06 PROCEDURE — 83735 ASSAY OF MAGNESIUM: CPT

## 2019-09-06 PROCEDURE — 99215 PR OFFICE/OUTPT VISIT, EST, LEVL V, 40-54 MIN: ICD-10-PCS | Mod: S$PBB,,, | Performed by: STUDENT IN AN ORGANIZED HEALTH CARE EDUCATION/TRAINING PROGRAM

## 2019-09-06 PROCEDURE — 85025 COMPLETE CBC W/AUTO DIFF WBC: CPT

## 2019-09-06 PROCEDURE — 80053 COMPREHEN METABOLIC PANEL: CPT

## 2019-09-06 PROCEDURE — 99999 PR PBB SHADOW E&M-EST. PATIENT-LVL IV: ICD-10-PCS | Mod: PBBFAC,,, | Performed by: STUDENT IN AN ORGANIZED HEALTH CARE EDUCATION/TRAINING PROGRAM

## 2019-09-06 RX ORDER — HYDROCORTISONE 1 %
CREAM (GRAM) TOPICAL
Qty: 140 G | Refills: 1 | Status: CANCELLED | OUTPATIENT
Start: 2019-09-06 | End: 2020-09-05

## 2019-09-06 RX ORDER — HYDROCORTISONE 1 %
CREAM (GRAM) TOPICAL
Qty: 140 G | Refills: 1 | Status: ON HOLD | OUTPATIENT
Start: 2019-09-06 | End: 2019-11-26

## 2019-09-06 NOTE — PLAN OF CARE
START OFF PATHWAY REGIMEN - [Other Dx]            Nivolumab (Opdivo(R))           Additional Orders: Ref: Opdivo (nivolumab) [package insert].   [place], [place]: [employer], [place]; 2018.    **Always confirm dose/schedule in your pharmacy ordering system**    Patient Characteristics:  Intent of Therapy:  Non-Curative / Palliative Intent, Discussed with Patient

## 2019-09-06 NOTE — PROGRESS NOTES
PATIENT: Neena Marks  MRN: [id number]  DATE: 9/5/2019      Diagnosis:   1. HCC (hepatocellular carcinoma)    2. Hand foot syndrome    3. Cancer associated pain    4. Iron deficiency anemia due to chronic blood loss    5. Severe malnutrition    6. Hypokalemia    7. Alcoholic cirrhosis of liver with ascites    8. PRACHI (acute kidney injury)    9. Esophageal varices in alcoholic cirrhosis    10. Gastroesophageal reflux disease, esophagitis presence not specified    11. Restless leg syndrome    12. Hypomagnesemia        Chief Complaint: No chief complaint on file.    Neena Marks is a 63 y.o. female with PMHx of alcoholic cirrhosis since 2015, Portal HTN, ascites, variceal bleeding, newly diagnosed with HCC now presented to oncology clinic for further evaluation.     Patient was a chronic alcoholic for the last 40 years and was diagnosed with alcoholic cirrhosis in 2015 however she continued to drink alcohol until January 2018 and has quit since then. She had history of upper GI bleeding with hematochezia in January 2018 s/p banding. She also had ascites since the last 1.5 to 2 years as is getting paracentesis q 2-3 months. She had no history of SBP in the past. She is currently on lasix and spironolactone for ascites and lower extremity edema.  She does not have jaundice but complaints of severe pruritis which is moderately controlled with hydroxyzine.      She currently has abdominal distention and abdominal discomfort.     CMP normal bilirubin and creatinine. Albumin of 2.8. Alk phos 138, AST 58   CBC- anemia with Hb of 8.7     AFP tumor marker is normal     Hep C and B testing on 4/27/2018- negative     Endoscopy- 1/9/19 - Grade II esophageal varices. Completely eradicated. Banded, Small hiatal hernia., Portal hypertensive gastropathy. Treated with argon plasma coagulation (APC).     Patient had an appointment on 2/15/2019, IR consult for Y90 but she missed her appointment.   - She had IR mapping and Hepatic  angiography demonstrates a large hypervascular lesion in the right hepatic lobe supplied by branches of the right hepatic artery and accessory left hepatic artery.  Technetium MAA was injected aorta determined lung shunt fraction.  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  - Nuclear medicine scan revealed that majority of tracer goes to the lungs with a liver lung shunt fraction of 68.9%.     Follow up on 9/6/2019   is not a candidate for Y90 or other liver directed therapies.   Started taking Sorafenib 400mg BID from 5/9/2019.    Have cut down to 200 mg BID since 7/6/2019 due to hand foot syndrome  Stopped Sorafenib on 8/16/2019 due to persistent desquamation of hand foot syndrome  CT Chest abdomen and pelvis done on 08/28/2019 revealed disease progression and also new bilateral pulmonary embolism. She was admitted to [facility] main campus and discharged on Eliquis (Patient declined Lovenox)   Denied abdominal pain but c/o abdominal distention  Still smokes 2-3 cig/day  B/L rash on her feet- multiple lesions on plantar surface (grade 2 or 3 hand-foot skin reaction from sorafenib) is improving   Evaluating if she is candidate for Toray trial, cohort E. spoke to Diana Robison (ext [phone])      Subjective:    Initial History: Ms. Marks is a 64 y.o. female who returns for follow up.     Past Medical History:   Past Medical History:   Diagnosis Date    Alcohol abuse     Anemia     Cancer     liver    Cirrhosis        Past Surgical HIstory:   Past Surgical History:   Procedure Laterality Date    EGD (ESOPHAGOGASTRODUODENOSCOPY) Left 1/9/2019    Performed by Madyson Farrar MD at [facility] ENDO    EMBOLIZATION, YTTRIUM THERAPY N/A 4/10/2019    Performed by [facility] Diagnostic Provider at [facility] FLR    ESOPHAGOGASTRODUODENOSCOPY (EGD) N/A 1/26/2018    Performed by Mark SPARROW  MD Ciara at [facility] ENDO    PARACENTESIS N/A 1/30/2018    Performed by Everett Fitzpatrick MD at [facility] CATH LAB    PARACENTESIS N/A 2/6/2015    Performed by Alejandro Myrick MD at [facility] CATH LAB    PARACENTESIS, ABDOMINAL N/A 1/9/2019    Performed by Everett Fitzpatrick MD at [facility] CATH LAB       Family History: No family history on file.    Social History:  reports that she has been smoking cigarettes.  She has never used smokeless tobacco. She reports that she has current or past drug history. Drug: Cocaine. She reports that she does not drink alcohol.    Allergies:  Review of patient's allergies indicates:  No Known Allergies    Medications:  Current Outpatient Medications   Medication Sig Dispense Refill    (Magic mouthwash) 1:1:1 Benadryl 12.5mg/5ml liq, aluminum & magnesium hydroxide-simehticone (Maalox), lidocaine viscous 2% Swish and spit 10 mLs every 4 (four) hours as needed. for mouth sores 450 mL 5    apixaban (ELIQUIS) 5 mg (74 tabs) DsPk For the first 7 days take two 5 mg tablets twice daily.  After 7 days take one 5 mg tablet twice daily. 74 tablet 0    cholecalciferol, vitamin D3, 50,000 unit Tab Take 125 mcg by mouth.      cyproheptadine (PERIACTIN) 4 mg tablet Take 1 tablet (4 mg total) by mouth 3 (three) times daily as needed. 90 tablet 2    famotidine (PEPCID) 20 MG tablet Take 1 tablet (20 mg total) by mouth once daily. 30 tablet 0    ferrous sulfate (FEOSOL) 325 mg (65 mg iron) Tab tablet Take 1 tablet (325 mg total) by mouth 2 (two) times daily. 30 tablet 5    furosemide (LASIX) 40 MG tablet Take 1 tablet (40 mg total) by mouth once daily. 30 tablet 5    hydrocortisone 1 % cream Apply to affected area 2 times daily 140 g 1    lactulose 10 gram/15 ml (CHRONULAC) 10 gram/15 mL (15 mL) solution Take 10 g by mouth 2 (two) times daily.      magnesium oxide (MAGOX) 400 mg (241.3 mg magnesium) tablet Take 1 tablet (400 mg total) by mouth once daily. 200 tablet 1    meloxicam (MOBIC)  15 MG tablet Take 15 mg by mouth nightly as needed for Pain.      multivitamin (ONE DAILY MULTIVITAMIN) per tablet Take 1 tablet by mouth once daily.      oxyCODONE (ROXICODONE) 5 MG immediate release tablet Take 1 tablet (5 mg total) by mouth every 6 (six) hours as needed for Pain. Greater than 1 week supply medically indicated 60 tablet 0    propranolol (INDERAL) 10 MG tablet Take 1 tablet (10 mg total) by mouth 2 (two) times daily. 60 tablet 2    SORAfenib (NEXAVAR) 200 mg tablet Take 2 tablets (400 mg total) by mouth 2 (two) times daily Hold until MD says to restart. 120 tablet 11    traZODone (DESYREL) 50 MG tablet TK 1 T PO  HS PRN INSOMNIA  2     No current facility-administered medications for this visit.        Review of Systems   Constitutional: Positive for activity change and fatigue. Negative for appetite change, chills and fever.   HENT: Negative for nosebleeds and trouble swallowing.    Respiratory: Positive for shortness of breath. Negative for cough and chest tightness.    Gastrointestinal: Positive for abdominal distention. Negative for abdominal pain, blood in stool, constipation, nausea and vomiting.   Genitourinary: Negative for dysuria, flank pain, frequency and hematuria.   Musculoskeletal: Negative for back pain and joint swelling.   Skin: Positive for rash. Negative for color change.   Neurological: Negative for seizures, syncope and headaches.   Hematological: Negative for adenopathy. Does not bruise/bleed easily.   Psychiatric/Behavioral: Negative for agitation and behavioral problems.       ECOG Performance Status: 2   Objective:      Vitals: There were no vitals filed for this visit.  BMI: There is no height or weight on file to calculate BMI.    Physical Exam   Constitutional: She is oriented to person, place, and time. She appears well-developed.   Thin female in no acute distress   HENT:   Head: Normocephalic and atraumatic.   Mouth/Throat: No oropharyngeal exudate.   Eyes:  Pupils are equal, round, and reactive to light. EOM are normal. No scleral icterus.   Neck: Normal range of motion. Neck supple. No JVD present.   Cardiovascular: Normal rate and regular rhythm.   Murmur heard.  Pulmonary/Chest: Effort normal and breath sounds normal. She has no wheezes.   Decreased breath sounds at the bases   Abdominal: Soft. Bowel sounds are normal. She exhibits distension. There is no tenderness. There is no guarding.   Musculoskeletal: Normal range of motion. She exhibits edema and tenderness. She exhibits no deformity.   Lymphadenopathy:     She has no cervical adenopathy.   Neurological: She is alert and oriented to person, place, and time.   Skin: Skin is warm. Capillary refill takes less than 2 seconds. Rash noted.   painful desquamation of soles of her feet   Psychiatric: She has a normal mood and affect. Her behavior is normal.       Laboratory Data:  No visits with results within 1 Week(s) from this visit.   Latest known visit with results is:   Admission on 08/28/2019, Discharged on 08/29/2019   Component Date Value Ref Range Status    WBC 08/28/2019 4.29  3.90 - 12.70 K/uL Final    RBC 08/28/2019 4.32  4.00 - 5.40 M/uL Final    Hemoglobin 08/28/2019 9.2* 12.0 - 16.0 g/dL Final    Hematocrit 08/28/2019 29.4* 37.0 - 48.5 % Final    Mean Corpuscular Volume 08/28/2019 68* 82 - 98 fL Final    Mean Corpuscular Hemoglobin 08/28/2019 21.3* 27.0 - 31.0 pg Final    Mean Corpuscular Hemoglobin Conc 08/28/2019 31.3* 32.0 - 36.0 g/dL Final    RDW 08/28/2019 18.0* 11.5 - 14.5 % Final    Platelets 08/28/2019 230  150 - 350 K/uL Final    MPV 08/28/2019 SEE COMMENT  9.2 - 12.9 fL Final    Result not available.    Immature Granulocytes 08/28/2019 0.2  0.0 - 0.5 % Final    Gran # (ANC) 08/28/2019 2.6  1.8 - 7.7 K/uL Final    Immature Grans (Abs) 08/28/2019 0.01  0.00 - 0.04 K/uL Final    Comment: Mild elevation in immature granulocytes is non specific and   can be seen in a variety of  conditions including stress response,   acute inflammation, trauma and pregnancy. Correlation with other   laboratory and clinical findings is essential.      Lymph # 08/28/2019 1.1  1.0 - 4.8 K/uL Final    Mono # 08/28/2019 0.5  0.3 - 1.0 K/uL Final    Eos # 08/28/2019 0.1  0.0 - 0.5 K/uL Final    Baso # 08/28/2019 0.02  0.00 - 0.20 K/uL Final    nRBC 08/28/2019 0  0 /100 WBC Final    Gran% 08/28/2019 60.9  38.0 - 73.0 % Final    Lymph% 08/28/2019 24.7  18.0 - 48.0 % Final    Mono% 08/28/2019 10.7  4.0 - 15.0 % Final    Eosinophil% 08/28/2019 3.0  0.0 - 8.0 % Final    Basophil% 08/28/2019 0.5  0.0 - 1.9 % Final    Differential Method 08/28/2019 Automated   Final    Sodium 08/28/2019 135* 136 - 145 mmol/L Final    Potassium 08/28/2019 4.7  3.5 - 5.1 mmol/L Final    Chloride 08/28/2019 101  95 - 110 mmol/L Final    CO2 08/28/2019 24  23 - 29 mmol/L Final    Glucose 08/28/2019 95  70 - 110 mg/dL Final    BUN, Bld 08/28/2019 12  8 - 23 mg/dL Final    Creatinine 08/28/2019 1.1  0.5 - 1.4 mg/dL Final    Calcium 08/28/2019 9.1  8.7 - 10.5 mg/dL Final    Total Protein 08/28/2019 8.9* 6.0 - 8.4 g/dL Final    Albumin 08/28/2019 2.9* 3.5 - 5.2 g/dL Final    Total Bilirubin 08/28/2019 0.9  0.1 - 1.0 mg/dL Final    Comment: For infants and newborns, interpretation of results should be based  on gestational age, weight and in agreement with clinical  observations.  Premature Infant recommended reference ranges:  Up to 24 hours.............<8.0 mg/dL  Up to 48 hours............<12.0 mg/dL  3-5 days..................<15.0 mg/dL  6-29 days.................<15.0 mg/dL      Alkaline Phosphatase 08/28/2019 102  55 - 135 U/L Final    AST 08/28/2019 53* 10 - 40 U/L Final    ALT 08/28/2019 12  10 - 44 U/L Final    Anion Gap 08/28/2019 10  8 - 16 mmol/L Final    eGFR if African American 08/28/2019 >60.0  >60 mL/min/1.73 m^2 Final    eGFR if non African American 08/28/2019 53.9* >60 mL/min/1.73 m^2 Final     Comment: Calculation used to obtain the estimated glomerular filtration  rate (eGFR) is the CKD-EPI equation.       Prothrombin Time 08/28/2019 13.2* 9.0 - 12.5 sec Final    INR 08/28/2019 1.3* 0.8 - 1.2 Final    Comment: Coumadin Therapy:  2.0 - 3.0 for INR for all indicators except mechanical heart valves  and antiphospholipid syndromes which should use 2.5 - 3.5.      aPTT 08/28/2019 26.2  21.0 - 32.0 sec Final    aPTT therapeutic range = 39-69 seconds    aPTT 08/29/2019 55.6* 21.0 - 32.0 sec Final    aPTT therapeutic range = 39-69 seconds    Sodium 08/29/2019 136  136 - 145 mmol/L Final    Potassium 08/29/2019 4.1  3.5 - 5.1 mmol/L Final    Chloride 08/29/2019 102  95 - 110 mmol/L Final    CO2 08/29/2019 23  23 - 29 mmol/L Final    Glucose 08/29/2019 87  70 - 110 mg/dL Final    BUN, Bld 08/29/2019 12  8 - 23 mg/dL Final    Creatinine 08/29/2019 1.0  0.5 - 1.4 mg/dL Final    Calcium 08/29/2019 8.9  8.7 - 10.5 mg/dL Final    Total Protein 08/29/2019 8.5* 6.0 - 8.4 g/dL Final    Albumin 08/29/2019 2.7* 3.5 - 5.2 g/dL Final    Total Bilirubin 08/29/2019 0.7  0.1 - 1.0 mg/dL Final    Comment: For infants and newborns, interpretation of results should be based  on gestational age, weight and in agreement with clinical  observations.  Premature Infant recommended reference ranges:  Up to 24 hours.............<8.0 mg/dL  Up to 48 hours............<12.0 mg/dL  3-5 days..................<15.0 mg/dL  6-29 days.................<15.0 mg/dL      Alkaline Phosphatase 08/29/2019 102  55 - 135 U/L Final    AST 08/29/2019 52* 10 - 40 U/L Final    ALT 08/29/2019 11  10 - 44 U/L Final    Anion Gap 08/29/2019 11  8 - 16 mmol/L Final    eGFR if African American 08/29/2019 >60.0  >60 mL/min/1.73 m^2 Final    eGFR if non African American 08/29/2019 >60.0  >60 mL/min/1.73 m^2 Final    Comment: Calculation used to obtain the estimated glomerular filtration  rate (eGFR) is the CKD-EPI equation.       Magnesium  08/29/2019 1.8  1.6 - 2.6 mg/dL Final    Phosphorus 08/29/2019 3.0  2.7 - 4.5 mg/dL Final    WBC 08/29/2019 3.84* 3.90 - 12.70 K/uL Final    RBC 08/29/2019 4.44  4.00 - 5.40 M/uL Final    Hemoglobin 08/29/2019 9.3* 12.0 - 16.0 g/dL Final    Hematocrit 08/29/2019 29.4* 37.0 - 48.5 % Final    Mean Corpuscular Volume 08/29/2019 66* 82 - 98 fL Final    Mean Corpuscular Hemoglobin 08/29/2019 20.9* 27.0 - 31.0 pg Final    Mean Corpuscular Hemoglobin Conc 08/29/2019 31.6* 32.0 - 36.0 g/dL Final    RDW 08/29/2019 18.3* 11.5 - 14.5 % Final    Platelets 08/29/2019 212  150 - 350 K/uL Final    MPV 08/29/2019 SEE COMMENT  9.2 - 12.9 fL Final    Result not available.    Immature Granulocytes 08/29/2019 0.5  0.0 - 0.5 % Final    Gran # (ANC) 08/29/2019 2.1  1.8 - 7.7 K/uL Final    Immature Grans (Abs) 08/29/2019 0.02  0.00 - 0.04 K/uL Final    Comment: Mild elevation in immature granulocytes is non specific and   can be seen in a variety of conditions including stress response,   acute inflammation, trauma and pregnancy. Correlation with other   laboratory and clinical findings is essential.      Lymph # 08/29/2019 1.2  1.0 - 4.8 K/uL Final    Mono # 08/29/2019 0.4  0.3 - 1.0 K/uL Final    Eos # 08/29/2019 0.1  0.0 - 0.5 K/uL Final    Baso # 08/29/2019 0.02  0.00 - 0.20 K/uL Final    nRBC 08/29/2019 0  0 /100 WBC Final    Gran% 08/29/2019 54.7  38.0 - 73.0 % Final    Lymph% 08/29/2019 30.7  18.0 - 48.0 % Final    Mono% 08/29/2019 10.7  4.0 - 15.0 % Final    Eosinophil% 08/29/2019 2.9  0.0 - 8.0 % Final    Basophil% 08/29/2019 0.5  0.0 - 1.9 % Final    Differential Method 08/29/2019 Automated   Final    aPTT 08/29/2019 84.2* 21.0 - 32.0 sec Final    aPTT therapeutic range = 39-69 seconds         Imaging:     Endoscopy- 1/9/19 - Grade II esophageal varices. Completely eradicated. Banded.                                  - Small hiatal hernia.                                  - Portal hypertensive  gastropathy. Treated with argon plasma coagulation (APC).                            CT abdomen- 1/21/2019  1. Large well-circumscribed heterogeneously enhancing right hepatic lobe mass with characteristics consistent with HCC.  Several additional indeterminate subcentimeter hyperenhancing lesions throughout the liver which become isodense.  Mass compresses and narrows the IVC, cannot exclude tumor involvement.  2. Liver cirrhosis.  3. Moderate volume abdominopelvic ascites.  4. Small paraesophageal varices present.  5. Cholelithiasis.     CT Chest  1.  No evidence of metastatic disease in this patient with history of HCC.  There are no measurable lesions per RECIST criteria.    2.  Mild centrilobular emphysematous changes with upper lung zone predominance.     IR Embolization for Tumor Organ Ischemia  Hepatic angiography demonstrates a large hypervascular lesion in the right hepatic lobe supplied by branches of the right hepatic artery and accessory left hepatic artery..  Technetium MAA was injected aorta determined lung shunt fraction.  There was a large arterial portal shunt with hypervascularity extending into the IVC tumor thrombus.    Plan:    Patient will be sent to nuclear medicine for determination of lung shunt fraction.     Nuclear medicine scan  1.  The majority of tracer goes to the lungs with a liver lung shunt fraction of 68.9%.    2.  No other significant extrahepatic activity     ECHO- 5/24/19  · Increased (hyperdynamic) left ventricular systolic function. The estimated ejection fraction is 76%  · Grade I (mild) left ventricular diastolic dysfunction consistent with impaired relaxation.  · Mild left atrial enlargement     EKG  Normal sinus rhythm  Normal ECG  When compared with ECG of 08-JAN-2019 07:22,  No significant change was found    CT Chest Abdomen and pelvis- 8/28/2019  Slight increased size of the heterogeneous enhancing hepatic mass with washout today measuring 9 x 8.4 cm compared 8.9 x  7.6 prior.  There is increase in the hypodensity extending into the IVC now extending into the right atrium.  This either represents bland or tumor thrombus.  Multiple hyperenhancing small nodule lesions in the liver.  At least 1 does washout concerning for but not diagnostic of hepatocellular cancer.  Bilateral pulmonary artery emboli now evident.  Cholelithiasis.    Assessment:       1. HCC (hepatocellular carcinoma)    2. Hand foot syndrome    3. Cancer associated pain    4. Iron deficiency anemia due to chronic blood loss    5. Severe malnutrition    6. Hypokalemia    7. Alcoholic cirrhosis of liver with ascites    8. PRACHI (acute kidney injury)    9. Esophageal varices in alcoholic cirrhosis    10. Gastroesophageal reflux disease, esophagitis presence not specified    11. Restless leg syndrome    12. Hypomagnesemia      She currently has abdominal distention and abdominal discomfort/pain   CMP normal bilirubin and creatinine. Albumin of 2.8. Alk phos 138, AST 58   CBC- anemia with Hb of 8.7     AFP tumor marker is normal  8 points- Child Class B  CT abdomen revealed Large well-circumscribed heterogeneously enhancing right hepatic lobe mass measuring 8.9 x 7.6 cm which demonstrates washout on venous and delayed phase imaging consistent with HCC.    - Several additional scattered subcentimeter foci of hyperenhancement without washout.    - Main and left portal vein are patent.    - Mass compresses and narrows the right portal vein.    - Mass compresses and significantly narrows the IVC, cannot exclude tumor involvement.     - IR Y90 mapping- Hepatic angiography demonstrates a large hypervascular lesion in the right hepatic lobe supplied by branches of the right hepatic artery and accessory left hepatic artery..  Technetium MAA was injected aorta determined lung shunt fraction.  There was a large arterial portal shunt with hypervascularity extending into the IVC tumor thrombus and patient will be sent to nuclear  medicine for determination of lung shunt fraction.  - Nuclear medicine scan revealed that majority of tracer goes to the lungs with a liver lung shunt fraction of 68.9%.    is not a candidate for Y90 or other liver directed therapies.     05/09/2019 Started taking Sorafenib. She took TID (6 pills of sorafenib/day) for 2 days and then took BID dosing since then. She said she just want to get medication in the system  07/06/2019- developed painful desquamation of her bilateral foot. She has at least grade 2- moderate foot skin reaction - skin changes include hyperkeratosis, blisters (healed) seen on the plantar surface of her bilateral foot with pain limiting her activities of daily living.  We cut down her sorafenib to 200 mg b.i.d.  08/16/2019- persistence of desquamation of hand foot syndrome.  Stopped sorafenib  08/28/2019- CT Chest abdomen and pelvis done on 08/28/2019 revealed disease progression and also new bilateral pulmonary embolism. She was admitted to [facility] main campus and discharged on Eliquis (Patient declined Lovenox)   09/06/2019- patient presented to the clinic.  In view of her intolerance to sorafenib we will change her treatment to nivolumab every 4 weeks.  Consent signed, pending insurance approval. Evaluating if she is candidate for Toray trial, cohort E. spoke to Diana Robison (ext [phone])    Hypomagnesemia- Magnesium 1.7        Plan:     1. Discontinued sorafenib.  Continue to use topical corticosteroids at the affected areas.  Apply topical steroid twice daily to erythematous areas of grade 2 HFSR. Hydrocortisone sent to pharmacy  2. Will start her on nivolumab every 4 weeks.  Informed consent signed by the patient, pending insurance approval  3. Evaluating if she is candidate for Toray trial, cohort E. spoke to Diana Robison (ext [phone]), however, patient does not appear to be interested.  4. Continue oxycodone for abdominal pain- filled on 08/16/2019  5. Patient counseled and educated  to avoid vigorous exercise/activities which may stress her feet. She should reduce exposure to hot water (may exacerbate hand-foot symptoms); avoid constrictive footwear and excessive skin friction. Patients may also wear thick cotton gloves or socks and should wear shoes with padded insoles  6.  Spoke to Vivi,  about ordering a rollater walker for ambulatory dysfunction  7.  Continue Eliquis for bilateral pulmonary emboli.     Follow up in 1 week with labs and likely to start her immunotherapy treatments    Plan of care discussed with Dr. Craig Roberts MD PGY-5  Hematology and Oncology  Pager:[phone]          ATTENDING NOTE, ONCOLOGY CLINIC    As above.  Patient seen and examined, chart reviewed.  Appears comfortable, in NAD.  Lungs are clear to auscultation.  Abdomen is soft, nontender, slightly distended.  There is evidence of PPE on her palms and her soles, which is improving (per patient).  Labs reviewed.    PLAN  We will initiate the approval process for nivolumab as second line treatment.  RTC 1 week with labs  His multiple questions were answered to his satisfaction.      Nikos Hansen MD

## 2019-09-06 NOTE — Clinical Note
Will start her on Immunotherapy Nivolumab q 4 weeks. Consent signed today, pending authorizationWill see her in 1 week with labs and  for likely starting her treatments

## 2019-09-09 ENCOUNTER — DOCUMENTATION ONLY (OUTPATIENT)
Dept: HEMATOLOGY/ONCOLOGY | Facility: CLINIC | Age: 64
End: 2019-09-09

## 2019-09-09 NOTE — PROGRESS NOTES
Referral received to assist patient with obtaining a Rolator.  DME orders faxed to [facility] for processing with a request to deliver to patient as soon as possible.

## 2019-09-10 ENCOUNTER — TELEPHONE (OUTPATIENT)
Dept: PHARMACY | Facility: CLINIC | Age: 64
End: 2019-09-10

## 2019-09-10 NOTE — TELEPHONE ENCOUNTER
Based on office visit on 9/6, Nexavar has been d/c'd due to HF syndrome - will switch to Opdivo pending insurance. LVM for patient to complete d/c survey.

## 2019-09-11 ENCOUNTER — DOCUMENTATION ONLY (OUTPATIENT)
Dept: HEMATOLOGY/ONCOLOGY | Facility: CLINIC | Age: 64
End: 2019-09-11

## 2019-09-11 NOTE — PROGRESS NOTES
Returned patient's call regarding additional home medical equipment.  P/c to [phone] no answer at time of call however voice mail was available with patient identifying self.  t  ml message requesting a return lonny at patient's convenience.  SWer remains available.

## 2019-09-12 DIAGNOSIS — G89.3 CANCER ASSOCIATED PAIN: ICD-10-CM

## 2019-09-12 DIAGNOSIS — C22.0 HCC (HEPATOCELLULAR CARCINOMA): Primary | ICD-10-CM

## 2019-09-12 DIAGNOSIS — L27.1 HAND FOOT SYNDROME: ICD-10-CM

## 2019-09-16 ENCOUNTER — TELEPHONE (OUTPATIENT)
Dept: PHARMACY | Facility: CLINIC | Age: 64
End: 2019-09-16

## 2019-09-16 NOTE — TELEPHONE ENCOUNTER
Confirmed with patient that she will no longer be taking Nexavar.  She thinks she has about 10 pills remaining.  Provided disposal information.  Noted that we will close her out of [facility], but should she ever have any questions or concerns, we will assist in any way that we can.  Patient expressed understanding.

## 2019-09-20 ENCOUNTER — TELEPHONE (OUTPATIENT)
Dept: HEMATOLOGY/ONCOLOGY | Facility: CLINIC | Age: 64
End: 2019-09-20

## 2019-09-20 NOTE — TELEPHONE ENCOUNTER
Called patient with the date and time for the lab, follow up and chemo.    ----- Message from Stella Torres RN sent at 9/20/2019  1:43 PM CDT -----  Needs to be scheduled with Dr. Pinto with labs prior for initiation of her chemo.  Chemo to be scheduled as well, now authorized.    ----- Message -----  From: Nikos Hansen MD  Sent: 9/6/2019   5:15 PM  To: Stella Torres RN    See Dr. Roberts in a week with labs for chemo if approval has been obtained.

## 2019-09-23 ENCOUNTER — TELEPHONE (OUTPATIENT)
Dept: HEMATOLOGY/ONCOLOGY | Facility: CLINIC | Age: 64
End: 2019-09-23

## 2019-09-23 DIAGNOSIS — C22.0 HCC (HEPATOCELLULAR CARCINOMA): Primary | ICD-10-CM

## 2019-09-23 DIAGNOSIS — G89.3 CANCER ASSOCIATED PAIN: ICD-10-CM

## 2019-09-23 RX ORDER — OXYCODONE HYDROCHLORIDE 5 MG/1
5 TABLET ORAL EVERY 4 HOURS PRN
Qty: 60 TABLET | Refills: 0 | Status: SHIPPED | OUTPATIENT
Start: 2019-09-23 | End: 2019-10-19 | Stop reason: SDUPTHER

## 2019-09-23 RX ORDER — OXYCODONE HYDROCHLORIDE 5 MG/1
5 TABLET ORAL EVERY 6 HOURS PRN
Qty: 60 TABLET | Refills: 0 | OUTPATIENT
Start: 2019-09-23

## 2019-09-23 NOTE — TELEPHONE ENCOUNTER
----- Message from Jaz Matos LCSW sent at 9/23/2019 10:07 AM CDT -----  Contact: Patient  I just received a call from patient - she has scheduled her transports with Medicaid for her appts in clinic later this week.  I gave her [facility]' address and phone number as family will help her purchase a scooter so she can continue to get her grandchild to/from the school bus stop  three blocks away.   She needs refill of pain meds as she's been using more - please call her about Rx.  She's still having swelling R knee and foot/toes and is icing them and using cream.

## 2019-09-23 NOTE — TELEPHONE ENCOUNTER
Spoke to MsHarpal Kendrick, patient had a mechanical fall on 09/17/2019 and complained of swelling of the right knee and ankle which initially improved with conservative treatment with ice pack.  Over the weekend patient stated her swelling in the right knee got worse and has been taking more pain medication for the last 2 days.  She held taking her Eliquis over the weekend (taking for pulmonary embolism diagnosed on 08/28/2019) in restarted today.  Recommended to go to the emergency department to get worked up with imaging as well as blood work.  In view of her being on blood thinner she is at increased risk of bleeding into her joints more than the average population.  Patient agreed to go to [facility] Emergency Department around 430-5 p.m. once her daughter comes back from work.  Patient also requested for pain medication which she ran out.  Send oxycodone script to [facility] on [address] for her to  prior or after the emergency department visit at [facility].  Answered all questions to her satisfaction.

## 2019-09-23 NOTE — TELEPHONE ENCOUNTER
Spoke with patient by phone to get more clarification regarding new issues of pain in knees .  Patient did confirm that she fell outside walking on concrete ( and tripped over uneven surface) . She fell  (9.17.19 )  last Tuesday to both knees and braced herself with her wrist to avoid hitting face.  She states that she continues to have pain and swelling in her knees, mostly her right knee.  She has been icing it and rubbing ( cream to help) .   Patient did not go to ER or urgent care for any imaging post fall.  I will see if Dr Cuevas would like to image due to the prolonged pain/ and swelling.  She would be able to have her daughter bring her for imaging tomrrow afternoon.    She is taking Eliquis and I counceled her on the high risk of bleeding and the importance of reporting falls and seeking medical evaluation due to high risk of bleeding while taking this therapy.  She did verbalize understanding.    She states that since the fall she has had to take an additional dose of oxycodone to assit with discomfort.  She was taking 1 a day and now has increase to 2 pills daily to assist with pain.    She is currently using a walker to assist her with ambulation at home. This is  New for her.  She states she has been told that she qualifies for a scooter to assist her as well and intends on going to [facility] with her daughter.    She states she lives alone with her 7 year old grand daughter who she is raising .  Her daughter lives near her.  She is home most of the day by herself as she cannot drive and requires assistance with transportation as well as due to her limited mobility      [facility] [address].    I am sending updates to Dr Huitron for further orders .  Patient is aware of appt this Friday and has transportation arranged..

## 2019-09-24 ENCOUNTER — TELEPHONE (OUTPATIENT)
Dept: HEMATOLOGY/ONCOLOGY | Facility: CLINIC | Age: 64
End: 2019-09-24

## 2019-09-26 ENCOUNTER — HOSPITAL ENCOUNTER (EMERGENCY)
Facility: HOSPITAL | Age: 64
Discharge: HOME | End: 2019-09-26
Attending: EMERGENCY MEDICINE
Payer: MEDICAID

## 2019-09-26 ENCOUNTER — OFFICE VISIT (OUTPATIENT)
Dept: HEPATOLOGY | Facility: CLINIC | Age: 64
End: 2019-09-26
Payer: MEDICAID

## 2019-09-26 VITALS
RESPIRATION RATE: 18 BRPM | SYSTOLIC BLOOD PRESSURE: 115 MMHG | HEIGHT: 59 IN | WEIGHT: 89.75 LBS | OXYGEN SATURATION: 97 % | DIASTOLIC BLOOD PRESSURE: 55 MMHG | HEART RATE: 86 BPM | BODY MASS INDEX: 18.09 KG/M2

## 2019-09-26 VITALS
TEMPERATURE: 98 F | WEIGHT: 89 LBS | HEIGHT: 59 IN | DIASTOLIC BLOOD PRESSURE: 60 MMHG | OXYGEN SATURATION: 98 % | SYSTOLIC BLOOD PRESSURE: 108 MMHG | RESPIRATION RATE: 18 BRPM | HEART RATE: 79 BPM | BODY MASS INDEX: 17.94 KG/M2

## 2019-09-26 DIAGNOSIS — K70.31 ALCOHOLIC CIRRHOSIS OF LIVER WITH ASCITES: ICD-10-CM

## 2019-09-26 DIAGNOSIS — C22.0 HCC (HEPATOCELLULAR CARCINOMA): Primary | ICD-10-CM

## 2019-09-26 DIAGNOSIS — M25.561 ACUTE PAIN OF RIGHT KNEE: Primary | ICD-10-CM

## 2019-09-26 DIAGNOSIS — M25.569 KNEE PAIN, ACUTE: ICD-10-CM

## 2019-09-26 PROCEDURE — 99999 PR PBB SHADOW E&M-EST. PATIENT-LVL V: ICD-10-PCS | Mod: PBBFAC,,, | Performed by: INTERNAL MEDICINE

## 2019-09-26 PROCEDURE — 99214 PR OFFICE/OUTPT VISIT, EST, LEVL IV, 30-39 MIN: ICD-10-PCS | Mod: S$PBB,,, | Performed by: INTERNAL MEDICINE

## 2019-09-26 PROCEDURE — 99214 OFFICE O/P EST MOD 30 MIN: CPT | Mod: S$PBB,,, | Performed by: INTERNAL MEDICINE

## 2019-09-26 PROCEDURE — 99283 EMERGENCY DEPT VISIT LOW MDM: CPT | Mod: 25

## 2019-09-26 PROCEDURE — 99999 PR PBB SHADOW E&M-EST. PATIENT-LVL V: CPT | Mod: PBBFAC,,, | Performed by: INTERNAL MEDICINE

## 2019-09-26 PROCEDURE — 99284 EMERGENCY DEPT VISIT MOD MDM: CPT | Mod: ,,, | Performed by: EMERGENCY MEDICINE

## 2019-09-26 PROCEDURE — 99215 OFFICE O/P EST HI 40 MIN: CPT | Mod: PBBFAC,25,27 | Performed by: INTERNAL MEDICINE

## 2019-09-26 PROCEDURE — 99284 PR EMERGENCY DEPT VISIT,LEVEL IV: ICD-10-PCS | Mod: ,,, | Performed by: EMERGENCY MEDICINE

## 2019-09-26 NOTE — ED NOTES
Patient identifiers verified and correct for Ms Marks  C/C: pain to right knee SEE NN  APPEARANCE: awake and alert in NAD.  SKIN: warm, dry and intact. No breakdown or bruising.  MUSCULOSKELETAL: Patient moving all extremities spontaneously, no obvious swelling or deformities noted. Ambulates with walker  RESPIRATORY: Denies shortness of breath.Respirations unlabored.   CARDIAC: Denies CP, 2+ distal pulses; no peripheral edema  ABDOMEN: S/ND/NT, Denies nausea  : voids spontaneously, denies difficulty  Neurologic: AAO x 4; follows commands equal strength in all extremities; denies numbness/tingling. Denies dizziness Positive weakness, pain to right knee and toes

## 2019-09-26 NOTE — ED PROVIDER NOTES
"Encounter Date: 9/26/2019    SCRIBE #1 NOTE: I, Lacho Delgado, am scribing for, and in the presence of,  Dr. Garner. I have scribed the entire note.       History     Chief Complaint   Patient presents with    Knee Pain     fell tuesday. C/o pain to right knee. PCP wants x rays "because i'm on blood thinners." Denies head injury with fall.      Patient is a 64 year old female presenting with right knee pain. She tripped and fell tuesday. Had some concern because she is currently on blood thinners. Her primary care physician told her to try to get an x-ray. No other injuries.   Denies head injury. Past history includes cirrhosis, liver cancer, anemia and alcohol abuse.     The history is provided by the patient.     Review of patient's allergies indicates:  No Known Allergies  Past Medical History:   Diagnosis Date    Alcohol abuse     Anemia     Cancer     liver    Cirrhosis      Past Surgical History:   Procedure Laterality Date    ESOPHAGOGASTRODUODENOSCOPY Left 1/9/2019    Procedure: EGD (ESOPHAGOGASTRODUODENOSCOPY);  Surgeon: Madyson Farrar MD;  Location: [facility] ENDO;  Service: Endoscopy;  Laterality: Left;    PERITONEOCENTESIS N/A 1/9/2019    Procedure: PARACENTESIS, ABDOMINAL;  Surgeon: Everett Fitzpatrick MD;  Location: [facility] CATH LAB;  Service: Radiology;  Laterality: N/A;     History reviewed. No pertinent family history.  Social History     Tobacco Use    Smoking status: Current Every Day Smoker     Packs/day: 0.50     Types: Cigarettes    Smokeless tobacco: Never Used    Tobacco comment: 2 cigarettes a day   Substance Use Topics    Alcohol use: No     Frequency: Never     Comment: Previously drank 1 pint a day    Drug use: Not Currently     Types: Cocaine     Comment: last use was 3 years ago     Review of Systems   Constitutional: Negative for chills and fever.   HENT: Negative for sore throat and trouble swallowing.    Eyes: Negative for pain and redness.   Respiratory: Negative for cough and " shortness of breath.    Cardiovascular: Negative for chest pain and palpitations.   Gastrointestinal: Negative for abdominal pain, nausea and vomiting.   Genitourinary: Negative for dysuria and hematuria.   Musculoskeletal: Negative for back pain and neck pain.        Knee pain   Neurological: Negative for light-headedness and headaches.   Psychiatric/Behavioral: Negative for behavioral problems and confusion.       Physical Exam     Initial Vitals [09/26/19 1214]   BP Pulse Resp Temp SpO2   108/60 79 18 98.4 °F (36.9 °C) 98 %      MAP       --         Physical Exam    Nursing note and vitals reviewed.  Constitutional: She appears well-developed and well-nourished. No distress.   HENT:   Head: Normocephalic and atraumatic.   Mouth/Throat: Oropharynx is clear and moist.   Eyes: EOM are normal. Pupils are equal, round, and reactive to light.   Neck: Neck supple.   Cardiovascular: Normal rate, regular rhythm, normal heart sounds and intact distal pulses.   Pulmonary/Chest: Breath sounds normal. No respiratory distress.   Abdominal: Soft. Bowel sounds are normal.   Musculoskeletal:        Right knee: She exhibits effusion (mild). She exhibits normal range of motion. No tenderness found.   No edema to right knee.    Neurological: She is alert and oriented to person, place, and time.   Skin: Skin is warm and dry. Capillary refill takes less than 2 seconds.   Psychiatric: She has a normal mood and affect.         ED Course   Procedures  Labs Reviewed - No data to display       Imaging Results          X-Ray Knee 3 View Right (Final result)  Result time 09/26/19 12:34:20    Final result by Carly Mckeon MD (09/26/19 12:34:20)                 Impression:      Small suprapatellar joint effusion.  No acute osseous abnormality.      Electronically signed by: Carly Mckeon  Date:    09/26/2019  Time:    12:34             Narrative:    EXAMINATION:  XR KNEE 3 VIEW RIGHT    CLINICAL HISTORY:  Pain in unspecified  knee    TECHNIQUE:  AP, lateral, and Merchant views of the right knee were performed.    COMPARISON:  None    FINDINGS:  No acute, displaced fracture.  No aggressive osseous abnormality.  Joint spaces are maintained.  Chondrocalcinosis in the tibiofemoral joint spaces.  Small suprapatellar joint effusion.  Superior patellar enthesophyte.                                 Medical Decision Making:   History:   Old Medical Records: I decided to obtain old medical records.  Initial Assessment:   Patient with knee strain. Will obtain x-rays. Ligaments are stable.   Independently Interpreted Test(s):   I have ordered and independently interpreted X-rays - see prior notes.  Clinical Tests:   Radiological Study: Ordered and Reviewed  ED Management:  12:39 PM  No fracture to knee. Will discharge home.             Scribe Attestation:   Scribe #1: I performed the above scribed service and the documentation accurately describes the services I performed. I attest to the accuracy of the note.               Clinical Impression:       ICD-10-CM ICD-9-CM   1. Acute pain of right knee M25.561 719.46   2. Knee pain, acute M25.569 719.46         Disposition:   Disposition: Discharged  Condition: Stable                        Greg Garner MD  09/26/19 2277

## 2019-09-26 NOTE — PROGRESS NOTES
Hepatology Follow-up Note    Chief complaint: alcoholic cirrhosis complicated by HCC    HPI:  Neena Marks is a 64 y.o. female that presents to hepatology clinic for follow-up of alcoholic cirrhosis.  She is alone.    The patient was last seen on 6/14/2019.   She has had progression of HCC since that time.  Liver function appears stable to improved.     Diagnosed with cirrhosis: patient reports diagnosis in 2017 but per review of chart, presented with ascites in Feb 2015 and diagnosed with alcoholic cirrhosis   Presenting symptom/s: ascites   Current symptoms of portal hypertension:   Ascites: yes, prior LVP soon after diagnosis, no history of SBP   LE edema: yes    HE: no   GI bleeding: yes, hematochezia in 1/2018; EGD performed but no colonoscopy in system   Jaundice: no    Pruritus: yes, on hydroxyzine     Despite evidence of alcoholic cirrhosis in 2015, patient continued to drink alcohol until 1/2018.  Reports that she became sicker related to her liver disease and this prompted her to discontinue alcohol use. She has not had alcohol rehab or relapse prevention.  No other etiologies of CLD known at this time.      Patient presented to ED for assessment of abdominal pain and swelling.  During evaluation, found to have liver lesion concerning for HCC with normal AFP.    Discharged on diuretic therapy.    Triple phase imaging obtained and HCC confirmed with IVC compression   Patient has been seen by oncology since last office visit.  Sorafenib had to be discontinued due to side effects and plan to initiate nivolumab.  She has not started the medication yet.  During surveillance imaging, also noted to have progression of disease while on sorafenib and incidental PE.  She has been started on Eliquis for anticoagulation.  Recent mechanical fall and seen in ED.  No significant bleeding issue despite Eliquis.  She continues on the medication currently.     Volume overload is improved.  She is taking lasix 40mg daiy and  has not required LVP since January 2019.    Continues to have poor appetite but is unable to reliably afford Ensure and uses it when finances are improved.  Weight is down to 89lbs.   Functional status continues to declines and presents with rollator and reports that she is planning to apply for an electronic scooter.     Denies jaundice, GI bleeding or HE.      Cirrhosis HCM:  Hepatitis A vaccination: immune  Hepatitis B vaccination: non-immune  HCC screening: HCC confirmed 1/2019  Variceal screening: grade II esophageal varices 1/2018  Pneumococcal vaccination:  2016   Influenza vaccination:  refuses    Patient Active Problem List   Diagnosis    Iron deficiency anemia due to chronic blood loss    Alcohol abuse    Hypokalemia    Liver mass    Esophageal web determined by endoscopy    Severe malnutrition    Acute blood loss anemia    Acute GI bleeding    PRACHI (acute kidney injury)    Alcoholic cirrhosis    Esophageal varices in alcoholic cirrhosis    Portal hypertensive gastropathy    HCC (hepatocellular carcinoma)    Restless leg syndrome    Acute pulmonary embolus    Ascites due to alcoholic cirrhosis    Hand foot syndrome    Pulmonary embolism       Past Medical History:   Diagnosis Date    Alcohol abuse     Anemia     Cancer     liver    Cirrhosis      PSH: none     FH: no liver disease, sister on HD     Social History     Socioeconomic History    Marital status: Single     Spouse name: Not on file    Number of children: Not on file    Years of education: Not on file    Highest education level: Not on file   Occupational History    Not on file   Social Needs    Financial resource strain: Not on file    Food insecurity:     Worry: Not on file     Inability: Not on file    Transportation needs:     Medical: Not on file     Non-medical: Not on file   Tobacco Use    Smoking status: Current Every Day Smoker     Types: Cigarettes    Smokeless tobacco: Never Used    Tobacco comment: 2  cigarettes a day   Substance and Sexual Activity    Alcohol use: No     Frequency: Never     Comment: Previously drank 1 pint a day    Drug use: Not Currently     Types: Cocaine     Comment: last use was 3 years ago    Sexual activity: Not on file   Lifestyle    Physical activity:     Days per week: Not on file     Minutes per session: Not on file    Stress: Not on file   Relationships    Social connections:     Talks on phone: Not on file     Gets together: Not on file     Attends Congregational service: Not on file     Active member of club or organization: Not on file     Attends meetings of clubs or organizations: Not on file     Relationship status: Not on file   Other Topics Concern    Not on file   Social History Narrative    Not on file   lives with sandra, son and granddaughter, unemployed, on disability - she is not sure of the indication  Use cocaine - once or twice a month  Reports alcohol discontinued in 1/2018    Current Outpatient Medications   Medication Sig Dispense Refill    cholecalciferol, vitamin D3, 50,000 unit Tab Take 125 mcg by mouth.      cyproheptadine (PERIACTIN) 4 mg tablet Take 1 tablet (4 mg total) by mouth 3 (three) times daily as needed. 90 tablet 2    famotidine (PEPCID) 20 MG tablet Take 1 tablet (20 mg total) by mouth once daily. 30 tablet 0    furosemide (LASIX) 40 MG tablet Take 1 tablet (40 mg total) by mouth once daily. 30 tablet 5    hydrocortisone 1 % cream Apply to affected area 2 times daily 140 g 1    magnesium oxide (MAGOX) 400 mg (241.3 mg magnesium) tablet Take 1 tablet (400 mg total) by mouth once daily. 200 tablet 1    meloxicam (MOBIC) 15 MG tablet Take 15 mg by mouth nightly as needed for Pain.      multivitamin (ONE DAILY MULTIVITAMIN) per tablet Take 1 tablet by mouth once daily.      oxyCODONE (ROXICODONE) 5 MG immediate release tablet Take 1 tablet (5 mg total) by mouth every 6 (six) hours as needed for Pain. Greater than 1 week supply medically  indicated 60 tablet 0    oxyCODONE (ROXICODONE) 5 MG immediate release tablet Take 1 tablet (5 mg total) by mouth every 4 (four) hours as needed for Pain. 60 tablet 0    propranolol (INDERAL) 10 MG tablet Take 1 tablet (10 mg total) by mouth 2 (two) times daily. 60 tablet 2    SORAfenib (NEXAVAR) 200 mg tablet Take 2 tablets (400 mg total) by mouth 2 (two) times daily Hold until MD says to restart. 120 tablet 11    traZODone (DESYREL) 50 MG tablet TK 1 T PO  HS PRN INSOMNIA  2    (Magic mouthwash) 1:1:1 Benadryl 12.5mg/5ml liq, aluminum & magnesium hydroxide-simehticone (Maalox), lidocaine viscous 2% Swish and spit 10 mLs every 4 (four) hours as needed. for mouth sores (Patient not taking: Reported on 9/26/2019) 450 mL 5    apixaban (ELIQUIS) 5 mg (74 tabs) DsPk For the first 7 days take two 5 mg tablets twice daily.  After 7 days take one 5 mg tablet twice daily. (Patient not taking: Reported on 9/26/2019) 74 tablet 0    ferrous sulfate (FEOSOL) 325 mg (65 mg iron) Tab tablet Take 1 tablet (325 mg total) by mouth 2 (two) times daily. (Patient not taking: Reported on 9/26/2019) 30 tablet 5    lactulose 10 gram/15 ml (CHRONULAC) 10 gram/15 mL (15 mL) solution Take 10 g by mouth 2 (two) times daily.       No current facility-administered medications for this visit.        Review of patient's allergies indicates:  No Known Allergies    Review of Systems   Constitutional: Positive for malaise/fatigue and weight loss. Negative for chills and fever.   HENT:        Tongue pain  Jaw swelling   Eyes: Negative.    Respiratory: Negative for cough and shortness of breath.    Cardiovascular: Negative for chest pain and leg swelling.   Gastrointestinal: Positive for abdominal pain. Negative for blood in stool, heartburn, melena, nausea and vomiting.   Musculoskeletal: Negative for joint pain and myalgias.   Skin: Negative for itching and rash.   Neurological: Negative for dizziness, focal weakness and headaches.  "  Endo/Heme/Allergies: Does not bruise/bleed easily.   Psychiatric/Behavioral: Negative for depression.       Vitals:    09/26/19 1059   BP: (!) 115/55   Pulse: 86   Resp: 18   SpO2: 97%   Weight: 40.7 kg (89 lb 11.6 oz)   Height: 4' 11" (1.499 m)       Physical Exam   Constitutional: She is oriented to person, place, and time. She appears well-developed. No distress.   Thin female, temporal wasting present   HENT:   Head: Normocephalic and atraumatic.   Mouth/Throat: Oropharynx is clear and moist. No oropharyngeal exudate.   Eyes: Pupils are equal, round, and reactive to light. EOM are normal. No scleral icterus.   Neck: Normal range of motion. Neck supple. No thyromegaly present.   Cardiovascular: Normal rate, regular rhythm and normal heart sounds. Exam reveals no gallop and no friction rub.   No murmur heard.  Pulmonary/Chest: Effort normal. No respiratory distress. She has no wheezes. She has no rales.   Abdominal: Soft. Bowel sounds are normal. She exhibits distension (mild). There is tenderness.   Abdominal varices present   Musculoskeletal: Normal range of motion. She exhibits no edema.   Lymphadenopathy:     She has no cervical adenopathy.   Neurological: She is alert and oriented to person, place, and time. No cranial nerve deficit.   Skin: Skin is warm and dry. No rash noted.   Psychiatric: She has a normal mood and affect. Her behavior is normal.   Vitals reviewed.      Lab Results   Component Value Date    ALT 22 09/06/2019    AST 99 (H) 09/06/2019    ALKPHOS 97 09/06/2019    BILITOT 1.1 (H) 09/06/2019       Lab Results   Component Value Date    WBC 4.91 09/06/2019    HGB 9.8 (L) 09/06/2019    HCT 30.9 (L) 09/06/2019    MCV 67 (L) 09/06/2019     09/06/2019       Lab Results   Component Value Date     09/06/2019    K 3.8 09/06/2019     09/06/2019    CO2 25 09/06/2019    BUN 8 09/06/2019    CREATININE 1.0 09/06/2019    CALCIUM 9.4 09/06/2019    ANIONGAP 10 09/06/2019    ESTGFRAFRICA " >60.0 09/06/2019    EGFRNONAA >60.0 09/06/2019     MELD-Na score: 13 at 9/6/2019  9:47 AM  MELD score: 12 at 9/6/2019  9:47 AM  Calculated from:  Serum Creatinine: 1.0 mg/dL at 9/6/2019  9:47 AM  Serum Sodium: 136 mmol/L at 9/6/2019  9:47 AM  Total Bilirubin: 1.1 mg/dL at 9/6/2019  9:47 AM  INR(ratio): 1.6 at 9/6/2019  9:47 AM  Age: 64 years    Imaging: EMR and external imaging available reviewed     Assessment:  64 y.o. female presenting with decompensated alcoholic cirrhosis and complicated by HCC.  She is undergoing systemic therapy with Nexavar due to not being a candidate for Y-90 with elevated lung shunt fraction on mapping.     Plan:  HCC: Patient has had progression of disease.  Planning to switch therapy to nivolumab and scheduled for oncology f/u.        Ascites:  Appears better controlled with lasix.  Renal function is normal.  Continue therapy without changes.  No indication for spironolactone at this time.    HE:  No overt HE at this time but continues on lactulose     PE:  Currently on anticoagulation with Eliquis.  No major bleeding complications and continuing therapy     Patient is not transplant candidate due to tumor burden outside [place] and [facility] along with frailty.      HCM as documented above, given advanced disease - will update accordingly    RTC in 4 months

## 2019-09-26 NOTE — PATIENT INSTRUCTIONS
Your recent labs show stable liver function.    No change in fluid pills.      You will follow-up with oncology regarding new chemotherapy.  You have an appointment tomorrow.    Return to liver clinic in 4 months

## 2019-09-26 NOTE — ED TRIAGE NOTES
Patient states she tripped and fell Tuesday with pain to right knee, using walker currently. Oxycodone this am at 0830, ice pack PTA

## 2019-09-27 ENCOUNTER — PES CALL (OUTPATIENT)
Dept: ADMINISTRATIVE | Facility: CLINIC | Age: 64
End: 2019-09-27

## 2019-09-27 ENCOUNTER — INFUSION (OUTPATIENT)
Dept: INFUSION THERAPY | Facility: HOSPITAL | Age: 64
End: 2019-09-27
Attending: STUDENT IN AN ORGANIZED HEALTH CARE EDUCATION/TRAINING PROGRAM
Payer: MEDICAID

## 2019-09-27 ENCOUNTER — OFFICE VISIT (OUTPATIENT)
Dept: HEMATOLOGY/ONCOLOGY | Facility: CLINIC | Age: 64
End: 2019-09-27
Payer: MEDICAID

## 2019-09-27 VITALS
RESPIRATION RATE: 16 BRPM | DIASTOLIC BLOOD PRESSURE: 63 MMHG | TEMPERATURE: 98 F | SYSTOLIC BLOOD PRESSURE: 108 MMHG | BODY MASS INDEX: 18.44 KG/M2 | OXYGEN SATURATION: 100 % | HEIGHT: 59 IN | HEART RATE: 86 BPM | WEIGHT: 91.5 LBS

## 2019-09-27 VITALS
HEART RATE: 84 BPM | DIASTOLIC BLOOD PRESSURE: 63 MMHG | SYSTOLIC BLOOD PRESSURE: 131 MMHG | RESPIRATION RATE: 18 BRPM | OXYGEN SATURATION: 99 % | TEMPERATURE: 98 F

## 2019-09-27 DIAGNOSIS — K70.30 ESOPHAGEAL VARICES IN ALCOHOLIC CIRRHOSIS: ICD-10-CM

## 2019-09-27 DIAGNOSIS — F10.10 ALCOHOL ABUSE: ICD-10-CM

## 2019-09-27 DIAGNOSIS — D50.0 IRON DEFICIENCY ANEMIA DUE TO CHRONIC BLOOD LOSS: ICD-10-CM

## 2019-09-27 DIAGNOSIS — I85.10 ESOPHAGEAL VARICES IN ALCOHOLIC CIRRHOSIS: ICD-10-CM

## 2019-09-27 DIAGNOSIS — E87.6 HYPOKALEMIA: ICD-10-CM

## 2019-09-27 DIAGNOSIS — K70.31 ALCOHOLIC CIRRHOSIS OF LIVER WITH ASCITES: ICD-10-CM

## 2019-09-27 DIAGNOSIS — I26.99 OTHER ACUTE PULMONARY EMBOLISM WITHOUT ACUTE COR PULMONALE: ICD-10-CM

## 2019-09-27 DIAGNOSIS — G89.3 CANCER ASSOCIATED PAIN: ICD-10-CM

## 2019-09-27 DIAGNOSIS — K21.9 GASTROESOPHAGEAL REFLUX DISEASE, ESOPHAGITIS PRESENCE NOT SPECIFIED: ICD-10-CM

## 2019-09-27 DIAGNOSIS — C22.0 HCC (HEPATOCELLULAR CARCINOMA): Primary | ICD-10-CM
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
## 2019-09-27 DIAGNOSIS — R26.2 AMBULATORY DYSFUNCTION: ICD-10-CM

## 2019-09-27 DIAGNOSIS — E43 SEVERE MALNUTRITION: ICD-10-CM

## 2019-09-27 DIAGNOSIS — L27.1 HAND FOOT SYNDROME: ICD-10-CM

## 2019-09-27 DIAGNOSIS — I26.99 PULMONARY EMBOLISM: ICD-10-CM

## 2019-09-27 PROCEDURE — 99999 PR PBB SHADOW E&M-EST. PATIENT-LVL V: CPT | Mod: PBBFAC,,, | Performed by: STUDENT IN AN ORGANIZED HEALTH CARE EDUCATION/TRAINING PROGRAM

## 2019-09-27 PROCEDURE — 96413 CHEMO IV INFUSION 1 HR: CPT

## 2019-09-27 PROCEDURE — 99214 PR OFFICE/OUTPT VISIT, EST, LEVL IV, 30-39 MIN: ICD-10-PCS | Mod: S$PBB,,, | Performed by: STUDENT IN AN ORGANIZED HEALTH CARE EDUCATION/TRAINING PROGRAM

## 2019-09-27 PROCEDURE — 63600175 PHARM REV CODE 636 W HCPCS: Mod: JG | Performed by: INTERNAL MEDICINE

## 2019-09-27 PROCEDURE — 99999 PR PBB SHADOW E&M-EST. PATIENT-LVL V: ICD-10-PCS | Mod: PBBFAC,,, | Performed by: STUDENT IN AN ORGANIZED HEALTH CARE EDUCATION/TRAINING PROGRAM

## 2019-09-27 PROCEDURE — 99214 OFFICE O/P EST MOD 30 MIN: CPT | Mod: S$PBB,,, | Performed by: STUDENT IN AN ORGANIZED HEALTH CARE EDUCATION/TRAINING PROGRAM

## 2019-09-27 PROCEDURE — 99215 OFFICE O/P EST HI 40 MIN: CPT | Mod: PBBFAC,25 | Performed by: STUDENT IN AN ORGANIZED HEALTH CARE EDUCATION/TRAINING PROGRAM

## 2019-09-27 RX ORDER — PROPRANOLOL HYDROCHLORIDE 10 MG/1
10 TABLET ORAL 2 TIMES DAILY
Qty: 60 TABLET | Refills: 2 | Status: CANCELLED | OUTPATIENT
Start: 2019-09-27 | End: 2020-09-26

## 2019-09-27 RX ORDER — SODIUM CHLORIDE 0.9 % (FLUSH) 0.9 %
10 SYRINGE (ML) INJECTION
Status: DISCONTINUED | OUTPATIENT
Start: 2019-09-27 | End: 2019-09-27 | Stop reason: HOSPADM

## 2019-09-27 RX ORDER — MELOXICAM 15 MG/1
15 TABLET ORAL NIGHTLY PRN
Status: CANCELLED | OUTPATIENT
Start: 2019-09-27

## 2019-09-27 RX ORDER — SODIUM CHLORIDE 0.9 % (FLUSH) 0.9 %
10 SYRINGE (ML) INJECTION
Status: CANCELLED | OUTPATIENT
Start: 2019-09-27

## 2019-09-27 RX ORDER — HEPARIN 100 UNIT/ML
500 SYRINGE INTRAVENOUS
Status: CANCELLED | OUTPATIENT
Start: 2019-09-27

## 2019-09-27 RX ORDER — FAMOTIDINE 20 MG/1
20 TABLET, FILM COATED ORAL DAILY
Qty: 30 TABLET | Refills: 5 | Status: ON HOLD | OUTPATIENT
Start: 2019-09-27 | End: 2019-11-26

## 2019-09-27 RX ORDER — HEPARIN 100 UNIT/ML
500 SYRINGE INTRAVENOUS
Status: DISCONTINUED | OUTPATIENT
Start: 2019-09-27 | End: 2019-09-27 | Stop reason: HOSPADM

## 2019-09-27 RX ORDER — FUROSEMIDE 40 MG/1
40 TABLET ORAL DAILY
Qty: 30 TABLET | Refills: 5 | Status: CANCELLED | OUTPATIENT
Start: 2019-09-27

## 2019-09-27 RX ORDER — FERROUS SULFATE 325(65) MG
325 TABLET ORAL 2 TIMES DAILY
Qty: 30 TABLET | Refills: 5 | Status: CANCELLED | OUTPATIENT
Start: 2019-09-27

## 2019-09-27 RX ADMIN — SODIUM CHLORIDE 480 MG: 9 INJECTION, SOLUTION INTRAVENOUS at 10:09

## 2019-09-27 RX ADMIN — SODIUM CHLORIDE: 9 INJECTION, SOLUTION INTRAVENOUS at 10:09

## 2019-09-27 NOTE — PLAN OF CARE
Pt ambulatory to clinic with rolling walker. Initially was in pharmacy waiting on medications. Called pt and she came to clinic. States tripped and fell two days ago. Having R knee pain and swelling. Seen in ED for. No fractures. Moderate edema noted. Ambulatory without difficulty. No other c/o. Denies striking head.

## 2019-09-27 NOTE — PROGRESS NOTES
PATIENT: Neena Marks  MRN: [id number]  DATE: 9/27/2019      Diagnosis:   1. HCC (hepatocellular carcinoma)    2. Cancer associated pain    3. Hand foot syndrome    4. Iron deficiency anemia due to chronic blood loss    5. Severe malnutrition    6. Hypokalemia    7. Alcoholic cirrhosis of liver with ascites    8. Esophageal varices in alcoholic cirrhosis    9. Gastroesophageal reflux disease, esophagitis presence not specified    10. Alcohol abuse    11. Ambulatory dysfunction    12. Fall (on)(from) sidewalk curb, initial encounter    13. Pulmonary embolism    14. Other acute pulmonary embolism without acute cor pulmonale        Chief Complaint: No chief complaint on file.    Neena Marks is a 63 y.o. female with PMHx of alcoholic cirrhosis since 2015, Portal HTN, ascites, variceal bleeding, newly diagnosed with HCC now presented to oncology clinic for further evaluation.     Patient was a chronic alcoholic for the last 40 years and was diagnosed with alcoholic cirrhosis in 2015 however she continued to drink alcohol until January 2018 and has quit since then. She had history of upper GI bleeding with hematochezia in January 2018 s/p banding. She also had ascites since the last 1.5 to 2 years as is getting paracentesis q 2-3 months. She had no history of SBP in the past. She is currently on lasix and spironolactone for ascites and lower extremity edema.  She does not have jaundice but complaints of severe pruritis which is moderately controlled with hydroxyzine.      She currently has abdominal distention and abdominal discomfort.     CMP normal bilirubin and creatinine. Albumin of 2.8. Alk phos 138, AST 58   CBC- anemia with Hb of 8.7     AFP tumor marker is normal     Hep C and B testing on 4/27/2018- negative     Endoscopy- 1/9/19 - Grade II esophageal varices. Completely eradicated. Banded, Small hiatal hernia., Portal hypertensive gastropathy. Treated with argon plasma coagulation (APC).     Patient had an  appointment on 2/15/2019, IR consult for Y90 but she missed her appointment.   - She had IR mapping and Hepatic angiography demonstrates a large hypervascular lesion in the right hepatic lobe supplied by branches of the right hepatic artery and accessory left hepatic artery.  Technetium MAA was injected aorta determined lung shunt fraction.  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  - Nuclear medicine scan revealed that majority of tracer goes to the lungs with a liver lung shunt fraction of 68.9%.   is not a candidate for Y90 or other liver directed therapies due to liver lung shunt    5/9/2019- Started taking Sorafenib 400mg BID from  7/6/2019- Have cut down to 200 mg BID due to hand foot syndrome  8/16/2019- Stopped Sorafenib on  due to persistent desquamation of hand foot syndrome  08/28/2019- CT Chest abdomen and pelvis done revealed disease progression and also new bilateral pulmonary embolism. She was admitted to [facility] and discharged on Eliquis (Patient declined Lovenox)        Follow up on 9/27/2019  She is here for C1 D1 of Nivolumab Immunotherapy for progression of HCC  Had a recent fall 9/17/2019 and followed up in ED and Xray right knee revealed no acute osseous abnormality  On Eliquis for PE  Denied abdominal pain or tenderness. Denied any nausea, vomiting or diarrhea  Still smokes 2-3 cig/day  B/L rash on her feet- multiple lesions on plantar surface (grade 2 hand-foot skin reaction from sorafenib) has significantly improved and does not have any pain currently       Subjective:    Initial History: Ms. Marks is a 64 y.o. female who returns for follow up.     Past Medical History:   Past Medical History:   Diagnosis Date    Alcohol abuse     Anemia     Cancer     liver    Cirrhosis        Past Surgical HIstory:   Past Surgical History:    Procedure Laterality Date    ESOPHAGOGASTRODUODENOSCOPY Left 1/9/2019    Procedure: EGD (ESOPHAGOGASTRODUODENOSCOPY);  Surgeon: Madyson Farrar MD;  Location: [facility] ENDO;  Service: Endoscopy;  Laterality: Left;    PERITONEOCENTESIS N/A 1/9/2019    Procedure: PARACENTESIS, ABDOMINAL;  Surgeon: Everett Fitzpatrick MD;  Location: [facility] CATH LAB;  Service: Radiology;  Laterality: N/A;       Family History: No family history on file.    Social History:  reports that she has been smoking cigarettes. She has been smoking about 0.50 packs per day. She has never used smokeless tobacco. She reports that she has current or past drug history. Drug: Cocaine. She reports that she does not drink alcohol.    Allergies:  Review of patient's allergies indicates:  No Known Allergies    Medications:  Current Outpatient Medications   Medication Sig Dispense Refill    cholecalciferol, vitamin D3, 50,000 unit Tab Take 125 mcg by mouth.      famotidine (PEPCID) 20 MG tablet Take 1 tablet (20 mg total) by mouth once daily. 30 tablet 5    ferrous sulfate (FEOSOL) 325 mg (65 mg iron) Tab tablet Take 1 tablet (325 mg total) by mouth 2 (two) times daily. 30 tablet 5    furosemide (LASIX) 40 MG tablet Take 1 tablet (40 mg total) by mouth once daily. 30 tablet 5    multivitamin (ONE DAILY MULTIVITAMIN) per tablet Take 1 tablet by mouth once daily.      oxyCODONE (ROXICODONE) 5 MG immediate release tablet Take 1 tablet (5 mg total) by mouth every 6 (six) hours as needed for Pain. Greater than 1 week supply medically indicated 60 tablet 0    propranolol (INDERAL) 10 MG tablet Take 1 tablet (10 mg total) by mouth 2 (two) times daily. 60 tablet 2    (Magic mouthwash) 1:1:1 Benadryl 12.5mg/5ml liq, aluminum & magnesium hydroxide-simehticone (Maalox), lidocaine viscous 2% Swish and spit 10 mLs every 4 (four) hours as needed. for mouth sores (Patient not taking: Reported on 9/26/2019) 450 mL 5    apixaban (ELIQUIS) 5 mg Tab Take 1 tablet (5 mg  total) by mouth 2 (two) times daily. 60 tablet 4    cyproheptadine (PERIACTIN) 4 mg tablet Take 1 tablet (4 mg total) by mouth 3 (three) times daily as needed. (Patient not taking: Reported on 9/27/2019) 90 tablet 2    hydrocortisone 1 % cream Apply to affected area 2 times daily (Patient not taking: Reported on 9/27/2019) 140 g 1    lactulose 10 gram/15 ml (CHRONULAC) 10 gram/15 mL (15 mL) solution Take 10 g by mouth 2 (two) times daily.      magnesium oxide (MAGOX) 400 mg (241.3 mg magnesium) tablet Take 1 tablet (400 mg total) by mouth once daily. (Patient not taking: Reported on 9/27/2019) 200 tablet 1    oxyCODONE (ROXICODONE) 5 MG immediate release tablet Take 1 tablet (5 mg total) by mouth every 4 (four) hours as needed for Pain. 60 tablet 0    traZODone (DESYREL) 50 MG tablet TK 1 T PO  HS PRN INSOMNIA  2     No current facility-administered medications for this visit.      Facility-Administered Medications Ordered in Other Visits   Medication Dose Route Frequency Provider Last Rate Last Dose    alteplase injection 2 mg  2 mg Intra-Catheter PRN Rodolfo Palomo MD        heparin, porcine (PF) 100 unit/mL injection flush 500 Units  500 Units Intravenous PRN Rodolfo Palomo MD        sodium chloride 0.9% flush 10 mL  10 mL Intravenous PRN Rodolfo Palomo MD           Review of Systems   Constitutional: Negative for appetite change, fatigue, fever and unexpected weight change.   HENT: Negative for mouth sores, nosebleeds and tinnitus.    Respiratory: Negative for cough, chest tightness and shortness of breath.    Cardiovascular: Positive for leg swelling. Negative for chest pain.   Gastrointestinal: Negative for abdominal pain, blood in stool, diarrhea, nausea and vomiting.   Genitourinary: Negative for dysuria, frequency, hematuria and urgency.   Musculoskeletal: Positive for gait problem. Negative for back pain, joint swelling and neck pain.   Skin: Positive for rash.   Neurological: Negative for tremors,  "seizures and headaches.   Hematological: Negative for adenopathy. Does not bruise/bleed easily.   Psychiatric/Behavioral: Negative for agitation and behavioral problems.       ECOG Performance Status: 1   Objective:      Vitals:   Vitals:    09/27/19 0903   BP: 108/63   Pulse: 86   Resp: 16   Temp: 98 °F (36.7 °C)   TempSrc: Oral   SpO2: 100%   Weight: 41.5 kg (91 lb 7.9 oz)   Height: 4' 11" (1.499 m)     BMI: Body mass index is 18.48 kg/m².    Physical Exam   Constitutional: She is oriented to person, place, and time. She appears well-developed.   Thin female with temporal wasting   HENT:   Head: Normocephalic and atraumatic.   Eyes: Pupils are equal, round, and reactive to light. EOM are normal.   Neck: Normal range of motion. Neck supple.   Cardiovascular: Normal rate and regular rhythm.   Murmur heard.  Pulmonary/Chest: Effort normal and breath sounds normal. No respiratory distress.   Abdominal: Soft. Bowel sounds are normal. She exhibits distension. There is no tenderness. There is no guarding.   Musculoskeletal: Normal range of motion. She exhibits edema.   Lymphadenopathy:     She has no cervical adenopathy.   Neurological: She is alert and oriented to person, place, and time. No cranial nerve deficit.   Skin: Skin is warm and dry. Capillary refill takes less than 2 seconds. Rash noted.   Her rash on B/L foot improved   Psychiatric: She has a normal mood and affect.       Laboratory Data:  Lab Visit on 09/27/2019   Component Date Value Ref Range Status    WBC 09/27/2019 4.45  3.90 - 12.70 K/uL Final    RBC 09/27/2019 4.47  4.00 - 5.40 M/uL Final    Hemoglobin 09/27/2019 9.5* 12.0 - 16.0 g/dL Final    Hematocrit 09/27/2019 31.4* 37.0 - 48.5 % Final    Mean Corpuscular Volume 09/27/2019 70* 82 - 98 fL Final    Mean Corpuscular Hemoglobin 09/27/2019 21.3* 27.0 - 31.0 pg Final    Mean Corpuscular Hemoglobin Conc 09/27/2019 30.3* 32.0 - 36.0 g/dL Final    RDW 09/27/2019 20.3* 11.5 - 14.5 % Final    " Platelets 09/27/2019 230  150 - 350 K/uL Final    MPV 09/27/2019 SEE COMMENT  9.2 - 12.9 fL Final    Result not available.    Immature Granulocytes 09/27/2019 0.2  0.0 - 0.5 % Final    Gran # (ANC) 09/27/2019 2.7  1.8 - 7.7 K/uL Final    Immature Grans (Abs) 09/27/2019 0.01  0.00 - 0.04 K/uL Final    Comment: Mild elevation in immature granulocytes is non specific and   can be seen in a variety of conditions including stress response,   acute inflammation, trauma and pregnancy. Correlation with other   laboratory and clinical findings is essential.      Lymph # 09/27/2019 1.3  1.0 - 4.8 K/uL Final    Mono # 09/27/2019 0.3  0.3 - 1.0 K/uL Final    Eos # 09/27/2019 0.2  0.0 - 0.5 K/uL Final    Baso # 09/27/2019 0.04  0.00 - 0.20 K/uL Final    nRBC 09/27/2019 0  0 /100 WBC Final    Gran% 09/27/2019 59.8  38.0 - 73.0 % Final    Lymph% 09/27/2019 28.3  18.0 - 48.0 % Final    Mono% 09/27/2019 7.4  4.0 - 15.0 % Final    Eosinophil% 09/27/2019 3.4  0.0 - 8.0 % Final    Basophil% 09/27/2019 0.9  0.0 - 1.9 % Final    Differential Method 09/27/2019 Automated   Final    Sodium 09/27/2019 139  136 - 145 mmol/L Final    Potassium 09/27/2019 3.7  3.5 - 5.1 mmol/L Final    Chloride 09/27/2019 106  95 - 110 mmol/L Final    CO2 09/27/2019 24  23 - 29 mmol/L Final    Glucose 09/27/2019 93  70 - 110 mg/dL Final    BUN, Bld 09/27/2019 11  8 - 23 mg/dL Final    Creatinine 09/27/2019 0.8  0.5 - 1.4 mg/dL Final    Calcium 09/27/2019 9.6  8.7 - 10.5 mg/dL Final    Total Protein 09/27/2019 8.8* 6.0 - 8.4 g/dL Final    Albumin 09/27/2019 3.1* 3.5 - 5.2 g/dL Final    Total Bilirubin 09/27/2019 0.8  0.1 - 1.0 mg/dL Final    Comment: For infants and newborns, interpretation of results should be based  on gestational age, weight and in agreement with clinical  observations.  Premature Infant recommended reference ranges:  Up to 24 hours.............<8.0 mg/dL  Up to 48 hours............<12.0 mg/dL  3-5  days..................<15.0 mg/dL  6-29 days.................<15.0 mg/dL      Alkaline Phosphatase 09/27/2019 122  55 - 135 U/L Final    AST 09/27/2019 62* 10 - 40 U/L Final    ALT 09/27/2019 17  10 - 44 U/L Final    Anion Gap 09/27/2019 9  8 - 16 mmol/L Final    eGFR if African American 09/27/2019 >60.0  >60 mL/min/1.73 m^2 Final    eGFR if non African American 09/27/2019 >60.0  >60 mL/min/1.73 m^2 Final    Comment: Calculation used to obtain the estimated glomerular filtration  rate (eGFR) is the CKD-EPI equation.       Prothrombin Time 09/27/2019 13.9* 9.0 - 12.5 sec Final    INR 09/27/2019 1.4* 0.8 - 1.2 Final    Comment: Coumadin Therapy:  2.0 - 3.0 for INR for all indicators except mechanical heart valves  and antiphospholipid syndromes which should use 2.5 - 3.5.      Magnesium 09/27/2019 1.7  1.6 - 2.6 mg/dL Final         Imaging:       CT abdomen- 1/21/2019  1. Large well-circumscribed heterogeneously enhancing right hepatic lobe mass with characteristics consistent with HCC.  Several additional indeterminate subcentimeter hyperenhancing lesions throughout the liver which become isodense.  Mass compresses and narrows the IVC, cannot exclude tumor involvement.  2. Liver cirrhosis.  3. Moderate volume abdominopelvic ascites.  4. Small paraesophageal varices present.  5. Cholelithiasis.     CT Chest  1.  No evidence of metastatic disease in this patient with history of HCC.  There are no measurable lesions per RECIST criteria.    2.  Mild centrilobular emphysematous changes with upper lung zone predominance.     IR Embolization for Tumor Organ Ischemia  Hepatic angiography demonstrates a large hypervascular lesion in the right hepatic lobe supplied by branches of the right hepatic artery and accessory left hepatic artery..  Technetium MAA was injected aorta determined lung shunt fraction.  There was a large arterial portal shunt with hypervascularity extending into the IVC tumor  thrombus.    Plan:    Patient will be sent to nuclear medicine for determination of lung shunt fraction.     Nuclear medicine scan  1.  The majority of tracer goes to the lungs with a liver lung shunt fraction of 68.9%.    2.  No other significant extrahepatic activity     ECHO- 5/24/19  · Increased (hyperdynamic) left ventricular systolic function. The estimated ejection fraction is 76%  · Grade I (mild) left ventricular diastolic dysfunction consistent with impaired relaxation.  · Mild left atrial enlargement     EKG  Normal sinus rhythm  Normal ECG  When compared with ECG of 08-JAN-2019 07:22,  No significant change was found     CT Chest Abdomen and pelvis- 8/28/2019  Slight increased size of the heterogeneous enhancing hepatic mass with washout today measuring 9 x 8.4 cm compared 8.9 x 7.6 prior.  There is increase in the hypodensity extending into the IVC now extending into the right atrium.  This either represents bland or tumor thrombus.  Multiple hyperenhancing small nodule lesions in the liver.  At least 1 does washout concerning for but not diagnostic of hepatocellular cancer.  Bilateral pulmonary artery emboli now evident.  Cholelithiasis.     Assessment:       1. HCC (hepatocellular carcinoma)    2. Cancer associated pain    3. Hand foot syndrome    4. Iron deficiency anemia due to chronic blood loss    5. Severe malnutrition    6. Hypokalemia    7. Alcoholic cirrhosis of liver with ascites    8. Esophageal varices in alcoholic cirrhosis    9. Gastroesophageal reflux disease, esophagitis presence not specified    10. Alcohol abuse    11. Ambulatory dysfunction    12. Fall (on)(from) sidewalk curb, initial encounter    13. Pulmonary embolism    14. Other acute pulmonary embolism without acute cor pulmonale      Hepatocellular carcinoma:  Not a candidate for local therapy in view of large liver lung shunt.  Progressed/unable to tolerate sorafenib.  Currently started on nivolumab  AFP tumor marker is  normal  8 points- Child Class B  CT abdomen revealed Large well-circumscribed heterogeneously enhancing right hepatic lobe mass measuring 8.9 x 7.6 cm which demonstrates washout on venous and delayed phase imaging consistent with HCC.    - Several additional scattered subcentimeter foci of hyperenhancement without washout.    - Main and left portal vein are patent.    - Mass compresses and narrows the right portal vein.    - Mass compresses and significantly narrows the IVC, cannot exclude tumor involvement.     - IR Y90 mapping- Hepatic angiography demonstrates a large hypervascular lesion in the right hepatic lobe supplied by branches of the right hepatic artery and accessory left hepatic artery..  Technetium MAA was injected aorta determined lung shunt fraction.  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  - Nuclear medicine scan revealed that majority of tracer goes to the lungs with a liver lung shunt fraction of 68.9%.    is not a candidate for Y90 or other liver directed therapies.      05/09/2019 Started taking Sorafenib. She took TID (6 pills of sorafenib/day) for 2 days and then took BID dosing since then. She said she just want to get medication in the system  07/06/2019- developed painful desquamation of her bilateral foot. She has at least grade 2- moderate foot skin reaction - skin changes include hyperkeratosis, blisters (healed) seen on the plantar surface of her bilateral foot with pain limiting her activities of daily living.  We cut down her sorafenib to 200 mg b.i.d.  08/16/2019- persistence of desquamation of hand foot syndrome.  Stopped sorafenib  08/28/2019- CT Chest abdomen and pelvis done on 08/28/2019 revealed disease progression and also new bilateral pulmonary embolism. She was admitted to [facility] and discharged on Eliquis  (Patient declined Lovenox)   09/06/2019- patient presented to the clinic.  In view of her intolerance to sorafenib we will change her treatment to nivolumab every 4 weeks.  Consent signed, pending insurance approval. she is not a  candidate for [place] trial, cohort E. spoke to Diana Robison (ext [phone])  9/12/2019- Received Rollator Walker  09/27/2019- cycle 1 day 1 of nivolumab    2. Hypomagnesemia- Magnesium 1.7       Plan:     1. Okay for cycle 1 day 1 of nivolumab today  2. Patient requested for scooter to use at home for ambulatory dysfunction and falls.  Consult placed for oncology social worker as well as a scooter that can be used at home.  3. Continue Eliquis for bilateral pulmonary emboli.  Scripts sent to [facility]  4. Continue oxycodone for abdominal pain- filled on 09/23/2019  5. Famotidine scripts sent to her pharmacy     Follow up in 4 week with labs-CBC, CMP, magnesium, TSH, free T4, phosphorus for C 2 of nivolumab     Plan of care discussed with Dr. Dewey Roberts MD PGY-5  Hematology and Oncology  Pager:[phone]

## 2019-10-02 ENCOUNTER — DOCUMENTATION ONLY (OUTPATIENT)
Dept: HEMATOLOGY/ONCOLOGY | Facility: CLINIC | Age: 64
End: 2019-10-02

## 2019-10-02 NOTE — PROGRESS NOTES
Received call from patient last Friday afternoon after she had her MD appointment in clinic. She said that she had spoken with Charla at [employer] about the motorized scooter and Charla told her that with an order from her MD they can try to see if her insurance will cover it; in the meantime she can use the wheelchair, and if the scooter gets approved, the wheelchair can be picked up and the scooter delivered. Patient spoke with Dr. Roberts about this and he ordered the scooter. Called [facility] at ext. [phone] and spoke with staff who confirmed that the authorization for the wheelchair was received - monthly rental until December 23, and it was being scheduled for delivery to patient on Monday 9/30. Called [employer], [phone], and spoke with Charla re: the order for the scooter; she accessed patient's chart in [email or web address] to print the order. She explained that their staff member who handles all power chair and scooter referrals is going to be out on medical leave for 3 months, and during this period they are outsourcing these referrals to [facility]. She will fax patient's referral including the order, necessary demographic and medical information to [facility]'s local branch office in [place]. Called patient to review this with her. She expressed understanding and agreement. She confirmed receiving the wheelchair on Monday. No other needs indicated at this time.

## 2019-10-15 ENCOUNTER — HOSPITAL ENCOUNTER (EMERGENCY)
Facility: OTHER | Age: 64
Discharge: HOME | End: 2019-10-15
Attending: EMERGENCY MEDICINE
Payer: MEDICAID

## 2019-10-15 VITALS
RESPIRATION RATE: 20 BRPM | BODY MASS INDEX: 19.76 KG/M2 | WEIGHT: 98 LBS | TEMPERATURE: 99 F | HEIGHT: 59 IN | OXYGEN SATURATION: 98 % | SYSTOLIC BLOOD PRESSURE: 145 MMHG | DIASTOLIC BLOOD PRESSURE: 65 MMHG | HEART RATE: 86 BPM

## 2019-10-15 DIAGNOSIS — M54.50 ACUTE MIDLINE LOW BACK PAIN WITHOUT SCIATICA: Primary | ICD-10-CM

## 2019-10-15 LAB
BACTERIA #/AREA URNS HPF: ABNORMAL /HPF
BILIRUB UR QL STRIP: NEGATIVE
CLARITY UR: CLEAR
COLOR UR: YELLOW
GLUCOSE UR QL STRIP: NEGATIVE
HGB UR QL STRIP: ABNORMAL
HYALINE CASTS #/AREA URNS LPF: 1 /LPF
KETONES UR QL STRIP: NEGATIVE
LEUKOCYTE ESTERASE UR QL STRIP: ABNORMAL
MICROSCOPIC COMMENT: ABNORMAL
NITRITE UR QL STRIP: NEGATIVE
PH UR STRIP: 6 [PH] (ref 5–8)
PROT UR QL STRIP: NEGATIVE
RBC #/AREA URNS HPF: 5 /HPF (ref 0–4)
SP GR UR STRIP: 1.01 (ref 1–1.03)
SQUAMOUS #/AREA URNS HPF: 2 /HPF
URN SPEC COLLECT METH UR: ABNORMAL
UROBILINOGEN UR STRIP-ACNC: 1 EU/DL
WBC #/AREA URNS HPF: 3 /HPF (ref 0–5)

## 2019-10-15 PROCEDURE — 25000003 PHARM REV CODE 250: Performed by: EMERGENCY MEDICINE

## 2019-10-15 PROCEDURE — 96372 THER/PROPH/DIAG INJ SC/IM: CPT

## 2019-10-15 PROCEDURE — 81000 URINALYSIS NONAUTO W/SCOPE: CPT

## 2019-10-15 PROCEDURE — 99284 EMERGENCY DEPT VISIT MOD MDM: CPT | Mod: 25

## 2019-10-15 PROCEDURE — 99285 EMERGENCY DEPT VISIT HI MDM: CPT | Mod: 25

## 2019-10-15 PROCEDURE — 63600175 PHARM REV CODE 636 W HCPCS: Performed by: EMERGENCY MEDICINE

## 2019-10-15 RX ORDER — DIAZEPAM 2 MG/1
2 TABLET ORAL
Status: COMPLETED | OUTPATIENT
Start: 2019-10-15 | End: 2019-10-15

## 2019-10-15 RX ORDER — KETOROLAC TROMETHAMINE 30 MG/ML
30 INJECTION, SOLUTION INTRAMUSCULAR; INTRAVENOUS
Status: COMPLETED | OUTPATIENT
Start: 2019-10-15 | End: 2019-10-15

## 2019-10-15 RX ORDER — ORPHENADRINE CITRATE 100 MG/1
100 TABLET, EXTENDED RELEASE ORAL 2 TIMES DAILY
Qty: 20 TABLET | Refills: 0 | Status: SHIPPED | OUTPATIENT
Start: 2019-10-15 | End: 2019-10-19 | Stop reason: ALTCHOICE

## 2019-10-15 RX ORDER — IBUPROFEN 400 MG/1
400 TABLET ORAL EVERY 6 HOURS PRN
Qty: 20 TABLET | Refills: 0 | Status: ON HOLD | OUTPATIENT
Start: 2019-10-15 | End: 2019-11-03 | Stop reason: HOSPADM

## 2019-10-15 RX ADMIN — KETOROLAC TROMETHAMINE 30 MG: 30 INJECTION, SOLUTION INTRAMUSCULAR; INTRAVENOUS at 04:10

## 2019-10-15 RX ADMIN — DIAZEPAM 2 MG: 2 TABLET ORAL at 04:10

## 2019-10-15 NOTE — ED PROVIDER NOTES
"Encounter Date: 10/15/2019    SCRIBE #1 NOTE: I, Olivia Tai, am scribing for, and in the presence of,  Dr. Smith. I have scribed the following portions of the note - Other sections scribed: HPI, ROS, PE.       History     Chief Complaint   Patient presents with    Back Pain     pt with back pain and side pain x 3 days. pt denies n/v or diarrhea     Neena Marks is a 64 y.o. female with history of cirrhosis, hepatocellular carcinoma, anemia, and alcohol abuse who presents to the ED complaining of 10/10 constant lower back pain for 3 days. Pain radiates from lower back to her sides. Movement exacerbates her pain. She cannot accurately describe the pain. She states this is a new issue for her and denies previous back issues. Denies radiation of pain down legs. She was taking pain medication, which alleviates some of the pain. She reports scrubbing her shower and bending over cleaning her tub a few days ago, but is unsure of any mechanism of injury. Denies fever or urinary symptoms. Denies abnormal BM. Denies episodes of urinary or fecal incontinence.    She was on chemotherapy medication, but has been off for one month. She states "I am waiting to hear what they will do next". Her oncologist is located at [facility], Dr. Stewart Roberts. She receives all her care at [facility]. She is currently on blood thinners. She states her stomach has been swollen x3 days, which she attributes to Motrin use and chemotherapy. She also reports abdominal pain, since her stomach has been this swollen.      The history is provided by the patient.     Review of patient's allergies indicates:  No Known Allergies  Past Medical History:   Diagnosis Date    Alcohol abuse     Anemia     Cancer     liver    Cirrhosis      Past Surgical History:   Procedure Laterality Date    ESOPHAGOGASTRODUODENOSCOPY Left 1/9/2019    Procedure: EGD (ESOPHAGOGASTRODUODENOSCOPY);  Surgeon: Madyson Farrar MD;  Location: [facility];  " Service: Endoscopy;  Laterality: Left;    PERITONEOCENTESIS N/A 1/9/2019    Procedure: PARACENTESIS, ABDOMINAL;  Surgeon: Everett Fitzpatrick MD;  Location: [facility] CATH LAB;  Service: Radiology;  Laterality: N/A;     No family history on file.  Social History     Tobacco Use    Smoking status: Current Every Day Smoker     Packs/day: 0.50     Types: Cigarettes    Smokeless tobacco: Never Used    Tobacco comment: 2 cigarettes a day   Substance Use Topics    Alcohol use: No     Frequency: Never     Comment: Previously drank 1 pint a day    Drug use: Not Currently     Types: Cocaine     Comment: last use was 3 years ago     Review of Systems   Constitutional: Negative for fever.   Respiratory: Negative for shortness of breath.    Cardiovascular: Negative for chest pain.   Gastrointestinal: Positive for abdominal distention and abdominal pain. Negative for vomiting.   Genitourinary: Negative for dysuria and enuresis.   Musculoskeletal: Positive for back pain. Negative for gait problem and neck pain.   Skin: Negative for wound.   Neurological: Negative for weakness and numbness.   All other systems reviewed and are negative.      Physical Exam     Initial Vitals [10/15/19 1513]   BP Pulse Resp Temp SpO2   132/66 74 18 98.8 °F (37.1 °C) 100 %      MAP       --         Physical Exam    Nursing note and vitals reviewed.  Constitutional: She appears well-developed.   Frail appearing patient. Patient appears uncomfortable.   HENT:   Head: Normocephalic and atraumatic.   Neck: Normal range of motion.   Cardiovascular: Normal rate, regular rhythm and normal heart sounds. Exam reveals no gallop and no friction rub.    No murmur heard.  Pulmonary/Chest: Breath sounds normal. No respiratory distress. She has no wheezes. She has no rhonchi. She has no rales.   Abdominal: She exhibits distension.   Distended abdomen noted. There is no peritoneal abnormal sensation on exam.   Genitourinary: Rectal exam shows anal tone normal.    Genitourinary Comments: Rectal Exam performed: Female chaperone (Delta) is present for exam. On exam rectal tone is normal. Normal sensation in peritoneum.    Musculoskeletal: She exhibits tenderness.   Lumbosacral midline tenderness noted. Tenderness to palpation at midline L5-S1 joint.   Neurological: She is alert and oriented to person, place, and time. GCS eye subscore is 4. GCS verbal subscore is 5. GCS motor subscore is 6.   Ambulating with no ataxia. Patient answers questions appropriately.   Skin: Skin is warm.   Psychiatric: She has a normal mood and affect.         ED Course   Procedures  Labs Reviewed   URINALYSIS, REFLEX TO URINE CULTURE - Abnormal; Notable for the following components:       Result Value    Occult Blood UA Trace (*)     Leukocytes, UA 1+ (*)     All other components within normal limits    Narrative:     Preferred Collection Type->Urine, Clean Catch   URINALYSIS MICROSCOPIC - Abnormal; Notable for the following components:    RBC, UA 5 (*)     All other components within normal limits    Narrative:     Preferred Collection Type->Urine, Clean Catch          Imaging Results          CT Sacrum Without Contrast (Final result)  Result time 10/15/19 17:33:31    Final result by Amandeep Figueroa MD (10/15/19 17:33:31)                 Impression:      CT lumbar spine:    No evidence of acute fracture or listhesis of the lumbar spine.    Diffuse osteopenia.  MRI of the lumbar spine may be obtained for further evaluation, as clinically warranted.    Marked abdominal and pelvic ascites.    Heterogeneous appearance of the liver with suspected hepatic lesions.  Outpatient follow-up with dedicated MRI of the liver, as clinically warranted.    CT sacrum:    No evidence of acute fracture or listhesis of the sacrum.    Marked osteopenia.  MRI of the sacrum/pelvis may be obtained for further evaluation, as clinically warranted.    Circumferential wall thickening of the urinary bladder.      Electronically  signed by: Amandeep Figueroa MD  Date:    10/15/2019  Time:    17:33             Narrative:    EXAMINATION:  CT LUMBAR SPINE WITHOUT CONTRAST; CT SACRUM WITHOUT CONTRAST    CLINICAL HISTORY:  Low back pain, risk factors (osteoporosis or chronic steroid use or elderly);; Sacroiliac sx, xray neg, spondyloarthropathy suspected;xray indeterminate sacrum;    TECHNIQUE:  Low-dose axial, sagittal and coronal reformations are obtained through the lumbar spine.  Contrast was not administered.    Axial CT scan of the sacrum was obtained without intravenous contrast.  Coronal and sagittal reformats were obtained.    COMPARISON:  X-ray of the lumbar spine dated 10/15/2019 and CT scan abdomen pelvis dated 01/08/2019.    FINDINGS:  CT lumbar spine:    The lumbar alignment is within normal limits.  The vertebral body heights are maintained.  There is diffuse osteopenia.  No evidence of a displaced fracture is identified.    There is disc desiccation at multiple levels.  There are multiple central disc protrusions at multiple levels.  Evaluation of the individual disc levels reveals variable spinal canal and neural foraminal narrowing.    There is hypertrophy of the posterior elements.  There is hypertrophy of the ligamentum flavum.    There are advanced calcifications involving the abdominal aorta and its branch vessels.  There is large amount of abdominal and pelvic ascites.  The liver is heterogeneous with suspected hepatic masses.  The spleen is enlarged.    CT sacrum:    There is diffuse demineralization of the osseous structures.  There is mottled appearance of the osseous structures.  No evidence of a displaced fracture is identified.    There is expected joint space narrowing involving the sacroiliac joints.  The neural arches are intact.  There is joint space narrowing involving the bilateral hip joints.  No evidence of a displaced fracture is present.    There is marked pelvic ascites.  There is circumferential wall thickening  of the urinary bladder.  There are varices involving the anterior abdominal wall.    There is no evidence of acute fracture or listhesis of the sacrum.                               CT Lumbar Spine Without Contrast (Final result)  Result time 10/15/19 17:33:31    Final result by Amandeep Figueroa MD (10/15/19 17:33:31)                 Impression:      CT lumbar spine:    No evidence of acute fracture or listhesis of the lumbar spine.    Diffuse osteopenia.  MRI of the lumbar spine may be obtained for further evaluation, as clinically warranted.    Marked abdominal and pelvic ascites.    Heterogeneous appearance of the liver with suspected hepatic lesions.  Outpatient follow-up with dedicated MRI of the liver, as clinically warranted.    CT sacrum:    No evidence of acute fracture or listhesis of the sacrum.    Marked osteopenia.  MRI of the sacrum/pelvis may be obtained for further evaluation, as clinically warranted.    Circumferential wall thickening of the urinary bladder.      Electronically signed by: Amandeep Figueroa MD  Date:    10/15/2019  Time:    17:33             Narrative:    EXAMINATION:  CT LUMBAR SPINE WITHOUT CONTRAST; CT SACRUM WITHOUT CONTRAST    CLINICAL HISTORY:  Low back pain, risk factors (osteoporosis or chronic steroid use or elderly);; Sacroiliac sx, xray neg, spondyloarthropathy suspected;xray indeterminate sacrum;    TECHNIQUE:  Low-dose axial, sagittal and coronal reformations are obtained through the lumbar spine.  Contrast was not administered.    Axial CT scan of the sacrum was obtained without intravenous contrast.  Coronal and sagittal reformats were obtained.    COMPARISON:  X-ray of the lumbar spine dated 10/15/2019 and CT scan abdomen pelvis dated 01/08/2019.    FINDINGS:  CT lumbar spine:    The lumbar alignment is within normal limits.  The vertebral body heights are maintained.  There is diffuse osteopenia.  No evidence of a displaced fracture is identified.    There is disc desiccation  at multiple levels.  There are multiple central disc protrusions at multiple levels.  Evaluation of the individual disc levels reveals variable spinal canal and neural foraminal narrowing.    There is hypertrophy of the posterior elements.  There is hypertrophy of the ligamentum flavum.    There are advanced calcifications involving the abdominal aorta and its branch vessels.  There is large amount of abdominal and pelvic ascites.  The liver is heterogeneous with suspected hepatic masses.  The spleen is enlarged.    CT sacrum:    There is diffuse demineralization of the osseous structures.  There is mottled appearance of the osseous structures.  No evidence of a displaced fracture is identified.    There is expected joint space narrowing involving the sacroiliac joints.  The neural arches are intact.  There is joint space narrowing involving the bilateral hip joints.  No evidence of a displaced fracture is present.    There is marked pelvic ascites.  There is circumferential wall thickening of the urinary bladder.  There are varices involving the anterior abdominal wall.    There is no evidence of acute fracture or listhesis of the sacrum.                               X-Ray Lumbar Spine Ap And Lateral (Final result)  Result time 10/15/19 15:53:13    Final result by Osmar Erickson MD (10/15/19 15:53:13)                 Impression:      1. No convincing acute displaced fracture or dislocation of the lumbar spine noting degenerative changes.  Please note, there is suboptimal evaluation of the sacral segments, if there is discrete sacrococcygeal pain, consider dedicated radiography as warranted.      Electronically signed by: Osmar Erickson MD  Date:    10/15/2019  Time:    15:53             Narrative:    EXAMINATION:  XR LUMBAR SPINE AP AND LATERAL    CLINICAL HISTORY:  Low back pain, risk factors (osteoporosis or chronic steroid use or elderly);    TECHNIQUE:  AP, lateral and spot images were performed of the  lumbar spine.    COMPARISON:  None    FINDINGS:  Three views.    Lateral imaging demonstrates grossly adequate alignment of the lumbar spine without significant vertebral body height loss or disc space height loss.  The sacral segments are grossly aligned allowing for positioning.  There is lower lumbar facet arthropathy.  AP spinal alignment is remarkable for dextroscoliotic curvature.  The sacroiliac joints are intact.  There is calcification of the aorta.                              X-Rays:   Independently Interpreted Readings:   Other Readings:  X-ray of L-spine: Degenerative changes at L5-S1. Slight anterior spondylolisthesis over L4-L5  No fractures seen. No bony abnormalities seen.     Medical Decision Making:   Initial Assessment:   A 64-year-old female with history of hepatocellular carcinoma presents with low back pain. To the midline over the lumbosacral region radiates around her ASIS.  I differential could include musculoskeletal however with history of cancer further workup is indicated.  Possible metastasis.  She has no neurological findings to suggest spinal cord issue or cauda equina.  She has no abnormal sensation in the perineum.  She has no loss of bowel or bladder.  Will give analgesia and observed.  I have considered but do not see any findings on examination to suggest SBP.  Independently Interpreted Test(s):   I have ordered and independently interpreted X-rays - see prior notes.  Clinical Tests:   Lab Tests: Ordered  Radiological Study: Ordered and Reviewed  ED Management:  I urinalysis reviewed showing no acute findings.  She has no symptoms of urinary tract infection.  I called and spoke with the patient's daughter and updated on the findings.  She will follow up with primary care as an outpatient.                Scribe Attestation:   Scribe #1: I performed the above scribed service and the documentation accurately describes the services I performed. I attest to the accuracy of the  note.    Attending Attestation:           Physician Attestation for Scribe:  Physician Attestation Statement for Scribe #1: I, Dr. Smith, reviewed documentation, as scribed by Olivia Tai in my presence, and it is both accurate and complete.                 ED Course as of Oct 15 1859   Tue Oct 15, 2019   1606 X-ray of the lumbar spine shows no significant abnormality.  Indeterminate findings the sacrum.  I due to the risk of the patient as she is a cancer patient although there is a lower risk of metastasis to the bone with hepatocellular carcinoma I will get a CT lumbar spine as well as sacrum.  She has no neurological findings I do not feel an MRI is indicated here in the emergency department likely with close follow-up.    [SM]   3348 Patient is feeling much better.  We did just get her urine.  She stated she would like to leave.  I will follow up on this and give her a call with the results and call in antibiotics as needed.    [SM]   1759  I let the patient  know that although no further workup in the emergency department is indicated, they still may require further testing as an outpatient for their issue here today.    Patient discharged home in stable condition. Diagnosis and treatment plan explained to patient and/or family member who is at bedside. I have answered all questions and the patient is satisfied with the plan of care. The patient demonstrates understanding of the care plan. This is the extent to the patients complaints today here in the emergency department.    [SM]   7679 Patient's contact number is [phone]. Will contact patient when urine culture has resulted.      [MS]      ED Course User Index  [MS] Olivia Tai  [SM] Chilango Smith,      Clinical Impression:     1. Acute midline low back pain without sciatica                                   Chilango Smith,   10/15/19 1859

## 2019-10-15 NOTE — ED NOTES
Pt to ED with c/o bilateral lower back pain x3 days. PMH of cirrhosis, liver CA. Pt reports pain starts in the back and radiates to bilateral flanks. Pt not getting any relief from PO opioids at home. Pt denies N/V/D, SOB, CP, abdominal pain, dysuria.     Two patient identifiers have been checked and are correct.      Appearance: Pt awake, alert & oriented to person, place & time. Pt in no acute distress at present time. Pt is clean and well groomed with clothes appropriately fastened.   Skin: Skin warm, dry & intact. Color consistent with ethnicity. Mucous membranes moist. No breakdown or brusing noted.   Musculoskeletal: Patient moving all extremities well, no obvious swelling or deformities noted. TTP to lower back pain.   Respiratory: Respirations spontaneous, even, and non-labored. Visible chest rise noted. Airway is open and patent. No accessory muscle use noted.   Neurologic: Sensation is intact. Speech is clear and appropriate. Eyes open spontaneously, behavior appropriate to situation, follows commands, facial expression symmetrical, bilateral hand grasp equal and even, purposeful motor response noted.  Cardiac: All peripheral pulses present. No Bilateral lower extremity edema. Cap refill is <3 seconds.  Abdomen: Abdomen soft, non-tender to palpation.   : Pt reports no dysuria or hematuria.

## 2019-10-17 ENCOUNTER — DOCUMENTATION ONLY (OUTPATIENT)
Dept: HEMATOLOGY/ONCOLOGY | Facility: CLINIC | Age: 64
End: 2019-10-17

## 2019-10-17 NOTE — PROGRESS NOTES
Received call from patient, requesting an update on the motorized scooter referral; she hasn't heard anything from the company ([employer]). Explained that I would notify her primary Oncology Social Worker Vivi Camacho LCSW, and ask her to check the status and call patient. Message sent to Vivi via Epic this morning. Received a missed call from patient this afternoon, and returned call to her; she said she dialed the number in error and wasn't calling me. She related about back issues/pain and that she has gone to the ED and was given two prescriptions, which she filled and paid $22 for, but she doesn't think the medicines are helping much. She reported limited income. Spoke with her about possible prescription assistance through [email or web address] and will ask Vivi to follow up with patient. Additional message sent to Vivi via Epic.

## 2019-10-19 ENCOUNTER — HOSPITAL ENCOUNTER (EMERGENCY)
Facility: HOSPITAL | Age: 64
Discharge: HOME | End: 2019-10-19
Attending: EMERGENCY MEDICINE
Payer: MEDICAID

## 2019-10-19 ENCOUNTER — HOSPITAL ENCOUNTER (EMERGENCY)
Facility: OTHER | Age: 64
Discharge: HOME | End: 2019-10-20
Attending: EMERGENCY MEDICINE
Payer: MEDICAID

## 2019-10-19 VITALS
HEIGHT: 59 IN | BODY MASS INDEX: 21.17 KG/M2 | HEART RATE: 82 BPM | SYSTOLIC BLOOD PRESSURE: 151 MMHG | TEMPERATURE: 99 F | OXYGEN SATURATION: 99 % | WEIGHT: 105 LBS | DIASTOLIC BLOOD PRESSURE: 82 MMHG | RESPIRATION RATE: 19 BRPM

## 2019-10-19 DIAGNOSIS — R11.2 NAUSEA AND VOMITING, INTRACTABILITY OF VOMITING NOT SPECIFIED, UNSPECIFIED VOMITING TYPE: ICD-10-CM

## 2019-10-19 DIAGNOSIS — M54.9 BACK PAIN, UNSPECIFIED BACK LOCATION, UNSPECIFIED BACK PAIN LATERALITY, UNSPECIFIED CHRONICITY: Primary | ICD-10-CM

## 2019-10-19 DIAGNOSIS — N39.0 URINARY TRACT INFECTION WITHOUT HEMATURIA, SITE UNSPECIFIED: Primary | ICD-10-CM

## 2019-10-19 LAB
ABO + RH BLD: NORMAL
ALBUMIN SERPL BCP-MCNC: 3.2 G/DL (ref 3.5–5.2)
ALBUMIN SERPL BCP-MCNC: 3.3 G/DL (ref 3.5–5.2)
ALP SERPL-CCNC: 109 U/L (ref 55–135)
ALP SERPL-CCNC: 118 U/L (ref 55–135)
ALT SERPL W/O P-5'-P-CCNC: 28 U/L (ref 10–44)
ALT SERPL W/O P-5'-P-CCNC: 28 U/L (ref 10–44)
AMMONIA PLAS-SCNC: 28 UMOL/L (ref 10–50)
ANION GAP SERPL CALC-SCNC: 11 MMOL/L (ref 8–16)
ANION GAP SERPL CALC-SCNC: 14 MMOL/L (ref 8–16)
ANISOCYTOSIS BLD QL SMEAR: SLIGHT
ANISOCYTOSIS BLD QL SMEAR: SLIGHT
AST SERPL-CCNC: 88 U/L (ref 10–40)
AST SERPL-CCNC: 90 U/L (ref 10–40)
BASOPHILS # BLD AUTO: 0.03 K/UL (ref 0–0.2)
BASOPHILS # BLD AUTO: 0.05 K/UL (ref 0–0.2)
BASOPHILS NFR BLD: 0.4 % (ref 0–1.9)
BASOPHILS NFR BLD: 0.9 % (ref 0–1.9)
BILIRUB SERPL-MCNC: 0.9 MG/DL (ref 0.1–1)
BILIRUB SERPL-MCNC: 1 MG/DL (ref 0.1–1)
BLD GP AB SCN CELLS X3 SERPL QL: NORMAL
BUN SERPL-MCNC: 14 MG/DL (ref 8–23)
BUN SERPL-MCNC: 17 MG/DL (ref 8–23)
CALCIUM SERPL-MCNC: 9.5 MG/DL (ref 8.7–10.5)
CALCIUM SERPL-MCNC: 9.6 MG/DL (ref 8.7–10.5)
CHLORIDE SERPL-SCNC: 104 MMOL/L (ref 95–110)
CHLORIDE SERPL-SCNC: 105 MMOL/L (ref 95–110)
CO2 SERPL-SCNC: 20 MMOL/L (ref 23–29)
CO2 SERPL-SCNC: 21 MMOL/L (ref 23–29)
CREAT SERPL-MCNC: 1 MG/DL (ref 0.5–1.4)
CREAT SERPL-MCNC: 1.1 MG/DL (ref 0.5–1.4)
CRP SERPL-MCNC: 2.1 MG/L (ref 0–8.2)
DACRYOCYTES BLD QL SMEAR: ABNORMAL
DIFFERENTIAL METHOD: ABNORMAL
DIFFERENTIAL METHOD: ABNORMAL
EOSINOPHIL # BLD AUTO: 0 K/UL (ref 0–0.5)
EOSINOPHIL # BLD AUTO: 0.1 K/UL (ref 0–0.5)
EOSINOPHIL NFR BLD: 0.1 % (ref 0–8)
EOSINOPHIL NFR BLD: 1.5 % (ref 0–8)
ERYTHROCYTE [DISTWIDTH] IN BLOOD BY AUTOMATED COUNT: 19 % (ref 11.5–14.5)
ERYTHROCYTE [DISTWIDTH] IN BLOOD BY AUTOMATED COUNT: 19.5 % (ref 11.5–14.5)
ERYTHROCYTE [SEDIMENTATION RATE] IN BLOOD BY WESTERGREN METHOD: >120 MM/HR (ref 0–36)
EST. GFR  (AFRICAN AMERICAN): >60 ML/MIN/1.73 M^2
EST. GFR  (AFRICAN AMERICAN): >60 ML/MIN/1.73 M^2
EST. GFR  (NON AFRICAN AMERICAN): 54 ML/MIN/1.73 M^2
EST. GFR  (NON AFRICAN AMERICAN): >60 ML/MIN/1.73 M^2
GLUCOSE SERPL-MCNC: 144 MG/DL (ref 70–110)
GLUCOSE SERPL-MCNC: 92 MG/DL (ref 70–110)
HCT VFR BLD AUTO: 30 % (ref 37–48.5)
HCT VFR BLD AUTO: 31.6 % (ref 37–48.5)
HGB BLD-MCNC: 9.4 G/DL (ref 12–16)
HGB BLD-MCNC: 9.8 G/DL (ref 12–16)
HYPOCHROMIA BLD QL SMEAR: ABNORMAL
HYPOCHROMIA BLD QL SMEAR: ABNORMAL
IMM GRANULOCYTES # BLD AUTO: 0.02 K/UL (ref 0–0.04)
IMM GRANULOCYTES # BLD AUTO: 0.04 K/UL (ref 0–0.04)
IMM GRANULOCYTES NFR BLD AUTO: 0.4 % (ref 0–0.5)
IMM GRANULOCYTES NFR BLD AUTO: 0.5 % (ref 0–0.5)
INR PPP: 1.3 (ref 0.8–1.2)
INR PPP: 1.4 (ref 0.8–1.2)
LIPASE SERPL-CCNC: 11 U/L (ref 4–60)
LYMPHOCYTES # BLD AUTO: 0.9 K/UL (ref 1–4.8)
LYMPHOCYTES # BLD AUTO: 1.2 K/UL (ref 1–4.8)
LYMPHOCYTES NFR BLD: 11.7 % (ref 18–48)
LYMPHOCYTES NFR BLD: 22.1 % (ref 18–48)
MCH RBC QN AUTO: 21.2 PG (ref 27–31)
MCH RBC QN AUTO: 21.8 PG (ref 27–31)
MCHC RBC AUTO-ENTMCNC: 31 G/DL (ref 32–36)
MCHC RBC AUTO-ENTMCNC: 31.3 G/DL (ref 32–36)
MCV RBC AUTO: 68 FL (ref 82–98)
MCV RBC AUTO: 70 FL (ref 82–98)
MONOCYTES # BLD AUTO: 0.4 K/UL (ref 0.3–1)
MONOCYTES # BLD AUTO: 0.5 K/UL (ref 0.3–1)
MONOCYTES NFR BLD: 5.7 % (ref 4–15)
MONOCYTES NFR BLD: 8.2 % (ref 4–15)
NEUTROPHILS # BLD AUTO: 3.7 K/UL (ref 1.8–7.7)
NEUTROPHILS # BLD AUTO: 6.1 K/UL (ref 1.8–7.7)
NEUTROPHILS NFR BLD: 66.9 % (ref 38–73)
NEUTROPHILS NFR BLD: 81.6 % (ref 38–73)
NRBC BLD-RTO: 0 /100 WBC
NRBC BLD-RTO: 0 /100 WBC
OVALOCYTES BLD QL SMEAR: ABNORMAL
OVALOCYTES BLD QL SMEAR: ABNORMAL
PLATELET # BLD AUTO: 202 K/UL (ref 150–350)
PLATELET # BLD AUTO: 211 K/UL (ref 150–350)
PLATELET BLD QL SMEAR: ABNORMAL
PMV BLD AUTO: ABNORMAL FL (ref 9.2–12.9)
PMV BLD AUTO: ABNORMAL FL (ref 9.2–12.9)
POIKILOCYTOSIS BLD QL SMEAR: SLIGHT
POTASSIUM SERPL-SCNC: 4.9 MMOL/L (ref 3.5–5.1)
POTASSIUM SERPL-SCNC: 5.1 MMOL/L (ref 3.5–5.1)
PROT SERPL-MCNC: 8.9 G/DL (ref 6–8.4)
PROT SERPL-MCNC: 9 G/DL (ref 6–8.4)
PROTHROMBIN TIME: 14 SEC (ref 9–12.5)
PROTHROMBIN TIME: 14.6 SEC (ref 9–12.5)
RBC # BLD AUTO: 4.43 M/UL (ref 4–5.4)
RBC # BLD AUTO: 4.5 M/UL (ref 4–5.4)
SCHISTOCYTES BLD QL SMEAR: ABNORMAL
SCHISTOCYTES BLD QL SMEAR: PRESENT
SODIUM SERPL-SCNC: 137 MMOL/L (ref 136–145)
SODIUM SERPL-SCNC: 138 MMOL/L (ref 136–145)
WBC # BLD AUTO: 5.47 K/UL (ref 3.9–12.7)
WBC # BLD AUTO: 7.42 K/UL (ref 3.9–12.7)

## 2019-10-19 PROCEDURE — 80053 COMPREHEN METABOLIC PANEL: CPT

## 2019-10-19 PROCEDURE — 80053 COMPREHEN METABOLIC PANEL: CPT | Mod: 91

## 2019-10-19 PROCEDURE — 36415 COLL VENOUS BLD VENIPUNCTURE: CPT

## 2019-10-19 PROCEDURE — 99284 PR EMERGENCY DEPT VISIT,LEVEL IV: ICD-10-PCS | Mod: ,,, | Performed by: EMERGENCY MEDICINE

## 2019-10-19 PROCEDURE — 87086 URINE CULTURE/COLONY COUNT: CPT

## 2019-10-19 PROCEDURE — 25000003 PHARM REV CODE 250: Performed by: EMERGENCY MEDICINE

## 2019-10-19 PROCEDURE — 87147 CULTURE TYPE IMMUNOLOGIC: CPT

## 2019-10-19 PROCEDURE — 96374 THER/PROPH/DIAG INJ IV PUSH: CPT | Mod: 59

## 2019-10-19 PROCEDURE — 86140 C-REACTIVE PROTEIN: CPT

## 2019-10-19 PROCEDURE — 99284 EMERGENCY DEPT VISIT MOD MDM: CPT | Mod: ,,, | Performed by: EMERGENCY MEDICINE

## 2019-10-19 PROCEDURE — 82140 ASSAY OF AMMONIA: CPT

## 2019-10-19 PROCEDURE — 83690 ASSAY OF LIPASE: CPT

## 2019-10-19 PROCEDURE — 96372 THER/PROPH/DIAG INJ SC/IM: CPT | Mod: 59

## 2019-10-19 PROCEDURE — 96375 TX/PRO/DX INJ NEW DRUG ADDON: CPT

## 2019-10-19 PROCEDURE — 85025 COMPLETE CBC W/AUTO DIFF WBC: CPT

## 2019-10-19 PROCEDURE — 86901 BLOOD TYPING SEROLOGIC RH(D): CPT

## 2019-10-19 PROCEDURE — 87077 CULTURE AEROBIC IDENTIFY: CPT

## 2019-10-19 PROCEDURE — 96374 THER/PROPH/DIAG INJ IV PUSH: CPT

## 2019-10-19 PROCEDURE — 85610 PROTHROMBIN TIME: CPT | Mod: 91

## 2019-10-19 PROCEDURE — 87088 URINE BACTERIA CULTURE: CPT

## 2019-10-19 PROCEDURE — 99284 EMERGENCY DEPT VISIT MOD MDM: CPT | Mod: 25,27

## 2019-10-19 PROCEDURE — 63600175 PHARM REV CODE 636 W HCPCS: Performed by: EMERGENCY MEDICINE

## 2019-10-19 PROCEDURE — 85025 COMPLETE CBC W/AUTO DIFF WBC: CPT | Mod: 91

## 2019-10-19 PROCEDURE — 81000 URINALYSIS NONAUTO W/SCOPE: CPT

## 2019-10-19 PROCEDURE — 85610 PROTHROMBIN TIME: CPT

## 2019-10-19 PROCEDURE — 87186 SC STD MICRODIL/AGAR DIL: CPT

## 2019-10-19 PROCEDURE — 25500020 PHARM REV CODE 255: Performed by: EMERGENCY MEDICINE

## 2019-10-19 PROCEDURE — A9585 GADOBUTROL INJECTION: HCPCS | Performed by: EMERGENCY MEDICINE

## 2019-10-19 PROCEDURE — 99285 EMERGENCY DEPT VISIT HI MDM: CPT | Mod: 25

## 2019-10-19 PROCEDURE — 85652 RBC SED RATE AUTOMATED: CPT

## 2019-10-19 RX ORDER — ONDANSETRON 4 MG/1
4 TABLET, ORALLY DISINTEGRATING ORAL
Status: COMPLETED | OUTPATIENT
Start: 2019-10-19 | End: 2019-10-19

## 2019-10-19 RX ORDER — ONDANSETRON 2 MG/ML
4 INJECTION INTRAMUSCULAR; INTRAVENOUS
Status: COMPLETED | OUTPATIENT
Start: 2019-10-19 | End: 2019-10-19

## 2019-10-19 RX ORDER — OXYCODONE AND ACETAMINOPHEN 5; 325 MG/1; MG/1
1 TABLET ORAL EVERY 4 HOURS PRN
Qty: 5 TABLET | Refills: 0 | Status: SHIPPED | OUTPATIENT
Start: 2019-10-19 | End: 2019-10-21

## 2019-10-19 RX ORDER — METOCLOPRAMIDE HYDROCHLORIDE 5 MG/ML
10 INJECTION INTRAMUSCULAR; INTRAVENOUS
Status: COMPLETED | OUTPATIENT
Start: 2019-10-20 | End: 2019-10-20

## 2019-10-19 RX ORDER — DIAZEPAM 5 MG/1
5 TABLET ORAL
Status: DISCONTINUED | OUTPATIENT
Start: 2019-10-19 | End: 2019-10-19

## 2019-10-19 RX ORDER — GADOBUTROL 604.72 MG/ML
5 INJECTION INTRAVENOUS
Status: COMPLETED | OUTPATIENT
Start: 2019-10-19 | End: 2019-10-19

## 2019-10-19 RX ORDER — METHOCARBAMOL 500 MG/1
500 TABLET, FILM COATED ORAL 4 TIMES DAILY
Qty: 40 TABLET | Refills: 0 | Status: ON HOLD | OUTPATIENT
Start: 2019-10-19 | End: 2019-11-03 | Stop reason: HOSPADM

## 2019-10-19 RX ORDER — ONDANSETRON 4 MG/1
4 TABLET, FILM COATED ORAL
Status: DISCONTINUED | OUTPATIENT
Start: 2019-10-19 | End: 2019-10-19

## 2019-10-19 RX ORDER — KETOROLAC TROMETHAMINE 30 MG/ML
15 INJECTION, SOLUTION INTRAMUSCULAR; INTRAVENOUS
Status: COMPLETED | OUTPATIENT
Start: 2019-10-19 | End: 2019-10-19

## 2019-10-19 RX ORDER — OXYCODONE AND ACETAMINOPHEN 5; 325 MG/1; MG/1
1 TABLET ORAL
Status: COMPLETED | OUTPATIENT
Start: 2019-10-19 | End: 2019-10-19

## 2019-10-19 RX ORDER — DIAZEPAM 2 MG/1
2 TABLET ORAL
Status: COMPLETED | OUTPATIENT
Start: 2019-10-19 | End: 2019-10-19

## 2019-10-19 RX ADMIN — ONDANSETRON 4 MG: 4 TABLET, ORALLY DISINTEGRATING ORAL at 02:10

## 2019-10-19 RX ADMIN — ONDANSETRON 4 MG: 2 INJECTION INTRAMUSCULAR; INTRAVENOUS at 01:10

## 2019-10-19 RX ADMIN — DIAZEPAM 2 MG: 2 TABLET ORAL at 01:10

## 2019-10-19 RX ADMIN — KETOROLAC TROMETHAMINE 15 MG: 30 INJECTION, SOLUTION INTRAMUSCULAR; INTRAVENOUS at 12:10

## 2019-10-19 RX ADMIN — ONDANSETRON 4 MG: 2 INJECTION INTRAMUSCULAR; INTRAVENOUS at 11:10

## 2019-10-19 RX ADMIN — OXYCODONE HYDROCHLORIDE AND ACETAMINOPHEN 1 TABLET: 5; 325 TABLET ORAL at 02:10

## 2019-10-19 RX ADMIN — ONDANSETRON 4 MG: 4 TABLET, ORALLY DISINTEGRATING ORAL at 03:10

## 2019-10-19 RX ADMIN — GADOBUTROL 5 ML: 604.72 INJECTION INTRAVENOUS at 02:10

## 2019-10-19 NOTE — ED NOTES
Patient identifiers verified and correct for Ms Marks  C/C: Left lower back pain non radiating SEE NN  APPEARANCE: awake and alert in NAD. Sclera yellow  SKIN: warm, dry and intact. No breakdown or bruising. Bent over due to pain   MUSCULOSKELETAL: Patient moving all extremities spontaneously, no obvious swelling or deformities noted. Ambulates with max assist  RESPIRATORY: Denies shortness of breath.Respirations unlabored.   CARDIAC: Denies CP, 2+ distal pulses; no peripheral edema  ABDOMEN: S/ND/NT, Denies nausea  : voids spontaneously, denies difficulty  Neurologic: AAO x 4; follows commands equal strength in all extremities; denies numbness/tingling. Denies dizziness Positive weakness, pain to left lower back

## 2019-10-19 NOTE — ED TRIAGE NOTES
"Patient states left lower back pain and "all over back" no recent injury except fell last month, states she was seen Wednesday for same pain, using Oxycodone  Ran out yesterday. Gave rx Ibuprofen 400mg that is not helping,   "

## 2019-10-19 NOTE — ED TRIAGE NOTES
"Pt states she has been a  and also takes care of  7 year old family member alone at home. " my back gave out on me" while at home. I didn't fall . I clenched onto the bed.  10/10 pain at this time.      Psychosocial:  Patient is calm and cooperative.  Patients insight and judgement are appropriate to situation.  Appears clean, well maintained, with clothing appropriate to environment.  No evidence of delusions, hallucinations, or psychosis.     Neuro:  Eyes open spontaneously.  Awake, alert, oriented x 4.  Speech clear and appropriate.  Tolerating saliva secretions well.  Able to follow commands, demonstrating ability to actively and appropriately communicate within context of current conversation.  Symmetrical facial muscles.  Moving all extremities well with no noted weakness.  Adequate muscle tone present.    Movement is purposeful.  No evidence of impaired sensation.  Response to external stimuli appropriately.  No noted drifts.     Airway:  Bilateral chest rise and fall.  RR regular and non-labored.  Air entry patent with and clear x 5 lobes of the lungs.  No crepitus or subcutaneous emphysema noted on palpation.       Circulatory:  Skin warm, dry, and pink.  Apical and radial pulses strong and regular.  Capillary refill/skin blanching less than 3 seconds to distal of 4 extremities.     Abdomen:  Abdomen soft and non-distended.  Positive normo-active bowel sounds x 4 quadrants.       Urinary: Denies pressure, frequency, urgency, odor or pain.     Extremities:  No redness, heat, deformity, or pain.     Skin:  Intact with no bruising/discolorations noted.  d  "

## 2019-10-19 NOTE — ED PROVIDER NOTES
Encounter Date: 10/19/2019    SCRIBE #1 NOTE: I, Perezpedro Zavala, am scribing for, and in the presence of,  Dr. Sheikh. I have scribed the entire note.       History     Chief Complaint   Patient presents with    Back Pain     Pt presents with lower back pain for the last 2-3 days. Pt denies trauma. Pt denies tingling or numbness in legs.      Time patient was seen by the provider: 12:22 PM      The patient is a 64 y.o. female with co-morbidities including: cirrhosis, liver cancer, and anemia, who presents to the ED with a complaint of worsening back pain. Patient reports that her pain began to worsen on Wednesday 3 days ago. She describes her back pain as severe but not radiating to her LE at this time. She states she has been urinating more frequently after taking her diuresis pills as directed. She reports regular BM as of recent but denies seeing any blood in her stool. She states that she visited [facility] Wednesday and was prescribed oxycodone but has since ran out of this script. She states she does not have a PCP at this time. Patient denies any other acute symptoms at this time.          Review of patient's allergies indicates:  No Known Allergies  Past Medical History:   Diagnosis Date    Alcohol abuse     Anemia     Cancer     liver    Cirrhosis      Past Surgical History:   Procedure Laterality Date    ESOPHAGOGASTRODUODENOSCOPY Left 1/9/2019    Procedure: EGD (ESOPHAGOGASTRODUODENOSCOPY);  Surgeon: Madyson Farrar MD;  Location: [facility];  Service: Endoscopy;  Laterality: Left;    PERITONEOCENTESIS N/A 1/9/2019    Procedure: PARACENTESIS, ABDOMINAL;  Surgeon: Everett Fitzpatrick MD;  Location: [facility] CATH LAB;  Service: Radiology;  Laterality: N/A;     History reviewed. No pertinent family history.  Social History     Tobacco Use    Smoking status: Current Every Day Smoker     Types: Cigarettes    Smokeless tobacco: Never Used    Tobacco comment: 2 cigarettes a day   Substance Use Topics     Alcohol use: No     Frequency: Never     Comment: Previously drank 1 pint a day    Drug use: Not Currently     Types: Cocaine     Comment: last use was 3 years ago     Review of Systems   Constitutional: Negative for chills and fever.   HENT: Negative for ear pain and sore throat.    Eyes: Negative for pain.   Respiratory: Negative for shortness of breath.    Cardiovascular: Negative for chest pain.   Gastrointestinal: Negative for blood in stool and nausea.   Endocrine: Negative for polyuria.   Genitourinary: Negative for dysuria and hematuria.   Musculoskeletal: Positive for back pain.   Skin: Negative for rash.   Neurological: Negative for seizures and weakness.   All other systems reviewed and are negative.      Physical Exam     Initial Vitals [10/19/19 1206]   BP Pulse Resp Temp SpO2   (!) 153/72 89 15 98.6 °F (37 °C) 98 %      MAP       --         Physical Exam    Nursing note and vitals reviewed.  Constitutional: She appears well-developed and well-nourished. She is not diaphoretic. She appears distressed.   HENT:   Head: Normocephalic and atraumatic.   Mouth/Throat: No oropharyngeal exudate.   Eyes: Conjunctivae and EOM are normal. Pupils are equal, round, and reactive to light. Right eye exhibits no discharge. Left eye exhibits no discharge.   Neck: Normal range of motion. Neck supple. No thyromegaly present. No tracheal deviation present. No JVD present.   Pulmonary/Chest: No stridor.   Abdominal: Soft. Bowel sounds are normal. She exhibits no distension. There is no tenderness.   Genitourinary:   Genitourinary Comments: Rectal tone normal    Musculoskeletal: Normal range of motion.   5/5 Strength in the LE. S&S grossly intact. No midline tenderness to palpitation in the lumbosacral spinal regions.    Neurological: She is alert and oriented to person, place, and time.   Psychiatric: She has a normal mood and affect. Thought content normal.         ED Course   Procedures  Labs Reviewed   CBC W/ AUTO  DIFFERENTIAL - Abnormal; Notable for the following components:       Result Value    Hemoglobin 9.8 (*)     Hematocrit 31.6 (*)     Mean Corpuscular Volume 70 (*)     Mean Corpuscular Hemoglobin 21.8 (*)     Mean Corpuscular Hemoglobin Conc 31.0 (*)     RDW 19.5 (*)     All other components within normal limits   SEDIMENTATION RATE - Abnormal; Notable for the following components:    Sed Rate >120 (*)     All other components within normal limits   COMPREHENSIVE METABOLIC PANEL - Abnormal; Notable for the following components:    CO2 21 (*)     Total Protein 8.9 (*)     Albumin 3.2 (*)     AST 90 (*)     All other components within normal limits   PROTIME-INR - Abnormal; Notable for the following components:    Prothrombin Time 14.0 (*)     INR 1.4 (*)     All other components within normal limits   C-REACTIVE PROTEIN   TYPE & SCREEN          Imaging Results          MRI Lumbar Spine W WO Cont (Final result)  Result time 10/19/19 14:46:50   Procedure changed from MRI Lumbar Spine With Contrast     Final result by Mike Mccormick DO (10/19/19 14:46:50)                 Impression:      Mild multilevel degenerative changes of the lumbar spine as described above, with mild bilateral neural foraminal narrowing at L3-L4 and L4-L5.      Electronically signed by: Mike Mccormick  Date:    10/19/2019  Time:    14:46             Narrative:    EXAMINATION:  MRI LUMBAR SPINE W WO CONTRAST    CLINICAL HISTORY:  Low back pain, cauda equina syndrome suspected;    TECHNIQUE:  Multiplanar, multisequence MR images were acquired from the thoracolumbar junction to the sacrum before and after the uncomplicated intravenous administration of 5 mL Gadavist.    COMPARISON:  No prior MRI available for comparison.  Correlation made to CT of the lumbar spine from 10/15/2019.    FINDINGS:  Alignment: Normal.    Vertebrae: Normal marrow signal. No fracture.  There is sacralization of L5 on the right.    Discs: Disc heights are  maintained.    Cord: Normal.  Conus terminates at L1.    Degenerative findings:    T12-L1: No spinal canal stenosis or neural foraminal narrowing.    L1-L2: No spinal canal stenosis or neural foraminal narrowing.    L2-L3: No spinal canal stenosis or neural foraminal narrowing.    L3-L4: Diffuse disc bulge with mild bilateral facet arthropathy results in mild bilateral neural foraminal narrowing.  No spinal canal stenosis.    L4-L5: Diffuse disc bulge with moderate bilateral facet arthropathy and ligamentum flavum hypertrophy results in mild bilateral neural foraminal narrowing.  No spinal canal stenosis.    L5-S1: No spinal canal stenosis or neural foraminal narrowing.    There is no evidence of abnormal enhancement or epidural abscess.    Paraspinal muscles & soft tissues: A simple cyst seen within the right kidney.  There is abdominal ascites.                                 Medical Decision Making:   History:   Old Medical Records: I decided to obtain old medical records.  Initial Assessment:   64 y.o. Female with past medical history of anemia, cirrhosis, and live cancer presents with chief complaint of back pain.   Differential Diagnosis:   DX includes lumbar sacral strain, cauda equina, conus medullaris, metastases.   Clinical Tests:   Lab Tests: Ordered and Reviewed  Radiological Study: Ordered and Reviewed  ED Management:  Plan:  Will obtain CBC ,CMP, analgesia MRI with contrast and reassess.            Scribe Attestation:   Scribe #1: I performed the above scribed service and the documentation accurately describes the services I performed. I attest to the accuracy of the note.            ED Course as of Oct 20 0146   Sat Oct 19, 2019   1522 Pt feels improved improved, able to ambulate through ED, MRI negative for cauda equina/conus medullaris, DJD, will d/c pt home with pain management follow up recommendations. Discussed plan with pt, has no further questions will d/c .     [DC]      ED Course User  Index  [DC] Yarelis Sheikh Jr., MD     Clinical Impression:       ICD-10-CM ICD-9-CM   1. Back pain, unspecified back location, unspecified back pain laterality, unspecified chronicity M54.9 724.5                                Yarelis Sheikh Jr., MD  10/20/19 0147

## 2019-10-19 NOTE — ED NOTES
Patient returned to INTAKE room for rectal exam, upon returning to wheelchair with max assist, patient vomited 50 cc dark emesis, Physician at bedside, to go to MAIN ED, states no alcohol x 14 years, did take (2) 75mg Diclofenac this am.

## 2019-10-20 VITALS
SYSTOLIC BLOOD PRESSURE: 152 MMHG | HEART RATE: 91 BPM | DIASTOLIC BLOOD PRESSURE: 76 MMHG | HEIGHT: 59 IN | OXYGEN SATURATION: 100 % | RESPIRATION RATE: 16 BRPM | WEIGHT: 85 LBS | TEMPERATURE: 98 F | BODY MASS INDEX: 17.14 KG/M2

## 2019-10-20 LAB
BACTERIA #/AREA URNS HPF: ABNORMAL /HPF
BILIRUB UR QL STRIP: ABNORMAL
CLARITY UR: CLEAR
COLOR UR: YELLOW
GLUCOSE UR QL STRIP: NEGATIVE
HGB UR QL STRIP: ABNORMAL
KETONES UR QL STRIP: ABNORMAL
LEUKOCYTE ESTERASE UR QL STRIP: ABNORMAL
MICROSCOPIC COMMENT: ABNORMAL
NITRITE UR QL STRIP: NEGATIVE
NON-SQ EPI CELLS #/AREA URNS HPF: 1 /HPF
PH UR STRIP: 6 [PH] (ref 5–8)
PROT UR QL STRIP: ABNORMAL
RBC #/AREA URNS HPF: 7 /HPF (ref 0–4)
SP GR UR STRIP: 1.01 (ref 1–1.03)
SQUAMOUS #/AREA URNS HPF: 6 /HPF
URN SPEC COLLECT METH UR: ABNORMAL
UROBILINOGEN UR STRIP-ACNC: ABNORMAL EU/DL
WBC #/AREA URNS HPF: 11 /HPF (ref 0–5)

## 2019-10-20 PROCEDURE — 63600175 PHARM REV CODE 636 W HCPCS: Performed by: EMERGENCY MEDICINE

## 2019-10-20 RX ORDER — PROMETHAZINE HYDROCHLORIDE 25 MG/1
25 TABLET ORAL EVERY 6 HOURS PRN
Qty: 25 TABLET | Refills: 0 | Status: SHIPPED | OUTPATIENT
Start: 2019-10-20

## 2019-10-20 RX ORDER — CIPROFLOXACIN 500 MG/1
500 TABLET ORAL 2 TIMES DAILY
Qty: 20 TABLET | Refills: 0 | Status: ON HOLD | OUTPATIENT
Start: 2019-10-20 | End: 2019-11-03 | Stop reason: HOSPADM

## 2019-10-20 RX ORDER — ONDANSETRON 4 MG/1
4 TABLET, FILM COATED ORAL EVERY 6 HOURS PRN
Qty: 20 TABLET | Refills: 0 | Status: SHIPPED | OUTPATIENT
Start: 2019-10-20 | End: 2019-10-21

## 2019-10-20 RX ADMIN — METOCLOPRAMIDE 10 MG: 5 INJECTION, SOLUTION INTRAMUSCULAR; INTRAVENOUS at 12:10

## 2019-10-20 NOTE — ED TRIAGE NOTES
"Pt reports to ED with c/o generalized ABD pain. [facility], but the pain has moved into her ABD and pt reports "the medications they gave me don't work." Pt currently nauseated and vomiting. Pt reports hx of liver cancer and cirrhosis. Pt ABD is distended and firm. Generalized ABD tenderness upon palpation.    "

## 2019-10-20 NOTE — ED PROVIDER NOTES
"Encounter Date: 10/19/2019    SCRIBE #1 NOTE: I, Andres Gamboa , am scribing for, and in the presence of, Dr. Quiroz .       History     Chief Complaint   Patient presents with    Abdominal Pain     pt reports abdominal pain, repotrs recently being at hospital and being discharged with medications that dont relieve symptoms     Time seen by provider: 10:11 PM    This is a 64 y.o. female who presents with complaint of generalized ABD pain that she states is chronic, but became acutely worse recently when she ran out of her pain medications.  [facility] back and abdominal pain, but the pain reports that gaping do anything for me and the medications they gave me don't work." Pt currently nauseated and vomiting. Denies any hematemesis, melena or hematochezia.  Denies any easy bruising.  Denies ny epistaxis or gum bleeding  Pt reports hx of liver cancer and cirrhosis.    The history is provided by the patient.     Review of patient's allergies indicates:  No Known Allergies  Past Medical History:   Diagnosis Date    Alcohol abuse     Anemia     Cancer     liver    Cirrhosis      Past Surgical History:   Procedure Laterality Date    ESOPHAGOGASTRODUODENOSCOPY Left 1/9/2019    Procedure: EGD (ESOPHAGOGASTRODUODENOSCOPY);  Surgeon: Madyson Farrar MD;  Location: [facility] ENDO;  Service: Endoscopy;  Laterality: Left;    PERITONEOCENTESIS N/A 1/9/2019    Procedure: PARACENTESIS, ABDOMINAL;  Surgeon: Everett Fitzpatrick MD;  Location: [facility] CATH LAB;  Service: Radiology;  Laterality: N/A;     History reviewed. No pertinent family history.  Social History     Tobacco Use    Smoking status: Current Every Day Smoker     Types: Cigarettes    Smokeless tobacco: Never Used    Tobacco comment: 2 cigarettes a day   Substance Use Topics    Alcohol use: No     Frequency: Never     Comment: Previously drank 1 pint a day    Drug use: Not Currently     Types: Cocaine     Comment: " last use was 3 years ago     Review of Systems   Constitutional: Negative for chills and fever.   HENT: Negative for congestion, rhinorrhea and sore throat.    Eyes: Negative for redness and visual disturbance.   Respiratory: Negative for cough, shortness of breath and wheezing.    Cardiovascular: Negative for chest pain and palpitations.   Gastrointestinal: Positive for abdominal pain, nausea and vomiting. Negative for diarrhea.   Genitourinary: Negative for dysuria and hematuria.   Musculoskeletal: Positive for back pain. Negative for joint swelling, myalgias and neck pain.        Reports chronic bilateral lower back pain, acutely worsen secondary to running out of her pain medications.  Denies any weakness or numbness in her lower extremities, denies any urinary fecal incontinence   Skin: Negative for rash.   Neurological: Negative for dizziness, weakness and light-headedness.   Psychiatric/Behavioral: Negative for confusion.       Physical Exam     Initial Vitals [10/19/19 2204]   BP Pulse Resp Temp SpO2   (!) 148/72 88 18 98.4 °F (36.9 °C) 100 %      MAP       --         Physical Exam    Nursing note and vitals reviewed.  Constitutional: She appears well-developed and well-nourished. She is not diaphoretic. No distress.   HENT:   Head: Normocephalic and atraumatic.   Nose: Nose normal.   Eyes: EOM are normal. Pupils are equal, round, and reactive to light. Right eye exhibits no discharge. Left eye exhibits no discharge.   Neck: Normal range of motion. Neck supple. No JVD present.   Cardiovascular: Normal rate, regular rhythm, normal heart sounds and intact distal pulses. Exam reveals no gallop and no friction rub.    No murmur heard.  Pulmonary/Chest: Breath sounds normal. No respiratory distress. She has no wheezes. She has no rhonchi. She has no rales. She exhibits no tenderness.   Abdominal: Soft. Bowel sounds are normal. She exhibits distension. There is no tenderness. There is no rebound and no guarding.    Musculoskeletal: Normal range of motion. She exhibits no edema.   Neurological: She is alert and oriented to person, place, and time. She has normal strength. She displays normal reflexes. No cranial nerve deficit.   Skin: Skin is warm and dry. Capillary refill takes less than 2 seconds. No erythema.   Psychiatric: She has a normal mood and affect. Thought content normal.         ED Course   Procedures  Labs Reviewed   CBC W/ AUTO DIFFERENTIAL - Abnormal; Notable for the following components:       Result Value    Hemoglobin 9.4 (*)     Hematocrit 30.0 (*)     Mean Corpuscular Volume 68 (*)     Mean Corpuscular Hemoglobin 21.2 (*)     Mean Corpuscular Hemoglobin Conc 31.3 (*)     RDW 19.0 (*)     Lymph # 0.9 (*)     Gran% 81.6 (*)     Lymph% 11.7 (*)     All other components within normal limits   COMPREHENSIVE METABOLIC PANEL - Abnormal; Notable for the following components:    CO2 20 (*)     Glucose 144 (*)     Total Protein 9.0 (*)     Albumin 3.3 (*)     AST 88 (*)     eGFR if non  54 (*)     All other components within normal limits   URINALYSIS, REFLEX TO URINE CULTURE - Abnormal; Notable for the following components:    Protein, UA Trace (*)     Ketones, UA Trace (*)     Bilirubin (UA) 1+ (*)     Occult Blood UA 3+ (*)     Urobilinogen, UA 2.0-3.0 (*)     Leukocytes, UA 3+ (*)     All other components within normal limits    Narrative:     Preferred Collection Type->Urine, Clean Catch   PROTIME-INR - Abnormal; Notable for the following components:    Prothrombin Time 14.6 (*)     INR 1.3 (*)     All other components within normal limits   URINALYSIS MICROSCOPIC - Abnormal; Notable for the following components:    RBC, UA 7 (*)     WBC, UA 11 (*)     Non-Squam Epith 1 (*)     All other components within normal limits    Narrative:     Preferred Collection Type->Urine, Clean Catch   CULTURE, URINE   LIPASE   AMMONIA          Imaging Results    None          Medical Decision Making:   History:    Old Medical Records: I decided to obtain old medical records.  Old Records Summarized: records from previous admission(s) and records from another hospital.       <> Summary of Records: Pt was seen earlier this afternoon at [facility] for low pain where she also received a full work up including and MRI. She was given pain medication and discharged home. Pt was also seen here at [facility] 4 days ago for lower back pain as well.   Differential Diagnosis:   Spontaneous bacterial peritonitis, urinary tract infection, Acute pyelonephritis, ovarian torsion, ovarian cyst rupture, PID, BV, TOA, , ureterolithiais, AAA rupture / dissection, diverticulitis, colitis, inflammatory bowel disease, gastroenteritis, gastritis, ulcer, cholecystitis, gallstones, pancreatitis, ileus, small bowel obstruction, appendicitis, constipation, intestinal gas pain, GERD, intestinal spasm,  intrabominal hemorrhage, intrabominal thrombus  Cauda equina syndrome, diskitis/osteomyelitis, epidural/paraspinal abscess, AAA, aortic dissection, post-op/hardware infection, trauma/vertebral fracture, spinal cord injury, disc herniation, spinal stenosis, sciatica, radiculopathy, neoplasm, lumbar muscle strain, muscle spasm, neuropathic pain, UTI/pyelonephritis, nephrolithiasis.    Clinical Tests:   Lab Tests: Ordered and Reviewed  ED Management:  Patient's hemoglobin and hematocrit, platelets, INR, AST and ALT are essentially the same as they were when she was seen earlier this afternoon.  Patient's bilirubin is normal. Although patient's abdomen is enlarged, it is not tender, has no complaints of upper lower G by bleeding and I do not believe that she has experiencing any symptoms related to SBP. After complete evaluation performed earlier today including MRI, the lower back pain symptoms are not related to an acute cauda equinus syndrome, but more to her chronic condition. There are no concerning features of bilateral weakness, bowel/bladder  incontinence, significant new motor/sensory deficits, or saddle anesthesia to suggest acute cauda equina syndrome. On physical exam, there is no focal midline tenderness or evidence of significant trauma to suggest fracture or injury. There is no fever, immunocompromise, history of recent surgery, or erythema/fluctuance to suggest epidural hematoma, infection, or abscess. The patient was treated with supportive care and improved.                      ED Course as of Oct 20 0623   Sun Oct 20, 2019   0016 Patient resting comfortably.  Easily awakens.  I discussed with her that her workup reveals that she has got a urinary tract infection for which we will treat her.  Patient's nausea is resolved.  Patient asked if we could give her pain medications for home for her chronic pain, discussed with her the reasons why that would not be pertinent at this time.  Encouraged follow-up with her pain management physician.    [MA]      ED Course User Index  [MA] John Quiroz MD     Clinical Impression:     1. Urinary tract infection without hematuria, site unspecified    2. Nausea and vomiting, intractability of vomiting not specified, unspecified vomiting type                             John Quiroz MD  10/20/19 0618

## 2019-10-21 ENCOUNTER — TELEPHONE (OUTPATIENT)
Dept: HEMATOLOGY/ONCOLOGY | Facility: CLINIC | Age: 64
End: 2019-10-21

## 2019-10-21 ENCOUNTER — DOCUMENTATION ONLY (OUTPATIENT)
Dept: HEMATOLOGY/ONCOLOGY | Facility: CLINIC | Age: 64
End: 2019-10-21

## 2019-10-21 DIAGNOSIS — C22.0 HCC (HEPATOCELLULAR CARCINOMA): Primary | ICD-10-CM

## 2019-10-21 DIAGNOSIS — K70.31 ALCOHOLIC CIRRHOSIS OF LIVER WITH ASCITES: ICD-10-CM

## 2019-10-21 DIAGNOSIS — G89.3 CANCER ASSOCIATED PAIN: ICD-10-CM

## 2019-10-21 DIAGNOSIS — C22.0 HCC (HEPATOCELLULAR CARCINOMA): ICD-10-CM

## 2019-10-21 RX ORDER — OXYCODONE HYDROCHLORIDE 5 MG/1
5 TABLET ORAL EVERY 6 HOURS PRN
Qty: 60 TABLET | Refills: 0 | Status: SHIPPED | OUTPATIENT
Start: 2019-10-21 | End: 2019-10-21

## 2019-10-21 RX ORDER — ONDANSETRON HYDROCHLORIDE 8 MG/1
8 TABLET, FILM COATED ORAL EVERY 8 HOURS PRN
Qty: 60 TABLET | Refills: 2 | Status: SHIPPED | OUTPATIENT
Start: 2019-10-21 | End: 2019-11-20

## 2019-10-21 RX ORDER — OXYCODONE HYDROCHLORIDE 5 MG/1
5 TABLET ORAL EVERY 6 HOURS PRN
Qty: 60 TABLET | Refills: 0 | Status: SHIPPED | OUTPATIENT
Start: 2019-10-21 | End: 2019-11-08 | Stop reason: SDUPTHER

## 2019-10-21 NOTE — PROGRESS NOTES
Received call this morning from patient, who needs assistance with getting prescriptions today as she cannot afford them. She also said that she needs to speak with her doctor about the back pain she's having, medications aren't helping, and she went to the ED twice over the weekend. Provided patient with phone numbers to call her primary Oncology Social Worker Vivi Camacho LCSW and to call the clinic to speak with Dr. Roberts's nurse.

## 2019-10-21 NOTE — TELEPHONE ENCOUNTER
Called Ms. Marks and spoke to her and her daughter. Patient c/o low back pain and right side abdominal pain, went to ED on 10/15, 10/19 and 10/20. Reviewed her Labs and Imaging which are stable. UA positive and currently on ciprofloxacin. She complained of nausea on phenergan and requested for pain medication and more medications for nausea. Sent Zofran and oxycodone scripts to her pharmacy. Recommended to complete her antibiotic course and I will be seeing in the clinic on Friday, 10/25. Recommended to go to ED if symptoms worsen. Patient agreed and understands. Answered all questions to her satisfaction.

## 2019-10-23 LAB
BACTERIA UR CULT: ABNORMAL
BACTERIA UR CULT: ABNORMAL

## 2019-10-25 ENCOUNTER — TELEPHONE (OUTPATIENT)
Dept: HEMATOLOGY/ONCOLOGY | Facility: CLINIC | Age: 64
End: 2019-10-25

## 2019-10-25 ENCOUNTER — OFFICE VISIT (OUTPATIENT)
Dept: HEMATOLOGY/ONCOLOGY | Facility: CLINIC | Age: 64
DRG: 683 | End: 2019-10-25
Payer: MEDICAID

## 2019-10-25 ENCOUNTER — INFUSION (OUTPATIENT)
Dept: INFUSION THERAPY | Facility: HOSPITAL | Age: 64
DRG: 683 | End: 2019-10-25
Attending: STUDENT IN AN ORGANIZED HEALTH CARE EDUCATION/TRAINING PROGRAM
Payer: MEDICAID

## 2019-10-25 ENCOUNTER — HOSPITAL ENCOUNTER (INPATIENT)
Facility: HOSPITAL | Age: 64
LOS: 9 days | Discharge: HOME HEALTH | DRG: 683 | End: 2019-11-03
Attending: INTERNAL MEDICINE | Admitting: INTERNAL MEDICINE
Payer: MEDICAID

## 2019-10-25 VITALS
HEIGHT: 59 IN | SYSTOLIC BLOOD PRESSURE: 110 MMHG | BODY MASS INDEX: 18.27 KG/M2 | WEIGHT: 90.63 LBS | DIASTOLIC BLOOD PRESSURE: 68 MMHG | HEART RATE: 74 BPM | TEMPERATURE: 98 F | RESPIRATION RATE: 18 BRPM | OXYGEN SATURATION: 100 %

## 2019-10-25 DIAGNOSIS — N30.00 ACUTE CYSTITIS WITHOUT HEMATURIA: ICD-10-CM

## 2019-10-25 DIAGNOSIS — K70.30 ESOPHAGEAL VARICES IN ALCOHOLIC CIRRHOSIS: Chronic | ICD-10-CM

## 2019-10-25 DIAGNOSIS — K70.31 ASCITES DUE TO ALCOHOLIC CIRRHOSIS: ICD-10-CM

## 2019-10-25 DIAGNOSIS — R07.9 CHEST PAIN: ICD-10-CM

## 2019-10-25 DIAGNOSIS — N17.9 ACUTE KIDNEY INJURY: ICD-10-CM
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
## 2019-10-25 DIAGNOSIS — I26.99 OTHER ACUTE PULMONARY EMBOLISM WITHOUT ACUTE COR PULMONALE: ICD-10-CM

## 2019-10-25 DIAGNOSIS — C22.0 HCC (HEPATOCELLULAR CARCINOMA): Primary | ICD-10-CM

## 2019-10-25 DIAGNOSIS — E87.5 HYPERKALEMIA: ICD-10-CM

## 2019-10-25 DIAGNOSIS — N17.9 AKI (ACUTE KIDNEY INJURY): ICD-10-CM

## 2019-10-25 DIAGNOSIS — E43 SEVERE MALNUTRITION: ICD-10-CM

## 2019-10-25 DIAGNOSIS — N17.9 AKI (ACUTE KIDNEY INJURY): Primary | ICD-10-CM

## 2019-10-25 DIAGNOSIS — K70.30 ALCOHOLIC CIRRHOSIS, UNSPECIFIED WHETHER ASCITES PRESENT: Chronic | ICD-10-CM

## 2019-10-25 DIAGNOSIS — I26.99 PULMONARY EMBOLISM, UNSPECIFIED CHRONICITY, UNSPECIFIED PULMONARY EMBOLISM TYPE, UNSPECIFIED WHETHER ACUTE COR PULMONALE PRESENT: ICD-10-CM

## 2019-10-25 DIAGNOSIS — K70.31 ALCOHOLIC CIRRHOSIS OF LIVER WITH ASCITES: ICD-10-CM

## 2019-10-25 DIAGNOSIS — I85.10 ESOPHAGEAL VARICES IN ALCOHOLIC CIRRHOSIS: Chronic | ICD-10-CM

## 2019-10-25 DIAGNOSIS — C22.0 HCC (HEPATOCELLULAR CARCINOMA): ICD-10-CM

## 2019-10-25 DIAGNOSIS — G89.3 CANCER ASSOCIATED PAIN: ICD-10-CM

## 2019-10-25 DIAGNOSIS — L27.1 HAND FOOT SYNDROME: ICD-10-CM

## 2019-10-25 DIAGNOSIS — R26.2 AMBULATORY DYSFUNCTION: ICD-10-CM

## 2019-10-25 LAB
CREAT UR-MCNC: 76 MG/DL (ref 15–325)
CREAT UR-MCNC: 76 MG/DL (ref 15–325)
EOSINOPHIL URNS QL WRIGHT STN: NORMAL
INR PPP: 1.5 (ref 0.8–1.2)
PROT UR-MCNC: 310 MG/DL (ref 0–15)
PROT/CREAT UR: 4.08 MG/G{CREAT} (ref 0–0.2)
PROTHROMBIN TIME: 14.7 SEC (ref 9–12.5)
SODIUM UR-SCNC: 28 MMOL/L (ref 20–250)
UUN UR-MCNC: 347 MG/DL (ref 140–1050)

## 2019-10-25 PROCEDURE — 99223 PR INITIAL HOSPITAL CARE,LEVL III: ICD-10-PCS | Mod: ,,, | Performed by: INTERNAL MEDICINE

## 2019-10-25 PROCEDURE — 63600175 PHARM REV CODE 636 W HCPCS: Performed by: STUDENT IN AN ORGANIZED HEALTH CARE EDUCATION/TRAINING PROGRAM

## 2019-10-25 PROCEDURE — 85610 PROTHROMBIN TIME: CPT

## 2019-10-25 PROCEDURE — 99999 PR PBB SHADOW E&M-EST. PATIENT-LVL V: CPT | Mod: PBBFAC,,, | Performed by: STUDENT IN AN ORGANIZED HEALTH CARE EDUCATION/TRAINING PROGRAM

## 2019-10-25 PROCEDURE — 84300 ASSAY OF URINE SODIUM: CPT

## 2019-10-25 PROCEDURE — 99215 OFFICE O/P EST HI 40 MIN: CPT | Mod: PBBFAC,25 | Performed by: STUDENT IN AN ORGANIZED HEALTH CARE EDUCATION/TRAINING PROGRAM

## 2019-10-25 PROCEDURE — 99223 1ST HOSP IP/OBS HIGH 75: CPT | Mod: ,,, | Performed by: INTERNAL MEDICINE

## 2019-10-25 PROCEDURE — 36415 COLL VENOUS BLD VENIPUNCTURE: CPT

## 2019-10-25 PROCEDURE — 99499 NO LOS: ICD-10-PCS | Mod: S$PBB,,, | Performed by: STUDENT IN AN ORGANIZED HEALTH CARE EDUCATION/TRAINING PROGRAM

## 2019-10-25 PROCEDURE — 25000003 PHARM REV CODE 250: Performed by: STUDENT IN AN ORGANIZED HEALTH CARE EDUCATION/TRAINING PROGRAM

## 2019-10-25 PROCEDURE — 82570 ASSAY OF URINE CREATININE: CPT

## 2019-10-25 PROCEDURE — 84540 ASSAY OF URINE/UREA-N: CPT

## 2019-10-25 PROCEDURE — 20600001 HC STEP DOWN PRIVATE ROOM

## 2019-10-25 PROCEDURE — 87205 SMEAR GRAM STAIN: CPT

## 2019-10-25 PROCEDURE — 99999 PR PBB SHADOW E&M-EST. PATIENT-LVL V: ICD-10-PCS | Mod: PBBFAC,,, | Performed by: STUDENT IN AN ORGANIZED HEALTH CARE EDUCATION/TRAINING PROGRAM

## 2019-10-25 PROCEDURE — 99499 UNLISTED E&M SERVICE: CPT | Mod: S$PBB,,, | Performed by: STUDENT IN AN ORGANIZED HEALTH CARE EDUCATION/TRAINING PROGRAM

## 2019-10-25 RX ORDER — SODIUM CHLORIDE 9 MG/ML
INJECTION, SOLUTION INTRAVENOUS CONTINUOUS
Status: ACTIVE | OUTPATIENT
Start: 2019-10-25 | End: 2019-10-26

## 2019-10-25 RX ORDER — GABAPENTIN 100 MG/1
100 CAPSULE ORAL NIGHTLY
Qty: 30 CAPSULE | Refills: 2 | Status: ON HOLD | OUTPATIENT
Start: 2019-10-25 | End: 2019-11-26

## 2019-10-25 RX ORDER — ONDANSETRON 8 MG/1
8 TABLET, ORALLY DISINTEGRATING ORAL EVERY 8 HOURS PRN
Status: DISCONTINUED | OUTPATIENT
Start: 2019-10-25 | End: 2019-11-03 | Stop reason: HOSPADM

## 2019-10-25 RX ORDER — GABAPENTIN 100 MG/1
100 CAPSULE ORAL NIGHTLY
Status: DISCONTINUED | OUTPATIENT
Start: 2019-10-25 | End: 2019-11-03 | Stop reason: HOSPADM

## 2019-10-25 RX ORDER — HEPARIN 100 UNIT/ML
500 SYRINGE INTRAVENOUS
OUTPATIENT
Start: 2019-11-22

## 2019-10-25 RX ORDER — FUROSEMIDE 40 MG/1
40 TABLET ORAL DAILY
Status: DISCONTINUED | OUTPATIENT
Start: 2019-10-26 | End: 2019-10-27

## 2019-10-25 RX ORDER — SODIUM CHLORIDE 0.9 % (FLUSH) 0.9 %
10 SYRINGE (ML) INJECTION
Status: DISCONTINUED | OUTPATIENT
Start: 2019-10-25 | End: 2019-11-03 | Stop reason: HOSPADM

## 2019-10-25 RX ORDER — FAMOTIDINE 20 MG/1
20 TABLET, FILM COATED ORAL DAILY
Status: DISCONTINUED | OUTPATIENT
Start: 2019-10-26 | End: 2019-10-26

## 2019-10-25 RX ORDER — OXYCODONE HYDROCHLORIDE 5 MG/1
5 TABLET ORAL EVERY 6 HOURS PRN
Status: DISCONTINUED | OUTPATIENT
Start: 2019-10-25 | End: 2019-11-03 | Stop reason: HOSPADM

## 2019-10-25 RX ORDER — PREDNISONE 20 MG/1
60 TABLET ORAL DAILY
Status: DISCONTINUED | OUTPATIENT
Start: 2019-10-25 | End: 2019-11-01

## 2019-10-25 RX ORDER — SODIUM CHLORIDE 0.9 % (FLUSH) 0.9 %
10 SYRINGE (ML) INJECTION
OUTPATIENT
Start: 2019-11-22

## 2019-10-25 RX ADMIN — SODIUM CHLORIDE 1000 ML: 0.9 INJECTION, SOLUTION INTRAVENOUS at 05:10

## 2019-10-25 RX ADMIN — SODIUM CHLORIDE: 0.9 INJECTION, SOLUTION INTRAVENOUS at 11:10

## 2019-10-25 RX ADMIN — PREDNISONE 60 MG: 20 TABLET ORAL at 11:10

## 2019-10-25 RX ADMIN — APIXABAN 5 MG: 5 TABLET, FILM COATED ORAL at 11:10

## 2019-10-25 RX ADMIN — GABAPENTIN 100 MG: 100 CAPSULE ORAL at 11:10

## 2019-10-25 NOTE — H&P
[facility]  Hematology/Oncology  H&P    Patient Name: Neena Marks  MRN: [id number]  Admission Date: 10/25/2019  Code Status: Full Code   Attending Provider: Nikos Hansen MD  Primary Care Physician: [facility]  Principal Problem:Acute kidney injury    Subjective:     HPI: Ms Marks is a 63-year-old  female with alcoholic cirrhosis dating back to 2015, portal HTN, ascites, history of variceal bleeding in January 2018 s/p banding, history of alcohol abuse (recently quit in January of 2018), and newly diagnosed hepatocellular carcinoma undergoing treatment with nivolumab who presented as a direct transfer from Heme/Onc clinic on 10/25 after routine laboratory studies were remarkable for serum creatinine of 7.1 (baseline creatinine ~1.0). Patient reports one week history of nasuea and vomiting. Per chart review, patient has presented to [facility] ED three times this month with complaints of abdominal pain, back pain, nausea, and vomiting. She was noted to have UTI 2/2 Klebsiella on 10/19 and is currently  undergoing treatment with ciprofloxacin.  She denies relief in abdominal and back pain with prescribed medications. Her nausea and vomiting has persisted as well. She reports decreased PO intake over this time span secondary to nausea and has a history of recent ibuprofen and naproxen use for abdominal pain. She denies fever, dysuria, hematuria, chest pain, shortness of breath, syncope, or worsening lower extremity edema. She currently has abdominal distention and abdominal pain which radiates around the left aspect of lumbar region. Patients' vitals with BP 98/54 mmHg, HR 80 bpm, afebrile with temperature of 98 F, and RR 16.     Cirrhosis & HCC History:  AFP tumor marker is normal.  Hep C and B testing on 4/27/2018 - negative.    Endoscopy - 1/9/19 - Grade II esophageal varices. Completely eradicated. Banded, Small hiatal hernia., Portal hypertensive gastropathy.  Treated with argon plasma coagulation (APC).     Patient had an appointment on 2/15/2019, IR consult for Y90 but she missed her appointment.   - She had IR mapping and Hepatic angiography demonstrates a large hypervascular lesion in the right hepatic lobe supplied by branches of the right hepatic artery and accessory left hepatic artery.  Technetium MAA was injected aorta determined lung shunt fraction.  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  - Nuclear medicine scan revealed that majority of tracer goes to the lungs with a liver lung shunt fraction of 68.9%.   is not a candidate for Y90 or other liver directed therapies due to liver lung shunt     5/9/2019 - Started taking Sorafenib 400mg BID from  7/6/2019 - Decrease to 200 mg BID due to hand foot syndrome  8/16/2019 - Stopped Sorafenib on  due to persistent desquamation of hand foot syndrome  08/28/2019 - CT Chest abdomen and pelvis done revealed disease progression and also new bilateral pulmonary embolism. She was admitted to [facility] main campus and discharged on Eliquis (Patient declined Lovenox)   9/17/2019 -  Fall and followed up in ED and Xray right knee revealed no acute osseous abnormality  9/27/2019 - Started C 1 D1 Nivolumab q 4 weeks  10/25/2019 - PRACHI with creatinine of 7.1, direct admit      Oncology Treatment Plan:   OP NIVOLUMAB Q4W    Medications:  Continuous Infusions:  Scheduled Meds:   apixaban  5 mg Oral BID    [START ON 10/26/2019] famotidine  20 mg Oral Daily    [START ON 10/26/2019] furosemide  40 mg Oral Daily    gabapentin  100 mg Oral QHS    sodium chloride 0.9%  1,000 mL Intravenous Once     PRN Meds:ondansetron, oxyCODONE, promethazine (PHENERGAN) IVPB, sodium chloride 0.9%     Review of patient's allergies indicates:  No Known Allergies     Past Medical History:   Diagnosis Date     Alcohol abuse     Anemia     Cancer     liver    Cirrhosis      Past Surgical History:   Procedure Laterality Date    ESOPHAGOGASTRODUODENOSCOPY Left 1/9/2019    Procedure: EGD (ESOPHAGOGASTRODUODENOSCOPY);  Surgeon: Madyson Farrar MD;  Location: [facility] ENDO;  Service: Endoscopy;  Laterality: Left;    PERITONEOCENTESIS N/A 1/9/2019    Procedure: PARACENTESIS, ABDOMINAL;  Surgeon: Everett Fitzpatrick MD;  Location: [facility] CATH LAB;  Service: Radiology;  Laterality: N/A;     Family History     None        Tobacco Use    Smoking status: Current Every Day Smoker     Types: Cigarettes    Smokeless tobacco: Never Used    Tobacco comment: 2 cigarettes a day   Substance and Sexual Activity    Alcohol use: No     Frequency: Never     Comment: Previously drank 1 pint a day    Drug use: Not Currently     Types: Cocaine     Comment: last use was 3 years ago    Sexual activity: Not Currently       Review of Systems   Constitutional: Positive for appetite change, chills and fatigue. Negative for diaphoresis and fever.   HENT: Negative for congestion, rhinorrhea and sore throat.    Eyes: Negative for pain and visual disturbance.   Respiratory: Negative for cough, shortness of breath and wheezing.    Cardiovascular: Positive for leg swelling. Negative for chest pain and palpitations.   Gastrointestinal: Positive for abdominal distention, abdominal pain, nausea and vomiting. Negative for blood in stool, constipation and diarrhea.   Genitourinary: Positive for difficulty urinating. Negative for dysuria, flank pain and hematuria.        Patient reports nil urine output over the past two days. Denies difficulty with urination, is not making urine.    Musculoskeletal: Positive for back pain. Negative for gait problem.   Skin: Negative for pallor and rash.   Neurological: Positive for headaches. Negative for dizziness, light-headedness and numbness.   Hematological: Negative for adenopathy. Does not bruise/bleed easily.    Psychiatric/Behavioral: Negative for behavioral problems and confusion.     Objective:     Vital Signs (Most Recent):  Temp: 98 °F (36.7 °C) (10/25/19 1542)  Pulse: 80 (10/25/19 1542)  Resp: 16 (10/25/19 1542)  BP: (!) 98/54 (10/25/19 1542) Vital Signs (24h Range):  Temp:  [98 °F (36.7 °C)-98.2 °F (36.8 °C)] 98 °F (36.7 °C)  Pulse:  [74-80] 80  Resp:  [16-18] 16  SpO2:  [100 %] 100 %  BP: ()/(54-68) 98/54     Weight: 40.8 kg (90 lb)  Body mass index is 18.18 kg/m².  Body surface area is 1.3 meters squared.    No intake or output data in the 24 hours ending 10/25/19 1716    Physical Exam   Constitutional: She is oriented to person, place, and time. She appears cachectic. No distress.   HENT:   Head: Normocephalic and atraumatic.   Mouth/Throat: Oropharynx is clear and moist. No oropharyngeal exudate.   Eyes: Pupils are equal, round, and reactive to light. Conjunctivae and EOM are normal. No scleral icterus.   Neck: Normal range of motion. Neck supple. No tracheal deviation present. No thyromegaly present.   Cardiovascular: Normal rate, regular rhythm, normal heart sounds and intact distal pulses. Exam reveals no gallop and no friction rub.   No murmur heard.  Pulmonary/Chest: Effort normal and breath sounds normal. She has no wheezes. She has no rales.   Abdominal: Soft. Bowel sounds are normal. She exhibits distension and ascites. There is generalized tenderness. There is no rigidity and no CVA tenderness.   Abdomen is distended with dilated superficially veins noted. She exhibits tenderness with deep palpation throughout but most notably left lower quadrant and suprapubic.    Musculoskeletal: She exhibits edema and tenderness.   Trace lower extremity edema bilaterally.   Lymphadenopathy:     She has no cervical adenopathy.   Neurological: She is alert and oriented to person, place, and time. She exhibits normal muscle tone. Coordination normal.   Skin: Skin is warm and dry. Capillary refill takes 2 to 3  seconds. She is not diaphoretic.   Psychiatric: She has a normal mood and affect. Her behavior is normal.   Nursing note and vitals reviewed.      Significant Labs:   Recent Lab Results       10/25/19  1053        Albumin 3.0     Alkaline Phosphatase 96     ALT 27     Anion Gap 10     AST 75     Baso # 0.07     Basophil% 0.8     BILIRUBIN TOTAL 1.3  Comment:  For infants and newborns, interpretation of results should be based  on gestational age, weight and in agreement with clinical  observations.  Premature Infant recommended reference ranges:  Up to 24 hours.............<8.0 mg/dL  Up to 48 hours............<12.0 mg/dL  3-5 days..................<15.0 mg/dL  6-29 days.................<15.0 mg/dL       BUN, Bld 52     Calcium 8.9     Chloride 95     CO2 21     Creatinine 7.1     Differential Method Automated     eGFR if African American 6.5     eGFR if non  5.7  Comment:  Calculation used to obtain the estimated glomerular filtration  rate (eGFR) is the CKD-EPI equation.        Eos # 0.2     Eosinophil% 2.0     Free T4 1.25     Glucose 100     Gran # (ANC) 5.7     Gran% 64.8     Hematocrit 27.0     Hemoglobin 8.8     Immature Grans (Abs) 0.06  Comment:  Mild elevation in immature granulocytes is non specific and   can be seen in a variety of conditions including stress response,   acute inflammation, trauma and pregnancy. Correlation with other   laboratory and clinical findings is essential.       Immature Granulocytes 0.7     Lymph # 1.6     Lymph% 17.6     Magnesium 2.0     MCH 21.6     MCHC 32.6     MCV 66     Mono # 1.2     Mono% 14.1     MPV SEE COMMENT  Comment:  Result not available.     nRBC 0     Phosphorus 4.4     Platelets 215     Potassium 5.5  Comment:  *No Visible Hemolysis     PROTEIN TOTAL 7.9     RBC 4.08     RDW 17.8     Sodium 126     TSH 1.970     WBC 8.79           Diagnostic Results:  Renal ultrasound pending.     Assessment/Plan:     * Acute kidney injury  Patient with  baseline serum creatinine around 1.0. Serum creatinine was 1.1 on 10/19. 10/25 serum creatinine was noted to be 7.1.    - Patient admitted for PRACHI  - Will obtain urine studies (i.e. UA, Burgos stain, urine protein to creatinine ratio, urine urea, etc.)  - Will obtain urine ultrasound  - IVFs  - Garcia catheter with strict I/Os  - Will avoid nephrotoxic agents (i.e. NSAIDs, contrast, ACEi, ARBs, etc.)   - Nephrology consulted   - Immunotherapy induced nephritis considered, if US without signs of obstruction will treat with 1 mg/kg steroids empirically     HCC (hepatocellular carcinoma)  - hold immunotherapy given concern for plausible immunotherapy induced nephritis     Esophageal varices in alcoholic cirrhosis  - patient denies any hemtopyosis, BRBPR, or melena  - continue home dose propanol 10 mg BID     Alcoholic cirrhosis  MELD of 30 at admission when using INR of 1.3 from 10/19.  CMP with albumin of 3.0, Alk phos 96, and AST 75.    Iron deficiency anemia due to chronic blood loss  Hemoglobin 8.8 on admission.     - continue to monitor with daily CBCs  - transfuse Hgb < 7        Lacho Nuñez MD  Hematology/Oncology  [facility]    ATTENDING NOTE, ONCOLOGY INPATIENT TEAM    As above.  Patient seen on the 8th floor around 5:30 p.m. On 10/25/2019  Chart reviewed.  Appears comfortable, in NAD.  She is anuric  Lungs are clear to auscultation.  Abdomen is soft, nontender.  Labs reviewed.    PLAN  Admit for further evaluation and management of her PRACHI,  Garcia catheter for accurate measurement of her urine output.  Renal ultrasound  Urine electrolytes.  Nephrology consult.    If hydronephrosis is ruled out, the differential will be between ATN from hypotension and also nephritis from her immunotherapy.  We will empirically start steroids.  We will follow.  Her multiple questions were answered to her satisfaction.        Nikos Hansen MD

## 2019-10-25 NOTE — ASSESSMENT & PLAN NOTE
MELD of 30 at admission when using INR of 1.3 from 10/19.  CMP with albumin of 3.0, Alk phos 96, and AST 75.

## 2019-10-25 NOTE — NURSING
Pt to not receive treatment per MD Don. Pt creatinine is 7.1. Pt will be admitted per MD. RN notified pt and MD was at bedside notifying the patient. Pt transport is called by charge RN. Pt will then be transported to admit office awaiting admission. IV was not started on pt due to pt not getting Opdivo infusion today.

## 2019-10-25 NOTE — TELEPHONE ENCOUNTER
Called and spoke with patient in regards to scheduling her US.   She stated that she was about to be admitted and wondered if it can just be done while she is admitted.   Message sent to Dr. Roberts   the EKG is unchanged from prior EKG

## 2019-10-25 NOTE — ASSESSMENT & PLAN NOTE
Patient with baseline serum creatinine around 1.0. Serum creatinine was 1.1 on 10/19. 10/25 serum creatinine was noted to be 7.1.    - Patient admitted for PRACHI  - Will obtain urine studies (i.e. UA, Burgos stain, urine protein to creatinine ratio, urine urea, etc.)  - Will obtain urine ultrasound  - IVFs  - Garcia catheter with strict I/Os  - Will avoid nephrotoxic agents (i.e. NSAIDs, contrast, ACEi, ARBs, etc.)   - Nephrology consulted   - Immunotherapy induced nephritis considered, if US without signs of obstruction will treat with 1 mg/kg steroids empirically

## 2019-10-25 NOTE — PROGRESS NOTES
Patient is direct admit from clinic. Patient ambulated to room 803 with the help of her walker. Oriented to room. Instructed patient on how to use call light to call for assistance. Patient is denies pain, nausea or diarrhea at this time. VSS. Afebrile. Patient stable. Will continue to monitor.

## 2019-10-25 NOTE — Clinical Note
Okay for cycle 2 of nivolumab todaySent gabapentin script to her pharmacyF/u in 4 weeks on 11/22 which is scheduled. Please schedule for labs- CBC, CMP, mag and urine culture- need to be scheduled

## 2019-10-25 NOTE — PROGRESS NOTES
PATIENT: Neena Marks  MRN: [id number]  DATE: 10/25/2019      Diagnosis:   1. HCC (hepatocellular carcinoma)    2. Cancer associated pain    3. Alcoholic cirrhosis of liver with ascites    4. Hand foot syndrome    5. Ambulatory dysfunction    6. Pulmonary embolism, unspecified chronicity, unspecified pulmonary embolism type, unspecified whether acute cor pulmonale present    7. Severe malnutrition    8. Fall (on)(from) sidewalk curb, initial encounter    9. Acute cystitis without hematuria    10. PRACHI (acute kidney injury)        Chief Complaint: HCC (hepatocellular carcinoma)    Neena Marks is a 63 y.o. female with PMHx of alcoholic cirrhosis since 2015, Portal HTN, ascites, variceal bleeding, newly diagnosed with HCC now presented to oncology clinic for further evaluation.     Patient was a chronic alcoholic for the last 40 years and was diagnosed with alcoholic cirrhosis in 2015 however she continued to drink alcohol until January 2018 and has quit since then. She had history of upper GI bleeding with hematochezia in January 2018 s/p banding. She also had ascites since the last 1.5 to 2 years as is getting paracentesis q 2-3 months. She had no history of SBP in the past. She is currently on lasix and spironolactone for ascites and lower extremity edema.  She does not have jaundice but complaints of severe pruritis which is moderately controlled with hydroxyzine.      She currently has abdominal distention and abdominal discomfort.     CMP normal bilirubin and creatinine. Albumin of 2.8. Alk phos 138, AST 58   CBC- anemia with Hb of 8.7     AFP tumor marker is normal     Hep C and B testing on 4/27/2018- negative     Endoscopy- 1/9/19 - Grade II esophageal varices. Completely eradicated. Banded, Small hiatal hernia., Portal hypertensive gastropathy. Treated with argon plasma coagulation (APC).     Patient had an appointment on 2/15/2019, IR consult for Y90 but she missed her appointment.   - She had IR mapping  and Hepatic angiography demonstrates a large hypervascular lesion in the right hepatic lobe supplied by branches of the right hepatic artery and accessory left hepatic artery.  Technetium MAA was injected aorta determined lung shunt fraction.  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  - Nuclear medicine scan revealed that majority of tracer goes to the lungs with a liver lung shunt fraction of 68.9%.   is not a candidate for Y90 or other liver directed therapies due to liver lung shunt     5/9/2019- Started taking Sorafenib 400mg BID from  7/6/2019- Have cut down to 200 mg BID due to hand foot syndrome  8/16/2019- Stopped Sorafenib on  due to persistent desquamation of hand foot syndrome  08/28/2019- CT Chest abdomen and pelvis done revealed disease progression and also new bilateral pulmonary embolism. She was admitted to [facility] main campus and discharged on Eliquis (Patient declined Lovenox)   9/17/2019- fall and followed up in ED and Xray right knee revealed no acute osseous abnormality  9/27/2019- Started C 1 D1 Nivolumab q 4 weeks    Follow up on 10/25/2019  She is here for C2 D1 of Nivolumab Immunotherapy for progression of HCC  Patient c/o low back pain, flank pain and right side abdominal pain. went to ED on 10/15, 10/19 and 10/20. UA/Urine culture positive for Klebsiella and currently on ciprofloxacin.   Also c/o nausea and severe fatigue  On Eliquis for PE  Still smokes 2-3 cig/day    Subjective:    Initial History: Ms. Marks is a 64 y.o. female who returns for follow up.     Past Medical History:   Past Medical History:   Diagnosis Date    Alcohol abuse     Anemia     Cancer     liver    Cirrhosis        Past Surgical HIstory:   Past Surgical History:   Procedure Laterality Date    ESOPHAGOGASTRODUODENOSCOPY Left 1/9/2019    Procedure: EGD  (ESOPHAGOGASTRODUODENOSCOPY);  Surgeon: Madyson Farrar MD;  Location: [facility] ENDO;  Service: Endoscopy;  Laterality: Left;    PERITONEOCENTESIS N/A 1/9/2019    Procedure: PARACENTESIS, ABDOMINAL;  Surgeon: Everett Fitzpatrick MD;  Location: [facility] CATH LAB;  Service: Radiology;  Laterality: N/A;       Family History: No family history on file.    Social History:  reports that she has been smoking cigarettes. She has never used smokeless tobacco. She reports that she has current or past drug history. Drug: Cocaine. She reports that she does not drink alcohol.    Allergies:  Review of patient's allergies indicates:  No Known Allergies    Medications:  Current Outpatient Medications   Medication Sig Dispense Refill    (Magic mouthwash) 1:1:1 Benadryl 12.5mg/5ml liq, aluminum & magnesium hydroxide-simehticone (Maalox), lidocaine viscous 2% Swish and spit 10 mLs every 4 (four) hours as needed. for mouth sores (Patient not taking: Reported on 9/26/2019) 450 mL 5    apixaban (ELIQUIS) 5 mg Tab Take 1 tablet (5 mg total) by mouth 2 (two) times daily. 60 tablet 4    cholecalciferol, vitamin D3, 50,000 unit Tab Take 125 mcg by mouth.      ciprofloxacin HCl (CIPRO) 500 MG tablet Take 1 tablet (500 mg total) by mouth 2 (two) times daily. for 10 days 20 tablet 0    cyproheptadine (PERIACTIN) 4 mg tablet Take 1 tablet (4 mg total) by mouth 3 (three) times daily as needed. 90 tablet 2    famotidine (PEPCID) 20 MG tablet Take 1 tablet (20 mg total) by mouth once daily. 30 tablet 5    ferrous sulfate (FEOSOL) 325 mg (65 mg iron) Tab tablet Take 1 tablet (325 mg total) by mouth 2 (two) times daily. 30 tablet 5    furosemide (LASIX) 40 MG tablet Take 1 tablet (40 mg total) by mouth once daily. 30 tablet 5    gabapentin (NEURONTIN) 100 MG capsule Take 1 capsule (100 mg total) by mouth every evening. 30 capsule 2    hydrocortisone 1 % cream Apply to affected area 2 times daily 140 g 1    ibuprofen (ADVIL,MOTRIN) 400 MG tablet  Take 1 tablet (400 mg total) by mouth every 6 (six) hours as needed. 20 tablet 0    lactulose 10 gram/15 ml (CHRONULAC) 10 gram/15 mL (15 mL) solution Take 10 g by mouth 2 (two) times daily.      magnesium oxide (MAGOX) 400 mg (241.3 mg magnesium) tablet Take 1 tablet (400 mg total) by mouth once daily. (Patient not taking: Reported on 9/27/2019) 200 tablet 1    methocarbamol (ROBAXIN) 500 MG Tab Take 1 tablet (500 mg total) by mouth 4 (four) times daily. for 10 days 40 tablet 0    multivitamin (ONE DAILY MULTIVITAMIN) per tablet Take 1 tablet by mouth once daily.      ondansetron (ZOFRAN) 8 MG tablet Take 1 tablet (8 mg total) by mouth every 8 (eight) hours as needed for Nausea. 60 tablet 2    oxyCODONE (ROXICODONE) 5 MG immediate release tablet Take 1 tablet (5 mg total) by mouth every 6 (six) hours as needed for Pain. 60 tablet 0    promethazine (PHENERGAN) 25 MG tablet Take 1 tablet (25 mg total) by mouth every 6 (six) hours as needed for Nausea. 25 tablet 0    propranolol (INDERAL) 10 MG tablet Take 1 tablet (10 mg total) by mouth 2 (two) times daily. 60 tablet 2    traZODone (DESYREL) 50 MG tablet TK 1 T PO  HS PRN INSOMNIA  2     No current facility-administered medications for this visit.        Review of Systems   Constitutional: Positive for appetite change and fatigue. Negative for chills, fever and unexpected weight change.   HENT: Negative for nosebleeds and trouble swallowing.    Respiratory: Negative for cough and shortness of breath.    Cardiovascular: Negative for chest pain and leg swelling.   Gastrointestinal: Positive for abdominal pain and nausea. Negative for blood in stool, constipation and diarrhea.   Genitourinary: Positive for flank pain. Negative for dysuria and frequency.   Musculoskeletal: Positive for back pain. Negative for gait problem and neck pain.   Skin: Negative for rash.   Neurological: Negative for seizures, syncope and headaches.   Hematological: Negative for  "adenopathy. Does not bruise/bleed easily.   Psychiatric/Behavioral: Negative for agitation and behavioral problems.       ECOG Performance Status: 2   Objective:      Vitals:   Vitals:    10/25/19 1121   BP: 110/68   BP Location: Left arm   Patient Position: Sitting   BP Method: Medium (Automatic)   Pulse: 74   Resp: 18   Temp: 98.2 °F (36.8 °C)   TempSrc: Oral   SpO2: 100%   Weight: 41.1 kg (90 lb 9.7 oz)   Height: 4' 11" (1.499 m)     BMI: Body mass index is 18.3 kg/m².    Physical Exam   Constitutional: She is oriented to person, place, and time. She appears well-developed.   Frail, temporal wasting   HENT:   Head: Normocephalic and atraumatic.   Mouth/Throat: No oropharyngeal exudate.   Eyes: Pupils are equal, round, and reactive to light. EOM are normal. No scleral icterus.   Neck: Normal range of motion. Neck supple. No JVD present.   Cardiovascular: Normal rate and regular rhythm.   Murmur heard.  Pulmonary/Chest: Effort normal and breath sounds normal. No respiratory distress.   Abdominal: Soft. Bowel sounds are normal. She exhibits distension. There is tenderness.   Musculoskeletal: Normal range of motion. She exhibits edema.   Lymphadenopathy:     She has no cervical adenopathy.   Neurological: She is alert and oriented to person, place, and time. No cranial nerve deficit.   Skin: Skin is warm and dry. Capillary refill takes less than 2 seconds. No pallor.   Psychiatric: She has a normal mood and affect. Her behavior is normal.       Laboratory Data:  Lab Visit on 10/25/2019   Component Date Value Ref Range Status    WBC 10/25/2019 8.79  3.90 - 12.70 K/uL Final    RBC 10/25/2019 4.08  4.00 - 5.40 M/uL Final    Hemoglobin 10/25/2019 8.8* 12.0 - 16.0 g/dL Final    Hematocrit 10/25/2019 27.0* 37.0 - 48.5 % Final    Mean Corpuscular Volume 10/25/2019 66* 82 - 98 fL Final    Mean Corpuscular Hemoglobin 10/25/2019 21.6* 27.0 - 31.0 pg Final    Mean Corpuscular Hemoglobin Conc 10/25/2019 32.6  32.0 - 36.0 " g/dL Final    RDW 10/25/2019 17.8* 11.5 - 14.5 % Final    Platelets 10/25/2019 215  150 - 350 K/uL Final    MPV 10/25/2019 SEE COMMENT  9.2 - 12.9 fL Final    Result not available.    Immature Granulocytes 10/25/2019 0.7* 0.0 - 0.5 % Final    Gran # (ANC) 10/25/2019 5.7  1.8 - 7.7 K/uL Final    Immature Grans (Abs) 10/25/2019 0.06* 0.00 - 0.04 K/uL Final    Comment: Mild elevation in immature granulocytes is non specific and   can be seen in a variety of conditions including stress response,   acute inflammation, trauma and pregnancy. Correlation with other   laboratory and clinical findings is essential.      Lymph # 10/25/2019 1.6  1.0 - 4.8 K/uL Final    Mono # 10/25/2019 1.2* 0.3 - 1.0 K/uL Final    Eos # 10/25/2019 0.2  0.0 - 0.5 K/uL Final    Baso # 10/25/2019 0.07  0.00 - 0.20 K/uL Final    nRBC 10/25/2019 0  0 /100 WBC Final    Gran% 10/25/2019 64.8  38.0 - 73.0 % Final    Lymph% 10/25/2019 17.6* 18.0 - 48.0 % Final    Mono% 10/25/2019 14.1  4.0 - 15.0 % Final    Eosinophil% 10/25/2019 2.0  0.0 - 8.0 % Final    Basophil% 10/25/2019 0.8  0.0 - 1.9 % Final    Differential Method 10/25/2019 Automated   Final    Sodium 10/25/2019 126* 136 - 145 mmol/L Final    Potassium 10/25/2019 5.5* 3.5 - 5.1 mmol/L Final    *No Visible Hemolysis    Chloride 10/25/2019 95  95 - 110 mmol/L Final    CO2 10/25/2019 21* 23 - 29 mmol/L Final    Glucose 10/25/2019 100  70 - 110 mg/dL Final    BUN, Bld 10/25/2019 52* 8 - 23 mg/dL Final    Creatinine 10/25/2019 7.1* 0.5 - 1.4 mg/dL Final    Calcium 10/25/2019 8.9  8.7 - 10.5 mg/dL Final    Total Protein 10/25/2019 7.9  6.0 - 8.4 g/dL Final    Albumin 10/25/2019 3.0* 3.5 - 5.2 g/dL Final    Total Bilirubin 10/25/2019 1.3* 0.1 - 1.0 mg/dL Final    Comment: For infants and newborns, interpretation of results should be based  on gestational age, weight and in agreement with clinical  observations.  Premature Infant recommended reference ranges:  Up to 24  hours.............<8.0 mg/dL  Up to 48 hours............<12.0 mg/dL  3-5 days..................<15.0 mg/dL  6-29 days.................<15.0 mg/dL      Alkaline Phosphatase 10/25/2019 96  55 - 135 U/L Final    AST 10/25/2019 75* 10 - 40 U/L Final    ALT 10/25/2019 27  10 - 44 U/L Final    Anion Gap 10/25/2019 10  8 - 16 mmol/L Final    eGFR if African American 10/25/2019 6.5* >60 mL/min/1.73 m^2 Final    eGFR if non African American 10/25/2019 5.7* >60 mL/min/1.73 m^2 Final    Comment: Calculation used to obtain the estimated glomerular filtration  rate (eGFR) is the CKD-EPI equation.       Magnesium 10/25/2019 2.0  1.6 - 2.6 mg/dL Final    Phosphorus 10/25/2019 4.4  2.7 - 4.5 mg/dL Final    TSH 10/25/2019 1.970  0.400 - 4.000 uIU/mL Final    Free T4 10/25/2019 1.25  0.71 - 1.51 ng/dL Final   Admission on 10/19/2019, Discharged on 10/20/2019   Component Date Value Ref Range Status    WBC 10/19/2019 7.42  3.90 - 12.70 K/uL Final    RBC 10/19/2019 4.43  4.00 - 5.40 M/uL Final    Hemoglobin 10/19/2019 9.4* 12.0 - 16.0 g/dL Final    Hematocrit 10/19/2019 30.0* 37.0 - 48.5 % Final    Mean Corpuscular Volume 10/19/2019 68* 82 - 98 fL Final    Mean Corpuscular Hemoglobin 10/19/2019 21.2* 27.0 - 31.0 pg Final    Mean Corpuscular Hemoglobin Conc 10/19/2019 31.3* 32.0 - 36.0 g/dL Final    RDW 10/19/2019 19.0* 11.5 - 14.5 % Final    Platelets 10/19/2019 211  150 - 350 K/uL Final    MPV 10/19/2019 SEE COMMENT  9.2 - 12.9 fL Final    Result not available.    Immature Granulocytes 10/19/2019 0.5  0.0 - 0.5 % Final    Gran # (ANC) 10/19/2019 6.1  1.8 - 7.7 K/uL Final    Immature Grans (Abs) 10/19/2019 0.04  0.00 - 0.04 K/uL Final    Comment: Mild elevation in immature granulocytes is non specific and   can be seen in a variety of conditions including stress response,   acute inflammation, trauma and pregnancy. Correlation with other   laboratory and clinical findings is essential.      Lymph # 10/19/2019  0.9* 1.0 - 4.8 K/uL Final    Mono # 10/19/2019 0.4  0.3 - 1.0 K/uL Final    Eos # 10/19/2019 0.0  0.0 - 0.5 K/uL Final    Baso # 10/19/2019 0.03  0.00 - 0.20 K/uL Final    nRBC 10/19/2019 0  0 /100 WBC Final    Gran% 10/19/2019 81.6* 38.0 - 73.0 % Final    Lymph% 10/19/2019 11.7* 18.0 - 48.0 % Final    Mono% 10/19/2019 5.7  4.0 - 15.0 % Final    Eosinophil% 10/19/2019 0.1  0.0 - 8.0 % Final    Basophil% 10/19/2019 0.4  0.0 - 1.9 % Final    Aniso 10/19/2019 Slight   Final    Hypo 10/19/2019 Occasional   Final    Ovalocytes 10/19/2019 Occasional   Final    Tear Drop Cells 10/19/2019 Occasional   Final    Schistocytes 10/19/2019 Present   Final    Differential Method 10/19/2019 Automated   Final    Sodium 10/19/2019 138  136 - 145 mmol/L Final    Potassium 10/19/2019 5.1  3.5 - 5.1 mmol/L Final    Chloride 10/19/2019 104  95 - 110 mmol/L Final    CO2 10/19/2019 20* 23 - 29 mmol/L Final    Glucose 10/19/2019 144* 70 - 110 mg/dL Final    BUN, Bld 10/19/2019 17  8 - 23 mg/dL Final    Creatinine 10/19/2019 1.1  0.5 - 1.4 mg/dL Final    Calcium 10/19/2019 9.6  8.7 - 10.5 mg/dL Final    Total Protein 10/19/2019 9.0* 6.0 - 8.4 g/dL Final    Albumin 10/19/2019 3.3* 3.5 - 5.2 g/dL Final    Total Bilirubin 10/19/2019 1.0  0.1 - 1.0 mg/dL Final    Comment: For infants and newborns, interpretation of results should be based  on gestational age, weight and in agreement with clinical  observations.  Premature Infant recommended reference ranges:  Up to 24 hours.............<8.0 mg/dL  Up to 48 hours............<12.0 mg/dL  3-5 days..................<15.0 mg/dL  6-29 days.................<15.0 mg/dL      Alkaline Phosphatase 10/19/2019 109  55 - 135 U/L Final    AST 10/19/2019 88* 10 - 40 U/L Final    ALT 10/19/2019 28  10 - 44 U/L Final    Anion Gap 10/19/2019 14  8 - 16 mmol/L Final    eGFR if African American 10/19/2019 >60  >60 mL/min/1.73 m^2 Final    eGFR if non African American 10/19/2019 54*  >60 mL/min/1.73 m^2 Final    Comment: Calculation used to obtain the estimated glomerular filtration  rate (eGFR) is the CKD-EPI equation.       Lipase 10/19/2019 11  4 - 60 U/L Final    Specimen UA 10/19/2019 Urine, Clean Catch   Final    Color, UA 10/19/2019 Yellow  Yellow, Straw, Catrina Final    Appearance, UA 10/19/2019 Clear  Clear Final    pH, UA 10/19/2019 6.0  5.0 - 8.0 Final    Specific [place], UA 10/19/2019 1.015  1.005 - 1.030 Final    Protein, UA 10/19/2019 Trace* Negative Final    Comment: Recommend a 24 hour urine protein or a urine   protein/creatinine ratio if globulin induced proteinuria is  clinically suspected.      Glucose, UA 10/19/2019 Negative  Negative Final    Ketones, UA 10/19/2019 Trace* Negative Final    Bilirubin (UA) 10/19/2019 1+* Negative Final    Comment: Positive urine bilirubin is not confirmed. Correlate with   serum bilirubin and clinical presentation.      Occult Blood UA 10/19/2019 3+* Negative Final    Nitrite, UA 10/19/2019 Negative  Negative Final    Urobilinogen, UA 10/19/2019 2.0-3.0* <2.0 EU/dL Final    Leukocytes, UA 10/19/2019 3+* Negative Final    Prothrombin Time 10/19/2019 14.6* 9.0 - 12.5 sec Final    INR 10/19/2019 1.3* 0.8 - 1.2 Final    Comment: Coumadin Therapy:  2.0 - 3.0 for INR for all indicators except mechanical heart valves  and antiphospholipid syndromes which should use 2.5 - 3.5.      Ammonia 10/19/2019 28  10 - 50 umol/L Final    RBC, UA 10/19/2019 7* 0 - 4 /hpf Final    WBC, UA 10/19/2019 11* 0 - 5 /hpf Final    Bacteria 10/19/2019 Occasional  None-Occ /hpf Final    Squam Epithel, UA 10/19/2019 6  /hpf Final    Non-Squam Epith 10/19/2019 1* <1/hpf /hpf Final    Microscopic Comment 10/19/2019 SEE COMMENT   Final    Comment: Other formed elements not mentioned in the report are not   present in the microscopic examination.       Urine Culture, Routine 10/19/2019 *  Final                    Value:STREPTOCOCCUS AGALACTIAE (GROUP  B)  > 100,000 cfu/ml  Beta-hemolytic streptococci are routinely susceptible to   penicillins,cephalosporins and carbapenems.      Urine Culture, Routine 10/19/2019 *  Final                    Value:KLEBSIELLA PNEUMONIAE  10,000 - 49,999 cfu/ml  No other significant isolate     Admission on 10/19/2019, Discharged on 10/19/2019   Component Date Value Ref Range Status    WBC 10/19/2019 5.47  3.90 - 12.70 K/uL Final    RBC 10/19/2019 4.50  4.00 - 5.40 M/uL Final    Hemoglobin 10/19/2019 9.8* 12.0 - 16.0 g/dL Final    Hematocrit 10/19/2019 31.6* 37.0 - 48.5 % Final    Mean Corpuscular Volume 10/19/2019 70* 82 - 98 fL Final    Mean Corpuscular Hemoglobin 10/19/2019 21.8* 27.0 - 31.0 pg Final    Mean Corpuscular Hemoglobin Conc 10/19/2019 31.0* 32.0 - 36.0 g/dL Final    RDW 10/19/2019 19.5* 11.5 - 14.5 % Final    Platelets 10/19/2019 202  150 - 350 K/uL Final    MPV 10/19/2019 SEE COMMENT  9.2 - 12.9 fL Final    Result not available.    Immature Granulocytes 10/19/2019 0.4  0.0 - 0.5 % Final    Gran # (ANC) 10/19/2019 3.7  1.8 - 7.7 K/uL Final    Immature Grans (Abs) 10/19/2019 0.02  0.00 - 0.04 K/uL Final    Comment: Mild elevation in immature granulocytes is non specific and   can be seen in a variety of conditions including stress response,   acute inflammation, trauma and pregnancy. Correlation with other   laboratory and clinical findings is essential.      Lymph # 10/19/2019 1.2  1.0 - 4.8 K/uL Final    Mono # 10/19/2019 0.5  0.3 - 1.0 K/uL Final    Eos # 10/19/2019 0.1  0.0 - 0.5 K/uL Final    Baso # 10/19/2019 0.05  0.00 - 0.20 K/uL Final    nRBC 10/19/2019 0  0 /100 WBC Final    Gran% 10/19/2019 66.9  38.0 - 73.0 % Final    Lymph% 10/19/2019 22.1  18.0 - 48.0 % Final    Mono% 10/19/2019 8.2  4.0 - 15.0 % Final    Eosinophil% 10/19/2019 1.5  0.0 - 8.0 % Final    Basophil% 10/19/2019 0.9  0.0 - 1.9 % Final    Platelet Estimate 10/19/2019 Appears normal   Final    Aniso 10/19/2019 Slight    Final    Poik 10/19/2019 Slight   Final    Hypo 10/19/2019 Occasional   Final    Ovalocytes 10/19/2019 Occasional   Final    Fragmented Cells 10/19/2019 Occasional   Final    Differential Method 10/19/2019 Automated   Final    Sed Rate 10/19/2019 >120* 0 - 36 mm/Hr Final    CRP 10/19/2019 2.1  0.0 - 8.2 mg/L Final    Sodium 10/19/2019 137  136 - 145 mmol/L Final    Potassium 10/19/2019 4.9  3.5 - 5.1 mmol/L Final    Chloride 10/19/2019 105  95 - 110 mmol/L Final    CO2 10/19/2019 21* 23 - 29 mmol/L Final    Glucose 10/19/2019 92  70 - 110 mg/dL Final    BUN, Bld 10/19/2019 14  8 - 23 mg/dL Final    Creatinine 10/19/2019 1.0  0.5 - 1.4 mg/dL Final    Calcium 10/19/2019 9.5  8.7 - 10.5 mg/dL Final    Total Protein 10/19/2019 8.9* 6.0 - 8.4 g/dL Final    Albumin 10/19/2019 3.2* 3.5 - 5.2 g/dL Final    Total Bilirubin 10/19/2019 0.9  0.1 - 1.0 mg/dL Final    Comment: For infants and newborns, interpretation of results should be based  on gestational age, weight and in agreement with clinical  observations.  Premature Infant recommended reference ranges:  Up to 24 hours.............<8.0 mg/dL  Up to 48 hours............<12.0 mg/dL  3-5 days..................<15.0 mg/dL  6-29 days.................<15.0 mg/dL      Alkaline Phosphatase 10/19/2019 118  55 - 135 U/L Final    AST 10/19/2019 90* 10 - 40 U/L Final    *Result may be interfered by visible hemolysis    ALT 10/19/2019 28  10 - 44 U/L Final    Anion Gap 10/19/2019 11  8 - 16 mmol/L Final    eGFR if African American 10/19/2019 >60.0  >60 mL/min/1.73 m^2 Final    eGFR if non African American 10/19/2019 >60.0  >60 mL/min/1.73 m^2 Final    Comment: Calculation used to obtain the estimated glomerular filtration  rate (eGFR) is the CKD-EPI equation.       Group & Rh 10/19/2019 B POS   Final    Indirect [place] 10/19/2019 NEG   Final    Prothrombin Time 10/19/2019 14.0* 9.0 - 12.5 sec Final    INR 10/19/2019 1.4* 0.8 - 1.2 Final    Comment:  Coumadin Therapy:  2.0 - 3.0 for INR for all indicators except mechanical heart valves  and antiphospholipid syndromes which should use 2.5 - 3.5.           Imaging:         EKG  Normal sinus rhythm  Normal ECG     CT Chest Abdomen and pelvis- 8/28/2019  Slight increased size of the heterogeneous enhancing hepatic mass with washout today measuring 9 x 8.4 cm compared 8.9 x 7.6 prior.  There is increase in the hypodensity extending into the IVC now extending into the right atrium.  This either represents bland or tumor thrombus.  Multiple hyperenhancing small nodule lesions in the liver.  At least 1 does washout concerning for but not diagnostic of hepatocellular cancer.  Bilateral pulmonary artery emboli now evident.  Cholelithiasis.     MRI lumbar spine- 10/19/2019  Mild multilevel degenerative changes of the lumbar spine as described above, with mild bilateral neural foraminal narrowing at L3-L4 and L4-L5    Assessment:       1. HCC (hepatocellular carcinoma)    2. Cancer associated pain    3. Alcoholic cirrhosis of liver with ascites    4. Hand foot syndrome    5. Ambulatory dysfunction    6. Pulmonary embolism, unspecified chronicity, unspecified pulmonary embolism type, unspecified whether acute cor pulmonale present    7. Severe malnutrition    8. Fall (on)(from) sidewalk curb, initial encounter    9. Acute cystitis without hematuria    10. PRACHI (acute kidney injury)      Hepatocellular carcinoma:  Not a candidate for local therapy in view of large liver lung shunt.  Progressed/unable to tolerate sorafenib.  Currently started on nivolumab  AFP tumor marker is normal  8 points- Child Class B  CT abdomen revealed Large well-circumscribed heterogeneously enhancing right hepatic lobe mass measuring 8.9 x 7.6 cm which demonstrates washout on venous and delayed phase imaging consistent with HCC.    - Several additional scattered subcentimeter foci of hyperenhancement without washout.    - Main and left portal vein  are patent.    - Mass compresses and narrows the right portal vein.    - Mass compresses and significantly narrows the IVC, cannot exclude tumor involvement.     - IR Y90 mapping- Hepatic angiography demonstrates a large hypervascular lesion in the right hepatic lobe supplied by branches of the right hepatic artery and accessory left hepatic artery..  Technetium MAA was injected aorta determined lung shunt fraction.  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  - Nuclear medicine scan revealed that majority of tracer goes to the lungs with a liver lung shunt fraction of 68.9%.    is not a candidate for Y90 or other liver directed therapies.      05/09/2019 Started taking Sorafenib. She took TID (6 pills of sorafenib/day) for 2 days and then took BID dosing since then. She said she just want to get medication in the system  07/06/2019- developed painful desquamation of her bilateral foot. She has at least grade 2- moderate foot skin reaction - skin changes include hyperkeratosis, blisters (healed) seen on the plantar surface of her bilateral foot with pain limiting her activities of daily living.  We cut down her sorafenib to 200 mg b.i.d.  08/16/2019- persistence of desquamation of hand foot syndrome.  Stopped sorafenib  08/28/2019- CT Chest abdomen and pelvis done on 08/28/2019 revealed disease progression and also new bilateral pulmonary embolism. She was admitted to [facility] main campus and discharged on Eliquis (Patient declined Lovenox)   09/06/2019- patient presented to the clinic.  In view of her intolerance to sorafenib we will change her treatment to nivolumab every 4 weeks.  Consent signed, pending insurance approval. she is not a  candidate for [place] trial, cohort E. spoke to Diana Robison (ext [phone])  9/12/2019- Received [employer]  09/27/2019- cycle 1  day 1 of nivolumab     2. PRACHI likely secondary to contrast induced nephropathy vs allergic interstitial nephritis from ciprofloxacin vs immunotherapy induced nephritis  3. Pulmonary embolism on Eliquis  4.  Ambulatory dysfunction, uses walker at home  5.  Cancer related pain on oxycodone 5 mg q.6 hours p.r.n.  6.  Abdominal ascites likely secondary to hepatocellular carcinoma, alcoholic cirrhosis of the liver     Plan:     1. Hold cycle 2 Nivolumab, her creatinine is 7.1 today.  I personally spoke to the patient and explained our plan of care.  Patient understands.  2. Will admit her to the hospital for PRACHI likely secondary to contrast induced nephropathy vs allergic interstitial nephritis from ciprofloxacin vs immunotherapy induced nephritis.  Needs imaging for further evaluation of her acute kidney injury and likely nephrology consult  3. Continue Eliquis for bilateral pulmonary emboli.  4.  Adequate pain control     Plan of care discussed with Dr. Dewey Roberts MD PGY-5  Hematology and Oncology  Pager:[phone]

## 2019-10-25 NOTE — TELEPHONE ENCOUNTER
----- Message from Stewart Roberts MD sent at 10/25/2019  1:06 PM CDT -----  Please schedule for US kidney, STAT for creatinine of 7.1

## 2019-10-25 NOTE — HPI
Ms Marks is a 63-year-old  female with alcoholic cirrhosis dating back to 2015, portal HTN, ascites, history of variceal bleeding in January 2018 s/p banding, history of alcohol abuse (recently quit in January of 2018), and newly diagnosed hepatocellular carcinoma undergoing treatment with nivolumab who presented as a direct transfer from Heme/Onc clinic on 10/25 after routine laboratory studies were remarkable for serum creatinine of 7.1 (baseline creatinine ~1.0). Patient reports one week history of nasuea and vomiting. Per chart review, [place] [facility] [place] with complaints of abdominal pain, back pain, nausea, and vomiting. She was noted to have UTI 2/2 Klebsiella on 10/19 and is currently undergoing treatment with ciprofloxacin. She denies relief in abdominal and back pain with prescribed medications. Her nausea and vomiting has persisted as well. She reports decreased PO intake over this time span secondary to nausea and has a history of recent ibuprofen and naproxen use for abdominal pain. She denies fever, dysuria, hematuria, chest pain, shortness of breath, syncope, or worsening lower extremity edema. She currently has abdominal distention and abdominal pain which radiates around the left aspect of lumbar region. Patients' vitals with BP 98/54 mmHg, HR 80 bpm, afebrile with temperature of 98 F, and RR 16.     Cirrhosis & HCC History:  AFP tumor marker is normal.  Hep C and B testing on 4/27/2018 - negative.    Endoscopy - 1/9/19 - Grade II esophageal varices. Completely eradicated. Banded, Small hiatal hernia., Portal hypertensive gastropathy. Treated with argon plasma coagulation (APC).     Patient had an appointment on 2/15/2019, IR consult for Y90 but she missed her appointment.   - She had IR mapping and Hepatic angiography demonstrates a large hypervascular lesion in the right hepatic lobe supplied by branches of the right hepatic artery and accessory  left hepatic artery.  Technetium MAA was injected aorta determined lung shunt fraction.  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  - Nuclear medicine scan revealed that majority of tracer goes to the lungs with a liver lung shunt fraction of 68.9%.   is not a candidate for Y90 or other liver directed therapies due to liver lung shunt     5/9/2019 - Started taking Sorafenib 400mg BID from  7/6/2019 - Decrease to 200 mg BID due to hand foot syndrome  8/16/2019 - Stopped Sorafenib on  due to persistent desquamation of hand foot syndrome  08/28/2019 - CT Chest abdomen and pelvis done revealed disease progression and also new bilateral pulmonary embolism. She was admitted to [facility] main campus and discharged on Eliquis (Patient declined Lovenox)   9/17/2019 - Fall and followed up in ED and Xray right knee revealed no acute osseous abnormality  9/27/2019 - Started C 1 D1 Nivolumab q 4 weeks  10/25/2019 - PRACHI with creatinine of 7.1, direct admit

## 2019-10-25 NOTE — SUBJECTIVE & OBJECTIVE
Oncology Treatment Plan:   OP NIVOLUMAB Q4W    Medications:  Continuous Infusions:  Scheduled Meds:   apixaban  5 mg Oral BID    [START ON 10/26/2019] famotidine  20 mg Oral Daily    [START ON 10/26/2019] furosemide  40 mg Oral Daily    gabapentin  100 mg Oral QHS    sodium chloride 0.9%  1,000 mL Intravenous Once     PRN Meds:ondansetron, oxyCODONE, promethazine (PHENERGAN) IVPB, sodium chloride 0.9%     Review of patient's allergies indicates:  No Known Allergies     Past Medical History:   Diagnosis Date    Alcohol abuse     Anemia     Cancer     liver    Cirrhosis      Past Surgical History:   Procedure Laterality Date    ESOPHAGOGASTRODUODENOSCOPY Left 1/9/2019    Procedure: EGD (ESOPHAGOGASTRODUODENOSCOPY);  Surgeon: Madyson Farrar MD;  Location: [facility] ENDO;  Service: Endoscopy;  Laterality: Left;    PERITONEOCENTESIS N/A 1/9/2019    Procedure: PARACENTESIS, ABDOMINAL;  Surgeon: Everett Fitzpatrick MD;  Location: [facility] CATH LAB;  Service: Radiology;  Laterality: N/A;     Family History     None        Tobacco Use    Smoking status: Current Every Day Smoker     Types: Cigarettes    Smokeless tobacco: Never Used    Tobacco comment: 2 cigarettes a day   Substance and Sexual Activity    Alcohol use: No     Frequency: Never     Comment: Previously drank 1 pint a day    Drug use: Not Currently     Types: Cocaine     Comment: last use was 3 years ago    Sexual activity: Not Currently       Review of Systems   Constitutional: Positive for appetite change, chills and fatigue. Negative for diaphoresis and fever.   HENT: Negative for congestion, rhinorrhea and sore throat.    Eyes: Negative for pain and visual disturbance.   Respiratory: Negative for cough, shortness of breath and wheezing.    Cardiovascular: Positive for leg swelling. Negative for chest pain and palpitations.   Gastrointestinal: Positive for abdominal distention, abdominal pain, nausea and vomiting. Negative for blood in stool,  constipation and diarrhea.   Genitourinary: Positive for difficulty urinating. Negative for dysuria, flank pain and hematuria.        Patient reports nil urine output over the past two days. Denies difficulty with urination, is not making urine.    Musculoskeletal: Positive for back pain. Negative for gait problem.   Skin: Negative for pallor and rash.   Neurological: Positive for headaches. Negative for dizziness, light-headedness and numbness.   Hematological: Negative for adenopathy. Does not bruise/bleed easily.   Psychiatric/Behavioral: Negative for behavioral problems and confusion.     Objective:     Vital Signs (Most Recent):  Temp: 98 °F (36.7 °C) (10/25/19 1542)  Pulse: 80 (10/25/19 1542)  Resp: 16 (10/25/19 1542)  BP: (!) 98/54 (10/25/19 1542) Vital Signs (24h Range):  Temp:  [98 °F (36.7 °C)-98.2 °F (36.8 °C)] 98 °F (36.7 °C)  Pulse:  [74-80] 80  Resp:  [16-18] 16  SpO2:  [100 %] 100 %  BP: ()/(54-68) 98/54     Weight: 40.8 kg (90 lb)  Body mass index is 18.18 kg/m².  Body surface area is 1.3 meters squared.    No intake or output data in the 24 hours ending 10/25/19 1716    Physical Exam   Constitutional: She is oriented to person, place, and time. She appears cachectic. No distress.   HENT:   Head: Normocephalic and atraumatic.   Mouth/Throat: Oropharynx is clear and moist. No oropharyngeal exudate.   Eyes: Pupils are equal, round, and reactive to light. Conjunctivae and EOM are normal. No scleral icterus.   Neck: Normal range of motion. Neck supple. No tracheal deviation present. No thyromegaly present.   Cardiovascular: Normal rate, regular rhythm, normal heart sounds and intact distal pulses. Exam reveals no gallop and no friction rub.   No murmur heard.  Pulmonary/Chest: Effort normal and breath sounds normal. She has no wheezes. She has no rales.   Abdominal: Soft. Bowel sounds are normal. She exhibits distension and ascites. There is generalized tenderness. There is no rigidity and no CVA  tenderness.   Abdomen is distended with dilated superficially veins noted. She exhibits tenderness with deep palpation throughout but most notably left lower quadrant and suprapubic.    Musculoskeletal: She exhibits edema and tenderness.   Trace lower extremity edema bilaterally.   Lymphadenopathy:     She has no cervical adenopathy.   Neurological: She is alert and oriented to person, place, and time. She exhibits normal muscle tone. Coordination normal.   Skin: Skin is warm and dry. Capillary refill takes 2 to 3 seconds. She is not diaphoretic.   Psychiatric: She has a normal mood and affect. Her behavior is normal.   Nursing note and vitals reviewed.      Significant Labs:   Recent Lab Results       10/25/19  1053        Albumin 3.0     Alkaline Phosphatase 96     ALT 27     Anion Gap 10     AST 75     Baso # 0.07     Basophil% 0.8     BILIRUBIN TOTAL 1.3  Comment:  For infants and newborns, interpretation of results should be based  on gestational age, weight and in agreement with clinical  observations.  Premature Infant recommended reference ranges:  Up to 24 hours.............<8.0 mg/dL  Up to 48 hours............<12.0 mg/dL  3-5 days..................<15.0 mg/dL  6-29 days.................<15.0 mg/dL       BUN, Bld 52     Calcium 8.9     Chloride 95     CO2 21     Creatinine 7.1     Differential Method Automated     eGFR if African American 6.5     eGFR if non  5.7  Comment:  Calculation used to obtain the estimated glomerular filtration  rate (eGFR) is the CKD-EPI equation.        Eos # 0.2     Eosinophil% 2.0     Free T4 1.25     Glucose 100     Gran # (ANC) 5.7     Gran% 64.8     Hematocrit 27.0     Hemoglobin 8.8     Immature Grans (Abs) 0.06  Comment:  Mild elevation in immature granulocytes is non specific and   can be seen in a variety of conditions including stress response,   acute inflammation, trauma and pregnancy. Correlation with other   laboratory and clinical findings is  essential.       Immature Granulocytes 0.7     Lymph # 1.6     Lymph% 17.6     Magnesium 2.0     MCH 21.6     MCHC 32.6     MCV 66     Mono # 1.2     Mono% 14.1     MPV SEE COMMENT  Comment:  Result not available.     nRBC 0     Phosphorus 4.4     Platelets 215     Potassium 5.5  Comment:  *No Visible Hemolysis     PROTEIN TOTAL 7.9     RBC 4.08     RDW 17.8     Sodium 126     TSH 1.970     WBC 8.79           Diagnostic Results:  Renal ultrasound pending.

## 2019-10-26 LAB
ALBUMIN SERPL BCP-MCNC: 2.4 G/DL (ref 3.5–5.2)
ALP SERPL-CCNC: 91 U/L (ref 55–135)
ALT SERPL W/O P-5'-P-CCNC: 32 U/L (ref 10–44)
ANION GAP SERPL CALC-SCNC: 10 MMOL/L (ref 8–16)
ANION GAP SERPL CALC-SCNC: 12 MMOL/L (ref 8–16)
ANION GAP SERPL CALC-SCNC: 13 MMOL/L (ref 8–16)
AST SERPL-CCNC: 51 U/L (ref 10–40)
BASOPHILS # BLD AUTO: 0.03 K/UL (ref 0–0.2)
BASOPHILS NFR BLD: 0.4 % (ref 0–1.9)
BILIRUB SERPL-MCNC: 1.3 MG/DL (ref 0.1–1)
BUN SERPL-MCNC: 60 MG/DL (ref 8–23)
BUN SERPL-MCNC: 68 MG/DL (ref 8–23)
BUN SERPL-MCNC: 70 MG/DL (ref 8–23)
CALCIUM SERPL-MCNC: 7.8 MG/DL (ref 8.7–10.5)
CALCIUM SERPL-MCNC: 7.8 MG/DL (ref 8.7–10.5)
CALCIUM SERPL-MCNC: 8.3 MG/DL (ref 8.7–10.5)
CHLORIDE SERPL-SCNC: 97 MMOL/L (ref 95–110)
CHLORIDE SERPL-SCNC: 98 MMOL/L (ref 95–110)
CHLORIDE SERPL-SCNC: 98 MMOL/L (ref 95–110)
CO2 SERPL-SCNC: 15 MMOL/L (ref 23–29)
CO2 SERPL-SCNC: 15 MMOL/L (ref 23–29)
CO2 SERPL-SCNC: 18 MMOL/L (ref 23–29)
CREAT SERPL-MCNC: 7.4 MG/DL (ref 0.5–1.4)
CREAT SERPL-MCNC: 8.1 MG/DL (ref 0.5–1.4)
CREAT SERPL-MCNC: 8.6 MG/DL (ref 0.5–1.4)
DIFFERENTIAL METHOD: ABNORMAL
EOSINOPHIL # BLD AUTO: 0 K/UL (ref 0–0.5)
EOSINOPHIL NFR BLD: 0.1 % (ref 0–8)
ERYTHROCYTE [DISTWIDTH] IN BLOOD BY AUTOMATED COUNT: 17.4 % (ref 11.5–14.5)
EST. GFR  (AFRICAN AMERICAN): 5.2 ML/MIN/1.73 M^2
EST. GFR  (AFRICAN AMERICAN): 5.6 ML/MIN/1.73 M^2
EST. GFR  (AFRICAN AMERICAN): 6.2 ML/MIN/1.73 M^2
EST. GFR  (NON AFRICAN AMERICAN): 4.5 ML/MIN/1.73 M^2
EST. GFR  (NON AFRICAN AMERICAN): 4.8 ML/MIN/1.73 M^2
EST. GFR  (NON AFRICAN AMERICAN): 5.4 ML/MIN/1.73 M^2
GLUCOSE SERPL-MCNC: 125 MG/DL (ref 70–110)
GLUCOSE SERPL-MCNC: 227 MG/DL (ref 70–110)
GLUCOSE SERPL-MCNC: 250 MG/DL (ref 70–110)
HCT VFR BLD AUTO: 25.3 % (ref 37–48.5)
HGB BLD-MCNC: 8.1 G/DL (ref 12–16)
IMM GRANULOCYTES # BLD AUTO: 0.03 K/UL (ref 0–0.04)
IMM GRANULOCYTES NFR BLD AUTO: 0.4 % (ref 0–0.5)
LYMPHOCYTES # BLD AUTO: 0.7 K/UL (ref 1–4.8)
LYMPHOCYTES NFR BLD: 10.3 % (ref 18–48)
MAGNESIUM SERPL-MCNC: 2 MG/DL (ref 1.6–2.6)
MCH RBC QN AUTO: 21.4 PG (ref 27–31)
MCHC RBC AUTO-ENTMCNC: 32 G/DL (ref 32–36)
MCV RBC AUTO: 67 FL (ref 82–98)
MONOCYTES # BLD AUTO: 0.5 K/UL (ref 0.3–1)
MONOCYTES NFR BLD: 7.1 % (ref 4–15)
NEUTROPHILS # BLD AUTO: 5.6 K/UL (ref 1.8–7.7)
NEUTROPHILS NFR BLD: 81.7 % (ref 38–73)
NRBC BLD-RTO: 0 /100 WBC
PHOSPHATE SERPL-MCNC: 6.7 MG/DL (ref 2.7–4.5)
PLATELET # BLD AUTO: 204 K/UL (ref 150–350)
PMV BLD AUTO: ABNORMAL FL (ref 9.2–12.9)
POCT GLUCOSE: 260 MG/DL (ref 70–110)
POTASSIUM SERPL-SCNC: 5.7 MMOL/L (ref 3.5–5.1)
POTASSIUM SERPL-SCNC: 6.3 MMOL/L (ref 3.5–5.1)
POTASSIUM SERPL-SCNC: 6.3 MMOL/L (ref 3.5–5.1)
PROT SERPL-MCNC: 7.1 G/DL (ref 6–8.4)
RBC # BLD AUTO: 3.78 M/UL (ref 4–5.4)
SODIUM SERPL-SCNC: 125 MMOL/L (ref 136–145)
SODIUM SERPL-SCNC: 125 MMOL/L (ref 136–145)
SODIUM SERPL-SCNC: 126 MMOL/L (ref 136–145)
WBC # BLD AUTO: 6.87 K/UL (ref 3.9–12.7)

## 2019-10-26 PROCEDURE — 99233 PR SUBSEQUENT HOSPITAL CARE,LEVL III: ICD-10-PCS | Mod: ,,, | Performed by: INTERNAL MEDICINE

## 2019-10-26 PROCEDURE — 85025 COMPLETE CBC W/AUTO DIFF WBC: CPT

## 2019-10-26 PROCEDURE — 84100 ASSAY OF PHOSPHORUS: CPT

## 2019-10-26 PROCEDURE — 36415 COLL VENOUS BLD VENIPUNCTURE: CPT

## 2019-10-26 PROCEDURE — 80048 BASIC METABOLIC PNL TOTAL CA: CPT | Mod: 91

## 2019-10-26 PROCEDURE — 94640 AIRWAY INHALATION TREATMENT: CPT

## 2019-10-26 PROCEDURE — 25000003 PHARM REV CODE 250: Performed by: STUDENT IN AN ORGANIZED HEALTH CARE EDUCATION/TRAINING PROGRAM

## 2019-10-26 PROCEDURE — 80048 BASIC METABOLIC PNL TOTAL CA: CPT

## 2019-10-26 PROCEDURE — 93005 ELECTROCARDIOGRAM TRACING: CPT

## 2019-10-26 PROCEDURE — 25000242 PHARM REV CODE 250 ALT 637 W/ HCPCS: Performed by: STUDENT IN AN ORGANIZED HEALTH CARE EDUCATION/TRAINING PROGRAM

## 2019-10-26 PROCEDURE — 80053 COMPREHEN METABOLIC PANEL: CPT

## 2019-10-26 PROCEDURE — 99233 SBSQ HOSP IP/OBS HIGH 50: CPT | Mod: ,,, | Performed by: INTERNAL MEDICINE

## 2019-10-26 PROCEDURE — 20600001 HC STEP DOWN PRIVATE ROOM

## 2019-10-26 PROCEDURE — 93010 ELECTROCARDIOGRAM REPORT: CPT | Mod: ,,, | Performed by: INTERNAL MEDICINE

## 2019-10-26 PROCEDURE — 83735 ASSAY OF MAGNESIUM: CPT

## 2019-10-26 PROCEDURE — 63600175 PHARM REV CODE 636 W HCPCS: Performed by: STUDENT IN AN ORGANIZED HEALTH CARE EDUCATION/TRAINING PROGRAM

## 2019-10-26 PROCEDURE — 94761 N-INVAS EAR/PLS OXIMETRY MLT: CPT

## 2019-10-26 PROCEDURE — 93010 EKG 12-LEAD: ICD-10-PCS | Mod: ,,, | Performed by: INTERNAL MEDICINE

## 2019-10-26 RX ORDER — DEXTROSE MONOHYDRATE 100 MG/ML
25 INJECTION, SOLUTION INTRAVENOUS
Status: DISCONTINUED | OUTPATIENT
Start: 2019-10-27 | End: 2019-10-26

## 2019-10-26 RX ORDER — SODIUM CHLORIDE 9 MG/ML
INJECTION, SOLUTION INTRAVENOUS ONCE
Status: DISCONTINUED | OUTPATIENT
Start: 2019-10-26 | End: 2019-10-27

## 2019-10-26 RX ORDER — ALBUTEROL SULFATE 2.5 MG/.5ML
10 SOLUTION RESPIRATORY (INHALATION) ONCE
Status: DISCONTINUED | OUTPATIENT
Start: 2019-10-27 | End: 2019-10-26

## 2019-10-26 RX ORDER — DEXTROSE MONOHYDRATE 100 MG/ML
25 INJECTION, SOLUTION INTRAVENOUS ONCE
Status: DISCONTINUED | OUTPATIENT
Start: 2019-10-27 | End: 2019-10-26

## 2019-10-26 RX ORDER — ALBUTEROL SULFATE 2.5 MG/.5ML
10 SOLUTION RESPIRATORY (INHALATION) ONCE
Status: COMPLETED | OUTPATIENT
Start: 2019-10-26 | End: 2019-10-26

## 2019-10-26 RX ORDER — PANTOPRAZOLE SODIUM 40 MG/1
40 TABLET, DELAYED RELEASE ORAL DAILY
Status: DISCONTINUED | OUTPATIENT
Start: 2019-10-27 | End: 2019-11-03 | Stop reason: HOSPADM

## 2019-10-26 RX ADMIN — PREDNISONE 60 MG: 20 TABLET ORAL at 09:10

## 2019-10-26 RX ADMIN — ALBUTEROL SULFATE 10 MG: 2.5 SOLUTION RESPIRATORY (INHALATION) at 02:10

## 2019-10-26 RX ADMIN — APIXABAN 5 MG: 5 TABLET, FILM COATED ORAL at 08:10

## 2019-10-26 RX ADMIN — CALCIUM GLUCONATE 1 G: 94 INJECTION, SOLUTION INTRAVENOUS at 03:10

## 2019-10-26 RX ADMIN — INSULIN HUMAN 4.08 UNITS: 100 INJECTION, SOLUTION PARENTERAL at 02:10

## 2019-10-26 RX ADMIN — OXYCODONE HYDROCHLORIDE 5 MG: 5 TABLET ORAL at 08:10

## 2019-10-26 RX ADMIN — GABAPENTIN 100 MG: 100 CAPSULE ORAL at 08:10

## 2019-10-26 RX ADMIN — FAMOTIDINE 20 MG: 20 TABLET ORAL at 09:10

## 2019-10-26 RX ADMIN — DEXTROSE 250 ML: 10 SOLUTION INTRAVENOUS at 01:10

## 2019-10-26 RX ADMIN — FUROSEMIDE 40 MG: 40 TABLET ORAL at 09:10

## 2019-10-26 RX ADMIN — APIXABAN 5 MG: 5 TABLET, FILM COATED ORAL at 09:10

## 2019-10-26 NOTE — PROGRESS NOTES
ATTENDING NOTE, ONCOLOGY INPATIENT TEAM    Events of last 12 hours noted.  Patient seen and examined around 1p.m.  Appears comfortable, in NAD.  Lungs are clear to auscultation.  Abdomen is soft, distended, but nontender.  Labs reviewed. Her creatinine is 7.4 mg/dl, BUN 60, K is 6.3 mEq/L and Na is 126 mEq/L.    PLAN  STAT EKG.  If she has changes consistent with hyperkalemia, she will receive D50 and insulin immediately.  She will need dialysis today.  Given her complexity and her K of 6.3 mEq/L, we will ask the ICU team to assume her care.  Would continue the steroids for now; differential is between ATN (secondary to hypotension?) versus nephritis from her immunotherapy.  Prognosis is guarded.  Family and patient made aware.    Nikos Hansen MD      2:11 p.m. ADDENDUM    EKG reviewed; no peaked T waves noted.  Will shift the potassium with albuterol, D50 and insulin.  Have discussed the case with the ICU attending.  The ICU team will place a dialysis line and she will receive dialysis on the floor.  We will follow.

## 2019-10-26 NOTE — HPI
63 yro F with PMH of EtOH cirrhosis, HCC (receiving treatment with nivolumab), and esophageal varices s/p banding 1/2018 who was [place] [facility] [place]/onc clinic for PRACHI with 7.1 (baseline creatinine ~1.0). Pt reports several weeks of decreased PO intake and n/v and has had multiple ED visits for this. She was being treated outpatient for an UTI 2/2 Klebsiella since 10/19 with cipro. Reports that she has not been putting out urine for 2 days. She also endorses recent ibuprofen and naproxen use for abdominal pain. Additionally, Pt had Ct with contrast 8/16. She denies fever, dysuria, hematuria, or worsening lower extremity edema. She currently has abdominal distention and abdominal pain which radiates around the left aspect of lumbar region. Patients' vitals with BP 98/54 mmHg. US RP shows no hydronephrosis. U p/cr 4 (trace protein on past UAs), and no UA available from this admission. Review of labs reveals K of 6.3, Cr 7.4, BUN 60, phos 6.7, bicarb 18. On admission patient received 1 L NS bolus. This AM, patient received 60 mg IV lasix without any UO. Garcia in place.

## 2019-10-26 NOTE — PLAN OF CARE
Pt involved in plan of care and communicating needs throughout shift. Pt hyperkalemic; potassium shift done; EKG showed normal sinus rhythm. Left IJ trialysis line placed for dialysis. Pt up with assistance. Pt tolerating diet well. Pt had 40 ml urine output all shift. Telemetry maintained; normal sinus rythmn. VSS; no acute events so far this shift.  Pt remaining free from falls or injury throughout shift; bed locked and in lowest position; call light within reach.  Pt instructed to call for assistance as needed.  Q1H rounding done on pt.

## 2019-10-26 NOTE — PROGRESS NOTES
Right IJ (trialysis line) being placed by Dr. Nuñez supervised by Dr. Bailon. Consent in chart, timeout prior to procedure. Will continue to monitor.

## 2019-10-26 NOTE — HOSPITAL COURSE
Patient admitted on 10/25 with acute renal failure and oliguria x2 days. Upon admission creatinine was 7.1 (baseline 1.0) and K 5.5. US relieved no evidence of obstruction/hydronephrous. MELD score was 30+ at time of admission. 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 Nephrology was consulted. On 10/26 patient was noted to have a K 6.3, patient was shifted with insulin, dextrose, and albuterol. Trialysis catheter was placed and patient received first HD on the leo of 10/26. Given concern of anticoagulation with apixaban for bilateral pulmonary embolic first noted on CT in August of 2019 in the setting of acute renal faillure patient was transitioned to heparin drip on the leo of 10/27. 10/28 second round of HD without HF. On the leo of 10/28 there was oozing at the site of trailysis catheter with noted drop in Hgb to 6.4. Also with abdominal pain over night, so decision was made to start on empiric antibiotics for SBP with Rocephin. Patient received transfusion on the morning of 10/29. Nephrology recommending renal biopsy, currently holding off while conitnueing steroids for a duration one of week to determine reversiblity of renal disease. 11/1 IVFs were stopped and 11/2 CVC was removed as patient was tolerating PO liquids with out difficulty and no indications for dialysis in light of improving creatinine and urine output which started on 10/30. 11/2 therapeutic paracentesis was perfromed with 4L of serous fluid removed. Patien tolerated procedure well with improvement in abdominal pain and distension. Heparin drip was discontinued on 11/2 and patient was transitioned to renally dosed Lovenox for bilateral pulmonary emboli. On 11/3 prednisone dose was decreased from 40 mg daily (1 mg/kg) to 30 mg daily. 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 If her renal function were to decline, steroids may need to be  increased with prolonged taper. She will also resume NOAC home medication upon discharge in light of improving renal function and will follow-up with her primary oncologist within the week with repeat labs.

## 2019-10-26 NOTE — PROGRESS NOTES
[facility]  Hematology/Oncology  Progress Note    Patient Name: Neena Marks  Admission Date: 10/25/2019  Hospital Length of Stay: 1 days  Code Status: Full Code     Subjective:     HPI:  Ms Marks is a 63-year-old  female with alcoholic cirrhosis dating back to 2015, portal HTN, ascites, history of variceal bleeding in January 2018 s/p banding, history of alcohol abuse (recently quit in January of 2018), and newly diagnosed hepatocellular carcinoma undergoing treatment with nivolumab who presented as a direct transfer from Heme/Onc clinic on 10/25 after routine laboratory studies were remarkable for serum creatinine of 7.1 (baseline creatinine ~1.0). Patient reports one week history of nasuea and vomiting. Per chart review, patient has presented to [facility] ED three times this month with complaints of abdominal pain, back pain, nausea, and vomiting. She was noted to have UTI 2/2 Klebsiella on 10/19 and is currently  undergoing treatment with ciprofloxacin.  She denies relief in abdominal and back pain with prescribed medications. Her nausea and vomiting has persisted as well. She reports decreased PO intake over this time span secondary to nausea and has a history of recent ibuprofen and naproxen use for abdominal pain. She denies fever, dysuria, hematuria, chest pain, shortness of breath, syncope, or worsening lower extremity edema. She currently has abdominal distention and abdominal pain which radiates around the left aspect of lumbar region. Patients' vitals with BP 98/54 mmHg, HR 80 bpm, afebrile with temperature of 98 F, and RR 16.     Cirrhosis & HCC History:  AFP tumor marker is normal.  Hep C and B testing on 4/27/2018 - negative.    Endoscopy - 1/9/19 - Grade II esophageal varices. Completely eradicated. Banded, Small hiatal hernia., Portal hypertensive gastropathy. Treated with argon plasma coagulation (APC).     Patient had an appointment on 2/15/2019, IR consult for  Y90 but she missed her appointment.   - She had IR mapping and Hepatic angiography demonstrates a large hypervascular lesion in the right hepatic lobe supplied by branches of the right hepatic artery and accessory left hepatic artery.  Technetium MAA was injected aorta determined lung shunt fraction.  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  - Nuclear medicine scan revealed that majority of tracer goes to the lungs with a liver lung shunt fraction of 68.9%.   is not a candidate for Y90 or other liver directed therapies due to liver lung shunt     5/9/2019 - Started taking Sorafenib 400mg BID from  7/6/2019 - Decrease to 200 mg BID due to hand foot syndrome  8/16/2019 - Stopped Sorafenib on  due to persistent desquamation of hand foot syndrome  08/28/2019 - CT Chest abdomen and pelvis done revealed disease progression and also new bilateral pulmonary embolism. She was admitted to [facility] main campus and discharged on Eliquis (Patient declined Lovenox)   9/17/2019 -  Fall and followed up in ED and Xray right knee revealed no acute osseous abnormality  9/27/2019 - Started C 1 D1 Nivolumab q 4 weeks  10/25/2019 - PRACHI with creatinine of 7.1, direct admit     Interval History: Patient seen and examined this AM. Denies any new acute complaints over night. Nill urine output in Garcia. Still with complaints of abdominal distention and pain. Patient amendable to dialysis for acute renal failure.    Oncology Treatment Plan:   OP NIVOLUMAB Q4W    Medications:  Continuous Infusions:  Scheduled Meds:   sodium chloride 0.9%   Intravenous Once    apixaban  5 mg Oral BID    calcium gluconate IVPB  1 g Intravenous Once    furosemide  40 mg Oral Daily    gabapentin  100 mg Oral QHS    [START ON 10/27/2019] pantoprazole  40 mg Oral Daily    predniSONE  60 mg Oral Daily     PRN  Meds:calcium gluconate IVPB **AND** calcium gluconate IVPB, [COMPLETED] Dextrose 10% Bolus **AND** Dextrose 10% Bolus **AND** [COMPLETED] insulin regular, ondansetron, oxyCODONE, promethazine (PHENERGAN) IVPB, sodium chloride 0.9%     Review of Systems   Constitutional: Positive for fatigue. Negative for chills, diaphoresis and fever.   Respiratory: Negative for cough, shortness of breath and wheezing.    Cardiovascular: Negative for chest pain, palpitations and leg swelling.   Gastrointestinal: Positive for abdominal distention and abdominal pain. Negative for constipation, diarrhea, nausea and vomiting.   Genitourinary: Negative for dysuria.        Decreased urine   Musculoskeletal: Positive for back pain. Negative for joint swelling.   Neurological: Negative for dizziness and headaches.   Psychiatric/Behavioral: Negative for behavioral problems and confusion.     Objective:     Vital Signs (Most Recent):  Temp: 98 °F (36.7 °C) (10/26/19 1646)  Pulse: 70 (10/26/19 1646)  Resp: 16 (10/26/19 1646)  BP: (!) 115/59 (10/26/19 1646)  SpO2: 98 % (10/26/19 1646) Vital Signs (24h Range):  Temp:  [97.9 °F (36.6 °C)-99 °F (37.2 °C)] 98 °F (36.7 °C)  Pulse:  [64-80] 70  Resp:  [15-17] 16  SpO2:  [93 %-98 %] 98 %  BP: ()/(53-62) 115/59     Weight: 40.8 kg (90 lb)  Body mass index is 18.18 kg/m².  Body surface area is 1.3 meters squared.      Intake/Output Summary (Last 24 hours) at 10/26/2019 1715  Last data filed at 10/26/2019 0935  Gross per 24 hour   Intake 1210 ml   Output 0 ml   Net 1210 ml       Physical Exam   Constitutional: Vital signs are normal. She appears cachectic.   HENT:   Head: Normocephalic and atraumatic.   Eyes: EOM are normal. No scleral icterus.   Cardiovascular: Normal rate, regular rhythm, normal heart sounds and intact distal pulses. Exam reveals no gallop and no friction rub.   No murmur heard.  Pulmonary/Chest: Effort normal and breath sounds normal. She has no wheezes. She has no rales.    Abdominal: Bowel sounds are normal. She exhibits distension. There is tenderness.   Musculoskeletal: She exhibits edema.   Trace lower extremity edema   Neurological: She is alert.   Skin: Skin is warm and dry.   Psychiatric: She has a normal mood and affect. Her behavior is normal.       Significant Labs:   Recent Lab Results       10/26/19  1647   10/26/19  0436   10/25/19  1900   10/25/19  1826        Albumin   2.4         Alkaline Phosphatase   91         ALT   32         Anion Gap   10         AST   51         Baso #   0.03         Basophil%   0.4         BILIRUBIN TOTAL   1.3  Comment:  For infants and newborns, interpretation of results should be based  on gestational age, weight and in agreement with clinical  observations.  Premature Infant recommended reference ranges:  Up to 24 hours.............<8.0 mg/dL  Up to 48 hours............<12.0 mg/dL  3-5 days..................<15.0 mg/dL  6-29 days.................<15.0 mg/dL           BUN, Bld   60         Calcium   7.8         Chloride   98         CO2   18         Creatinine   7.4         Creatinine, Random Ur     76.0  Comment:  The random urine reference ranges provided were established   for 24 hour urine collections.  No reference ranges exist for  random urine specimens.  Correlate clinically.              76.0  Comment:  The random urine reference ranges provided were established   for 24 hour urine collections.  No reference ranges exist for  random urine specimens.  Correlate clinically.         Differential Method   Automated         eGFR if    6.2         eGFR if non    5.4  Comment:  Calculation used to obtain the estimated glomerular filtration  rate (eGFR) is the CKD-EPI equation.            Eos #   0.0         Eosinophil%   0.1         Glucose   125         Gran # (ANC)   5.6         Gran%   81.7         Hematocrit   25.3         Hemoglobin   8.1         Immature Grans (Abs)   0.03  Comment:  Mild elevation in  immature granulocytes is non specific and   can be seen in a variety of conditions including stress response,   acute inflammation, trauma and pregnancy. Correlation with other   laboratory and clinical findings is essential.           Immature Granulocytes   0.4         Coumadin Monitoring INR       1.5  Comment:  Coumadin Therapy:  2.0 - 3.0 for INR for all indicators except mechanical heart valves  and antiphospholipid syndromes which should use 2.5 - 3.5.       Lymph #   0.7         Lymph%   10.3         Magnesium   2.0         MCH   21.4         MCHC   32.0         MCV   67         Mono #   0.5         Mono%   7.1         MPV   SEE COMMENT  Comment:  Result not available.         nRBC   0         Phosphorus   6.7         Platelets   204         POCT Glucose 260           Potassium   6.3  Comment:  *Critical value -   Results called to and read back by:COMPA DUTTA RN  *No Visible Hemolysis           Prot/Creat Ratio, Ur     4.08       PROTEIN TOTAL   7.1         Protein, Urine Random     310  Comment:  The random urine reference ranges provided were established   for 24 hour urine collections.  No reference ranges exist for  random urine specimens.  Correlate clinically.         Protime       14.7     RBC   3.78         RDW   17.4         Sodium   126         Sodium Urine Random     28  Comment:  The random urine reference ranges provided were established   for 24 hour urine collections.  No reference ranges exist for  random urine specimens.  Correlate clinically.         Urine Urea Nitrogen, Random     347  Comment:  The random urine reference ranges provided were established   for 24 hour urine collections.  No reference ranges exist for  random urine specimens.  Correlate clinically.         WBC   6.87         Burgos's Stain, Ur     No eosinophils seen             Diagnostic Results:  US Retroperitoneal Complete on 10/25/2019:   Comparison:  CT chest, abdomen, and pelvis on 08/28/2019   Findings:  Right  kidney: The right kidney measures 11.2 cm. No cortical thinning. No loss of corticomedullary distinction. Resistive index measures 1.0.  There is a 0.8 x 0.7 x 0.8 cm simple cyst.  No renal stone. No hydronephrosis.  Left kidney: The left kidney measures 11.1 cm. No cortical thinning. No loss of corticomedullary distinction. Resistive index measures 1.0.  No mass. No renal stone. No hydronephrosis.  The bladder is partially distended at the time of scanning and has an unremarkable appearance.  Moderate volume ascites.   Impression:  Bilateral elevated resistive indices, suggestive of medical renal disease.  No hydronephrosis or nephrolithiasis. Moderate volume ascites.      Assessment/Plan:     * Acute kidney injury  Patient with baseline serum creatinine around 1.0. Serum creatinine was 1.1 on 10/19. 10/25 serum creatinine was noted to be 7.1.    - K of 6.3 this AM, patient was shifted with insulin, D50, and albuterol  -  Burgos stain negative, urine protein to creatinine ratio of 4, Fe urea 62.3% suggestive of intrinsic etiology, US without hydronephrosis, Garcia with scant urin output despite IV Lasix and IVFs  - Continue with Garcia catheter with strict I/Os  - Will continue to avoid nephrotoxic agents (i.e. 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 CMP on admission with albumin of 3.0, Alk phos  96, and AST 75.    Iron deficiency anemia due to chronic blood loss  Hemoglobin 8.8 on admission.     - continue to monitor with daily CBCs  - transfuse Hgb < 7             Lacho Nuñez MD  Hematology/Oncology  [facility]            ATTENDING NOTE, ONCOLOGY INPATIENT TEAM    As above; please refer to my note of same day for our assessment and plan    Nikos Hansen MD

## 2019-10-26 NOTE — SUBJECTIVE & OBJECTIVE
Interval History: Patient seen and examined this AM. Denies any new acute complaints over night. Nill urine output in Garcia. Still with complaints of abdominal distention and pain. Patient amendable to dialysis for acute renal failure.    Oncology Treatment Plan:   OP NIVOLUMAB Q4W    Medications:  Continuous Infusions:  Scheduled Meds:   sodium chloride 0.9%   Intravenous Once    apixaban  5 mg Oral BID    calcium gluconate IVPB  1 g Intravenous Once    furosemide  40 mg Oral Daily    gabapentin  100 mg Oral QHS    [START ON 10/27/2019] pantoprazole  40 mg Oral Daily    predniSONE  60 mg Oral Daily     PRN Meds:calcium gluconate IVPB **AND** calcium gluconate IVPB, [COMPLETED] Dextrose 10% Bolus **AND** Dextrose 10% Bolus **AND** [COMPLETED] insulin regular, ondansetron, oxyCODONE, promethazine (PHENERGAN) IVPB, sodium chloride 0.9%     Review of Systems   Constitutional: Positive for fatigue. Negative for chills, diaphoresis and fever.   Respiratory: Negative for cough, shortness of breath and wheezing.    Cardiovascular: Negative for chest pain, palpitations and leg swelling.   Gastrointestinal: Positive for abdominal distention and abdominal pain. Negative for constipation, diarrhea, nausea and vomiting.   Genitourinary: Negative for dysuria.        Decreased urine   Musculoskeletal: Positive for back pain. Negative for joint swelling.   Neurological: Negative for dizziness and headaches.   Psychiatric/Behavioral: Negative for behavioral problems and confusion.     Objective:     Vital Signs (Most Recent):  Temp: 98 °F (36.7 °C) (10/26/19 1646)  Pulse: 70 (10/26/19 1646)  Resp: 16 (10/26/19 1646)  BP: (!) 115/59 (10/26/19 1646)  SpO2: 98 % (10/26/19 1646) Vital Signs (24h Range):  Temp:  [97.9 °F (36.6 °C)-99 °F (37.2 °C)] 98 °F (36.7 °C)  Pulse:  [64-80] 70  Resp:  [15-17] 16  SpO2:  [93 %-98 %] 98 %  BP: ()/(53-62) 115/59     Weight: 40.8 kg (90 lb)  Body mass index is 18.18 kg/m².  Body surface  area is 1.3 meters squared.      Intake/Output Summary (Last 24 hours) at 10/26/2019 1715  Last data filed at 10/26/2019 0935  Gross per 24 hour   Intake 1210 ml   Output 0 ml   Net 1210 ml       Physical Exam   Constitutional: Vital signs are normal. She appears cachectic.   HENT:   Head: Normocephalic and atraumatic.   Eyes: EOM are normal. No scleral icterus.   Cardiovascular: Normal rate, regular rhythm, normal heart sounds and intact distal pulses. Exam reveals no gallop and no friction rub.   No murmur heard.  Pulmonary/Chest: Effort normal and breath sounds normal. She has no wheezes. She has no rales.   Abdominal: Bowel sounds are normal. She exhibits distension. There is tenderness.   Musculoskeletal: She exhibits edema.   Trace lower extremity edema   Neurological: She is alert.   Skin: Skin is warm and dry.   Psychiatric: She has a normal mood and affect. Her behavior is normal.       Significant Labs:   Recent Lab Results       10/26/19  1647   10/26/19  0436   10/25/19  1900   10/25/19  1826        Albumin   2.4         Alkaline Phosphatase   91         ALT   32         Anion Gap   10         AST   51         Baso #   0.03         Basophil%   0.4         BILIRUBIN TOTAL   1.3  Comment:  For infants and newborns, interpretation of results should be based  on gestational age, weight and in agreement with clinical  observations.  Premature Infant recommended reference ranges:  Up to 24 hours.............<8.0 mg/dL  Up to 48 hours............<12.0 mg/dL  3-5 days..................<15.0 mg/dL  6-29 days.................<15.0 mg/dL           BUN, Bld   60         Calcium   7.8         Chloride   98         CO2   18         Creatinine   7.4         Creatinine, Random Ur     76.0  Comment:  The random urine reference ranges provided were established   for 24 hour urine collections.  No reference ranges exist for  random urine specimens.  Correlate clinically.              76.0  Comment:  The random urine  reference ranges provided were established   for 24 hour urine collections.  No reference ranges exist for  random urine specimens.  Correlate clinically.         Differential Method   Automated         eGFR if    6.2         eGFR if non    5.4  Comment:  Calculation used to obtain the estimated glomerular filtration  rate (eGFR) is the CKD-EPI equation.            Eos #   0.0         Eosinophil%   0.1         Glucose   125         Gran # (ANC)   5.6         Gran%   81.7         Hematocrit   25.3         Hemoglobin   8.1         Immature Grans (Abs)   0.03  Comment:  Mild elevation in immature granulocytes is non specific and   can be seen in a variety of conditions including stress response,   acute inflammation, trauma and pregnancy. Correlation with other   laboratory and clinical findings is essential.           Immature Granulocytes   0.4         Coumadin Monitoring INR       1.5  Comment:  Coumadin Therapy:  2.0 - 3.0 for INR for all indicators except mechanical heart valves  and antiphospholipid syndromes which should use 2.5 - 3.5.       Lymph #   0.7         Lymph%   10.3         Magnesium   2.0         MCH   21.4         MCHC   32.0         MCV   67         Mono #   0.5         Mono%   7.1         MPV   SEE COMMENT  Comment:  Result not available.         nRBC   0         Phosphorus   6.7         Platelets   204         POCT Glucose 260           Potassium   6.3  Comment:  *Critical value -   Results called to and read back by:COMPA DUTTA RN  *No Visible Hemolysis           Prot/Creat Ratio, Ur     4.08       PROTEIN TOTAL   7.1         Protein, Urine Random     310  Comment:  The random urine reference ranges provided were established   for 24 hour urine collections.  No reference ranges exist for  random urine specimens.  Correlate clinically.         Protime       14.7     RBC   3.78         RDW   17.4         Sodium   126         Sodium Urine Random     28  Comment:  The  random urine reference ranges provided were established   for 24 hour urine collections.  No reference ranges exist for  random urine specimens.  Correlate clinically.         Urine Urea Nitrogen, Random     347  Comment:  The random urine reference ranges provided were established   for 24 hour urine collections.  No reference ranges exist for  random urine specimens.  Correlate clinically.         WBC   6.87         Burgos's Stain, Ur     No eosinophils seen             Diagnostic Results:  US Retroperitoneal Complete on 10/25/2019:   Comparison:  CT chest, abdomen, and pelvis on 08/28/2019   Findings:  Right kidney: The right kidney measures 11.2 cm. No cortical thinning. No loss of corticomedullary distinction. Resistive index measures 1.0.  There is a 0.8 x 0.7 x 0.8 cm simple cyst.  No renal stone. No hydronephrosis.  Left kidney: The left kidney measures 11.1 cm. No cortical thinning. No loss of corticomedullary distinction. Resistive index measures 1.0.  No mass. No renal stone. No hydronephrosis.  The bladder is partially distended at the time of scanning and has an unremarkable appearance.  Moderate volume ascites.   Impression:  Bilateral elevated resistive indices, suggestive of medical renal disease.  No hydronephrosis or nephrolithiasis. Moderate volume ascites.

## 2019-10-26 NOTE — PROGRESS NOTES
Right IJ attempted but unsuccessful; pt tolerated well. Left IJ to be placed by Dr. Bailon. Consent in chart, timeout prior to procedure. Will continue to monitor.

## 2019-10-26 NOTE — ASSESSMENT & PLAN NOTE
Patient with baseline serum creatinine around 1.0. Serum creatinine was 1.1 on 10/19. 10/25 serum creatinine was noted to be 7.1.    - K of 6.3 this AM, patient was shifted with insulin, D50, and albuterol  -  Burgos stain negative, urine protein to creatinine ratio of 4, Fe urea 62.3% suggestive of intrinsic etiology, US without hydronephrosis, Garcia with scant urin output despite IV Lasix and IVFs  - Continue with Garcia catheter with strict I/Os  - Will continue to avoid nephrotoxic agents (i.e. 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

## 2019-10-26 NOTE — ASSESSMENT & PLAN NOTE
63 yro F with PMH of EtOH cirrhosis, HCC (receiving treatment with nivolumab), and esophageal varices s/p banding 1/2018 who was presents with PRACHI with 7.1 (baseline creatinine ~1.0) and no UO for 2 days in setting of several weeks of decreased PO intake, nausea, and vomiting. Additionally, she underwent CT scan with contrast 8/16, and was being treated for an UTI with cipro as out patient. Pt also endorses NSAID use for abdominal pain.    -US RP shows no hydronephrosis  -U p/cr 4 (trace protein on past UAs), and no UA available from this admission  -Review of labs reveals K of 6.3, Cr 7.4, BUN 60, phos 6.7, bicarb 18.   -On admission patient received 1 L NS bolus. This AM, patient received 60 mg IV lasix without any UO. Garcia in place.   -Will plan on HD today, will require trialysis line placement. Plan on HD with low temp and high Ca2+ bath to augment BP  -godwin RFPs, strict IOS, avoid nephrotoxic agents, renally dose medications

## 2019-10-26 NOTE — PROCEDURES
"Neena Marks is a 64 y.o. female patient.    Temp: 98 °F (36.7 °C) (10/26/19 1646)  Pulse: 70 (10/26/19 1646)  Resp: 16 (10/26/19 1646)  BP: (!) 115/59 (10/26/19 1646)  SpO2: 98 % (10/26/19 1646)  Weight: 40.8 kg (90 lb) (10/25/19 1542)  Height: 4' 11" (149.9 cm) (10/25/19 1542)       Central Line  Date/Time: 10/26/2019 6:44 PM  Location procedure was performed: [facility] ONCOLOGY ACUTE  Performed by: Stephen Bailon MD  Supervising provider: Stephen Bailon  Assisting provider: Lacho Nuñez MD  Consent Done: Yes  Site marked: the operative site was marked  Indications: hemodialysis    Anesthesia:  Local anesthesia used: yes  Local Anesthetic: lidocaine 2% with epinephrine and lidocaine 1% without epinephrine  Preparation: skin prepped with ChloraPrep  Skin prep agent dried: skin prep agent completely dried prior to procedure  Sterile barriers: all five maximum sterile barriers used - cap, mask, sterile gown, sterile gloves, and large sterile sheet  Hand hygiene: hand hygiene performed prior to central venous catheter insertion  Location details: left internal jugular  Catheter type: trialysis  Catheter Length: 20cm    Ultrasound guidance: yes  Vessel Caliber: small, medium, large, patent, compressibility normal  Vascular Doppler: flow caudad  Guidewire confirmed in vessel.  Manometry: Yes  Number of attempts: 2  Assessment: placement verified by x-ray and no pneumothorax on x-ray  Estimated blood loss (mL): 3.02  Specimens: No  Implants: No  Post-procedure: line sutured,  chlorhexidine patch,  sterile dressing applied and blood return through all ports  Complications: No        Stephen Bailon  10/26/2019  "

## 2019-10-26 NOTE — SUBJECTIVE & OBJECTIVE
Past Medical History:   Diagnosis Date    Alcohol abuse     Anemia     Cancer     liver    Cirrhosis        Past Surgical History:   Procedure Laterality Date    ESOPHAGOGASTRODUODENOSCOPY Left 1/9/2019    Procedure: EGD (ESOPHAGOGASTRODUODENOSCOPY);  Surgeon: Madyson Farrar MD;  Location: [facility] ENDO;  Service: Endoscopy;  Laterality: Left;    PERITONEOCENTESIS N/A 1/9/2019    Procedure: PARACENTESIS, ABDOMINAL;  Surgeon: Everett Fitzpatrick MD;  Location: [facility] CATH LAB;  Service: Radiology;  Laterality: N/A;       Review of patient's allergies indicates:  No Known Allergies  Current Facility-Administered Medications   Medication Frequency    0.9%  NaCl infusion Once    albuterol sulfate nebulizer solution 10 mg Once    apixaban tablet 5 mg BID    calcium gluconate 1g in dextrose 5% 100mL (ready to mix system) Once    And    calcium gluconate 1g in dextrose 5% 100mL (ready to mix system) Q10 Min PRN    dextrose 50% injection 25 g Once    And    dextrose 50% injection 25 g PRN    And    insulin regular injection 4.08 Units Once    furosemide tablet 40 mg Daily    gabapentin capsule 100 mg QHS    ondansetron disintegrating tablet 8 mg Q8H PRN    oxyCODONE immediate release tablet 5 mg Q6H PRN    [START ON 10/27/2019] pantoprazole EC tablet 40 mg Daily    predniSONE tablet 60 mg Daily    promethazine (PHENERGAN) 6.25 mg in dextrose 5 % 50 mL IVPB Q6H PRN    sodium chloride 0.9% flush 10 mL PRN     Family History     None        Tobacco Use    Smoking status: Current Every Day Smoker     Types: Cigarettes    Smokeless tobacco: Never Used    Tobacco comment: 2 cigarettes a day   Substance and Sexual Activity    Alcohol use: No     Frequency: Never     Comment: Previously drank 1 pint a day    Drug use: Not Currently     Types: Cocaine     Comment: last use was 3 years ago    Sexual activity: Not Currently     Review of Systems   Constitutional: Negative for chills and fever.   HENT: Negative for  congestion and rhinorrhea.    Eyes: Negative for photophobia and visual disturbance.   Respiratory: Negative for cough and shortness of breath.    Cardiovascular: Negative for chest pain, palpitations and leg swelling.   Gastrointestinal: Positive for abdominal pain. Negative for constipation and diarrhea.   Endocrine: Negative for cold intolerance and heat intolerance.   Genitourinary: Positive for decreased urine volume. Negative for dysuria and hematuria.   Musculoskeletal: Negative for arthralgias and myalgias.   Skin: Negative for pallor and rash.   Neurological: Negative for weakness and headaches.   Hematological: Negative for adenopathy. Does not bruise/bleed easily.   Psychiatric/Behavioral: Negative for agitation and behavioral problems.     Objective:     Vital Signs (Most Recent):  Temp: 98.3 °F (36.8 °C) (10/26/19 1105)  Pulse: 68 (10/26/19 1202)  Resp: 15 (10/26/19 1105)  BP: (!) 96/54 (10/26/19 1107)  SpO2: 96 % (10/26/19 1105)  O2 Device (Oxygen Therapy): room air (10/26/19 1105) Vital Signs (24h Range):  Temp:  [97.9 °F (36.6 °C)-99 °F (37.2 °C)] 98.3 °F (36.8 °C)  Pulse:  [64-80] 68  Resp:  [15-17] 15  SpO2:  [93 %-96 %] 96 %  BP: ()/(53-62) 96/54     Weight: 40.8 kg (90 lb) (10/25/19 1542)  Body mass index is 18.18 kg/m².  Body surface area is 1.3 meters squared.    No intake/output data recorded.    Physical Exam   Constitutional: She is oriented to person, place, and time. She appears well-developed.   cachectic    HENT:   Head: Normocephalic and atraumatic.   Eyes: Pupils are equal, round, and reactive to light. EOM are normal.   Neck: Neck supple. No thyromegaly present.   Cardiovascular: Normal rate, regular rhythm and normal heart sounds.   No murmur heard.  Pulmonary/Chest: Effort normal and breath sounds normal. No respiratory distress.   Abdominal: Soft. Bowel sounds are normal. She exhibits distension.   Musculoskeletal: She exhibits no edema or deformity.   Lymphadenopathy:      She has no cervical adenopathy.   Neurological: She is alert and oriented to person, place, and time.   Skin: Skin is warm and dry.       Significant Labs:  CBC:   Recent Labs   Lab 10/26/19  0436   WBC 6.87   RBC 3.78*   HGB 8.1*   HCT 25.3*      MCV 67*   MCH 21.4*   MCHC 32.0     CMP:   Recent Labs   Lab 10/26/19  0436   *   CALCIUM 7.8*   ALBUMIN 2.4*   PROT 7.1   *   K 6.3*   CO2 18*   CL 98   BUN 60*   CREATININE 7.4*   ALKPHOS 91   ALT 32   AST 51*   BILITOT 1.3*

## 2019-10-26 NOTE — CONSULTS
[facility]  Nephrology  Consult Note    Patient Name: Neena Marks  MRN: [id number]  Admission Date: 10/25/2019  Hospital Length of Stay: 1 days  Attending Provider: Nikos Hansen MD   Primary Care Physician: [facility]  Principal Problem:Acute kidney injury    Inpatient consult to Nephrology  Consult performed by: Nicolasa Combs MD  Consult ordered by: Lacho Nuñez MD        Subjective:     HPI: 63 yro F with PMH of EtOH cirrhosis, HCC (receiving treatment with nivolumab), and esophageal varices s/p banding 1/2018 who [facility] heme/onc clinic for PRACHI with 7.1 (baseline creatinine ~1.0). Pt reports several weeks of decreased PO intake and n/v and has had multiple ED visits for this. She was being treated outpatient for an UTI 2/2 Klebsiella since 10/19 with cipro. Reports that she has not been putting out urine for 2 days. She also endorses recent ibuprofen and naproxen use for abdominal pain. Additionally, Pt had Ct with contrast 8/16. She denies fever, dysuria, hematuria, or worsening lower extremity edema. She currently has abdominal distention and abdominal pain which radiates around the left aspect of lumbar region. Patients' vitals with BP 98/54 mmHg. US RP shows no hydronephrosis. U p/cr 4 (trace protein on past UAs), and no UA available from this admission. Review of labs reveals K of 6.3, Cr 7.4, BUN 60, phos 6.7, bicarb 18. On admission patient received 1 L NS bolus. This AM, patient received 60 mg IV lasix without any UO. Garcia in place.         Past Medical History:   Diagnosis Date    Alcohol abuse     Anemia     Cancer     liver    Cirrhosis        Past Surgical History:   Procedure Laterality Date    ESOPHAGOGASTRODUODENOSCOPY Left 1/9/2019    Procedure: EGD (ESOPHAGOGASTRODUODENOSCOPY);  Surgeon: Madyson Farrar MD;  Location: [facility];  Service: Endoscopy;  Laterality: Left;    PERITONEOCENTESIS N/A 1/9/2019    Procedure:  PARACENTESIS, ABDOMINAL;  Surgeon: Everett Fitzpatrick MD;  [facility] C[facility] LAB;  Service: Radiology;  Laterality: N/A;       Review of patient's allergies indicates:  No Known Allergies  Current Facility-Administered Medications   Medication Frequency    0.9%  NaCl infusion Once    albuterol sulfate nebulizer solution 10 mg Once    apixaban tablet 5 mg BID    calcium gluconate 1g in dextrose 5% 100mL (ready to mix system) Once    And    calcium gluconate 1g in dextrose 5% 100mL (ready to mix system) Q10 Min PRN    dextrose 50% injection 25 g Once    And    dextrose 50% injection 25 g PRN    And    insulin regular injection 4.08 Units Once    furosemide tablet 40 mg Daily    gabapentin capsule 100 mg QHS    ondansetron disintegrating tablet 8 mg Q8H PRN    oxyCODONE immediate release tablet 5 mg Q6H PRN    [START ON 10/27/2019] pantoprazole EC tablet 40 mg Daily    predniSONE tablet 60 mg Daily    promethazine (PHENERGAN) 6.25 mg in dextrose 5 % 50 mL IVPB Q6H PRN    sodium chloride 0.9% flush 10 mL PRN     Family History     None        Tobacco Use    Smoking status: Current Every Day Smoker     Types: Cigarettes    Smokeless tobacco: Never Used    Tobacco comment: 2 cigarettes a day   Substance and Sexual Activity    Alcohol use: No     Frequency: Never     Comment: Previously drank 1 pint a day    Drug use: Not Currently     Types: Cocaine     Comment: last use was 3 years ago    Sexual activity: Not Currently     Review of Systems   Constitutional: Negative for chills and fever.   HENT: Negative for congestion and rhinorrhea.    Eyes: Negative for photophobia and visual disturbance.   Respiratory: Negative for cough and shortness of breath.    Cardiovascular: Negative for chest pain, palpitations and leg swelling.   Gastrointestinal: Positive for abdominal pain. Negative for constipation and diarrhea.   Endocrine: Negative for cold intolerance and heat intolerance.   Genitourinary:  Positive for decreased urine volume. Negative for dysuria and hematuria.   Musculoskeletal: Negative for arthralgias and myalgias.   Skin: Negative for pallor and rash.   Neurological: Negative for weakness and headaches.   Hematological: Negative for adenopathy. Does not bruise/bleed easily.   Psychiatric/Behavioral: Negative for agitation and behavioral problems.     Objective:     Vital Signs (Most Recent):  Temp: 98.3 °F (36.8 °C) (10/26/19 1105)  Pulse: 68 (10/26/19 1202)  Resp: 15 (10/26/19 1105)  BP: (!) 96/54 (10/26/19 1107)  SpO2: 96 % (10/26/19 1105)  O2 Device (Oxygen Therapy): room air (10/26/19 1105) Vital Signs (24h Range):  Temp:  [97.9 °F (36.6 °C)-99 °F (37.2 °C)] 98.3 °F (36.8 °C)  Pulse:  [64-80] 68  Resp:  [15-17] 15  SpO2:  [93 %-96 %] 96 %  BP: ()/(53-62) 96/54     Weight: 40.8 kg (90 lb) (10/25/19 1542)  Body mass index is 18.18 kg/m².  Body surface area is 1.3 meters squared.    No intake/output data recorded.    Physical Exam   Constitutional: She is oriented to person, place, and time. She appears well-developed.   cachectic    HENT:   Head: Normocephalic and atraumatic.   Eyes: Pupils are equal, round, and reactive to light. EOM are normal.   Neck: Neck supple. No thyromegaly present.   Cardiovascular: Normal rate, regular rhythm and normal heart sounds.   No murmur heard.  Pulmonary/Chest: Effort normal and breath sounds normal. No respiratory distress.   Abdominal: Soft. Bowel sounds are normal. She exhibits distension.   Musculoskeletal: She exhibits no edema or deformity.   Lymphadenopathy:     She has no cervical adenopathy.   Neurological: She is alert and oriented to person, place, and time.   Skin: Skin is warm and dry.       Significant Labs:  CBC:   Recent Labs   Lab 10/26/19  0436   WBC 6.87   RBC 3.78*   HGB 8.1*   HCT 25.3*      MCV 67*   MCH 21.4*   MCHC 32.0     CMP:   Recent Labs   Lab 10/26/19  0436   *   CALCIUM 7.8*   ALBUMIN 2.4*   PROT 7.1   *    K 6.3*   CO2 18*   CL 98   BUN 60*   CREATININE 7.4*   ALKPHOS 91   ALT 32   AST 51*   BILITOT 1.3*     Assessment/Plan:     * Acute kidney injury  63 yro F with PMH of EtOH cirrhosis, HCC (receiving treatment with nivolumab), and esophageal varices s/p banding 1/2018 who was presents with PRACHI with 7.1 (baseline creatinine ~1.0) and no UO for 2 days in setting of several weeks of decreased PO intake, nausea, and vomiting. Additionally, she underwent CT scan with contrast 8/16, and was being treated for an UTI with cipro as out patient. Pt also endorses NSAID use for abdominal pain. On immunotherapy for HCC with nivolumab which can cause nephritis.    -US RP shows no hydronephrosis  -U p/cr 4 (trace protein on past UAs), and no UA available from this admission  -Review of labs reveals K of 6.3, Cr 7.4, BUN 60, phos 6.7, bicarb 18.   -On admission patient received 1 L NS bolus. This AM, patient received 60 mg IV lasix without any UO. Garcia in place.   -Will plan on HD today, will require trialysis line placement. Plan on HD with low temp and high Ca2+ bath to augment BP  -godwin RFPs, strict IOS, avoid nephrotoxic agents, renally dose medications               Thank you for your consult. I will follow-up with patient. Please contact us if you have any additional questions.    Nicolasa Combs MD  Nephrology  [facility]

## 2019-10-27 LAB
ALBUMIN SERPL BCP-MCNC: 2.5 G/DL (ref 3.5–5.2)
ALP SERPL-CCNC: 91 U/L (ref 55–135)
ALT SERPL W/O P-5'-P-CCNC: 34 U/L (ref 10–44)
ANION GAP SERPL CALC-SCNC: 11 MMOL/L (ref 8–16)
APTT BLDCRRT: 28.5 SEC (ref 21–32)
AST SERPL-CCNC: 62 U/L (ref 10–40)
BASOPHILS # BLD AUTO: 0.01 K/UL (ref 0–0.2)
BASOPHILS NFR BLD: 0.1 % (ref 0–1.9)
BILIRUB SERPL-MCNC: 0.9 MG/DL (ref 0.1–1)
BUN SERPL-MCNC: 55 MG/DL (ref 8–23)
CALCIUM SERPL-MCNC: 8.4 MG/DL (ref 8.7–10.5)
CHLORIDE SERPL-SCNC: 95 MMOL/L (ref 95–110)
CO2 SERPL-SCNC: 22 MMOL/L (ref 23–29)
CREAT SERPL-MCNC: 6.6 MG/DL (ref 0.5–1.4)
DIFFERENTIAL METHOD: ABNORMAL
EOSINOPHIL # BLD AUTO: 0 K/UL (ref 0–0.5)
EOSINOPHIL NFR BLD: 0 % (ref 0–8)
ERYTHROCYTE [DISTWIDTH] IN BLOOD BY AUTOMATED COUNT: 17 % (ref 11.5–14.5)
EST. GFR  (AFRICAN AMERICAN): 7.1 ML/MIN/1.73 M^2
EST. GFR  (NON AFRICAN AMERICAN): 6.2 ML/MIN/1.73 M^2
FIBRINOGEN PPP-MCNC: 345 MG/DL (ref 182–366)
GLUCOSE SERPL-MCNC: 151 MG/DL (ref 70–110)
HCT VFR BLD AUTO: 23.8 % (ref 37–48.5)
HGB BLD-MCNC: 8 G/DL (ref 12–16)
IMM GRANULOCYTES # BLD AUTO: 0.12 K/UL (ref 0–0.04)
IMM GRANULOCYTES NFR BLD AUTO: 1 % (ref 0–0.5)
INR PPP: 1.4 (ref 0.8–1.2)
LYMPHOCYTES # BLD AUTO: 0.8 K/UL (ref 1–4.8)
LYMPHOCYTES NFR BLD: 6.7 % (ref 18–48)
MAGNESIUM SERPL-MCNC: 1.9 MG/DL (ref 1.6–2.6)
MCH RBC QN AUTO: 22 PG (ref 27–31)
MCHC RBC AUTO-ENTMCNC: 33.6 G/DL (ref 32–36)
MCV RBC AUTO: 66 FL (ref 82–98)
MONOCYTES # BLD AUTO: 0.8 K/UL (ref 0.3–1)
MONOCYTES NFR BLD: 6.4 % (ref 4–15)
NEUTROPHILS # BLD AUTO: 10.4 K/UL (ref 1.8–7.7)
NEUTROPHILS NFR BLD: 85.8 % (ref 38–73)
NRBC BLD-RTO: 0 /100 WBC
PHOSPHATE SERPL-MCNC: 5 MG/DL (ref 2.7–4.5)
PLATELET # BLD AUTO: 178 K/UL (ref 150–350)
PMV BLD AUTO: ABNORMAL FL (ref 9.2–12.9)
POTASSIUM SERPL-SCNC: 5.5 MMOL/L (ref 3.5–5.1)
PROT SERPL-MCNC: 7 G/DL (ref 6–8.4)
PROTHROMBIN TIME: 14.2 SEC (ref 9–12.5)
RBC # BLD AUTO: 3.63 M/UL (ref 4–5.4)
SODIUM SERPL-SCNC: 128 MMOL/L (ref 136–145)
WBC # BLD AUTO: 12.06 K/UL (ref 3.9–12.7)

## 2019-10-27 PROCEDURE — 83735 ASSAY OF MAGNESIUM: CPT

## 2019-10-27 PROCEDURE — 99233 SBSQ HOSP IP/OBS HIGH 50: CPT | Mod: ,,, | Performed by: INTERNAL MEDICINE

## 2019-10-27 PROCEDURE — 20600001 HC STEP DOWN PRIVATE ROOM

## 2019-10-27 PROCEDURE — 80053 COMPREHEN METABOLIC PANEL: CPT

## 2019-10-27 PROCEDURE — 84100 ASSAY OF PHOSPHORUS: CPT

## 2019-10-27 PROCEDURE — 80100014 HC HEMODIALYSIS 1:1

## 2019-10-27 PROCEDURE — 63600175 PHARM REV CODE 636 W HCPCS: Mod: JG | Performed by: STUDENT IN AN ORGANIZED HEALTH CARE EDUCATION/TRAINING PROGRAM

## 2019-10-27 PROCEDURE — 36415 COLL VENOUS BLD VENIPUNCTURE: CPT

## 2019-10-27 PROCEDURE — 85610 PROTHROMBIN TIME: CPT

## 2019-10-27 PROCEDURE — 99233 PR SUBSEQUENT HOSPITAL CARE,LEVL III: ICD-10-PCS | Mod: ,,, | Performed by: INTERNAL MEDICINE

## 2019-10-27 PROCEDURE — 85730 THROMBOPLASTIN TIME PARTIAL: CPT

## 2019-10-27 PROCEDURE — 63600175 PHARM REV CODE 636 W HCPCS: Performed by: STUDENT IN AN ORGANIZED HEALTH CARE EDUCATION/TRAINING PROGRAM

## 2019-10-27 PROCEDURE — 85384 FIBRINOGEN ACTIVITY: CPT

## 2019-10-27 PROCEDURE — 85025 COMPLETE CBC W/AUTO DIFF WBC: CPT

## 2019-10-27 PROCEDURE — 25000003 PHARM REV CODE 250: Performed by: STUDENT IN AN ORGANIZED HEALTH CARE EDUCATION/TRAINING PROGRAM

## 2019-10-27 RX ORDER — HEPARIN SODIUM,PORCINE/D5W 25000/250
18 INTRAVENOUS SOLUTION INTRAVENOUS CONTINUOUS
Status: CANCELLED | OUTPATIENT
Start: 2019-10-27

## 2019-10-27 RX ORDER — SODIUM CHLORIDE 9 MG/ML
INJECTION, SOLUTION INTRAVENOUS ONCE
Status: DISCONTINUED | OUTPATIENT
Start: 2019-10-27 | End: 2019-10-28

## 2019-10-27 RX ORDER — AMOXICILLIN 250 MG
1 CAPSULE ORAL ONCE
Status: COMPLETED | OUTPATIENT
Start: 2019-10-27 | End: 2019-10-27

## 2019-10-27 RX ORDER — POLYETHYLENE GLYCOL 3350 17 G/17G
17 POWDER, FOR SOLUTION ORAL ONCE
Status: COMPLETED | OUTPATIENT
Start: 2019-10-27 | End: 2019-10-27

## 2019-10-27 RX ORDER — HEPARIN SODIUM,PORCINE/D5W 25000/250
18 INTRAVENOUS SOLUTION INTRAVENOUS CONTINUOUS
Status: DISCONTINUED | OUTPATIENT
Start: 2019-10-27 | End: 2019-10-29

## 2019-10-27 RX ORDER — HEPARIN SODIUM 1000 [USP'U]/ML
1000 INJECTION, SOLUTION INTRAVENOUS; SUBCUTANEOUS
Status: DISCONTINUED | OUTPATIENT
Start: 2019-10-27 | End: 2019-11-03 | Stop reason: HOSPADM

## 2019-10-27 RX ADMIN — ALTEPLASE 2 MG: 2.2 INJECTION, POWDER, LYOPHILIZED, FOR SOLUTION INTRAVENOUS at 04:10

## 2019-10-27 RX ADMIN — GABAPENTIN 100 MG: 100 CAPSULE ORAL at 09:10

## 2019-10-27 RX ADMIN — FUROSEMIDE 40 MG: 40 TABLET ORAL at 09:10

## 2019-10-27 RX ADMIN — APIXABAN 5 MG: 5 TABLET, FILM COATED ORAL at 09:10

## 2019-10-27 RX ADMIN — ONDANSETRON 8 MG: 8 TABLET, ORALLY DISINTEGRATING ORAL at 10:10

## 2019-10-27 RX ADMIN — POLYETHYLENE GLYCOL 3350 17 G: 17 POWDER, FOR SOLUTION ORAL at 11:10

## 2019-10-27 RX ADMIN — PANTOPRAZOLE SODIUM 40 MG: 40 TABLET, DELAYED RELEASE ORAL at 09:10

## 2019-10-27 RX ADMIN — SENNOSIDES, DOCUSATE SODIUM 1 TABLET: 50; 8.6 TABLET, FILM COATED ORAL at 11:10

## 2019-10-27 RX ADMIN — PREDNISONE 60 MG: 20 TABLET ORAL at 09:10

## 2019-10-27 NOTE — SUBJECTIVE & OBJECTIVE
Interval History: 1.4L urine output in 24 hours. On prednisone.     Review of patient's allergies indicates:  No Known Allergies  Current Facility-Administered Medications   Medication Frequency    0.9%  NaCl infusion Once    calcium gluconate 1g in dextrose 5% 100mL (ready to mix system) Once    And    calcium gluconate 1g in dextrose 5% 100mL (ready to mix system) Q10 Min PRN    dextrose 10% (D10W) Bolus PRN    gabapentin capsule 100 mg QHS    heparin (porcine) injection 1,000 Units PRN    ondansetron disintegrating tablet 8 mg Q8H PRN    oxyCODONE immediate release tablet 5 mg Q6H PRN    pantoprazole EC tablet 40 mg Daily    predniSONE tablet 60 mg Daily    promethazine (PHENERGAN) 6.25 mg in dextrose 5 % 50 mL IVPB Q6H PRN    sodium chloride 0.9% flush 10 mL PRN       Objective:     Vital Signs (Most Recent):  Temp: 98.6 °F (37 °C) (10/27/19 1127)  Pulse: 83 (10/27/19 1134)  Resp: 15 (10/27/19 1127)  BP: 138/61 (10/27/19 1127)  SpO2: 98 % (10/27/19 1127)  O2 Device (Oxygen Therapy): room air (10/27/19 0015) Vital Signs (24h Range):  Temp:  [97.9 °F (36.6 °C)-98.6 °F (37 °C)] 98.6 °F (37 °C)  Pulse:  [67-91] 83  Resp:  [15-18] 15  SpO2:  [92 %-98 %] 98 %  BP: ()/(53-74) 138/61     Weight: 40.8 kg (90 lb) (10/25/19 1542)  Body mass index is 18.18 kg/m².  Body surface area is 1.3 meters squared.    I/O last 3 completed shifts:  In: 1460 [P.O.:240; I.V.:970; IV Piggyback:250]  Out: 40 [Urine:40]    Physical Exam   Constitutional: She is oriented to person, place, and time. She appears well-developed.   cachectic    HENT:   Head: Normocephalic and atraumatic.   Eyes: Pupils are equal, round, and reactive to light. EOM are normal.   Neck: Neck supple. No thyromegaly present.   Cardiovascular: Normal rate, regular rhythm and normal heart sounds.   No murmur heard.  Pulmonary/Chest: Effort normal and breath sounds normal. No respiratory distress.   Abdominal: Soft. Bowel sounds are normal. She exhibits  distension.   Musculoskeletal: She exhibits no edema or deformity.   Lymphadenopathy:     She has no cervical adenopathy.   Neurological: She is alert and oriented to person, place, and time.   Skin: Skin is warm and dry.       Significant Labs:  CBC:   Recent Labs   Lab 10/27/19  0645   WBC 12.06   RBC 3.63*   HGB 8.0*   HCT 23.8*      MCV 66*   MCH 22.0*   MCHC 33.6     CMP:   Recent Labs   Lab 10/27/19  0645   *   CALCIUM 8.4*   ALBUMIN 2.5*   PROT 7.0   *   K 5.5*   CO2 22*   CL 95   BUN 55*   CREATININE 6.6*   ALKPHOS 91   ALT 34   AST 62*   BILITOT 0.9     All labs within the past 24 hours have been reviewed.     Significant Imaging:  all imaging in past 24 hours reviewed

## 2019-10-27 NOTE — PROGRESS NOTES
Hemodialysis    Bedside HD treatment in room 803A.    Patient is on room air, awake, alert, oriented.   ACCESS: newly inserted  left IJ trialysis; ready to use ordered.   No clots aspirated, with good flow on both ports.    HD started

## 2019-10-27 NOTE — PROGRESS NOTES
Hemodialysis    Lines clotted off with remaining time of 20 mins. rinsed back only on the arterial side. Left IJ trialysis flushed and locked with Normal Saline, capped and secured.    Total treatment time: 1 hour 40 mins.  NO UF     Report given to primary nurse.

## 2019-10-27 NOTE — ASSESSMENT & PLAN NOTE
Patient with baseline serum creatinine around 1.0. Serum creatinine was 1.1 on 10/19. 10/25 serum creatinine was noted to be 7.1.    - K of 5.5 this AM s/p HD yesterday night  -  Burgos stain negative, urine protein to creatinine ratio of 4, Fe urea 62.3% suggestive of intrinsic etiology, US without hydronephrosis, Garcia with scant urin output despite IV Lasix and IVFs  - Continue with Garcia catheter with strict I/Os  - Will continue to avoid nephrotoxic agents (i.e. 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

## 2019-10-27 NOTE — NURSING
The patient only receiving one hour and forty minutes of her HD. Unable to complete remaining 20 minutes due to a clot in the Trialysis cath. Dr. Orr notified. Will continue to monitor.

## 2019-10-27 NOTE — PROGRESS NOTES
[facility]  Nephrology  Progress Note    Patient Name: Neena Marks  MRN: [id number]  Admission Date: 10/25/2019  Hospital Length of Stay: 2 days  Attending Provider: Nikos Hansen MD   Primary Care Physician: [facility]  Principal Problem:Acute kidney injury    Subjective:     HPI: 63 yro F with PMH of EtOH cirrhosis, HCC (receiving treatment with nivolumab), and esophageal varices s/p banding 1/2018 who was sent to [facility] from heme/onc clinic for PRACHI with 7.1 (baseline creatinine ~1.0). Pt reports several weeks of decreased PO intake and n/v and has had multiple ED visits for this. She was being treated outpatient for an UTI 2/2 Klebsiella since 10/19 with cipro. Reports that she has not been putting out urine for 2 days. She also endorses recent ibuprofen and naproxen use for abdominal pain. Additionally, Pt had Ct with contrast 8/16. She denies fever, dysuria, hematuria, or worsening lower extremity edema. She currently has abdominal distention and abdominal pain which radiates around the left aspect of lumbar region. Patients' vitals with BP 98/54 mmHg. US RP shows no hydronephrosis. U p/cr 4 (trace protein on past UAs), and no UA available from this admission. Review of labs reveals K of 6.3, Cr 7.4, BUN 60, phos 6.7, bicarb 18. On admission patient received 1 L NS bolus. This AM, patient received 60 mg IV lasix without any UO. Garica in place.         Interval History: 1.4L urine output in 24 hours. On prednisone.     Review of patient's allergies indicates:  No Known Allergies  Current Facility-Administered Medications   Medication Frequency    0.9%  NaCl infusion Once    calcium gluconate 1g in dextrose 5% 100mL (ready to mix system) Once    And    calcium gluconate 1g in dextrose 5% 100mL (ready to mix system) Q10 Min PRN    dextrose 10% (D10W) Bolus PRN    gabapentin capsule 100 mg QHS    heparin (porcine) injection 1,000 Units PRN    ondansetron  disintegrating tablet 8 mg Q8H PRN    oxyCODONE immediate release tablet 5 mg Q6H PRN    pantoprazole EC tablet 40 mg Daily    predniSONE tablet 60 mg Daily    promethazine (PHENERGAN) 6.25 mg in dextrose 5 % 50 mL IVPB Q6H PRN    sodium chloride 0.9% flush 10 mL PRN       Objective:     Vital Signs (Most Recent):  Temp: 98.6 °F (37 °C) (10/27/19 1127)  Pulse: 83 (10/27/19 1134)  Resp: 15 (10/27/19 1127)  BP: 138/61 (10/27/19 1127)  SpO2: 98 % (10/27/19 1127)  O2 Device (Oxygen Therapy): room air (10/27/19 0015) Vital Signs (24h Range):  Temp:  [97.9 °F (36.6 °C)-98.6 °F (37 °C)] 98.6 °F (37 °C)  Pulse:  [67-91] 83  Resp:  [15-18] 15  SpO2:  [92 %-98 %] 98 %  BP: ()/(53-74) 138/61     Weight: 40.8 kg (90 lb) (10/25/19 1542)  Body mass index is 18.18 kg/m².  Body surface area is 1.3 meters squared.    I/O last 3 completed shifts:  In: 1460 [P.O.:240; I.V.:970; IV Piggyback:250]  Out: 40 [Urine:40]    Physical Exam   Constitutional: She is oriented to person, place, and time. She appears well-developed.   cachectic    HENT:   Head: Normocephalic and atraumatic.   Eyes: Pupils are equal, round, and reactive to light. EOM are normal.   Neck: Neck supple. No thyromegaly present.   Cardiovascular: Normal rate, regular rhythm and normal heart sounds.   No murmur heard.  Pulmonary/Chest: Effort normal and breath sounds normal. No respiratory distress.   Abdominal: Soft. Bowel sounds are normal. She exhibits distension.   Musculoskeletal: She exhibits no edema or deformity.   Lymphadenopathy:     She has no cervical adenopathy.   Neurological: She is alert and oriented to person, place, and time.   Skin: Skin is warm and dry.       Significant Labs:  CBC:   Recent Labs   Lab 10/27/19  0645   WBC 12.06   RBC 3.63*   HGB 8.0*   HCT 23.8*      MCV 66*   MCH 22.0*   MCHC 33.6     CMP:   Recent Labs   Lab 10/27/19  0645   *   CALCIUM 8.4*   ALBUMIN 2.5*   PROT 7.0   *   K 5.5*   CO2 22*   CL 95    BUN 55*   CREATININE 6.6*   ALKPHOS 91   ALT 34   AST 62*   BILITOT 0.9     All labs within the past 24 hours have been reviewed.     Significant Imaging:  all imaging in past 24 hours reviewed    Assessment/Plan:     * Acute kidney injury  63 yro F with PMH of EtOH cirrhosis, HCC (receiving treatment with nivolumab), and esophageal varices s/p banding 1/2018 who was presents with PRACHI with 7.1 (baseline creatinine ~1.0) and no UO for 2 days in setting of several weeks of decreased PO intake, nausea, and vomiting. Additionally, she underwent CT scan with contrast 8/16, and was being treated for an UTI with cipro as out patient. Pt also endorses NSAID use for abdominal pain.    -US RP shows no hydronephrosis  -U p/cr 4 (trace protein on past UAs), and no UA available from this admission  -Labs on admission: K of 6.3, Cr 7.4, BUN 60, phos 6.7, bicarb 18.   -received HD 10/26/19  -trend RFPs, strict IOS, avoid nephrotoxic agents, renally dose medications   -will continue to follow patient; no indication for HD today            Thank you for your consult. I will follow-up with patient. Please contact us if you have any additional questions.    Christopher Aranda MD  Nephrology  [facility]

## 2019-10-27 NOTE — NURSING
Nurse arrived to perform dialysis prior to shift being performed. Dr. Orr notified. All orders for K shift discontinued. Will continue to monitor.

## 2019-10-27 NOTE — ASSESSMENT & PLAN NOTE
63 yro F with PMH of EtOH cirrhosis, HCC (receiving treatment with nivolumab), and esophageal varices s/p banding 1/2018 who was presents with PRACHI with 7.1 (baseline creatinine ~1.0) and no UO for 2 days in setting of several weeks of decreased PO intake, nausea, and vomiting. Additionally, she underwent CT scan with contrast 8/16, and was being treated for an UTI with cipro as out patient. Pt also endorses NSAID use for abdominal pain.    -US RP shows no hydronephrosis  -U p/cr 4 (trace protein on past UAs), and no UA available from this admission  -Labs on admission: K of 6.3, Cr 7.4, BUN 60, phos 6.7, bicarb 18.   -received HD 10/26/19  -trend RFPs, strict IOS, avoid nephrotoxic agents, renally dose medications   -will continue to follow patient; no indication for HD today

## 2019-10-27 NOTE — PLAN OF CARE
Plan of care reviewed with the patient at the beginning of shift. The patient is alert and oriented. GCS 15. Denying complaints at this time. VSS. The patient was found to have a critically high K level during the shift. Orders were placed to perform a K Shift, but dialysis arrived prior to the K shift being performed. The patient was able to receive one hour and forty minutes of her dialysis, but was unable to complete the remaining twenty minutes due to the IJ clotting off during treatment. Patient remained free from falls and injuries throughout shift. Bed in low locked position. Side rails up x2. Call bell and personal items within reach. Will continue to monitor.

## 2019-10-27 NOTE — SUBJECTIVE & OBJECTIVE
Interval History: Patient seen and examined this AM. Denies any new acute complaints over night apart from neck discomfort associated with central line placement, nonproductive cough, and constipation. Scant urine output in Garcia overnight. She tolerated her HD last night and denies fatigue this morning. HD was terminated 20 minutes early secondary to clot. Otherwise, her abdominal pain has improved and denies nausea , vomiting, fever, SOB, or chest pain.     Oncology Treatment Plan:   OP NIVOLUMAB Q4W    Medications:  Continuous Infusions:  Scheduled Meds:   sodium chloride 0.9%   Intravenous Once    apixaban  5 mg Oral BID    calcium gluconate IVPB  1 g Intravenous Once    furosemide  40 mg Oral Daily    gabapentin  100 mg Oral QHS    pantoprazole  40 mg Oral Daily    predniSONE  60 mg Oral Daily     PRN Meds:calcium gluconate IVPB **AND** calcium gluconate IVPB, [COMPLETED] Dextrose 10% Bolus **AND** Dextrose 10% Bolus **AND** [COMPLETED] insulin regular, ondansetron, oxyCODONE, promethazine (PHENERGAN) IVPB, sodium chloride 0.9%     Review of Systems   Constitutional: Negative for chills, diaphoresis, fatigue and fever.   HENT: Negative for sore throat.    Respiratory: Positive for cough. Negative for shortness of breath and wheezing.    Cardiovascular: Negative for chest pain, palpitations and leg swelling.   Gastrointestinal: Positive for constipation. Negative for abdominal distention, abdominal pain, diarrhea, nausea and vomiting.   Genitourinary: Negative for dysuria.        Decreased urine output.   Musculoskeletal: Positive for neck pain. Negative for joint swelling.   Neurological: Negative for dizziness and headaches.   Psychiatric/Behavioral: Negative for behavioral problems and confusion.     Objective:     Vital Signs (Most Recent):  Temp: 97.9 °F (36.6 °C) (10/27/19 0343)  Pulse: 80 (10/27/19 0343)  Resp: 16 (10/27/19 0343)  BP: (!) 100/53 (10/27/19 0343)  SpO2: (!) 94 % (10/27/19 0343) Vital  Signs (24h Range):  Temp:  [97.9 °F (36.6 °C)-98.3 °F (36.8 °C)] 97.9 °F (36.6 °C)  Pulse:  [64-80] 80  Resp:  [15-18] 16  SpO2:  [94 %-98 %] 94 %  BP: ()/(53-74) 100/53     Weight: 40.8 kg (90 lb)  Body mass index is 18.18 kg/m².  Body surface area is 1.3 meters squared.      Intake/Output Summary (Last 24 hours) at 10/27/2019 0747  Last data filed at 10/27/2019 0542  Gross per 24 hour   Intake 1460 ml   Output 40 ml   Net 1420 ml       Physical Exam   Constitutional: Vital signs are normal. She appears cachectic.   HENT:   Head: Normocephalic and atraumatic.   Eyes: EOM are normal. No scleral icterus.   Arcus senilis    Neck: Neck supple.   Central line on the left.   Cardiovascular: Normal rate, regular rhythm, normal heart sounds and intact distal pulses. Exam reveals no gallop and no friction rub.   No murmur heard.  Pulmonary/Chest: Effort normal and breath sounds normal. She has no wheezes. She has no rales.   Abdominal: Soft. Bowel sounds are normal. She exhibits distension. There is no tenderness.   Abdomen soft but distended. There is ascites and superficial dilation of veins overlying abdomen.   Musculoskeletal: She exhibits edema.   Trace lower extremity edema   Neurological: She is alert.   Skin: Skin is warm and dry.   Psychiatric: She has a normal mood and affect. Her behavior is normal.   Nursing note and vitals reviewed.      Significant Labs:   Recent Lab Results       10/26/19  2144   10/26/19  1647   10/26/19  1642        Anion Gap 13   12     BUN, Bld 70   68     Calcium 8.3   7.8     Chloride 97   98     CO2 15   15     Creatinine 8.6   8.1     eGFR if  5.2   5.6     eGFR if non  4.5  Comment:  Calculation used to obtain the estimated glomerular filtration  rate (eGFR) is the CKD-EPI equation.      4.8  Comment:  Calculation used to obtain the estimated glomerular filtration  rate (eGFR) is the CKD-EPI equation.        Glucose 227   250     POCT Glucose    260       Potassium 6.3  Comment:  *Critical value -   Results called to and read back by:ZEHRA TYSON RN  *No Visible Hemolysis     5.7  Comment:  *No Visible Hemolysis     Sodium 125   125           Diagnostic Results:  CXR on 10/256/2019:   Comparison:  01/08/2019   Findings:  Cardiac monitoring leads overlie the chest.  There is a left central venous catheter in place, the tip of which projects over the right atrium.  The cardiomediastinal silhouette is within normal limits.  The trachea is midline.  The lungs are symmetrically expanded with no evidence of large confluent airspace consolidation, significant volume of pleural fluid or pneumothorax.  Visualized osseous structures are intact.   Impression:  Left central venous catheter in place with tip projecting over the right atrium.    US Retroperitoneal Complete on 10/25/2019:   Comparison:  CT chest, abdomen, and pelvis on 08/28/2019   Findings:  Right kidney: The right kidney measures 11.2 cm. No cortical thinning. No loss of corticomedullary distinction. Resistive index measures 1.0.  There is a 0.8 x 0.7 x 0.8 cm simple cyst.  No renal stone. No hydronephrosis.  Left kidney: The left kidney measures 11.1 cm. No cortical thinning. No loss of corticomedullary distinction. Resistive index measures 1.0.  No mass. No renal stone. No hydronephrosis.  The bladder is partially distended at the time of scanning and has an unremarkable appearance.  Moderate volume ascites.   Impression:  Bilateral elevated resistive indices, suggestive of medical renal disease.  No hydronephrosis or nephrolithiasis. Moderate volume ascites.

## 2019-10-27 NOTE — PLAN OF CARE
Pt c/o of no pain throughout shift. Pt c/o of constipation, senna and Miralax given and pt reported 2 bowel movements. Pt did not receive dialysis today; cath flow instilled into both lumens per MD order; prior to admin both lumens eulalio blood back and flushed. Prednisone administered. Pt involved in plan of care and communicating needs throughout shift. Telemetry maintained; normal sinus rythmn.  All VSS; no acute events so far this shift.  Pt remaining free from falls or injury throughout shift; bed locked and in lowest position; call light within reach.  Pt instructed to call for assistance as needed.  Q1H rounding done on pt.

## 2019-10-27 NOTE — PROGRESS NOTES
[facility]  Hematology/Oncology  Progress Note    Patient Name: Neena Marks  Admission Date: 10/25/2019  Hospital Length of Stay: 2 days  Code Status: Full Code     Subjective:     HPI:  Ms Marks is a 63-year-old  female with alcoholic cirrhosis dating back to 2015, portal HTN, ascites, history of variceal bleeding in January 2018 s/p banding, history of alcohol abuse (recently quit in January of 2018), and newly diagnosed hepatocellular carcinoma undergoing treatment with nivolumab who presented as a direct transfer from Heme/Onc clinic on 10/25 after routine laboratory studies were remarkable for serum creatinine of 7.1 (baseline creatinine ~1.0). Patient reports one week history of nasuea and vomiting. Per chart review, patient has presented to [facility] ED three times this month with complaints of abdominal pain, back pain, nausea, and vomiting. She was noted to have UTI 2/2 Klebsiella on 10/19 and is currently  undergoing treatment with ciprofloxacin.  She denies relief in abdominal and back pain with prescribed medications. Her nausea and vomiting has persisted as well. She reports decreased PO intake over this time span secondary to nausea and has a history of recent ibuprofen and naproxen use for abdominal pain. She denies fever, dysuria, hematuria, chest pain, shortness of breath, syncope, or worsening lower extremity edema. She currently has abdominal distention and abdominal pain which radiates around the left aspect of lumbar region. Patients' vitals with BP 98/54 mmHg, HR 80 bpm, afebrile with temperature of 98 F, and RR 16.     Cirrhosis & HCC History:  AFP tumor marker is normal.  Hep C and B testing on 4/27/2018 - negative.    Endoscopy - 1/9/19 - Grade II esophageal varices. Completely eradicated. Banded, Small hiatal hernia., Portal hypertensive gastropathy. Treated with argon plasma coagulation (APC).     Patient had an appointment on 2/15/2019, IR consult for  Y90 but she missed her appointment.   - She had IR mapping and Hepatic angiography demonstrates a large hypervascular lesion in the right hepatic lobe supplied by branches of the right hepatic artery and accessory left hepatic artery.  Technetium MAA was injected aorta determined lung shunt fraction.  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  - Nuclear medicine scan revealed that majority of tracer goes to the lungs with a liver lung shunt fraction of 68.9%.   is not a candidate for Y90 or other liver directed therapies due to liver lung shunt     5/9/2019 - Started taking Sorafenib 400mg BID from  7/6/2019 - Decrease to 200 mg BID due to hand foot syndrome  8/16/2019 - Stopped Sorafenib on  due to persistent desquamation of hand foot syndrome  08/28/2019 - CT Chest abdomen and pelvis done revealed disease progression and also new bilateral pulmonary embolism. She was admitted to [facility] and discharged on Eliquis (Patient declined Lovenox)   9/17/2019 -  Fall and followed up in ED and Xray right knee revealed no acute osseous abnormality  9/27/2019 - Started C 1 D1 Nivolumab q 4 weeks  10/25/2019 - PRACHI with creatinine of 7.1, direct admit     Interval History: Patient seen and examined this AM. Denies any new acute complaints over night apart from neck discomfort associated with central line placement, nonproductive cough, and constipation. Scant urine output in Garcia overnight. She tolerated her HD last night and denies fatigue this morning. HD was terminated 20 minutes early secondary to clot. Otherwise, her abdominal pain has improved and denies nausea , vomiting, fever, SOB, or chest pain.     Oncology Treatment Plan:   OP NIVOLUMAB Q4W    Medications:  Continuous Infusions:  Scheduled Meds:   sodium chloride 0.9%   Intravenous Once    apixaban  5  mg Oral BID    calcium gluconate IVPB  1 g Intravenous Once    furosemide  40 mg Oral Daily    gabapentin  100 mg Oral QHS    pantoprazole  40 mg Oral Daily    predniSONE  60 mg Oral Daily     PRN Meds:calcium gluconate IVPB **AND** calcium gluconate IVPB, [COMPLETED] Dextrose 10% Bolus **AND** Dextrose 10% Bolus **AND** [COMPLETED] insulin regular, ondansetron, oxyCODONE, promethazine (PHENERGAN) IVPB, sodium chloride 0.9%     Review of Systems   Constitutional: Negative for chills, diaphoresis, fatigue and fever.   HENT: Negative for sore throat.    Respiratory: Positive for cough. Negative for shortness of breath and wheezing.    Cardiovascular: Negative for chest pain, palpitations and leg swelling.   Gastrointestinal: Positive for constipation. Negative for abdominal distention, abdominal pain, diarrhea, nausea and vomiting.   Genitourinary: Negative for dysuria.        Decreased urine output.   Musculoskeletal: Positive for neck pain. Negative for joint swelling.   Neurological: Negative for dizziness and headaches.   Psychiatric/Behavioral: Negative for behavioral problems and confusion.     Objective:     Vital Signs (Most Recent):  Temp: 97.9 °F (36.6 °C) (10/27/19 0343)  Pulse: 80 (10/27/19 0343)  Resp: 16 (10/27/19 0343)  BP: (!) 100/53 (10/27/19 0343)  SpO2: (!) 94 % (10/27/19 0343) Vital Signs (24h Range):  Temp:  [97.9 °F (36.6 °C)-98.3 °F (36.8 °C)] 97.9 °F (36.6 °C)  Pulse:  [64-80] 80  Resp:  [15-18] 16  SpO2:  [94 %-98 %] 94 %  BP: ()/(53-74) 100/53     Weight: 40.8 kg (90 lb)  Body mass index is 18.18 kg/m².  Body surface area is 1.3 meters squared.      Intake/Output Summary (Last 24 hours) at 10/27/2019 0777  Last data filed at 10/27/2019 0542  Gross per 24 hour   Intake 1460 ml   Output 40 ml   Net 1420 ml       Physical Exam   Constitutional: Vital signs are normal. She appears cachectic.   HENT:   Head: Normocephalic and atraumatic.   Eyes: EOM are normal. No scleral icterus.    Arcus senilis    Neck: Neck supple.   Central line on the left.   Cardiovascular: Normal rate, regular rhythm, normal heart sounds and intact distal pulses. Exam reveals no gallop and no friction rub.   No murmur heard.  Pulmonary/Chest: Effort normal and breath sounds normal. She has no wheezes. She has no rales.   Abdominal: Soft. Bowel sounds are normal. She exhibits distension. There is no tenderness.   Abdomen soft but distended. There is ascites and superficial dilation of veins overlying abdomen.   Musculoskeletal: She exhibits edema.   Trace lower extremity edema   Neurological: She is alert.   Skin: Skin is warm and dry.   Psychiatric: She has a normal mood and affect. Her behavior is normal.   Nursing note and vitals reviewed.      Significant Labs:   Recent Lab Results       10/26/19  2144   10/26/19  1647   10/26/19  1642        Anion Gap 13   12     BUN, Bld 70   68     Calcium 8.3   7.8     Chloride 97   98     CO2 15   15     Creatinine 8.6   8.1     eGFR if  5.2   5.6     eGFR if non  4.5  Comment:  Calculation used to obtain the estimated glomerular filtration  rate (eGFR) is the CKD-EPI equation.      4.8  Comment:  Calculation used to obtain the estimated glomerular filtration  rate (eGFR) is the CKD-EPI equation.        Glucose 227   250     POCT Glucose   260       Potassium 6.3  Comment:  *Critical value -   Results called to and read back by:ZEHRA TYSON RN  *No Visible Hemolysis     5.7  Comment:  *No Visible Hemolysis     Sodium 125   125           Diagnostic Results:  CXR on 10/256/2019:   Comparison:  01/08/2019   Findings:  Cardiac monitoring leads overlie the chest.  There is a left central venous catheter in place, the tip of which projects over the right atrium.  The cardiomediastinal silhouette is within normal limits.  The trachea is midline.  The lungs are symmetrically expanded with no evidence of large confluent airspace consolidation,  significant volume of pleural fluid or pneumothorax.  Visualized osseous structures are intact.   Impression:  Left central venous catheter in place with tip projecting over the right atrium.    US Retroperitoneal Complete on 10/25/2019:   Comparison:  CT chest, abdomen, and pelvis on 08/28/2019   Findings:  Right kidney: The right kidney measures 11.2 cm. No cortical thinning. No loss of corticomedullary distinction. Resistive index measures 1.0.  There is a 0.8 x 0.7 x 0.8 cm simple cyst.  No renal stone. No hydronephrosis.  Left kidney: The left kidney measures 11.1 cm. No cortical thinning. No loss of corticomedullary distinction. Resistive index measures 1.0.  No mass. No renal stone. No hydronephrosis.  The bladder is partially distended at the time of scanning and has an unremarkable appearance.  Moderate volume ascites.   Impression:  Bilateral elevated resistive indices, suggestive of medical renal disease.  No hydronephrosis or nephrolithiasis. Moderate volume ascites.      Assessment/Plan:     * Acute kidney injury  Patient with baseline serum creatinine around 1.0. Serum creatinine was 1.1 on 10/19. 10/25 serum creatinine was noted to be 7.1.    - K of 5.5 this AM s/p HD yesterday night  -  Burgos stain negative, urine protein to creatinine ratio of 4, Fe urea 62.3% suggestive of intrinsic etiology, US without hydronephrosis, Garcia with scant urin output despite IV Lasix and IVFs  - Continue with Garcia catheter with strict I/Os  - Will continue to avoid nephrotoxic agents (i.e. 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 CMP on admission with albumin of 3.0, Alk phos 96, and AST 75.    Iron deficiency anemia due to chronic blood loss  Hemoglobin 8.8 on admission.     - continue to monitor with daily CBCs  - transfuse Hgb < 7             Lacho Nuñez MD       ATTENDING NOTE, ONCOLOGY INPATIENT TEAM    As above; events of last 24 hours noted.  Patient seen and examined, chart reviewed.  Appears comfortable, in NAD. Urine output remains minimal.  Lungs are clear to auscultation.  Abdomen is soft, nontender.  Labs reviewed. K is 5.5 mEq/l today.  Creatinine 6.6 mg/dl    PLAN  Continue daily labs.  D/C apixaban and start heparin drip.  She will need dialysis again today.  Will continue prednisone and PPIs.  We will follow.  Her multiple questions were answered to her satisfaction.      Nikos Hansen MD    [facility]

## 2019-10-27 NOTE — PROGRESS NOTES
Dr. Nuñez reviewed results of chest xray which was looking at line placement of the trialysis line. Dr. Nuñez stated the line is in the correct place, no kinks. He ordered to instill cath flow into both lumens. Spoke to dialysis RN Nilo whom stated ok to instill cath flow. Prior to instilling cath flow both lumens eulalio back blood and flushed easily.

## 2019-10-27 NOTE — NURSING
Dr. Orr notified of critical value. K 6.3. Awaiting orders to shift patient. Will continue to monitor.

## 2019-10-27 NOTE — PROGRESS NOTES
Heparin drip started and administered as ordered. aPTT drawn prior to admin. Will continue to monitor.

## 2019-10-27 NOTE — ASSESSMENT & PLAN NOTE
- patient denies any hemtopyosis, BRBPR, or melena  - holding home dose propanol 10 mg BID in light of hypotension

## 2019-10-28 LAB
ALBUMIN SERPL BCP-MCNC: 2.6 G/DL (ref 3.5–5.2)
ALP SERPL-CCNC: 90 U/L (ref 55–135)
ALT SERPL W/O P-5'-P-CCNC: 39 U/L (ref 10–44)
ANION GAP SERPL CALC-SCNC: 11 MMOL/L (ref 8–16)
ANION GAP SERPL CALC-SCNC: 12 MMOL/L (ref 8–16)
ANION GAP SERPL CALC-SCNC: 14 MMOL/L (ref 8–16)
APTT BLDCRRT: 33.3 SEC (ref 21–32)
APTT BLDCRRT: 41.5 SEC (ref 21–32)
APTT BLDCRRT: >150 SEC (ref 21–32)
AST SERPL-CCNC: 70 U/L (ref 10–40)
BASOPHILS # BLD AUTO: 0 K/UL (ref 0–0.2)
BASOPHILS # BLD AUTO: 0.01 K/UL (ref 0–0.2)
BASOPHILS NFR BLD: 0 % (ref 0–1.9)
BASOPHILS NFR BLD: 0.1 % (ref 0–1.9)
BILIRUB SERPL-MCNC: 1 MG/DL (ref 0.1–1)
BUN SERPL-MCNC: 38 MG/DL (ref 8–23)
BUN SERPL-MCNC: 40 MG/DL (ref 8–23)
BUN SERPL-MCNC: 69 MG/DL (ref 8–23)
CALCIUM SERPL-MCNC: 8 MG/DL (ref 8.7–10.5)
CALCIUM SERPL-MCNC: 8.6 MG/DL (ref 8.7–10.5)
CALCIUM SERPL-MCNC: 8.7 MG/DL (ref 8.7–10.5)
CHLORIDE SERPL-SCNC: 92 MMOL/L (ref 95–110)
CHLORIDE SERPL-SCNC: 96 MMOL/L (ref 95–110)
CHLORIDE SERPL-SCNC: 97 MMOL/L (ref 95–110)
CO2 SERPL-SCNC: 20 MMOL/L (ref 23–29)
CO2 SERPL-SCNC: 24 MMOL/L (ref 23–29)
CO2 SERPL-SCNC: 25 MMOL/L (ref 23–29)
CREAT SERPL-MCNC: 4.7 MG/DL (ref 0.5–1.4)
CREAT SERPL-MCNC: 4.8 MG/DL (ref 0.5–1.4)
CREAT SERPL-MCNC: 7.7 MG/DL (ref 0.5–1.4)
DIFFERENTIAL METHOD: ABNORMAL
DIFFERENTIAL METHOD: ABNORMAL
EOSINOPHIL # BLD AUTO: 0 K/UL (ref 0–0.5)
EOSINOPHIL # BLD AUTO: 0 K/UL (ref 0–0.5)
EOSINOPHIL NFR BLD: 0 % (ref 0–8)
EOSINOPHIL NFR BLD: 0 % (ref 0–8)
ERYTHROCYTE [DISTWIDTH] IN BLOOD BY AUTOMATED COUNT: 16.7 % (ref 11.5–14.5)
ERYTHROCYTE [DISTWIDTH] IN BLOOD BY AUTOMATED COUNT: 16.9 % (ref 11.5–14.5)
EST. GFR  (AFRICAN AMERICAN): 10.5 ML/MIN/1.73 M^2
EST. GFR  (AFRICAN AMERICAN): 10.7 ML/MIN/1.73 M^2
EST. GFR  (AFRICAN AMERICAN): 5.9 ML/MIN/1.73 M^2
EST. GFR  (NON AFRICAN AMERICAN): 5.1 ML/MIN/1.73 M^2
EST. GFR  (NON AFRICAN AMERICAN): 9.1 ML/MIN/1.73 M^2
EST. GFR  (NON AFRICAN AMERICAN): 9.3 ML/MIN/1.73 M^2
FIBRINOGEN PPP-MCNC: 303 MG/DL (ref 182–366)
GLUCOSE SERPL-MCNC: 157 MG/DL (ref 70–110)
GLUCOSE SERPL-MCNC: 183 MG/DL (ref 70–110)
GLUCOSE SERPL-MCNC: 200 MG/DL (ref 70–110)
HCT VFR BLD AUTO: 23.3 % (ref 37–48.5)
HCT VFR BLD AUTO: 23.6 % (ref 37–48.5)
HGB BLD-MCNC: 7.9 G/DL (ref 12–16)
HGB BLD-MCNC: 8.1 G/DL (ref 12–16)
IMM GRANULOCYTES # BLD AUTO: 0.07 K/UL (ref 0–0.04)
IMM GRANULOCYTES # BLD AUTO: 0.1 K/UL (ref 0–0.04)
IMM GRANULOCYTES NFR BLD AUTO: 0.8 % (ref 0–0.5)
IMM GRANULOCYTES NFR BLD AUTO: 0.9 % (ref 0–0.5)
INR PPP: 1.3 (ref 0.8–1.2)
INR PPP: 1.5 (ref 0.8–1.2)
LYMPHOCYTES # BLD AUTO: 0.8 K/UL (ref 1–4.8)
LYMPHOCYTES # BLD AUTO: 0.8 K/UL (ref 1–4.8)
LYMPHOCYTES NFR BLD: 7.4 % (ref 18–48)
LYMPHOCYTES NFR BLD: 9.3 % (ref 18–48)
MAGNESIUM SERPL-MCNC: 2.2 MG/DL (ref 1.6–2.6)
MCH RBC QN AUTO: 21.7 PG (ref 27–31)
MCH RBC QN AUTO: 22.3 PG (ref 27–31)
MCHC RBC AUTO-ENTMCNC: 33.9 G/DL (ref 32–36)
MCHC RBC AUTO-ENTMCNC: 34.3 G/DL (ref 32–36)
MCV RBC AUTO: 64 FL (ref 82–98)
MCV RBC AUTO: 65 FL (ref 82–98)
MONOCYTES # BLD AUTO: 0.7 K/UL (ref 0.3–1)
MONOCYTES # BLD AUTO: 0.7 K/UL (ref 0.3–1)
MONOCYTES NFR BLD: 6.8 % (ref 4–15)
MONOCYTES NFR BLD: 7.8 % (ref 4–15)
NEUTROPHILS # BLD AUTO: 7.1 K/UL (ref 1.8–7.7)
NEUTROPHILS # BLD AUTO: 8.9 K/UL (ref 1.8–7.7)
NEUTROPHILS NFR BLD: 82.1 % (ref 38–73)
NEUTROPHILS NFR BLD: 84.8 % (ref 38–73)
NRBC BLD-RTO: 0 /100 WBC
NRBC BLD-RTO: 0 /100 WBC
PHOSPHATE SERPL-MCNC: 5.7 MG/DL (ref 2.7–4.5)
PLATELET # BLD AUTO: 175 K/UL (ref 150–350)
PLATELET # BLD AUTO: 196 K/UL (ref 150–350)
PMV BLD AUTO: ABNORMAL FL (ref 9.2–12.9)
PMV BLD AUTO: ABNORMAL FL (ref 9.2–12.9)
POCT GLUCOSE: 197 MG/DL (ref 70–110)
POTASSIUM SERPL-SCNC: 4 MMOL/L (ref 3.5–5.1)
POTASSIUM SERPL-SCNC: 4.2 MMOL/L (ref 3.5–5.1)
POTASSIUM SERPL-SCNC: 5.6 MMOL/L (ref 3.5–5.1)
PROT SERPL-MCNC: 7.3 G/DL (ref 6–8.4)
PROTHROMBIN TIME: 13 SEC (ref 9–12.5)
PROTHROMBIN TIME: 15 SEC (ref 9–12.5)
RBC # BLD AUTO: 3.64 M/UL (ref 4–5.4)
RBC # BLD AUTO: 3.64 M/UL (ref 4–5.4)
SODIUM SERPL-SCNC: 126 MMOL/L (ref 136–145)
SODIUM SERPL-SCNC: 131 MMOL/L (ref 136–145)
SODIUM SERPL-SCNC: 134 MMOL/L (ref 136–145)
URATE SERPL-MCNC: 8.9 MG/DL (ref 2.4–5.7)
WBC # BLD AUTO: 10.54 K/UL (ref 3.9–12.7)
WBC # BLD AUTO: 8.68 K/UL (ref 3.9–12.7)

## 2019-10-28 PROCEDURE — 86334 IMMUNOFIX E-PHORESIS SERUM: CPT

## 2019-10-28 PROCEDURE — 63600175 PHARM REV CODE 636 W HCPCS: Performed by: STUDENT IN AN ORGANIZED HEALTH CARE EDUCATION/TRAINING PROGRAM

## 2019-10-28 PROCEDURE — 20600001 HC STEP DOWN PRIVATE ROOM

## 2019-10-28 PROCEDURE — 99233 PR SUBSEQUENT HOSPITAL CARE,LEVL III: ICD-10-PCS | Mod: ,,, | Performed by: INTERNAL MEDICINE

## 2019-10-28 PROCEDURE — 80074 ACUTE HEPATITIS PANEL: CPT

## 2019-10-28 PROCEDURE — 63600175 PHARM REV CODE 636 W HCPCS: Performed by: GENERAL PRACTICE

## 2019-10-28 PROCEDURE — 25000003 PHARM REV CODE 250: Performed by: STUDENT IN AN ORGANIZED HEALTH CARE EDUCATION/TRAINING PROGRAM

## 2019-10-28 PROCEDURE — 85610 PROTHROMBIN TIME: CPT | Mod: 91

## 2019-10-28 PROCEDURE — 80048 BASIC METABOLIC PNL TOTAL CA: CPT

## 2019-10-28 PROCEDURE — 80048 BASIC METABOLIC PNL TOTAL CA: CPT | Mod: 91

## 2019-10-28 PROCEDURE — 85384 FIBRINOGEN ACTIVITY: CPT

## 2019-10-28 PROCEDURE — 86334 IMMUNOFIX E-PHORESIS SERUM: CPT | Mod: 26,,, | Performed by: PATHOLOGY

## 2019-10-28 PROCEDURE — 90935 HEMODIALYSIS ONE EVALUATION: CPT

## 2019-10-28 PROCEDURE — 36415 COLL VENOUS BLD VENIPUNCTURE: CPT

## 2019-10-28 PROCEDURE — 80053 COMPREHEN METABOLIC PANEL: CPT

## 2019-10-28 PROCEDURE — 83520 IMMUNOASSAY QUANT NOS NONAB: CPT | Mod: 59

## 2019-10-28 PROCEDURE — 84550 ASSAY OF BLOOD/URIC ACID: CPT

## 2019-10-28 PROCEDURE — 85610 PROTHROMBIN TIME: CPT

## 2019-10-28 PROCEDURE — P9047 ALBUMIN (HUMAN), 25%, 50ML: HCPCS | Mod: JG | Performed by: STUDENT IN AN ORGANIZED HEALTH CARE EDUCATION/TRAINING PROGRAM

## 2019-10-28 PROCEDURE — 84165 PROTEIN E-PHORESIS SERUM: CPT | Mod: 26,,, | Performed by: PATHOLOGY

## 2019-10-28 PROCEDURE — 84165 PATHOLOGIST INTERPRETATION SPE: ICD-10-PCS | Mod: 26,,, | Performed by: PATHOLOGY

## 2019-10-28 PROCEDURE — 86334 PATHOLOGIST INTERPRETATION IFE: ICD-10-PCS | Mod: 26,,, | Performed by: PATHOLOGY

## 2019-10-28 PROCEDURE — 99233 SBSQ HOSP IP/OBS HIGH 50: CPT | Mod: ,,, | Performed by: INTERNAL MEDICINE

## 2019-10-28 PROCEDURE — 84100 ASSAY OF PHOSPHORUS: CPT

## 2019-10-28 PROCEDURE — 85025 COMPLETE CBC W/AUTO DIFF WBC: CPT | Mod: 91

## 2019-10-28 PROCEDURE — 85730 THROMBOPLASTIN TIME PARTIAL: CPT | Mod: 91

## 2019-10-28 PROCEDURE — 86703 HIV-1/HIV-2 1 RESULT ANTBDY: CPT

## 2019-10-28 PROCEDURE — 84165 PROTEIN E-PHORESIS SERUM: CPT

## 2019-10-28 PROCEDURE — 63600175 PHARM REV CODE 636 W HCPCS: Mod: JG | Performed by: STUDENT IN AN ORGANIZED HEALTH CARE EDUCATION/TRAINING PROGRAM

## 2019-10-28 PROCEDURE — 86592 SYPHILIS TEST NON-TREP QUAL: CPT

## 2019-10-28 PROCEDURE — 83735 ASSAY OF MAGNESIUM: CPT

## 2019-10-28 RX ORDER — SODIUM CHLORIDE 9 MG/ML
INJECTION, SOLUTION INTRAVENOUS ONCE
Status: COMPLETED | OUTPATIENT
Start: 2019-10-28 | End: 2019-10-28

## 2019-10-28 RX ORDER — ALBUMIN HUMAN 250 G/1000ML
1.5 SOLUTION INTRAVENOUS ONCE
Status: DISCONTINUED | OUTPATIENT
Start: 2019-10-28 | End: 2019-10-28

## 2019-10-28 RX ORDER — LACTULOSE 10 G/15ML
10 SOLUTION ORAL 3 TIMES DAILY
Status: DISCONTINUED | OUTPATIENT
Start: 2019-10-28 | End: 2019-11-03 | Stop reason: HOSPADM

## 2019-10-28 RX ORDER — ALBUMIN HUMAN 250 G/1000ML
50 SOLUTION INTRAVENOUS ONCE
Status: COMPLETED | OUTPATIENT
Start: 2019-10-28 | End: 2019-10-28

## 2019-10-28 RX ORDER — PROCHLORPERAZINE EDISYLATE 5 MG/ML
10 INJECTION INTRAMUSCULAR; INTRAVENOUS EVERY 6 HOURS PRN
Status: DISCONTINUED | OUTPATIENT
Start: 2019-10-28 | End: 2019-11-03 | Stop reason: HOSPADM

## 2019-10-28 RX ADMIN — CEFTRIAXONE 2 G: 2 INJECTION, SOLUTION INTRAVENOUS at 09:10

## 2019-10-28 RX ADMIN — PANTOPRAZOLE SODIUM 40 MG: 40 TABLET, DELAYED RELEASE ORAL at 08:10

## 2019-10-28 RX ADMIN — GABAPENTIN 100 MG: 100 CAPSULE ORAL at 08:10

## 2019-10-28 RX ADMIN — HEPARIN SODIUM AND DEXTROSE 14 UNITS/KG/HR: 10000; 5 INJECTION INTRAVENOUS at 04:10

## 2019-10-28 RX ADMIN — LACTULOSE 10 G: 20 SOLUTION ORAL at 08:10

## 2019-10-28 RX ADMIN — SODIUM CHLORIDE: 0.9 INJECTION, SOLUTION INTRAVENOUS at 02:10

## 2019-10-28 RX ADMIN — PROCHLORPERAZINE EDISYLATE 10 MG: 5 INJECTION INTRAMUSCULAR; INTRAVENOUS at 05:10

## 2019-10-28 RX ADMIN — ONDANSETRON 8 MG: 8 TABLET, ORALLY DISINTEGRATING ORAL at 12:10

## 2019-10-28 RX ADMIN — PREDNISONE 60 MG: 20 TABLET ORAL at 08:10

## 2019-10-28 RX ADMIN — ALBUMIN HUMAN 50 G: 0.25 SOLUTION INTRAVENOUS at 08:10

## 2019-10-28 NOTE — NURSING
Reports feeling nauseated and dizzy. Vomited 300 cc undigested food. Nausea and dizzy resolved w emesis. VS : 158/66, HR 76, RR 20, T 98.9 Ax. ODT Zofran administered. Patient reports resolution of  symptoms. Hygiene provided. Changed clothes. Repositioned. All needs met at this time . On Call resident aware of details via telephone. No new orders WCTM.

## 2019-10-28 NOTE — ASSESSMENT & PLAN NOTE
63 yro F with PMH of EtOH cirrhosis, HCC (receiving treatment with nivolumab), and esophageal varices s/p banding 1/2018 who was presents with PRACHI with 7.1 (baseline creatinine ~1.0) and no UO for 2 days in setting of several weeks of decreased PO intake, nausea, and vomiting. Additionally, she underwent CT scan with contrast 8/16, and was being treated for an UTI with cipro as out patient. Pt also endorses NSAID use for abdominal pain.  -US RP shows no hydronephrosis  -U p/cr 4 (trace protein on past UAs), and no UA available from this admission  -Labs on admission: K of 6.3, Cr 7.4, BUN 60, phos 6.7, bicarb 18.   -received HD 10/26/19    Recommendations:  -trend RFPs, strict IOS, avoid nephrotoxic agents, renally dose medications   -Cr continues to worsen after being on prednisone  -continue HD today  -will need kidney biopsy for definitive diagnosis of her PRACHI

## 2019-10-28 NOTE — PHYSICIAN QUERY
PT Name: Neena Marks  MR #: [id number]     PHYSICIAN QUERY -  ELECTROLYTE CLARIFICATION      CDS: Gifty López RN      Contact information:[email or web address]    This form is a permanent document in the medical record.     Query Date: October 28, 2019    By submitting this query, we are merely seeking further clarification of documentation to reflect the severity of illness of your patient. Please utilize your independent clinical judgment when addressing the question(s) below.    The Medical record reflects the following:     Indicators   Supporting Clinical Findings Location in Medical Record   x Lab Value(s)  10/26  04:36 10/26  16:42 10/26  21:44 10/27  06:45 10/28  01:41   Na+ 126 125  125 128  126    K+ 6.3  5.7  6.3  5.5  5.6       10/26  04:36 10/26  16:42 10/26  21:44 10/27  06:45 10/28  01:41   Calcium 7.8  7.8  8.3  8.4  8.0       Lab Results   x Treatment                 Medication albuterol sulfate nebulizer solution 10 mg   Ordered Dose: 10 mg   Route: Nebulization   Frequency: Once    insulin regular injection 4.08 Units   Ordered Dose: 0.1 Units/kg × 40.8 kg   Route: Intravenous   Frequency: Once    dextrose 10% (D10W) Bolus   Ordered Dose: 25 g   Route: Intravenous   Frequency: Once @ 250 mL/hr over 60 Minutes    calcium gluconate 1g in dextrose 5% 100mL  Ordered Dose: 1 g   Route: Intravenous   Frequency: Every 10 min PRN for If after first dose of calcium gluconate ECG changes persist for 10 minutes.  May repeat another dose after 10 minutes if ECG persists. over 10 Minutes MAR    Other       Provider, please specify the diagnosis or diagnoses that correspond(s) to the above indicators. Kit all that apply:    [   ] Hypocalcemia   [  + ] Hyperkalemia   [ +  ] Hyponatremia   [   ] Other electrolyte disturbance (please specify): _______   [   ]  Clinically Undetermined       Please document in your progress notes daily for the duration of treatment until resolved, and include in your discharge  summary.

## 2019-10-28 NOTE — ASSESSMENT & PLAN NOTE
Hx of PE on chronic anticoagulation with Eliquis; however due to sudden impaired renal function unable to appropriately dose Eliquis   - started heparin drip   - monitor PTTs

## 2019-10-28 NOTE — PROGRESS NOTES
Pressure applied to Left IJ site for 20 mins. Bleeding appears to have stopped. VSS. Will continue to monitor/

## 2019-10-28 NOTE — PROGRESS NOTES
Heparin drip held pending labs. Stat labs drawn. VSS. Pt slightly sleepy but otherwise asymptomatic. Will continue to monitor.

## 2019-10-28 NOTE — ASSESSMENT & PLAN NOTE
MELD of +30 at admission. CMP on admission with albumin of 3.0, Alk phos 96, and AST 75.  - continue to monitor

## 2019-10-28 NOTE — PLAN OF CARE
MDRs completed with Dr. Srinivasan and the team. Patient of Dr. Roberts with a history of HCC undergoing treatment with nivolumab. Patient admitted on 10/25/19 after routine clinic laboratory results revealed a creatinine of 7.1 (baseline creatinine ~1.0). Patient diagnosed with an PRACHI. Garcia catheter in place for strict I's & O's. Nephrology consulted, patient currently undergoing HD. Vital signs are stable. Patient is currently afebrile. O2 sats WNL on room air. Na 126, K 5.6, Phos 5.7, Mg 2.2, Crt 7.7, AST 70, ALT 39. CBC stable. Daily labs ordered. Patient on a Heparin gtt for history of PE (on chronic anticoagulation at home with Eliquis). Prednisone 60 mg PO daily. Discharge needs to be determined. Patient off the unit in dialysis during morning rounds. CM to continue to follow with the team.    Val Santos, RN, BSN, CM  [facility]  Nurse - Med Onc/Gyn Onc

## 2019-10-28 NOTE — PROGRESS NOTES
[facility]  Nephrology  Progress Note    Patient Name: Neena Marks  MRN: [id number]  Admission Date: 10/25/2019  Hospital Length of Stay: 3 days  Attending Provider: Nikos Hansen MD   Primary Care Physician: [facility]  Principal Problem:Acute kidney injury    Subjective:     HPI: 63 yro F with PMH of EtOH cirrhosis, HCC (receiving treatment with nivolumab), and esophageal varices s/p banding 1/2018 who was sent to [facility] [place] heme/onc clinic for PRACHI with 7.1 (baseline creatinine ~1.0). Pt reports several weeks of decreased PO intake and n/v and has had multiple ED visits for this. She was being treated outpatient for an UTI 2/2 Klebsiella since 10/19 with cipro. Reports that she has not been putting out urine for 2 days. She also endorses recent ibuprofen and naproxen use for abdominal pain. Additionally, Pt had Ct with contrast 8/16. She denies fever, dysuria, hematuria, or worsening lower extremity edema. She currently has abdominal distention and abdominal pain which radiates around the left aspect of lumbar region. Patients' vitals with BP 98/54 mmHg. US RP shows no hydronephrosis. U p/cr 4 (trace protein on past UAs), and no UA available from this admission. Review of labs reveals K of 6.3, Cr 7.4, BUN 60, phos 6.7, bicarb 18. On admission patient received 1 L NS bolus. This AM, patient received 60 mg IV lasix without any UO. Garcia in place.         Interval History: 195cc recorded UOP in 24 hours. 3 episode of vomiting overnight. Complains of new tremor X 3-4 days. Also complains of abdominal tenderness.     Review of patient's allergies indicates:  No Known Allergies  Current Facility-Administered Medications   Medication Frequency    0.9%  NaCl infusion Once    calcium gluconate 1g in dextrose 5% 100mL (ready to mix system) Once    And    calcium gluconate 1g in dextrose 5% 100mL (ready to mix system) Q10 Min PRN    gabapentin capsule 100 mg QHS    heparin  (porcine) injection 1,000 Units PRN    heparin 25,000 units in dextrose 5% (100 units/ml) IV bolus from bag - ADDITIONAL PRN BOLUS - 30 units/kg PRN    heparin 25,000 units in dextrose 5% (100 units/ml) IV bolus from bag - ADDITIONAL PRN BOLUS - 60 units/kg PRN    heparin 25,000 units in dextrose 5% 250 mL (100 units/mL) infusion HIGH INTENSITY nomogram - [facility] Continuous    ondansetron disintegrating tablet 8 mg Q8H PRN    oxyCODONE immediate release tablet 5 mg Q6H PRN    pantoprazole EC tablet 40 mg Daily    predniSONE tablet 60 mg Daily    promethazine (PHENERGAN) 6.25 mg in dextrose 5 % 50 mL IVPB Q6H PRN    sodium chloride 0.9% flush 10 mL PRN       Objective:     Vital Signs (Most Recent):  Temp: 98.6 °F (37 °C) (10/28/19 0755)  Pulse: 64 (10/28/19 0755)  Resp: 16 (10/28/19 0755)  BP: (!) 96/51 (10/28/19 0756)  SpO2: 98 % (10/28/19 0755)  O2 Device (Oxygen Therapy): room air (10/27/19 2302) Vital Signs (24h Range):  Temp:  [98.1 °F (36.7 °C)-98.6 °F (37 °C)] 98.6 °F (37 °C)  Pulse:  [61-91] 64  Resp:  [15-20] 16  SpO2:  [95 %-100 %] 98 %  BP: ()/(51-66) 96/51     Weight: 40.8 kg (90 lb) (10/25/19 1542)  Body mass index is 18.18 kg/m².  Body surface area is 1.3 meters squared.    I/O last 3 completed shifts:  In: 720 [P.O.:720]  Out: 195 [Urine:195]    Physical Exam   Constitutional: She is oriented to person, place, and time. She appears well-developed.   cachectic    HENT:   Head: Normocephalic and atraumatic.   Eyes: Pupils are equal, round, and reactive to light. EOM are normal.   Neck: Neck supple. No thyromegaly present.   Cardiovascular: Normal rate, regular rhythm and normal heart sounds.   No murmur heard.  Pulmonary/Chest: Effort normal and breath sounds normal. No respiratory distress.   Abdominal: Soft. Bowel sounds are normal. She exhibits distension. There is tenderness.   Musculoskeletal: She exhibits no edema or deformity.   Lymphadenopathy:     She has no cervical adenopathy.    Neurological: She is alert and oriented to person, place, and time.   Skin: Skin is warm and dry.     Significant Labs:  CBC:   Recent Labs   Lab 10/28/19  0141   WBC 8.68   RBC 3.64*   HGB 8.1*   HCT 23.6*      MCV 65*   MCH 22.3*   MCHC 34.3     CMP:   Recent Labs   Lab 10/28/19  0141   *   CALCIUM 8.0*   ALBUMIN 2.6*   PROT 7.3   *   K 5.6*   CO2 20*   CL 92*   BUN 69*   CREATININE 7.7*   ALKPHOS 90   ALT 39   AST 70*   BILITOT 1.0     Coagulation:   Recent Labs   Lab 10/28/19  0141   INR 1.5*   APTT >150.0*     All labs within the past 24 hours have been reviewed.     Significant Imaging:  all imaging in past 24 hours reviewed    Assessment/Plan:     * Acute kidney injury  63 yro F with PMH of EtOH cirrhosis, HCC (receiving treatment with nivolumab), and esophageal varices s/p banding 1/2018 who was presents with PRACHI with 7.1 (baseline creatinine ~1.0) and no UO for 2 days in setting of several weeks of decreased PO intake, nausea, and vomiting. Additionally, she underwent CT scan with contrast 8/16, and was being treated for an UTI with cipro as out patient. Pt also endorses NSAID use for abdominal pain.  -US RP shows no hydronephrosis  -U p/cr 4 (trace protein on past UAs), and no UA available from this admission  -Labs on admission: K of 6.3, Cr 7.4, BUN 60, phos 6.7, bicarb 18.   -received HD 10/26/19    Recommendations:  -trend RFPs, strict IOS, avoid nephrotoxic agents, renally dose medications   -Cr continues to worsen after being on prednisone  -continue HD today  -will need kidney biopsy for definitive diagnosis of her PRACHI        Thank you for your consult. I will follow-up with patient. Please contact us if you have any additional questions.    Christopher Aranda MD  Nephrology  [facility]

## 2019-10-28 NOTE — SUBJECTIVE & OBJECTIVE
Interval History: 195cc recorded UOP in 24 hours. 3 episode of vomiting overnight. Complains of new tremor X 3-4 days. Also complains of abdominal tenderness.     Review of patient's allergies indicates:  No Known Allergies  Current Facility-Administered Medications   Medication Frequency    0.9%  NaCl infusion Once    calcium gluconate 1g in dextrose 5% 100mL (ready to mix system) Once    And    calcium gluconate 1g in dextrose 5% 100mL (ready to mix system) Q10 Min PRN    gabapentin capsule 100 mg QHS    heparin (porcine) injection 1,000 Units PRN    heparin 25,000 units in dextrose 5% (100 units/ml) IV bolus from bag - ADDITIONAL PRN BOLUS - 30 units/kg PRN    heparin 25,000 units in dextrose 5% (100 units/ml) IV bolus from bag - ADDITIONAL PRN BOLUS - 60 units/kg PRN    heparin 25,000 units in dextrose 5% 250 mL (100 units/mL) infusion HIGH INTENSITY nomogram - [facility] Continuous    ondansetron disintegrating tablet 8 mg Q8H PRN    oxyCODONE immediate release tablet 5 mg Q6H PRN    pantoprazole EC tablet 40 mg Daily    predniSONE tablet 60 mg Daily    promethazine (PHENERGAN) 6.25 mg in dextrose 5 % 50 mL IVPB Q6H PRN    sodium chloride 0.9% flush 10 mL PRN       Objective:     Vital Signs (Most Recent):  Temp: 98.6 °F (37 °C) (10/28/19 0755)  Pulse: 64 (10/28/19 0755)  Resp: 16 (10/28/19 0755)  BP: (!) 96/51 (10/28/19 0756)  SpO2: 98 % (10/28/19 0755)  O2 Device (Oxygen Therapy): room air (10/27/19 2302) Vital Signs (24h Range):  Temp:  [98.1 °F (36.7 °C)-98.6 °F (37 °C)] 98.6 °F (37 °C)  Pulse:  [61-91] 64  Resp:  [15-20] 16  SpO2:  [95 %-100 %] 98 %  BP: ()/(51-66) 96/51     Weight: 40.8 kg (90 lb) (10/25/19 1542)  Body mass index is 18.18 kg/m².  Body surface area is 1.3 meters squared.    I/O last 3 completed shifts:  In: 720 [P.O.:720]  Out: 195 [Urine:195]    Physical Exam   Constitutional: She is oriented to person, place, and time. She appears well-developed.   cachectic    HENT:   Head:  Normocephalic and atraumatic.   Eyes: Pupils are equal, round, and reactive to light. EOM are normal.   Neck: Neck supple. No thyromegaly present.   Cardiovascular: Normal rate, regular rhythm and normal heart sounds.   No murmur heard.  Pulmonary/Chest: Effort normal and breath sounds normal. No respiratory distress.   Abdominal: Soft. Bowel sounds are normal. She exhibits distension. There is tenderness.   Musculoskeletal: She exhibits no edema or deformity.   Lymphadenopathy:     She has no cervical adenopathy.   Neurological: She is alert and oriented to person, place, and time.   Skin: Skin is warm and dry.     Significant Labs:  CBC:   Recent Labs   Lab 10/28/19  0141   WBC 8.68   RBC 3.64*   HGB 8.1*   HCT 23.6*      MCV 65*   MCH 22.3*   MCHC 34.3     CMP:   Recent Labs   Lab 10/28/19  0141   *   CALCIUM 8.0*   ALBUMIN 2.6*   PROT 7.3   *   K 5.6*   CO2 20*   CL 92*   BUN 69*   CREATININE 7.7*   ALKPHOS 90   ALT 39   AST 70*   BILITOT 1.0     Coagulation:   Recent Labs   Lab 10/28/19  0141   INR 1.5*   APTT >150.0*     All labs within the past 24 hours have been reviewed.     Significant Imaging:  all imaging in past 24 hours reviewed

## 2019-10-28 NOTE — PROGRESS NOTES
[facility]  Hematology/Oncology  Progress Note    Patient Name: Neena Marks  Admission Date: 10/25/2019  Hospital Length of Stay: 3 days  Code Status: Full Code     Subjective:     HPI:  Ms Marks is a 63-year-old  female with alcoholic cirrhosis dating back to 2015, portal HTN, ascites, history of variceal bleeding in January 2018 s/p banding, history of alcohol abuse (recently quit in January of 2018), and newly diagnosed hepatocellular carcinoma undergoing treatment with nivolumab who presented as a direct transfer from Heme/Onc clinic on 10/25 after routine laboratory studies were remarkable for serum creatinine of 7.1 (baseline creatinine ~1.0). Patient reports one week history of nasuea and vomiting. Per chart review, patient has presented to [facility] ED three times this month with complaints of abdominal pain, back pain, nausea, and vomiting. She was noted to have UTI 2/2 Klebsiella on 10/19 and is currently  undergoing treatment with ciprofloxacin.  She denies relief in abdominal and back pain with prescribed medications. Her nausea and vomiting has persisted as well. She reports decreased PO intake over this time span secondary to nausea and has a history of recent ibuprofen and naproxen use for abdominal pain. She denies fever, dysuria, hematuria, chest pain, shortness of breath, syncope, or worsening lower extremity edema. She currently has abdominal distention and abdominal pain which radiates around the left aspect of lumbar region. Patients' vitals with BP 98/54 mmHg, HR 80 bpm, afebrile with temperature of 98 F, and RR 16.     Cirrhosis & HCC History:  AFP tumor marker is normal.  Hep C and B testing on 4/27/2018 - negative.    Endoscopy - 1/9/19 - Grade II esophageal varices. Completely eradicated. Banded, Small hiatal hernia., Portal hypertensive gastropathy. Treated with argon plasma coagulation (APC).     Patient had an appointment on 2/15/2019, IR consult for  Y90 but she missed her appointment.   - She had IR mapping and Hepatic angiography demonstrates a large hypervascular lesion in the right hepatic lobe supplied by branches of the right hepatic artery and accessory left hepatic artery.  Technetium MAA was injected aorta determined lung shunt fraction.  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  - Nuclear medicine scan revealed that majority of tracer goes to the lungs with a liver lung shunt fraction of 68.9%.   is not a candidate for Y90 or other liver directed therapies due to liver lung shunt     5/9/2019 - Started taking Sorafenib 400mg BID from  7/6/2019 - Decrease to 200 mg BID due to hand foot syndrome  8/16/2019 - Stopped Sorafenib on  due to persistent desquamation of hand foot syndrome  08/28/2019 - CT Chest abdomen and pelvis done revealed disease progression and also new bilateral pulmonary embolism. She was admitted to [facility] main campus and discharged on Eliquis (Patient declined Lovenox)   9/17/2019 -  Fall and followed up in ED and Xray right knee revealed no acute osseous abnormality  9/27/2019 - Started C 1 D1 Nivolumab q 4 weeks  10/25/2019 - PRACHI with creatinine of 7.1, direct admit     Interval History: NAEON.  Patient had one episode of nausea and vomiting of clear liquid that resolved with 1x dose of zofran.      Oncology Treatment Plan:   OP NIVOLUMAB Q4W    Medications:  Continuous Infusions:   heparin (porcine) in D5W 14 Units/kg/hr (10/28/19 0418)     Scheduled Meds:   sodium chloride 0.9%   Intravenous Once    calcium gluconate IVPB  1 g Intravenous Once    gabapentin  100 mg Oral QHS    pantoprazole  40 mg Oral Daily    predniSONE  60 mg Oral Daily     PRN Meds:calcium gluconate IVPB **AND** calcium gluconate IVPB, heparin (porcine), heparin (PORCINE), heparin (PORCINE), ondansetron,  oxyCODONE, promethazine (PHENERGAN) IVPB, sodium chloride 0.9%     Review of Systems   Constitutional: Negative for chills, diaphoresis, fatigue and fever.   HENT: Negative for sore throat.    Respiratory: Negative for shortness of breath and wheezing.    Cardiovascular: Negative for chest pain, palpitations and leg swelling.   Gastrointestinal: Positive for constipation. Negative for abdominal distention, abdominal pain, diarrhea, nausea and vomiting.   Genitourinary: Negative for dysuria.        Decreased urine output.   Musculoskeletal: Negative for joint swelling.   Neurological: Negative for dizziness and headaches.   Psychiatric/Behavioral: Negative for behavioral problems and confusion.     Objective:     Vital Signs (Most Recent):  Temp: 98.1 °F (36.7 °C) (10/28/19 0425)  Pulse: 71 (10/28/19 0425)  Resp: 17 (10/28/19 0425)  BP: (!) 110/55 (10/28/19 0425)  SpO2: 97 % (10/28/19 0425) Vital Signs (24h Range):  Temp:  [98.1 °F (36.7 °C)-98.6 °F (37 °C)] 98.1 °F (36.7 °C)  Pulse:  [64-91] 71  Resp:  [15-20] 17  SpO2:  [92 %-100 %] 97 %  BP: ()/(55-66) 110/55     Weight: 40.8 kg (90 lb)  Body mass index is 18.18 kg/m².  Body surface area is 1.3 meters squared.      Intake/Output Summary (Last 24 hours) at 10/28/2019 0615  Last data filed at 10/28/2019 0418  Gross per 24 hour   Intake 720 ml   Output 195 ml   Net 525 ml       Physical Exam   Constitutional: Vital signs are normal. She appears cachectic.   Patient is a 63 yo F who appears cachetic, older than stated age and in NAD   HENT:   Head: Normocephalic and atraumatic.   Eyes: EOM are normal. No scleral icterus.   Arcus senilis    Neck: Neck supple.   Central line on the left.   Cardiovascular: Normal rate, regular rhythm, normal heart sounds and intact distal pulses. Exam reveals no gallop and no friction rub.   No murmur heard.  Pulmonary/Chest: Effort normal and breath sounds normal. She has no wheezes. She has no rales.   Abdominal: Soft. Bowel  sounds are normal. She exhibits distension. There is no tenderness.   Abdomen soft but distended. There is ascites and superficial dilation of veins overlying abdomen.   Musculoskeletal: She exhibits edema.   Trace lower extremity edema   Neurological: She is alert.   Skin: Skin is warm and dry.   Psychiatric: She has a normal mood and affect. Her behavior is normal.   Nursing note and vitals reviewed.    Significant Labs:   CBC:   Recent Labs   Lab 10/27/19  0645 10/28/19  0141   WBC 12.06 8.68   HGB 8.0* 8.1*   HCT 23.8* 23.6*    196    and CMP:   Recent Labs   Lab 10/26/19  2144 10/27/19  0645 10/28/19  0141   * 128* 126*   K 6.3* 5.5* 5.6*   CL 97 95 92*   CO2 15* 22* 20*   * 151* 157*   BUN 70* 55* 69*   CREATININE 8.6* 6.6* 7.7*   CALCIUM 8.3* 8.4* 8.0*   PROT  --  7.0 7.3   ALBUMIN  --  2.5* 2.6*   BILITOT  --  0.9 1.0   ALKPHOS  --  91 90   AST  --  62* 70*   ALT  --  34 39   ANIONGAP 13 11 14   EGFRNONAA 4.5* 6.2* 5.1*       Diagnostic Results:  I have reviewed all pertinent imaging results/findings within the past 24 hours.    Assessment/Plan:     * Acute kidney injury  Patient with baseline serum creatinine around 1.0. Serum creatinine was 1.1 on 10/19. 10/25 serum creatinine was noted to be 7.1. K of 6.3 that required urgent HD (10/26)  -  Burgos stain negative, urine protein to creatinine ratio of 4, Fe urea 62.3% suggestive of intrinsic etiology, US without hydronephrosis, Garcia with scant urin output despite IV Lasix and IVFs    - Continue with Garcia catheter with strict I/Os  - Will continue to avoid nephrotoxic agents (i.e. 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 will continue with 1 mg/kg steroids (60 mg) daily  - Continue to monitor electrolytes, will likely need further dialysis     Pulmonary embolism  Hx of PE on chronic anticoagulation with Eliquis; however due to  sudden impaired renal function, unable to appropriately dose Eliquis   - started heparin drip   - monitor PTTs    HCC (hepatocellular carcinoma)  - hold immunotherapy given concern for plausible immunotherapy induced nephritis     Portal hypertensive gastropathy  - holding propanolol as above    Esophageal varices in alcoholic cirrhosis  - patient denies any hemtopyosis, BRBPR, or melena  - holding home dose propanol 10 mg BID in light of hypotension    Alcoholic cirrhosis  MELD of +30 at admission. CMP on admission with albumin of 3.0, Alk phos 96, and AST 75.  - continue to monitor    Iron deficiency anemia due to chronic blood loss  Hemoglobin 8.8 on admission.     - continue to monitor with daily CBCs  - transfuse Hgb < 7             Huan Jaffe MD  Hematology/Oncology  [facility]      ATTENDING NOTE, ONCOLOGY INPATIENT TEAM    As above; events of last 24 hours noted.  Patient seen and examined during her hemodialysis, chart reviewed.  Appears comfortable, in NAD.  She remains anuric  Lungs are clear to auscultation.  Abdomen is soft, nontender.  Labs reviewed.    PLAN  Continue steroids for now.  Continue dialysis and follow electrolytes.    Prognosis is guarded.  We will follow.    Nikos Hansen MD

## 2019-10-28 NOTE — ASSESSMENT & PLAN NOTE
Patient with baseline serum creatinine around 1.0. Serum creatinine was 1.1 on 10/19. 10/25 serum creatinine was noted to be 7.1. K of 6.3 that required urgent HD (10/26)  -  Burgos stain negative, urine protein to creatinine ratio of 4, Fe urea 62.3% suggestive of intrinsic etiology, US without hydronephrosis, Garcia with scant urin output despite IV Lasix and IVFs    - Continue with Garcia catheter with strict I/Os  - Will continue to avoid nephrotoxic agents (i.e. 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 will continue with 1 mg/kg steroids (60 mg) daily  - Continue to monitor electrolytes

## 2019-10-28 NOTE — SUBJECTIVE & OBJECTIVE
Interval History: NAEON.  Patient had one episode of nausea and vomiting of clear liquid that resolved with 1x dose of zofran.      Oncology Treatment Plan:   OP NIVOLUMAB Q4W    Medications:  Continuous Infusions:   heparin (porcine) in D5W 14 Units/kg/hr (10/28/19 0418)     Scheduled Meds:   sodium chloride 0.9%   Intravenous Once    calcium gluconate IVPB  1 g Intravenous Once    gabapentin  100 mg Oral QHS    pantoprazole  40 mg Oral Daily    predniSONE  60 mg Oral Daily     PRN Meds:calcium gluconate IVPB **AND** calcium gluconate IVPB, heparin (porcine), heparin (PORCINE), heparin (PORCINE), ondansetron, oxyCODONE, promethazine (PHENERGAN) IVPB, sodium chloride 0.9%     Review of Systems   Constitutional: Negative for chills, diaphoresis, fatigue and fever.   HENT: Negative for sore throat.    Respiratory: Negative for shortness of breath and wheezing.    Cardiovascular: Negative for chest pain, palpitations and leg swelling.   Gastrointestinal: Positive for constipation. Negative for abdominal distention, abdominal pain, diarrhea, nausea and vomiting.   Genitourinary: Negative for dysuria.        Decreased urine output.   Musculoskeletal: Negative for joint swelling.   Neurological: Negative for dizziness and headaches.   Psychiatric/Behavioral: Negative for behavioral problems and confusion.     Objective:     Vital Signs (Most Recent):  Temp: 98.1 °F (36.7 °C) (10/28/19 0425)  Pulse: 71 (10/28/19 0425)  Resp: 17 (10/28/19 0425)  BP: (!) 110/55 (10/28/19 0425)  SpO2: 97 % (10/28/19 0425) Vital Signs (24h Range):  Temp:  [98.1 °F (36.7 °C)-98.6 °F (37 °C)] 98.1 °F (36.7 °C)  Pulse:  [64-91] 71  Resp:  [15-20] 17  SpO2:  [92 %-100 %] 97 %  BP: ()/(55-66) 110/55     Weight: 40.8 kg (90 lb)  Body mass index is 18.18 kg/m².  Body surface area is 1.3 meters squared.      Intake/Output Summary (Last 24 hours) at 10/28/2019 0615  Last data filed at 10/28/2019 0418  Gross per 24 hour   Intake 720 ml    Output 195 ml   Net 525 ml       Physical Exam   Constitutional: Vital signs are normal. She appears cachectic.   Patient is a 63 yo F who appears cachetic, older than stated age and in NAD   HENT:   Head: Normocephalic and atraumatic.   Eyes: EOM are normal. No scleral icterus.   Arcus senilis    Neck: Neck supple.   Central line on the left.   Cardiovascular: Normal rate, regular rhythm, normal heart sounds and intact distal pulses. Exam reveals no gallop and no friction rub.   No murmur heard.  Pulmonary/Chest: Effort normal and breath sounds normal. She has no wheezes. She has no rales.   Abdominal: Soft. Bowel sounds are normal. She exhibits distension. There is no tenderness.   Abdomen soft but distended. There is ascites and superficial dilation of veins overlying abdomen.   Musculoskeletal: She exhibits edema.   Trace lower extremity edema   Neurological: She is alert.   Skin: Skin is warm and dry.   Psychiatric: She has a normal mood and affect. Her behavior is normal.   Nursing note and vitals reviewed.    Significant Labs:   CBC:   Recent Labs   Lab 10/27/19  0645 10/28/19  0141   WBC 12.06 8.68   HGB 8.0* 8.1*   HCT 23.8* 23.6*    196    and CMP:   Recent Labs   Lab 10/26/19  2144 10/27/19  0645 10/28/19  0141   * 128* 126*   K 6.3* 5.5* 5.6*   CL 97 95 92*   CO2 15* 22* 20*   * 151* 157*   BUN 70* 55* 69*   CREATININE 8.6* 6.6* 7.7*   CALCIUM 8.3* 8.4* 8.0*   PROT  --  7.0 7.3   ALBUMIN  --  2.5* 2.6*   BILITOT  --  0.9 1.0   ALKPHOS  --  91 90   AST  --  62* 70*   ALT  --  34 39   ANIONGAP 13 11 14   EGFRNONAA 4.5* 6.2* 5.1*       Diagnostic Results:  I have reviewed all pertinent imaging results/findings within the past 24 hours.

## 2019-10-28 NOTE — PLAN OF CARE
POC reviewed w patient at beginning of shift and PRN Heparin drip continued per nomogram. Denies pain but frequently rubs abdomen. 1 episode vomiting undigested food, resolved w ODT zofran. 2 large incontinent BM's this shift.  Communicating needs throughout shift. Telemetry maintained; normal sinus rythmn.  VSS; no acute events so far this shift.  Pt remaining free from falls or injury throughout shift; bed locked and in lowest position; call light within reach.  Pt instructed to call for assistance as needed. Bed alarm on. Will CTM

## 2019-10-28 NOTE — PROGRESS NOTES
Arrived to dialysis via stretcher. AAO x 4. Agreed to 2.5 hour HD Tx. UF goal 500ml. Aspirated CathFlo. Venous lumen difficult to aspirate but flushes. Arterial lumen sluggish to aspirate and flush.

## 2019-10-28 NOTE — PROGRESS NOTES
Pt noted to be bleeding at Left IJ site. Heparin drip paused. Pt not c/o of any pain related to site. MD at bedside to assess. Will continue to monitor.

## 2019-10-29 LAB
ABO + RH BLD: NORMAL
ALBUMIN SERPL BCP-MCNC: 3.4 G/DL (ref 3.5–5.2)
ALBUMIN SERPL ELPH-MCNC: 2.87 G/DL (ref 3.35–5.55)
ALP SERPL-CCNC: 63 U/L (ref 55–135)
ALPHA1 GLOB SERPL ELPH-MCNC: 0.35 G/DL (ref 0.17–0.41)
ALPHA2 GLOB SERPL ELPH-MCNC: 0.63 G/DL (ref 0.43–0.99)
ALT SERPL W/O P-5'-P-CCNC: 32 U/L (ref 10–44)
ANION GAP SERPL CALC-SCNC: 11 MMOL/L (ref 8–16)
APTT BLDCRRT: 24.5 SEC (ref 21–32)
APTT BLDCRRT: 45.4 SEC (ref 21–32)
AST SERPL-CCNC: 52 U/L (ref 10–40)
B-GLOBULIN SERPL ELPH-MCNC: 1.17 G/DL (ref 0.5–1.1)
BASOPHILS # BLD AUTO: 0 K/UL (ref 0–0.2)
BASOPHILS # BLD AUTO: 0.01 K/UL (ref 0–0.2)
BASOPHILS NFR BLD: 0 % (ref 0–1.9)
BASOPHILS NFR BLD: 0.2 % (ref 0–1.9)
BILIRUB SERPL-MCNC: 0.9 MG/DL (ref 0.1–1)
BLD GP AB SCN CELLS X3 SERPL QL: NORMAL
BLD PROD TYP BPU: NORMAL
BLOOD UNIT EXPIRATION DATE: NORMAL
BLOOD UNIT TYPE CODE: 9500
BLOOD UNIT TYPE: NORMAL
BUN SERPL-MCNC: 46 MG/DL (ref 8–23)
CALCIUM SERPL-MCNC: 8.6 MG/DL (ref 8.7–10.5)
CHLORIDE SERPL-SCNC: 96 MMOL/L (ref 95–110)
CO2 SERPL-SCNC: 24 MMOL/L (ref 23–29)
CODING SYSTEM: NORMAL
CREAT SERPL-MCNC: 5.3 MG/DL (ref 0.5–1.4)
DIFFERENTIAL METHOD: ABNORMAL
DIFFERENTIAL METHOD: ABNORMAL
DISPENSE STATUS: NORMAL
EOSINOPHIL # BLD AUTO: 0 K/UL (ref 0–0.5)
EOSINOPHIL # BLD AUTO: 0 K/UL (ref 0–0.5)
EOSINOPHIL NFR BLD: 0 % (ref 0–8)
EOSINOPHIL NFR BLD: 0 % (ref 0–8)
ERYTHROCYTE [DISTWIDTH] IN BLOOD BY AUTOMATED COUNT: 16.5 % (ref 11.5–14.5)
ERYTHROCYTE [DISTWIDTH] IN BLOOD BY AUTOMATED COUNT: 16.8 % (ref 11.5–14.5)
EST. GFR  (AFRICAN AMERICAN): 9.3 ML/MIN/1.73 M^2
EST. GFR  (NON AFRICAN AMERICAN): 8.1 ML/MIN/1.73 M^2
FIBRINOGEN PPP-MCNC: 196 MG/DL (ref 182–366)
GAMMA GLOB SERPL ELPH-MCNC: 1.78 G/DL (ref 0.67–1.58)
GLUCOSE SERPL-MCNC: 122 MG/DL (ref 70–110)
HAV IGM SERPL QL IA: NEGATIVE
HBV CORE IGM SERPL QL IA: NEGATIVE
HBV SURFACE AG SERPL QL IA: NEGATIVE
HCT VFR BLD AUTO: 18.5 % (ref 37–48.5)
HCT VFR BLD AUTO: 18.9 % (ref 37–48.5)
HCV AB SERPL QL IA: NEGATIVE
HGB BLD-MCNC: 6.2 G/DL (ref 12–16)
HGB BLD-MCNC: 6.4 G/DL (ref 12–16)
HIV 1+2 AB+HIV1 P24 AG SERPL QL IA: NEGATIVE
IMM GRANULOCYTES # BLD AUTO: 0.06 K/UL (ref 0–0.04)
IMM GRANULOCYTES # BLD AUTO: 0.06 K/UL (ref 0–0.04)
IMM GRANULOCYTES NFR BLD AUTO: 0.9 % (ref 0–0.5)
IMM GRANULOCYTES NFR BLD AUTO: 0.9 % (ref 0–0.5)
INTERPRETATION SERPL IFE-IMP: NORMAL
KAPPA LC SER QL IA: 21.66 MG/DL (ref 0.33–1.94)
KAPPA LC/LAMBDA SER IA: 1.78 (ref 0.26–1.65)
LAMBDA LC SER QL IA: 12.14 MG/DL (ref 0.57–2.63)
LYMPHOCYTES # BLD AUTO: 1 K/UL (ref 1–4.8)
LYMPHOCYTES # BLD AUTO: 1.2 K/UL (ref 1–4.8)
LYMPHOCYTES NFR BLD: 15.6 % (ref 18–48)
LYMPHOCYTES NFR BLD: 17.4 % (ref 18–48)
MAGNESIUM SERPL-MCNC: 1.9 MG/DL (ref 1.6–2.6)
MCH RBC QN AUTO: 21.3 PG (ref 27–31)
MCH RBC QN AUTO: 22 PG (ref 27–31)
MCHC RBC AUTO-ENTMCNC: 32.8 G/DL (ref 32–36)
MCHC RBC AUTO-ENTMCNC: 34.6 G/DL (ref 32–36)
MCV RBC AUTO: 64 FL (ref 82–98)
MCV RBC AUTO: 65 FL (ref 82–98)
MONOCYTES # BLD AUTO: 1 K/UL (ref 0.3–1)
MONOCYTES # BLD AUTO: 1 K/UL (ref 0.3–1)
MONOCYTES NFR BLD: 14.5 % (ref 4–15)
MONOCYTES NFR BLD: 14.7 % (ref 4–15)
NEUTROPHILS # BLD AUTO: 4.5 K/UL (ref 1.8–7.7)
NEUTROPHILS # BLD AUTO: 4.6 K/UL (ref 1.8–7.7)
NEUTROPHILS NFR BLD: 67 % (ref 38–73)
NEUTROPHILS NFR BLD: 68.8 % (ref 38–73)
NRBC BLD-RTO: 0 /100 WBC
NRBC BLD-RTO: 0 /100 WBC
PATHOLOGIST INTERPRETATION IFE: NORMAL
PATHOLOGIST INTERPRETATION SPE: NORMAL
PHOSPHATE SERPL-MCNC: 4.9 MG/DL (ref 2.7–4.5)
PLATELET # BLD AUTO: 147 K/UL (ref 150–350)
PLATELET # BLD AUTO: 151 K/UL (ref 150–350)
PMV BLD AUTO: ABNORMAL FL (ref 9.2–12.9)
PMV BLD AUTO: ABNORMAL FL (ref 9.2–12.9)
POTASSIUM SERPL-SCNC: 4.2 MMOL/L (ref 3.5–5.1)
PROT SERPL-MCNC: 6.6 G/DL (ref 6–8.4)
PROT SERPL-MCNC: 6.8 G/DL (ref 6–8.4)
RBC # BLD AUTO: 2.91 M/UL (ref 4–5.4)
RBC # BLD AUTO: 2.91 M/UL (ref 4–5.4)
RPR SER QL: NORMAL
SODIUM SERPL-SCNC: 131 MMOL/L (ref 136–145)
TRANS ERYTHROCYTES VOL PATIENT: NORMAL ML
WBC # BLD AUTO: 6.62 K/UL (ref 3.9–12.7)
WBC # BLD AUTO: 6.68 K/UL (ref 3.9–12.7)

## 2019-10-29 PROCEDURE — 84100 ASSAY OF PHOSPHORUS: CPT

## 2019-10-29 PROCEDURE — 86920 COMPATIBILITY TEST SPIN: CPT

## 2019-10-29 PROCEDURE — 80053 COMPREHEN METABOLIC PANEL: CPT

## 2019-10-29 PROCEDURE — 20600001 HC STEP DOWN PRIVATE ROOM

## 2019-10-29 PROCEDURE — 85384 FIBRINOGEN ACTIVITY: CPT

## 2019-10-29 PROCEDURE — 99233 SBSQ HOSP IP/OBS HIGH 50: CPT | Mod: ,,, | Performed by: INTERNAL MEDICINE

## 2019-10-29 PROCEDURE — 36430 TRANSFUSION BLD/BLD COMPNT: CPT

## 2019-10-29 PROCEDURE — 85730 THROMBOPLASTIN TIME PARTIAL: CPT

## 2019-10-29 PROCEDURE — 25000003 PHARM REV CODE 250: Performed by: STUDENT IN AN ORGANIZED HEALTH CARE EDUCATION/TRAINING PROGRAM

## 2019-10-29 PROCEDURE — 86850 RBC ANTIBODY SCREEN: CPT

## 2019-10-29 PROCEDURE — 85025 COMPLETE CBC W/AUTO DIFF WBC: CPT | Mod: 91

## 2019-10-29 PROCEDURE — P9021 RED BLOOD CELLS UNIT: HCPCS

## 2019-10-29 PROCEDURE — 63600175 PHARM REV CODE 636 W HCPCS: Performed by: STUDENT IN AN ORGANIZED HEALTH CARE EDUCATION/TRAINING PROGRAM

## 2019-10-29 PROCEDURE — 83735 ASSAY OF MAGNESIUM: CPT

## 2019-10-29 PROCEDURE — 27201040 HC RC 50 FILTER

## 2019-10-29 PROCEDURE — 85730 THROMBOPLASTIN TIME PARTIAL: CPT | Mod: 91

## 2019-10-29 PROCEDURE — 99233 PR SUBSEQUENT HOSPITAL CARE,LEVL III: ICD-10-PCS | Mod: ,,, | Performed by: INTERNAL MEDICINE

## 2019-10-29 RX ORDER — RAMELTEON 8 MG/1
8 TABLET ORAL NIGHTLY PRN
Status: DISCONTINUED | OUTPATIENT
Start: 2019-10-29 | End: 2019-11-03 | Stop reason: HOSPADM

## 2019-10-29 RX ORDER — HYDROCODONE BITARTRATE AND ACETAMINOPHEN 500; 5 MG/1; MG/1
TABLET ORAL
Status: DISCONTINUED | OUTPATIENT
Start: 2019-10-29 | End: 2019-11-03 | Stop reason: HOSPADM

## 2019-10-29 RX ORDER — HEPARIN SODIUM,PORCINE/D5W 25000/250
10 INTRAVENOUS SOLUTION INTRAVENOUS CONTINUOUS
Status: DISCONTINUED | OUTPATIENT
Start: 2019-10-29 | End: 2019-11-02

## 2019-10-29 RX ADMIN — PANTOPRAZOLE SODIUM 40 MG: 40 TABLET, DELAYED RELEASE ORAL at 08:10

## 2019-10-29 RX ADMIN — LACTULOSE 10 G: 20 SOLUTION ORAL at 08:10

## 2019-10-29 RX ADMIN — RAMELTEON 8 MG: 8 TABLET ORAL at 11:10

## 2019-10-29 RX ADMIN — GABAPENTIN 100 MG: 100 CAPSULE ORAL at 09:10

## 2019-10-29 RX ADMIN — CEFTRIAXONE 2 G: 2 INJECTION, SOLUTION INTRAVENOUS at 09:10

## 2019-10-29 RX ADMIN — HEPARIN SODIUM AND DEXTROSE 10 UNITS/KG/HR: 10000; 5 INJECTION INTRAVENOUS at 01:10

## 2019-10-29 RX ADMIN — PREDNISONE 60 MG: 20 TABLET ORAL at 08:10

## 2019-10-29 RX ADMIN — LACTULOSE 10 G: 20 SOLUTION ORAL at 02:10

## 2019-10-29 NOTE — PLAN OF CARE
POC reviewed with patient; understanding verbalized. 1U RBC administered this shift. Heparin drip started this shift. Aptt to be drawn at 1930. Pt remains up with assist. Renal diet with good appetite. Tele in place. Garcia in place with concentrated output; one BM this shift.  Pt. with nonskid footwear on, bed in lowest position, and locked with bed rails up x2.  Pt. instructed to call prior to getting OOB.  Pt. has call light and personal items within reach. VSS and afebrile this shift. All questions and concerns addressed at this time. Will continue to monitor.

## 2019-10-29 NOTE — ASSESSMENT & PLAN NOTE
Hx of PE (CT scan in August of 2019) on chronic anticoagulation with Eliquis 5 mg BID at home, however, due to sudden impaired renal function unable to appropriately dose Eliquis and was transitioned to heparin drip on 10/27.    - hold heparin drip in light of catheter oozing and sudden drop in Hgb  - monitor aPTT, PT, and INR in the setting of cirrhosis and anticoagulation

## 2019-10-29 NOTE — ASSESSMENT & PLAN NOTE
- with complaints of abdominal pain over night, US with small amount of ascitic fluid  - concern for SBP  - was given albumin and started on Rocephin

## 2019-10-29 NOTE — ASSESSMENT & PLAN NOTE
Hemoglobin 8.8 on admission.     - Hgb 6.4 this AM  - Continue holding heparin drip  - transfuse 1 unit pRBCs this AM  - continue to monitor with daily CBCs  - transfuse Hgb < 7

## 2019-10-29 NOTE — PLAN OF CARE
Reviewed plan of care with pt at the beginning of the shift. Pt reported no pain or n/v throughout the shift. Vital signs were stable throughout the shift.Fall precautions continued for pt. Bed locked and at lowest position. Call light within reach. Pt knows to call if assistance is needed.

## 2019-10-29 NOTE — CARE UPDATE
Patient has been having bleeding around central line throughout the day. Heparin being held. Bleeding is currently stopped, tegaderm with significant bulk underlying, unclear how much is packing versus blood. Will not take down at this time as she appears to have achieved hemostasis.    Came back from dialysis with nausea and vomiting. Mucinous appearance without any blood. Also complaining of abdo pain with vomit.     On exam she appears comfortable. She has mild diffuse abdominal tenderness to palpation with mild rebound. Soft. No guarding. No fevers.     Bedside ultrasound performed. No pocket on right side. 3cm of ascites on left but with intermittent bowels floating into field.     Assessment:  Coagulopathy with bleeding around central venous access for acute renal failure  Chronic ac 2/2 VTE  Decompensated cirrhosis with ascites  Abdominal pain, intermediate probability of SBP  Overall very guarded prognosis    Plan:  Continue to hold heparin  Empiric therapy for SBP with Ceftriaxone (will hold off paracentesis as patient is already having bleeding problems and view not completely clear).   PRN anti-emetics

## 2019-10-29 NOTE — PLAN OF CARE
"Plan of care review with pt and pt communicating needs throughout shift. Pt went to dialysis this am but no fluids were pulled. Dialysis nurse reported that central line had some issues with the venous side having "no aspiration" and arterial side was "sluggish." Pt was on Heparin drip most of the day, however later in the afternoon pt began to bleed at IJ site. See progress notes for more. Pt c/o of severe abdominal pain and nausea and vomiting this evening.  Pt producing little urine about 100 ml the whole shift. Telemetry maintained; normal sinus rythmn.  Pt remaining free from falls or injury throughout shift; bed locked and in lowest position; call light within reach.  Pt instructed to call for assistance as needed.  Q1H rounding done on pt.    "

## 2019-10-29 NOTE — ASSESSMENT & PLAN NOTE
Patient with baseline serum creatinine around 1.0. Serum creatinine was 1.1 on 10/19. 10/25 serum creatinine was noted to be 7.1. K of 6.3 that required urgent HD (10/26).    -  Burgos stain negative, urine protein to creatinine ratio of 4, Fe urea 62.3% suggestive of intrinsic etiology, US without hydronephrosis, Garcia with scant urine output despite IV Lasix and IVFs  - Continue with Garcia catheter with strict I/Os  - Will continue to avoid nephrotoxic agents (i.e. 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

## 2019-10-29 NOTE — PROGRESS NOTES
Admit Assessment    Patient Identification  Neena Marks   :  1955  Admit Date:  10/25/2019  Attending Provider:  MD felipa Hallman  Referral:   Pt was admitted to  with a diagnosis of Acute kidney injury, and was admitted this hospital stay due to Acute kidney injury [N17.9]  HCC (hepatocellular carcinoma) [C22.0]  Acute kidney injury [N17.9].   is involved was referred to the Social Work Department via (Referral).  Patient presents as a 64 y.o. year old  female.      Living Situation:      Resides at [address] [place] [address] [facility] [address], phone: [phone] (home).      (RETIRED) Functional Status Prior  Ambulation Prior: 1-->assistive equipment  Transferrin-->assistive equipment  Toiletin-->assistive equipment    Current or Past Agencies and Description of Services/Supplies    DME  Agency Name: unknown  Agency Phone Number: unknown  Equipment Currently Used at Home: walker, rolling    Home Health  Agency Name: none  Agency Phone Number: none  Services: none  Denies a history of  Home health services    IV Infusion  Agency Name: n/a  Agency Phone Number: n/a    Nutrition: Oral    Outpatient Pharmacy:     [id number] - [place], [place] - [address] [facility] [address]  [address]  [place] [address]  Phone: [phone] Fax: [phone]      Patient Preference of agencies include To be determined as appropriate.    Patient/Caregiver informed of right to choose providers or agencies.  Patient provides permission to release any necessary information to [facility] and to Non-[facility] agencies as needed to facilitate patient care, treatment planning, and patient discharge planning.  Written and verbal resources provided.      Adjustment to Diagnosis and Treatment  Appropriate.  Pt has support of magalys Meredith [phone]  And cousin-George Lewis [phone] and  cousin-in-law  Pratibha Salmeron [phone].  Pt does care for her granddaughter   Who is currently in the care of her dtr Viri.      History/Current Symptoms of Anxiety/Depression: No:   History/Current Substance Use:   Social History     Tobacco Use    Smoking status: Current Every Day Smoker     Types: Cigarettes    Smokeless tobacco: Never Used    Tobacco comment: 2 cigarettes a day   Substance and Sexual Activity    Alcohol use: No     Frequency: Never     Comment: Previously drank 1 pint a day    Drug use: Not Currently     Types: Cocaine     Comment: last use was 3 years ago    Sexual activity: Not Currently       Indications of Abuse/Neglect: No:   Abuse Screen (yes response referral indicated)  Feels Unsafe at Home or Work/School: no  Feels Threatened by Someone: no  Does Anyone Try to Keep You From Having Contact with Others or Doing Things Outside Your Home?: no  Physical Signs of Abuse Present: no    Financial:  Payor/Plan Subscr  Sex Relation Sub. Ins. ID Effective Group Num   1. MEDICAID - [place]* LARON SMART RANJEET 1955 Female  [id number]* 10/1/14                                    [address] [address], [place] [address]        Other identified concerns/needs: Business card including contact information for the SWer provided.      Plan:    Interventions/Referrals: TBD  Patient/caregiver engaged in treatment planning process.     providing psychosocial and supportive counseling, resources, education, assistance and discharge planning as appropriate.  Patient/caregiver state understanding of  available resources,  following, remains available.

## 2019-10-29 NOTE — SUBJECTIVE & OBJECTIVE
Interval History: Patient seen and examined this AM. Denies any new acute complaints over night apart from nausea and vomiting (one episode of non-bilious, non bloody emesis) after second round of HD on 10/28 which was relieved with medications. Still with minimal urine output at this time. Currently she denies abdominal pain but had complaints over night and was started on Rocephin for concern of SBP. Oozing was noted from catheter last night and heparin drip was stopped. Her Hgb noted to be in the 6s this AM, consent was obtained and patient to receive one unit of pRBCs at this time. Otherwise, her abdominal pain has resolved and she denies experiencing any fever, SOB, or chest pain.     Oncology Treatment Plan:   OP NIVOLUMAB Q4W    Medications:  Continuous Infusions:   heparin (porcine) in D5W Stopped (10/28/19 1720)     Scheduled Meds:   calcium gluconate IVPB  1 g Intravenous Once    cefTRIAXone (ROCEPHIN) IVPB  2 g Intravenous Q24H    gabapentin  100 mg Oral QHS    lactulose  10 g Oral TID    pantoprazole  40 mg Oral Daily    predniSONE  60 mg Oral Daily     PRN Meds:sodium chloride, calcium gluconate IVPB **AND** calcium gluconate IVPB, cellulose, oxidized 3 x 4', heparin (porcine), heparin (PORCINE), heparin (PORCINE), ondansetron, oxyCODONE, prochlorperazine, sodium chloride 0.9%     Review of Systems   Constitutional: Negative for chills, diaphoresis, fatigue and fever.   HENT: Negative for nosebleeds.    Respiratory: Positive for cough. Negative for shortness of breath and wheezing.    Cardiovascular: Negative for chest pain, palpitations and leg swelling.   Gastrointestinal: Positive for nausea and vomiting. Negative for abdominal distention, abdominal pain, anal bleeding, blood in stool, constipation and diarrhea.   Genitourinary: Negative for hematuria.        Decreased urine output.   Neurological: Negative for dizziness and headaches.   Hematological: Does not bruise/bleed easily.    Psychiatric/Behavioral: Negative for behavioral problems and confusion.     Objective:     Vital Signs (Most Recent):  Temp: 98.7 °F (37.1 °C) (10/29/19 0724)  Pulse: 80 (10/29/19 0724)  Resp: 16 (10/29/19 0724)  BP: 123/68 (10/29/19 0724)  SpO2: 96 % (10/29/19 0724) Vital Signs (24h Range):  Temp:  [96.2 °F (35.7 °C)-98.7 °F (37.1 °C)] 98.7 °F (37.1 °C)  Pulse:  [64-92] 80  Resp:  [15-18] 16  SpO2:  [92 %-99 %] 96 %  BP: ()/(51-76) 123/68     Weight: 40.8 kg (90 lb)  Body mass index is 18.18 kg/m².  Body surface area is 1.3 meters squared.      Intake/Output Summary (Last 24 hours) at 10/29/2019 0747  Last data filed at 10/28/2019 2141  Gross per 24 hour   Intake 880 ml   Output 866 ml   Net 14 ml       Physical Exam   Constitutional: Vital signs are normal. She appears cachectic.   HENT:   Head: Normocephalic and atraumatic.   Eyes: EOM are normal. No scleral icterus.   Arcus senilis    Neck: Neck supple.   Central line on the left. Pressure gauze in place. No overt bleeding appreciated or evidence of hematoma.    Cardiovascular: Normal rate, regular rhythm, normal heart sounds and intact distal pulses. Exam reveals no gallop and no friction rub.   No murmur heard.  Pulmonary/Chest: Effort normal and breath sounds normal. She has no wheezes. She has no rales.   Abdominal: Soft. Bowel sounds are normal. She exhibits distension. There is no tenderness.   Abdomen soft but distended. There is ascites and superficial dilation of veins overlying abdomen.   Musculoskeletal: She exhibits edema.   Trace lower extremity edema   Neurological: She is alert.   Skin: Skin is warm and dry.   Psychiatric: She has a normal mood and affect. Her behavior is normal.   Nursing note and vitals reviewed.      Significant Labs:   Recent Lab Results       10/29/19  0554   10/29/19  0315   10/28/19  1738   10/28/19  1720   10/28/19  1608        Albumin   3.4           Alkaline Phosphatase   63           ALT   32           Anion Gap    11 11         aPTT     33.3  Comment:  aPTT therapeutic range = 39-69 seconds         AST   52           Baso # 0.00 0.01 0.01         Basophil% 0.0 0.2 0.1         BILIRUBIN TOTAL   0.9  Comment:  For infants and newborns, interpretation of results should be based  on gestational age, weight and in agreement with clinical  observations.  Premature Infant recommended reference ranges:  Up to 24 hours.............<8.0 mg/dL  Up to 48 hours............<12.0 mg/dL  3-5 days..................<15.0 mg/dL  6-29 days.................<15.0 mg/dL             BUN, Bld   46 40         Calcium   8.6 8.6         Chloride   96 96         CO2   24 24         Creatinine   5.3 4.8         Differential Method Automated Automated Automated         eGFR if    9.3 10.5         eGFR if non    8.1  Comment:  Calculation used to obtain the estimated glomerular filtration  rate (eGFR) is the CKD-EPI equation.    9.1  Comment:  Calculation used to obtain the estimated glomerular filtration  rate (eGFR) is the CKD-EPI equation.            Eos # 0.0 0.0 0.0         Eosinophil% 0.0 0.0 0.0         Fibrinogen   196           Glucose   122 200         Gran # (ANC) 4.5 4.6 8.9         Gran% 67.0 68.8 84.8         Group & Rh B POS             Hematocrit 18.5  Comment:  Previously reported results for this analyte were similarly   abnormal.  Call not needed.  Documented, 10/29/2019 06:40   18.9  Comment:  HCT critical result(s) called and verbal readback obtained from   JAN DE LEON RN, 10/29/2019 05:50   23.3         Hemoglobin 6.4 6.2 7.9         Immature Grans (Abs) 0.06  Comment:  Mild elevation in immature granulocytes is non specific and   can be seen in a variety of conditions including stress response,   acute inflammation, trauma and pregnancy. Correlation with other   laboratory and clinical findings is essential.   0.06  Comment:  Mild elevation in immature granulocytes is non specific and   can be seen  in a variety of conditions including stress response,   acute inflammation, trauma and pregnancy. Correlation with other   laboratory and clinical findings is essential.   0.10  Comment:  Mild elevation in immature granulocytes is non specific and   can be seen in a variety of conditions including stress response,   acute inflammation, trauma and pregnancy. Correlation with other   laboratory and clinical findings is essential.           Immature Granulocytes 0.9 0.9 0.9         INDIRECT LUCIE NEG             Coumadin Monitoring INR               Lymph # 1.2 1.0 0.8         Lymph% 17.4 15.6 7.4         Magnesium   1.9           MCH 22.0 21.3 21.7         MCHC 34.6 32.8 33.9         MCV 64 65 64         Mono # 1.0 1.0 0.7         Mono% 14.7 14.5 6.8         MPV SEE COMMENT  Comment:  Result not available. SEE COMMENT  Comment:  Result not available. SEE COMMENT  Comment:  Result not available.         nRBC 0 0 0         Phosphorus   4.9           Platelets 151 147 175         POCT Glucose       197       Potassium   4.2 4.0         PROTEIN TOTAL   6.6           Protein, Serum         6.8  Comment:  Serum protein electrophoresis and immunofixation results should be   interpreted in clinical context in that some therapeutic agents can   result   in false positive results (example, daratumumab). Correlation with   the   patient s therapeutic regimen is required.       Protime               RBC 2.91 2.91 3.64         RDW 16.8 16.5 16.9         Sodium   131 131         WBC 6.68 6.62 10.54                          10/28/19  1607   10/28/19  1015        Albumin         Alkaline Phosphatase         ALT         Anion Gap 12       aPTT   41.5  Comment:  aPTT therapeutic range = 39-69 seconds     AST         Baso #         Basophil%         BILIRUBIN TOTAL         BUN, Bld 38       Calcium 8.7       Chloride 97       CO2 25       Creatinine 4.7       Differential Method         eGFR if African American 10.7       eGFR if non   9.3  Comment:  Calculation used to obtain the estimated glomerular filtration  rate (eGFR) is the CKD-EPI equation.          Eos #         Eosinophil%         Fibrinogen         Glucose 183       Gran # (ANC)         Gran%         Group & Rh         Hematocrit         Hemoglobin         Immature Grans (Abs)         Immature Granulocytes         INDIRECT LUCIE         Coumadin Monitoring INR 1.3  Comment:  Coumadin Therapy:  2.0 - 3.0 for INR for all indicators except mechanical heart valves  and antiphospholipid syndromes which should use 2.5 - 3.5.         Lymph #         Lymph%         Magnesium         MCH         MCHC         MCV         Mono #         Mono%         MPV         nRBC         Phosphorus         Platelets         POCT Glucose         Potassium 4.2       PROTEIN TOTAL         Protein, Serum         Protime 13.0       RBC         RDW         Sodium 134       WBC               Diagnostic Results:  CXR on 10/27/2019:   Comparison:  Prior dated 10/26/2019    Findings:  Left-sided trialysis catheter is present with tip at the IVC/RA junction.  The mediastinal structures are midline.  The cardiac silhouette is enlarged and stable.  There is pulmonary vascular congestion and bilateral reticular opacities consistent with mild interstitial edema.  Trace right pleural fluid is suspected. No osseous abnormalities are seen.   Impression:  See above.    US Retroperitoneal Complete on 10/25/2019:   Comparison:  CT chest, abdomen, and pelvis on 08/28/2019   Findings:  Right kidney: The right kidney measures 11.2 cm. No cortical thinning. No loss of corticomedullary distinction. Resistive index measures 1.0.  There is a 0.8 x 0.7 x 0.8 cm simple cyst.  No renal stone. No hydronephrosis.  Left kidney: The left kidney measures 11.1 cm. No cortical thinning. No loss of corticomedullary distinction. Resistive index measures 1.0.  No mass. No renal stone. No hydronephrosis.  The bladder is partially  distended at the time of scanning and has an unremarkable appearance.  Moderate volume ascites.   Impression:  Bilateral elevated resistive indices, suggestive of medical renal disease.  No hydronephrosis or nephrolithiasis. Moderate volume ascites.

## 2019-10-29 NOTE — PROGRESS NOTES
[facility]  Hematology/Oncology  Progress Note    Patient Name: Neena Marks  Admission Date: 10/25/2019  Hospital Length of Stay: 4 days  Code Status: Full Code     Subjective:     HPI:  Ms Marks is a 63-year-old  female with alcoholic cirrhosis dating back to 2015, portal HTN, ascites, history of variceal bleeding in January 2018 s/p banding, history of alcohol abuse (recently quit in January of 2018), and newly diagnosed hepatocellular carcinoma undergoing treatment with nivolumab who presented as a direct transfer from Heme/Onc clinic on 10/25 after routine laboratory studies were remarkable for serum creatinine of 7.1 (baseline creatinine ~1.0). Patient reports one week history of nasuea and vomiting. Per chart review, patient has presented to [facility] ED three times this month with complaints of abdominal pain, back pain, nausea, and vomiting. She was noted to have UTI 2/2 Klebsiella on 10/19 and is currently  undergoing treatment with ciprofloxacin.  She denies relief in abdominal and back pain with prescribed medications. Her nausea and vomiting has persisted as well. She reports decreased PO intake over this time span secondary to nausea and has a history of recent ibuprofen and naproxen use for abdominal pain. She denies fever, dysuria, hematuria, chest pain, shortness of breath, syncope, or worsening lower extremity edema. She currently has abdominal distention and abdominal pain which radiates around the left aspect of lumbar region. Patients' vitals with BP 98/54 mmHg, HR 80 bpm, afebrile with temperature of 98 F, and RR 16.     Cirrhosis & HCC History:  AFP tumor marker is normal.  Hep C and B testing on 4/27/2018 - negative.    Endoscopy - 1/9/19 - Grade II esophageal varices. Completely eradicated. Banded, Small hiatal hernia., Portal hypertensive gastropathy. Treated with argon plasma coagulation (APC).     Patient had an appointment on 2/15/2019, IR consult for  Y90 but she missed her appointment.   - She had IR mapping and Hepatic angiography demonstrates a large hypervascular lesion in the right hepatic lobe supplied by branches of the right hepatic artery and accessory left hepatic artery.  Technetium MAA was injected aorta determined lung shunt fraction.  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  - Nuclear medicine scan revealed that majority of tracer goes to the lungs with a liver lung shunt fraction of 68.9%.   is not a candidate for Y90 or other liver directed therapies due to liver lung shunt     5/9/2019 - Started taking Sorafenib 400mg BID from  7/6/2019 - Decrease to 200 mg BID due to hand foot syndrome  8/16/2019 - Stopped Sorafenib on  due to persistent desquamation of hand foot syndrome  08/28/2019 - CT Chest abdomen and pelvis done revealed disease progression and also new bilateral pulmonary embolism. She was admitted to [facility] main campus and discharged on Eliquis (Patient declined Lovenox)   9/17/2019 -  Fall and followed up in ED and Xray right knee revealed no acute osseous abnormality  9/27/2019 - Started C 1 D1 Nivolumab q 4 weeks  10/25/2019 - PRACHI with creatinine of 7.1, direct admit     Interval History: Patient seen and examined this AM. Denies any new acute complaints over night apart from nausea and vomiting (one episode of non-bilious, non bloody emesis) after second round of HD on 10/28 which was relieved with medications. Still with minimal urine output at this time. Currently she denies abdominal pain but had complaints over night and was started on Rocephin for concern of SBP. Oozing was noted from catheter last night and heparin drip was stopped. Her Hgb noted to be in the 6s this AM, consent was obtained and patient to receive one unit of pRBCs at this time. Otherwise, her abdominal pain  has resolved and she denies experiencing any fever, SOB, or chest pain.     Oncology Treatment Plan:   OP NIVOLUMAB Q4W    Medications:  Continuous Infusions:   heparin (porcine) in D5W Stopped (10/28/19 1720)     Scheduled Meds:   calcium gluconate IVPB  1 g Intravenous Once    cefTRIAXone (ROCEPHIN) IVPB  2 g Intravenous Q24H    gabapentin  100 mg Oral QHS    lactulose  10 g Oral TID    pantoprazole  40 mg Oral Daily    predniSONE  60 mg Oral Daily     PRN Meds:sodium chloride, calcium gluconate IVPB **AND** calcium gluconate IVPB, cellulose, oxidized 3 x 4', heparin (porcine), heparin (PORCINE), heparin (PORCINE), ondansetron, oxyCODONE, prochlorperazine, sodium chloride 0.9%     Review of Systems   Constitutional: Negative for chills, diaphoresis, fatigue and fever.   HENT: Negative for nosebleeds.    Respiratory: Positive for cough. Negative for shortness of breath and wheezing.    Cardiovascular: Negative for chest pain, palpitations and leg swelling.   Gastrointestinal: Positive for nausea and vomiting. Negative for abdominal distention, abdominal pain, anal bleeding, blood in stool, constipation and diarrhea.   Genitourinary: Negative for hematuria.        Decreased urine output.   Neurological: Negative for dizziness and headaches.   Hematological: Does not bruise/bleed easily.   Psychiatric/Behavioral: Negative for behavioral problems and confusion.     Objective:     Vital Signs (Most Recent):  Temp: 98.7 °F (37.1 °C) (10/29/19 0724)  Pulse: 80 (10/29/19 0724)  Resp: 16 (10/29/19 0724)  BP: 123/68 (10/29/19 0724)  SpO2: 96 % (10/29/19 0724) Vital Signs (24h Range):  Temp:  [96.2 °F (35.7 °C)-98.7 °F (37.1 °C)] 98.7 °F (37.1 °C)  Pulse:  [64-92] 80  Resp:  [15-18] 16  SpO2:  [92 %-99 %] 96 %  BP: ()/(51-76) 123/68     Weight: 40.8 kg (90 lb)  Body mass index is 18.18 kg/m².  Body surface area is 1.3 meters squared.      Intake/Output Summary (Last 24 hours) at 10/29/2019 2305  Last data filed  at 10/28/2019 2141  Gross per 24 hour   Intake 880 ml   Output 866 ml   Net 14 ml       Physical Exam   Constitutional: Vital signs are normal. She appears cachectic.   HENT:   Head: Normocephalic and atraumatic.   Eyes: EOM are normal. No scleral icterus.   Arcus senilis    Neck: Neck supple.   Central line on the left. Pressure gauze in place. No overt bleeding appreciated or evidence of hematoma.    Cardiovascular: Normal rate, regular rhythm, normal heart sounds and intact distal pulses. Exam reveals no gallop and no friction rub.   No murmur heard.  Pulmonary/Chest: Effort normal and breath sounds normal. She has no wheezes. She has no rales.   Abdominal: Soft. Bowel sounds are normal. She exhibits distension. There is no tenderness.   Abdomen soft but distended. There is ascites and superficial dilation of veins overlying abdomen.   Musculoskeletal: She exhibits edema.   Trace lower extremity edema   Neurological: She is alert.   Skin: Skin is warm and dry.   Psychiatric: She has a normal mood and affect. Her behavior is normal.   Nursing note and vitals reviewed.      Significant Labs:   Recent Lab Results       10/29/19  0554   10/29/19  0315   10/28/19  1738   10/28/19  1720   10/28/19  1608        Albumin   3.4           Alkaline Phosphatase   63           ALT   32           Anion Gap   11 11         aPTT     33.3  Comment:  aPTT therapeutic range = 39-69 seconds         AST   52           Baso # 0.00 0.01 0.01         Basophil% 0.0 0.2 0.1         BILIRUBIN TOTAL   0.9  Comment:  For infants and newborns, interpretation of results should be based  on gestational age, weight and in agreement with clinical  observations.  Premature Infant recommended reference ranges:  Up to 24 hours.............<8.0 mg/dL  Up to 48 hours............<12.0 mg/dL  3-5 days..................<15.0 mg/dL  6-29 days.................<15.0 mg/dL             BUN, Bld   46 40         Calcium   8.6 8.6         Chloride   96 96          CO2   24 24         Creatinine   5.3 4.8         Differential Method Automated Automated Automated         eGFR if    9.3 10.5         eGFR if non    8.1  Comment:  Calculation used to obtain the estimated glomerular filtration  rate (eGFR) is the CKD-EPI equation.    9.1  Comment:  Calculation used to obtain the estimated glomerular filtration  rate (eGFR) is the CKD-EPI equation.            Eos # 0.0 0.0 0.0         Eosinophil% 0.0 0.0 0.0         Fibrinogen   196           Glucose   122 200         Gran # (ANC) 4.5 4.6 8.9         Gran% 67.0 68.8 84.8         Group & Rh B POS             Hematocrit 18.5  Comment:  Previously reported results for this analyte were similarly   abnormal.  Call not needed.  Documented, 10/29/2019 06:40   18.9  Comment:  HCT critical result(s) called and verbal readback obtained from   JAN DE LEON RN, 10/29/2019 05:50   23.3         Hemoglobin 6.4 6.2 7.9         Immature Grans (Abs) 0.06  Comment:  Mild elevation in immature granulocytes is non specific and   can be seen in a variety of conditions including stress response,   acute inflammation, trauma and pregnancy. Correlation with other   laboratory and clinical findings is essential.   0.06  Comment:  Mild elevation in immature granulocytes is non specific and   can be seen in a variety of conditions including stress response,   acute inflammation, trauma and pregnancy. Correlation with other   laboratory and clinical findings is essential.   0.10  Comment:  Mild elevation in immature granulocytes is non specific and   can be seen in a variety of conditions including stress response,   acute inflammation, trauma and pregnancy. Correlation with other   laboratory and clinical findings is essential.           Immature Granulocytes 0.9 0.9 0.9         INDIRECT LUCIE NEG             Coumadin Monitoring INR               Lymph # 1.2 1.0 0.8         Lymph% 17.4 15.6 7.4         Magnesium   1.9            MCH 22.0 21.3 21.7         MCHC 34.6 32.8 33.9         MCV 64 65 64         Mono # 1.0 1.0 0.7         Mono% 14.7 14.5 6.8         MPV SEE COMMENT  Comment:  Result not available. SEE COMMENT  Comment:  Result not available. SEE COMMENT  Comment:  Result not available.         nRBC 0 0 0         Phosphorus   4.9           Platelets 151 147 175         POCT Glucose       197       Potassium   4.2 4.0         PROTEIN TOTAL   6.6           Protein, Serum         6.8  Comment:  Serum protein electrophoresis and immunofixation results should be   interpreted in clinical context in that some therapeutic agents can   result   in false positive results (example, daratumumab). Correlation with   the   patient s therapeutic regimen is required.       Protime               RBC 2.91 2.91 3.64         RDW 16.8 16.5 16.9         Sodium   131 131         WBC 6.68 6.62 10.54                          10/28/19  1607   10/28/19  1015        Albumin         Alkaline Phosphatase         ALT         Anion Gap 12       aPTT   41.5  Comment:  aPTT therapeutic range = 39-69 seconds     AST         Baso #         Basophil%         BILIRUBIN TOTAL         BUN, Bld 38       Calcium 8.7       Chloride 97       CO2 25       Creatinine 4.7       Differential Method         eGFR if African American 10.7       eGFR if non  9.3  Comment:  Calculation used to obtain the estimated glomerular filtration  rate (eGFR) is the CKD-EPI equation.          Eos #         Eosinophil%         Fibrinogen         Glucose 183       Gran # (ANC)         Gran%         Group & Rh         Hematocrit         Hemoglobin         Immature Grans (Abs)         Immature Granulocytes         INDIRECT LUCIE         Coumadin Monitoring INR 1.3  Comment:  Coumadin Therapy:  2.0 - 3.0 for INR for all indicators except mechanical heart valves  and antiphospholipid syndromes which should use 2.5 - 3.5.         Lymph #         Lymph%         Magnesium         MCH          MCHC         MCV         Mono #         Mono%         MPV         nRBC         Phosphorus         Platelets         POCT Glucose         Potassium 4.2       PROTEIN TOTAL         Protein, Serum         Protime 13.0       RBC         RDW         Sodium 134       WBC               Diagnostic Results:  CXR on 10/27/2019:   Comparison:  Prior dated 10/26/2019    Findings:  Left-sided trialysis catheter is present with tip at the IVC/RA junction.  The mediastinal structures are midline.  The cardiac silhouette is enlarged and stable.  There is pulmonary vascular congestion and bilateral reticular opacities consistent with mild interstitial edema.  Trace right pleural fluid is suspected. No osseous abnormalities are seen.   Impression:  See above.    US Retroperitoneal Complete on 10/25/2019:   Comparison:  CT chest, abdomen, and pelvis on 08/28/2019   Findings:  Right kidney: The right kidney measures 11.2 cm. No cortical thinning. No loss of corticomedullary distinction. Resistive index measures 1.0.  There is a 0.8 x 0.7 x 0.8 cm simple cyst.  No renal stone. No hydronephrosis.  Left kidney: The left kidney measures 11.1 cm. No cortical thinning. No loss of corticomedullary distinction. Resistive index measures 1.0.  No mass. No renal stone. No hydronephrosis.  The bladder is partially distended at the time of scanning and has an unremarkable appearance.  Moderate volume ascites.   Impression:  Bilateral elevated resistive indices, suggestive of medical renal disease.  No hydronephrosis or nephrolithiasis. Moderate volume ascites.      Assessment/Plan:     * Acute kidney injury  Patient with baseline serum creatinine around 1.0. Serum creatinine was 1.1 on 10/19. 10/25 serum creatinine was noted to be 7.1. K of 6.3 that required urgent HD (10/26).    -  Burgos stain negative, urine protein to creatinine ratio of 4, Fe urea 62.3% suggestive of intrinsic etiology, US without hydronephrosis, Garcia with scant urine  output despite IV Lasix and IVFs  - Continue with Garcia catheter with strict I/Os  - Will continue to avoid nephrotoxic agents (i.e. NSAIDs, contrast, ACEi, ARBs, etc.)   - Nephrology consulted, appreciate recommendations, consideration for renal biopsy at this time  - Given immunotherapy induced nephritis still of concern will continue with 1 mg/kg steroids (60 mg) daily  - Continue to monitor electrolytes  - s/p HD on 10/26 and 10/28    Pulmonary embolism  Hx of PE (CT scan in August of 2019) on chronic anticoagulation with Eliquis 5 mg BID at home, however, due to sudden impaired renal function unable to appropriately dose Eliquis and was transitioned to heparin drip on 10/27.    - hold heparin drip in light of catheter oozing and sudden drop in Hgb  - monitor aPTT, PT, and INR in the setting of cirrhosis and anticoagulation     Ascites due to alcoholic cirrhosis  - with complaints of abdominal pain over night, US with small amount of ascitic fluid  - concern for SBP  - was given albumin and started on Rocephin    HCC (hepatocellular carcinoma)  - hold immunotherapy given concern for plausible immunotherapy induced nephritis     Portal hypertensive gastropathy  - holding propanolol as above    Esophageal varices in alcoholic cirrhosis  - patient denies any hemtopyosis, BRBPR, or melena  - holding home dose propanol 10 mg BID in light of hypotension    Alcoholic cirrhosis  MELD of +30 at admission. CMP on admission with albumin of 3.0, Alk phos 96, and AST 75.  - continue to monitor    Iron deficiency anemia due to chronic blood loss  Hemoglobin 8.8 on admission.     - Hgb 6.4 this AM  - Continue holding heparin drip  - transfuse 1 unit pRBCs this AM  - continue to monitor with daily CBCs  - transfuse Hgb < 7             Lacho Nuñez MD  Hematology/Oncology  [facility]      ATTENDING NOTE, ONCOLOGY INPATIENT TEAM    As above; events of last 24 hours noted.  Patient seen and examined,  chart reviewed.  Appears comfortable, in NAD.  Lungs are clear to auscultation.  Abdomen is soft, nontender.  Labs reviewed.    PLAN  Continue daily labs.  Will discuss with nephrology Service their recommendation for biopsy; would be inclined to wait for a few days.  Continue steroids for now.  Heparin drip for her bilateral PEs.  Prognosis is guarded.  We will follow.      Nikos Hansen MD

## 2019-10-30 LAB
ALBUMIN SERPL BCP-MCNC: 2.9 G/DL (ref 3.5–5.2)
ALP SERPL-CCNC: 78 U/L (ref 55–135)
ALT SERPL W/O P-5'-P-CCNC: 18 U/L (ref 10–44)
ANION GAP SERPL CALC-SCNC: 11 MMOL/L (ref 8–16)
ANION GAP SERPL CALC-SCNC: 13 MMOL/L (ref 8–16)
APTT BLDCRRT: 118.9 SEC (ref 21–32)
APTT BLDCRRT: 32.1 SEC (ref 21–32)
APTT BLDCRRT: 35.4 SEC (ref 21–32)
APTT BLDCRRT: 52.8 SEC (ref 21–32)
APTT BLDCRRT: 55.7 SEC (ref 21–32)
AST SERPL-CCNC: 51 U/L (ref 10–40)
BASOPHILS # BLD AUTO: 0.01 K/UL (ref 0–0.2)
BASOPHILS NFR BLD: 0.1 % (ref 0–1.9)
BILIRUB SERPL-MCNC: 1.7 MG/DL (ref 0.1–1)
BUN SERPL-MCNC: 57 MG/DL (ref 8–23)
BUN SERPL-MCNC: 57 MG/DL (ref 8–23)
CALCIUM SERPL-MCNC: 8.2 MG/DL (ref 8.7–10.5)
CALCIUM SERPL-MCNC: 8.3 MG/DL (ref 8.7–10.5)
CHLORIDE SERPL-SCNC: 96 MMOL/L (ref 95–110)
CHLORIDE SERPL-SCNC: 98 MMOL/L (ref 95–110)
CO2 SERPL-SCNC: 21 MMOL/L (ref 23–29)
CO2 SERPL-SCNC: 22 MMOL/L (ref 23–29)
CREAT SERPL-MCNC: 5.1 MG/DL (ref 0.5–1.4)
CREAT SERPL-MCNC: 5.2 MG/DL (ref 0.5–1.4)
DIFFERENTIAL METHOD: ABNORMAL
EOSINOPHIL # BLD AUTO: 0 K/UL (ref 0–0.5)
EOSINOPHIL NFR BLD: 0 % (ref 0–8)
ERYTHROCYTE [DISTWIDTH] IN BLOOD BY AUTOMATED COUNT: 19.9 % (ref 11.5–14.5)
EST. GFR  (AFRICAN AMERICAN): 9.5 ML/MIN/1.73 M^2
EST. GFR  (AFRICAN AMERICAN): 9.7 ML/MIN/1.73 M^2
EST. GFR  (NON AFRICAN AMERICAN): 8.2 ML/MIN/1.73 M^2
EST. GFR  (NON AFRICAN AMERICAN): 8.4 ML/MIN/1.73 M^2
FIBRINOGEN PPP-MCNC: 180 MG/DL (ref 182–366)
GLUCOSE SERPL-MCNC: 160 MG/DL (ref 70–110)
GLUCOSE SERPL-MCNC: 212 MG/DL (ref 70–110)
HCT VFR BLD AUTO: 24.6 % (ref 37–48.5)
HGB BLD-MCNC: 8.4 G/DL (ref 12–16)
IMM GRANULOCYTES # BLD AUTO: 0.24 K/UL (ref 0–0.04)
IMM GRANULOCYTES NFR BLD AUTO: 2.2 % (ref 0–0.5)
LYMPHOCYTES # BLD AUTO: 1.5 K/UL (ref 1–4.8)
LYMPHOCYTES NFR BLD: 13.6 % (ref 18–48)
MAGNESIUM SERPL-MCNC: 1.7 MG/DL (ref 1.6–2.6)
MAGNESIUM SERPL-MCNC: 1.9 MG/DL (ref 1.6–2.6)
MCH RBC QN AUTO: 22.7 PG (ref 27–31)
MCHC RBC AUTO-ENTMCNC: 34.1 G/DL (ref 32–36)
MCV RBC AUTO: 67 FL (ref 82–98)
MONOCYTES # BLD AUTO: 1.3 K/UL (ref 0.3–1)
MONOCYTES NFR BLD: 11.5 % (ref 4–15)
NEUTROPHILS # BLD AUTO: 8 K/UL (ref 1.8–7.7)
NEUTROPHILS NFR BLD: 72.6 % (ref 38–73)
NRBC BLD-RTO: 0 /100 WBC
PHOSPHATE SERPL-MCNC: 4.4 MG/DL (ref 2.7–4.5)
PLATELET # BLD AUTO: 165 K/UL (ref 150–350)
PMV BLD AUTO: ABNORMAL FL (ref 9.2–12.9)
POTASSIUM SERPL-SCNC: 4 MMOL/L (ref 3.5–5.1)
POTASSIUM SERPL-SCNC: 4 MMOL/L (ref 3.5–5.1)
PROT SERPL-MCNC: 6.7 G/DL (ref 6–8.4)
RBC # BLD AUTO: 3.7 M/UL (ref 4–5.4)
SODIUM SERPL-SCNC: 130 MMOL/L (ref 136–145)
SODIUM SERPL-SCNC: 131 MMOL/L (ref 136–145)
WBC # BLD AUTO: 10.98 K/UL (ref 3.9–12.7)

## 2019-10-30 PROCEDURE — 99232 PR SUBSEQUENT HOSPITAL CARE,LEVL II: ICD-10-PCS | Mod: ,,, | Performed by: INTERNAL MEDICINE

## 2019-10-30 PROCEDURE — 85730 THROMBOPLASTIN TIME PARTIAL: CPT | Mod: 91

## 2019-10-30 PROCEDURE — 80048 BASIC METABOLIC PNL TOTAL CA: CPT

## 2019-10-30 PROCEDURE — 83735 ASSAY OF MAGNESIUM: CPT

## 2019-10-30 PROCEDURE — 63600175 PHARM REV CODE 636 W HCPCS: Performed by: STUDENT IN AN ORGANIZED HEALTH CARE EDUCATION/TRAINING PROGRAM

## 2019-10-30 PROCEDURE — 20600001 HC STEP DOWN PRIVATE ROOM

## 2019-10-30 PROCEDURE — 25000003 PHARM REV CODE 250: Performed by: STUDENT IN AN ORGANIZED HEALTH CARE EDUCATION/TRAINING PROGRAM

## 2019-10-30 PROCEDURE — 85384 FIBRINOGEN ACTIVITY: CPT

## 2019-10-30 PROCEDURE — 99233 SBSQ HOSP IP/OBS HIGH 50: CPT | Mod: ,,, | Performed by: INTERNAL MEDICINE

## 2019-10-30 PROCEDURE — 85025 COMPLETE CBC W/AUTO DIFF WBC: CPT

## 2019-10-30 PROCEDURE — 84100 ASSAY OF PHOSPHORUS: CPT

## 2019-10-30 PROCEDURE — 80053 COMPREHEN METABOLIC PANEL: CPT

## 2019-10-30 PROCEDURE — 99233 PR SUBSEQUENT HOSPITAL CARE,LEVL III: ICD-10-PCS | Mod: ,,, | Performed by: INTERNAL MEDICINE

## 2019-10-30 PROCEDURE — 99232 SBSQ HOSP IP/OBS MODERATE 35: CPT | Mod: ,,, | Performed by: INTERNAL MEDICINE

## 2019-10-30 RX ORDER — SODIUM CHLORIDE 9 MG/ML
INJECTION, SOLUTION INTRAVENOUS CONTINUOUS
Status: DISCONTINUED | OUTPATIENT
Start: 2019-10-30 | End: 2019-11-01

## 2019-10-30 RX ADMIN — LACTULOSE 10 G: 20 SOLUTION ORAL at 09:10

## 2019-10-30 RX ADMIN — HEPARIN SODIUM AND DEXTROSE 14 UNITS/KG/HR: 10000; 5 INJECTION INTRAVENOUS at 06:10

## 2019-10-30 RX ADMIN — RAMELTEON 8 MG: 8 TABLET ORAL at 11:10

## 2019-10-30 RX ADMIN — LACTULOSE 10 G: 20 SOLUTION ORAL at 03:10

## 2019-10-30 RX ADMIN — GABAPENTIN 100 MG: 100 CAPSULE ORAL at 09:10

## 2019-10-30 RX ADMIN — SODIUM CHLORIDE: 0.9 INJECTION, SOLUTION INTRAVENOUS at 03:10

## 2019-10-30 RX ADMIN — HEPARIN SODIUM AND DEXTROSE 12 UNITS/KG/HR: 10000; 5 INJECTION INTRAVENOUS at 03:10

## 2019-10-30 RX ADMIN — PANTOPRAZOLE SODIUM 40 MG: 40 TABLET, DELAYED RELEASE ORAL at 09:10

## 2019-10-30 RX ADMIN — PREDNISONE 60 MG: 20 TABLET ORAL at 09:10

## 2019-10-30 NOTE — ASSESSMENT & PLAN NOTE
- with complaints of abdominal pain over night of 10/28-29, US with small amount of ascitic fluids with some concern for SBP  - was given albumin and started on Rocephin  - will continue with lactulose

## 2019-10-30 NOTE — ASSESSMENT & PLAN NOTE
Patient with baseline serum creatinine around 1.0. Serum creatinine was 1.1 on 10/19. 10/25 serum creatinine was noted to be 7.1. K of 6.3 that required urgent HD (10/26).    -  Burgos stain negative, urine protein to creatinine ratio of 4, Fe urea 62.3% suggestive of intrinsic etiology, US without hydronephrosis, Garcia with scant urine output despite IV Lasix and IVFs  - Continue with Garcia catheter with strict I/Os  - Will continue to avoid nephrotoxic agents (i.e. NSAIDs, contrast, ACEi, ARBs, etc.)   - Nephrology consulted, recommended renal biopsy at this time, will hold off until patient is s/p one week steroids, if no improvement will need goals of care discussion   - Given immunotherapy induced nephritis still of concern will continue with 1 mg/kg steroids (60 mg) daily  - Continue to monitor electrolytes  - s/p HD on 10/26 and 10/28

## 2019-10-30 NOTE — ASSESSMENT & PLAN NOTE
Hx of PE (CT scan in August of 2019) on chronic anticoagulation with Eliquis 5 mg BID at home, however, due to sudden impaired renal function unable to appropriately dose Eliquis and was transitioned to heparin drip on 10/27.    - continue with heparin drip  - monitor aPTT, PT, and INR in the setting of cirrhosis and anticoagulation

## 2019-10-30 NOTE — ASSESSMENT & PLAN NOTE
- hold immunotherapy given concern for plausible immunotherapy induced nephritis   - continue steroids  - prognosis guarded

## 2019-10-30 NOTE — SUBJECTIVE & OBJECTIVE
Interval History: Patient seen and examined this AM. Denies any new acute complaints over night apart from nausea and vomiting (one episode of non-bilious, non bloody emesis) after second round of HD on 10/28 which was relieved with medications. Still with minimal urine output at this time. Currently she denies abdominal pain but had complaints over night and was started on Rocephin for concern of SBP. Oozing was noted from catheter last night and heparin drip was stopped. Her Hgb noted to be in the 6s this AM, consent was obtained and patient to receive one unit of pRBCs at this time. Otherwise, her abdominal pain has resolved and she denies experiencing any fever, SOB, or chest pain.     Oncology Treatment Plan:   OP NIVOLUMAB Q4W    Medications:  Continuous Infusions:   heparin (porcine) in D5W 12 Units/kg/hr (10/30/19 0324)     Scheduled Meds:   calcium gluconate IVPB  1 g Intravenous Once    cefTRIAXone (ROCEPHIN) IVPB  2 g Intravenous Q24H    gabapentin  100 mg Oral QHS    lactulose  10 g Oral TID    pantoprazole  40 mg Oral Daily    predniSONE  60 mg Oral Daily     PRN Meds:sodium chloride, calcium gluconate IVPB **AND** calcium gluconate IVPB, cellulose, oxidized 3 x 4', heparin (porcine), heparin (PORCINE), heparin (PORCINE), ondansetron, oxyCODONE, prochlorperazine, ramelteon, sodium chloride 0.9%     Review of Systems   Constitutional: Negative for chills, diaphoresis and fever.   Respiratory: Negative for shortness of breath and wheezing.    Cardiovascular: Negative for chest pain, palpitations and leg swelling.   Gastrointestinal: Negative for abdominal distention, abdominal pain, anal bleeding, blood in stool, constipation, diarrhea, nausea and vomiting.   Genitourinary:        Decreased urine output.   Musculoskeletal: Positive for arthralgias and myalgias.        Report left hand pain this morning associated with blood draws/IV.    Hematological: Does not bruise/bleed easily.    Psychiatric/Behavioral: Negative for behavioral problems and confusion.     Objective:     Vital Signs (Most Recent):  Temp: 98.2 °F (36.8 °C) (10/30/19 0335)  Pulse: 72 (10/30/19 0335)  Resp: 16 (10/30/19 0335)  BP: 113/60 (10/30/19 0335)  SpO2: (!) 94 % (10/30/19 0335) Vital Signs (24h Range):  Temp:  [97.9 °F (36.6 °C)-98.8 °F (37.1 °C)] 98.2 °F (36.8 °C)  Pulse:  [72-89] 72  Resp:  [16-18] 16  SpO2:  [94 %-98 %] 94 %  BP: (113-161)/(60-77) 113/60     Weight: 40.8 kg (90 lb)  Body mass index is 18.18 kg/m².  Body surface area is 1.3 meters squared.      Intake/Output Summary (Last 24 hours) at 10/30/2019 0715  Last data filed at 10/30/2019 0522  Gross per 24 hour   Intake 651.33 ml   Output 750 ml   Net -98.67 ml       Physical Exam   Constitutional: Vital signs are normal. She appears cachectic.   HENT:   Head: Normocephalic and atraumatic.   Eyes: EOM are normal. No scleral icterus.   Arcus senilis    Neck: Neck supple.   Central line on the left. Pressure gauze in place. No overt bleeding appreciated or evidence of hematoma.    Cardiovascular: Normal rate, regular rhythm, normal heart sounds and intact distal pulses. Exam reveals no gallop and no friction rub.   No murmur heard.  Pulmonary/Chest: Effort normal and breath sounds normal. She has no wheezes. She has no rales.   Abdominal: Soft. Bowel sounds are normal. She exhibits distension. There is no tenderness.   Abdomen soft but distended. There is ascites and superficial dilation of veins overlying abdomen.   Musculoskeletal: She exhibits edema.   Trace lower extremity edema   Neurological: She is alert.   Skin: Skin is warm and dry.   Psychiatric: She has a normal mood and affect. Her behavior is normal.   Nursing note and vitals reviewed.      Significant Labs:   Recent Lab Results       10/30/19  0349   10/30/19  0216   10/29/19  1951   10/29/19  1206        Albumin 2.9           Alkaline Phosphatase 78           ALT 18           Anion Gap 13            aPTT 118.9  Comment:  aPTT therapeutic range = 39-69 seconds  EXPECTED RESULTS PER LION DE LEON RN WILL VERIFY IN MORNING LABS   10/30/2019  04:21   35.4  Comment:  aPTT therapeutic range = 39-69 seconds 45.4  Comment:  aPTT therapeutic range = 39-69 seconds 24.5  Comment:  aPTT therapeutic range = 39-69 seconds     AST 51           Baso # 0.01           Basophil% 0.1           BILIRUBIN TOTAL 1.7  Comment:  For infants and newborns, interpretation of results should be based  on gestational age, weight and in agreement with clinical  observations.  Premature Infant recommended reference ranges:  Up to 24 hours.............<8.0 mg/dL  Up to 48 hours............<12.0 mg/dL  3-5 days..................<15.0 mg/dL  6-29 days.................<15.0 mg/dL             BUN, Bld 57           Calcium 8.2           Chloride 96           CO2 21           Creatinine 5.2           Differential Method Automated           eGFR if  9.5           eGFR if non  8.2  Comment:  Calculation used to obtain the estimated glomerular filtration  rate (eGFR) is the CKD-EPI equation.              Eos # 0.0           Eosinophil% 0.0           Fibrinogen 180           Glucose 160           Gran # (ANC) 8.0           Gran% 72.6           Hematocrit 24.6  Comment:  Results confirmed, test repeated           Hemoglobin 8.4  Comment:  Results confirmed, test repeated           Immature Grans (Abs) 0.24  Comment:  Mild elevation in immature granulocytes is non specific and   can be seen in a variety of conditions including stress response,   acute inflammation, trauma and pregnancy. Correlation with other   laboratory and clinical findings is essential.             Immature Granulocytes 2.2           Lymph # 1.5           Lymph% 13.6           Magnesium 1.9           MCH 22.7           MCHC 34.1           MCV 67           Mono # 1.3           Mono% 11.5           MPV SEE COMMENT  Comment:  Result not available.            nRBC 0           Phosphorus 4.4           Platelets 165           Potassium 4.0           PROTEIN TOTAL 6.7           RBC 3.70           RDW 19.9           Sodium 130           WBC 10.98                 Diagnostic Results:  CXR on 10/27/2019:   Comparison:  Prior dated 10/26/2019    Findings:  Left-sided trialysis catheter is present with tip at the IVC/RA junction.  The mediastinal structures are midline.  The cardiac silhouette is enlarged and stable.  There is pulmonary vascular congestion and bilateral reticular opacities consistent with mild interstitial edema.  Trace right pleural fluid is suspected. No osseous abnormalities are seen.   Impression:  See above.    US Retroperitoneal Complete on 10/25/2019:   Comparison:  CT chest, abdomen, and pelvis on 08/28/2019   Findings:  Right kidney: The right kidney measures 11.2 cm. No cortical thinning. No loss of corticomedullary distinction. Resistive index measures 1.0.  There is a 0.8 x 0.7 x 0.8 cm simple cyst.  No renal stone. No hydronephrosis.  Left kidney: The left kidney measures 11.1 cm. No cortical thinning. No loss of corticomedullary distinction. Resistive index measures 1.0.  No mass. No renal stone. No hydronephrosis.  The bladder is partially distended at the time of scanning and has an unremarkable appearance.  Moderate volume ascites.   Impression:  Bilateral elevated resistive indices, suggestive of medical renal disease.  No hydronephrosis or nephrolithiasis. Moderate volume ascites.

## 2019-10-30 NOTE — PHYSICIAN QUERY
PT Name: Neena Marks  MR #: [id number]     Physician Query Form - Documentation Clarification      CDS: Gifty López RN    Contact information:[email or web address]      This form is a permanent document in the medical record.     Query Date: October 30, 2019    By submitting this query, we are merely seeking further clarification of documentation. Please utilize your independent clinical judgment when addressing the question(s) below.    The Medical record reflects the following:    Supporting Clinical Findings Location in Medical Record   Weight: 40.8 kg (90 lb) (10/25/19 1542)  Body mass index is 18.18 kg/m².  Body surface area is 1.3 meters squared   H&P 10/25   Constitutional: She is oriented to person, place, and time. She appears cachectic. No distress.   H&P 10/25                                                                            Doctor, Please specify diagnosis or diagnoses associated with above clinical findings.    Provider Use Only      [ + ] Cachexia      [  ] Other:________________                                                                                                             [  ] Clinically Undetermined

## 2019-10-30 NOTE — PROGRESS NOTES
[facility]  Hematology/Oncology  Progress Note    Patient Name: Neena Marks  Admission Date: 10/25/2019  Hospital Length of Stay: 5 days  Code Status: Full Code     Subjective:     HPI:  Ms Marks is a 63-year-old  female with alcoholic cirrhosis dating back to 2015, portal HTN, ascites, history of variceal bleeding in January 2018 s/p banding, history of alcohol abuse (recently quit in January of 2018), and newly diagnosed hepatocellular carcinoma undergoing treatment with nivolumab who presented as a direct transfer from Heme/Onc clinic on 10/25 after routine laboratory studies were remarkable for serum creatinine of 7.1 (baseline creatinine ~1.0). Patient reports one week history of nasuea and vomiting. Per chart review, patient has presented to [facility] ED three times this month with complaints of abdominal pain, back pain, nausea, and vomiting. She was noted to have UTI 2/2 Klebsiella on 10/19 and is currently  undergoing treatment with ciprofloxacin.  She denies relief in abdominal and back pain with prescribed medications. Her nausea and vomiting has persisted as well. She reports decreased PO intake over this time span secondary to nausea and has a history of recent ibuprofen and naproxen use for abdominal pain. She denies fever, dysuria, hematuria, chest pain, shortness of breath, syncope, or worsening lower extremity edema. She currently has abdominal distention and abdominal pain which radiates around the left aspect of lumbar region. Patients' vitals with BP 98/54 mmHg, HR 80 bpm, afebrile with temperature of 98 F, and RR 16.     Cirrhosis & HCC History:  AFP tumor marker is normal.  Hep C and B testing on 4/27/2018 - negative.    Endoscopy - 1/9/19 - Grade II esophageal varices. Completely eradicated. Banded, Small hiatal hernia., Portal hypertensive gastropathy. Treated with argon plasma coagulation (APC).     Patient had an appointment on 2/15/2019, IR consult for  Y90 but she missed her appointment.   - She had IR mapping and Hepatic angiography demonstrates a large hypervascular lesion in the right hepatic lobe supplied by branches of the right hepatic artery and accessory left hepatic artery.  Technetium MAA was injected aorta determined lung shunt fraction.  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  - Nuclear medicine scan revealed that majority of tracer goes to the lungs with a liver lung shunt fraction of 68.9%.   is not a candidate for Y90 or other liver directed therapies due to liver lung shunt     5/9/2019 - Started taking Sorafenib 400mg BID from  7/6/2019 - Decrease to 200 mg BID due to hand foot syndrome  8/16/2019 - Stopped Sorafenib on  due to persistent desquamation of hand foot syndrome  08/28/2019 - CT Chest abdomen and pelvis done revealed disease progression and also new bilateral pulmonary embolism. She was admitted to [facility] main campus and discharged on Eliquis (Patient declined Lovenox)   9/17/2019 -  Fall and followed up in ED and Xray right knee revealed no acute osseous abnormality  9/27/2019 - Started C 1 D1 Nivolumab q 4 weeks  10/25/2019 - PRACHI with creatinine of 7.1, direct admit     Interval History: Patient seen and examined this AM. Denies any new acute complaints over night apart from nausea and vomiting (one episode of non-bilious, non bloody emesis) after second round of HD on 10/28 which was relieved with medications. Still with minimal urine output at this time. Currently she denies abdominal pain but had complaints over night and was started on Rocephin for concern of SBP. Oozing was noted from catheter last night and heparin drip was stopped. Her Hgb noted to be in the 6s this AM, consent was obtained and patient to receive one unit of pRBCs at this time. Otherwise, her abdominal pain  has resolved and she denies experiencing any fever, SOB, or chest pain.     Oncology Treatment Plan:   OP NIVOLUMAB Q4W    Medications:  Continuous Infusions:   heparin (porcine) in D5W 12 Units/kg/hr (10/30/19 0324)     Scheduled Meds:   calcium gluconate IVPB  1 g Intravenous Once    cefTRIAXone (ROCEPHIN) IVPB  2 g Intravenous Q24H    gabapentin  100 mg Oral QHS    lactulose  10 g Oral TID    pantoprazole  40 mg Oral Daily    predniSONE  60 mg Oral Daily     PRN Meds:sodium chloride, calcium gluconate IVPB **AND** calcium gluconate IVPB, cellulose, oxidized 3 x 4', heparin (porcine), heparin (PORCINE), heparin (PORCINE), ondansetron, oxyCODONE, prochlorperazine, ramelteon, sodium chloride 0.9%     Review of Systems   Constitutional: Negative for chills, diaphoresis and fever.   Respiratory: Negative for shortness of breath and wheezing.    Cardiovascular: Negative for chest pain, palpitations and leg swelling.   Gastrointestinal: Negative for abdominal distention, abdominal pain, anal bleeding, blood in stool, constipation, diarrhea, nausea and vomiting.   Genitourinary:        Decreased urine output.   Musculoskeletal: Positive for arthralgias and myalgias.        Report left hand pain this morning associated with blood draws/IV.    Hematological: Does not bruise/bleed easily.   Psychiatric/Behavioral: Negative for behavioral problems and confusion.     Objective:     Vital Signs (Most Recent):  Temp: 98.2 °F (36.8 °C) (10/30/19 0335)  Pulse: 72 (10/30/19 0335)  Resp: 16 (10/30/19 0335)  BP: 113/60 (10/30/19 0335)  SpO2: (!) 94 % (10/30/19 0335) Vital Signs (24h Range):  Temp:  [97.9 °F (36.6 °C)-98.8 °F (37.1 °C)] 98.2 °F (36.8 °C)  Pulse:  [72-89] 72  Resp:  [16-18] 16  SpO2:  [94 %-98 %] 94 %  BP: (113-161)/(60-77) 113/60     Weight: 40.8 kg (90 lb)  Body mass index is 18.18 kg/m².  Body surface area is 1.3 meters squared.      Intake/Output Summary (Last 24 hours) at 10/30/2019 0715  Last data filed  at 10/30/2019 0522  Gross per 24 hour   Intake 651.33 ml   Output 750 ml   Net -98.67 ml       Physical Exam   Constitutional: Vital signs are normal. She appears cachectic.   HENT:   Head: Normocephalic and atraumatic.   Eyes: EOM are normal. No scleral icterus.   Arcus senilis    Neck: Neck supple.   Central line on the left. Pressure gauze in place. No overt bleeding appreciated or evidence of hematoma.    Cardiovascular: Normal rate, regular rhythm, normal heart sounds and intact distal pulses. Exam reveals no gallop and no friction rub.   No murmur heard.  Pulmonary/Chest: Effort normal and breath sounds normal. She has no wheezes. She has no rales.   Abdominal: Soft. Bowel sounds are normal. She exhibits distension. There is no tenderness.   Abdomen soft but distended. There is ascites and superficial dilation of veins overlying abdomen.   Musculoskeletal: She exhibits edema.   Trace lower extremity edema   Neurological: She is alert.   Skin: Skin is warm and dry.   Psychiatric: She has a normal mood and affect. Her behavior is normal.   Nursing note and vitals reviewed.      Significant Labs:   Recent Lab Results       10/30/19  0349   10/30/19  0216   10/29/19  1951   10/29/19  1206        Albumin 2.9           Alkaline Phosphatase 78           ALT 18           Anion Gap 13           aPTT 118.9  Comment:  aPTT therapeutic range = 39-69 seconds  EXPECTED RESULTS PER LION DE LEON RN WILL VERIFY IN MORNING LABS   10/30/2019  04:21   35.4  Comment:  aPTT therapeutic range = 39-69 seconds 45.4  Comment:  aPTT therapeutic range = 39-69 seconds 24.5  Comment:  aPTT therapeutic range = 39-69 seconds     AST 51           Baso # 0.01           Basophil% 0.1           BILIRUBIN TOTAL 1.7  Comment:  For infants and newborns, interpretation of results should be based  on gestational age, weight and in agreement with clinical  observations.  Premature Infant recommended reference ranges:  Up to 24  hours.............<8.0 mg/dL  Up to 48 hours............<12.0 mg/dL  3-5 days..................<15.0 mg/dL  6-29 days.................<15.0 mg/dL             BUN, Bld 57           Calcium 8.2           Chloride 96           CO2 21           Creatinine 5.2           Differential Method Automated           eGFR if  9.5           eGFR if non  8.2  Comment:  Calculation used to obtain the estimated glomerular filtration  rate (eGFR) is the CKD-EPI equation.              Eos # 0.0           Eosinophil% 0.0           Fibrinogen 180           Glucose 160           Gran # (ANC) 8.0           Gran% 72.6           Hematocrit 24.6  Comment:  Results confirmed, test repeated           Hemoglobin 8.4  Comment:  Results confirmed, test repeated           Immature Grans (Abs) 0.24  Comment:  Mild elevation in immature granulocytes is non specific and   can be seen in a variety of conditions including stress response,   acute inflammation, trauma and pregnancy. Correlation with other   laboratory and clinical findings is essential.             Immature Granulocytes 2.2           Lymph # 1.5           Lymph% 13.6           Magnesium 1.9           MCH 22.7           MCHC 34.1           MCV 67           Mono # 1.3           Mono% 11.5           MPV SEE COMMENT  Comment:  Result not available.           nRBC 0           Phosphorus 4.4           Platelets 165           Potassium 4.0           PROTEIN TOTAL 6.7           RBC 3.70           RDW 19.9           Sodium 130           WBC 10.98                 Diagnostic Results:  CXR on 10/27/2019:   Comparison:  Prior dated 10/26/2019    Findings:  Left-sided trialysis catheter is present with tip at the IVC/RA junction.  The mediastinal structures are midline.  The cardiac silhouette is enlarged and stable.  There is pulmonary vascular congestion and bilateral reticular opacities consistent with mild interstitial edema.  Trace right pleural fluid is  suspected. No osseous abnormalities are seen.   Impression:  See above.    US Retroperitoneal Complete on 10/25/2019:   Comparison:  CT chest, abdomen, and pelvis on 08/28/2019   Findings:  Right kidney: The right kidney measures 11.2 cm. No cortical thinning. No loss of corticomedullary distinction. Resistive index measures 1.0.  There is a 0.8 x 0.7 x 0.8 cm simple cyst.  No renal stone. No hydronephrosis.  Left kidney: The left kidney measures 11.1 cm. No cortical thinning. No loss of corticomedullary distinction. Resistive index measures 1.0.  No mass. No renal stone. No hydronephrosis.  The bladder is partially distended at the time of scanning and has an unremarkable appearance.  Moderate volume ascites.   Impression:  Bilateral elevated resistive indices, suggestive of medical renal disease.  No hydronephrosis or nephrolithiasis. Moderate volume ascites.      Assessment/Plan:     * Acute kidney injury  Patient with baseline serum creatinine around 1.0. Serum creatinine was 1.1 on 10/19. 10/25 serum creatinine was noted to be 7.1. K of 6.3 that required urgent HD (10/26).    -  Burgos stain negative, urine protein to creatinine ratio of 4, Fe urea 62.3% suggestive of intrinsic etiology, US without hydronephrosis, Garcia with scant urine output despite IV Lasix and IVFs  - Continue with Garcia catheter with strict I/Os  - Will continue to avoid nephrotoxic agents (i.e. 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    - continue with heparin drip  - monitor aPTT, PT, and INR in the setting of cirrhosis and anticoagulation     Ascites due to alcoholic cirrhosis  - with complaints of abdominal pain over night of 10/28-29, US with small amount of ascitic fluids with some concern for SBP  - was given albumin and started on Rocephin  - will continue with lactulose     HCC (hepatocellular carcinoma)  - hold immunotherapy given concern for plausible immunotherapy induced nephritis   - continue steroids  - prognosis guarded    Portal hypertensive gastropathy  - holding propanolol as above    Esophageal varices in alcoholic cirrhosis  - patient denies any hemtopyosis, BRBPR, or melena  - holding home dose propanol 10 mg BID in light of hypotension    Alcoholic cirrhosis  MELD of +30 at admission. CMP on admission with albumin of 3.0, Alk phos 96, and AST 75.  - continue to monitor    Iron deficiency anemia due to chronic blood loss  Hemoglobin 8.8 on admission.     - Hgb 8.4 this AM  - Will continue with Heparin drip, denies overt bleeding  - s/p 1 unit pRBCs on 10/29 for Hgb 6.4  - continue to monitor with daily CBCs  - transfuse Hgb < 7             Lacho Nuñez MD  Hematology/Oncology  [facility]      ATTENDING NOTE, ONCOLOGY INPATIENT TEAM    As above; events of last 24 hours noted.  Patient seen and examined, chart reviewed.  Appears comfortable, in NAD.  Lungs are clear to auscultation.  Abdomen is soft, nontender.  Labs reviewed.  She does have significant urine output today.    PLAN  Continue daily labs.  Follow urine output  Continue prednisone 60 mg QD.  Continue heparin drip.  Would prefer to delay diagnostic kidney biopsy until next week.  We will follow.  Her multiple questions were answered to her satisfaction.        Nikos Hansen MD

## 2019-10-30 NOTE — ASSESSMENT & PLAN NOTE
Hemoglobin 8.8 on admission.     - Hgb 8.4 this AM  - Will continue with Heparin drip, denies overt bleeding  - s/p 1 unit pRBCs on 10/29 for Hgb 6.4  - continue to monitor with daily CBCs  - transfuse Hgb < 7

## 2019-10-31 LAB
ALBUMIN SERPL BCP-MCNC: 2.7 G/DL (ref 3.5–5.2)
ALP SERPL-CCNC: 85 U/L (ref 55–135)
ALT SERPL W/O P-5'-P-CCNC: 14 U/L (ref 10–44)
ANION GAP SERPL CALC-SCNC: 15 MMOL/L (ref 8–16)
APTT BLDCRRT: 60.5 SEC (ref 21–32)
AST SERPL-CCNC: 47 U/L (ref 10–40)
BASOPHILS # BLD AUTO: 0.01 K/UL (ref 0–0.2)
BASOPHILS NFR BLD: 0.1 % (ref 0–1.9)
BILIRUB SERPL-MCNC: 0.8 MG/DL (ref 0.1–1)
BUN SERPL-MCNC: 56 MG/DL (ref 8–23)
CALCIUM SERPL-MCNC: 8.1 MG/DL (ref 8.7–10.5)
CHLORIDE SERPL-SCNC: 100 MMOL/L (ref 95–110)
CO2 SERPL-SCNC: 17 MMOL/L (ref 23–29)
CREAT SERPL-MCNC: 4.7 MG/DL (ref 0.5–1.4)
DIFFERENTIAL METHOD: ABNORMAL
EOSINOPHIL # BLD AUTO: 0 K/UL (ref 0–0.5)
EOSINOPHIL NFR BLD: 0 % (ref 0–8)
ERYTHROCYTE [DISTWIDTH] IN BLOOD BY AUTOMATED COUNT: 19.9 % (ref 11.5–14.5)
EST. GFR  (AFRICAN AMERICAN): 10.7 ML/MIN/1.73 M^2
EST. GFR  (NON AFRICAN AMERICAN): 9.3 ML/MIN/1.73 M^2
FIBRINOGEN PPP-MCNC: 128 MG/DL (ref 182–366)
GLUCOSE SERPL-MCNC: 222 MG/DL (ref 70–110)
HCT VFR BLD AUTO: 23.4 % (ref 37–48.5)
HGB BLD-MCNC: 8.1 G/DL (ref 12–16)
IMM GRANULOCYTES # BLD AUTO: 0.2 K/UL (ref 0–0.04)
IMM GRANULOCYTES NFR BLD AUTO: 1.9 % (ref 0–0.5)
LYMPHOCYTES # BLD AUTO: 1 K/UL (ref 1–4.8)
LYMPHOCYTES NFR BLD: 9.7 % (ref 18–48)
MAGNESIUM SERPL-MCNC: 1.8 MG/DL (ref 1.6–2.6)
MCH RBC QN AUTO: 23.1 PG (ref 27–31)
MCHC RBC AUTO-ENTMCNC: 34.6 G/DL (ref 32–36)
MCV RBC AUTO: 67 FL (ref 82–98)
MONOCYTES # BLD AUTO: 0.7 K/UL (ref 0.3–1)
MONOCYTES NFR BLD: 6.9 % (ref 4–15)
NEUTROPHILS # BLD AUTO: 8.8 K/UL (ref 1.8–7.7)
NEUTROPHILS NFR BLD: 81.4 % (ref 38–73)
NRBC BLD-RTO: 0 /100 WBC
PHOSPHATE SERPL-MCNC: 4.4 MG/DL (ref 2.7–4.5)
PLATELET # BLD AUTO: 167 K/UL (ref 150–350)
PMV BLD AUTO: ABNORMAL FL (ref 9.2–12.9)
POTASSIUM SERPL-SCNC: 3.9 MMOL/L (ref 3.5–5.1)
PROT SERPL-MCNC: 6.3 G/DL (ref 6–8.4)
RBC # BLD AUTO: 3.5 M/UL (ref 4–5.4)
SODIUM SERPL-SCNC: 132 MMOL/L (ref 136–145)
WBC # BLD AUTO: 10.74 K/UL (ref 3.9–12.7)

## 2019-10-31 PROCEDURE — 25000003 PHARM REV CODE 250: Performed by: STUDENT IN AN ORGANIZED HEALTH CARE EDUCATION/TRAINING PROGRAM

## 2019-10-31 PROCEDURE — 80053 COMPREHEN METABOLIC PANEL: CPT

## 2019-10-31 PROCEDURE — 85384 FIBRINOGEN ACTIVITY: CPT

## 2019-10-31 PROCEDURE — 63600175 PHARM REV CODE 636 W HCPCS: Performed by: STUDENT IN AN ORGANIZED HEALTH CARE EDUCATION/TRAINING PROGRAM

## 2019-10-31 PROCEDURE — 85730 THROMBOPLASTIN TIME PARTIAL: CPT

## 2019-10-31 PROCEDURE — 99233 SBSQ HOSP IP/OBS HIGH 50: CPT | Mod: ,,, | Performed by: INTERNAL MEDICINE

## 2019-10-31 PROCEDURE — 83735 ASSAY OF MAGNESIUM: CPT

## 2019-10-31 PROCEDURE — 99233 PR SUBSEQUENT HOSPITAL CARE,LEVL III: ICD-10-PCS | Mod: ,,, | Performed by: INTERNAL MEDICINE

## 2019-10-31 PROCEDURE — 85025 COMPLETE CBC W/AUTO DIFF WBC: CPT

## 2019-10-31 PROCEDURE — 20600001 HC STEP DOWN PRIVATE ROOM

## 2019-10-31 PROCEDURE — 84100 ASSAY OF PHOSPHORUS: CPT

## 2019-10-31 RX ORDER — QUETIAPINE FUMARATE 25 MG/1
25 TABLET, FILM COATED ORAL NIGHTLY PRN
Status: DISCONTINUED | OUTPATIENT
Start: 2019-10-31 | End: 2019-11-01

## 2019-10-31 RX ADMIN — OXYCODONE HYDROCHLORIDE 5 MG: 5 TABLET ORAL at 04:10

## 2019-10-31 RX ADMIN — OXYCODONE HYDROCHLORIDE 5 MG: 5 TABLET ORAL at 06:10

## 2019-10-31 RX ADMIN — LACTULOSE 10 G: 20 SOLUTION ORAL at 03:10

## 2019-10-31 RX ADMIN — PANTOPRAZOLE SODIUM 40 MG: 40 TABLET, DELAYED RELEASE ORAL at 09:10

## 2019-10-31 RX ADMIN — GABAPENTIN 100 MG: 100 CAPSULE ORAL at 08:10

## 2019-10-31 RX ADMIN — SODIUM CHLORIDE 125 ML/HR: 0.9 INJECTION, SOLUTION INTRAVENOUS at 05:10

## 2019-10-31 RX ADMIN — PREDNISONE 60 MG: 20 TABLET ORAL at 09:10

## 2019-10-31 RX ADMIN — QUETIAPINE FUMARATE 25 MG: 25 TABLET ORAL at 11:10

## 2019-10-31 RX ADMIN — LACTULOSE 10 G: 20 SOLUTION ORAL at 09:10

## 2019-10-31 NOTE — ASSESSMENT & PLAN NOTE
63 yro F with PMH of EtOH cirrhosis, HCC (receiving treatment with nivolumab), and esophageal varices s/p banding 1/2018 who was presents with PRACHI with 7.1 (baseline creatinine ~1.0) and no UO for 2 days in setting of several weeks of decreased PO intake, nausea, and vomiting. Additionally, she underwent CT scan with contrast 8/16, and was being treated for an UTI with cipro as out patient. Pt also endorses NSAID use for abdominal pain.  -US RP shows no hydronephrosis  -U p/cr 4 (trace protein on past UAs), and no UA available from this admission  -Labs on admission: K of 6.3, Cr 7.4, BUN 60, phos 6.7, bicarb 18.   -received HD 10/26/19    Recommendations:  - We still recommend Renal biopsy given patient still up for aggressive measures and no explanation for her acute kidney decompensation requiring dialysis  - Addressed this concern with team  - Trend RFPs daily  - Strict I/Os and chart  - Avoid nephrotoxic agents, renally dose medications   - making urine and not uremic on exam; Cr improving  - will continue to monitor her renal function while off HD

## 2019-10-31 NOTE — PLAN OF CARE
Problem: Adult Inpatient Plan of Care  Goal: Plan of Care Review  Outcome: Ongoing, Progressing  Goal: Patient-Specific Goal (Individualization)  Outcome: Ongoing, Progressing  Goal: Absence of Hospital-Acquired Illness or Injury  Outcome: Ongoing, Progressing  Goal: Optimal Comfort and Wellbeing  Outcome: Ongoing, Progressing     Problem: Fall Injury Risk  Goal: Absence of Fall and Fall-Related Injury  Outcome: Ongoing, Progressing     Problem: Infection  Goal: Infection Symptom Resolution  Outcome: Ongoing, Progressing     Problem: Device-Related Complication Risk (Hemodialysis)  Goal: Safe, Effective Therapy Delivery  Outcome: Ongoing, Progressing     Problem: Hemodynamic Instability (Hemodialysis)  Goal: Vital Signs Remain in Desired Range  Outcome: Ongoing, Progressing     Problem: Infection (Hemodialysis)  Goal: Absence of Infection Signs/Symptoms  Outcome: Ongoing, Progressing

## 2019-10-31 NOTE — ASSESSMENT & PLAN NOTE
63 yro F with PMH of EtOH cirrhosis, HCC (receiving treatment with nivolumab), and esophageal varices s/p banding 1/2018 who was presents with PRACHI with 7.1 (baseline creatinine ~1.0) and no UO for 2 days in setting of several weeks of decreased PO intake, nausea, and vomiting. Additionally, she underwent CT scan with contrast 8/16, and was being treated for an UTI with cipro as out patient. Pt also endorses NSAID use for abdominal pain.  -US RP shows no hydronephrosis  -U p/cr 4 (trace protein on past UAs), and no UA available from this admission  -Labs on admission: K of 6.3, Cr 7.4, BUN 60, phos 6.7, bicarb 18.   -received HD 10/26/19    Recommendations:  - trend RFPs, strict IOS, avoid nephrotoxic agents, renally dose medications   - making urine and not uremic on exam; Cr improving  - will continue to monitor her renal function while off HD

## 2019-10-31 NOTE — PROGRESS NOTES
[facility]  Hematology/Oncology  Progress Note    Patient Name: Neena Marks  Admission Date: 10/25/2019  Hospital Length of Stay: 6 days  Code Status: Full Code     Subjective:     HPI:  Ms Marks is a 63-year-old  female with alcoholic cirrhosis dating back to 2015, portal HTN, ascites, history of variceal bleeding in January 2018 s/p banding, history of alcohol abuse (recently quit in January of 2018), and newly diagnosed hepatocellular carcinoma undergoing treatment with nivolumab who presented as a direct transfer from Heme/Onc clinic on 10/25 after routine laboratory studies were remarkable for serum creatinine of 7.1 (baseline creatinine ~1.0). Patient reports one week history of nasuea and vomiting. Per chart review, patient [place] [facility] [place] this [place] with complaints of abdominal pain, back pain, nausea, and vomiting. She was noted to have UTI 2/2 Klebsiella on 10/19 and is currently  undergoing treatment with ciprofloxacin.  She denies relief in abdominal and back pain with prescribed medications. Her nausea and vomiting has persisted as well. She reports decreased PO intake over this time span secondary to nausea and has a history of recent ibuprofen and naproxen use for abdominal pain. She denies fever, dysuria, hematuria, chest pain, shortness of breath, syncope, or worsening lower extremity edema. She currently has abdominal distention and abdominal pain which radiates around the left aspect of lumbar region. Patients' vitals with BP 98/54 mmHg, HR 80 bpm, afebrile with temperature of 98 F, and RR 16.     Cirrhosis & HCC History:  AFP tumor marker is normal.  Hep C and B testing on 4/27/2018 - negative.    Endoscopy - 1/9/19 - Grade II esophageal varices. Completely eradicated. Banded, Small hiatal hernia., Portal hypertensive gastropathy. Treated with argon plasma coagulation (APC).     Patient had an appointment on 2/15/2019, IR consult for  Y90 but she missed her appointment.   - She had IR mapping and Hepatic angiography demonstrates a large hypervascular lesion in the right hepatic lobe supplied by branches of the right hepatic artery and accessory left hepatic artery.  Technetium MAA was injected aorta determined lung shunt fraction.  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  - Nuclear medicine scan revealed that majority of tracer goes to the lungs with a liver lung shunt fraction of 68.9%.   is not a candidate for Y90 or other liver directed therapies due to liver lung shunt     5/9/2019 - Started taking Sorafenib 400mg BID from  7/6/2019 - Decrease to 200 mg BID due to hand foot syndrome  8/16/2019 - Stopped Sorafenib on  due to persistent desquamation of hand foot syndrome  08/28/2019 - CT Chest abdomen and pelvis done revealed disease progression and also new bilateral pulmonary embolism. She was admitted to [facility] main campus and discharged on Eliquis (Patient declined Lovenox)   9/17/2019 -  Fall and followed up in ED and Xray right knee revealed no acute osseous abnormality  9/27/2019 - Started C 1 D1 Nivolumab q 4 weeks  10/25/2019 - PRACHI with creatinine of 7.1, direct admit     Interval History: Patient seen and examined this AM. Denies any new acute complaints over night apart from apart from some constipation and abdominal pain over night which as relieved with PRN pain and constipation medications. She denies fever, SOB, or chest pain. Urine output with 1.1L over 24 hrs.    Oncology Treatment Plan:   OP NIVOLUMAB Q4W    Medications:  Continuous Infusions:   sodium chloride 0.9% 125 mL/hr at 10/30/19 1523    heparin (porcine) in D5W 14 Units/kg/hr (10/30/19 4438)     Scheduled Meds:   calcium gluconate IVPB  1 g Intravenous Once    gabapentin  100 mg Oral QHS    lactulose  10 g Oral  TID    pantoprazole  40 mg Oral Daily    predniSONE  60 mg Oral Daily     PRN Meds:sodium chloride, calcium gluconate IVPB **AND** calcium gluconate IVPB, cellulose, oxidized 3 x 4', heparin (porcine), heparin (PORCINE), heparin (PORCINE), ondansetron, oxyCODONE, prochlorperazine, ramelteon, sodium chloride 0.9%     Review of Systems   Constitutional: Negative for chills, diaphoresis and fever.   Respiratory: Negative for shortness of breath and wheezing.    Cardiovascular: Negative for chest pain, palpitations and leg swelling.   Gastrointestinal: Positive for abdominal distention, abdominal pain and constipation. Negative for anal bleeding, blood in stool, diarrhea, nausea and vomiting.   Genitourinary:        Increase in urine output.   Hematological: Does not bruise/bleed easily.   Psychiatric/Behavioral: Negative for behavioral problems and confusion.     Objective:     Vital Signs (Most Recent):  Temp: 98.7 °F (37.1 °C) (10/31/19 0745)  Pulse: 75 (10/31/19 0745)  Resp: 15 (10/31/19 0745)  BP: 131/67 (10/31/19 0745)  SpO2: 97 % (10/31/19 0745) Vital Signs (24h Range):  Temp:  [96.9 °F (36.1 °C)-98.9 °F (37.2 °C)] 98.7 °F (37.1 °C)  Pulse:  [73-86] 75  Resp:  [15-18] 15  SpO2:  [94 %-98 %] 97 %  BP: (105-141)/(59-71) 131/67     Weight: 40.8 kg (90 lb)  Body mass index is 18.18 kg/m².  Body surface area is 1.3 meters squared.      Intake/Output Summary (Last 24 hours) at 10/31/2019 0846  Last data filed at 10/31/2019 0353  Gross per 24 hour   Intake 2762.5 ml   Output 1100 ml   Net 1662.5 ml       Physical Exam   Constitutional: Vital signs are normal. She appears cachectic.   HENT:   Head: Normocephalic and atraumatic.   Eyes: EOM are normal. No scleral icterus.   Arcus senilis    Neck: Neck supple.   Central line on the left. Pressure gauze in place. No overt bleeding appreciated or evidence of hematoma.    Cardiovascular: Normal rate, regular rhythm, normal heart sounds and intact distal pulses. Exam reveals  no gallop and no friction rub.   No murmur heard.  Pulmonary/Chest: Effort normal and breath sounds normal. She has no wheezes. She has no rales.   Abdominal: Soft. Bowel sounds are normal. She exhibits distension. There is tenderness.   Abdomen soft but distended. There is ascites and superficial dilation of veins overlying abdomen. Tender to deep palpation.    Musculoskeletal: She exhibits edema.   Trace lower extremity edema   Neurological: She is alert.   Skin: Skin is warm and dry.   Psychiatric: She has a normal mood and affect. Her behavior is normal.   Nursing note and vitals reviewed.      Significant Labs:   Recent Lab Results       10/31/19  0350   10/30/19  2201   10/30/19  1822   10/30/19  1539        Albumin 2.7           Alkaline Phosphatase 85           ALT 14           Anion Gap 15   11       aPTT 60.5  Comment:  aPTT therapeutic range = 39-69 seconds 55.7  Comment:  aPTT therapeutic range = 39-69 seconds   52.8  Comment:  aPTT therapeutic range = 39-69 seconds     AST 47           Baso # 0.01           Basophil% 0.1           BILIRUBIN TOTAL 0.8  Comment:  For infants and newborns, interpretation of results should be based  on gestational age, weight and in agreement with clinical  observations.  Premature Infant recommended reference ranges:  Up to 24 hours.............<8.0 mg/dL  Up to 48 hours............<12.0 mg/dL  3-5 days..................<15.0 mg/dL  6-29 days.................<15.0 mg/dL             BUN, Bld 56   57       Calcium 8.1   8.3       Chloride 100   98       CO2 17   22       Creatinine 4.7   5.1       Differential Method Automated           eGFR if African American 10.7   9.7       eGFR if non  9.3  Comment:  Calculation used to obtain the estimated glomerular filtration  rate (eGFR) is the CKD-EPI equation.      8.4  Comment:  Calculation used to obtain the estimated glomerular filtration  rate (eGFR) is the CKD-EPI equation.          Eos # 0.0            Eosinophil% 0.0           Fibrinogen 128           Glucose 222   212       Gran # (ANC) 8.8           Gran% 81.4           Hematocrit 23.4           Hemoglobin 8.1           Immature Grans (Abs) 0.20  Comment:  Mild elevation in immature granulocytes is non specific and   can be seen in a variety of conditions including stress response,   acute inflammation, trauma and pregnancy. Correlation with other   laboratory and clinical findings is essential.             Immature Granulocytes 1.9           Lymph # 1.0           Lymph% 9.7           Magnesium 1.8   1.7       MCH 23.1           MCHC 34.6           MCV 67           Mono # 0.7           Mono% 6.9           MPV SEE COMMENT  Comment:  Result not available.           nRBC 0           Phosphorus 4.4           Platelets 167           Potassium 3.9   4.0       PROTEIN TOTAL 6.3           RBC 3.50           RDW 19.9           Sodium 132   131       WBC 10.74                 Diagnostic Results:  CXR on 10/27/2019:   Comparison:  Prior dated 10/26/2019    Findings:  Left-sided trialysis catheter is present with tip at the IVC/RA junction.  The mediastinal structures are midline.  The cardiac silhouette is enlarged and stable.  There is pulmonary vascular congestion and bilateral reticular opacities consistent with mild interstitial edema.  Trace right pleural fluid is suspected. No osseous abnormalities are seen.   Impression:  See above.    US Retroperitoneal Complete on 10/25/2019:   Comparison:  CT chest, abdomen, and pelvis on 08/28/2019   Findings:  Right kidney: The right kidney measures 11.2 cm. No cortical thinning. No loss of corticomedullary distinction. Resistive index measures 1.0.  There is a 0.8 x 0.7 x 0.8 cm simple cyst.  No renal stone. No hydronephrosis.  Left kidney: The left kidney measures 11.1 cm. No cortical thinning. No loss of corticomedullary distinction. Resistive index measures 1.0.  No mass. No renal stone. No hydronephrosis.  The bladder is  partially distended at the time of scanning and has an unremarkable appearance.  Moderate volume ascites.   Impression:  Bilateral elevated resistive indices, suggestive of medical renal disease.  No hydronephrosis or nephrolithiasis. Moderate volume ascites.      Assessment/Plan:     * Acute kidney injury  Patient with baseline serum creatinine around 1.0. Serum creatinine was 1.1 on 10/19. 10/25 serum creatinine was noted to be 7.1. K of 6.3 that required urgent HD (10/26). Burgos stain negative, urine protein to creatinine ratio of 4, Fe urea 62.3% suggestive of intrinsic etiology, US without hydronephrosis, Garcia with scant urine output despite IV Lasix and IVFs    - Creatine down trending with a value of 4.7 this AM, 1.1L urine output over the last 24hrs  - Continue with Garcia catheter with strict I/Os  - Will continue to avoid nephrotoxic agents (i.e. NSAIDs, contrast, ACEi, ARBs, etc.)   - Nephrology consulted, recommended renal biopsy at this time, will hold off until patient is s/p one week steroids, if no improvement will need goals of care discussion   - Given immunotherapy induced nephritis still of concern will continue with 1 mg/kg steroids (60 mg) daily  - Continue to monitor electrolytes  - s/p HD on 10/26 and 10/28    Pulmonary embolism  Hx of PE (CT scan in August of 2019) on chronic anticoagulation with Eliquis 5 mg BID at home, however, due to sudden impaired renal function unable to appropriately dose Eliquis and was transitioned to heparin drip on 10/27.    - continue with heparin drip  - monitor aPTT, PT, and INR in the setting of cirrhosis and anticoagulation     Ascites due to alcoholic cirrhosis  - with complaints of abdominal pain over night of 10/28-29, US with small amount of ascitic fluids with some concern for SBP  - was given albumin and started on Rocephin  - will continue with lactulose     HCC (hepatocellular carcinoma)  - hold immunotherapy given concern for plausible  immunotherapy induced nephritis   - continue steroids  - prognosis guarded    Portal hypertensive gastropathy  - holding propanolol as above    Esophageal varices in alcoholic cirrhosis  - patient denies any hemtopyosis, BRBPR, or melena  - holding home dose propanol 10 mg BID in light of hypotension    Alcoholic cirrhosis  MELD of +30 at admission. CMP on admission with albumin of 3.0, Alk phos 96, and AST 75.  - continue to monitor    Iron deficiency anemia due to chronic blood loss  Hemoglobin 8.8 on admission.     - Hgb 8.1 this AM  - Will continue with Heparin drip, denies overt bleeding  - s/p 1 unit pRBCs on 10/29 for Hgb 6.4  - continue to monitor with daily CBCs  - transfuse Hgb < 7             Lacho Nuñez MD  Hematology/Oncology  [facility]      ATTENDING NOTE, ONCOLOGY INPATIENT TEAM    As above; events of last 24 hours noted.  Patient seen and examined, chart reviewed.  Appears comfortable, in NAD.  Lungs with few scattered ronchi.  Abdomen is soft, distended, nontender.  Labs reviewed. Creatinine today is 4.7 mg/dl    PLAN  Repeat electrolytes in am.  Will hold off on dialysis again today.  If renal function continues to improve, we may be  to remove her catheter in the next 24-48 hours.  No need for a renal biopsy given her improvement.  Match I/Os.  PT/OT to evaluate and ambulate.  Continue the heparin drip for now.  We will follow.      Nikos Hansen MD

## 2019-10-31 NOTE — ASSESSMENT & PLAN NOTE
Patient with baseline serum creatinine around 1.0. Serum creatinine was 1.1 on 10/19. 10/25 serum creatinine was noted to be 7.1. K of 6.3 that required urgent HD (10/26). Burgos stain negative, urine protein to creatinine ratio of 4, Fe urea 62.3% suggestive of intrinsic etiology, US without hydronephrosis, Garcia with scant urine output despite IV Lasix and IVFs    - Creatine down trending with a value of 4.7 this AM, 1.1L urine output over the last 24hrs  - Continue with Garcia catheter with strict I/Os  - Will continue to avoid nephrotoxic agents (i.e. NSAIDs, contrast, ACEi, ARBs, etc.)   - Nephrology consulted, recommended renal biopsy at this time, will hold off until patient is s/p one week steroids, if no improvement will need goals of care discussion   - Given immunotherapy induced nephritis still of concern will continue with 1 mg/kg steroids (60 mg) daily  - Continue to monitor electrolytes  - s/p HD on 10/26 and 10/28

## 2019-10-31 NOTE — PROGRESS NOTES
[facility]  Nephrology  Progress Note    Patient Name: Neena Marks  MRN: [id number]  Admission Date: 10/25/2019  Hospital Length of Stay: 6 days  Attending Provider: Nikos Hansen MD   Primary Care Physician: [facility]  Principal Problem:Acute kidney injury    Subjective:     HPI: 63 yro F with PMH of EtOH cirrhosis, HCC (receiving treatment with nivolumab), and esophageal varices s/p banding 1/2018 who was [place] [facility] [place] heme/onc clinic for PRACHI with 7.1 (baseline creatinine ~1.0). Pt reports several weeks of decreased PO intake and n/v and has had multiple ED visits for this. She was being treated outpatient for an UTI 2/2 Klebsiella since 10/19 with cipro. Reports that she has not been putting out urine for 2 days. She also endorses recent ibuprofen and naproxen use for abdominal pain. Additionally, Pt had Ct with contrast 8/16. She denies fever, dysuria, hematuria, or worsening lower extremity edema. She currently has abdominal distention and abdominal pain which radiates around the left aspect of lumbar region. Patients' vitals with BP 98/54 mmHg. US RP shows no hydronephrosis. U p/cr 4 (trace protein on past UAs), and no UA available from this admission. Review of labs reveals K of 6.3, Cr 7.4, BUN 60, phos 6.7, bicarb 18. On admission patient received 1 L NS bolus. This AM, patient received 60 mg IV lasix without any UO. Garcia in place.         Interval History: 1.1L urine output in past 24 hours.     Review of patient's allergies indicates:  No Known Allergies  Current Facility-Administered Medications   Medication Frequency    0.9%  NaCl infusion (for blood administration) Q24H PRN    0.9%  NaCl infusion Continuous    calcium gluconate 1g in dextrose 5% 100mL (ready to mix system) Once    And    calcium gluconate 1g in dextrose 5% 100mL (ready to mix system) Q10 Min PRN    cellulose, oxidized 3 x 4' (SURGICEL) Pads 1 each Daily PRN    gabapentin capsule  100 mg QHS    heparin (porcine) injection 1,000 Units PRN    heparin 25,000 units in dextrose 5% (100 units/ml) IV bolus from bag - ADDITIONAL PRN BOLUS - 30 units/kg PRN    heparin 25,000 units in dextrose 5% (100 units/ml) IV bolus from bag - ADDITIONAL PRN BOLUS - 60 units/kg PRN    heparin 25,000 units in dextrose 5% 250 mL (100 units/mL) infusion HIGH INTENSITY nomogram - [facility] Continuous    lactulose 20 gram/30 mL solution Soln 10 g TID    ondansetron disintegrating tablet 8 mg Q8H PRN    oxyCODONE immediate release tablet 5 mg Q6H PRN    pantoprazole EC tablet 40 mg Daily    predniSONE tablet 60 mg Daily    prochlorperazine injection Soln 10 mg Q6H PRN    ramelteon tablet 8 mg Nightly PRN    sodium chloride 0.9% flush 10 mL PRN       Objective:     Vital Signs (Most Recent):  Temp: 98.2 °F (36.8 °C) (10/31/19 1142)  Pulse: 75 (10/31/19 1142)  Resp: 15 (10/31/19 1142)  BP: 126/71 (10/31/19 1142)  SpO2: 99 % (10/31/19 1142)  O2 Device (Oxygen Therapy): room air (10/30/19 1138) Vital Signs (24h Range):  Temp:  [98.2 °F (36.8 °C)-98.9 °F (37.2 °C)] 98.2 °F (36.8 °C)  Pulse:  [73-86] 75  Resp:  [15-18] 15  SpO2:  [94 %-99 %] 99 %  BP: (117-141)/(59-71) 126/71     Weight: 40.8 kg (90 lb) (10/25/19 1542)  Body mass index is 18.18 kg/m².  Body surface area is 1.3 meters squared.    I/O last 3 completed shifts:  In: 3002.5 [P.O.:1440; I.V.:1562.5]  Out: 1550 [Urine:1550]    Physical Exam   Constitutional: She is oriented to person, place, and time. She appears well-developed.   cachectic    HENT:   Head: Normocephalic and atraumatic.   Eyes: Pupils are equal, round, and reactive to light. EOM are normal.   Neck: Neck supple. No thyromegaly present.   Cardiovascular: Normal rate, regular rhythm and normal heart sounds.   No murmur heard.  Pulmonary/Chest: Effort normal and breath sounds normal. No respiratory distress.   Abdominal: Soft. Bowel sounds are normal. She exhibits distension. There is tenderness.    Musculoskeletal: She exhibits no edema or deformity.   Lymphadenopathy:     She has no cervical adenopathy.   Neurological: She is alert and oriented to person, place, and time.   Skin: Skin is warm and dry.     Significant Labs:  CBC:   Recent Labs   Lab 10/31/19  0350   WBC 10.74   RBC 3.50*   HGB 8.1*   HCT 23.4*      MCV 67*   MCH 23.1*   MCHC 34.6     CMP:   Recent Labs   Lab 10/31/19  0350   *   CALCIUM 8.1*   ALBUMIN 2.7*   PROT 6.3   *   K 3.9   CO2 17*      BUN 56*   CREATININE 4.7*   ALKPHOS 85   ALT 14   AST 47*   BILITOT 0.8     All labs within the past 24 hours have been reviewed.     Significant Imaging:  all imaging in past 24 hours reviewed    Assessment/Plan:     * Acute kidney injury  63 yro F with PMH of EtOH cirrhosis, HCC (receiving treatment with nivolumab), and esophageal varices s/p banding 1/2018 who was presents with PRACHI with 7.1 (baseline creatinine ~1.0) and no UO for 2 days in setting of several weeks of decreased PO intake, nausea, and vomiting. Additionally, she underwent CT scan with contrast 8/16, and was being treated for an UTI with cipro as out patient. Pt also endorses NSAID use for abdominal pain.  -US RP shows no hydronephrosis  -U p/cr 4 (trace protein on past UAs), and no UA available from this admission  -Labs on admission: K of 6.3, Cr 7.4, BUN 60, phos 6.7, bicarb 18.   -received HD 10/26/19    Recommendations:  - trend RFPs, strict IOS, avoid nephrotoxic agents, renally dose medications   - making urine and not uremic on exam; Cr improving  - will continue to monitor her renal function while off HD      Thank you for your consult. I will follow-up with patient. Please contact us if you have any additional questions.    Christopher Aranda MD  Nephrology  [facility]

## 2019-10-31 NOTE — PROGRESS NOTES
[facility]  Nephrology  Progress Note    Patient Name: Neena Marks  MRN: [id number]  Admission Date: 10/25/2019  Hospital Length of Stay: 6 days  Attending Provider: Nikos Hansen MD   Primary Care Physician: [facility]  Principal Problem:Acute kidney injury    Subjective:     HPI: 63 yro F with PMH of EtOH cirrhosis, HCC (receiving treatment with nivolumab), and esophageal varices s/p banding 1/2018 who [facility] [place] [facility] heme/onc clinic for PRACHI with 7.1 (baseline creatinine ~1.0). Pt reports several weeks of decreased PO intake and n/v and has had multiple ED visits for this. She was being treated outpatient for an UTI 2/2 Klebsiella since 10/19 with cipro. Reports that she has not been putting out urine for 2 days. She also endorses recent ibuprofen and naproxen use for abdominal pain. Additionally, Pt had Ct with contrast 8/16. She denies fever, dysuria, hematuria, or worsening lower extremity edema. She currently has abdominal distention and abdominal pain which radiates around the left aspect of lumbar region. Patients' vitals with BP 98/54 mmHg. US RP shows no hydronephrosis. U p/cr 4 (trace protein on past UAs), and no UA available from this admission. Review of labs reveals K of 6.3, Cr 7.4, BUN 60, phos 6.7, bicarb 18. On admission patient received 1 L NS bolus. This AM, patient received 60 mg IV lasix without any UO. Garcia in place.         Interval History: Patient evaluated bedside, stable vital signs, sCr flat, not worsening, some urine output.  Night uneventful.    Review of patient's allergies indicates:  No Known Allergies  Current Facility-Administered Medications   Medication Frequency    0.9%  NaCl infusion (for blood administration) Q24H PRN    0.9%  NaCl infusion Continuous    cellulose, oxidized 3 x 4' (SURGICEL) Pads 1 each Daily PRN    gabapentin capsule 100 mg QHS    heparin (porcine) injection 1,000 Units PRN    heparin 25,000 units in  dextrose 5% (100 units/ml) IV bolus from bag - ADDITIONAL PRN BOLUS - 30 units/kg PRN    heparin 25,000 units in dextrose 5% (100 units/ml) IV bolus from bag - ADDITIONAL PRN BOLUS - 60 units/kg PRN    heparin 25,000 units in dextrose 5% 250 mL (100 units/mL) infusion HIGH INTENSITY nomogram - [facility] Continuous    lactulose 20 gram/30 mL solution Soln 10 g TID    ondansetron disintegrating tablet 8 mg Q8H PRN    oxyCODONE immediate release tablet 5 mg Q6H PRN    pantoprazole EC tablet 40 mg Daily    predniSONE tablet 60 mg Daily    prochlorperazine injection Soln 10 mg Q6H PRN    ramelteon tablet 8 mg Nightly PRN    sodium chloride 0.9% flush 10 mL PRN       Objective:     Vital Signs (Most Recent):  Temp: 98.2 °F (36.8 °C) (10/31/19 1142)  Pulse: 87 (10/31/19 1506)  Resp: 15 (10/31/19 1142)  BP: 126/71 (10/31/19 1142)  SpO2: 99 % (10/31/19 1142)  O2 Device (Oxygen Therapy): room air (10/30/19 1138) Vital Signs (24h Range):  Temp:  [98.2 °F (36.8 °C)-98.9 °F (37.2 °C)] 98.2 °F (36.8 °C)  Pulse:  [73-87] 87  Resp:  [15-16] 15  SpO2:  [94 %-99 %] 99 %  BP: (117-141)/(64-71) 126/71     Weight: 40.8 kg (90 lb) (10/25/19 1542)  Body mass index is 18.18 kg/m².  Body surface area is 1.3 meters squared.    I/O last 3 completed shifts:  In: 3002.5 [P.O.:1440; I.V.:1562.5]  Out: 1550 [Urine:1550]    Physical Exam   Constitutional: She is oriented to person, place, and time. She appears well-developed.   cachectic    HENT:   Head: Normocephalic and atraumatic.   Eyes: Pupils are equal, round, and reactive to light. EOM are normal.   Neck: Neck supple. No thyromegaly present.   Cardiovascular: Normal rate and regular rhythm.   No murmur heard.  Pulmonary/Chest: Effort normal and breath sounds normal. No respiratory distress.   Abdominal: Soft. Bowel sounds are normal. She exhibits distension. There is tenderness.   Musculoskeletal: She exhibits no edema or deformity.   Lymphadenopathy:     She has no cervical adenopathy.    Neurological: She is alert and oriented to person, place, and time.   Skin: Skin is warm and dry.   Nursing note and vitals reviewed.      Significant Labs:  All labs within the past 24 hours have been reviewed.     Significant Imaging:  CXR personally reviewed.   Renal US reviewed.      Assessment/Plan:     * Acute kidney injury  63 yro F with PMH of EtOH cirrhosis, HCC (receiving treatment with nivolumab), and esophageal varices s/p banding 1/2018 who was presents with PRACHI with 7.1 (baseline creatinine ~1.0) and no UO for 2 days in setting of several weeks of decreased PO intake, nausea, and vomiting. Additionally, she underwent CT scan with contrast 8/16, and was being treated for an UTI with cipro as out patient. Pt also endorses NSAID use for abdominal pain.  -US RP shows no hydronephrosis  -U p/cr 4 (trace protein on past UAs), and no UA available from this admission  -Labs on admission: K of 6.3, Cr 7.4, BUN 60, phos 6.7, bicarb 18.   -received HD 10/26/19    Recommendations:  - We still recommend Renal biopsy given patient still up for aggressive measures and no explanation for her acute kidney decompensation requiring dialysis  - Addressed this concern with team  - Trend RFPs daily  - Strict I/Os and chart  - Avoid nephrotoxic agents, renally dose medications   - making urine and not uremic on exam; Cr improving  - will continue to monitor her renal function while off HD    HCC (hepatocellular carcinoma)  Per primary team        Thank you for your consult. I will follow-up with patient. Please contact us if you have any additional questions.    Golden Fierro MD  Nephrology  [facility]

## 2019-10-31 NOTE — PLAN OF CARE
Pt AAO;dependent with walker or one person assist oob Vital signs stable and pt remained afebrile throughout shift. Receiving  prn pain med times one this shift. Lactulose given per order with one BM in diaper . Abdomen still round and distended. Patient passing gas . Good UOP.  Garcia remains in place this shift. Heparin drip continues at current rate . APTT in goal at this time.  Pt remained free from falls during shift.  Bed lowest position and locked.  Call light in reach.  [facility]

## 2019-10-31 NOTE — SUBJECTIVE & OBJECTIVE
Interval History: 1.1L urine output in past 24 hours.     Review of patient's allergies indicates:  No Known Allergies  Current Facility-Administered Medications   Medication Frequency    0.9%  NaCl infusion (for blood administration) Q24H PRN    0.9%  NaCl infusion Continuous    calcium gluconate 1g in dextrose 5% 100mL (ready to mix system) Once    And    calcium gluconate 1g in dextrose 5% 100mL (ready to mix system) Q10 Min PRN    cellulose, oxidized 3 x 4' (SURGICEL) Pads 1 each Daily PRN    gabapentin capsule 100 mg QHS    heparin (porcine) injection 1,000 Units PRN    heparin 25,000 units in dextrose 5% (100 units/ml) IV bolus from bag - ADDITIONAL PRN BOLUS - 30 units/kg PRN    heparin 25,000 units in dextrose 5% (100 units/ml) IV bolus from bag - ADDITIONAL PRN BOLUS - 60 units/kg PRN    heparin 25,000 units in dextrose 5% 250 mL (100 units/mL) infusion HIGH INTENSITY nomogram - [facility] Continuous    lactulose 20 gram/30 mL solution Soln 10 g TID    ondansetron disintegrating tablet 8 mg Q8H PRN    oxyCODONE immediate release tablet 5 mg Q6H PRN    pantoprazole EC tablet 40 mg Daily    predniSONE tablet 60 mg Daily    prochlorperazine injection Soln 10 mg Q6H PRN    ramelteon tablet 8 mg Nightly PRN    sodium chloride 0.9% flush 10 mL PRN       Objective:     Vital Signs (Most Recent):  Temp: 98.2 °F (36.8 °C) (10/31/19 1142)  Pulse: 75 (10/31/19 1142)  Resp: 15 (10/31/19 1142)  BP: 126/71 (10/31/19 1142)  SpO2: 99 % (10/31/19 1142)  O2 Device (Oxygen Therapy): room air (10/30/19 1138) Vital Signs (24h Range):  Temp:  [98.2 °F (36.8 °C)-98.9 °F (37.2 °C)] 98.2 °F (36.8 °C)  Pulse:  [73-86] 75  Resp:  [15-18] 15  SpO2:  [94 %-99 %] 99 %  BP: (117-141)/(59-71) 126/71     Weight: 40.8 kg (90 lb) (10/25/19 1542)  Body mass index is 18.18 kg/m².  Body surface area is 1.3 meters squared.    I/O last 3 completed shifts:  In: 3002.5 [P.O.:1440; I.V.:1562.5]  Out: 1550 [Urine:1550]    Physical Exam    Constitutional: She is oriented to person, place, and time. She appears well-developed.   cachectic    HENT:   Head: Normocephalic and atraumatic.   Eyes: Pupils are equal, round, and reactive to light. EOM are normal.   Neck: Neck supple. No thyromegaly present.   Cardiovascular: Normal rate, regular rhythm and normal heart sounds.   No murmur heard.  Pulmonary/Chest: Effort normal and breath sounds normal. No respiratory distress.   Abdominal: Soft. Bowel sounds are normal. She exhibits distension. There is tenderness.   Musculoskeletal: She exhibits no edema or deformity.   Lymphadenopathy:     She has no cervical adenopathy.   Neurological: She is alert and oriented to person, place, and time.   Skin: Skin is warm and dry.     Significant Labs:  CBC:   Recent Labs   Lab 10/31/19  0350   WBC 10.74   RBC 3.50*   HGB 8.1*   HCT 23.4*      MCV 67*   MCH 23.1*   MCHC 34.6     CMP:   Recent Labs   Lab 10/31/19  0350   *   CALCIUM 8.1*   ALBUMIN 2.7*   PROT 6.3   *   K 3.9   CO2 17*      BUN 56*   CREATININE 4.7*   ALKPHOS 85   ALT 14   AST 47*   BILITOT 0.8     All labs within the past 24 hours have been reviewed.     Significant Imaging:  all imaging in past 24 hours reviewed

## 2019-10-31 NOTE — SUBJECTIVE & OBJECTIVE
Interval History: Patient evaluated bedside, stable vital signs, sCr flat, not worsening, some urine output.  Night uneventful.    Review of patient's allergies indicates:  No Known Allergies  Current Facility-Administered Medications   Medication Frequency    0.9%  NaCl infusion (for blood administration) Q24H PRN    0.9%  NaCl infusion Continuous    cellulose, oxidized 3 x 4' (SURGICEL) Pads 1 each Daily PRN    gabapentin capsule 100 mg QHS    heparin (porcine) injection 1,000 Units PRN    heparin 25,000 units in dextrose 5% (100 units/ml) IV bolus from bag - ADDITIONAL PRN BOLUS - 30 units/kg PRN    heparin 25,000 units in dextrose 5% (100 units/ml) IV bolus from bag - ADDITIONAL PRN BOLUS - 60 units/kg PRN    heparin 25,000 units in dextrose 5% 250 mL (100 units/mL) infusion HIGH INTENSITY nomogram - [facility] Continuous    lactulose 20 gram/30 mL solution Soln 10 g TID    ondansetron disintegrating tablet 8 mg Q8H PRN    oxyCODONE immediate release tablet 5 mg Q6H PRN    pantoprazole EC tablet 40 mg Daily    predniSONE tablet 60 mg Daily    prochlorperazine injection Soln 10 mg Q6H PRN    ramelteon tablet 8 mg Nightly PRN    sodium chloride 0.9% flush 10 mL PRN       Objective:     Vital Signs (Most Recent):  Temp: 98.2 °F (36.8 °C) (10/31/19 1142)  Pulse: 87 (10/31/19 1506)  Resp: 15 (10/31/19 1142)  BP: 126/71 (10/31/19 1142)  SpO2: 99 % (10/31/19 1142)  O2 Device (Oxygen Therapy): room air (10/30/19 1138) Vital Signs (24h Range):  Temp:  [98.2 °F (36.8 °C)-98.9 °F (37.2 °C)] 98.2 °F (36.8 °C)  Pulse:  [73-87] 87  Resp:  [15-16] 15  SpO2:  [94 %-99 %] 99 %  BP: (117-141)/(64-71) 126/71     Weight: 40.8 kg (90 lb) (10/25/19 1542)  Body mass index is 18.18 kg/m².  Body surface area is 1.3 meters squared.    I/O last 3 completed shifts:  In: 3002.5 [P.O.:1440; I.V.:1562.5]  Out: 1550 [Urine:1550]    Physical Exam   Constitutional: She is oriented to person, place, and time. She appears well-developed.    cachectic    HENT:   Head: Normocephalic and atraumatic.   Eyes: Pupils are equal, round, and reactive to light. EOM are normal.   Neck: Neck supple. No thyromegaly present.   Cardiovascular: Normal rate and regular rhythm.   No murmur heard.  Pulmonary/Chest: Effort normal and breath sounds normal. No respiratory distress.   Abdominal: Soft. Bowel sounds are normal. She exhibits distension. There is tenderness.   Musculoskeletal: She exhibits no edema or deformity.   Lymphadenopathy:     She has no cervical adenopathy.   Neurological: She is alert and oriented to person, place, and time.   Skin: Skin is warm and dry.   Nursing note and vitals reviewed.      Significant Labs:  All labs within the past 24 hours have been reviewed.     Significant Imaging:  CXR personally reviewed.   Renal US reviewed.

## 2019-10-31 NOTE — SUBJECTIVE & OBJECTIVE
Interval History: Patient seen and examined this AM. Denies any new acute complaints over night apart from apart from some constipation and abdominal pain over night which as relieved with PRN pain and constipation medications. She denies fever, SOB, or chest pain. Urine output with 1.1L over 24 hrs.    Oncology Treatment Plan:   OP NIVOLUMAB Q4W    Medications:  Continuous Infusions:   sodium chloride 0.9% 125 mL/hr at 10/30/19 1523    heparin (porcine) in D5W 14 Units/kg/hr (10/30/19 1858)     Scheduled Meds:   calcium gluconate IVPB  1 g Intravenous Once    gabapentin  100 mg Oral QHS    lactulose  10 g Oral TID    pantoprazole  40 mg Oral Daily    predniSONE  60 mg Oral Daily     PRN Meds:sodium chloride, calcium gluconate IVPB **AND** calcium gluconate IVPB, cellulose, oxidized 3 x 4', heparin (porcine), heparin (PORCINE), heparin (PORCINE), ondansetron, oxyCODONE, prochlorperazine, ramelteon, sodium chloride 0.9%     Review of Systems   Constitutional: Negative for chills, diaphoresis and fever.   Respiratory: Negative for shortness of breath and wheezing.    Cardiovascular: Negative for chest pain, palpitations and leg swelling.   Gastrointestinal: Positive for abdominal distention, abdominal pain and constipation. Negative for anal bleeding, blood in stool, diarrhea, nausea and vomiting.   Genitourinary:        Increase in urine output.   Hematological: Does not bruise/bleed easily.   Psychiatric/Behavioral: Negative for behavioral problems and confusion.     Objective:     Vital Signs (Most Recent):  Temp: 98.7 °F (37.1 °C) (10/31/19 0745)  Pulse: 75 (10/31/19 0745)  Resp: 15 (10/31/19 0745)  BP: 131/67 (10/31/19 0745)  SpO2: 97 % (10/31/19 0745) Vital Signs (24h Range):  Temp:  [96.9 °F (36.1 °C)-98.9 °F (37.2 °C)] 98.7 °F (37.1 °C)  Pulse:  [73-86] 75  Resp:  [15-18] 15  SpO2:  [94 %-98 %] 97 %  BP: (105-141)/(59-71) 131/67     Weight: 40.8 kg (90 lb)  Body mass index is 18.18 kg/m².  Body surface  area is 1.3 meters squared.      Intake/Output Summary (Last 24 hours) at 10/31/2019 0838  Last data filed at 10/31/2019 0353  Gross per 24 hour   Intake 2762.5 ml   Output 1100 ml   Net 1662.5 ml       Physical Exam   Constitutional: Vital signs are normal. She appears cachectic.   HENT:   Head: Normocephalic and atraumatic.   Eyes: EOM are normal. No scleral icterus.   Arcus senilis    Neck: Neck supple.   Central line on the left. Pressure gauze in place. No overt bleeding appreciated or evidence of hematoma.    Cardiovascular: Normal rate, regular rhythm, normal heart sounds and intact distal pulses. Exam reveals no gallop and no friction rub.   No murmur heard.  Pulmonary/Chest: Effort normal and breath sounds normal. She has no wheezes. She has no rales.   Abdominal: Soft. Bowel sounds are normal. She exhibits distension. There is tenderness.   Abdomen soft but distended. There is ascites and superficial dilation of veins overlying abdomen. Tender to deep palpation.    Musculoskeletal: She exhibits edema.   Trace lower extremity edema   Neurological: She is alert.   Skin: Skin is warm and dry.   Psychiatric: She has a normal mood and affect. Her behavior is normal.   Nursing note and vitals reviewed.      Significant Labs:   Recent Lab Results       10/31/19  0350   10/30/19  2201   10/30/19  1822   10/30/19  1539        Albumin 2.7           Alkaline Phosphatase 85           ALT 14           Anion Gap 15   11       aPTT 60.5  Comment:  aPTT therapeutic range = 39-69 seconds 55.7  Comment:  aPTT therapeutic range = 39-69 seconds   52.8  Comment:  aPTT therapeutic range = 39-69 seconds     AST 47           Baso # 0.01           Basophil% 0.1           BILIRUBIN TOTAL 0.8  Comment:  For infants and newborns, interpretation of results should be based  on gestational age, weight and in agreement with clinical  observations.  Premature Infant recommended reference ranges:  Up to 24 hours.............<8.0 mg/dL  Up  to 48 hours............<12.0 mg/dL  3-5 days..................<15.0 mg/dL  6-29 days.................<15.0 mg/dL             BUN, Bld 56   57       Calcium 8.1   8.3       Chloride 100   98       CO2 17   22       Creatinine 4.7   5.1       Differential Method Automated           eGFR if African American 10.7   9.7       eGFR if non  9.3  Comment:  Calculation used to obtain the estimated glomerular filtration  rate (eGFR) is the CKD-EPI equation.      8.4  Comment:  Calculation used to obtain the estimated glomerular filtration  rate (eGFR) is the CKD-EPI equation.          Eos # 0.0           Eosinophil% 0.0           Fibrinogen 128           Glucose 222   212       Gran # (ANC) 8.8           Gran% 81.4           Hematocrit 23.4           Hemoglobin 8.1           Immature Grans (Abs) 0.20  Comment:  Mild elevation in immature granulocytes is non specific and   can be seen in a variety of conditions including stress response,   acute inflammation, trauma and pregnancy. Correlation with other   laboratory and clinical findings is essential.             Immature Granulocytes 1.9           Lymph # 1.0           Lymph% 9.7           Magnesium 1.8   1.7       MCH 23.1           MCHC 34.6           MCV 67           Mono # 0.7           Mono% 6.9           MPV SEE COMMENT  Comment:  Result not available.           nRBC 0           Phosphorus 4.4           Platelets 167           Potassium 3.9   4.0       PROTEIN TOTAL 6.3           RBC 3.50           RDW 19.9           Sodium 132   131       WBC 10.74                 Diagnostic Results:  CXR on 10/27/2019:   Comparison:  Prior dated 10/26/2019    Findings:  Left-sided trialysis catheter is present with tip at the IVC/RA junction.  The mediastinal structures are midline.  The cardiac silhouette is enlarged and stable.  There is pulmonary vascular congestion and bilateral reticular opacities consistent with mild interstitial edema.  Trace right pleural fluid  is suspected. No osseous abnormalities are seen.   Impression:  See above.    US Retroperitoneal Complete on 10/25/2019:   Comparison:  CT chest, abdomen, and pelvis on 08/28/2019   Findings:  Right kidney: The right kidney measures 11.2 cm. No cortical thinning. No loss of corticomedullary distinction. Resistive index measures 1.0.  There is a 0.8 x 0.7 x 0.8 cm simple cyst.  No renal stone. No hydronephrosis.  Left kidney: The left kidney measures 11.1 cm. No cortical thinning. No loss of corticomedullary distinction. Resistive index measures 1.0.  No mass. No renal stone. No hydronephrosis.  The bladder is partially distended at the time of scanning and has an unremarkable appearance.  Moderate volume ascites.   Impression:  Bilateral elevated resistive indices, suggestive of medical renal disease.  No hydronephrosis or nephrolithiasis. Moderate volume ascites.

## 2019-10-31 NOTE — ASSESSMENT & PLAN NOTE
Hemoglobin 8.8 on admission.     - Hgb 8.1 this AM  - Will continue with Heparin drip, denies overt bleeding  - s/p 1 unit pRBCs on 10/29 for Hgb 6.4  - continue to monitor with daily CBCs  - transfuse Hgb < 7

## 2019-11-01 VITALS
SYSTOLIC BLOOD PRESSURE: 105 MMHG | TEMPERATURE: 98 F | HEART RATE: 88 BPM | RESPIRATION RATE: 16 BRPM | OXYGEN SATURATION: 99 % | DIASTOLIC BLOOD PRESSURE: 66 MMHG

## 2019-11-01 LAB
ALBUMIN SERPL BCP-MCNC: 2.5 G/DL (ref 3.5–5.2)
ALP SERPL-CCNC: 74 U/L (ref 55–135)
ALT SERPL W/O P-5'-P-CCNC: 16 U/L (ref 10–44)
ANION GAP SERPL CALC-SCNC: 11 MMOL/L (ref 8–16)
APTT BLDCRRT: 42.5 SEC (ref 21–32)
APTT BLDCRRT: 47.8 SEC (ref 21–32)
APTT BLDCRRT: 74.8 SEC (ref 21–32)
AST SERPL-CCNC: 45 U/L (ref 10–40)
BASOPHILS # BLD AUTO: 0.01 K/UL (ref 0–0.2)
BASOPHILS NFR BLD: 0.1 % (ref 0–1.9)
BILIRUB SERPL-MCNC: 0.7 MG/DL (ref 0.1–1)
BUN SERPL-MCNC: 52 MG/DL (ref 8–23)
CALCIUM SERPL-MCNC: 8 MG/DL (ref 8.7–10.5)
CHLORIDE SERPL-SCNC: 104 MMOL/L (ref 95–110)
CO2 SERPL-SCNC: 20 MMOL/L (ref 23–29)
CREAT SERPL-MCNC: 3.4 MG/DL (ref 0.5–1.4)
DIFFERENTIAL METHOD: ABNORMAL
EOSINOPHIL # BLD AUTO: 0 K/UL (ref 0–0.5)
EOSINOPHIL NFR BLD: 0.1 % (ref 0–8)
ERYTHROCYTE [DISTWIDTH] IN BLOOD BY AUTOMATED COUNT: 20.8 % (ref 11.5–14.5)
EST. GFR  (AFRICAN AMERICAN): 15.9 ML/MIN/1.73 M^2
EST. GFR  (NON AFRICAN AMERICAN): 13.8 ML/MIN/1.73 M^2
FIBRINOGEN PPP-MCNC: 112 MG/DL (ref 182–366)
GLUCOSE SERPL-MCNC: 145 MG/DL (ref 70–110)
HCT VFR BLD AUTO: 23.7 % (ref 37–48.5)
HGB BLD-MCNC: 8.2 G/DL (ref 12–16)
IMM GRANULOCYTES # BLD AUTO: 0.21 K/UL (ref 0–0.04)
IMM GRANULOCYTES NFR BLD AUTO: 2 % (ref 0–0.5)
LYMPHOCYTES # BLD AUTO: 1.2 K/UL (ref 1–4.8)
LYMPHOCYTES NFR BLD: 11.1 % (ref 18–48)
MAGNESIUM SERPL-MCNC: 1.5 MG/DL (ref 1.6–2.6)
MCH RBC QN AUTO: 23 PG (ref 27–31)
MCHC RBC AUTO-ENTMCNC: 34.6 G/DL (ref 32–36)
MCV RBC AUTO: 66 FL (ref 82–98)
MONOCYTES # BLD AUTO: 0.9 K/UL (ref 0.3–1)
MONOCYTES NFR BLD: 8.1 % (ref 4–15)
NEUTROPHILS # BLD AUTO: 8.4 K/UL (ref 1.8–7.7)
NEUTROPHILS NFR BLD: 78.6 % (ref 38–73)
NRBC BLD-RTO: 0 /100 WBC
PHOSPHATE SERPL-MCNC: 4.2 MG/DL (ref 2.7–4.5)
PLATELET # BLD AUTO: 160 K/UL (ref 150–350)
PMV BLD AUTO: ABNORMAL FL (ref 9.2–12.9)
POTASSIUM SERPL-SCNC: 3.6 MMOL/L (ref 3.5–5.1)
PROT SERPL-MCNC: 6 G/DL (ref 6–8.4)
RBC # BLD AUTO: 3.57 M/UL (ref 4–5.4)
SODIUM SERPL-SCNC: 135 MMOL/L (ref 136–145)
WBC # BLD AUTO: 10.67 K/UL (ref 3.9–12.7)

## 2019-11-01 PROCEDURE — 99232 PR SUBSEQUENT HOSPITAL CARE,LEVL II: ICD-10-PCS | Mod: ,,, | Performed by: INTERNAL MEDICINE

## 2019-11-01 PROCEDURE — 97165 OT EVAL LOW COMPLEX 30 MIN: CPT

## 2019-11-01 PROCEDURE — 36415 COLL VENOUS BLD VENIPUNCTURE: CPT

## 2019-11-01 PROCEDURE — 63600175 PHARM REV CODE 636 W HCPCS: Performed by: STUDENT IN AN ORGANIZED HEALTH CARE EDUCATION/TRAINING PROGRAM

## 2019-11-01 PROCEDURE — 99232 SBSQ HOSP IP/OBS MODERATE 35: CPT | Mod: ,,, | Performed by: INTERNAL MEDICINE

## 2019-11-01 PROCEDURE — 80053 COMPREHEN METABOLIC PANEL: CPT

## 2019-11-01 PROCEDURE — 85730 THROMBOPLASTIN TIME PARTIAL: CPT | Mod: 91

## 2019-11-01 PROCEDURE — 97116 GAIT TRAINING THERAPY: CPT

## 2019-11-01 PROCEDURE — 85025 COMPLETE CBC W/AUTO DIFF WBC: CPT

## 2019-11-01 PROCEDURE — 97161 PT EVAL LOW COMPLEX 20 MIN: CPT

## 2019-11-01 PROCEDURE — 85384 FIBRINOGEN ACTIVITY: CPT

## 2019-11-01 PROCEDURE — 85730 THROMBOPLASTIN TIME PARTIAL: CPT

## 2019-11-01 PROCEDURE — 25000003 PHARM REV CODE 250: Performed by: STUDENT IN AN ORGANIZED HEALTH CARE EDUCATION/TRAINING PROGRAM

## 2019-11-01 PROCEDURE — 20600001 HC STEP DOWN PRIVATE ROOM

## 2019-11-01 PROCEDURE — 84100 ASSAY OF PHOSPHORUS: CPT

## 2019-11-01 PROCEDURE — 83735 ASSAY OF MAGNESIUM: CPT

## 2019-11-01 RX ORDER — MAGNESIUM SULFATE HEPTAHYDRATE 40 MG/ML
2 INJECTION, SOLUTION INTRAVENOUS ONCE
Status: COMPLETED | OUTPATIENT
Start: 2019-11-01 | End: 2019-11-01

## 2019-11-01 RX ORDER — PREDNISONE 20 MG/1
40 TABLET ORAL DAILY
Status: DISCONTINUED | OUTPATIENT
Start: 2019-11-01 | End: 2019-11-03

## 2019-11-01 RX ADMIN — LACTULOSE 10 G: 20 SOLUTION ORAL at 09:11

## 2019-11-01 RX ADMIN — PANTOPRAZOLE SODIUM 40 MG: 40 TABLET, DELAYED RELEASE ORAL at 09:11

## 2019-11-01 RX ADMIN — PREDNISONE 40 MG: 20 TABLET ORAL at 09:11

## 2019-11-01 RX ADMIN — OXYCODONE HYDROCHLORIDE 5 MG: 5 TABLET ORAL at 08:11

## 2019-11-01 RX ADMIN — LACTULOSE 10 G: 20 SOLUTION ORAL at 08:11

## 2019-11-01 RX ADMIN — HEPARIN SODIUM AND DEXTROSE 12 UNITS/KG/HR: 10000; 5 INJECTION INTRAVENOUS at 10:11

## 2019-11-01 RX ADMIN — MAGNESIUM SULFATE IN WATER 2 G: 40 INJECTION, SOLUTION INTRAVENOUS at 06:11

## 2019-11-01 RX ADMIN — GABAPENTIN 100 MG: 100 CAPSULE ORAL at 08:11

## 2019-11-01 RX ADMIN — HEPARIN SODIUM AND DEXTROSE 12 UNITS/KG/HR: 10000; 5 INJECTION INTRAVENOUS at 05:11

## 2019-11-01 RX ADMIN — OXYCODONE HYDROCHLORIDE 5 MG: 5 TABLET ORAL at 10:11

## 2019-11-01 NOTE — SUBJECTIVE & OBJECTIVE
Interval History: Patient seen and examined this AM. Denies any new acute complaints over night apart from some abdominal pain which was relieved with PRN oxycodone. She denies experiencing any fever, SOB, or chest pain. She reports continued urine output with 1.35L over the past 24 hrs. Per Nephrology recommendation, okay to discontinue fluids and remove trialysis line today. Garcia catheter removed as well, patient denies any difficulty urinating or dysuria.     Oncology Treatment Plan:   OP NIVOLUMAB Q4W    Medications:  Continuous Infusions:   sodium chloride 0.9% 75 mL/hr (11/01/19 0858)    heparin (porcine) in D5W 12 Units/kg/hr (11/01/19 0565)     Scheduled Meds:   gabapentin  100 mg Oral QHS    lactulose  10 g Oral TID    pantoprazole  40 mg Oral Daily    predniSONE  40 mg Oral Daily     PRN Meds:sodium chloride, cellulose, oxidized 3 x 4', heparin (porcine), heparin (PORCINE), heparin (PORCINE), ondansetron, oxyCODONE, prochlorperazine, ramelteon, sodium chloride 0.9%     Review of Systems   Constitutional: Negative for chills, diaphoresis and fever.   Respiratory: Negative for shortness of breath and wheezing.    Cardiovascular: Negative for chest pain and leg swelling.   Gastrointestinal: Positive for abdominal distention and abdominal pain. Negative for blood in stool, constipation, diarrhea, nausea and vomiting.   Genitourinary: Negative for difficulty urinating and dysuria.        Reports good urine output without difficulty.   Psychiatric/Behavioral: Negative for behavioral problems and confusion.     Objective:     Vital Signs (Most Recent):  Temp: 97.9 °F (36.6 °C) (11/01/19 0750)  Pulse: 85 (11/01/19 1128)  Resp: 16 (11/01/19 0750)  BP: 116/85 (11/01/19 0750)  SpO2: 98 % (11/01/19 0750) Vital Signs (24h Range):  Temp:  [97.7 °F (36.5 °C)-98.5 °F (36.9 °C)] 97.9 °F (36.6 °C)  Pulse:  [76-99] 85  Resp:  [14-17] 16  SpO2:  [96 %-100 %] 98 %  BP: (105-147)/(64-85) 116/85     Weight: 40.8 kg (90  lb)  Body mass index is 18.18 kg/m².  Body surface area is 1.3 meters squared.      Intake/Output Summary (Last 24 hours) at 11/1/2019 1218  Last data filed at 11/1/2019 1015  Gross per 24 hour   Intake --   Output 1475 ml   Net -1475 ml       Physical Exam   Constitutional: Vital signs are normal. She appears cachectic.   HENT:   Head: Normocephalic and atraumatic.   Eyes: EOM are normal. No scleral icterus.   Arcus senilis    Neck: Neck supple.   Central line on the left. Pressure gauze in place. No overt bleeding appreciated or evidence of hematoma.    Cardiovascular: Normal rate, regular rhythm, normal heart sounds and intact distal pulses. Exam reveals no gallop and no friction rub.   No murmur heard.  Pulmonary/Chest: Effort normal and breath sounds normal. She has no wheezes. She has no rales.   Abdominal: Soft. Bowel sounds are normal. She exhibits distension. There is tenderness.   Abdomen soft but distended. There is ascites and superficial dilation of veins overlying abdomen. Tender to deep palpation.    Musculoskeletal: She exhibits edema.   Trace lower extremity edema   Neurological: She is alert.   Skin: Skin is warm and dry.   Psychiatric: She has a normal mood and affect. Her behavior is normal.   Nursing note and vitals reviewed.      Significant Labs:   Recent Lab Results       11/01/19  0400        Albumin 2.5     Alkaline Phosphatase 74     ALT 16     Anion Gap 11     aPTT 74.8  Comment:  aPTT therapeutic range = 39-69 seconds     AST 45     Baso # 0.01     Basophil% 0.1     BILIRUBIN TOTAL 0.7  Comment:  For infants and newborns, interpretation of results should be based  on gestational age, weight and in agreement with clinical  observations.  Premature Infant recommended reference ranges:  Up to 24 hours.............<8.0 mg/dL  Up to 48 hours............<12.0 mg/dL  3-5 days..................<15.0 mg/dL  6-29 days.................<15.0 mg/dL       BUN, Bld 52     Calcium 8.0     Chloride 104      CO2 20     Creatinine 3.4     Differential Method Automated     eGFR if African American 15.9     eGFR if non  13.8  Comment:  Calculation used to obtain the estimated glomerular filtration  rate (eGFR) is the CKD-EPI equation.        Eos # 0.0     Eosinophil% 0.1     Fibrinogen 112     Glucose 145     Gran # (ANC) 8.4     Gran% 78.6     Hematocrit 23.7     Hemoglobin 8.2     Immature Grans (Abs) 0.21  Comment:  Mild elevation in immature granulocytes is non specific and   can be seen in a variety of conditions including stress response,   acute inflammation, trauma and pregnancy. Correlation with other   laboratory and clinical findings is essential.       Immature Granulocytes 2.0     Lymph # 1.2     Lymph% 11.1     Magnesium 1.5     MCH 23.0     MCHC 34.6     MCV 66     Mono # 0.9     Mono% 8.1     MPV SEE COMMENT  Comment:  Result not available.     nRBC 0     Phosphorus 4.2     Platelets 160     Potassium 3.6     PROTEIN TOTAL 6.0     RBC 3.57     RDW 20.8     Sodium 135     WBC 10.67           Diagnostic Results:  CXR on 10/27/2019:   Comparison:  Prior dated 10/26/2019    Findings:  Left-sided trialysis catheter is present with tip at the IVC/RA junction.  The mediastinal structures are midline.  The cardiac silhouette is enlarged and stable.  There is pulmonary vascular congestion and bilateral reticular opacities consistent with mild interstitial edema.  Trace right pleural fluid is suspected. No osseous abnormalities are seen.   Impression:  See above.    US Retroperitoneal Complete on 10/25/2019:   Comparison:  CT chest, abdomen, and pelvis on 08/28/2019   Findings:  Right kidney: The right kidney measures 11.2 cm. No cortical thinning. No loss of corticomedullary distinction. Resistive index measures 1.0.  There is a 0.8 x 0.7 x 0.8 cm simple cyst.  No renal stone. No hydronephrosis.  Left kidney: The left kidney measures 11.1 cm. No cortical thinning. No loss of corticomedullary  distinction. Resistive index measures 1.0.  No mass. No renal stone. No hydronephrosis.  The bladder is partially distended at the time of scanning and has an unremarkable appearance.  Moderate volume ascites.   Impression:  Bilateral elevated resistive indices, suggestive of medical renal disease.  No hydronephrosis or nephrolithiasis. Moderate volume ascites.

## 2019-11-01 NOTE — ASSESSMENT & PLAN NOTE
Hemoglobin 8.8 on admission.     - Hgb 8.2 this AM  - Will continue with Heparin drip, denies overt bleeding  - s/p 1 unit pRBCs on 10/29 for Hgb 6.4  - continue to monitor with daily CBCs  - transfuse Hgb < 7

## 2019-11-01 NOTE — PROGRESS NOTES
[facility]  Nephrology  Progress Note    Patient Name: Neena Marks  MRN: [id number]  Admission Date: 10/25/2019  Hospital Length of Stay: 7 days  Attending Provider: Karen Mathis MD   Primary Care Physician: [facility]  Principal Problem:Acute kidney injury    Subjective:     HPI: 63 yro F with PMH of EtOH cirrhosis, HCC (receiving treatment with nivolumab), and esophageal varices s/p banding 1/2018 who was sent to [facility] from heme/onc clinic for PRACHI with 7.1 (baseline creatinine ~1.0). Pt reports several weeks of decreased PO intake and n/v and has had multiple ED visits for this. She was being treated outpatient for an UTI 2/2 Klebsiella since 10/19 with cipro. Reports that she has not been putting out urine for 2 days. She also endorses recent ibuprofen and naproxen use for abdominal pain. Additionally, Pt had Ct with contrast 8/16. She denies fever, dysuria, hematuria, or worsening lower extremity edema. She currently has abdominal distention and abdominal pain which radiates around the left aspect of lumbar region. Patients' vitals with BP 98/54 mmHg. US RP shows no hydronephrosis. U p/cr 4 (trace protein on past UAs), and no UA available from this admission. Review of labs reveals K of 6.3, Cr 7.4, BUN 60, phos 6.7, bicarb 18. On admission patient received 1 L NS bolus. This AM, patient received 60 mg IV lasix without any UO. Garcia in place.         Interval History: 1.3L urine output recorded in 24 hours.     Review of patient's allergies indicates:  No Known Allergies  Current Facility-Administered Medications   Medication Frequency    0.9%  NaCl infusion (for blood administration) Q24H PRN    0.9%  NaCl infusion Continuous    cellulose, oxidized 3 x 4' (SURGICEL) Pads 1 each Daily PRN    gabapentin capsule 100 mg QHS    heparin (porcine) injection 1,000 Units PRN    heparin 25,000 units in dextrose 5% (100 units/ml) IV bolus from bag - ADDITIONAL PRN BOLUS - 30  units/kg PRN    heparin 25,000 units in dextrose 5% (100 units/ml) IV bolus from bag - ADDITIONAL PRN BOLUS - 60 units/kg PRN    heparin 25,000 units in dextrose 5% 250 mL (100 units/mL) infusion HIGH INTENSITY nomogram - [facility] Continuous    lactulose 20 gram/30 mL solution Soln 10 g TID    ondansetron disintegrating tablet 8 mg Q8H PRN    oxyCODONE immediate release tablet 5 mg Q6H PRN    pantoprazole EC tablet 40 mg Daily    predniSONE tablet 40 mg Daily    prochlorperazine injection Soln 10 mg Q6H PRN    ramelteon tablet 8 mg Nightly PRN    sodium chloride 0.9% flush 10 mL PRN       Objective:     Vital Signs (Most Recent):  Temp: 97.9 °F (36.6 °C) (11/01/19 0750)  Pulse: 99 (11/01/19 1529)  Resp: 16 (11/01/19 0750)  BP: 116/85 (11/01/19 0750)  SpO2: 98 % (11/01/19 0750)  O2 Device (Oxygen Therapy): room air (10/30/19 1138) Vital Signs (24h Range):  Temp:  [97.7 °F (36.5 °C)-98.5 °F (36.9 °C)] 97.9 °F (36.6 °C)  Pulse:  [76-99] 99  Resp:  [14-17] 16  SpO2:  [96 %-100 %] 98 %  BP: (105-147)/(64-85) 116/85     Weight: 40.8 kg (90 lb) (10/25/19 1542)  Body mass index is 18.18 kg/m².  Body surface area is 1.3 meters squared.    I/O last 3 completed shifts:  In: 2282.5 [P.O.:720; I.V.:1562.5]  Out: 1900 [Urine:1900]    Physical Exam   Constitutional: She is oriented to person, place, and time. She appears well-developed.   cachectic    HENT:   Head: Normocephalic and atraumatic.   Eyes: Pupils are equal, round, and reactive to light. EOM are normal.   Neck: Neck supple. No thyromegaly present.   Cardiovascular: Normal rate, regular rhythm and normal heart sounds.   No murmur heard.  Pulmonary/Chest: Effort normal and breath sounds normal. No respiratory distress.   Abdominal: Soft. Bowel sounds are normal.   Musculoskeletal: She exhibits no edema or deformity.   Lymphadenopathy:     She has no cervical adenopathy.   Neurological: She is alert and oriented to person, place, and time.   Skin: Skin is warm and  dry.       Significant Labs:  CBC:   Recent Labs   Lab 11/01/19  0400   WBC 10.67   RBC 3.57*   HGB 8.2*   HCT 23.7*      MCV 66*   MCH 23.0*   MCHC 34.6     CMP:   Recent Labs   Lab 11/01/19  0400   *   CALCIUM 8.0*   ALBUMIN 2.5*   PROT 6.0   *   K 3.6   CO2 20*      BUN 52*   CREATININE 3.4*   ALKPHOS 74   ALT 16   AST 45*   BILITOT 0.7     All labs within the past 24 hours have been reviewed.     Significant Imaging:  all imaging in past 24 hours reviewed    Assessment/Plan:     * Acute kidney injury  63 yro F with PMH of EtOH cirrhosis, HCC (receiving treatment with nivolumab), and esophageal varices s/p banding 1/2018 who was presents with PRACHI with 7.1 (baseline creatinine ~1.0) and no UO for 2 days in setting of several weeks of decreased PO intake, nausea, and vomiting. Additionally, she underwent CT scan with contrast 8/16, and was being treated for an UTI with cipro as out patient. Pt also endorses NSAID use for abdominal pain.  -US RP shows no hydronephrosis  -U p/cr 4 (trace protein on past UAs), and no UA available from this admission  -Labs on admission: K of 6.3, Cr 7.4, BUN 60, phos 6.7, bicarb 18.   -received HD 10/26/19    Recommendations:  - trend RFPs, strict IOS, avoid nephrotoxic agents, renally dose medications   - making urine and not uremic on exam; Cr improving  - No indication for dialysis; OK to remove CVC  - Stop IVF  - Nephrology will sign off      Thank you for your consult. I will sign off. Please contact us if you have any additional questions.    Christopher Aranda MD  Nephrology  [facility]

## 2019-11-01 NOTE — PLAN OF CARE
Problem: Occupational Therapy Goal  Goal: Occupational Therapy Goal  Description  Pt is not currently displaying a need for acute OT services. D/C acute OT services and recommend pt D/C home.   Outcome: Met Jason Ortiz OTR/L  11/1/2019

## 2019-11-01 NOTE — PT/OT/SLP EVAL
"Physical Therapy Evaluation and Discharge Note    Patient Name:  Neena Marks   MRN:  [id number]    Recommendations:     Discharge Recommendations:  home health PT   Discharge Equipment Recommendations: none   Barriers to discharge: None    Assessment:     Neena Marks is a 64 y.o. female admitted with a medical diagnosis of Acute kidney injury.     Pt requires SBA of 1 person to amb in hallways with use of rollator for support.  Pt has a history of falls, most recent falls was 4-5 months ago.  Recommendation for HHPT services to address balance deficits, posture, and safety with gait.  At this time, patient does not require further acute PT services.     Recent Surgery: * No surgery found *      Plan:     During this hospitalization, patient does not require further acute PT services.  Please re-consult if situation changes.      Subjective     Chief Complaint: low back pain  Patient/Family Comments/goals: "To go home"  Pain/Comfort:  · Pain Rating 1: 9/10  · Location - Orientation 1: lower  · Location 1: back  · Pain Addressed 1: Pre-medicate for activity, Nurse notified, Distraction    Patients cultural, spiritual, Spiritism conflicts given the current situation: no    Living Environment:  Pt lives with daughterstanley, on the 1st floor, ramp present in the back.   Prior to admission, patients level of function requires use of rollator to amb, I with ADLs.  Equipment used at home: rollator.  DME owned (not currently used): single point cane, bedside commode, transfer tub bench and grab bars.  Upon discharge, patient will have assistance from daughter.    Objective:     Communicated with nursing prior to session.  Patient found supine with telemetry, peripheral IV upon PT entry to room.    General Precautions: Standard, fall   Orthopedic Precautions:N/A   Braces: N/A     Exams:  · Cognitive Exam:  Patient is oriented to Person, Place, Time and Situation  · Fine Motor Coordination:    · -       Intact  Left hand " thumb/finger opposition skills and Right hand thumb/finger opposition skills  · Gross Motor Coordination:  WFL  · Postural Exam:  Patient presented with the following abnormalities:    · -       No postural abnormalities identified  · Sensation:    · -       Intact  · Skin Integrity/Edema:      · -       Edema: None noted B LEs  · RUE ROM: WFL  · RUE Strength: WFL  · LUE ROM: WFL  · LUE Strength: WFL  · RLE ROM: WFL  · RLE Strength: WFL  · LLE ROM: WFL  · LLE Strength: WFL    Functional Mobility:  · Bed Mobility:     · Rolling Right: independence  · Scooting: independence  · Supine to Sit: independence  · Transfers:     · Sit to Stand:  independence with no AD  · Bed to Chair: contact guard assistance with  rolling walker  using  Stand Pivot  · Gait: Pt amb 202', SBA-CGA, RW, no episodes of LOB  · Balance: SBA-CGA: dynamic standing balance with AD    AM-PAC 6 CLICK MOBILITY  Total Score:21       Therapeutic Activities and Exercises:   Whiteboard updated    AM-PAC 6 CLICK MOBILITY  Total Score:21     Patient left up in chair with all lines intact, call button in reach and nursing notified.    GOALS:   Multidisciplinary Problems     Physical Therapy Goals     Not on file                History:     Past Medical History:   Diagnosis Date    Alcohol abuse     Anemia     Cancer     liver    Cirrhosis        Past Surgical History:   Procedure Laterality Date    ESOPHAGOGASTRODUODENOSCOPY Left 1/9/2019    Procedure: EGD (ESOPHAGOGASTRODUODENOSCOPY);  Surgeon: Madyson Farrar MD;  Location: [facility] ENDO;  Service: Endoscopy;  Laterality: Left;    PERITONEOCENTESIS N/A 1/9/2019    Procedure: PARACENTESIS, ABDOMINAL;  Surgeon: Everett Fitzpatrick MD;  Location: [facility] CATH LAB;  Service: Radiology;  Laterality: N/A;       Time Tracking:     PT Received On: 11/01/19  PT Start Time: 0909     PT Stop Time: 0928  PT Total Time (min): 19 min     Billable Minutes: Evaluation 8 and Gait Training 10      Fabiola Suarez  PT  11/01/2019

## 2019-11-01 NOTE — ASSESSMENT & PLAN NOTE
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

## 2019-11-01 NOTE — SUBJECTIVE & OBJECTIVE
Interval History: 1.3L urine output recorded in 24 hours.     Review of patient's allergies indicates:  No Known Allergies  Current Facility-Administered Medications   Medication Frequency    0.9%  NaCl infusion (for blood administration) Q24H PRN    0.9%  NaCl infusion Continuous    cellulose, oxidized 3 x 4' (SURGICEL) Pads 1 each Daily PRN    gabapentin capsule 100 mg QHS    heparin (porcine) injection 1,000 Units PRN    heparin 25,000 units in dextrose 5% (100 units/ml) IV bolus from bag - ADDITIONAL PRN BOLUS - 30 units/kg PRN    heparin 25,000 units in dextrose 5% (100 units/ml) IV bolus from bag - ADDITIONAL PRN BOLUS - 60 units/kg PRN    heparin 25,000 units in dextrose 5% 250 mL (100 units/mL) infusion HIGH INTENSITY nomogram - [facility] Continuous    lactulose 20 gram/30 mL solution Soln 10 g TID    ondansetron disintegrating tablet 8 mg Q8H PRN    oxyCODONE immediate release tablet 5 mg Q6H PRN    pantoprazole EC tablet 40 mg Daily    predniSONE tablet 40 mg Daily    prochlorperazine injection Soln 10 mg Q6H PRN    ramelteon tablet 8 mg Nightly PRN    sodium chloride 0.9% flush 10 mL PRN       Objective:     Vital Signs (Most Recent):  Temp: 97.9 °F (36.6 °C) (11/01/19 0750)  Pulse: 99 (11/01/19 1529)  Resp: 16 (11/01/19 0750)  BP: 116/85 (11/01/19 0750)  SpO2: 98 % (11/01/19 0750)  O2 Device (Oxygen Therapy): room air (10/30/19 1138) Vital Signs (24h Range):  Temp:  [97.7 °F (36.5 °C)-98.5 °F (36.9 °C)] 97.9 °F (36.6 °C)  Pulse:  [76-99] 99  Resp:  [14-17] 16  SpO2:  [96 %-100 %] 98 %  BP: (105-147)/(64-85) 116/85     Weight: 40.8 kg (90 lb) (10/25/19 1542)  Body mass index is 18.18 kg/m².  Body surface area is 1.3 meters squared.    I/O last 3 completed shifts:  In: 2282.5 [P.O.:720; I.V.:1562.5]  Out: 1900 [Urine:1900]    Physical Exam   Constitutional: She is oriented to person, place, and time. She appears well-developed.   cachectic    HENT:   Head: Normocephalic and atraumatic.   Eyes:  Pupils are equal, round, and reactive to light. EOM are normal.   Neck: Neck supple. No thyromegaly present.   Cardiovascular: Normal rate, regular rhythm and normal heart sounds.   No murmur heard.  Pulmonary/Chest: Effort normal and breath sounds normal. No respiratory distress.   Abdominal: Soft. Bowel sounds are normal.   Musculoskeletal: She exhibits no edema or deformity.   Lymphadenopathy:     She has no cervical adenopathy.   Neurological: She is alert and oriented to person, place, and time.   Skin: Skin is warm and dry.       Significant Labs:  CBC:   Recent Labs   Lab 11/01/19  0400   WBC 10.67   RBC 3.57*   HGB 8.2*   HCT 23.7*      MCV 66*   MCH 23.0*   MCHC 34.6     CMP:   Recent Labs   Lab 11/01/19  0400   *   CALCIUM 8.0*   ALBUMIN 2.5*   PROT 6.0   *   K 3.6   CO2 20*      BUN 52*   CREATININE 3.4*   ALKPHOS 74   ALT 16   AST 45*   BILITOT 0.7     All labs within the past 24 hours have been reviewed.     Significant Imaging:  all imaging in past 24 hours reviewed

## 2019-11-01 NOTE — ASSESSMENT & PLAN NOTE
- with complaints of abdominal pain over night of 10/28-29, US with small amount of ascitic fluids with some concern for SBP, was given albumin and started on Rocephin which has since been discontinued   - will continue with lactulose

## 2019-11-01 NOTE — ASSESSMENT & PLAN NOTE
- patient denies any hemtopyosis, BRBPR, or melena  - holding home dose propanol 10 mg BID in light of hypotension and concern for ATN 2/2 to hypotension

## 2019-11-01 NOTE — PROGRESS NOTES
[facility]  Hematology/Oncology  Progress Note    Patient Name: Neena Marks  Admission Date: 10/25/2019  Hospital Length of Stay: 7 days  Code Status: Full Code     Subjective:     HPI:  Ms Marks is a 63-year-old  female with alcoholic cirrhosis dating back to 2015, portal HTN, ascites, history of variceal bleeding in January 2018 s/p banding, history of alcohol abuse (recently quit in January of 2018), and newly diagnosed hepatocellular carcinoma undergoing treatment with nivolumab who presented as a direct transfer from Heme/Onc clinic on 10/25 after routine laboratory studies were remarkable for serum creatinine of 7.1 (baseline creatinine ~1.0). Patient reports one week history of nasuea and vomiting. Per chart review, patient has presented to [facility] ED three times this month with complaints of abdominal pain, back pain, nausea, and vomiting. She was noted to have UTI 2/2 Klebsiella on 10/19 and is currently  undergoing treatment with ciprofloxacin.  She denies relief in abdominal and back pain with prescribed medications. Her nausea and vomiting has persisted as well. She reports decreased PO intake over this time span secondary to nausea and has a history of recent ibuprofen and naproxen use for abdominal pain. She denies fever, dysuria, hematuria, chest pain, shortness of breath, syncope, or worsening lower extremity edema. She currently has abdominal distention and abdominal pain which radiates around the left aspect of lumbar region. Patients' vitals with BP 98/54 mmHg, HR 80 bpm, afebrile with temperature of 98 F, and RR 16.     Cirrhosis & HCC History:  AFP tumor marker is normal.  Hep C and B testing on 4/27/2018 - negative.    Endoscopy - 1/9/19 - Grade II esophageal varices. Completely eradicated. Banded, Small hiatal hernia., Portal hypertensive gastropathy. Treated with argon plasma coagulation (APC).     Patient had an appointment on 2/15/2019, IR consult for  Y90 but she missed her appointment.   - She had IR mapping and Hepatic angiography demonstrates a large hypervascular lesion in the right hepatic lobe supplied by branches of the right hepatic artery and accessory left hepatic artery.  Technetium MAA was injected aorta determined lung shunt fraction.  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  - Nuclear medicine scan revealed that majority of tracer goes to the lungs with a liver lung shunt fraction of 68.9%.   is not a candidate for Y90 or other liver directed therapies due to liver lung shunt     5/9/2019 - Started taking Sorafenib 400mg BID from  7/6/2019 - Decrease to 200 mg BID due to hand foot syndrome  8/16/2019 - Stopped Sorafenib on  due to persistent desquamation of hand foot syndrome  08/28/2019 - CT Chest abdomen and pelvis done revealed disease progression and also new bilateral pulmonary embolism. She was admitted to [facility] main campus and discharged on Eliquis (Patient declined Lovenox)   9/17/2019 -  Fall and followed up in ED and Xray right knee revealed no acute osseous abnormality  9/27/2019 - Started C 1 D1 Nivolumab q 4 weeks  10/25/2019 - PRACHI with creatinine of 7.1, direct admit     Interval History: Patient seen and examined this AM. Denies any new acute complaints over night apart from some abdominal pain which was relieved with PRN oxycodone. She denies experiencing any fever, SOB, or chest pain. She reports continued urine output with 1.35L over the past 24 hrs. Per Nephrology recommendation, okay to discontinue fluids and remove trialysis line today. Garcia catheter removed as well, patient denies any difficulty urinating or dysuria.     Oncology Treatment Plan:   OP NIVOLUMAB Q4W    Medications:  Continuous Infusions:   sodium chloride 0.9% 75 mL/hr (11/01/19 0858)    heparin (porcine)  in D5W 12 Units/kg/hr (11/01/19 0525)     Scheduled Meds:   gabapentin  100 mg Oral QHS    lactulose  10 g Oral TID    pantoprazole  40 mg Oral Daily    predniSONE  40 mg Oral Daily     PRN Meds:sodium chloride, cellulose, oxidized 3 x 4', heparin (porcine), heparin (PORCINE), heparin (PORCINE), ondansetron, oxyCODONE, prochlorperazine, ramelteon, sodium chloride 0.9%     Review of Systems   Constitutional: Negative for chills, diaphoresis and fever.   Respiratory: Negative for shortness of breath and wheezing.    Cardiovascular: Negative for chest pain and leg swelling.   Gastrointestinal: Positive for abdominal distention and abdominal pain. Negative for blood in stool, constipation, diarrhea, nausea and vomiting.   Genitourinary: Negative for difficulty urinating and dysuria.        Reports good urine output without difficulty.   Psychiatric/Behavioral: Negative for behavioral problems and confusion.     Objective:     Vital Signs (Most Recent):  Temp: 97.9 °F (36.6 °C) (11/01/19 0750)  Pulse: 85 (11/01/19 1128)  Resp: 16 (11/01/19 0750)  BP: 116/85 (11/01/19 0750)  SpO2: 98 % (11/01/19 0750) Vital Signs (24h Range):  Temp:  [97.7 °F (36.5 °C)-98.5 °F (36.9 °C)] 97.9 °F (36.6 °C)  Pulse:  [76-99] 85  Resp:  [14-17] 16  SpO2:  [96 %-100 %] 98 %  BP: (105-147)/(64-85) 116/85     Weight: 40.8 kg (90 lb)  Body mass index is 18.18 kg/m².  Body surface area is 1.3 meters squared.      Intake/Output Summary (Last 24 hours) at 11/1/2019 1218  Last data filed at 11/1/2019 1015  Gross per 24 hour   Intake --   Output 1475 ml   Net -1475 ml       Physical Exam   Constitutional: Vital signs are normal. She appears cachectic.   HENT:   Head: Normocephalic and atraumatic.   Eyes: EOM are normal. No scleral icterus.   Arcus senilis    Neck: Neck supple.   Central line on the left. Pressure gauze in place. No overt bleeding appreciated or evidence of hematoma.    Cardiovascular: Normal rate, regular rhythm, normal heart  sounds and intact distal pulses. Exam reveals no gallop and no friction rub.   No murmur heard.  Pulmonary/Chest: Effort normal and breath sounds normal. She has no wheezes. She has no rales.   Abdominal: Soft. Bowel sounds are normal. She exhibits distension. There is tenderness.   Abdomen soft but distended. There is ascites and superficial dilation of veins overlying abdomen. Tender to deep palpation.    Musculoskeletal: She exhibits edema.   Trace lower extremity edema   Neurological: She is alert.   Skin: Skin is warm and dry.   Psychiatric: She has a normal mood and affect. Her behavior is normal.   Nursing note and vitals reviewed.      Significant Labs:   Recent Lab Results       11/01/19  0400        Albumin 2.5     Alkaline Phosphatase 74     ALT 16     Anion Gap 11     aPTT 74.8  Comment:  aPTT therapeutic range = 39-69 seconds     AST 45     Baso # 0.01     Basophil% 0.1     BILIRUBIN TOTAL 0.7  Comment:  For infants and newborns, interpretation of results should be based  on gestational age, weight and in agreement with clinical  observations.  Premature Infant recommended reference ranges:  Up to 24 hours.............<8.0 mg/dL  Up to 48 hours............<12.0 mg/dL  3-5 days..................<15.0 mg/dL  6-29 days.................<15.0 mg/dL       BUN, Bld 52     Calcium 8.0     Chloride 104     CO2 20     Creatinine 3.4     Differential Method Automated     eGFR if African American 15.9     eGFR if non  13.8  Comment:  Calculation used to obtain the estimated glomerular filtration  rate (eGFR) is the CKD-EPI equation.        Eos # 0.0     Eosinophil% 0.1     Fibrinogen 112     Glucose 145     Gran # (ANC) 8.4     Gran% 78.6     Hematocrit 23.7     Hemoglobin 8.2     Immature Grans (Abs) 0.21  Comment:  Mild elevation in immature granulocytes is non specific and   can be seen in a variety of conditions including stress response,   acute inflammation, trauma and pregnancy. Correlation  with other   laboratory and clinical findings is essential.       Immature Granulocytes 2.0     Lymph # 1.2     Lymph% 11.1     Magnesium 1.5     MCH 23.0     MCHC 34.6     MCV 66     Mono # 0.9     Mono% 8.1     MPV SEE COMMENT  Comment:  Result not available.     nRBC 0     Phosphorus 4.2     Platelets 160     Potassium 3.6     PROTEIN TOTAL 6.0     RBC 3.57     RDW 20.8     Sodium 135     WBC 10.67           Diagnostic Results:  CXR on 10/27/2019:   Comparison:  Prior dated 10/26/2019    Findings:  Left-sided trialysis catheter is present with tip at the IVC/RA junction.  The mediastinal structures are midline.  The cardiac silhouette is enlarged and stable.  There is pulmonary vascular congestion and bilateral reticular opacities consistent with mild interstitial edema.  Trace right pleural fluid is suspected. No osseous abnormalities are seen.   Impression:  See above.    US Retroperitoneal Complete on 10/25/2019:   Comparison:  CT chest, abdomen, and pelvis on 08/28/2019   Findings:  Right kidney: The right kidney measures 11.2 cm. No cortical thinning. No loss of corticomedullary distinction. Resistive index measures 1.0.  There is a 0.8 x 0.7 x 0.8 cm simple cyst.  No renal stone. No hydronephrosis.  Left kidney: The left kidney measures 11.1 cm. No cortical thinning. No loss of corticomedullary distinction. Resistive index measures 1.0.  No mass. No renal stone. No hydronephrosis.  The bladder is partially distended at the time of scanning and has an unremarkable appearance.  Moderate volume ascites.   Impression:  Bilateral elevated resistive indices, suggestive of medical renal disease.  No hydronephrosis or nephrolithiasis. Moderate volume ascites.      Assessment/Plan:     * Acute kidney injury  Patient with baseline serum creatinine around 1.0. Serum creatinine was 1.1 on 10/19. 10/25 serum creatinine was noted to be 7.1. K of 6.3 that required urgent HD (10/26). Burgos stain negative, urine protein to  creatinine ratio of 4, Fe urea 62.3% suggestive of intrinsic etiology, US without hydronephrosis, Garcia with scant urine output despite IV Lasix and IVFs    - Creatine down trending with a value of 3.7 this AM, 1.35L urine output over the last 24hrs  - Discontinued Garcia catheter, continue with strict I/Os  - Discontinued continuous IVFs and will remove central venous catheter today per Nephrology recommendations   - Will continue to avoid nephrotoxic agents (i.e. 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    - continue with heparin drip  - monitor aPTT, PT, and INR in the setting of cirrhosis and anticoagulation     Ascites due to alcoholic cirrhosis  - with complaints of abdominal pain over night of 10/28-29, US with small amount of ascitic fluids with some concern for SBP, was given albumin and started on Rocephin which has since been discontinued   - will continue with lactulose     HCC (hepatocellular carcinoma)  - hold immunotherapy given concern for plausible immunotherapy induced nephritis   - continue steroids at 1 mg/kg for now  - prognosis guarded    Portal hypertensive gastropathy  - holding propanolol as above    Esophageal varices in alcoholic cirrhosis  - patient denies any hemtopyosis, BRBPR, or melena  - holding home dose propanol 10 mg BID in light of hypotension and concern for ATN 2/2 to hypotension    Alcoholic cirrhosis  MELD of +30 at admission. CMP on admission with albumin of 3.0, Alk phos 96, and AST  75.  - continue to monitor    Iron deficiency anemia due to chronic blood loss  Hemoglobin 8.8 on admission.     - Hgb 8.2 this AM  - Will continue with Heparin drip, denies overt bleeding  - s/p 1 unit pRBCs on 10/29 for Hgb 6.4  - continue to monitor with daily CBCs  - transfuse Hgb < 7             Lacho Nuñez MD  Hematology/Oncology  [facility]

## 2019-11-01 NOTE — ASSESSMENT & PLAN NOTE
63 yro F with PMH of EtOH cirrhosis, HCC (receiving treatment with nivolumab), and esophageal varices s/p banding 1/2018 who was presents with PRACHI with 7.1 (baseline creatinine ~1.0) and no UO for 2 days in setting of several weeks of decreased PO intake, nausea, and vomiting. Additionally, she underwent CT scan with contrast 8/16, and was being treated for an UTI with cipro as out patient. Pt also endorses NSAID use for abdominal pain.  -US RP shows no hydronephrosis  -U p/cr 4 (trace protein on past UAs), and no UA available from this admission  -Labs on admission: K of 6.3, Cr 7.4, BUN 60, phos 6.7, bicarb 18.   -received HD 10/26/19    Recommendations:  - trend RFPs, strict IOS, avoid nephrotoxic agents, renally dose medications   - making urine and not uremic on exam; Cr improving  - No indication for dialysis; OK to remove CVC  - Stop IVF  - Nephrology will sign off

## 2019-11-01 NOTE — PLAN OF CARE
Pt AAO;dependent oob with min asst. Garcia removed today . Patient able to void spontaneously with GUOP. BM times three this shift. Vital signs stable and pt remained afebrile throughout shift. Receiving  prn pain med for lower back and lower wrapping abd pain. Trialysis pig tail not able to with draw blood from. Changed patient to lab draw. Aptt is therapeutic no change made. New bag of Heparin hung.at same current rate of 12 units  /kg/ hr . Pt remained free from falls during shift.  Bed lowest position and locked.  Call light in reach.  [facility]

## 2019-11-01 NOTE — ASSESSMENT & PLAN NOTE
Patient with baseline serum creatinine around 1.0. Serum creatinine was 1.1 on 10/19. 10/25 serum creatinine was noted to be 7.1. K of 6.3 that required urgent HD (10/26). Burgos stain negative, urine protein to creatinine ratio of 4, Fe urea 62.3% suggestive of intrinsic etiology, US without hydronephrosis, Garcia with scant urine output despite IV Lasix and IVFs    - Creatine down trending with a value of 3.7 this AM, 1.35L urine output over the last 24hrs  - Discontinued Garcia catheter, continue with strict I/Os  - Discontinued continuous IVFs and will remove central venous catheter today per Nephrology recommendations   - Will continue to avoid nephrotoxic agents (i.e. 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

## 2019-11-02 LAB
ABO + RH BLD: NORMAL
ALBUMIN SERPL BCP-MCNC: 2.9 G/DL (ref 3.5–5.2)
ALP SERPL-CCNC: 107 U/L (ref 55–135)
ALT SERPL W/O P-5'-P-CCNC: 36 U/L (ref 10–44)
ANION GAP SERPL CALC-SCNC: 13 MMOL/L (ref 8–16)
APTT BLDCRRT: 52.6 SEC (ref 21–32)
AST SERPL-CCNC: 75 U/L (ref 10–40)
BACTERIA #/AREA URNS AUTO: ABNORMAL /HPF
BASOPHILS # BLD AUTO: 0.01 K/UL (ref 0–0.2)
BASOPHILS NFR BLD: 0.1 % (ref 0–1.9)
BILIRUB SERPL-MCNC: 0.9 MG/DL (ref 0.1–1)
BILIRUB UR QL STRIP: NEGATIVE
BLD GP AB SCN CELLS X3 SERPL QL: NORMAL
BUN SERPL-MCNC: 44 MG/DL (ref 8–23)
CALCIUM SERPL-MCNC: 8.6 MG/DL (ref 8.7–10.5)
CHLORIDE SERPL-SCNC: 105 MMOL/L (ref 95–110)
CLARITY UR REFRACT.AUTO: CLEAR
CO2 SERPL-SCNC: 18 MMOL/L (ref 23–29)
COLOR UR AUTO: YELLOW
CREAT SERPL-MCNC: 2.5 MG/DL (ref 0.5–1.4)
DIFFERENTIAL METHOD: ABNORMAL
EOSINOPHIL # BLD AUTO: 0 K/UL (ref 0–0.5)
EOSINOPHIL NFR BLD: 0 % (ref 0–8)
ERYTHROCYTE [DISTWIDTH] IN BLOOD BY AUTOMATED COUNT: 21.8 % (ref 11.5–14.5)
EST. GFR  (AFRICAN AMERICAN): 23 ML/MIN/1.73 M^2
EST. GFR  (NON AFRICAN AMERICAN): 20 ML/MIN/1.73 M^2
FIBRINOGEN PPP-MCNC: 157 MG/DL (ref 182–366)
GLUCOSE SERPL-MCNC: 180 MG/DL (ref 70–110)
GLUCOSE UR QL STRIP: NEGATIVE
HCT VFR BLD AUTO: 23.8 % (ref 37–48.5)
HGB BLD-MCNC: 8.2 G/DL (ref 12–16)
HGB UR QL STRIP: NEGATIVE
IMM GRANULOCYTES # BLD AUTO: 0.17 K/UL (ref 0–0.04)
IMM GRANULOCYTES NFR BLD AUTO: 1.6 % (ref 0–0.5)
KETONES UR QL STRIP: NEGATIVE
LEUKOCYTE ESTERASE UR QL STRIP: ABNORMAL
LYMPHOCYTES # BLD AUTO: 0.8 K/UL (ref 1–4.8)
LYMPHOCYTES NFR BLD: 7.8 % (ref 18–48)
MAGNESIUM SERPL-MCNC: 1.8 MG/DL (ref 1.6–2.6)
MCH RBC QN AUTO: 23 PG (ref 27–31)
MCHC RBC AUTO-ENTMCNC: 34.5 G/DL (ref 32–36)
MCV RBC AUTO: 67 FL (ref 82–98)
MICROSCOPIC COMMENT: ABNORMAL
MONOCYTES # BLD AUTO: 1.1 K/UL (ref 0.3–1)
MONOCYTES NFR BLD: 10.4 % (ref 4–15)
NEUTROPHILS # BLD AUTO: 8.7 K/UL (ref 1.8–7.7)
NEUTROPHILS NFR BLD: 80.1 % (ref 38–73)
NITRITE UR QL STRIP: NEGATIVE
NON-SQ EPI CELLS #/AREA URNS AUTO: 1 /HPF
NRBC BLD-RTO: 0 /100 WBC
PH UR STRIP: 6 [PH] (ref 5–8)
PHOSPHATE SERPL-MCNC: 3.2 MG/DL (ref 2.7–4.5)
PLATELET # BLD AUTO: 164 K/UL (ref 150–350)
PMV BLD AUTO: ABNORMAL FL (ref 9.2–12.9)
POTASSIUM SERPL-SCNC: 3.5 MMOL/L (ref 3.5–5.1)
PROT SERPL-MCNC: 6.6 G/DL (ref 6–8.4)
PROT UR QL STRIP: NEGATIVE
RBC # BLD AUTO: 3.57 M/UL (ref 4–5.4)
RBC #/AREA URNS AUTO: 2 /HPF (ref 0–4)
SODIUM SERPL-SCNC: 136 MMOL/L (ref 136–145)
SP GR UR STRIP: 1.01 (ref 1–1.03)
SQUAMOUS #/AREA URNS AUTO: 1 /HPF
URN SPEC COLLECT METH UR: ABNORMAL
WBC # BLD AUTO: 10.82 K/UL (ref 3.9–12.7)
WBC #/AREA URNS AUTO: 4 /HPF (ref 0–5)

## 2019-11-02 PROCEDURE — 49082 ABD PARACENTESIS: CPT

## 2019-11-02 PROCEDURE — 85384 FIBRINOGEN ACTIVITY: CPT

## 2019-11-02 PROCEDURE — 63600175 PHARM REV CODE 636 W HCPCS: Performed by: STUDENT IN AN ORGANIZED HEALTH CARE EDUCATION/TRAINING PROGRAM

## 2019-11-02 PROCEDURE — 99232 PR SUBSEQUENT HOSPITAL CARE,LEVL II: ICD-10-PCS | Mod: ,,, | Performed by: INTERNAL MEDICINE

## 2019-11-02 PROCEDURE — 83735 ASSAY OF MAGNESIUM: CPT

## 2019-11-02 PROCEDURE — 25000003 PHARM REV CODE 250: Performed by: STUDENT IN AN ORGANIZED HEALTH CARE EDUCATION/TRAINING PROGRAM

## 2019-11-02 PROCEDURE — 99232 SBSQ HOSP IP/OBS MODERATE 35: CPT | Mod: ,,, | Performed by: INTERNAL MEDICINE

## 2019-11-02 PROCEDURE — 85730 THROMBOPLASTIN TIME PARTIAL: CPT

## 2019-11-02 PROCEDURE — 20600001 HC STEP DOWN PRIVATE ROOM

## 2019-11-02 PROCEDURE — 85025 COMPLETE CBC W/AUTO DIFF WBC: CPT

## 2019-11-02 PROCEDURE — 86901 BLOOD TYPING SEROLOGIC RH(D): CPT

## 2019-11-02 PROCEDURE — 81001 URINALYSIS AUTO W/SCOPE: CPT

## 2019-11-02 PROCEDURE — 84100 ASSAY OF PHOSPHORUS: CPT

## 2019-11-02 PROCEDURE — 80053 COMPREHEN METABOLIC PANEL: CPT

## 2019-11-02 RX ORDER — PROPRANOLOL HYDROCHLORIDE 10 MG/1
10 TABLET ORAL 2 TIMES DAILY
Status: DISCONTINUED | OUTPATIENT
Start: 2019-11-02 | End: 2019-11-03 | Stop reason: HOSPADM

## 2019-11-02 RX ORDER — LIDOCAINE HYDROCHLORIDE 20 MG/ML
10 INJECTION, SOLUTION EPIDURAL; INFILTRATION; INTRACAUDAL; PERINEURAL ONCE
Status: DISCONTINUED | OUTPATIENT
Start: 2019-11-02 | End: 2019-11-02

## 2019-11-02 RX ORDER — LIDOCAINE HYDROCHLORIDE 20 MG/ML
5 INJECTION, SOLUTION EPIDURAL; INFILTRATION; INTRACAUDAL; PERINEURAL ONCE
Status: DISCONTINUED | OUTPATIENT
Start: 2019-11-02 | End: 2019-11-03 | Stop reason: HOSPADM

## 2019-11-02 RX ORDER — ENOXAPARIN SODIUM 100 MG/ML
40 INJECTION SUBCUTANEOUS EVERY 24 HOURS
Status: DISCONTINUED | OUTPATIENT
Start: 2019-11-02 | End: 2019-11-03 | Stop reason: HOSPADM

## 2019-11-02 RX ORDER — LIDOCAINE HYDROCHLORIDE 10 MG/ML
10 INJECTION INFILTRATION; PERINEURAL ONCE
Status: DISCONTINUED | OUTPATIENT
Start: 2019-11-02 | End: 2019-11-02

## 2019-11-02 RX ADMIN — PANTOPRAZOLE SODIUM 40 MG: 40 TABLET, DELAYED RELEASE ORAL at 08:11

## 2019-11-02 RX ADMIN — LACTULOSE 10 G: 20 SOLUTION ORAL at 08:11

## 2019-11-02 RX ADMIN — PROPRANOLOL HYDROCHLORIDE 10 MG: 10 TABLET ORAL at 11:11

## 2019-11-02 RX ADMIN — PROPRANOLOL HYDROCHLORIDE 10 MG: 10 TABLET ORAL at 09:11

## 2019-11-02 RX ADMIN — GABAPENTIN 100 MG: 100 CAPSULE ORAL at 09:11

## 2019-11-02 RX ADMIN — LACTULOSE 10 G: 20 SOLUTION ORAL at 02:11

## 2019-11-02 RX ADMIN — ENOXAPARIN SODIUM 40 MG: 100 INJECTION SUBCUTANEOUS at 05:11

## 2019-11-02 RX ADMIN — RAMELTEON 8 MG: 8 TABLET ORAL at 09:11

## 2019-11-02 RX ADMIN — LACTULOSE 10 G: 20 SOLUTION ORAL at 09:11

## 2019-11-02 RX ADMIN — RAMELTEON 8 MG: 8 TABLET ORAL at 01:11

## 2019-11-02 RX ADMIN — OXYCODONE HYDROCHLORIDE 5 MG: 5 TABLET ORAL at 09:11

## 2019-11-02 RX ADMIN — PREDNISONE 40 MG: 20 TABLET ORAL at 08:11

## 2019-11-02 RX ADMIN — OXYCODONE HYDROCHLORIDE 5 MG: 5 TABLET ORAL at 10:11

## 2019-11-02 NOTE — ASSESSMENT & PLAN NOTE
Hemoglobin 8.8 on admission.     - Hgb 8.2 this AM, denies overt bleeding   - Stop heparin drip today, plan to remove CVC later today and transition to Lovenox 1 mg/kg (40 mg) daily in light of improving creatinine clearance  - s/p 1 unit pRBCs on 10/29 for Hgb 6.4  - continue to monitor with daily CBCs  - transfuse Hgb < 7

## 2019-11-02 NOTE — PLAN OF CARE
Pt AAO; independent with walker assistance oob.  Vital signs stable and pt remained afebrile throughout shift. Received paracentesis this shift. Abdomen now softer and not uncomfortable to patient.  Trialysis  also removed this shift. Transparent dressing placed to neck no drainage on dressing. .   Heparin dcd prior to procedures today and lovenox started at 1730.  Pt remained free from falls during shift.   Instructed patient to use her walker oob and to call with any additional needs in between rounding .Bed lowest position and locked.  Call light in reach.  [facility]

## 2019-11-02 NOTE — ASSESSMENT & PLAN NOTE
Patient with baseline serum creatinine around 1.0. Serum creatinine was 1.1 on 10/19. 10/25 serum creatinine was noted to be 7.1. K of 6.3 that required urgent HD (10/26). Burgos stain negative, urine protein to creatinine ratio of 4, Fe urea 62.3% suggestive of intrinsic etiology, US without hydronephrosis, Garcia with scant urine output despite IV Lasix and IVFs    - Creatine down trending with a value of 3.7 this AM, 1.35L urine output over the last 24hrs  - Continue with strict I/Os  - Discontinued continuous IVFs and will remove central venous catheter today per Nephrology recommendations after stopping heparin  - Will continue to avoid nephrotoxic agents (i.e. 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

## 2019-11-02 NOTE — ASSESSMENT & PLAN NOTE
- patient denies any hemtopyosis, BRBPR, or melena  - resuming home dose propanol 10 mg BID as patient is no longer hypotensive

## 2019-11-02 NOTE — PLAN OF CARE
POC reviewed with patient; understanding verbalized. Pt complains of back pain this shift, PRN meds given. Pt heparin drip has stayed in therapeutic range throughout shift. Pt bed alarm set due to getting out of bed without calling as instructed. Pt voids via hat and had 1 bm this shift. Pt. with nonskid footwear on, bed in lowest position, and locked with bed rails up x2.  Pt. instructed to call prior to getting OOB. Patient ambulates in room independently. VSS and afebrile this shift. All questions and concerns addressed at this time. Will continue to monitor.

## 2019-11-02 NOTE — ASSESSMENT & PLAN NOTE
- hold immunotherapy given concern for plausible immunotherapy induced nephritis   - continue steroids at 1 mg/kg for now (40 mg QD)  - prognosis guarded

## 2019-11-02 NOTE — PROGRESS NOTES
[facility]  Hematology/Oncology  Progress Note    Patient Name: Neena Marks  Admission Date: 10/25/2019  Hospital Length of Stay: 8 days  Code Status: Full Code     Subjective:     HPI:  Ms Marks is a 63-year-old  female with alcoholic cirrhosis dating back to 2015, portal HTN, ascites, history of variceal bleeding in January 2018 s/p banding, history of alcohol abuse (recently quit in January of 2018), and newly diagnosed hepatocellular carcinoma undergoing treatment with nivolumab who presented as a direct transfer from Heme/Onc clinic on 10/25 after routine laboratory studies were remarkable for serum creatinine of 7.1 (baseline creatinine ~1.0). Patient reports one week history of nasuea and vomiting. Per chart review, patient has [place] [facility] [place] this month with complaints of abdominal pain, back pain, nausea, and vomiting. She was noted to have UTI 2/2 Klebsiella on 10/19 and is currently  undergoing treatment with ciprofloxacin.  She denies relief in abdominal and back pain with prescribed medications. Her nausea and vomiting has persisted as well. She reports decreased PO intake over this time span secondary to nausea and has a history of recent ibuprofen and naproxen use for abdominal pain. She denies fever, dysuria, hematuria, chest pain, shortness of breath, syncope, or worsening lower extremity edema. She currently has abdominal distention and abdominal pain which radiates around the left aspect of lumbar region. Patients' vitals with BP 98/54 mmHg, HR 80 bpm, afebrile with temperature of 98 F, and RR 16.     Cirrhosis & HCC History:  AFP tumor marker is normal.  Hep C and B testing on 4/27/2018 - negative.    Endoscopy - 1/9/19 - Grade II esophageal varices. Completely eradicated. Banded, Small hiatal hernia., Portal hypertensive gastropathy. Treated with argon plasma coagulation (APC).     Patient had an appointment on 2/15/2019, IR consult for  Y90 but she missed her appointment.   - She had IR mapping and Hepatic angiography demonstrates a large hypervascular lesion in the right hepatic lobe supplied by branches of the right hepatic artery and accessory left hepatic artery.  Technetium MAA was injected aorta determined lung shunt fraction.  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  - Nuclear medicine scan revealed that majority of tracer goes to the lungs with a liver lung shunt fraction of 68.9%.   is not a candidate for Y90 or other liver directed therapies due to liver lung shunt     5/9/2019 - Started taking Sorafenib 400mg BID from  7/6/2019 - Decrease to 200 mg BID due to hand foot syndrome  8/16/2019 - Stopped Sorafenib on  due to persistent desquamation of hand foot syndrome  08/28/2019 - CT Chest abdomen and pelvis done revealed disease progression and also new bilateral pulmonary embolism. She was admitted to [facility] main campus and discharged on Eliquis (Patient declined Lovenox)   9/17/2019 -  Fall and followed up in ED and Xray right knee revealed no acute osseous abnormality  9/27/2019 - Started C 1 D1 Nivolumab q 4 weeks  10/25/2019 - PRACHI with creatinine of 7.1, direct admit     Interval History: Patient seen and examined this AM. Denies any new acute complaints over night apart from some abdominal distention and lumbar back pain. She used one PRN oxycodone overnight. She denies experiencing any fever, SOB, chest pain, nausea, vomiting, constipation, dysuria, hematuria, or difficulty urinating. She has put out ~1.4L of urine over the last 24 hrs. Yesterday her continues IVFs were stopped. Per Nephrology okay to remove CVC, plan to do that today after holding heparin for four hours.    Oncology Treatment Plan:   OP NIVOLUMAB Q4W    Medications:  Continuous Infusions:   heparin (porcine) in  D5W 12 Units/kg/hr (11/01/19 2251)     Scheduled Meds:   gabapentin  100 mg Oral QHS    lactulose  10 g Oral TID    pantoprazole  40 mg Oral Daily    predniSONE  40 mg Oral Daily     PRN Meds:sodium chloride, cellulose, oxidized 3 x 4', heparin (porcine), heparin (PORCINE), heparin (PORCINE), ondansetron, oxyCODONE, prochlorperazine, ramelteon, sodium chloride 0.9%     Review of Systems   Constitutional: Negative for chills, diaphoresis and fever.   Respiratory: Negative for shortness of breath and wheezing.    Cardiovascular: Negative for chest pain and leg swelling.   Gastrointestinal: Positive for abdominal distention. Negative for abdominal pain, blood in stool, constipation, diarrhea, nausea and vomiting.   Genitourinary: Negative for difficulty urinating, dysuria and hematuria.        Reports good urine output without difficulty.   Musculoskeletal: Positive for back pain.   Psychiatric/Behavioral: Negative for behavioral problems and confusion.     Objective:     Vital Signs (Most Recent):  Temp: 97.8 °F (36.6 °C) (11/02/19 0722)  Pulse: 82 (11/02/19 0722)  Resp: 15 (11/02/19 0722)  BP: 131/78 (11/02/19 0722)  SpO2: 99 % (11/02/19 0722) Vital Signs (24h Range):  Temp:  [97.6 °F (36.4 °C)-98.4 °F (36.9 °C)] 97.8 °F (36.6 °C)  Pulse:  [76-99] 82  Resp:  [15-18] 15  SpO2:  [98 %-100 %] 99 %  BP: (116-184)/(70-97) 131/78     Weight: 40.8 kg (90 lb)  Body mass index is 18.18 kg/m².  Body surface area is 1.3 meters squared.      Intake/Output Summary (Last 24 hours) at 11/2/2019 0759  Last data filed at 11/2/2019 0311  Gross per 24 hour   Intake --   Output 1426 ml   Net -1426 ml       Physical Exam   Constitutional: Vital signs are normal. She appears cachectic.   HENT:   Head: Normocephalic and atraumatic.   Eyes: EOM are normal. No scleral icterus.   Arcus senilis    Neck: Neck supple.   Central line on the left. Pressure gauze in place. No overt bleeding appreciated or evidence of hematoma.     Cardiovascular: Normal rate, regular rhythm, normal heart sounds and intact distal pulses. Exam reveals no gallop and no friction rub.   No murmur heard.  Pulmonary/Chest: Effort normal and breath sounds normal. She has no wheezes. She has no rales.   Abdominal: Soft. Bowel sounds are normal. She exhibits distension. There is no tenderness.   Abdomen more distended 2/2 to ascites this AM than days prior. Patient reports last paracentesis was over a year ago and she wishes to have one again. There is marked superficial dilation of veins overlying abdomen.    Musculoskeletal: She exhibits no edema.   Neurological: She is alert.   Skin: Skin is warm and dry.   Psychiatric: She has a normal mood and affect. Her behavior is normal.   Nursing note and vitals reviewed.      Significant Labs:   Recent Lab Results       11/02/19  0352   11/01/19  2206   11/01/19  1605        Albumin 2.9         Alkaline Phosphatase 107         ALT 36         Anion Gap 13         aPTT 52.6  Comment:  aPTT therapeutic range = 39-69 seconds 47.8  Comment:  aPTT therapeutic range = 39-69 seconds 42.5  Comment:  aPTT therapeutic range = 39-69 seconds     AST 75         Baso # 0.01         Basophil% 0.1         BILIRUBIN TOTAL 0.9  Comment:  For infants and newborns, interpretation of results should be based  on gestational age, weight and in agreement with clinical  observations.  Premature Infant recommended reference ranges:  Up to 24 hours.............<8.0 mg/dL  Up to 48 hours............<12.0 mg/dL  3-5 days..................<15.0 mg/dL  6-29 days.................<15.0 mg/dL           BUN, Bld 44         Calcium 8.6         Chloride 105         CO2 18         Creatinine 2.5         Differential Method Automated         eGFR if  23.0         eGFR if non  20.0  Comment:  Calculation used to obtain the estimated glomerular filtration  rate (eGFR) is the CKD-EPI equation.            Eos # 0.0         Eosinophil%  0.0         Fibrinogen 157         Glucose 180         Gran # (ANC) 8.7         Gran% 80.1         Group & Rh B POS         Hematocrit 23.8         Hemoglobin 8.2         Immature Grans (Abs) 0.17  Comment:  Mild elevation in immature granulocytes is non specific and   can be seen in a variety of conditions including stress response,   acute inflammation, trauma and pregnancy. Correlation with other   laboratory and clinical findings is essential.           Immature Granulocytes 1.6         INDIRECT LUCIE NEG         Lymph # 0.8         Lymph% 7.8         Magnesium 1.8         MCH 23.0         MCHC 34.5         MCV 67         Mono # 1.1         Mono% 10.4         MPV SEE COMMENT  Comment:  Result not available.         nRBC 0         Phosphorus 3.2         Platelets 164         Potassium 3.5         PROTEIN TOTAL 6.6         RBC 3.57         RDW 21.8         Sodium 136         WBC 10.82               Diagnostic Results:  CXR on 10/27/2019:   Comparison:  Prior dated 10/26/2019    Findings:  Left-sided trialysis catheter is present with tip at the IVC/RA junction.  The mediastinal structures are midline.  The cardiac silhouette is enlarged and stable.  There is pulmonary vascular congestion and bilateral reticular opacities consistent with mild interstitial edema.  Trace right pleural fluid is suspected. No osseous abnormalities are seen.   Impression:  See above.    US Retroperitoneal Complete on 10/25/2019:   Comparison:  CT chest, abdomen, and pelvis on 08/28/2019   Findings:  Right kidney: The right kidney measures 11.2 cm. No cortical thinning. No loss of corticomedullary distinction. Resistive index measures 1.0.  There is a 0.8 x 0.7 x 0.8 cm simple cyst.  No renal stone. No hydronephrosis.  Left kidney: The left kidney measures 11.1 cm. No cortical thinning. No loss of corticomedullary distinction. Resistive index measures 1.0.  No mass. No renal stone. No hydronephrosis.  The bladder is partially distended  at the time of scanning and has an unremarkable appearance.  Moderate volume ascites.   Impression:  Bilateral elevated resistive indices, suggestive of medical renal disease.  No hydronephrosis or nephrolithiasis. Moderate volume ascites.      Assessment/Plan:     * Acute kidney injury  Patient with baseline serum creatinine around 1.0. Serum creatinine was 1.1 on 10/19. 10/25 serum creatinine was noted to be 7.1. K of 6.3 that required urgent HD (10/26). Burgos stain negative, urine protein to creatinine ratio of 4, Fe urea 62.3% suggestive of intrinsic etiology, US without hydronephrosis, Garcia with scant urine output despite IV Lasix and IVFs    - Creatine down trending with a value of 3.7 this AM, 1.35L urine output over the last 24hrs  - Continue with strict I/Os  - Discontinued continuous IVFs and will remove central venous catheter today per Nephrology recommendations after stopping heparin  - Will continue to avoid nephrotoxic agents (i.e. 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    - Stop heparin drip today, plan to remove CVC later today and transition to Lovenox 1 mg/kg (40 mg) daily in light of improving creatinine clearance  - monitor aPTT, PT, and INR in the setting of cirrhosis and anticoagulation     Ascites due to alcoholic cirrhosis  - with complaints of abdominal pain over night of 10/28-29, US with small amount of ascitic fluids with some concern for SBP, was given albumin and started on Rocephin which  has since been discontinued   - will continue with lactulose   - patient with complaints of abdominal distention on 11/2, asking for therapeutic paracentesis, reports her last one was about a year ago, will consider doing paracentesis today    HCC (hepatocellular carcinoma)  - hold immunotherapy given concern for plausible immunotherapy induced nephritis   - continue steroids at 1 mg/kg for now (40 mg QD)  - prognosis guarded    Portal hypertensive gastropathy  - resume home dose propanol     Esophageal varices in alcoholic cirrhosis  - patient denies any hemtopyosis, BRBPR, or melena  - resuming home dose propanol 10 mg BID as patient is no longer hypotensive     Alcoholic cirrhosis  MELD of +30 at admission. CMP on admission with albumin of 3.0, Alk phos 96, and AST 75.  - continue to monitor    Iron deficiency anemia due to chronic blood loss  Hemoglobin 8.8 on admission.     - Hgb 8.2 this AM, denies overt bleeding   - Stop heparin drip today, plan to remove CVC later today and transition to Lovenox 1 mg/kg (40 mg) daily in light of improving creatinine clearance  - s/p 1 unit pRBCs on 10/29 for Hgb 6.4  - continue to monitor with daily CBCs  - transfuse Hgb < 7             Lacho Nuñez MD  Hematology/Oncology  [facility]

## 2019-11-02 NOTE — PROCEDURES
"Neena Marks is a 64 y.o. female patient.    Temp: 98.5 °F (36.9 °C) (11/02/19 1122)  Pulse: 70 (11/02/19 1538)  Resp: 17 (11/02/19 1122)  BP: (!) 142/49 (11/02/19 1122)  SpO2: 98 % (11/02/19 1122)  Weight: 40.8 kg (90 lb) (10/25/19 1542)  Height: 4' 11" (149.9 cm) (10/25/19 1542)       Paracentesis  Date/Time: 11/2/2019 2:47 PM  Location procedure was performed: [facility] HEMATOLOGY/ONCOLOGY  Performed by: Lacho Nuñez MD  Authorized by: Lacho Nuñez MD   Assisting provider: Hilda Delgado DO  Pre-operative diagnosis: Ascites, cirrhosis. portal hypertension, hepatocellular carcinoma  Post-operative diagnosis: Ascites, cirrhosis. portal hypertension, hepatocellular carcinoma  Consent Done: Yes  Consent: Verbal consent obtained. Written consent obtained.  Consent given by: patient  Patient understanding: patient states understanding of the procedure being performed  Patient consent: the patient's understanding of the procedure matches consent given  Procedure consent: procedure consent matches procedure scheduled  Relevant documents: relevant documents present and verified  Test results available and properly labeled: No test results necessary, therapeutic paracentesis   Site marked: the operative site was marked  Imaging studies: imaging studies available (Ultrasound guided )  Time out: Immediately prior to procedure a "time out" was called to verify the correct patient, procedure, equipment, support staff and site/side marked as required.  Initial or subsequent exam: subsequent  Procedure purpose: therapeutic  Indications: abdominal discomfort secondary to ascites  Anesthesia: local infiltration    Anesthesia:  Local anesthesia used: yes  Local Anesthetic: lidocaine 2% without epinephrine  Anesthetic total: 7 mL  Patient sedated: no  Preparation: Patient was prepped and draped in the usual sterile fashion.  Needle gauge: 22  Ultrasound guidance: yes  Puncture site: left lower quadrant  Fluid " removed: 4000(ml)  Fluid appearance: clear and serous  Technical procedures used: Z techinque, ultrasound guided  Complications: No  Estimated blood loss (mL): 5  Specimens: No  Implants: No  Patient tolerance: Patient tolerated the procedure well with no immediate complications          Lacho Nuñez  11/2/2019

## 2019-11-02 NOTE — SUBJECTIVE & OBJECTIVE
Interval History: Patient seen and examined this AM. Denies any new acute complaints over night apart from some abdominal distention and lumbar back pain. She used one PRN oxycodone overnight. She denies experiencing any fever, SOB, chest pain, nausea, vomiting, constipation, dysuria, hematuria, or difficulty urinating. She has put out ~1.4L of urine over the last 24 hrs. Yesterday her continues IVFs were stopped. Per Nephrology okay to remove CVC, plan to do that today after holding heparin for four hours.    Oncology Treatment Plan:   OP NIVOLUMAB Q4W    Medications:  Continuous Infusions:   heparin (porcine) in D5W 12 Units/kg/hr (11/01/19 2251)     Scheduled Meds:   gabapentin  100 mg Oral QHS    lactulose  10 g Oral TID    pantoprazole  40 mg Oral Daily    predniSONE  40 mg Oral Daily     PRN Meds:sodium chloride, cellulose, oxidized 3 x 4', heparin (porcine), heparin (PORCINE), heparin (PORCINE), ondansetron, oxyCODONE, prochlorperazine, ramelteon, sodium chloride 0.9%     Review of Systems   Constitutional: Negative for chills, diaphoresis and fever.   Respiratory: Negative for shortness of breath and wheezing.    Cardiovascular: Negative for chest pain and leg swelling.   Gastrointestinal: Positive for abdominal distention. Negative for abdominal pain, blood in stool, constipation, diarrhea, nausea and vomiting.   Genitourinary: Negative for difficulty urinating, dysuria and hematuria.        Reports good urine output without difficulty.   Musculoskeletal: Positive for back pain.   Psychiatric/Behavioral: Negative for behavioral problems and confusion.     Objective:     Vital Signs (Most Recent):  Temp: 97.8 °F (36.6 °C) (11/02/19 0722)  Pulse: 82 (11/02/19 0722)  Resp: 15 (11/02/19 0722)  BP: 131/78 (11/02/19 0722)  SpO2: 99 % (11/02/19 0722) Vital Signs (24h Range):  Temp:  [97.6 °F (36.4 °C)-98.4 °F (36.9 °C)] 97.8 °F (36.6 °C)  Pulse:  [76-99] 82  Resp:  [15-18] 15  SpO2:  [98 %-100 %] 99 %  BP:  (116-184)/(70-97) 131/78     Weight: 40.8 kg (90 lb)  Body mass index is 18.18 kg/m².  Body surface area is 1.3 meters squared.      Intake/Output Summary (Last 24 hours) at 11/2/2019 0749  Last data filed at 11/2/2019 0311  Gross per 24 hour   Intake --   Output 1426 ml   Net -1426 ml       Physical Exam   Constitutional: Vital signs are normal. She appears cachectic.   HENT:   Head: Normocephalic and atraumatic.   Eyes: EOM are normal. No scleral icterus.   Arcus senilis    Neck: Neck supple.   Central line on the left. Pressure gauze in place. No overt bleeding appreciated or evidence of hematoma.    Cardiovascular: Normal rate, regular rhythm, normal heart sounds and intact distal pulses. Exam reveals no gallop and no friction rub.   No murmur heard.  Pulmonary/Chest: Effort normal and breath sounds normal. She has no wheezes. She has no rales.   Abdominal: Soft. Bowel sounds are normal. She exhibits distension. There is no tenderness.   Abdomen more distended 2/2 to ascites this AM than days prior. Patient reports last paracentesis was over a year ago and she wishes to have one again. There is marked superficial dilation of veins overlying abdomen.    Musculoskeletal: She exhibits no edema.   Neurological: She is alert.   Skin: Skin is warm and dry.   Psychiatric: She has a normal mood and affect. Her behavior is normal.   Nursing note and vitals reviewed.      Significant Labs:   Recent Lab Results       11/02/19  0352   11/01/19  2206   11/01/19  1605        Albumin 2.9         Alkaline Phosphatase 107         ALT 36         Anion Gap 13         aPTT 52.6  Comment:  aPTT therapeutic range = 39-69 seconds 47.8  Comment:  aPTT therapeutic range = 39-69 seconds 42.5  Comment:  aPTT therapeutic range = 39-69 seconds     AST 75         Baso # 0.01         Basophil% 0.1         BILIRUBIN TOTAL 0.9  Comment:  For infants and newborns, interpretation of results should be based  on gestational age, weight and in  agreement with clinical  observations.  Premature Infant recommended reference ranges:  Up to 24 hours.............<8.0 mg/dL  Up to 48 hours............<12.0 mg/dL  3-5 days..................<15.0 mg/dL  6-29 days.................<15.0 mg/dL           BUN, Bld 44         Calcium 8.6         Chloride 105         CO2 18         Creatinine 2.5         Differential Method Automated         eGFR if  23.0         eGFR if non  20.0  Comment:  Calculation used to obtain the estimated glomerular filtration  rate (eGFR) is the CKD-EPI equation.            Eos # 0.0         Eosinophil% 0.0         Fibrinogen 157         Glucose 180         Gran # (ANC) 8.7         Gran% 80.1         Group & Rh B POS         Hematocrit 23.8         Hemoglobin 8.2         Immature Grans (Abs) 0.17  Comment:  Mild elevation in immature granulocytes is non specific and   can be seen in a variety of conditions including stress response,   acute inflammation, trauma and pregnancy. Correlation with other   laboratory and clinical findings is essential.           Immature Granulocytes 1.6         INDIRECT LUCIE NEG         Lymph # 0.8         Lymph% 7.8         Magnesium 1.8         MCH 23.0         MCHC 34.5         MCV 67         Mono # 1.1         Mono% 10.4         MPV SEE COMMENT  Comment:  Result not available.         nRBC 0         Phosphorus 3.2         Platelets 164         Potassium 3.5         PROTEIN TOTAL 6.6         RBC 3.57         RDW 21.8         Sodium 136         WBC 10.82               Diagnostic Results:  CXR on 10/27/2019:   Comparison:  Prior dated 10/26/2019    Findings:  Left-sided trialysis catheter is present with tip at the IVC/RA junction.  The mediastinal structures are midline.  The cardiac silhouette is enlarged and stable.  There is pulmonary vascular congestion and bilateral reticular opacities consistent with mild interstitial edema.  Trace right pleural fluid is suspected. No  osseous abnormalities are seen.   Impression:  See above.    US Retroperitoneal Complete on 10/25/2019:   Comparison:  CT chest, abdomen, and pelvis on 08/28/2019   Findings:  Right kidney: The right kidney measures 11.2 cm. No cortical thinning. No loss of corticomedullary distinction. Resistive index measures 1.0.  There is a 0.8 x 0.7 x 0.8 cm simple cyst.  No renal stone. No hydronephrosis.  Left kidney: The left kidney measures 11.1 cm. No cortical thinning. No loss of corticomedullary distinction. Resistive index measures 1.0.  No mass. No renal stone. No hydronephrosis.  The bladder is partially distended at the time of scanning and has an unremarkable appearance.  Moderate volume ascites.   Impression:  Bilateral elevated resistive indices, suggestive of medical renal disease.  No hydronephrosis or nephrolithiasis. Moderate volume ascites.

## 2019-11-02 NOTE — ASSESSMENT & PLAN NOTE
- with complaints of abdominal pain over night of 10/28-29, US with small amount of ascitic fluids with some concern for SBP, was given albumin and started on Rocephin which has since been discontinued   - will continue with lactulose   - patient with complaints of abdominal distention on 11/2, asking for therapeutic paracentesis, reports her last one was about a year ago, will consider doing paracentesis today

## 2019-11-02 NOTE — ASSESSMENT & PLAN NOTE
Hx of PE (CT scan in August of 2019) on chronic anticoagulation with Eliquis 5 mg BID at home, however, due to sudden impaired renal function unable to appropriately dose Eliquis and was transitioned to heparin drip on 10/27.    - Stop heparin drip today, plan to remove CVC later today and transition to Lovenox 1 mg/kg (40 mg) daily in light of improving creatinine clearance  - monitor aPTT, PT, and INR in the setting of cirrhosis and anticoagulation

## 2019-11-03 VITALS
OXYGEN SATURATION: 99 % | SYSTOLIC BLOOD PRESSURE: 133 MMHG | RESPIRATION RATE: 14 BRPM | HEART RATE: 61 BPM | HEIGHT: 59 IN | BODY MASS INDEX: 19.91 KG/M2 | TEMPERATURE: 98 F | DIASTOLIC BLOOD PRESSURE: 66 MMHG | WEIGHT: 98.75 LBS

## 2019-11-03 LAB
ALBUMIN SERPL BCP-MCNC: 2.9 G/DL (ref 3.5–5.2)
ALP SERPL-CCNC: 115 U/L (ref 55–135)
ALT SERPL W/O P-5'-P-CCNC: 57 U/L (ref 10–44)
ANION GAP SERPL CALC-SCNC: 11 MMOL/L (ref 8–16)
AST SERPL-CCNC: 98 U/L (ref 10–40)
BASOPHILS # BLD AUTO: 0.01 K/UL (ref 0–0.2)
BASOPHILS NFR BLD: 0.1 % (ref 0–1.9)
BILIRUB SERPL-MCNC: 1 MG/DL (ref 0.1–1)
BUN SERPL-MCNC: 38 MG/DL (ref 8–23)
CALCIUM SERPL-MCNC: 9.2 MG/DL (ref 8.7–10.5)
CHLORIDE SERPL-SCNC: 105 MMOL/L (ref 95–110)
CO2 SERPL-SCNC: 22 MMOL/L (ref 23–29)
CREAT SERPL-MCNC: 1.9 MG/DL (ref 0.5–1.4)
DIFFERENTIAL METHOD: ABNORMAL
EOSINOPHIL # BLD AUTO: 0 K/UL (ref 0–0.5)
EOSINOPHIL NFR BLD: 0 % (ref 0–8)
ERYTHROCYTE [DISTWIDTH] IN BLOOD BY AUTOMATED COUNT: 23.7 % (ref 11.5–14.5)
EST. GFR  (AFRICAN AMERICAN): 32.1 ML/MIN/1.73 M^2
EST. GFR  (NON AFRICAN AMERICAN): 27.9 ML/MIN/1.73 M^2
FIBRINOGEN PPP-MCNC: 164 MG/DL (ref 182–366)
GLUCOSE SERPL-MCNC: 157 MG/DL (ref 70–110)
HCT VFR BLD AUTO: 27.9 % (ref 37–48.5)
HGB BLD-MCNC: 9.2 G/DL (ref 12–16)
IMM GRANULOCYTES # BLD AUTO: 0.13 K/UL (ref 0–0.04)
IMM GRANULOCYTES NFR BLD AUTO: 1.3 % (ref 0–0.5)
LYMPHOCYTES # BLD AUTO: 1.1 K/UL (ref 1–4.8)
LYMPHOCYTES NFR BLD: 11.2 % (ref 18–48)
MAGNESIUM SERPL-MCNC: 1.5 MG/DL (ref 1.6–2.6)
MCH RBC QN AUTO: 22.7 PG (ref 27–31)
MCHC RBC AUTO-ENTMCNC: 33 G/DL (ref 32–36)
MCV RBC AUTO: 69 FL (ref 82–98)
MONOCYTES # BLD AUTO: 0.9 K/UL (ref 0.3–1)
MONOCYTES NFR BLD: 9.6 % (ref 4–15)
NEUTROPHILS # BLD AUTO: 7.5 K/UL (ref 1.8–7.7)
NEUTROPHILS NFR BLD: 77.8 % (ref 38–73)
NRBC BLD-RTO: 0 /100 WBC
PHOSPHATE SERPL-MCNC: 2.7 MG/DL (ref 2.7–4.5)
PLATELET # BLD AUTO: 177 K/UL (ref 150–350)
PMV BLD AUTO: ABNORMAL FL (ref 9.2–12.9)
POTASSIUM SERPL-SCNC: 3.4 MMOL/L (ref 3.5–5.1)
PROT SERPL-MCNC: 7.1 G/DL (ref 6–8.4)
RBC # BLD AUTO: 4.06 M/UL (ref 4–5.4)
SODIUM SERPL-SCNC: 138 MMOL/L (ref 136–145)
WBC # BLD AUTO: 9.65 K/UL (ref 3.9–12.7)

## 2019-11-03 PROCEDURE — 99239 PR HOSPITAL DISCHARGE DAY,>30 MIN: ICD-10-PCS | Mod: ,,, | Performed by: INTERNAL MEDICINE

## 2019-11-03 PROCEDURE — 36415 COLL VENOUS BLD VENIPUNCTURE: CPT

## 2019-11-03 PROCEDURE — 83735 ASSAY OF MAGNESIUM: CPT

## 2019-11-03 PROCEDURE — 80053 COMPREHEN METABOLIC PANEL: CPT

## 2019-11-03 PROCEDURE — 25000003 PHARM REV CODE 250: Performed by: STUDENT IN AN ORGANIZED HEALTH CARE EDUCATION/TRAINING PROGRAM

## 2019-11-03 PROCEDURE — 85025 COMPLETE CBC W/AUTO DIFF WBC: CPT

## 2019-11-03 PROCEDURE — 85384 FIBRINOGEN ACTIVITY: CPT

## 2019-11-03 PROCEDURE — 84100 ASSAY OF PHOSPHORUS: CPT

## 2019-11-03 PROCEDURE — 63600175 PHARM REV CODE 636 W HCPCS: Performed by: STUDENT IN AN ORGANIZED HEALTH CARE EDUCATION/TRAINING PROGRAM

## 2019-11-03 PROCEDURE — 99239 HOSP IP/OBS DSCHRG MGMT >30: CPT | Mod: ,,, | Performed by: INTERNAL MEDICINE

## 2019-11-03 RX ORDER — MAGNESIUM SULFATE HEPTAHYDRATE 40 MG/ML
2 INJECTION, SOLUTION INTRAVENOUS ONCE
Status: COMPLETED | OUTPATIENT
Start: 2019-11-03 | End: 2019-11-03

## 2019-11-03 RX ORDER — PREDNISONE 10 MG/1
TABLET ORAL
Qty: 12 TABLET | Refills: 0 | Status: SHIPPED | OUTPATIENT
Start: 2019-11-04 | End: 2019-11-10

## 2019-11-03 RX ORDER — LACTULOSE 10 G/15ML
10 SOLUTION ORAL; RECTAL 2 TIMES DAILY
Qty: 900 ML | Refills: 3 | Status: SHIPPED | OUTPATIENT
Start: 2019-11-03 | End: 2019-11-06 | Stop reason: SDUPTHER

## 2019-11-03 RX ADMIN — OXYCODONE HYDROCHLORIDE 5 MG: 5 TABLET ORAL at 03:11

## 2019-11-03 RX ADMIN — PREDNISONE 30 MG: 20 TABLET ORAL at 08:11

## 2019-11-03 RX ADMIN — PANTOPRAZOLE SODIUM 40 MG: 40 TABLET, DELAYED RELEASE ORAL at 08:11

## 2019-11-03 RX ADMIN — OXYCODONE HYDROCHLORIDE 5 MG: 5 TABLET ORAL at 08:11

## 2019-11-03 RX ADMIN — PROPRANOLOL HYDROCHLORIDE 10 MG: 10 TABLET ORAL at 08:11

## 2019-11-03 RX ADMIN — MAGNESIUM SULFATE IN WATER 2 G: 40 INJECTION, SOLUTION INTRAVENOUS at 06:11

## 2019-11-03 NOTE — PLAN OF CARE
Patient AAOX4. Vital signs stable, no complaints of nausea, patient afebrile. Complaints of back pain relieved with PRN pain medication. Bed alarm set and patient instructed to call for assistance. Patient utilizes walker to ambulate in room and needs reminders to call for assistance. Will continue to monitor.

## 2019-11-03 NOTE — PLAN OF CARE
[facility]    HOME HEALTH ORDERS  FACE TO FACE ENCOUNTER    Patient Name: Neena Marks  YOB: 1955    PCP: [facility]   PCP Address: [address] [facility] [address]  PCP Phone Number: [phone]  PCP Fax: [phone]    Encounter Date: 11/03/2019    Admit to Home Health    Diagnoses:  Active Hospital Problems    Diagnosis  POA    *Acute kidney injury [N17.9]  Yes     Priority: 1 - High     Chronic    Pulmonary embolism [I26.99]  Yes    Ascites due to alcoholic cirrhosis [K70.31]  Yes    HCC (hepatocellular carcinoma) [C22.0]  Yes    Portal hypertensive gastropathy [K76.6, K31.89]  Yes     Chronic    Alcoholic cirrhosis [K70.30]  Yes     Chronic    Esophageal varices in alcoholic cirrhosis [K70.30, I85.10]  Yes     Chronic    Iron deficiency anemia due to chronic blood loss [D50.0]  Yes      Resolved Hospital Problems   No resolved problems to display.       Future Appointments   Date Time Provider Department Center   11/22/2019  9:10 AM LAB, HEMONC CANCER BLDG [facility]   11/22/2019 10:30 AM Stewart Roberts MD [facility] HEM ONC [facility]   11/22/2019 11:30 AM SASHA CHEMO [facility] CHE[facility]     Follow-up Information     Stewart Roberts MD.    Specialty:  Hematology and Oncology  Why:  Follow-Up Appointment as scheduled by the clinic  Contact information:  [address] [facility] [address] [address]  [phone]                     I have seen and examined this patient face to face today. My clinical findings that support the need for the home health skilled services and home bound status are the following:  Weakness/numbness causing balance and gait disturbance due to Weakness/Debility and Malignancy/Cancer making it taxing to leave home.  Requiring assistive device to leave home due to unsteady gait caused by  Weakness/Debility and Malignancy/Cancer.  Patient with medication mismanagement issues requiring home bound status  as evidenced by  Poor understanding of medication regimen/dosage.    Allergies:Review of patient's allergies indicates:  No Known Allergies    Diet: renal diet and 2 gram sodium diet    Activities: activity as tolerated    Nursing:   SN to complete comprehensive assessment including routine vital signs. Instruct on disease process and s/s of complications to report to MD. Review/verify medication list sent home with the patient at time of discharge  and instruct patient/caregiver as needed. Frequency may be adjusted depending on start of care date.    Notify MD if SBP > 160 or < 90; DBP > 90 or < 50; HR > 120 or < 50; Temp > 101      CONSULTS:    Physical Therapy to evaluate and treat. Evaluate for home safety and equipment needs; Establish/upgrade home exercise program. Perform / instruct on therapeutic exercises, gait training, transfer training, and Range of Motion.    MISCELLANEOUS CARE:  N/A    WOUND CARE ORDERS  n/a      Medications: Review discharge medications with patient and family and provide education.      Current Discharge Medication List      START taking these medications    Details   predniSONE (DELTASONE) 10 MG tablet Take 3 tablets (30 mg total) by mouth once daily for 2 days, THEN 2 tablets (20 mg total) once daily for 2 days, THEN 1 tablet (10 mg total) once daily for 2 days.  Qty: 12 tablet, Refills: 0    Comments: Bedside delivery.         CONTINUE these medications which have NOT CHANGED    Details   apixaban (ELIQUIS) 5 mg Tab Take 1 tablet (5 mg total) by mouth 2 (two) times daily.  Qty: 60 tablet, Refills: 4    Associated Diagnoses: HCC (hepatocellular carcinoma); Cancer associated pain; Hand foot syndrome; Iron deficiency anemia due to chronic blood loss; Severe malnutrition; Esophageal varices in alcoholic cirrhosis; Pulmonary embolism      cyproheptadine (PERIACTIN) 4 mg tablet Take 1 tablet (4 mg total) by mouth 3 (three) times daily as needed.  Qty: 90 tablet, Refills: 2    Associated  Diagnoses: HCC (hepatocellular carcinoma); Esophageal varices in alcoholic cirrhosis; Alcoholic cirrhosis of liver with ascites; Iron deficiency anemia due to chronic blood loss; Alcoholic cirrhosis, unspecified whether ascites present; Severe malnutrition      famotidine (PEPCID) 20 MG tablet Take 1 tablet (20 mg total) by mouth once daily.  Qty: 30 tablet, Refills: 5    Associated Diagnoses: Gastroesophageal reflux disease, esophagitis presence not specified      ferrous sulfate (FEOSOL) 325 mg (65 mg iron) Tab tablet Take 1 tablet (325 mg total) by mouth 2 (two) times daily.  Qty: 30 tablet, Refills: 5    Associated Diagnoses: Iron deficiency anemia due to chronic blood loss; Alcoholic cirrhosis of liver with ascites      furosemide (LASIX) 40 MG tablet Take 1 tablet (40 mg total) by mouth once daily.  Qty: 30 tablet, Refills: 5    Associated Diagnoses: Iron deficiency anemia due to chronic blood loss; Alcoholic cirrhosis of liver with ascites      gabapentin (NEURONTIN) 100 MG capsule Take 1 capsule (100 mg total) by mouth every evening.  Qty: 30 capsule, Refills: 2    Associated Diagnoses: HCC (hepatocellular carcinoma); Cancer associated pain; Alcoholic cirrhosis of liver with ascites; Ambulatory dysfunction      hydrocortisone 1 % cream Apply to affected area 2 times daily  Qty: 140 g, Refills: 1    Associated Diagnoses: HCC (hepatocellular carcinoma); Hand foot syndrome      multivitamin (ONE DAILY MULTIVITAMIN) per tablet Take 1 tablet by mouth once daily.      ondansetron (ZOFRAN) 8 MG tablet Take 1 tablet (8 mg total) by mouth every 8 (eight) hours as needed for Nausea.  Qty: 60 tablet, Refills: 2    Associated Diagnoses: HCC (hepatocellular carcinoma); Cancer associated pain; Alcoholic cirrhosis of liver with ascites      oxyCODONE (ROXICODONE) 5 MG immediate release tablet Take 1 tablet (5 mg total) by mouth every 6 (six) hours as needed for Pain.  Qty: 60 tablet, Refills: 0    Comments: Quantity  prescribed more than 7 day supply? Yes, quantity medically necessary  Associated Diagnoses: HCC (hepatocellular carcinoma); Cancer associated pain; Alcoholic cirrhosis of liver with ascites      (Magic mouthwash) 1:1:1 Benadryl 12.5mg/5ml liq, aluminum & magnesium hydroxide-simehticone (Maalox), lidocaine viscous 2% Swish and spit 10 mLs every 4 (four) hours as needed. for mouth sores  Qty: 450 mL, Refills: 5    Associated Diagnoses: HCC (hepatocellular carcinoma)      lactulose 10 gram/15 ml (CHRONULAC) 10 gram/15 mL (15 mL) solution Take 10 g by mouth 2 (two) times daily.      magnesium oxide (MAGOX) 400 mg (241.3 mg magnesium) tablet Take 1 tablet (400 mg total) by mouth once daily.  Qty: 200 tablet, Refills: 1    Associated Diagnoses: Hypomagnesemia      promethazine (PHENERGAN) 25 MG tablet Take 1 tablet (25 mg total) by mouth every 6 (six) hours as needed for Nausea.  Qty: 25 tablet, Refills: 0      propranolol (INDERAL) 10 MG tablet Take 1 tablet (10 mg total) by mouth 2 (two) times daily.  Qty: 60 tablet, Refills: 2    Associated Diagnoses: HCC (hepatocellular carcinoma); Iron deficiency anemia due to chronic blood loss; Alcoholic cirrhosis of liver with ascites; Esophageal varices in alcoholic cirrhosis      traZODone (DESYREL) 50 MG tablet TK 1 T PO  HS PRN INSOMNIA  Refills: 2         STOP taking these medications       cholecalciferol, vitamin D3, 50,000 unit Tab Comments:   Reason for Stopping:         ciprofloxacin HCl (CIPRO) 500 MG tablet Comments:   Reason for Stopping:         ibuprofen (ADVIL,MOTRIN) 400 MG tablet Comments:   Reason for Stopping:         methocarbamol (ROBAXIN) 500 MG Tab Comments:   Reason for Stopping:               I certify that this patient is confined to her home and needs physical therapy.

## 2019-11-03 NOTE — NURSING
Patient prepared for discharge. PIV removed. AVS  Reviewed with patient , a;; questions answered. Prescription delivered to bedside. Patient refused  Flu shot . Daughter will be picking her up within the hour.

## 2019-11-03 NOTE — SUBJECTIVE & OBJECTIVE
Interval History: Patient seen and examined this AM. Denies any new acute complaints over night apart from lumbar back pain which she attributes to her posture while lying in hospital bed. She used two PRN oxycodones overnight. She denies experiencing any fever, SOB, chest pain, nausea, vomiting, constipation, dysuria, hematuria, or difficulty urinating. She has put out 1L of urine over the last 24 hrs. Yesterday her CVC was removed without complications per Nephrology's recommendation.  She also underwent therapeutic paracentesis for which 4L was removed without complication. Will begin steroid taper today, decreasing from 40 to 30 mg and continue taper as prescribed. Also she was transitioned from heparin drip to Lovenox yesterday but will resume home dose Eliquis upon discharge.     Oncology Treatment Plan:   OP NIVOLUMAB Q4W    Medications:  Continuous Infusions:    Scheduled Meds:   enoxaparin  40 mg Subcutaneous Daily    gabapentin  100 mg Oral QHS    lactulose  10 g Oral TID    lidocaine (PF) 20 mg/mL (2%)  5 mL Other Once    magnesium sulfate IVPB  2 g Intravenous Once    pantoprazole  40 mg Oral Daily    predniSONE  30 mg Oral Daily    propranolol  10 mg Oral BID     PRN Meds:sodium chloride, cellulose, oxidized 3 x 4', heparin (porcine), ondansetron, oxyCODONE, prochlorperazine, ramelteon, sodium chloride 0.9%     Review of Systems   Constitutional: Negative for chills, diaphoresis and fever.   Respiratory: Negative for shortness of breath and wheezing.    Cardiovascular: Negative for chest pain and leg swelling.   Gastrointestinal: Positive for abdominal distention. Negative for abdominal pain, blood in stool, constipation, diarrhea, nausea and vomiting.        Reports improvement from days prior.   Genitourinary: Negative for difficulty urinating, dysuria and hematuria.        Reports good urine output without difficulty.   Musculoskeletal: Positive for back pain.        Relief with PRN  oxycodones.    Psychiatric/Behavioral: Negative for behavioral problems and confusion.     Objective:     Vital Signs (Most Recent):  Temp: 98.4 °F (36.9 °C) (11/03/19 0435)  Pulse: 68 (11/03/19 0435)  Resp: 18 (11/03/19 0435)  BP: 132/62 (11/03/19 0435)  SpO2: 97 % (11/03/19 0435) Vital Signs (24h Range):  Temp:  [98.2 °F (36.8 °C)-98.9 °F (37.2 °C)] 98.4 °F (36.9 °C)  Pulse:  [68-85] 68  Resp:  [15-20] 18  SpO2:  [96 %-100 %] 97 %  BP: (127-166)/(49-83) 132/62     Weight: 44.8 kg (98 lb 12.3 oz)  Body mass index is 19.95 kg/m².  Body surface area is 1.37 meters squared.      Intake/Output Summary (Last 24 hours) at 11/3/2019 0727  Last data filed at 11/3/2019 0643  Gross per 24 hour   Intake 50 ml   Output 1000 ml   Net -950 ml       Physical Exam   Constitutional: Vital signs are normal. She appears cachectic.   HENT:   Head: Normocephalic and atraumatic.   Eyes: EOM are normal. No scleral icterus.   Arcus senilis    Neck: Neck supple.   Pressure gauze in place over left IJ. No overt bleeding appreciated or evidence of hematoma. Sterile gauze changed.    Cardiovascular: Normal rate, regular rhythm, normal heart sounds and intact distal pulses. Exam reveals no gallop and no friction rub.   No murmur heard.  Pulmonary/Chest: Effort normal and breath sounds normal. She has no wheezes. She has no rales.   Abdominal: Soft. Bowel sounds are normal. There is no tenderness.   Abdomen less distended this morning s/p paracentesis yesterday. No ascitic leak noted. There is marked superficial dilation of veins overlying abdomen.    Musculoskeletal: She exhibits no edema.   Neurological: She is alert.   Skin: Skin is warm and dry.   Psychiatric: She has a normal mood and affect. Her behavior is normal.   Nursing note and vitals reviewed.      Significant Labs:   Recent Lab Results       11/03/19  0414   11/02/19  1319        Albumin 2.9       Alkaline Phosphatase 115       ALT 57       Anion Gap 11       Appearance, UA    Clear     AST 98       Bacteria, UA   Rare     Baso # 0.01       Basophil% 0.1       Bilirubin (UA)   Negative     BILIRUBIN TOTAL 1.0  Comment:  For infants and newborns, interpretation of results should be based  on gestational age, weight and in agreement with clinical  observations.  Premature Infant recommended reference ranges:  Up to 24 hours.............<8.0 mg/dL  Up to 48 hours............<12.0 mg/dL  3-5 days..................<15.0 mg/dL  6-29 days.................<15.0 mg/dL         BUN, Bld 38       Calcium 9.2       Chloride 105       CO2 22       Color, UA   Yellow     Creatinine 1.9       Differential Method Automated       eGFR if  32.1       eGFR if non  27.9  Comment:  Calculation used to obtain the estimated glomerular filtration  rate (eGFR) is the CKD-EPI equation.          Eos # 0.0       Eosinophil% 0.0       Fibrinogen 164       Glucose 157       Glucose, UA   Negative     Gran # (ANC) 7.5       Gran% 77.8       Hematocrit 27.9       Hemoglobin 9.2       Immature Grans (Abs) 0.13  Comment:  Mild elevation in immature granulocytes is non specific and   can be seen in a variety of conditions including stress response,   acute inflammation, trauma and pregnancy. Correlation with other   laboratory and clinical findings is essential.         Immature Granulocytes 1.3       Ketones, UA   Negative     Leukocytes, UA   Trace     Lymph # 1.1       Lymph% 11.2       Magnesium 1.5       MCH 22.7       MCHC 33.0       MCV 69       Microscopic Comment   SEE COMMENT  Comment:  Other formed elements not mentioned in the report are not   present in the microscopic examination.        Mono # 0.9       Mono% 9.6       MPV SEE COMMENT  Comment:  Result not available.       NITRITE UA   Negative     Non-Squam Epith   1     nRBC 0       Occult Blood UA   Negative     pH, UA   6.0     Phosphorus 2.7       Platelets 177       Potassium 3.4       PROTEIN TOTAL 7.1       Protein, UA    Negative  Comment:  Recommend a 24 hour urine protein or a urine   protein/creatinine ratio if globulin induced proteinuria is  clinically suspected.       RBC 4.06       RBC, UA   2     RDW 23.7       Sodium 138       Specific Gravity, UA   1.010     Specimen UA   Urine, Clean Catch     Squam Epithel, UA   1     WBC, UA   4     WBC 9.65             Diagnostic Results:  CXR on 10/27/2019:   Comparison:  Prior dated 10/26/2019    Findings:  Left-sided trialysis catheter is present with tip at the IVC/RA junction.  The mediastinal structures are midline.  The cardiac silhouette is enlarged and stable.  There is pulmonary vascular congestion and bilateral reticular opacities consistent with mild interstitial edema.  Trace right pleural fluid is suspected. No osseous abnormalities are seen.   Impression:  See above.    US Retroperitoneal Complete on 10/25/2019:   Comparison:  CT chest, abdomen, and pelvis on 08/28/2019   Findings:  Right kidney: The right kidney measures 11.2 cm. No cortical thinning. No loss of corticomedullary distinction. Resistive index measures 1.0.  There is a 0.8 x 0.7 x 0.8 cm simple cyst.  No renal stone. No hydronephrosis.  Left kidney: The left kidney measures 11.1 cm. No cortical thinning. No loss of corticomedullary distinction. Resistive index measures 1.0.  No mass. No renal stone. No hydronephrosis.  The bladder is partially distended at the time of scanning and has an unremarkable appearance.  Moderate volume ascites.   Impression:  Bilateral elevated resistive indices, suggestive of medical renal disease.  No hydronephrosis or nephrolithiasis. Moderate volume ascites.

## 2019-11-03 NOTE — DISCHARGE SUMMARY
[facility]  Hematology/Oncology  Discharge Summary      Patient Name: Neena Marks  MRN: [id number]  Admission Date: 10/25/2019  Hospital Length of Stay: 9 days  Discharge Date and Time:  11/03/2019 8:33 AM  Attending Physician: Karen Mathis MD   Discharging Provider: Lacho Nuñez MD  Primary Care Provider: [facility]    HPI: Ms Marks is a 63-year-old  female with alcoholic cirrhosis dating back to 2015, portal HTN, ascites, history of variceal bleeding in January 2018 s/p banding, history of alcohol abuse (recently quit in January of 2018), and newly diagnosed hepatocellular carcinoma undergoing treatment with nivolumab who presented as a direct transfer from Heme/Onc clinic on 10/25 after routine laboratory studies were remarkable for serum creatinine of 7.1 (baseline creatinine ~1.0). Patient reports one week history of nasuea and vomiting. Per chart review, patient has presented to [facility] ED three times this month with complaints of abdominal pain, back pain, nausea, and vomiting. She was noted to have UTI 2/2 Klebsiella on 10/19 and is currently  undergoing treatment with ciprofloxacin.  She denies relief in abdominal and back pain with prescribed medications. Her nausea and vomiting has persisted as well. She reports decreased PO intake over this time span secondary to nausea and has a history of recent ibuprofen and naproxen use for abdominal pain. She denies fever, dysuria, hematuria, chest pain, shortness of breath, syncope, or worsening lower extremity edema. She currently has abdominal distention and abdominal pain which radiates around the left aspect of lumbar region. Patients' vitals with BP 98/54 mmHg, HR 80 bpm, afebrile with temperature of 98 F, and RR 16.     Cirrhosis & HCC History:  AFP tumor marker is normal.  Hep C and B testing on 4/27/2018 - negative.    Endoscopy - 1/9/19 - Grade II esophageal varices. Completely eradicated.  Banded, Small hiatal hernia., Portal hypertensive gastropathy. Treated with argon plasma coagulation (APC).     Patient had an appointment on 2/15/2019, IR consult for Y90 but she missed her appointment.   - She had IR mapping and Hepatic angiography demonstrates a large hypervascular lesion in the right hepatic lobe supplied by branches of the right hepatic artery and accessory left hepatic artery.  Technetium MAA was injected aorta determined lung shunt fraction.  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  - Nuclear medicine scan revealed that majority of tracer goes to the lungs with a liver lung shunt fraction of 68.9%.   is not a candidate for Y90 or other liver directed therapies due to liver lung shunt     5/9/2019 - Started taking Sorafenib 400mg BID from  7/6/2019 - Decrease to 200 mg BID due to hand foot syndrome  8/16/2019 - Stopped Sorafenib on  due to persistent desquamation of hand foot syndrome  08/28/2019 - CT Chest abdomen and pelvis done revealed disease progression and also new bilateral pulmonary embolism. She was admitted to [facility] and discharged on Eliquis (Patient declined Lovenox)   9/17/2019 -  Fall and followed up in ED and Xray right knee revealed no acute osseous abnormality  9/27/2019 - Started C 1 D1 Nivolumab q 4 weeks  10/25/2019 - PRACHI with creatinine of 7.1, direct admit     * No surgery found *     Hospital Course: Patient admitted on 10/25 with acute renal failure and oliguria x2 days. Upon admission creatinine was 7.1 (baseline 1.0) and K 5.5. US relieved no evidence of obstruction/hydronephrous. MELD score was 30+ at time of admission. 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 Nephrology was consulted. On 10/26 patient was noted to have a K 6.3, patient was shifted with insulin,  dextrose, and albuterol. Trialysis catheter was placed and patient received first HD on the leo of 10/26. Given concern of anticoagulation with apixaban for bilateral pulmonary embolic first noted on CT in August of 2019 in the setting of acute renal faillure patient was transitioned to heparin drip on the leo of 10/27. 10/28 second round of HD without HF. On the leo of 10/28 there was oozing at the site of trailysis catheter with noted drop in Hgb to 6.4. Also with abdominal pain over night, so decision was made to start on empiric antibiotics for SBP with Rocephin. Patient received transfusion on the morning of 10/29. Nephrology recommending renal biopsy, currently holding off while conitnueing steroids for a duration one of week to determine reversiblity of renal disease. 11/1 IVFs were stopped and 11/2 CVC was removed as patient was tolerating PO liquids with out difficulty and no indications for dialysis in light of improving creatinine and urine output which started on 10/30. 11/2 therapeutic paracentesis was perfromed with 4L of serous fluid removed. Patien tolerated procedure well with improvement in abdominal pain and distension. Heparin drip was discontinued on 11/2 and patient was transitioned to renally dosed Lovenox for bilateral pulmonary emboli. On 11/3 prednisone dose was decreased from 40 mg daily (1 mg/kg) to 30 mg daily. 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 If her renal function were to decline, steroids may need to be increased with prolonged taper. She will also resume NOAC home medication upon discharge in light of improving renal function and will follow-up with her primary oncologist within the week with repeat labs.    Consults:   Consults (From admission, onward)        Status Ordering Provider     Inpatient consult to Nephrology  Once     Provider:  (Not yet assigned)    Completed ANGEL WHELAN  History: Patient seen and examined this AM. Denies any new acute complaints over night apart from lumbar back pain which she attributes to her posture while lying in hospital bed. She used two PRN oxycodones overnight. She denies experiencing any fever, SOB, chest pain, nausea, vomiting, constipation, dysuria, hematuria, or difficulty urinating. She has put out 1L of urine over the last 24 hrs. Yesterday her CVC was removed without complications per Nephrology's recommendation.  She also underwent therapeutic paracentesis for which 4L was removed without complication. Will begin steroid taper today, decreasing from 40 to 30 mg and continue taper as prescribed. Also she was transitioned from heparin drip to Lovenox yesterday but will resume home dose Eliquis upon discharge.     Oncology Treatment Plan:   OP NIVOLUMAB Q4W    Medications:  Continuous Infusions:    Scheduled Meds:   enoxaparin  40 mg Subcutaneous Daily    gabapentin  100 mg Oral QHS    lactulose  10 g Oral TID    lidocaine (PF) 20 mg/mL (2%)  5 mL Other Once    magnesium sulfate IVPB  2 g Intravenous Once    pantoprazole  40 mg Oral Daily    predniSONE  30 mg Oral Daily    propranolol  10 mg Oral BID     PRN Meds:sodium chloride, cellulose, oxidized 3 x 4', heparin (porcine), ondansetron, oxyCODONE, prochlorperazine, ramelteon, sodium chloride 0.9%     Review of Systems   Constitutional: Negative for chills, diaphoresis and fever.   Respiratory: Negative for shortness of breath and wheezing.    Cardiovascular: Negative for chest pain and leg swelling.   Gastrointestinal: Positive for abdominal distention. Negative for abdominal pain, blood in stool, constipation, diarrhea, nausea and vomiting.        Reports improvement from days prior.   Genitourinary: Negative for difficulty urinating, dysuria and hematuria.        Reports good urine output without difficulty.   Musculoskeletal: Positive for back pain.        Relief with PRN oxycodones.     Psychiatric/Behavioral: Negative for behavioral problems and confusion.     Objective:     Vital Signs (Most Recent):  Temp: 98.4 °F (36.9 °C) (11/03/19 0435)  Pulse: 68 (11/03/19 0435)  Resp: 18 (11/03/19 0435)  BP: 132/62 (11/03/19 0435)  SpO2: 97 % (11/03/19 0435) Vital Signs (24h Range):  Temp:  [98.2 °F (36.8 °C)-98.9 °F (37.2 °C)] 98.4 °F (36.9 °C)  Pulse:  [68-85] 68  Resp:  [15-20] 18  SpO2:  [96 %-100 %] 97 %  BP: (127-166)/(49-83) 132/62     Weight: 44.8 kg (98 lb 12.3 oz)  Body mass index is 19.95 kg/m².  Body surface area is 1.37 meters squared.      Intake/Output Summary (Last 24 hours) at 11/3/2019 0727  Last data filed at 11/3/2019 0643  Gross per 24 hour   Intake 50 ml   Output 1000 ml   Net -950 ml       Physical Exam   Constitutional: Vital signs are normal. She appears cachectic.   HENT:   Head: Normocephalic and atraumatic.   Eyes: EOM are normal. No scleral icterus.   Arcus senilis    Neck: Neck supple.   Pressure gauze in place over left IJ. No overt bleeding appreciated or evidence of hematoma. Sterile gauze changed.    Cardiovascular: Normal rate, regular rhythm, normal heart sounds and intact distal pulses. Exam reveals no gallop and no friction rub.   No murmur heard.  Pulmonary/Chest: Effort normal and breath sounds normal. She has no wheezes. She has no rales.   Abdominal: Soft. Bowel sounds are normal. There is no tenderness.   Abdomen less distended this morning s/p paracentesis yesterday. No ascitic leak noted. There is marked superficial dilation of veins overlying abdomen.    Musculoskeletal: She exhibits no edema.   Neurological: She is alert.   Skin: Skin is warm and dry.   Psychiatric: She has a normal mood and affect. Her behavior is normal.   Nursing note and vitals reviewed.      Significant Labs:   Recent Lab Results       11/03/19  0414   11/02/19  1319        Albumin 2.9       Alkaline Phosphatase 115       ALT 57       Anion Gap 11       Appearance, UA   Clear     AST 98        Bacteria, UA   Rare     Baso # 0.01       Basophil% 0.1       Bilirubin (UA)   Negative     BILIRUBIN TOTAL 1.0  Comment:  For infants and newborns, interpretation of results should be based  on gestational age, weight and in agreement with clinical  observations.  Premature Infant recommended reference ranges:  Up to 24 hours.............<8.0 mg/dL  Up to 48 hours............<12.0 mg/dL  3-5 days..................<15.0 mg/dL  6-29 days.................<15.0 mg/dL         BUN, Bld 38       Calcium 9.2       Chloride 105       CO2 22       Color, UA   Yellow     Creatinine 1.9       Differential Method Automated       eGFR if  32.1       eGFR if non  27.9  Comment:  Calculation used to obtain the estimated glomerular filtration  rate (eGFR) is the CKD-EPI equation.          Eos # 0.0       Eosinophil% 0.0       Fibrinogen 164       Glucose 157       Glucose, UA   Negative     Gran # (ANC) 7.5       Gran% 77.8       Hematocrit 27.9       Hemoglobin 9.2       Immature Grans (Abs) 0.13  Comment:  Mild elevation in immature granulocytes is non specific and   can be seen in a variety of conditions including stress response,   acute inflammation, trauma and pregnancy. Correlation with other   laboratory and clinical findings is essential.         Immature Granulocytes 1.3       Ketones, UA   Negative     Leukocytes, UA   Trace     Lymph # 1.1       Lymph% 11.2       Magnesium 1.5       MCH 22.7       MCHC 33.0       MCV 69       Microscopic Comment   SEE COMMENT  Comment:  Other formed elements not mentioned in the report are not   present in the microscopic examination.        Mono # 0.9       Mono% 9.6       MPV SEE COMMENT  Comment:  Result not available.       NITRITE UA   Negative     Non-Squam Epith   1     nRBC 0       Occult Blood UA   Negative     pH, UA   6.0     Phosphorus 2.7       Platelets 177       Potassium 3.4       PROTEIN TOTAL 7.1       Protein, UA    Negative  Comment:  Recommend a 24 hour urine protein or a urine   protein/creatinine ratio if globulin induced proteinuria is  clinically suspected.       RBC 4.06       RBC, UA   2     RDW 23.7       Sodium 138       Specific Gravity, UA   1.010     Specimen UA   Urine, Clean Catch     Squam Epithel, UA   1     WBC, UA   4     WBC 9.65             Diagnostic Results:  CXR on 10/27/2019:   Comparison:  Prior dated 10/26/2019    Findings:  Left-sided trialysis catheter is present with tip at the IVC/RA junction.  The mediastinal structures are midline.  The cardiac silhouette is enlarged and stable.  There is pulmonary vascular congestion and bilateral reticular opacities consistent with mild interstitial edema.  Trace right pleural fluid is suspected. No osseous abnormalities are seen.   Impression:  See above.    US Retroperitoneal Complete on 10/25/2019:   Comparison:  CT chest, abdomen, and pelvis on 08/28/2019   Findings:  Right kidney: The right kidney measures 11.2 cm. No cortical thinning. No loss of corticomedullary distinction. Resistive index measures 1.0.  There is a 0.8 x 0.7 x 0.8 cm simple cyst.  No renal stone. No hydronephrosis.  Left kidney: The left kidney measures 11.1 cm. No cortical thinning. No loss of corticomedullary distinction. Resistive index measures 1.0.  No mass. No renal stone. No hydronephrosis.  The bladder is partially distended at the time of scanning and has an unremarkable appearance.  Moderate volume ascites.   Impression:  Bilateral elevated resistive indices, suggestive of medical renal disease.  No hydronephrosis or nephrolithiasis. Moderate volume ascites.        Significant Diagnostic Studies:   Labs:   BMP:   Recent Labs   Lab 11/02/19  0352 11/03/19  0414   * 157*    138   K 3.5 3.4*    105   CO2 18* 22*   BUN 44* 38*   CREATININE 2.5* 1.9*   CALCIUM 8.6* 9.2   MG 1.8 1.5*   , CMP   Recent Labs   Lab 11/02/19  0352 11/03/19  0414    138   K 3.5  3.4*    105   CO2 18* 22*   * 157*   BUN 44* 38*   CREATININE 2.5* 1.9*   CALCIUM 8.6* 9.2   PROT 6.6 7.1   ALBUMIN 2.9* 2.9*   BILITOT 0.9 1.0   ALKPHOS 107 115   AST 75* 98*   ALT 36 57*   ANIONGAP 13 11   ESTGFRAFRICA 23.0* 32.1*   EGFRNONAA 20.0* 27.9*   , CBC   Recent Labs   Lab 11/02/19  0352 11/03/19  0414   WBC 10.82 9.65   HGB 8.2* 9.2*   HCT 23.8* 27.9*    177   , INR   Lab Results   Component Value Date    INR 1.3 (H) 10/28/2019    INR 1.5 (H) 10/28/2019    INR 1.4 (H) 10/27/2019   Microbiology:   Blood Culture   Lab Results   Component Value Date    LABBLOO No growth after 5 days. 01/25/2018   ,Urine Culture    Lab Results   Component Value Date    LABURIN (A) 10/19/2019     STREPTOCOCCUS AGALACTIAE (GROUP B)  > 100,000 cfu/ml  Beta-hemolytic streptococci are routinely susceptible to   penicillins,cephalosporins and carbapenems.      LABURIN (A) 10/19/2019     KLEBSIELLA PNEUMONIAE  10,000 - 49,999 cfu/ml  No other significant isolate       Specimen (12h ago, onward)    None          Pending Diagnostic Studies:     Procedure Component Value Units Date/Time    Osmolality, urine [[id number]] Collected:  10/25/19 1900    Order Status:  Sent Lab Status:  In process Updated:  10/25/19 1901    Specimen:  Urine, Catheterized     Urinalysis, Reflex to Urine Culture Urine, Clean Catch [[id number]] Collected:  10/25/19 1859    Order Status:  Sent Lab Status:  In process Updated:  10/25/19 1900    Specimen:  Urine         Final Active Diagnoses:    Diagnosis Date Noted POA    PRINCIPAL PROBLEM:  Acute kidney injury [N17.9] 10/25/2019 Yes     Chronic    Pulmonary embolism [I26.99]  Yes    Ascites due to alcoholic cirrhosis [K70.31]  Yes    HCC (hepatocellular carcinoma) [C22.0] 02/22/2019 Yes    Portal hypertensive gastropathy [K76.6, K31.89] 01/10/2019 Yes     Chronic    Alcoholic cirrhosis [K70.30] 01/08/2019 Yes     Chronic    Esophageal varices in alcoholic cirrhosis [K70.30, I85.10]  01/08/2019 Yes     Chronic    Iron deficiency anemia due to chronic blood loss [D50.0] 02/04/2015 Yes      Problems Resolved During this Admission:      Discharged Condition: good    Disposition: Home-Health Care [facility]    Follow Up:  Follow-up Information     Stewart Roberts MD.    Specialty:  Hematology and Oncology  Why:  Follow-Up Appointment as scheduled by the clinic  Contact information:  [address]  [facility] [address]  [phone]                 Patient Instructions:   No discharge procedures on file.  Medications:  Reconciled Home Medications:      Medication List      START taking these medications    predniSONE 10 MG tablet  Commonly known as:  DELTASONE  Take 3 tablets (30 mg total) by mouth once daily for 2 days, THEN 2 tablets (20 mg total) once daily for 2 days, THEN 1 tablet (10 mg total) once daily for 2 days.  Start taking on:  November 4, 2019        CONTINUE taking these medications    (MAGIC MOUTHWASH) 1:1:1 BENADRYL 12.5MG/5ML LIQ, ALUMINUM & MAGNESIUM  Swish and spit 10 mLs every 4 (four) hours as needed. for mouth sores     cyproheptadine 4 mg tablet  Commonly known as:  PERIACTIN  Take 1 tablet (4 mg total) by mouth 3 (three) times daily as needed.     ELIQUIS 5 mg Tab  Generic drug:  apixaban  Take 1 tablet (5 mg total) by mouth 2 (two) times daily.     famotidine 20 MG tablet  Commonly known as:  PEPCID  Take 1 tablet (20 mg total) by mouth once daily.     ferrous sulfate 325 mg (65 mg iron) Tab tablet  Commonly known as:  FEOSOL  Take 1 tablet (325 mg total) by mouth 2 (two) times daily.     furosemide 40 MG tablet  Commonly known as:  LASIX  Take 1 tablet (40 mg total) by mouth once daily.     gabapentin 100 MG capsule  Commonly known as:  NEURONTIN  Take 1 capsule (100 mg total) by mouth every evening.     hydrocortisone 1 % cream  Apply to affected area 2 times daily     lactulose 10 gram/15 ml 10 gram/15 mL (15 mL) solution  Commonly known as:  CHRONULAC  Take 10 g by  mouth 2 (two) times daily.     magnesium oxide 400 mg (241.3 mg magnesium) tablet  Commonly known as:  MAG-OX  Take 1 tablet (400 mg total) by mouth once daily.     ondansetron 8 MG tablet  Commonly known as:  ZOFRAN  Take 1 tablet (8 mg total) by mouth every 8 (eight) hours as needed for Nausea.     ONE DAILY MULTIVITAMIN per tablet  Generic drug:  multivitamin  Take 1 tablet by mouth once daily.     oxyCODONE 5 MG immediate release tablet  Commonly known as:  ROXICODONE  Take 1 tablet (5 mg total) by mouth every 6 (six) hours as needed for Pain.     promethazine 25 MG tablet  Commonly known as:  PHENERGAN  Take 1 tablet (25 mg total) by mouth every 6 (six) hours as needed for Nausea.     propranolol 10 MG tablet  Commonly known as:  INDERAL  Take 1 tablet (10 mg total) by mouth 2 (two) times daily.     traZODone 50 MG tablet  Commonly known as:  DESYREL  TK 1 T PO  HS PRN INSOMNIA        STOP taking these medications    cholecalciferol (vitamin D3) 50,000 unit Tab     ciprofloxacin HCl 500 MG tablet  Commonly known as:  CIPRO     ibuprofen 400 MG tablet  Commonly known as:  ADVIL,MOTRIN     methocarbamol 500 MG Tab  Commonly known as:  ROBAXIN            Lacho Nuñez MD  Hematology/Oncology  [facility]

## 2019-11-03 NOTE — PROGRESS NOTES
Call received thru on call (Med/Onc) requesting HH for pt. To include physical therapy at home. Informed medical team that the pt. Has medicaid and will not be susi to get HH PT due to insurance barrier. Discussed with team, it pt. Appropriate for outpt PT. Medical team feels that this is OK. Contacted pt.in her room and she requested that this sw'er call her back as she was speak with Md re: medication. Second call placed to pt. And discussed HH and outpt. PT. Pt. States that she does not feel she needs HH or outpt. PT at this time. She states that she is independent at home and feels she will be able to care for her self. No other needs identified.

## 2019-11-04 NOTE — PLAN OF CARE
On 11/4/19 at 0700: CM noted that the patient discharged home on 11/3/19. The patient discharged home with home health. Follow-up appointment scheduled as listed below. Discharge and follow-up instructions completed by the bedside nurse.    Future Appointments   Date Time Provider Department Center   11/22/2019  9:10 AM LAB, HEMONC [facility]   11/22/2019 10:30 AM Stewart Roberts MD [facility] HEM ONC [place]   11/22/2019 11:30 AM [facility]        11/04/19 1049   Final Note   Assessment Type Final Discharge Note   Anticipated Discharge Disposition Home-Health   What phone number can be called within the next 1-3 days to see how you are doing after discharge?   ([phone])   Hospital Follow Up  Appt(s) scheduled? Yes   Discharge plans and expectations educations in teach back method with documentation complete? Yes  (per the bedside nurse)

## 2019-11-05 ENCOUNTER — TELEPHONE (OUTPATIENT)
Dept: HEMATOLOGY/ONCOLOGY | Facility: CLINIC | Age: 64
End: 2019-11-05

## 2019-11-05 NOTE — TELEPHONE ENCOUNTER
Called patient to confirm apts on Friday, no answer left message asking pt to call back       RE: Appt   Received: Yesterday   Message Contents   MD Karen Borges MD; Lynn Ty RN; Hilda Tomas   Cc: Rodolfo Palomo MD             Sure Dr. Mathis, I will follow her up in the clinic this Friday, 11/8 along with lab work (I took day off next Friday on 11/15). I will arrange that, and I have included Hilda and Lynn in this message.     Thank you   Stewart

## 2019-11-06 DIAGNOSIS — D50.0 IRON DEFICIENCY ANEMIA DUE TO CHRONIC BLOOD LOSS: ICD-10-CM

## 2019-11-06 DIAGNOSIS — K70.31 ALCOHOLIC CIRRHOSIS OF LIVER WITH ASCITES: ICD-10-CM

## 2019-11-06 DIAGNOSIS — C22.0 HCC (HEPATOCELLULAR CARCINOMA): Primary | ICD-10-CM

## 2019-11-06 RX ORDER — FUROSEMIDE 40 MG/1
40 TABLET ORAL DAILY
Qty: 30 TABLET | Refills: 5 | Status: SHIPPED | OUTPATIENT
Start: 2019-11-06 | End: 2019-11-08 | Stop reason: SDUPTHER

## 2019-11-06 RX ORDER — LACTULOSE 10 G/15ML
10 SOLUTION ORAL; RECTAL 2 TIMES DAILY
Qty: 900 ML | Refills: 3 | Status: SHIPPED | OUTPATIENT
Start: 2019-11-06 | End: 2019-11-08 | Stop reason: SDUPTHER

## 2019-11-08 ENCOUNTER — LAB VISIT (OUTPATIENT)
Dept: LAB | Facility: HOSPITAL | Age: 64
End: 2019-11-08
Attending: STUDENT IN AN ORGANIZED HEALTH CARE EDUCATION/TRAINING PROGRAM
Payer: MEDICAID

## 2019-11-08 ENCOUNTER — OFFICE VISIT (OUTPATIENT)
Dept: HEMATOLOGY/ONCOLOGY | Facility: CLINIC | Age: 64
End: 2019-11-08
Payer: MEDICAID

## 2019-11-08 VITALS
SYSTOLIC BLOOD PRESSURE: 98 MMHG | DIASTOLIC BLOOD PRESSURE: 55 MMHG | RESPIRATION RATE: 16 BRPM | HEIGHT: 59 IN | HEART RATE: 83 BPM | TEMPERATURE: 99 F | WEIGHT: 94.81 LBS | BODY MASS INDEX: 19.12 KG/M2 | OXYGEN SATURATION: 100 %

## 2019-11-08 DIAGNOSIS — C22.0 HCC (HEPATOCELLULAR CARCINOMA): ICD-10-CM

## 2019-11-08 DIAGNOSIS — D50.0 IRON DEFICIENCY ANEMIA DUE TO CHRONIC BLOOD LOSS: ICD-10-CM

## 2019-11-08 DIAGNOSIS — L27.1 HAND FOOT SYNDROME: ICD-10-CM

## 2019-11-08 DIAGNOSIS — K70.31 ALCOHOLIC CIRRHOSIS OF LIVER WITH ASCITES: ICD-10-CM

## 2019-11-08 DIAGNOSIS — E43 SEVERE MALNUTRITION: ICD-10-CM

## 2019-11-08 DIAGNOSIS — N17.9 AKI (ACUTE KIDNEY INJURY): ICD-10-CM

## 2019-11-08 DIAGNOSIS — F41.9 ANXIETY: ICD-10-CM

## 2019-11-08 DIAGNOSIS — N00.9 ACUTE NEPHRITIS: ICD-10-CM

## 2019-11-08 DIAGNOSIS — K70.31 ASCITES DUE TO ALCOHOLIC CIRRHOSIS: ICD-10-CM

## 2019-11-08 DIAGNOSIS — E87.6 HYPOKALEMIA: ICD-10-CM

## 2019-11-08 DIAGNOSIS — R26.2 AMBULATORY DYSFUNCTION: ICD-10-CM

## 2019-11-08 DIAGNOSIS — G89.3 CANCER ASSOCIATED PAIN: ICD-10-CM

## 2019-11-08 DIAGNOSIS — E83.42 HYPOMAGNESEMIA: ICD-10-CM

## 2019-11-08 DIAGNOSIS — I26.99 PULMONARY EMBOLISM: ICD-10-CM

## 2019-11-08 DIAGNOSIS — K70.30 ESOPHAGEAL VARICES IN ALCOHOLIC CIRRHOSIS: ICD-10-CM

## 2019-11-08 DIAGNOSIS — C22.0 HCC (HEPATOCELLULAR CARCINOMA): Primary | ICD-10-CM

## 2019-11-08 DIAGNOSIS — G47.00 INSOMNIA, UNSPECIFIED TYPE: ICD-10-CM

## 2019-11-08 DIAGNOSIS — I26.99 PULMONARY EMBOLISM, UNSPECIFIED CHRONICITY, UNSPECIFIED PULMONARY EMBOLISM TYPE, UNSPECIFIED WHETHER ACUTE COR PULMONALE PRESENT: ICD-10-CM

## 2019-11-08 DIAGNOSIS — I85.10 ESOPHAGEAL VARICES IN ALCOHOLIC CIRRHOSIS: ICD-10-CM
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
## 2019-11-08 LAB
ALBUMIN SERPL BCP-MCNC: 2.7 G/DL (ref 3.5–5.2)
ALP SERPL-CCNC: 105 U/L (ref 55–135)
ALT SERPL W/O P-5'-P-CCNC: 68 U/L (ref 10–44)
ANION GAP SERPL CALC-SCNC: 5 MMOL/L (ref 8–16)
AST SERPL-CCNC: 68 U/L (ref 10–40)
BASOPHILS # BLD AUTO: 0.01 K/UL (ref 0–0.2)
BASOPHILS NFR BLD: 0.1 % (ref 0–1.9)
BILIRUB SERPL-MCNC: 1.3 MG/DL (ref 0.1–1)
BUN SERPL-MCNC: 16 MG/DL (ref 8–23)
CALCIUM SERPL-MCNC: 8.6 MG/DL (ref 8.7–10.5)
CHLORIDE SERPL-SCNC: 106 MMOL/L (ref 95–110)
CO2 SERPL-SCNC: 28 MMOL/L (ref 23–29)
CREAT SERPL-MCNC: 1.1 MG/DL (ref 0.5–1.4)
DIFFERENTIAL METHOD: ABNORMAL
EOSINOPHIL # BLD AUTO: 0.1 K/UL (ref 0–0.5)
EOSINOPHIL NFR BLD: 1.3 % (ref 0–8)
ERYTHROCYTE [DISTWIDTH] IN BLOOD BY AUTOMATED COUNT: 26 % (ref 11.5–14.5)
EST. GFR  (AFRICAN AMERICAN): >60 ML/MIN/1.73 M^2
EST. GFR  (NON AFRICAN AMERICAN): 53.9 ML/MIN/1.73 M^2
GLUCOSE SERPL-MCNC: 105 MG/DL (ref 70–110)
HCT VFR BLD AUTO: 27.8 % (ref 37–48.5)
HGB BLD-MCNC: 8.7 G/DL (ref 12–16)
IMM GRANULOCYTES # BLD AUTO: 0.06 K/UL (ref 0–0.04)
IMM GRANULOCYTES NFR BLD AUTO: 0.6 % (ref 0–0.5)
LYMPHOCYTES # BLD AUTO: 1.9 K/UL (ref 1–4.8)
LYMPHOCYTES NFR BLD: 20.4 % (ref 18–48)
MAGNESIUM SERPL-MCNC: 1.2 MG/DL (ref 1.6–2.6)
MCH RBC QN AUTO: 23 PG (ref 27–31)
MCHC RBC AUTO-ENTMCNC: 31.3 G/DL (ref 32–36)
MCV RBC AUTO: 74 FL (ref 82–98)
MONOCYTES # BLD AUTO: 0.9 K/UL (ref 0.3–1)
MONOCYTES NFR BLD: 10 % (ref 4–15)
NEUTROPHILS # BLD AUTO: 6.3 K/UL (ref 1.8–7.7)
NEUTROPHILS NFR BLD: 67.6 % (ref 38–73)
NRBC BLD-RTO: 0 /100 WBC
PLATELET # BLD AUTO: 189 K/UL (ref 150–350)
PMV BLD AUTO: ABNORMAL FL (ref 9.2–12.9)
POTASSIUM SERPL-SCNC: 4.1 MMOL/L (ref 3.5–5.1)
PROT SERPL-MCNC: 6.4 G/DL (ref 6–8.4)
RBC # BLD AUTO: 3.78 M/UL (ref 4–5.4)
SODIUM SERPL-SCNC: 139 MMOL/L (ref 136–145)
WBC # BLD AUTO: 9.34 K/UL (ref 3.9–12.7)

## 2019-11-08 PROCEDURE — 99214 OFFICE O/P EST MOD 30 MIN: CPT | Mod: S$PBB,,, | Performed by: STUDENT IN AN ORGANIZED HEALTH CARE EDUCATION/TRAINING PROGRAM

## 2019-11-08 PROCEDURE — 99215 OFFICE O/P EST HI 40 MIN: CPT | Mod: PBBFAC | Performed by: STUDENT IN AN ORGANIZED HEALTH CARE EDUCATION/TRAINING PROGRAM

## 2019-11-08 PROCEDURE — 99999 PR PBB SHADOW E&M-EST. PATIENT-LVL V: ICD-10-PCS | Mod: PBBFAC,,, | Performed by: STUDENT IN AN ORGANIZED HEALTH CARE EDUCATION/TRAINING PROGRAM

## 2019-11-08 PROCEDURE — 80053 COMPREHEN METABOLIC PANEL: CPT

## 2019-11-08 PROCEDURE — 85025 COMPLETE CBC W/AUTO DIFF WBC: CPT

## 2019-11-08 PROCEDURE — 36415 COLL VENOUS BLD VENIPUNCTURE: CPT

## 2019-11-08 PROCEDURE — 99214 PR OFFICE/OUTPT VISIT, EST, LEVL IV, 30-39 MIN: ICD-10-PCS | Mod: S$PBB,,, | Performed by: STUDENT IN AN ORGANIZED HEALTH CARE EDUCATION/TRAINING PROGRAM

## 2019-11-08 PROCEDURE — 83735 ASSAY OF MAGNESIUM: CPT

## 2019-11-08 PROCEDURE — 99999 PR PBB SHADOW E&M-EST. PATIENT-LVL V: CPT | Mod: PBBFAC,,, | Performed by: STUDENT IN AN ORGANIZED HEALTH CARE EDUCATION/TRAINING PROGRAM

## 2019-11-08 RX ORDER — FUROSEMIDE 40 MG/1
40 TABLET ORAL DAILY
Qty: 30 TABLET | Refills: 5 | Status: ON HOLD | OUTPATIENT
Start: 2019-11-08 | End: 2019-11-26

## 2019-11-08 RX ORDER — TRAZODONE HYDROCHLORIDE 50 MG/1
50 TABLET ORAL NIGHTLY
Qty: 30 TABLET | Refills: 5 | Status: ON HOLD | OUTPATIENT
Start: 2019-11-08 | End: 2019-11-26

## 2019-11-08 RX ORDER — LANOLIN ALCOHOL/MO/W.PET/CERES
400 CREAM (GRAM) TOPICAL 2 TIMES DAILY
Qty: 60 TABLET | Refills: 3 | Status: ON HOLD | OUTPATIENT
Start: 2019-11-08 | End: 2019-11-26

## 2019-11-08 RX ORDER — OXYCODONE HYDROCHLORIDE 5 MG/1
5 TABLET ORAL EVERY 6 HOURS PRN
Qty: 90 TABLET | Refills: 0 | Status: SHIPPED | OUTPATIENT
Start: 2019-11-08 | End: 2019-11-22 | Stop reason: SDUPTHER

## 2019-11-08 RX ORDER — LACTULOSE 10 G/15ML
10 SOLUTION ORAL; RECTAL 2 TIMES DAILY
Qty: 900 ML | Refills: 3 | Status: ON HOLD | OUTPATIENT
Start: 2019-11-08 | End: 2019-11-26

## 2019-11-08 NOTE — PROGRESS NOTES
PATIENT: Neena Marks  MRN: [id number]  DATE: 11/7/2019      Diagnosis:   1. HCC (hepatocellular carcinoma)    2. Cancer associated pain    3. PRACHI (acute kidney injury)    4. Acute nephritis    5. Alcoholic cirrhosis of liver with ascites    6. Severe malnutrition    7. Iron deficiency anemia due to chronic blood loss    8. Pulmonary embolism, unspecified chronicity, unspecified pulmonary embolism type, unspecified whether acute cor pulmonale present    9. Ascites due to alcoholic cirrhosis        Chief Complaint: No chief complaint on file.    Ms. Marks is 64-year-old female diagnosed with HCC started on sorafenib in May 2019 and stopped in August 2019 secondary to hand-foot syndrome and progression of the disease and started on nivolumab in September 2019 unfortunately got complicated by severe PRACHI (unsure if it is immunotherapy related) and immunotherapy currently on hold.     Oncologic history  Neena Marks is a 63 y.o. female with PMHx of alcoholic cirrhosis since 2015, Portal HTN, ascites, variceal bleeding, newly diagnosed with HCC now presented to oncology clinic for further evaluation.     Patient was a chronic alcoholic for the last 40 years and was diagnosed with alcoholic cirrhosis in 2015 however she continued to drink alcohol until January 2018 and has quit since then. She had history of upper GI bleeding with hematochezia in January 2018 s/p banding. She also had ascites since the last 1.5 to 2 years as is getting paracentesis q 2-3 months. She had no history of SBP in the past. She is currently on lasix and spironolactone for ascites and lower extremity edema.  She does not have jaundice but complaints of severe pruritis which is moderately controlled with hydroxyzine.      She currently has abdominal distention and abdominal discomfort.     CMP normal bilirubin and creatinine. Albumin of 2.8. Alk phos 138, AST 58. CBC- anemia with Hb of 8.7     AFP tumor marker is normal  Hep C and B testing on  4/27/2018- negative  Endoscopy- 1/9/19 - Grade II esophageal varices. Completely eradicated. Banded, Small hiatal hernia., Portal hypertensive gastropathy. Treated with argon plasma coagulation (APC).     Patient had an appointment on 2/15/2019, IR consult for Y90 but she missed her appointment.   - She had IR mapping and Hepatic angiography demonstrates a large hypervascular lesion in the right hepatic lobe supplied by branches of the right hepatic artery and accessory left hepatic artery.  Technetium MAA was injected aorta determined lung shunt fraction.  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  - Nuclear medicine scan revealed that majority of tracer goes to the lungs with a liver lung shunt fraction of 68.9%.   is not a candidate for Y90 or other liver directed therapies due to liver lung shunt     5/9/2019- Started taking Sorafenib 400mg BID from  7/6/2019- Have cut down to 200 mg BID due to hand foot syndrome  8/16/2019- Stopped Sorafenib on  due to persistent desquamation of hand foot syndrome  08/28/2019- CT Chest abdomen and pelvis done revealed disease progression and also new bilateral pulmonary embolism. She was admitted to [facility] main [facility] and discharged on Eliquis (Patient declined Lovenox)   9/17/2019- fall and followed up in ED and Xray right knee revealed no acute osseous abnormality  9/27/2019- Started C 1 D1 Nivolumab q 4 weeks  10/15, 10/19 and 10/20/2019 ED visits for flank pain. UA/Urine culture positive for Klebsiella and treated with ciprofloxacin.  10/25/2019- Office visit and then admitted to hospital for creatinine of 7.1 s/p HD on 10/26 and 10/28. Nephrology recommending renal biopsy, currently held off while continuing steroids for a duration one of week  11/2/2019- therapeutic paracentesis was perfromed with 4L of serous fluid  removed.   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  Her creatinine at the time of discharge was 1.9     Follow up on 11/8/2019  Her C2 D1 of Nivolumab Immunotherapy has been held since 10/25/2019  Creatinine today is 1.1  Patient c/o b/l  low back pain, tender to palpate.  She also complains of chronic abdominal pain, however her abdomen is less distended status post paracentesis a week while in hospital.  Still smokes 2-3 cig/day    Subjective:    Initial History: Ms. Marks is a 64 y.o. female who returns for follow up.     Past Medical History:   Past Medical History:   Diagnosis Date    Alcohol abuse     Anemia     Cancer     liver    Cirrhosis        Past Surgical HIstory:   Past Surgical History:   Procedure Laterality Date    ESOPHAGOGASTRODUODENOSCOPY Left 1/9/2019    Procedure: EGD (ESOPHAGOGASTRODUODENOSCOPY);  Surgeon: Madyson Farrar MD;  Location: [facility] ENDO;  Service: Endoscopy;  Laterality: Left;    PERITONEOCENTESIS N/A 1/9/2019    Procedure: PARACENTESIS, ABDOMINAL;  Surgeon: Everett Fitzpatrick MD;  Location: [facility] CATH LAB;  Service: Radiology;  Laterality: N/A;       Family History: No family history on file.    Social History:  reports that she has been smoking cigarettes. She has never used smokeless tobacco. She reports that she has current or past drug history. Drug: Cocaine. She reports that she does not drink alcohol.    Allergies:  Review of patient's allergies indicates:  No Known Allergies    Medications:  Current Outpatient Medications   Medication Sig Dispense Refill    (Magic mouthwash) 1:1:1 Benadryl 12.5mg/5ml liq, aluminum & magnesium hydroxide-simehticone (Maalox), lidocaine viscous 2% Swish and spit 10 mLs every 4 (four) hours as needed. for mouth sores (Patient not taking: Reported on 9/26/2019) 450 mL 5    apixaban (ELIQUIS) 5 mg Tab Take 1 tablet (5 mg total) by mouth 2  (two) times daily. 60 tablet 4    cyproheptadine (PERIACTIN) 4 mg tablet Take 1 tablet (4 mg total) by mouth 3 (three) times daily as needed. 90 tablet 2    famotidine (PEPCID) 20 MG tablet Take 1 tablet (20 mg total) by mouth once daily. 30 tablet 5    ferrous sulfate (FEOSOL) 325 mg (65 mg iron) Tab tablet Take 1 tablet (325 mg total) by mouth 2 (two) times daily. 30 tablet 5    furosemide (LASIX) 40 MG tablet Take 1 tablet (40 mg total) by mouth once daily. 30 tablet 5    gabapentin (NEURONTIN) 100 MG capsule Take 1 capsule (100 mg total) by mouth every evening. 30 capsule 2    hydrocortisone 1 % cream Apply to affected area 2 times daily 140 g 1    lactulose (CHRONULAC) 10 gram/15 mL solution Take 15 mLs (10 g total) by mouth 2 (two) times daily. 900 mL 3    magnesium oxide (MAGOX) 400 mg (241.3 mg magnesium) tablet Take 1 tablet (400 mg total) by mouth once daily. (Patient not taking: Reported on 9/27/2019) 200 tablet 1    multivitamin (ONE DAILY MULTIVITAMIN) per tablet Take 1 tablet by mouth once daily.      ondansetron (ZOFRAN) 8 MG tablet Take 1 tablet (8 mg total) by mouth every 8 (eight) hours as needed for Nausea. 60 tablet 2    oxyCODONE (ROXICODONE) 5 MG immediate release tablet Take 1 tablet (5 mg total) by mouth every 6 (six) hours as needed for Pain. 60 tablet 0    predniSONE (DELTASONE) 10 MG tablet Take 3 tablets (30 mg total) by mouth once daily for 2 days, THEN 2 tablets (20 mg total) once daily for 2 days, THEN 1 tablet (10 mg total) once daily for 2 days. 12 tablet 0    promethazine (PHENERGAN) 25 MG tablet Take 1 tablet (25 mg total) by mouth every 6 (six) hours as needed for Nausea. 25 tablet 0    propranolol (INDERAL) 10 MG tablet Take 1 tablet (10 mg total) by mouth 2 (two) times daily. 60 tablet 2    traZODone (DESYREL) 50 MG tablet TK 1 T PO  HS PRN INSOMNIA  2     No current facility-administered medications for this visit.        Review of Systems   Constitutional:  Positive for activity change. Negative for appetite change, chills and fever.   HENT: Negative for mouth sores, nosebleeds and trouble swallowing.    Respiratory: Negative for cough and shortness of breath.    Cardiovascular: Positive for leg swelling. Negative for chest pain.   Gastrointestinal: Positive for abdominal pain and constipation. Negative for nausea and vomiting.   Genitourinary: Negative for dysuria, frequency and urgency.   Musculoskeletal: Positive for back pain.   Neurological: Negative for seizures, syncope and headaches.   Hematological: Negative for adenopathy. Does not bruise/bleed easily.   Psychiatric/Behavioral: Negative for agitation, behavioral problems and confusion.       ECOG Performance Status: 2   Objective:      Vitals: There were no vitals filed for this visit.  BMI: There is no height or weight on file to calculate BMI.    Physical Exam   Constitutional: She is oriented to person, place, and time. She appears well-developed.   Appears chronically ill, temporal wasting   HENT:   Head: Normocephalic and atraumatic.   Eyes: Pupils are equal, round, and reactive to light. EOM are normal. No scleral icterus.   Neck: Normal range of motion. Neck supple.   Cardiovascular: Normal rate and regular rhythm.   Murmur heard.  Pulmonary/Chest: Effort normal and breath sounds normal. She has no wheezes.   Decreased breath sounds at bases   Abdominal: Soft. Bowel sounds are normal. She exhibits distension. There is tenderness.   Musculoskeletal: Normal range of motion. She exhibits edema and tenderness. She exhibits no deformity.   Paraspinal tenderness   Lymphadenopathy:     She has no cervical adenopathy.   Neurological: She is alert and oriented to person, place, and time. No cranial nerve deficit. Coordination normal.   Skin: Skin is warm and dry. Capillary refill takes less than 2 seconds. No rash noted. No erythema. There is pallor.   Psychiatric: She has a normal mood and affect. Her behavior  is normal.       Laboratory Data:  No results displayed because visit has over 200 results.            Imaging:          EKG  Normal sinus rhythm  Normal ECG     CT Chest Abdomen and pelvis- 8/28/2019  Slight increased size of the heterogeneous enhancing hepatic mass with washout today measuring 9 x 8.4 cm compared 8.9 x 7.6 prior.  There is increase in the hypodensity extending into the IVC now extending into the right atrium.  This either represents bland or tumor thrombus.  Multiple hyperenhancing small nodule lesions in the liver.  At least 1 does washout concerning for but not diagnostic of hepatocellular cancer.  Bilateral pulmonary artery emboli now evident.  Cholelithiasis.     MRI lumbar spine- 10/19/2019  Mild multilevel degenerative changes of the lumbar spine as described above, with mild bilateral neural foraminal narrowing at L3-L4 and L4-L5     Assessment:       1. HCC (hepatocellular carcinoma)    2. Cancer associated pain    3. PRACHI (acute kidney injury)    4. Acute nephritis    5. Alcoholic cirrhosis of liver with ascites    6. Severe malnutrition    7. Iron deficiency anemia due to chronic blood loss    8. Pulmonary embolism, unspecified chronicity, unspecified pulmonary embolism type, unspecified whether acute cor pulmonale present    9. Ascites due to alcoholic cirrhosis      Hepatocellular carcinoma:  Not a candidate for local therapy in view of large liver lung shunt.  Progressed/unable to tolerate sorafenib.  Currently started on nivolumab  AFP tumor marker is normal  8 points- Child Class B  CT abdomen revealed Large well-circumscribed heterogeneously enhancing right hepatic lobe mass measuring 8.9 x 7.6 cm which demonstrates washout on venous and delayed phase imaging consistent with HCC.    - Several additional scattered subcentimeter foci of hyperenhancement without washout.    - Main and left portal vein are patent.    - Mass compresses and narrows the right portal vein.    - Mass  compresses and significantly narrows the IVC, cannot exclude tumor involvement.     - IR Y90 mapping- Hepatic angiography demonstrates a large hypervascular lesion in the right hepatic lobe supplied by branches of the right hepatic artery and accessory left hepatic artery..  Technetium MAA was injected aorta determined lung shunt fraction.  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  - Nuclear medicine scan revealed that majority of tracer goes to the lungs with a liver lung shunt fraction of 68.9%.    is not a candidate for Y90 or other liver directed therapies.      05/09/2019 Started taking Sorafenib. She took TID (6 pills of sorafenib/day) for 2 days and then took BID dosing since then. She said she just want to get medication in the system  07/06/2019- developed painful desquamation of her bilateral foot. She has at least grade 2- moderate foot skin reaction - skin changes include hyperkeratosis, blisters (healed) seen on the plantar surface of her bilateral foot with pain limiting her activities of daily living.  We cut down her sorafenib to 200 mg b.i.d.  08/16/2019- persistence of desquamation of hand foot syndrome.  Stopped sorafenib  08/28/2019- CT Chest abdomen and pelvis done on 08/28/2019 revealed disease progression and also new bilateral pulmonary embolism. She was admitted to [facility] and discharged on Eliquis (Patient declined Lovenox)   09/06/2019- patient presented to the clinic.  In view of her intolerance to sorafenib we will change her treatment to nivolumab every 4 weeks.  Consent signed, pending insurance approval. she is not a  candidate for [place] trial, cohort E. spoke to Diana Robison (ext [phone])  9/12/2019- Received Rollator Walker  09/27/2019- cycle 1 day 1 of nivolumab  10/15, 10/19 and 10/20/2019 ED visits for flank pain.  UA/Urine culture positive for Klebsiella and treated with ciprofloxacin.  10/25/2019- Office visit and then admitted to hospital for creatinine of 7.1 s/p HD on 10/26 and 10/28. Nephrology recommending renal biopsy, currently held off while continuing steroids for a duration one of week  11/2/2019- therapeutic paracentesis was perfromed with 4L of serous fluid removed.   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  Her creatinine at the time of discharge was 1.9  11/08/2019- her creatinine is 1.1, back to baseline       2. PRACHI likely secondary to contrast induced nephropathy vs allergic interstitial nephritis from ciprofloxacin vs immunotherapy induced nephritis  3. Pulmonary embolism on Eliquis  4. Ambulatory dysfunction, uses walker at home  5. Cancer related pain on oxycodone 5 mg q.6 hours p.r.n.  6. Abdominal ascites likely secondary to hepatocellular carcinoma, alcoholic cirrhosis of the liver  7. Lumbosacral strain     Plan:     1. Hold cycle 2 Nivolumab for now, and monitor.   2. Her Creatinine today is 1.1. Spoke to Dr. Sagastume and we believe that the creatinine came down more quickly to normal with steroids for 7-10 days which is not very common with immunotherapy induced nephritis. We will follow her creatinine weekly and if stable will restart her immunotherapy with nivolumab on 11/22/2019.  However if her creatinine worsens after the next cycle of immunotherapy we will start her on steroids, scheduled for renal biopsy and discontinue any further immunotherapy treatment in the future.   3. Continue Eliquis for bilateral pulmonary emboli.  4.  Adequate pain control.  Refilled her pain medication  5.  Refilled Lasix and lactulose  6.  Prescribed oral magnesium for hypomagnesemia    F/u in 2 weeks on 11/22 with labs and likely cycle 2 of immunotherapy- Scheduled already     Plan of care discussed with  Dr. Dewey Roberts MD PGY-5  Hematology and Oncology  Pager:[phone]

## 2019-11-18 ENCOUNTER — TELEPHONE (OUTPATIENT)
Dept: NEPHROLOGY | Facility: CLINIC | Age: 64
End: 2019-11-18

## 2019-11-18 NOTE — TELEPHONE ENCOUNTER
----- Message from Emilee Alessio sent at 11/8/2019  2:45 PM CST -----  Hello,   I am trying to get this patient scheduled from her referral. She is a medicaid patient. Do you all have any openings?  Thanks!      Please schedule for ambulatory consult to nephrology ASAP. Thank you  - JESSICA Velazquez      Pt has been scheduled with Dr. Combs

## 2019-11-19 DIAGNOSIS — I26.99 PULMONARY EMBOLISM: ICD-10-CM

## 2019-11-19 DIAGNOSIS — C22.0 HCC (HEPATOCELLULAR CARCINOMA): ICD-10-CM

## 2019-11-19 DIAGNOSIS — K70.31 ALCOHOLIC CIRRHOSIS OF LIVER WITH ASCITES: ICD-10-CM

## 2019-11-19 DIAGNOSIS — K70.30 ESOPHAGEAL VARICES IN ALCOHOLIC CIRRHOSIS: ICD-10-CM

## 2019-11-19 DIAGNOSIS — E43 SEVERE MALNUTRITION: ICD-10-CM

## 2019-11-19 DIAGNOSIS — I85.10 ESOPHAGEAL VARICES IN ALCOHOLIC CIRRHOSIS: ICD-10-CM

## 2019-11-19 DIAGNOSIS — G89.3 CANCER ASSOCIATED PAIN: ICD-10-CM

## 2019-11-19 DIAGNOSIS — L27.1 HAND FOOT SYNDROME: ICD-10-CM

## 2019-11-19 DIAGNOSIS — D50.0 IRON DEFICIENCY ANEMIA DUE TO CHRONIC BLOOD LOSS: ICD-10-CM

## 2019-11-21 ENCOUNTER — TELEPHONE (OUTPATIENT)
Dept: HEMATOLOGY/ONCOLOGY | Facility: CLINIC | Age: 64
End: 2019-11-21

## 2019-11-22 ENCOUNTER — TELEPHONE (OUTPATIENT)
Dept: HEMATOLOGY/ONCOLOGY | Facility: CLINIC | Age: 64
End: 2019-11-22

## 2019-11-22 ENCOUNTER — HOSPITAL ENCOUNTER (EMERGENCY)
Facility: OTHER | Age: 64
Discharge: HOME | End: 2019-11-22
Attending: EMERGENCY MEDICINE
Payer: MEDICAID

## 2019-11-22 VITALS
DIASTOLIC BLOOD PRESSURE: 68 MMHG | TEMPERATURE: 99 F | SYSTOLIC BLOOD PRESSURE: 124 MMHG | RESPIRATION RATE: 18 BRPM | HEIGHT: 59 IN | BODY MASS INDEX: 19.56 KG/M2 | HEART RATE: 82 BPM | WEIGHT: 97 LBS | OXYGEN SATURATION: 99 %

## 2019-11-22 DIAGNOSIS — R18.8 OTHER ASCITES: ICD-10-CM

## 2019-11-22 DIAGNOSIS — C22.0 HEPATOCELLULAR CARCINOMA: ICD-10-CM

## 2019-11-22 DIAGNOSIS — R10.9 ACUTE LEFT FLANK PAIN: Primary | ICD-10-CM

## 2019-11-22 DIAGNOSIS — G89.3 CANCER ASSOCIATED PAIN: ICD-10-CM

## 2019-11-22 DIAGNOSIS — K70.31 ALCOHOLIC CIRRHOSIS OF LIVER WITH ASCITES: ICD-10-CM

## 2019-11-22 DIAGNOSIS — C22.0 HCC (HEPATOCELLULAR CARCINOMA): ICD-10-CM

## 2019-11-22 DIAGNOSIS — R06.02 SHORTNESS OF BREATH: ICD-10-CM

## 2019-11-22 LAB
ALBUMIN SERPL BCP-MCNC: 2.9 G/DL (ref 3.5–5.2)
ALP SERPL-CCNC: 143 U/L (ref 55–135)
ALT SERPL W/O P-5'-P-CCNC: 29 U/L (ref 10–44)
AMMONIA PLAS-SCNC: 26 UMOL/L (ref 10–50)
ANION GAP SERPL CALC-SCNC: 13 MMOL/L (ref 8–16)
ANISOCYTOSIS BLD QL SMEAR: SLIGHT
AST SERPL-CCNC: 57 U/L (ref 10–40)
BASOPHILS # BLD AUTO: 0.03 K/UL (ref 0–0.2)
BASOPHILS NFR BLD: 0.7 % (ref 0–1.9)
BILIRUB SERPL-MCNC: 1.2 MG/DL (ref 0.1–1)
BILIRUB UR QL STRIP: NEGATIVE
BUN SERPL-MCNC: 7 MG/DL (ref 8–23)
CALCIUM SERPL-MCNC: 9.1 MG/DL (ref 8.7–10.5)
CHLORIDE SERPL-SCNC: 102 MMOL/L (ref 95–110)
CLARITY UR: CLEAR
CO2 SERPL-SCNC: 20 MMOL/L (ref 23–29)
COLOR UR: YELLOW
CREAT SERPL-MCNC: 0.9 MG/DL (ref 0.5–1.4)
DIFFERENTIAL METHOD: ABNORMAL
EOSINOPHIL # BLD AUTO: 0.3 K/UL (ref 0–0.5)
EOSINOPHIL NFR BLD: 5.7 % (ref 0–8)
ERYTHROCYTE [DISTWIDTH] IN BLOOD BY AUTOMATED COUNT: 22.2 % (ref 11.5–14.5)
EST. GFR  (AFRICAN AMERICAN): >60 ML/MIN/1.73 M^2
EST. GFR  (NON AFRICAN AMERICAN): >60 ML/MIN/1.73 M^2
GLUCOSE SERPL-MCNC: 89 MG/DL (ref 70–110)
GLUCOSE UR QL STRIP: NEGATIVE
HCT VFR BLD AUTO: 34.7 % (ref 37–48.5)
HGB BLD-MCNC: 11.2 G/DL (ref 12–16)
HGB UR QL STRIP: NEGATIVE
IMM GRANULOCYTES # BLD AUTO: 0.02 K/UL (ref 0–0.04)
IMM GRANULOCYTES NFR BLD AUTO: 0.4 % (ref 0–0.5)
KETONES UR QL STRIP: NEGATIVE
LEUKOCYTE ESTERASE UR QL STRIP: NEGATIVE
LIPASE SERPL-CCNC: 3 U/L (ref 4–60)
LYMPHOCYTES # BLD AUTO: 1.7 K/UL (ref 1–4.8)
LYMPHOCYTES NFR BLD: 38.2 % (ref 18–48)
MAGNESIUM SERPL-MCNC: 1.3 MG/DL (ref 1.6–2.6)
MCH RBC QN AUTO: 22 PG (ref 27–31)
MCHC RBC AUTO-ENTMCNC: 32.3 G/DL (ref 32–36)
MCV RBC AUTO: 68 FL (ref 82–98)
MONOCYTES # BLD AUTO: 0.5 K/UL (ref 0.3–1)
MONOCYTES NFR BLD: 11 % (ref 4–15)
NEUTROPHILS # BLD AUTO: 2 K/UL (ref 1.8–7.7)
NEUTROPHILS NFR BLD: 44 % (ref 38–73)
NITRITE UR QL STRIP: NEGATIVE
NRBC BLD-RTO: 0 /100 WBC
PH UR STRIP: 7 [PH] (ref 5–8)
PHOSPHATE SERPL-MCNC: 3.6 MG/DL (ref 2.7–4.5)
PLATELET # BLD AUTO: 368 K/UL (ref 150–350)
PLATELET BLD QL SMEAR: ABNORMAL
PMV BLD AUTO: ABNORMAL FL (ref 9.2–12.9)
POIKILOCYTOSIS BLD QL SMEAR: SLIGHT
POTASSIUM SERPL-SCNC: 4 MMOL/L (ref 3.5–5.1)
PROT SERPL-MCNC: 7.5 G/DL (ref 6–8.4)
PROT UR QL STRIP: NEGATIVE
RBC # BLD AUTO: 5.08 M/UL (ref 4–5.4)
SODIUM SERPL-SCNC: 135 MMOL/L (ref 136–145)
SP GR UR STRIP: 1.01 (ref 1–1.03)
URN SPEC COLLECT METH UR: NORMAL
UROBILINOGEN UR STRIP-ACNC: NEGATIVE EU/DL
WBC # BLD AUTO: 4.56 K/UL (ref 3.9–12.7)

## 2019-11-22 PROCEDURE — 99285 EMERGENCY DEPT VISIT HI MDM: CPT | Mod: 25

## 2019-11-22 PROCEDURE — 93010 EKG 12-LEAD: ICD-10-PCS | Mod: ,,, | Performed by: INTERNAL MEDICINE

## 2019-11-22 PROCEDURE — 25000003 PHARM REV CODE 250: Performed by: EMERGENCY MEDICINE

## 2019-11-22 PROCEDURE — 83690 ASSAY OF LIPASE: CPT

## 2019-11-22 PROCEDURE — 93005 ELECTROCARDIOGRAM TRACING: CPT

## 2019-11-22 PROCEDURE — 81003 URINALYSIS AUTO W/O SCOPE: CPT

## 2019-11-22 PROCEDURE — 93010 ELECTROCARDIOGRAM REPORT: CPT | Mod: ,,, | Performed by: INTERNAL MEDICINE

## 2019-11-22 PROCEDURE — 36415 COLL VENOUS BLD VENIPUNCTURE: CPT

## 2019-11-22 PROCEDURE — 85025 COMPLETE CBC W/AUTO DIFF WBC: CPT

## 2019-11-22 PROCEDURE — 80053 COMPREHEN METABOLIC PANEL: CPT

## 2019-11-22 PROCEDURE — 96372 THER/PROPH/DIAG INJ SC/IM: CPT | Mod: 59

## 2019-11-22 PROCEDURE — 83735 ASSAY OF MAGNESIUM: CPT

## 2019-11-22 PROCEDURE — 82140 ASSAY OF AMMONIA: CPT

## 2019-11-22 PROCEDURE — 63600175 PHARM REV CODE 636 W HCPCS: Performed by: EMERGENCY MEDICINE

## 2019-11-22 PROCEDURE — 84100 ASSAY OF PHOSPHORUS: CPT

## 2019-11-22 RX ORDER — ORPHENADRINE CITRATE 30 MG/ML
60 INJECTION INTRAMUSCULAR; INTRAVENOUS
Status: COMPLETED | OUTPATIENT
Start: 2019-11-22 | End: 2019-11-22

## 2019-11-22 RX ORDER — KETOROLAC TROMETHAMINE 30 MG/ML
30 INJECTION, SOLUTION INTRAMUSCULAR; INTRAVENOUS
Status: COMPLETED | OUTPATIENT
Start: 2019-11-22 | End: 2019-11-22

## 2019-11-22 RX ORDER — OXYCODONE HYDROCHLORIDE 5 MG/1
10 TABLET ORAL
Status: COMPLETED | OUTPATIENT
Start: 2019-11-22 | End: 2019-11-22

## 2019-11-22 RX ORDER — OXYCODONE HYDROCHLORIDE 5 MG/1
5 TABLET ORAL EVERY 6 HOURS PRN
Qty: 8 TABLET | Refills: 0 | Status: ON HOLD | OUTPATIENT
Start: 2019-11-22 | End: 2019-12-02 | Stop reason: SDUPTHER

## 2019-11-22 RX ORDER — ONDANSETRON 4 MG/1
4 TABLET, ORALLY DISINTEGRATING ORAL EVERY 6 HOURS PRN
Qty: 15 TABLET | Refills: 0 | Status: ON HOLD | OUTPATIENT
Start: 2019-11-22 | End: 2019-11-26

## 2019-11-22 RX ORDER — ONDANSETRON 4 MG/1
4 TABLET, ORALLY DISINTEGRATING ORAL
Status: COMPLETED | OUTPATIENT
Start: 2019-11-22 | End: 2019-11-22

## 2019-11-22 RX ADMIN — KETOROLAC TROMETHAMINE 30 MG: 30 INJECTION, SOLUTION INTRAMUSCULAR; INTRAVENOUS at 07:11

## 2019-11-22 RX ADMIN — OXYCODONE HYDROCHLORIDE 10 MG: 5 TABLET ORAL at 06:11

## 2019-11-22 RX ADMIN — ONDANSETRON 4 MG: 4 TABLET, ORALLY DISINTEGRATING ORAL at 07:11

## 2019-11-22 RX ADMIN — ORPHENADRINE CITRATE 60 MG: 30 INJECTION INTRAMUSCULAR; INTRAVENOUS at 07:11

## 2019-11-22 NOTE — TELEPHONE ENCOUNTER
Called Ms. Marks on [phone], could not talk to  but spoke to a gentleman who stated that Ms. Marks is going to [facility] ED as she is having abdominal pain and asked me to call on [phone].  Called but unable to reach anyone.  Call her daughter cell at [phone], but was not able to reach her.  Call back on [phone] , left voicemail

## 2019-11-22 NOTE — ED TRIAGE NOTES
"Patient presents to ER c/o abdominal pain for several days.  Patient states "I have liver problems."  Patient states she recently had fluid pulled of her abdomen but states her abdomen is filling back up quickly.  Patient denies chest pain and sob.   "

## 2019-11-22 NOTE — TELEPHONE ENCOUNTER
Patient missed appointment due to ride issues. Person I spoke to was not with her, did not have another number to reach her at. States that she is having pain as well.

## 2019-11-23 NOTE — ED NOTES
Pt states pain is better, denies any needs at this time. Resp pattern even and non labored. Pt states she is okay for right now, toilet needs addressed. Will continue to monitor.

## 2019-11-23 NOTE — ED NOTES
Pt  to er with c/o abdomin pain and swelling . Pt with ascitis to abdomin growing since last paracentesis two weeks ago, pt aaox3 skin warm and  Dry heart rate regular rate. Lungs clear bilaterally no peripheral edema  Noted. Abdomin large not firm positive bowel sounds.

## 2019-11-23 NOTE — ED NOTES
Pt AAOx4, NAD noted at this time, resp patter even and non labored. Pt denies any pain at this time, Updated on POC. Will continue to monitor.

## 2019-11-23 NOTE — ED PROVIDER NOTES
Encounter Date: 11/22/2019    SCRIBE #1 NOTE: I, Sawyer Alba, am scribing for, and in the presence of, Dr. Sparks.       History     Chief Complaint   Patient presents with    Abdominal Pain     +L flank pain with abdominal distension x3 days. PMH of cirrohsis. Admits to SOB.      Time seen by provider: 6:02 PM    This is a 64 y.o. female with PMHx of cirrhosis and liver cancer who presents with L sided flank pain. She reports associated abdominal distension, constipation, decreased appetite, trouble swallowing and SOB.  She denies N/V/D. She denies recent fall or trauma. Pt reports she has been taking oxycodone 5mg BID prescribed by her oncologist. She reports compliance with her home meds. She reports tobacco use, but no ETOH use.    The history is provided by the patient.     Review of patient's allergies indicates:  No Known Allergies  Past Medical History:   Diagnosis Date    Alcohol abuse     Anemia     Cancer     liver    Cirrhosis      Past Surgical History:   Procedure Laterality Date    ESOPHAGOGASTRODUODENOSCOPY Left 1/9/2019    Procedure: EGD (ESOPHAGOGASTRODUODENOSCOPY);  Surgeon: Madyson Farrar MD;  Location: [facility] ENDO;  Service: Endoscopy;  Laterality: Left;    PERITONEOCENTESIS N/A 1/9/2019    Procedure: PARACENTESIS, ABDOMINAL;  Surgeon: Everett Fitzpatrick MD;  Location: [facility] CATH LAB;  Service: Radiology;  Laterality: N/A;     History reviewed. No pertinent family history.  Social History     Tobacco Use    Smoking status: Current Every Day Smoker     Packs/day: 0.50     Years: 30.00     Pack years: 15.00     Types: Cigarettes    Smokeless tobacco: Never Used    Tobacco comment: 2 cigarettes a day   Substance Use Topics    Alcohol use: No     Frequency: Never     Comment: Previously drank 1 pint a day    Drug use: Not Currently     Types: Cocaine     Comment: last use was 3 years ago     Review of Systems   Constitutional: Positive for appetite change (decreased). Negative for chills  and fever.   HENT: Positive for trouble swallowing. Negative for congestion, rhinorrhea and sore throat.    Eyes: Negative for visual disturbance.   Respiratory: Positive for shortness of breath. Negative for cough.    Cardiovascular: Negative for chest pain.   Gastrointestinal: Positive for abdominal distention and constipation. Negative for diarrhea, nausea and vomiting.   Genitourinary: Positive for flank pain. Negative for dysuria.   Musculoskeletal: Negative for back pain.   Skin: Negative for rash.   Neurological: Negative for dizziness, weakness and light-headedness.   Psychiatric/Behavioral: Negative for confusion.       Physical Exam     Initial Vitals [11/22/19 1708]   BP Pulse Resp Temp SpO2   (!) 158/73 88 20 99 °F (37.2 °C) 100 %      MAP       --         Physical Exam    Nursing note and vitals reviewed.  Constitutional: She appears well-developed. She is not diaphoretic.   Frail, chronically ill appearing, uncomfortable but in no distress.   HENT:   Head: Normocephalic and atraumatic.   Right Ear: External ear normal.   Left Ear: External ear normal.   Mildly dry mucous membranes.   Eyes: Conjunctivae and EOM are normal. Pupils are equal, round, and reactive to light.    No pallor or icterus.   Neck: Normal range of motion. Neck supple.   Cardiovascular: Normal rate, regular rhythm and normal heart sounds. Exam reveals no gallop and no friction rub.    No murmur heard.  Pulmonary/Chest: Breath sounds normal. No respiratory distress. She has no wheezes. She has no rhonchi. She has no rales.   Abdominal: There is no rebound and no guarding.   Very distended abdomen with fluid wave but no tympany.Tenderness to L lateral abdomen. Hypoactive bowel sounds throughout.   Musculoskeletal: Normal range of motion. She exhibits no edema.   Tenderness to L flank.   Neurological: She is alert and oriented to person, place, and time.   Skin: Skin is warm and dry.   Psychiatric: She has a normal mood and affect. Her  behavior is normal. Judgment and thought content normal.         ED Course   Procedures  Labs Reviewed   CBC W/ AUTO DIFFERENTIAL - Abnormal; Notable for the following components:       Result Value    Hemoglobin 11.2 (*)     Hematocrit 34.7 (*)     Mean Corpuscular Volume 68 (*)     Mean Corpuscular Hemoglobin 22.0 (*)     RDW 22.2 (*)     Platelets 368 (*)     All other components within normal limits   LIPASE - Abnormal; Notable for the following components:    Lipase 3 (*)     All other components within normal limits   MAGNESIUM - Abnormal; Notable for the following components:    Magnesium 1.3 (*)     All other components within normal limits   COMPREHENSIVE METABOLIC PANEL - Abnormal; Notable for the following components:    Sodium 135 (*)     CO2 20 (*)     BUN, Bld 7 (*)     Albumin 2.9 (*)     Total Bilirubin 1.2 (*)     Alkaline Phosphatase 143 (*)     AST 57 (*)     All other components within normal limits   URINALYSIS, REFLEX TO URINE CULTURE    Narrative:     Preferred Collection Type->Urine, Clean Catch   AMMONIA   PHOSPHORUS     EKG Readings: (Independently Interpreted)   Normals sinus rhythm at a rate of 80 bpm. No Ischemia or arrhythmia.        Imaging Results          X-Ray Chest AP Portable (Final result)  Result time 11/22/19 18:39:38    Final result by Jus Mcnair MD (11/22/19 18:39:38)                 Impression:      Hypoinflated lungs.  No acute cardiopulmonary process identified.      Electronically signed by: Jus Mcnair MD  Date:    11/22/2019  Time:    18:39             Narrative:    EXAMINATION:  XR CHEST AP PORTABLE    CLINICAL HISTORY:  Shortness of breath    TECHNIQUE:  Single frontal view of the chest was performed.    COMPARISON:  10/27/2019.    FINDINGS:  Cardiac silhouette is stable in size.  Lungs are hypoinflated which accentuates pulmonary vascular markings.  No evidence of focal consolidative process, pneumothorax, or significant effusion.  No acute osseous  abnormality identified.                              X-Rays:   Independently Interpreted Readings:   Chest X-Ray: Ltd by positioning and technique. No obvious infiltrate or effusion.     Medical Decision Making:   History:   Old Medical Records: I decided to obtain old medical records.  Independently Interpreted Test(s):   I have ordered and independently interpreted X-rays - see prior notes.  I have ordered and independently interpreted EKG Reading(s) - see prior notes  Clinical Tests:   Lab Tests: Ordered and Reviewed  Radiological Study: Ordered and Reviewed  Medical Tests: Ordered and Reviewed            Scribe Attestation:   Scribe #1: I performed the above scribed service and the documentation accurately describes the services I performed. I attest to the accuracy of the note.    Attending Attestation:           Physician Attestation for Scribe:  Physician Attestation Statement for Scribe #1: I, Dr. Sparks, reviewed documentation, as scribed by Sawyer Alba in my presence, and it is both accurate and complete.                 ED Course as of Nov 23 2119 Fri Nov 22, 2019   1740 Morrisonfelipa Marks, 64 y.o.  presented to the ED with c/o left flank pain and abdominal distension with h/o cirrhosis.     Patient seen and medically screened by the Provider in Triage due to ED crowding.  Appropriate tests and/or medications ordered.  A medical screening exam has been performed.  The care will be assumed by myself or another provider when treatment space becomes available.  I am not assuming care of this patient at this time. 5:40 PM. TIMOTHY ANDERS        [FC]      ED Course User Index  [FC] Nkechi Mesa PA-C          Patient with a history of metastatic cancer, liver disease presents with left flank pain. Denies fever.  No relief with home analgesics.  She does state that she has had increased use of them and has only a few left.  Also complains of constipation decreased appetite but no vomiting. My exam she has  distended abdomen due to ascites but no unusual tenderness, no rebound or signs of a acute abdomen.  Afebrile with stable vital signs. Laboratory studies show mild anemia, improved from prior.  Mild hyponatremia however has received IV fluids remainder electrolytes unremarkable bilirubin, transaminases, alk-phos similar to prior values urinalysis does not suggest UTI.  After analgesics patient is feeling better.  Will give a prescription for a limited number of narcotic analgesics.  Encouraged patient follow up with her oncologist this coming week.  Stable for discharge. Return precautions discussed with patient and family members      Clinical Impression:     1. Acute left flank pain    2. Shortness of breath    3. Other ascites    4. Hepatocellular carcinoma    5. HCC (hepatocellular carcinoma)    6. Cancer associated pain    7. Alcoholic cirrhosis of liver with ascites                              Jamil Sparks II, MD  11/23/19 2120

## 2019-11-25 ENCOUNTER — HOSPITAL ENCOUNTER (INPATIENT)
Facility: HOSPITAL | Age: 64
LOS: 11 days | Discharge: HOSPICE | DRG: 542 | End: 2019-12-06
Attending: EMERGENCY MEDICINE | Admitting: HOSPITALIST
Payer: MEDICAID

## 2019-11-25 DIAGNOSIS — G95.20 SPINAL CORD COMPRESSION: ICD-10-CM

## 2019-11-25 DIAGNOSIS — I27.82 OTHER CHRONIC PULMONARY EMBOLISM WITHOUT ACUTE COR PULMONALE: ICD-10-CM

## 2019-11-25 DIAGNOSIS — R29.898 WEAKNESS OF BOTH LOWER EXTREMITIES: Primary | ICD-10-CM

## 2019-11-25 DIAGNOSIS — E27.8 ADRENAL NODULE: ICD-10-CM

## 2019-11-25 DIAGNOSIS — K70.30 ESOPHAGEAL VARICES IN ALCOHOLIC CIRRHOSIS: Chronic | ICD-10-CM

## 2019-11-25 DIAGNOSIS — D50.0 IRON DEFICIENCY ANEMIA DUE TO CHRONIC BLOOD LOSS: ICD-10-CM

## 2019-11-25 DIAGNOSIS — Z71.89 COUNSELING REGARDING ADVANCED CARE PLANNING AND GOALS OF CARE: ICD-10-CM

## 2019-11-25 DIAGNOSIS — G47.00 INSOMNIA, UNSPECIFIED TYPE: ICD-10-CM

## 2019-11-25 DIAGNOSIS — D62 ACUTE BLOOD LOSS ANEMIA: ICD-10-CM

## 2019-11-25 DIAGNOSIS — E43 SEVERE MALNUTRITION: ICD-10-CM

## 2019-11-25 DIAGNOSIS — N17.9 ACUTE KIDNEY INJURY: Chronic | ICD-10-CM

## 2019-11-25 DIAGNOSIS — G95.29 SPINAL CORD COMPRESSION DUE TO MALIGNANT NEOPLASM METASTATIC TO SPINE: ICD-10-CM

## 2019-11-25 DIAGNOSIS — J90 PLEURAL EFFUSION, RIGHT: ICD-10-CM

## 2019-11-25 DIAGNOSIS — R16.0 LIVER MASS: ICD-10-CM

## 2019-11-25 DIAGNOSIS — N17.9 AKI (ACUTE KIDNEY INJURY): ICD-10-CM

## 2019-11-25 DIAGNOSIS — R10.9 ABDOMINAL PAIN: ICD-10-CM

## 2019-11-25 DIAGNOSIS — K70.31 ALCOHOLIC CIRRHOSIS OF LIVER WITH ASCITES: Chronic | ICD-10-CM

## 2019-11-25 DIAGNOSIS — G89.3 CANCER ASSOCIATED PAIN: ICD-10-CM

## 2019-11-25 DIAGNOSIS — Z01.810 ENCOUNTER FOR PRE-OPERATIVE CARDIOVASCULAR CLEARANCE: ICD-10-CM

## 2019-11-25 DIAGNOSIS — C22.0 HCC (HEPATOCELLULAR CARCINOMA): ICD-10-CM

## 2019-11-25 DIAGNOSIS — Q39.4 ESOPHAGEAL WEB DETERMINED BY ENDOSCOPY: Chronic | ICD-10-CM

## 2019-11-25 DIAGNOSIS — G25.81 RESTLESS LEG SYNDROME: ICD-10-CM

## 2019-11-25 DIAGNOSIS — Z51.5 PALLIATIVE CARE ENCOUNTER: ICD-10-CM

## 2019-11-25 DIAGNOSIS — I82.220 IVC THROMBOSIS: ICD-10-CM

## 2019-11-25 DIAGNOSIS — C79.51 SPINAL CORD COMPRESSION DUE TO MALIGNANT NEOPLASM METASTATIC TO SPINE: ICD-10-CM

## 2019-11-25 DIAGNOSIS — R91.8 PULMONARY NODULES/LESIONS, MULTIPLE: ICD-10-CM

## 2019-11-25 DIAGNOSIS — S22.000A COMPRESSION FRACTURE OF BODY OF THORACIC VERTEBRA: ICD-10-CM

## 2019-11-25 DIAGNOSIS — I50.9 CHF (CONGESTIVE HEART FAILURE): ICD-10-CM

## 2019-11-25 DIAGNOSIS — I85.10 ESOPHAGEAL VARICES IN ALCOHOLIC CIRRHOSIS: Chronic | ICD-10-CM

## 2019-11-25 DIAGNOSIS — K70.30 ALCOHOLIC CIRRHOSIS, UNSPECIFIED WHETHER ASCITES PRESENT: Chronic | ICD-10-CM

## 2019-11-25 DIAGNOSIS — K92.2 ACUTE GI BLEEDING: ICD-10-CM

## 2019-11-25 DIAGNOSIS — M54.9 BACK PAIN, UNSPECIFIED BACK LOCATION, UNSPECIFIED BACK PAIN LATERALITY, UNSPECIFIED CHRONICITY: ICD-10-CM

## 2019-11-25 DIAGNOSIS — M54.9 BACK PAIN: ICD-10-CM

## 2019-11-25 DIAGNOSIS — K21.9 GASTROESOPHAGEAL REFLUX DISEASE, ESOPHAGITIS PRESENCE NOT SPECIFIED: ICD-10-CM

## 2019-11-25 LAB
ALBUMIN SERPL BCP-MCNC: 2.8 G/DL (ref 3.5–5.2)
ALP SERPL-CCNC: 138 U/L (ref 55–135)
ALT SERPL W/O P-5'-P-CCNC: 56 U/L (ref 10–44)
ANION GAP SERPL CALC-SCNC: 10 MMOL/L (ref 8–16)
AST SERPL-CCNC: 120 U/L (ref 10–40)
BASOPHILS # BLD AUTO: 0.03 K/UL (ref 0–0.2)
BASOPHILS NFR BLD: 0.6 % (ref 0–1.9)
BILIRUB SERPL-MCNC: 1.4 MG/DL (ref 0.1–1)
BUN SERPL-MCNC: 11 MG/DL (ref 8–23)
CALCIUM SERPL-MCNC: 8.9 MG/DL (ref 8.7–10.5)
CHLORIDE SERPL-SCNC: 102 MMOL/L (ref 95–110)
CO2 SERPL-SCNC: 22 MMOL/L (ref 23–29)
CREAT SERPL-MCNC: 1 MG/DL (ref 0.5–1.4)
DIFFERENTIAL METHOD: ABNORMAL
EOSINOPHIL # BLD AUTO: 0.1 K/UL (ref 0–0.5)
EOSINOPHIL NFR BLD: 2.7 % (ref 0–8)
ERYTHROCYTE [DISTWIDTH] IN BLOOD BY AUTOMATED COUNT: 21.6 % (ref 11.5–14.5)
EST. GFR  (AFRICAN AMERICAN): >60 ML/MIN/1.73 M^2
EST. GFR  (NON AFRICAN AMERICAN): >60 ML/MIN/1.73 M^2
GLUCOSE SERPL-MCNC: 87 MG/DL (ref 70–110)
HCT VFR BLD AUTO: 34.8 % (ref 37–48.5)
HGB BLD-MCNC: 11.2 G/DL (ref 12–16)
IMM GRANULOCYTES # BLD AUTO: 0.02 K/UL (ref 0–0.04)
IMM GRANULOCYTES NFR BLD AUTO: 0.4 % (ref 0–0.5)
INR PPP: 1.4 (ref 0.8–1.2)
LACTATE SERPL-SCNC: 1.6 MMOL/L (ref 0.5–2.2)
LIPASE SERPL-CCNC: 6 U/L (ref 4–60)
LYMPHOCYTES # BLD AUTO: 1.6 K/UL (ref 1–4.8)
LYMPHOCYTES NFR BLD: 34.6 % (ref 18–48)
MAGNESIUM SERPL-MCNC: 1.6 MG/DL (ref 1.6–2.6)
MCH RBC QN AUTO: 22.9 PG (ref 27–31)
MCHC RBC AUTO-ENTMCNC: 32.2 G/DL (ref 32–36)
MCV RBC AUTO: 71 FL (ref 82–98)
MONOCYTES # BLD AUTO: 0.5 K/UL (ref 0.3–1)
MONOCYTES NFR BLD: 10.1 % (ref 4–15)
NEUTROPHILS # BLD AUTO: 2.4 K/UL (ref 1.8–7.7)
NEUTROPHILS NFR BLD: 51.6 % (ref 38–73)
NRBC BLD-RTO: 0 /100 WBC
PHOSPHATE SERPL-MCNC: 2.9 MG/DL (ref 2.7–4.5)
PLATELET # BLD AUTO: 326 K/UL (ref 150–350)
PMV BLD AUTO: ABNORMAL FL (ref 9.2–12.9)
POTASSIUM SERPL-SCNC: 4.4 MMOL/L (ref 3.5–5.1)
PROT SERPL-MCNC: 7.8 G/DL (ref 6–8.4)
PROTHROMBIN TIME: 13.5 SEC (ref 9–12.5)
RBC # BLD AUTO: 4.9 M/UL (ref 4–5.4)
SODIUM SERPL-SCNC: 134 MMOL/L (ref 136–145)
TROPONIN I SERPL DL<=0.01 NG/ML-MCNC: 0.01 NG/ML (ref 0–0.03)
WBC # BLD AUTO: 4.74 K/UL (ref 3.9–12.7)

## 2019-11-25 PROCEDURE — 11000001 HC ACUTE MED/SURG PRIVATE ROOM

## 2019-11-25 PROCEDURE — 99285 EMERGENCY DEPT VISIT HI MDM: CPT | Mod: ,,, | Performed by: EMERGENCY MEDICINE

## 2019-11-25 PROCEDURE — 80053 COMPREHEN METABOLIC PANEL: CPT

## 2019-11-25 PROCEDURE — 83605 ASSAY OF LACTIC ACID: CPT

## 2019-11-25 PROCEDURE — 25500020 PHARM REV CODE 255: Performed by: EMERGENCY MEDICINE

## 2019-11-25 PROCEDURE — 99233 PR SUBSEQUENT HOSPITAL CARE,LEVL III: ICD-10-PCS | Mod: ,,, | Performed by: NEUROLOGICAL SURGERY

## 2019-11-25 PROCEDURE — 84484 ASSAY OF TROPONIN QUANT: CPT

## 2019-11-25 PROCEDURE — 63600175 PHARM REV CODE 636 W HCPCS: Performed by: PHYSICIAN ASSISTANT

## 2019-11-25 PROCEDURE — 96374 THER/PROPH/DIAG INJ IV PUSH: CPT

## 2019-11-25 PROCEDURE — 99233 SBSQ HOSP IP/OBS HIGH 50: CPT | Mod: ,,, | Performed by: NEUROLOGICAL SURGERY

## 2019-11-25 PROCEDURE — 85610 PROTHROMBIN TIME: CPT

## 2019-11-25 PROCEDURE — 93010 EKG 12-LEAD: ICD-10-PCS | Mod: ,,, | Performed by: INTERNAL MEDICINE

## 2019-11-25 PROCEDURE — A9585 GADOBUTROL INJECTION: HCPCS | Performed by: EMERGENCY MEDICINE

## 2019-11-25 PROCEDURE — 93010 ELECTROCARDIOGRAM REPORT: CPT | Mod: ,,, | Performed by: INTERNAL MEDICINE

## 2019-11-25 PROCEDURE — 63600175 PHARM REV CODE 636 W HCPCS: Performed by: EMERGENCY MEDICINE

## 2019-11-25 PROCEDURE — 99285 PR EMERGENCY DEPT VISIT,LEVEL V: ICD-10-PCS | Mod: ,,, | Performed by: EMERGENCY MEDICINE

## 2019-11-25 PROCEDURE — 25000003 PHARM REV CODE 250: Performed by: EMERGENCY MEDICINE

## 2019-11-25 PROCEDURE — 99285 EMERGENCY DEPT VISIT HI MDM: CPT | Mod: 25

## 2019-11-25 PROCEDURE — 83690 ASSAY OF LIPASE: CPT

## 2019-11-25 PROCEDURE — 84100 ASSAY OF PHOSPHORUS: CPT

## 2019-11-25 PROCEDURE — 93005 ELECTROCARDIOGRAM TRACING: CPT

## 2019-11-25 PROCEDURE — 83735 ASSAY OF MAGNESIUM: CPT

## 2019-11-25 PROCEDURE — 85025 COMPLETE CBC W/AUTO DIFF WBC: CPT

## 2019-11-25 PROCEDURE — 25000003 PHARM REV CODE 250: Performed by: PHYSICIAN ASSISTANT

## 2019-11-25 RX ORDER — OXYCODONE HYDROCHLORIDE 5 MG/1
5 TABLET ORAL EVERY 6 HOURS PRN
Status: DISCONTINUED | OUTPATIENT
Start: 2019-11-25 | End: 2019-11-26

## 2019-11-25 RX ORDER — IBUPROFEN 200 MG
24 TABLET ORAL
Status: DISCONTINUED | OUTPATIENT
Start: 2019-11-25 | End: 2019-12-06 | Stop reason: HOSPADM

## 2019-11-25 RX ORDER — SODIUM CHLORIDE 0.9 % (FLUSH) 0.9 %
10 SYRINGE (ML) INJECTION
Status: DISCONTINUED | OUTPATIENT
Start: 2019-11-25 | End: 2019-11-26

## 2019-11-25 RX ORDER — FUROSEMIDE 40 MG/1
40 TABLET ORAL DAILY
Status: DISCONTINUED | OUTPATIENT
Start: 2019-11-26 | End: 2019-12-03

## 2019-11-25 RX ORDER — ONDANSETRON 4 MG/1
4 TABLET, ORALLY DISINTEGRATING ORAL
Status: COMPLETED | OUTPATIENT
Start: 2019-11-25 | End: 2019-11-25

## 2019-11-25 RX ORDER — ONDANSETRON 4 MG/1
4 TABLET, ORALLY DISINTEGRATING ORAL EVERY 6 HOURS PRN
Status: DISCONTINUED | OUTPATIENT
Start: 2019-11-25 | End: 2019-12-06 | Stop reason: HOSPADM

## 2019-11-25 RX ORDER — FAMOTIDINE 20 MG/1
20 TABLET, FILM COATED ORAL DAILY
Status: DISCONTINUED | OUTPATIENT
Start: 2019-11-26 | End: 2019-12-06 | Stop reason: HOSPADM

## 2019-11-25 RX ORDER — DEXAMETHASONE SODIUM PHOSPHATE 4 MG/ML
4 INJECTION, SOLUTION INTRA-ARTICULAR; INTRALESIONAL; INTRAMUSCULAR; INTRAVENOUS; SOFT TISSUE EVERY 6 HOURS
Status: DISCONTINUED | OUTPATIENT
Start: 2019-11-26 | End: 2019-12-03

## 2019-11-25 RX ORDER — MORPHINE SULFATE 4 MG/ML
4 INJECTION, SOLUTION INTRAMUSCULAR; INTRAVENOUS
Status: COMPLETED | OUTPATIENT
Start: 2019-11-25 | End: 2019-11-25

## 2019-11-25 RX ORDER — IBUPROFEN 200 MG
16 TABLET ORAL
Status: DISCONTINUED | OUTPATIENT
Start: 2019-11-25 | End: 2019-12-06 | Stop reason: HOSPADM

## 2019-11-25 RX ORDER — GADOBUTROL 604.72 MG/ML
5 INJECTION INTRAVENOUS
Status: COMPLETED | OUTPATIENT
Start: 2019-11-25 | End: 2019-11-25

## 2019-11-25 RX ORDER — OXYCODONE HYDROCHLORIDE 5 MG/1
5 TABLET ORAL
Status: COMPLETED | OUTPATIENT
Start: 2019-11-25 | End: 2019-11-25

## 2019-11-25 RX ORDER — GLUCAGON 1 MG
1 KIT INJECTION
Status: DISCONTINUED | OUTPATIENT
Start: 2019-11-25 | End: 2019-11-27

## 2019-11-25 RX ORDER — PROPRANOLOL HYDROCHLORIDE 10 MG/1
10 TABLET ORAL 2 TIMES DAILY
Status: DISCONTINUED | OUTPATIENT
Start: 2019-11-26 | End: 2019-12-05

## 2019-11-25 RX ORDER — FERROUS SULFATE 325(65) MG
325 TABLET, DELAYED RELEASE (ENTERIC COATED) ORAL DAILY
Status: DISCONTINUED | OUTPATIENT
Start: 2019-11-26 | End: 2019-12-05

## 2019-11-25 RX ORDER — SODIUM CHLORIDE 0.9 % (FLUSH) 0.9 %
10 SYRINGE (ML) INJECTION
Status: DISCONTINUED | OUTPATIENT
Start: 2019-11-25 | End: 2019-12-06 | Stop reason: HOSPADM

## 2019-11-25 RX ADMIN — ONDANSETRON 4 MG: 4 TABLET, ORALLY DISINTEGRATING ORAL at 09:11

## 2019-11-25 RX ADMIN — MORPHINE SULFATE 4 MG: 4 INJECTION, SOLUTION INTRAMUSCULAR; INTRAVENOUS at 09:11

## 2019-11-25 RX ADMIN — MORPHINE SULFATE 4 MG: 4 INJECTION, SOLUTION INTRAMUSCULAR; INTRAVENOUS at 01:11

## 2019-11-25 RX ADMIN — GADOBUTROL 5 ML: 604.72 INJECTION INTRAVENOUS at 05:11

## 2019-11-25 RX ADMIN — OXYCODONE HYDROCHLORIDE 5 MG: 5 TABLET ORAL at 06:11

## 2019-11-25 NOTE — ED NOTES
Patient brought to ED bathroom and assisted with bedpan use in stretcher for urination, cleaned per staff, diaper applied per pt request. Patient repositioned for comfort in stretcher.

## 2019-11-25 NOTE — ED NOTES
Hourly rounding complete. Patient resting in stretcher and is in NAD at this time. Pt is awake alert and oriented x4. Pt updated on POC, awaiting MRI. Patient reports back/ side pain is returning but declines this RN to request for additional pain medication at this time, states she does not need it right now, will continue to monitor. Family at bedside. Bed low and locked, SR up x2, pt in view of nurses station. Pt voices no needs at this time.

## 2019-11-25 NOTE — ED NOTES
Hourly rounding complete. Patient resting in stretcher and is in NAD at this time. Pt is awake alert and oriented x4.Pt updated on POC, awaiting MRI. Family at bedside. Bed low and locked, SR up x2, pt in view of nurses station. Pt voices no needs at this time.

## 2019-11-25 NOTE — ED NOTES
Hourly rounding complete. Patient resting in stretcher and is in NAD at this time. Pt is awake alert and oriented x4.Pt updated on POC. Family at bedside. Bed low and locked, SR up x2, pt in view of nurses station. Patient repositioned in stretcher for comfort with pillows, provided warm blanket. Pt voices no other needs at this time.

## 2019-11-25 NOTE — ED PROVIDER NOTES
Encounter Date: 11/25/2019       History     Chief Complaint   Patient presents with    Back Pain     Patient reports back pain and numbness to BLE since 11/22. Patient reports being tx on 11/22 and receiving shot in hip and then developed numbness to BLE.      Patient is a 64-year-old female with past medical history of cirrhosis, hepatocellular carcinoma, alcohol abuse, anemia who presents to the emergency department with complaints of left-sided back pain, abdominal pain, abdominal distension, and lower extremity weakness. She reports the symptoms began approximately 1-2 weeks ago after paracentesis.  She reports some nausea and vomiting about 1 week ago, but symptoms have since resolved. She denies fever at home.  She states that prior to one week ago, she was able to ambulate at home with no assistance. She reports that she is currently unable to move her lower extremities or walk without falling.  She reports that her daughter has been helping her get around the house.  Denies bladder or bowel incontinence, saddle anesthesia, recent trauma or injury.  Last bowel movement was about 4 days ago. This is the extent of the patient's complaints at this time.           Review of patient's allergies indicates:  No Known Allergies  Past Medical History:   Diagnosis Date    Alcohol abuse     Anemia     Cancer     liver    Cirrhosis      Past Surgical History:   Procedure Laterality Date    ESOPHAGOGASTRODUODENOSCOPY Left 1/9/2019    Procedure: EGD (ESOPHAGOGASTRODUODENOSCOPY);  Surgeon: Madyson Farrar MD;  Location: [facility] ENDO;  Service: Endoscopy;  Laterality: Left;    PERITONEOCENTESIS N/A 1/9/2019    Procedure: PARACENTESIS, ABDOMINAL;  Surgeon: Everett Fitzpatrick MD;  Location: [facility] CATH LAB;  Service: Radiology;  Laterality: N/A;     History reviewed. No pertinent family history.  Social History     Tobacco Use    Smoking status: Current Every Day Smoker     Packs/day: 0.50     Years: 30.00     Pack years:  15.00     Types: Cigarettes    Smokeless tobacco: Never Used    Tobacco comment: 2 cigarettes a day   Substance Use Topics    Alcohol use: No     Frequency: Never     Comment: Previously drank 1 pint a day    Drug use: Not Currently     Types: Cocaine     Comment: last use was 3 years ago     Review of Systems   Constitutional: Negative for chills and fever.   HENT: Negative for congestion, postnasal drip and sore throat.    Eyes: Negative.    Respiratory: Negative for cough, chest tightness and shortness of breath.    Cardiovascular: Negative for chest pain and palpitations.   Gastrointestinal: Positive for abdominal distention, abdominal pain, constipation, nausea (resolved) and vomiting (resolved). Negative for anal bleeding, blood in stool, diarrhea and rectal pain.   Endocrine: Negative.    Genitourinary: Negative for difficulty urinating and dysuria.   Musculoskeletal: Positive for back pain, gait problem and myalgias. Negative for joint swelling.   Skin: Negative.    Allergic/Immunologic: Negative.    Neurological: Positive for weakness. Negative for light-headedness and headaches.   Hematological: Negative.    Psychiatric/Behavioral: Negative.        Physical Exam     Initial Vitals [11/25/19 1040]   BP Pulse Resp Temp SpO2   137/82 75 16 98.2 °F (36.8 °C) 99 %      MAP       --         Physical Exam    Nursing note and vitals reviewed.  Constitutional: She appears well-developed. She is not diaphoretic.   Chronically ill appearing, thin  female.    HENT:   Head: Normocephalic and atraumatic.   Mouth/Throat: Oropharynx is clear and moist.   Eyes: Conjunctivae and EOM are normal. Pupils are equal, round, and reactive to light.   Neck: Normal range of motion. Neck supple.   Cardiovascular: Normal rate, regular rhythm, normal heart sounds and intact distal pulses.   Pulmonary/Chest: Breath sounds normal.   Abdominal: She exhibits distension and ascites. She exhibits no abdominal bruit and no  mass. Bowel sounds are decreased. There is tenderness in the left upper quadrant and left lower quadrant. There is no rebound and no guarding.   Musculoskeletal: Normal range of motion.        Thoracic back: She exhibits tenderness and bony tenderness.        Lumbar back: She exhibits tenderness and bony tenderness.   Sensation intact bilateral lower extremities. 0/5 strength bilateral lower extremities. Patient able to move toes.    Neurological: She is alert and oriented to person, place, and time. She has normal reflexes. She is not disoriented. She displays normal reflexes. No cranial nerve deficit or sensory deficit. GCS eye subscore is 4. GCS verbal subscore is 5. GCS motor subscore is 6.   Skin: Skin is warm and dry. Capillary refill takes less than 2 seconds.   Psychiatric: She has a normal mood and affect. Her behavior is normal. Judgment and thought content normal.         ED Course   Procedures  Labs Reviewed   CBC W/ AUTO DIFFERENTIAL - Abnormal; Notable for the following components:       Result Value    Hemoglobin 11.2 (*)     Hematocrit 34.8 (*)     Mean Corpuscular Volume 71 (*)     Mean Corpuscular Hemoglobin 22.9 (*)     RDW 21.6 (*)     All other components within normal limits   COMPREHENSIVE METABOLIC PANEL - Abnormal; Notable for the following components:    Sodium 134 (*)     CO2 22 (*)     Albumin 2.8 (*)     Total Bilirubin 1.4 (*)     Alkaline Phosphatase 138 (*)      (*)     ALT 56 (*)     All other components within normal limits   PROTIME-INR - Abnormal; Notable for the following components:    Prothrombin Time 13.5 (*)     INR 1.4 (*)     All other components within normal limits   LIPASE   LACTIC ACID, PLASMA   TROPONIN I   MAGNESIUM   PHOSPHORUS   TROPONIN I    Narrative:     ADD-ON TROP #[id number] PER MANISH CARRILLO,  13:29  11/25/2019   ADD-ON MG #[id number]; PHOS #[id number] PER MANISH CARRILLO,  13:31    11/25/2019    MAGNESIUM    Narrative:     ADD-ON TROP #[id number] PER  MANISH CARRILLO, DO 13:29  11/25/2019   ADD-ON MG #[id number]; PHOS #[id number] PER MANISH CARRILLO, DO 13:31    11/25/2019    PHOSPHORUS    Narrative:     ADD-ON TROP #[id number] PER MANISH CARRILLO, DO 13:29  11/25/2019   ADD-ON MG #[id number]; PHOS #[id number] PER MANISH CARRILLO, DO 13:31    11/25/2019           Imaging Results           MRI Lumbar Spine W WO Cont (Final result)  Result time 11/25/19 19:31:26    Final result by Damien Davis MD (11/25/19 19:31:26)                 Impression:      Acute compression fracture at T11 with almost 25% height loss and retropulsion of the posterior cortex into the spinal canal.  This results in severe canal stenosis and posterior cord displacement.  Subtle partially visualized T2 signal hyperintensity of the central cord proximally/cranially at T10, possibly representing myelomalacia.    Abnormal signal and enhancement of the right facet at L1, possibly representing inflammation/hyperemia from degenerative changes, fracture or facet osteomyelitis.  CT scan might provide more information.    No advanced lumbar spondylosis.    Ascites.    Cyst in the right kidney.    This report was flagged in Epic as abnormal.    Electronically signed by resident: Lawrence Reyna  Date:    11/25/2019  Time:    18:16    Electronically signed by: Damien Davis MD  Date:    11/25/2019  Time:    19:31             Narrative:    EXAMINATION:  MRI LUMBAR SPINE W WO CONTRAST    CLINICAL HISTORY:  Low back pain, <6wks, no red flags, no prior management;Low back pain, cauda equina syndrome suspected;    TECHNIQUE:  Multiplanar multisequence images of the lumbar spine were obtained before and after the administration of 5 cc of intravenous Gadavist.    COMPARISON:  Lumbar radiograph 11/25/2019.    FINDINGS:  Visualized portion of the thoracic spine    Acute compression fracture at T11 (almost 25% height loss) with associated hyperemia and retropulsion of the posterior cortex into spinal canal and resultant  severe canal stenosis.  Mass effect on the thoracic cord resulting in posterior displacement.  Associated enhancement and hyperemia of the dura.  Intervertebral disc height are preserved.  Subtle and partially visualized T2 signal hyperintensity involving the central cord proximally at T10 (series 21, image 1), suggestive of myelomalacia.    Lumbar spine    Alignment: Maintained lumbar lordosis.    Vertebrae: Normal body height and contour.  Normal bone marrow signal.  T2/stir signal hyperintensity involving the right facet at L1 with associated enhancement, possibly representing inflammation from degenerative changes, fracture or facet osteomyelitis.    Cord: The conus medullaris terminates at L1-L2.  No abnormal enhancement.    Discs: Normal intervertebral height and signal.    Degenerative changes: Multilevel facet arthropathy and ligamentum flavum hypertrophy without high-grade spinal canal stenosis or neural foraminal narrowing.    Paraspinal muscles and soft tissue: Ascites.  0.8 cm cyst in the right kidney.                               X-Ray Lumbar Spine Ap And Lateral (Final result)  Result time 11/25/19 12:48:22    Final result by Mickey Devries III, MD (11/25/19 12:48:22)                 Impression:      No acute process seen.      Electronically signed by: Mickey Devries MD  Date:    11/25/2019  Time:    12:48             Narrative:    EXAMINATION:  XR LUMBAR SPINE AP AND LATERAL    CLINICAL HISTORY:  Low back pain, rapidly progressive neuro deficit;    FINDINGS:  Two views: Alignment is normal.  No fracture dislocation bone destruction seen.  There is mild DJD.  No acute process seen.                               X-Ray Thoracic Spine AP Lateral (Final result)  Result time 11/25/19 12:48:50    Final result by Mickey Devries III, MD (11/25/19 12:48:50)                 Impression:      No acute process seen.      Electronically signed by: Mickey Devreis MD  Date:    11/25/2019  Time:    12:48              Narrative:    EXAMINATION:  XR THORACIC SPINE AP LATERAL    CLINICAL HISTORY:  Dorsalgia, unspecified    FINDINGS:  There is a levoconvex curvature of the upper T-spine.  There is a gallstone in the gallbladder.  There is baseline DJD.  No acute fracture dislocation bone destruction seen.                                 Medical Decision Making:   History:   Old Medical Records: I decided to obtain old medical records.  Old Records Summarized: records from clinic visits and records from previous admission(s).  Initial Assessment:   Emergent evaluation of a 64-year-old female who presents to the emergency department with complaints of left-sided back pain, abdominal pain, bilateral lower extremity weakness that began about to 2 weeks ago.  Patient reports that she has not been able to walk for the past week and has needed assistance getting around her home.  Patient is afebrile and hemodynamically stable. Patient appears uncomfortable on exam. Will order labs, imaging, and continue to monitor.   Differential Diagnosis:   Differential diagnosis includes but is not limited to epidural abscess, epidural hematoma, vertebral fracture, cauda equina, SBP, bowel obstruction, aortic aneurism, aortic dissection, nephrolithiasis.   Clinical Tests:   Lab Tests: Ordered and Reviewed  Radiological Study: Ordered and Reviewed  ED Management:  On physical exam, patient has significant ascites and abdominal distention.  She has minor tenderness to palpation of left upper and lower quadrant.  Diffuse tenderness to palpation of lower thoracic and lumbar spine as well as left-sided paraspinous muscles.  Patient is unable to move bilateral lower extremities. When lower extremities are lifted, she is unable to hold them up.  Sensation is intact bilaterally. Intact distal pulses.   Will give Morphine for pain.   CBC reveals WBC 4.74, H/H of 11.2/34.8.   CMP reveals Na 134, Albumin 2.8, total bilirubin 1.4, Alk Phos 138,  and ALT  56, BUN 11 and Creatinine 1.0.   Lactate 1.6   Lipase 6.   INR 1.4   1:28 PM XRAY of thoracic and lumbar spine negative for fractures or dislocations. Will order MRI of lumbar spine.     6:30 PM Patient has returned from MRI. She reports pain has returned and is requesting pain medication. Will administer oxycodone IR 5mg.     7:06 PM MRI not yet resulted. Will discuss patient's case with Dr. Vigil who will take over patient care. Final disposition pending MRI results but I suspect admission to Hospital Medicine.               Attending Attestation:     Physician Attestation Statement for NP/PA:   I have conducted a face to face encounter with this patient in addition to the NP/PA, due to Medical Complexity    Other NP/PA Attestation Additions:    History of Present Illness: 64-year-old woman with comorbidities of hepatocellular malignancy presents to the emergency department for evaluation of worsening lower extremity weakness and inability to walk.           Attending ED Notes:   Imaging obtained today does reveal evidence of a vertebral body fracture at T11 with retropulsion of the posterior fragment resulting in myelomalacia below.  The patient continues to endorse back pain and bilateral leg weakness.  Findings and concerns have been discussed with Neurosurgery on-call who will evaluate the patient.  I will continue to manage this patient's pain and discussed hospital admission to Internal Medicine with neurosurgical consultation pending.    I have reviewed and concur with the advanced practice clinician's history, physical, assessment, and plan.  I have personally interviewed and examined the patient at bedside.  See below addendum for my evaluation and additional findings.    Briefly,  this is a 64 y.o. female with a history of cirrhosis, hepatocellular carcinoma, alcohol abuse, anemia presenting with left-sided back pain, abdominal pain, abdominal distension and lower extremity weakness over the last 1-2  weeks.  Abdominal pain worsened after paracentesis, but improved.  She is unable to walk for the last week, and unable to move her lower extremity secondary to weakness. Vital signs stable, exam with appreciable weakness to lower extremities. Unable to bear weight or elevate lower extremities. X-ray of the lumbar and thoracic spine obtained with no acute findings.  MRI obtained which shows T11 fracture with retropulsion into cord with myelomalacia.  No bowel or bladder dysfunction, no saddle anesthesia and no recent trauma or injury. The patient was signed out to Dr. Vigil for final disposition, likely admission with neurosurgical evaluation.  Regarding her abdominal pain, bedside ultrasound confirms ascites very similar to previous, doubt SBP, doubt acute intra-abdominal process based on exam, history and findings at this time.     Complexity:  High risk  -level 5      Damon Smith DO    Dept of Emergency Medicine   [facility]  Spectralink: [id number]                        Clinical Impression:     1. Weakness of both lower extremities    2. Abdominal pain    3. Back pain            Disposition:   Disposition: Admitted  Condition: Fair                     Latosha Early PA-C  11/25/19 1916       Rohan Vigil MD  11/25/19 2049       Damon Smith DO  11/26/19 4713

## 2019-11-25 NOTE — ED NOTES
Hourly rounding complete. Patient resting in stretcher and is in NAD at this time. Pt is awake alert and oriented x4. Pt aware of POC. Patient reports back/ side pain is increasing, now requesting pain medication, ED MD informed. Family at bedside. Bed low and locked, SR up x2, pt in view of nurses station. Pt voices no other needs at this time.

## 2019-11-25 NOTE — ED NOTES
Hourly rounding complete. Patient resting in stretcher and is in NAD at this time. Pt is awake alert and oriented x4.Pt updated on POC. Family at bedside. Bed low and locked, SR up x2, pt in view of nurses station. Pt voices no needs at this time.

## 2019-11-25 NOTE — ED TRIAGE NOTES
Neena Marks, a 64 y.o. female presents to the ED via EMS with CC lower back and side pain onset Thursday, also c/o numbness to BLE since Thursday. Reports seen at [facility] for complaints, given a shot but unsure of name of medication. Reports pain has been increasingly worsening since Friday.     Patient identifiers verified verbally with patient and correct for Neena Marks.    LOC/ APPEARANCE: The patient is AAOx4. Pt is speaking appropriately, no slurred speech. Pt is clean and well groomed. No JVD visible. Pt updated on POC. Bed low and locked with side rails up x2, pt in view of nurse's station. Family member at bedside.  SKIN: Skin is warm dry and intact, and color is consistent with ethnicity. Capillary refill <3 seconds. Mucus membranes moist, acyanotic.  RESPIRATORY: Airway is open and patent. Respirations-spontaneous, unlabored, regular rate, equal bilaterally on inspiration and expiration. No accessory muscle use noted. Lungs clear to auscultation in all fields bilaterally anterior and posterior.   CARDIAC: Patient has regular heart rate.  No peripheral edema noted, and patient has no c/o chest pain. Peripheral pulses present equal and strong throughout.  ABDOMEN: Distended, mild tenderness throughout with palpation, pt reports distended x2 weeks ago. Normoactive bowel sounds x4 quadrants. Pt c/o constipation, LBM 3-4 days ago, denies blood in stool, denies . Pt reports normal appetite.   NEUROLOGIC: Eyes open spontaneously and facial expression symmetrical. Pt behavior appropriate to situation, and pt follows commands. Pt reports sensation present in all extremities when touched with a finger, pt c/o numbness to BLE. PERRLA  MUSCULOSKELETAL: Spontaneous movement noted to BUE, withdraws to pain to BLE.  : No complaints of frequency, burning, urgency or blood in the urine. No complaints of incontinence.

## 2019-11-26 PROBLEM — G95.20 SPINAL CORD COMPRESSION: Status: ACTIVE | Noted: 2019-11-25

## 2019-11-26 PROBLEM — S22.000A COMPRESSION FRACTURE OF BODY OF THORACIC VERTEBRA: Status: ACTIVE | Noted: 2019-11-26

## 2019-11-26 PROBLEM — Z01.810 ENCOUNTER FOR PRE-OPERATIVE CARDIOVASCULAR CLEARANCE: Status: ACTIVE | Noted: 2019-11-26

## 2019-11-26 PROBLEM — I27.82 CHRONIC PULMONARY EMBOLISM: Status: ACTIVE | Noted: 2019-08-29

## 2019-11-26 LAB
ALBUMIN SERPL BCP-MCNC: 2.6 G/DL (ref 3.5–5.2)
ALP SERPL-CCNC: 121 U/L (ref 55–135)
ALT SERPL W/O P-5'-P-CCNC: 42 U/L (ref 10–44)
ANION GAP SERPL CALC-SCNC: 10 MMOL/L (ref 8–16)
APTT BLDCRRT: 26.5 SEC (ref 21–32)
AST SERPL-CCNC: 70 U/L (ref 10–40)
AV INDEX (PROSTH): 0.62
AV MEAN GRADIENT: 5 MMHG
AV PEAK GRADIENT: 10 MMHG
AV VALVE AREA: 1.18 CM2
AV VELOCITY RATIO: 0.62
BASOPHILS # BLD AUTO: 0.05 K/UL (ref 0–0.2)
BASOPHILS NFR BLD: 0.9 % (ref 0–1.9)
BILIRUB SERPL-MCNC: 1.1 MG/DL (ref 0.1–1)
BSA FOR ECHO PROCEDURE: 1.31 M2
BUN SERPL-MCNC: 11 MG/DL (ref 8–23)
CALCIUM SERPL-MCNC: 8.6 MG/DL (ref 8.7–10.5)
CHLORIDE SERPL-SCNC: 101 MMOL/L (ref 95–110)
CO2 SERPL-SCNC: 21 MMOL/L (ref 23–29)
CREAT SERPL-MCNC: 0.9 MG/DL (ref 0.5–1.4)
CV ECHO LV RWT: 0.29 CM
DIFFERENTIAL METHOD: ABNORMAL
DOP CALC AO PEAK VEL: 1.57 M/S
DOP CALC AO VTI: 33.52 CM
DOP CALC LVOT AREA: 1.9 CM2
DOP CALC LVOT DIAMETER: 1.56 CM
DOP CALC LVOT PEAK VEL: 0.97 M/S
DOP CALC LVOT STROKE VOLUME: 39.64 CM3
DOP CALCLVOT PEAK VEL VTI: 20.75 CM
E WAVE DECELERATION TIME: 223.67 MSEC
E/A RATIO: 0.97
E/E' RATIO: 9.38 M/S
ECHO LV POSTERIOR WALL: 0.6 CM (ref 0.6–1.1)
EOSINOPHIL # BLD AUTO: 0.2 K/UL (ref 0–0.5)
EOSINOPHIL NFR BLD: 3.1 % (ref 0–8)
ERYTHROCYTE [DISTWIDTH] IN BLOOD BY AUTOMATED COUNT: 21.2 % (ref 11.5–14.5)
EST. GFR  (AFRICAN AMERICAN): >60 ML/MIN/1.73 M^2
EST. GFR  (NON AFRICAN AMERICAN): >60 ML/MIN/1.73 M^2
FRACTIONAL SHORTENING: 35 % (ref 28–44)
GLUCOSE SERPL-MCNC: 88 MG/DL (ref 70–110)
HCT VFR BLD AUTO: 30.3 % (ref 37–48.5)
HGB BLD-MCNC: 9.4 G/DL (ref 12–16)
IMM GRANULOCYTES # BLD AUTO: 0.03 K/UL (ref 0–0.04)
IMM GRANULOCYTES NFR BLD AUTO: 0.6 % (ref 0–0.5)
INR PPP: 1.4 (ref 0.8–1.2)
INTERVENTRICULAR SEPTUM: 0.47 CM (ref 0.6–1.1)
LA MAJOR: 5.1 CM
LA MINOR: 4.68 CM
LA WIDTH: 3.7 CM
LEFT ATRIUM SIZE: 3.79 CM
LEFT ATRIUM VOLUME INDEX: 45.1 ML/M2
LEFT ATRIUM VOLUME: 58.18 CM3
LEFT INTERNAL DIMENSION IN SYSTOLE: 2.71 CM (ref 2.1–4)
LEFT VENTRICLE DIASTOLIC VOLUME INDEX: 59.02 ML/M2
LEFT VENTRICLE DIASTOLIC VOLUME: 76.17 ML
LEFT VENTRICLE MASS INDEX: 46 G/M2
LEFT VENTRICLE SYSTOLIC VOLUME INDEX: 21.2 ML/M2
LEFT VENTRICLE SYSTOLIC VOLUME: 27.32 ML
LEFT VENTRICULAR INTERNAL DIMENSION IN DIASTOLE: 4.15 CM (ref 3.5–6)
LEFT VENTRICULAR MASS: 59.48 G
LV LATERAL E/E' RATIO: 9.38 M/S
LV SEPTAL E/E' RATIO: 9.38 M/S
LYMPHOCYTES # BLD AUTO: 1.8 K/UL (ref 1–4.8)
LYMPHOCYTES NFR BLD: 33.1 % (ref 18–48)
MAGNESIUM SERPL-MCNC: 1.5 MG/DL (ref 1.6–2.6)
MCH RBC QN AUTO: 22 PG (ref 27–31)
MCHC RBC AUTO-ENTMCNC: 31 G/DL (ref 32–36)
MCV RBC AUTO: 71 FL (ref 82–98)
MONOCYTES # BLD AUTO: 0.8 K/UL (ref 0.3–1)
MONOCYTES NFR BLD: 15.2 % (ref 4–15)
MV PEAK A VEL: 0.77 M/S
MV PEAK E VEL: 0.75 M/S
NEUTROPHILS # BLD AUTO: 2.5 K/UL (ref 1.8–7.7)
NEUTROPHILS NFR BLD: 47.1 % (ref 38–73)
NRBC BLD-RTO: 0 /100 WBC
PHOSPHATE SERPL-MCNC: 2.9 MG/DL (ref 2.7–4.5)
PLATELET # BLD AUTO: 327 K/UL (ref 150–350)
PMV BLD AUTO: 10.2 FL (ref 9.2–12.9)
POTASSIUM SERPL-SCNC: 3.9 MMOL/L (ref 3.5–5.1)
PROT SERPL-MCNC: 6.7 G/DL (ref 6–8.4)
PROTHROMBIN TIME: 13.5 SEC (ref 9–12.5)
RA MAJOR: 4.7 CM
RA WIDTH: 3.5 CM
RBC # BLD AUTO: 4.28 M/UL (ref 4–5.4)
RIGHT VENTRICULAR END-DIASTOLIC DIMENSION: 3.52 CM
SINUS: 2.51 CM
SODIUM SERPL-SCNC: 132 MMOL/L (ref 136–145)
STJ: 2.08 CM
TDI LATERAL: 0.08 M/S
TDI SEPTAL: 0.08 M/S
TDI: 0.08 M/S
TRICUSPID ANNULAR PLANE SYSTOLIC EXCURSION: 1.79 CM
WBC # BLD AUTO: 5.4 K/UL (ref 3.9–12.7)

## 2019-11-26 PROCEDURE — 36415 COLL VENOUS BLD VENIPUNCTURE: CPT

## 2019-11-26 PROCEDURE — 63600175 PHARM REV CODE 636 W HCPCS: Performed by: STUDENT IN AN ORGANIZED HEALTH CARE EDUCATION/TRAINING PROGRAM

## 2019-11-26 PROCEDURE — 83735 ASSAY OF MAGNESIUM: CPT

## 2019-11-26 PROCEDURE — 25500020 PHARM REV CODE 255: Performed by: STUDENT IN AN ORGANIZED HEALTH CARE EDUCATION/TRAINING PROGRAM

## 2019-11-26 PROCEDURE — 85025 COMPLETE CBC W/AUTO DIFF WBC: CPT

## 2019-11-26 PROCEDURE — 84100 ASSAY OF PHOSPHORUS: CPT

## 2019-11-26 PROCEDURE — 99233 SBSQ HOSP IP/OBS HIGH 50: CPT | Mod: ,,, | Performed by: PHYSICIAN ASSISTANT

## 2019-11-26 PROCEDURE — 80053 COMPREHEN METABOLIC PANEL: CPT

## 2019-11-26 PROCEDURE — 25000003 PHARM REV CODE 250: Performed by: STUDENT IN AN ORGANIZED HEALTH CARE EDUCATION/TRAINING PROGRAM

## 2019-11-26 PROCEDURE — 85730 THROMBOPLASTIN TIME PARTIAL: CPT

## 2019-11-26 PROCEDURE — 11000001 HC ACUTE MED/SURG PRIVATE ROOM

## 2019-11-26 PROCEDURE — 99223 PR INITIAL HOSPITAL CARE,LEVL III: ICD-10-PCS | Mod: ,,, | Performed by: INTERNAL MEDICINE

## 2019-11-26 PROCEDURE — 63600175 PHARM REV CODE 636 W HCPCS: Performed by: INTERNAL MEDICINE

## 2019-11-26 PROCEDURE — 99233 PR SUBSEQUENT HOSPITAL CARE,LEVL III: ICD-10-PCS | Mod: ,,, | Performed by: PHYSICIAN ASSISTANT

## 2019-11-26 PROCEDURE — 25500020 PHARM REV CODE 255: Performed by: INTERNAL MEDICINE

## 2019-11-26 PROCEDURE — 85610 PROTHROMBIN TIME: CPT

## 2019-11-26 PROCEDURE — 99223 1ST HOSP IP/OBS HIGH 75: CPT | Mod: ,,, | Performed by: INTERNAL MEDICINE

## 2019-11-26 RX ORDER — GADOBUTROL 604.72 MG/ML
5 INJECTION INTRAVENOUS
Status: COMPLETED | OUTPATIENT
Start: 2019-11-27 | End: 2019-11-26

## 2019-11-26 RX ORDER — HYDROMORPHONE HYDROCHLORIDE 1 MG/ML
1 INJECTION, SOLUTION INTRAMUSCULAR; INTRAVENOUS; SUBCUTANEOUS EVERY 4 HOURS PRN
Status: DISCONTINUED | OUTPATIENT
Start: 2019-11-26 | End: 2019-11-29

## 2019-11-26 RX ORDER — HYDROMORPHONE HYDROCHLORIDE 1 MG/ML
1 INJECTION, SOLUTION INTRAMUSCULAR; INTRAVENOUS; SUBCUTANEOUS ONCE
Status: COMPLETED | OUTPATIENT
Start: 2019-11-26 | End: 2019-11-26

## 2019-11-26 RX ORDER — HYDROMORPHONE HYDROCHLORIDE 1 MG/ML
0.5 INJECTION, SOLUTION INTRAMUSCULAR; INTRAVENOUS; SUBCUTANEOUS EVERY 4 HOURS PRN
Status: DISCONTINUED | OUTPATIENT
Start: 2019-11-26 | End: 2019-11-26

## 2019-11-26 RX ORDER — OXYCODONE HYDROCHLORIDE 10 MG/1
10 TABLET ORAL EVERY 4 HOURS PRN
Status: DISCONTINUED | OUTPATIENT
Start: 2019-11-26 | End: 2019-11-29

## 2019-11-26 RX ORDER — SODIUM CHLORIDE 9 MG/ML
INJECTION, SOLUTION INTRAVENOUS CONTINUOUS
Status: ACTIVE | OUTPATIENT
Start: 2019-11-26 | End: 2019-11-27

## 2019-11-26 RX ADMIN — IOHEXOL 15 ML: 350 INJECTION, SOLUTION INTRAVENOUS at 04:11

## 2019-11-26 RX ADMIN — HYDROMORPHONE HYDROCHLORIDE 1 MG: 1 INJECTION, SOLUTION INTRAMUSCULAR; INTRAVENOUS; SUBCUTANEOUS at 12:11

## 2019-11-26 RX ADMIN — HYDROMORPHONE HYDROCHLORIDE 0.5 MG: 1 INJECTION, SOLUTION INTRAMUSCULAR; INTRAVENOUS; SUBCUTANEOUS at 03:11

## 2019-11-26 RX ADMIN — DEXAMETHASONE SODIUM PHOSPHATE 4 MG: 4 INJECTION, SOLUTION INTRAMUSCULAR; INTRAVENOUS at 06:11

## 2019-11-26 RX ADMIN — THERA TABS 1 TABLET: TAB at 09:11

## 2019-11-26 RX ADMIN — HYDROMORPHONE HYDROCHLORIDE 1 MG: 1 INJECTION, SOLUTION INTRAMUSCULAR; INTRAVENOUS; SUBCUTANEOUS at 11:11

## 2019-11-26 RX ADMIN — FUROSEMIDE 40 MG: 40 TABLET ORAL at 09:11

## 2019-11-26 RX ADMIN — DEXAMETHASONE SODIUM PHOSPHATE 4 MG: 4 INJECTION, SOLUTION INTRAMUSCULAR; INTRAVENOUS at 11:11

## 2019-11-26 RX ADMIN — OXYCODONE HYDROCHLORIDE 5 MG: 5 TABLET ORAL at 11:11

## 2019-11-26 RX ADMIN — GADOBUTROL 5 ML: 604.72 INJECTION INTRAVENOUS at 11:11

## 2019-11-26 RX ADMIN — IOHEXOL 15 ML: 350 INJECTION, SOLUTION INTRAVENOUS at 03:11

## 2019-11-26 RX ADMIN — OXYCODONE HYDROCHLORIDE 5 MG: 5 TABLET ORAL at 12:11

## 2019-11-26 RX ADMIN — IOHEXOL 75 ML: 350 INJECTION, SOLUTION INTRAVENOUS at 05:11

## 2019-11-26 RX ADMIN — FERROUS SULFATE TAB EC 325 MG (65 MG FE EQUIVALENT) 325 MG: 325 (65 FE) TABLET DELAYED RESPONSE at 09:11

## 2019-11-26 RX ADMIN — OXYCODONE HYDROCHLORIDE 10 MG: 10 TABLET ORAL at 09:11

## 2019-11-26 RX ADMIN — FAMOTIDINE 20 MG: 20 TABLET ORAL at 09:11

## 2019-11-26 RX ADMIN — PROPRANOLOL HYDROCHLORIDE 10 MG: 10 TABLET ORAL at 09:11

## 2019-11-26 RX ADMIN — SODIUM CHLORIDE: 0.9 INJECTION, SOLUTION INTRAVENOUS at 06:11

## 2019-11-26 NOTE — ED NOTES
Report received from ASHA Hammond. Assume care of pt. Pt resting comfortably and independently repositioned in stretcher with bed locked in lowest position for safety. NAD noted at this time. Respirations even and unlabored and visible chest rise noted. Pt instructed to call if assistance is needed. No needs at this time. Will continue to monitor.

## 2019-11-26 NOTE — ED NOTES
Rounded on pt. Pt requesting food and water, upon speaking with MD Musa. Educated pt to remain NPO until seen by neurosurgery. Pt verbalized understanding. No needs at present. NAD.will continue to monitor.

## 2019-11-26 NOTE — PLAN OF CARE
11/26/19 1442   Discharge Assessment   Assessment Type Discharge Planning Assessment   Confirmed/corrected address and phone number on facesheet? Yes   Assessment information obtained from? Patient   Expected Length of Stay (days) 3   Communicated expected length of stay with patient/caregiver yes   Prior to hospitilization cognitive status: Alert/Oriented   Prior to hospitalization functional status: Needs Assistance   Current cognitive status: Alert/Oriented   Current Functional Status: Needs Assistance   Lives With child(anika), adult;grandchild(anika)   Able to Return to Prior Arrangements yes   Is patient able to care for self after discharge? Unable to determine at this time (comments)   Who are your caregiver(s) and their phone number(s)? alia Marks [facility] [phone]   Patient's perception of discharge disposition skilled nursing facility   Patient currently being followed by outpatient case management? Unable to determine (comments)   Patient currently receives any other outside agency services? No   Equipment Currently Used at Home wheelchair;rollator   Do you have any problems affording any of your prescribed medications? No   Is the patient taking medications as prescribed? yes   Does the patient have transportation home? Yes   Transportation Anticipated family or friend will provide   Dialysis Name and Scheduled days n/a   Does the patient receive services at the [facility]? No   Discharge Plan A Skilled Nursing Facility   Discharge Plan B Rehab   DME Needed Upon Discharge  none   Patient/Family in Agreement with Plan yes

## 2019-11-26 NOTE — ASSESSMENT & PLAN NOTE
MELD-Na score: 11 at 11/25/2019 11:53 AM  MELD score: 11 at 11/25/2019 11:53 AM  Calculated from:  Serum Creatinine: 1.0 mg/dL at 11/25/2019 11:53 AM  Serum Sodium: 134 mmol/L at 11/25/2019 11:53 AM  Total Bilirubin: 1.4 mg/dL at 11/25/2019 11:53 AM  INR(ratio): 1.4 at 11/25/2019 11:53 AM  Age: 64 years    Stable.

## 2019-11-26 NOTE — H&P
"[facility] Medicine  History & Physical    Patient Name: Neena Marks  MRN: [id number]  Admission Date: 11/25/2019  Attending Physician: Bubba Sanders MD   Primary Care Provider: [facility] Medicine Team: [facility] MED 2 Paige Santana DO     Patient information was obtained from patient, past medical records and ER records.     Subjective:     Principal Problem:Spinal cord compression    Chief Complaint:   Chief Complaint   Patient presents with    Back Pain     Patient reports back pain and numbness to BLE since 11/22. Patient reports being tx on 11/22 and receiving shot in hip and then developed numbness to BLE.         HPI: Ms. Marks is a 63-year-old  female with Hepatocellular carcinoma, alcoholic cirrhosis( dx 2015), portal HTN, ascites, hx of variceal bleeding in January 2018 s/p banding,B/l pulmonary embolism,  hx of ETOH abuse (quit Jan 2018) who presents to the ED with back pain and b/l lower extremity weakness. Patient states that symptoms have been on going for the past week and half and have progressed to the point where she cannot lift her leg against gravity or walk. She decribes "electric shocklike" sensations down her legs bilaterally. She denies any falls or trauma. She complains of back pain and LUQ & LLQ abdominal pain that radiates to her back. She denies any bladder/bowel incontinence. She denies fever, chills, vomiting. She    In ED, MRI with Acute compression fracture at T11 with almost 25% height loss and retropulsion of the posterior cortex into the spinal canal.  This results in severe canal stenosis and posterior cord displacement.     Past Medical History:   Diagnosis Date    Alcohol abuse     Anemia     Cancer     liver    Cirrhosis        Past Surgical History:   Procedure Laterality Date    ESOPHAGOGASTRODUODENOSCOPY Left 1/9/2019    Procedure: EGD (ESOPHAGOGASTRODUODENOSCOPY);  Surgeon: Madyson Farrar " MD;  Location: [facility] ENDO;  Service: Endoscopy;  Laterality: Left;    PERITONEOCENTESIS N/A 1/9/2019    Procedure: PARACENTESIS, ABDOMINAL;  Surgeon: Everett Fitzpatrick MD;  Location: [facility] CATH LAB;  Service: Radiology;  Laterality: N/A;       Review of patient's allergies indicates:  No Known Allergies    No current facility-administered medications on file prior to encounter.      Current Outpatient Medications on File Prior to Encounter   Medication Sig    (Magic mouthwash) 1:1:1 Benadryl 12.5mg/5ml liq, aluminum & magnesium hydroxide-simehticone (Maalox), lidocaine viscous 2% Swish and spit 10 mLs every 4 (four) hours as needed. for mouth sores    apixaban (ELIQUIS) 5 mg Tab Take 1 tablet (5 mg total) by mouth 2 (two) times daily.    cyproheptadine (PERIACTIN) 4 mg tablet Take 1 tablet (4 mg total) by mouth 3 (three) times daily as needed.    famotidine (PEPCID) 20 MG tablet Take 1 tablet (20 mg total) by mouth once daily.    ferrous sulfate (FEOSOL) 325 mg (65 mg iron) Tab tablet Take 1 tablet (325 mg total) by mouth 2 (two) times daily.    furosemide (LASIX) 40 MG tablet Take 1 tablet (40 mg total) by mouth once daily.    gabapentin (NEURONTIN) 100 MG capsule Take 1 capsule (100 mg total) by mouth every evening.    hydrocortisone 1 % cream Apply to affected area 2 times daily    lactulose (CHRONULAC) 10 gram/15 mL solution Take 15 mLs (10 g total) by mouth 2 (two) times daily.    magnesium oxide (MAGOX) 400 mg (241.3 mg magnesium) tablet Take 1 tablet (400 mg total) by mouth 2 (two) times daily.    multivitamin (ONE DAILY MULTIVITAMIN) per tablet Take 1 tablet by mouth once daily.    ondansetron (ZOFRAN-ODT) 4 MG TbDL Take 1 tablet (4 mg total) by mouth every 6 (six) hours as needed.    oxyCODONE (ROXICODONE) 5 MG immediate release tablet Take 1 tablet (5 mg total) by mouth every 6 (six) hours as needed for Pain.    promethazine (PHENERGAN) 25 MG tablet Take 1 tablet (25 mg total) by mouth every  6 (six) hours as needed for Nausea.    propranolol (INDERAL) 10 MG tablet Take 1 tablet (10 mg total) by mouth 2 (two) times daily.    traZODone (DESYREL) 50 MG tablet Take 1 tablet (50 mg total) by mouth every evening.     Family History     None        Tobacco Use    Smoking status: Current Every Day Smoker     Packs/day: 0.50     Years: 30.00     Pack years: 15.00     Types: Cigarettes    Smokeless tobacco: Never Used    Tobacco comment: 2 cigarettes a day   Substance and Sexual Activity    Alcohol use: No     Frequency: Never     Comment: Previously drank 1 pint a day    Drug use: Not Currently     Types: Cocaine     Comment: last use was 3 years ago    Sexual activity: Not Currently     Review of Systems   Constitutional: Positive for activity change, fatigue and unexpected weight change. Negative for chills and fever.   HENT: Negative for congestion and sore throat.    Eyes: Negative for photophobia and visual disturbance.   Respiratory: Negative for cough, chest tightness and shortness of breath.    Cardiovascular: Negative for chest pain, palpitations and leg swelling.   Gastrointestinal: Positive for abdominal distention and abdominal pain. Negative for blood in stool, nausea and vomiting.   Genitourinary: Negative for difficulty urinating and dysuria.   Musculoskeletal: Positive for back pain. Negative for arthralgias and joint swelling.   Skin: Negative for color change and rash.   Neurological: Positive for weakness. Negative for dizziness, numbness and headaches.   Psychiatric/Behavioral: Negative for behavioral problems and confusion. The patient is nervous/anxious.      Objective:     Vital Signs (Most Recent):  Temp: 99.7 °F (37.6 °C) (11/25/19 2245)  Pulse: 94 (11/25/19 2245)  Resp: 18 (11/25/19 2245)  BP: 136/76 (11/25/19 2245)  SpO2: 95 % (11/25/19 2245) Vital Signs (24h Range):  Temp:  [98.2 °F (36.8 °C)-99.7 °F (37.6 °C)] 99.7 °F (37.6 °C)  Pulse:  [75-94] 94  Resp:  [15-18] 18  SpO2:   [95 %-99 %] 95 %  BP: (107-137)/(66-82) 136/76     Weight: 44 kg (97 lb)  Body mass index is 19.59 kg/m².    Physical Exam   Constitutional: She is oriented to person, place, and time. She is cooperative. She appears ill. Cachetic  HENT:   Head: Normocephalic and atraumatic.   Eyes: Pupils are equal, round, and reactive to light. Conjunctivae are normal. No scleral icterus.   Neck: No JVD present.   Cardiovascular: Normal rate, regular rhythm, normal heart sounds and intact distal pulses.   No murmur heard.  Pulmonary/Chest: Effort normal and breath sounds normal. No respiratory distress. She has no wheezes. She has no rales.   Abdominal: Bowel sounds are normal. She exhibits distension. There is tenderness. There is no rebound and no guarding. Caput [place]  Musculoskeletal: She exhibits no edema or tenderness.   Neurological: She is alert and oriented to person, place, and time. No sensory deficit.   Intact sensation in b/l lower extremities. 0/5 muscle strength in b/l lower extremities.    Skin: Skin is warm and dry. No erythema.   Psychiatric: She has a normal mood and affect. Her behavior is normal.         CRANIAL NERVES     CN III, IV, VI   Pupils are equal, round, and reactive to light.       Significant Labs:   CBC:   Recent Labs   Lab 11/25/19  1153   WBC 4.74   HGB 11.2*   HCT 34.8*        CMP:   Recent Labs   Lab 11/25/19  1153   *   K 4.4      CO2 22*   GLU 87   BUN 11   CREATININE 1.0   CALCIUM 8.9   PROT 7.8   ALBUMIN 2.8*   BILITOT 1.4*   ALKPHOS 138*   *   ALT 56*   ANIONGAP 10   EGFRNONAA >60.0     Lactic Acid:   Recent Labs   Lab 11/25/19  1254   LACTATE 1.6     Magnesium:   Recent Labs   Lab 11/25/19  1153   MG 1.6       Significant Imaging:MRI Lumbar Spine W/WO contrast.   Acute compression fracture at T11 with almost 25% height loss and retropulsion of the posterior cortex into the spinal canal.  This results in severe canal stenosis and posterior cord displacement.   Subtle partially visualized T2 signal hyperintensity of the central cord proximally/cranially at T10, possibly representing myelomalacia.    Assessment/Plan:     * Spinal cord compression  65 yo female with Hepatocellular carcinoma, alcoholic cirrhosis, ascites, b/l pulmonary PE presents with b/l lower extremity weakness and back pain. Found to have compression fracture of T11 with canal stenosis and posterior cord displacement. Patient with sensation intact but 0/5 muscle strength in b/l lower ext.    Plan  - Consult to neurosurgery, reccs appreciated  - Continue Dexamethasone 4mg IV q6h   - Per neurosurgery patient will need surgery, but will round on patient in the a.m for further reccs. Medical clearance requested.  - NPO  - Dilaudid 0.5mg prn pain, Oxy IR q6h prn pain  - MRI cervical, thoracic spine      Encounter for pre-operative cardiovascular clearance    Surgical Risk Assessment   Active cardiac issues:  Active decompensated heart failure? No   Unstable angina?  No   Significant uncontrolled arrhythmias? No   Severe valvular heart disease-Aortic or Mitral Stenosis? No   Recent MI or coronary revascularization < 30 days? No     Cardiac Risk Factors  History of CAD/ischemic heart disease? No   History of cerebrovascular disease? No   History of compensated heart failure? No   Type 2 diabetes requiring insulin? No   Serum Creatinine > 2? No   Total cardiac risk factors 0     Assessment/Plan:   Cardiovascular Risk Assessment:  Non-emergent surgery.  No active cardiac problems (such as unstable angina, decompensated heart failure, significant uncontrolled arrhythmias or severe valvular disease).  Surgery is _Interemediate risk  Functional Status: Functional mets  < 4 METS. Poor functional status, unable to perform ADLS at this time.    Revised Cardiac Risk Index   1 predictor = 6.0%  RCRI Calculator Class and Risk percentage:  1 predictor,  which is a 6.0% risk of adverse cardiac event in 30  DAYS.    Anticoagulation: On Apixaban for b/l  Pulmonary embolism.                          Compression fracture of body of thoracic vertebra  See note on spinal cord compression.      Ascites due to alcoholic cirrhosis  Last paracentesis was 2 weeks ago. Patient with distended abdomen states no significant increase since last paracentesis  Supposed to be on Lasix 40mg, but currenlty held by heme/onc due to recent PRACHI. Denies SOB.    Plan  - Continue Lasix 40mg daily    Chronic pulmonary embolism  - Patient on Eliquis 5mg BID  - Hold pending reccs from neurosurgery      HCC (hepatocellular carcinoma)  Follows with heme/onc not a transplant candidate. Previously on Nivolumab immunotherapy but currently held due to recent PRACHI.    Plan  - F/u outpatient with heme/onc      Esophageal varices in alcoholic cirrhosis  Denies hematemesis    Plan  - Continue home propanolol 10mg BID      Alcoholic cirrhosis  MELD-Na score: 11 at 11/25/2019 11:53 AM  MELD score: 11 at 11/25/2019 11:53 AM  Calculated from:  Serum Creatinine: 1.0 mg/dL at 11/25/2019 11:53 AM  Serum Sodium: 134 mmol/L at 11/25/2019 11:53 AM  Total Bilirubin: 1.4 mg/dL at 11/25/2019 11:53 AM  INR(ratio): 1.4 at 11/25/2019 11:53 AM  Age: 64 years    Stable.        VTE Risk Mitigation (From admission, onward)         Ordered     apixaban tablet 5 mg  2 times daily      11/25/19 2247     Place GARO hose  Until discontinued      11/25/19 2243     Place sequential compression device  Until discontinued      11/25/19 2243     Reason for No Pharmacological VTE Prophylaxis  Once     Question:  Reasons:  Answer:  Already adequately anticoagulated on oral Anticoagulants    11/25/19 2243     IP VTE HIGH RISK PATIENT  Once      11/25/19 2243                   Paige Santana DO  Department of Hospital Medicine   [facility]

## 2019-11-26 NOTE — PHARMACY MED REC
"Admission Medication Reconciliation - Pharmacy Consult Note    The home medication history was taken by Bonnie Bojorquez, Pharmacy Technician.    Based on information gathered and subsequent review by the clinical pharmacist, the items below may need attention.     You may go to "Admission" then "Reconcile Home Medications" tabs to review and/or act upon these items.     Potentially problematic discrepancies with current MAR  o Patient IS NOT taking the following which was ordered upon admit  o Famotidine (reasonable to resume while on steroids for gastric protection)  o Furosemide  o Patient is taking a drug DIFFERENTLY than how ordered upon admit  o Takes propranolol 10 mg PO daily    Potential issues to be addressed PRIOR TO DISCHARGE  o Holding apixaban for possible neurosurgical intervention, restart as appropriate    Please address this information as you see fit.  Feel free to contact us if you have any questions or require assistance.    Darren Villanueva, Pharm.D., BCPS  [id number]                    .    .            "

## 2019-11-26 NOTE — SUBJECTIVE & OBJECTIVE
Medications Prior to Admission   Medication Sig Dispense Refill Last Dose    (Magic mouthwash) 1:1:1 Benadryl 12.5mg/5ml liq, aluminum & magnesium hydroxide-simehticone (Maalox), lidocaine viscous 2% Swish and spit 10 mLs every 4 (four) hours as needed. for mouth sores 450 mL 5 Unknown    apixaban (ELIQUIS) 5 mg Tab Take 1 tablet (5 mg total) by mouth 2 (two) times daily. 60 tablet 4 11/22/2019    cyproheptadine (PERIACTIN) 4 mg tablet Take 1 tablet (4 mg total) by mouth 3 (three) times daily as needed. 90 tablet 2 11/22/2019    famotidine (PEPCID) 20 MG tablet Take 1 tablet (20 mg total) by mouth once daily. 30 tablet 5 11/21/2019    ferrous sulfate (FEOSOL) 325 mg (65 mg iron) Tab tablet Take 1 tablet (325 mg total) by mouth 2 (two) times daily. 30 tablet 5 11/22/2019    furosemide (LASIX) 40 MG tablet Take 1 tablet (40 mg total) by mouth once daily. 30 tablet 5 11/22/2019    gabapentin (NEURONTIN) 100 MG capsule Take 1 capsule (100 mg total) by mouth every evening. 30 capsule 2 More than a month    hydrocortisone 1 % cream Apply to affected area 2 times daily 140 g 1 More than a month    lactulose (CHRONULAC) 10 gram/15 mL solution Take 15 mLs (10 g total) by mouth 2 (two) times daily. 900 mL 3 11/21/2019    magnesium oxide (MAGOX) 400 mg (241.3 mg magnesium) tablet Take 1 tablet (400 mg total) by mouth 2 (two) times daily. 60 tablet 3 11/22/2019    multivitamin (ONE DAILY MULTIVITAMIN) per tablet Take 1 tablet by mouth once daily.   11/22/2019    ondansetron (ZOFRAN-ODT) 4 MG TbDL Take 1 tablet (4 mg total) by mouth every 6 (six) hours as needed. 15 tablet 0     oxyCODONE (ROXICODONE) 5 MG immediate release tablet Take 1 tablet (5 mg total) by mouth every 6 (six) hours as needed for Pain. 8 tablet 0     promethazine (PHENERGAN) 25 MG tablet Take 1 tablet (25 mg total) by mouth every 6 (six) hours as needed for Nausea. 25 tablet 0 Past Week    propranolol (INDERAL) 10 MG tablet Take 1 tablet (10 mg  total) by mouth 2 (two) times daily. 60 tablet 2 11/21/2019    traZODone (DESYREL) 50 MG tablet Take 1 tablet (50 mg total) by mouth every evening. 30 tablet 5 11/22/2019       Review of patient's allergies indicates:  No Known Allergies    Past Medical History:   Diagnosis Date    Alcohol abuse     Anemia     Cancer     liver    Cirrhosis      Past Surgical History:   Procedure Laterality Date    ESOPHAGOGASTRODUODENOSCOPY Left 1/9/2019    Procedure: EGD (ESOPHAGOGASTRODUODENOSCOPY);  Surgeon: Madyson Farrar MD;  Location: [facility] ENDO;  Service: Endoscopy;  Laterality: Left;    PERITONEOCENTESIS N/A 1/9/2019    Procedure: PARACENTESIS, ABDOMINAL;  Surgeon: Everett Fitzpatrick MD;  Location: [facility] CATH LAB;  Service: Radiology;  Laterality: N/A;     Family History     None        Tobacco Use    Smoking status: Current Every Day Smoker     Packs/day: 0.50     Years: 30.00     Pack years: 15.00     Types: Cigarettes    Smokeless tobacco: Never Used    Tobacco comment: 2 cigarettes a day   Substance and Sexual Activity    Alcohol use: No     Frequency: Never     Comment: Previously drank 1 pint a day    Drug use: Not Currently     Types: Cocaine     Comment: last use was 3 years ago    Sexual activity: Not Currently     Review of Systems   Constitutional: Positive for fatigue. Negative for activity change, appetite change, chills, diaphoresis and unexpected weight change.   Respiratory: Positive for shortness of breath.    Cardiovascular: Negative for chest pain.   Gastrointestinal: Positive for abdominal pain and constipation. Negative for nausea and vomiting.   Musculoskeletal: Positive for back pain and gait problem.   Neurological: Positive for weakness and numbness. Negative for tremors, seizures, syncope, speech difficulty and light-headedness.     Objective:     Weight: 44 kg (97 lb)  Body mass index is 19.59 kg/m².  Vital Signs (Most Recent):  Temp: 99.7 °F (37.6 °C) (11/25/19 2245)  Pulse: 94  (11/25/19 2245)  Resp: 18 (11/25/19 2245)  BP: 136/76 (11/25/19 2245)  SpO2: 95 % (11/25/19 2245) Vital Signs (24h Range):  Temp:  [98.2 °F (36.8 °C)-99.7 °F (37.6 °C)] 99.7 °F (37.6 °C)  Pulse:  [75-94] 94  Resp:  [15-18] 18  SpO2:  [95 %-99 %] 95 %  BP: (107-137)/(66-82) 136/76                          Physical Exam:  Nursing note and vitals reviewed.    Neurological:   AOX3  PERRL, face symm  CNII-XII intact  Bilateral lower extremity strength 1/5 DP, 0/5 otherwise with decreased sensation and hyperreflexia.  Decreased sensation below umbilicus       Significant Labs:  Recent Labs   Lab 11/25/19  1153   GLU 87   *   K 4.4      CO2 22*   BUN 11   CREATININE 1.0   CALCIUM 8.9   MG 1.6     Recent Labs   Lab 11/25/19  1153   WBC 4.74   HGB 11.2*   HCT 34.8*        Recent Labs   Lab 11/25/19  1153   INR 1.4*     Microbiology Results (last 7 days)     ** No results found for the last 168 hours. **        All pertinent labs from the last 24 hours have been reviewed.    Significant Diagnostics:  CT: No results found in the last 24 hours.  MRI L spine: Acute compression fracture at T11 with almost 25% height loss and retropulsion of the posterior cortex into the spinal canal.  This results in severe canal stenosis and posterior cord displacement.  Subtle partially visualized T2 signal hyperintensity of the central cord proximally/cranially at T10, possibly representing myelomalacia.

## 2019-11-26 NOTE — SUBJECTIVE & OBJECTIVE
Interval History: NAEON. Neuro exam stable. Back pain persists, improved with current pain meds. LE weakness and decreased sensation bilaterally unchanged per pt. She reports decreased sensation around saddle area but denies bowel/bladder incontinence. Further imaging pending for surgical planning and metastatic workup.    Medications:  Continuous Infusions:  Scheduled Meds:   dexamethasone  4 mg Intravenous Q6H    famotidine  20 mg Oral Daily    ferrous sulfate  325 mg Oral Daily    furosemide  40 mg Oral Daily    propranolol  10 mg Oral BID     PRN Meds:Dextrose 10% Bolus, Dextrose 10% Bolus, glucagon (human recombinant), glucose, glucose, HYDROmorphone, iohexol, ondansetron, oxyCODONE, sodium chloride 0.9%     Review of Systems   Constitutional: Negative for chills, diaphoresis and fever.   HENT: Negative for rhinorrhea and trouble swallowing.    Eyes: Negative for photophobia and visual disturbance.   Respiratory: Negative for cough and shortness of breath.    Cardiovascular: Negative for chest pain and palpitations.   Gastrointestinal: Positive for abdominal distention. Negative for abdominal pain, nausea and vomiting.   Genitourinary: Negative for difficulty urinating and dysuria.   Musculoskeletal: Positive for back pain and gait problem. Negative for neck pain and neck stiffness.   Neurological: Positive for weakness and numbness. Negative for dizziness, tremors, seizures, speech difficulty and headaches.   Psychiatric/Behavioral: Negative for behavioral problems and confusion.     Objective:     Weight: 44 kg (97 lb)  Body mass index is 22.45 kg/m².  Vital Signs (Most Recent):  Temp: 98.6 °F (37 °C) (11/26/19 1137)  Pulse: 88 (11/26/19 1137)  Resp: 18 (11/26/19 1137)  BP: (!) 170/76 (11/26/19 1137)  SpO2: 98 % (11/26/19 1137) Vital Signs (24h Range):  Temp:  [98.4 °F (36.9 °C)-99.7 °F (37.6 °C)] 98.6 °F (37 °C)  Pulse:  [81-94] 88  Resp:  [15-18] 18  SpO2:  [92 %-100 %] 98 %  BP: ()/(58-76)  170/76                          Urethral Catheter 11/26/19 1201 Coude 16 Fr. (Active)       Neurosurgery Physical Exam    General: well developed, no distress. Cachectic with generalized muscle wasting  Head: normocephalic, atraumatic  Neck: No tracheal deviation. No palpable masses. Full ROM.   Neurologic: Alert and oriented. Thought content appropriate.  GCS: Motor: 6/Verbal: 5/Eyes: 4 GCS Total: 15  Mental Status: Awake, Alert, Oriented x 4  Language: No aphasia  Speech: No dysarthria  Cranial nerves: face symmetric, tongue midline, CN II-XII grossly intact.   Eyes: pupils equal, round, reactive to light with accomodation, EOMI.   Ears: No drainage.   Pulmonary: normal respirations, no signs of respiratory distress  Abdomen: soft, not tender to palpation. Distended    Sensory: Decreased sensation throughout BLE and perineal area, no complete loss of sensation    Motor Strength: Full strength upper extremities. No abnormal movements seen.      Strength  Deltoids Triceps Biceps Wrist Extension Wrist Flexion Hand    Upper: R 5/5 5/5 5/5 5/5 5/5 5/5    L 5/5 5/5 5/5 5/5 5/5 5/5     Iliopsoas Quadriceps Knee  Flexion Tibialis  anterior Gastro- cnemius EHL   Lower: R 1/5 1/5 1/5 0/5 0/5 0/5    L 1/5 1/5 1/5 0/5 0/5 0/5     DTR's - 2 + and symmetric in UE and LE  Pronator Drift: no drift noted  Finger-to-nose: Intact bilaterally  Garrett: absent  Clonus: absent  Babinski: absent  Vascular: Pulses 2+ and symmetric radial and dorsalis pedis. No LE edema.   Skin: Skin is warm, dry and intact.    Rectal: normal sphincter tone      Significant Labs:  Recent Labs   Lab 11/25/19  1153 11/26/19  0428   GLU 87 88   * 132*   K 4.4 3.9    101   CO2 22* 21*   BUN 11 11   CREATININE 1.0 0.9   CALCIUM 8.9 8.6*   MG 1.6 1.5*     Recent Labs   Lab 11/25/19  1153 11/26/19  0428   WBC 4.74 5.40   HGB 11.2* 9.4*   HCT 34.8* 30.3*    327     Recent Labs   Lab 11/25/19  1153 11/26/19  0428   INR 1.4* 1.4*   APTT  --   26.5     Microbiology Results (last 7 days)     ** No results found for the last 168 hours. **        All pertinent labs from the last 24 hours have been reviewed.    Significant Diagnostics:  I have reviewed and interpreted all pertinent imaging results/findings within the past 24 hours.

## 2019-11-26 NOTE — SUBJECTIVE & OBJECTIVE
Past Medical History:   Diagnosis Date    Alcohol abuse     Anemia     Cancer     liver    Cirrhosis        Past Surgical History:   Procedure Laterality Date    ESOPHAGOGASTRODUODENOSCOPY Left 1/9/2019    Procedure: EGD (ESOPHAGOGASTRODUODENOSCOPY);  Surgeon: Madyson Farrar MD;  Location: [facility] ENDO;  Service: Endoscopy;  Laterality: Left;    PERITONEOCENTESIS N/A 1/9/2019    Procedure: PARACENTESIS, ABDOMINAL;  Surgeon: Everett Fitzpatrick MD;  Location: [facility] CATH LAB;  Service: Radiology;  Laterality: N/A;       Review of patient's allergies indicates:  No Known Allergies    No current facility-administered medications on file prior to encounter.      Current Outpatient Medications on File Prior to Encounter   Medication Sig    (Magic mouthwash) 1:1:1 Benadryl 12.5mg/5ml liq, aluminum & magnesium hydroxide-simehticone (Maalox), lidocaine viscous 2% Swish and spit 10 mLs every 4 (four) hours as needed. for mouth sores    apixaban (ELIQUIS) 5 mg Tab Take 1 tablet (5 mg total) by mouth 2 (two) times daily.    cyproheptadine (PERIACTIN) 4 mg tablet Take 1 tablet (4 mg total) by mouth 3 (three) times daily as needed.    famotidine (PEPCID) 20 MG tablet Take 1 tablet (20 mg total) by mouth once daily.    ferrous sulfate (FEOSOL) 325 mg (65 mg iron) Tab tablet Take 1 tablet (325 mg total) by mouth 2 (two) times daily.    furosemide (LASIX) 40 MG tablet Take 1 tablet (40 mg total) by mouth once daily.    gabapentin (NEURONTIN) 100 MG capsule Take 1 capsule (100 mg total) by mouth every evening.    hydrocortisone 1 % cream Apply to affected area 2 times daily    lactulose (CHRONULAC) 10 gram/15 mL solution Take 15 mLs (10 g total) by mouth 2 (two) times daily.    magnesium oxide (MAGOX) 400 mg (241.3 mg magnesium) tablet Take 1 tablet (400 mg total) by mouth 2 (two) times daily.    multivitamin (ONE DAILY MULTIVITAMIN) per tablet Take 1 tablet by mouth once daily.    ondansetron (ZOFRAN-ODT) 4 MG TbDL Take  1 tablet (4 mg total) by mouth every 6 (six) hours as needed.    oxyCODONE (ROXICODONE) 5 MG immediate release tablet Take 1 tablet (5 mg total) by mouth every 6 (six) hours as needed for Pain.    promethazine (PHENERGAN) 25 MG tablet Take 1 tablet (25 mg total) by mouth every 6 (six) hours as needed for Nausea.    propranolol (INDERAL) 10 MG tablet Take 1 tablet (10 mg total) by mouth 2 (two) times daily.    traZODone (DESYREL) 50 MG tablet Take 1 tablet (50 mg total) by mouth every evening.     Family History     None        Tobacco Use    Smoking status: Current Every Day Smoker     Packs/day: 0.50     Years: 30.00     Pack years: 15.00     Types: Cigarettes    Smokeless tobacco: Never Used    Tobacco comment: 2 cigarettes a day   Substance and Sexual Activity    Alcohol use: No     Frequency: Never     Comment: Previously drank 1 pint a day    Drug use: Not Currently     Types: Cocaine     Comment: last use was 3 years ago    Sexual activity: Not Currently     Review of Systems   Constitutional: Positive for activity change, fatigue and unexpected weight change. Negative for chills and fever.   HENT: Negative for congestion and sore throat.    Eyes: Negative for photophobia and visual disturbance.   Respiratory: Negative for cough, chest tightness and shortness of breath.    Cardiovascular: Negative for chest pain, palpitations and leg swelling.   Gastrointestinal: Positive for abdominal distention and abdominal pain. Negative for blood in stool, nausea and vomiting.   Genitourinary: Negative for difficulty urinating and dysuria.   Musculoskeletal: Positive for back pain. Negative for arthralgias and joint swelling.   Skin: Negative for color change and rash.   Neurological: Positive for weakness. Negative for dizziness, numbness and headaches.   Psychiatric/Behavioral: Negative for behavioral problems and confusion. The patient is nervous/anxious.      Objective:     Vital Signs (Most Recent):  Temp:  99.7 °F (37.6 °C) (11/25/19 2245)  Pulse: 94 (11/25/19 2245)  Resp: 18 (11/25/19 2245)  BP: 136/76 (11/25/19 2245)  SpO2: 95 % (11/25/19 2245) Vital Signs (24h Range):  Temp:  [98.2 °F (36.8 °C)-99.7 °F (37.6 °C)] 99.7 °F (37.6 °C)  Pulse:  [75-94] 94  Resp:  [15-18] 18  SpO2:  [95 %-99 %] 95 %  BP: (107-137)/(66-82) 136/76     Weight: 44 kg (97 lb)  Body mass index is 19.59 kg/m².    Physical Exam   Constitutional: She is oriented to person, place, and time. She is cooperative. She appears ill.   HENT:   Head: Normocephalic and atraumatic.   Eyes: Pupils are equal, round, and reactive to light. Conjunctivae are normal. No scleral icterus.   Neck: No JVD present.   Cardiovascular: Normal rate, regular rhythm, normal heart sounds and intact distal pulses.   No murmur heard.  Pulmonary/Chest: Effort normal and breath sounds normal. No respiratory distress. She has no wheezes. She has no rales.   Abdominal: Bowel sounds are normal. She exhibits distension. There is tenderness. There is no rebound and no guarding.   Musculoskeletal: She exhibits no edema or tenderness.   Neurological: She is alert and oriented to person, place, and time. No sensory deficit.   Intact sensation in b/l lower extremities. 0/5 muscle strength in b/l lower extremities.    Skin: Skin is warm and dry. No erythema.   Psychiatric: She has a normal mood and affect. Her behavior is normal.         CRANIAL NERVES     CN III, IV, VI   Pupils are equal, round, and reactive to light.       Significant Labs:   CBC:   Recent Labs   Lab 11/25/19  1153   WBC 4.74   HGB 11.2*   HCT 34.8*        CMP:   Recent Labs   Lab 11/25/19  1153   *   K 4.4      CO2 22*   GLU 87   BUN 11   CREATININE 1.0   CALCIUM 8.9   PROT 7.8   ALBUMIN 2.8*   BILITOT 1.4*   ALKPHOS 138*   *   ALT 56*   ANIONGAP 10   EGFRNONAA >60.0     Lactic Acid:   Recent Labs   Lab 11/25/19  1254   LACTATE 1.6     Magnesium:   Recent Labs   Lab 11/25/19  1153   MG 1.6        Significant Imaging:MRI Lumbar Spine W/WO contrast.   Acute compression fracture at T11 with almost 25% height loss and retropulsion of the posterior cortex into the spinal canal.  This results in severe canal stenosis and posterior cord displacement.  Subtle partially visualized T2 signal hyperintensity of the central cord proximally/cranially at T10, possibly representing myelomalacia.

## 2019-11-26 NOTE — ASSESSMENT & PLAN NOTE
Last paracentesis was 2 weeks ago. Patient with distended abdomen states no significant increase since last paracentesis  Supposed to be on Lasix 40mg, but currenlty held by heme/onc due to recent PRACHI. Denies SOB.    Plan  - Continue Lasix 40mg daily

## 2019-11-26 NOTE — CONSULTS
[facility]  Neurosurgery  Consult Note    Consults  Subjective:     Chief Complaint/Reason for Admission: leg weakness    History of Present Illness: 64F pmh alcoholic cirrhosis, hepatocellular carcinoma presents with one month of progressive back pain and weakness, now with 5 days of severe BLE weakness and loss of sensation. Pt denies bowel or bladder incontinence. MRI L spine shows a large pathologic fracture in T11 vertebrae retropulsing into central canal causing severe cord compression.    Medications Prior to Admission   Medication Sig Dispense Refill Last Dose    (Magic mouthwash) 1:1:1 Benadryl 12.5mg/5ml liq, aluminum & magnesium hydroxide-simehticone (Maalox), lidocaine viscous 2% Swish and spit 10 mLs every 4 (four) hours as needed. for mouth sores 450 mL 5 Unknown    apixaban (ELIQUIS) 5 mg Tab Take 1 tablet (5 mg total) by mouth 2 (two) times daily. 60 tablet 4 11/22/2019    cyproheptadine (PERIACTIN) 4 mg tablet Take 1 tablet (4 mg total) by mouth 3 (three) times daily as needed. 90 tablet 2 11/22/2019    famotidine (PEPCID) 20 MG tablet Take 1 tablet (20 mg total) by mouth once daily. 30 tablet 5 11/21/2019    ferrous sulfate (FEOSOL) 325 mg (65 mg iron) Tab tablet Take 1 tablet (325 mg total) by mouth 2 (two) times daily. 30 tablet 5 11/22/2019    furosemide (LASIX) 40 MG tablet Take 1 tablet (40 mg total) by mouth once daily. 30 tablet 5 11/22/2019    gabapentin (NEURONTIN) 100 MG capsule Take 1 capsule (100 mg total) by mouth every evening. 30 capsule 2 More than a month    hydrocortisone 1 % cream Apply to affected area 2 times daily 140 g 1 More than a month    lactulose (CHRONULAC) 10 gram/15 mL solution Take 15 mLs (10 g total) by mouth 2 (two) times daily. 900 mL 3 11/21/2019    magnesium oxide (MAGOX) 400 mg (241.3 mg magnesium) tablet Take 1 tablet (400 mg total) by mouth 2 (two) times daily. 60 tablet 3 11/22/2019    multivitamin (ONE DAILY MULTIVITAMIN) per  tablet Take 1 tablet by mouth once daily.   11/22/2019    ondansetron (ZOFRAN-ODT) 4 MG TbDL Take 1 tablet (4 mg total) by mouth every 6 (six) hours as needed. 15 tablet 0     oxyCODONE (ROXICODONE) 5 MG immediate release tablet Take 1 tablet (5 mg total) by mouth every 6 (six) hours as needed for Pain. 8 tablet 0     promethazine (PHENERGAN) 25 MG tablet Take 1 tablet (25 mg total) by mouth every 6 (six) hours as needed for Nausea. 25 tablet 0 Past Week    propranolol (INDERAL) 10 MG tablet Take 1 tablet (10 mg total) by mouth 2 (two) times daily. 60 tablet 2 11/21/2019    traZODone (DESYREL) 50 MG tablet Take 1 tablet (50 mg total) by mouth every evening. 30 tablet 5 11/22/2019       Review of patient's allergies indicates:  No Known Allergies    Past Medical History:   Diagnosis Date    Alcohol abuse     Anemia     Cancer     liver    Cirrhosis      Past Surgical History:   Procedure Laterality Date    ESOPHAGOGASTRODUODENOSCOPY Left 1/9/2019    Procedure: EGD (ESOPHAGOGASTRODUODENOSCOPY);  Surgeon: Madyson Farrar MD;  Location: [facility] ENDO;  Service: Endoscopy;  Laterality: Left;    PERITONEOCENTESIS N/A 1/9/2019    Procedure: PARACENTESIS, ABDOMINAL;  Surgeon: Everett Fitzpatrick MD;  Location: [facility] CATH LAB;  Service: Radiology;  Laterality: N/A;     Family History     None        Tobacco Use    Smoking status: Current Every Day Smoker     Packs/day: 0.50     Years: 30.00     Pack years: 15.00     Types: Cigarettes    Smokeless tobacco: Never Used    Tobacco comment: 2 cigarettes a day   Substance and Sexual Activity    Alcohol use: No     Frequency: Never     Comment: Previously drank 1 pint a day    Drug use: Not Currently     Types: Cocaine     Comment: last use was 3 years ago    Sexual activity: Not Currently     Review of Systems   Constitutional: Positive for fatigue. Negative for activity change, appetite change, chills, diaphoresis and unexpected weight change.   Respiratory: Positive  for shortness of breath.    Cardiovascular: Negative for chest pain.   Gastrointestinal: Positive for abdominal pain and constipation. Negative for nausea and vomiting.   Musculoskeletal: Positive for back pain and gait problem.   Neurological: Positive for weakness and numbness. Negative for tremors, seizures, syncope, speech difficulty and light-headedness.     Objective:     Weight: 44 kg (97 lb)  Body mass index is 19.59 kg/m².  Vital Signs (Most Recent):  Temp: 99.7 °F (37.6 °C) (11/25/19 2245)  Pulse: 94 (11/25/19 2245)  Resp: 18 (11/25/19 2245)  BP: 136/76 (11/25/19 2245)  SpO2: 95 % (11/25/19 2245) Vital Signs (24h Range):  Temp:  [98.2 °F (36.8 °C)-99.7 °F (37.6 °C)] 99.7 °F (37.6 °C)  Pulse:  [75-94] 94  Resp:  [15-18] 18  SpO2:  [95 %-99 %] 95 %  BP: (107-137)/(66-82) 136/76                          Physical Exam:  Nursing note and vitals reviewed.    Neurological:   AOX3  PERRL, face symm  CNII-XII intact  Bilateral lower extremity strength 1/5 DP, 0/5 otherwise with decreased sensation and hyperreflexia.  Decreased sensation below umbilicus       Significant Labs:  Recent Labs   Lab 11/25/19  1153   GLU 87   *   K 4.4      CO2 22*   BUN 11   CREATININE 1.0   CALCIUM 8.9   MG 1.6     Recent Labs   Lab 11/25/19  1153   WBC 4.74   HGB 11.2*   HCT 34.8*        Recent Labs   Lab 11/25/19  1153   INR 1.4*     Microbiology Results (last 7 days)     ** No results found for the last 168 hours. **        All pertinent labs from the last 24 hours have been reviewed.    Significant Diagnostics:  CT: No results found in the last 24 hours.  MRI L spine: Acute compression fracture at T11 with almost 25% height loss and retropulsion of the posterior cortex into the spinal canal.  This results in severe canal stenosis and posterior cord displacement.  Subtle partially visualized T2 signal hyperintensity of the central cord proximally/cranially at T10, possibly representing  myelomalacia.    Assessment/Plan:     Weakness of both lower extremities  64F pmh alcoholic cirrhosis, hepatocellular carcinoma presents with one month of progressive back pain and weakness, now with 5 days of severe BLE weakness and loss of sensation. Pt denies bowel or bladder incontinence. MRI L spine shows a large pathologic fracture in T11 vertebrae retropulsing into central canal causing severe cord compression.    Plan:  No immediate surgical intervention tonight  Admit [facility] medicine  Dexamethasone 4q6  F/u full axis MRI C/T/L spine w and w/o contrast when appropriate  Obtain medical clearance for surgery early tomorrow.  NPO midnight          Thank you for your consult. I will follow-up with patient. Please contact us if you have any additional questions.    Josafat Biggs MD  Neurosurgery  [facility]

## 2019-11-26 NOTE — ASSESSMENT & PLAN NOTE
64F pmh alcoholic cirrhosis, hepatocellular carcinoma presents with one month of progressive back pain and weakness, now with 5 days of severe BLE weakness and loss of sensation. Pt denies bowel or bladder incontinence. 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    -Admitted to   -MRI L-spine w/wo 11/25 personally reviewed/interpreted: acute pathologic compression fracture of T11 with ~25% loss of height. There is associated retropulsion into the spinal canal causing severe cord compression.  -Neuro exam stable without acute worsening since admission  -No immediate surgical intervention necessary at this time  -F/u full axis MRI C/T/L spine w and w/o contrast 24 hrs after last contrasted scan. Will obtain CT T/L spine as well to evaluate osseous structures for surgical planning  -Continue neuro checks q4h  -Continue Dexamethasone 4mg IV q6h  -Preoperative clearance: appreciate  assistance.    -Surgery is Interemediate risk   -RCRI Calculator Class and Risk percentage:  1 predictor,  which is a 6.0% risk of adverse cardiac event in 30 DAYS.   -ECG and echocardiogram completed, normal diastolic and systolic function at this time.  No further cardiac testing indicated.   -Cirrhosis well compensated at this time, MELD 11   -Anticoagulation: on Eliquis long-term for history of PE's. Last dose 11/22, continue to hold in preop setting.   -Metastatic Workup: CT chest/abdomen/pelvis w/ contrast pending to evaluate for mets prior to surgery  -Chronic Pulmonary Embolism: Continue to hold home Eliquis for possible surgical intervention  -HCC: Follows with Heme/Onc outpt, last visit 11/8 with Dr. Roberts. Not a transplant candidate. Previously on immunotherapy, currently held 2/2 severe PRACHI (now resolved).  -Alcoholic Cirrhosis: Well compensated. MELD score 11.  -Surgical plan pending completion of additional imaging for workup and planning,  will update recs as imaging completed.  -NSGY will continue to follow. Please call with any questions or changes in neuro exam.    Discussed with Dr. Carcamo

## 2019-11-26 NOTE — ED NOTES
At 2050- pt complaining of pain level of 9 on 0-10 scale. MD Musa notified of pt pain level. Awaiting orders at this time.

## 2019-11-26 NOTE — ASSESSMENT & PLAN NOTE
Follows with heme/onc not a transplant candidate. Previously on Nivolumab immunotherapy but currently held due to recent PRACHI.    Plan  - F/u outpatient with heme/onc

## 2019-11-26 NOTE — PROGRESS NOTES
[facility]  Neurosurgery  Progress Note    Subjective:     History of Present Illness: 64F pmh alcoholic cirrhosis, hepatocellular carcinoma presents with one month of progressive back pain and weakness, now with 5 days of severe BLE weakness and loss of sensation. Pt denies bowel or bladder incontinence. MRI L spine shows a large pathologic fracture in T11 vertebrae retropulsing into central canal causing severe cord compression.    Post-Op Info:  * No surgery found *         Interval History: NAEON. Neuro exam stable. Back pain persists, improved with current pain meds. LE weakness and decreased sensation bilaterally unchanged per pt. She reports decreased sensation around saddle area but denies bowel/bladder incontinence. Further imaging pending for surgical planning and metastatic workup.    Medications:  Continuous Infusions:  Scheduled Meds:   dexamethasone  4 mg Intravenous Q6H    famotidine  20 mg Oral Daily    ferrous sulfate  325 mg Oral Daily    furosemide  40 mg Oral Daily    propranolol  10 mg Oral BID     PRN Meds:Dextrose 10% Bolus, Dextrose 10% Bolus, glucagon (human recombinant), glucose, glucose, HYDROmorphone, iohexol, ondansetron, oxyCODONE, sodium chloride 0.9%     Review of Systems   Constitutional: Negative for chills, diaphoresis and fever.   HENT: Negative for rhinorrhea and trouble swallowing.    Eyes: Negative for photophobia and visual disturbance.   Respiratory: Negative for cough and shortness of breath.    Cardiovascular: Negative for chest pain and palpitations.   Gastrointestinal: Positive for abdominal distention. Negative for abdominal pain, nausea and vomiting.   Genitourinary: Negative for difficulty urinating and dysuria.   Musculoskeletal: Positive for back pain and gait problem. Negative for neck pain and neck stiffness.   Neurological: Positive for weakness and numbness. Negative for dizziness, tremors, seizures, speech difficulty and headaches.    Psychiatric/Behavioral: Negative for behavioral problems and confusion.     Objective:     Weight: 44 kg (97 lb)  Body mass index is 22.45 kg/m².  Vital Signs (Most Recent):  Temp: 98.6 °F (37 °C) (11/26/19 1137)  Pulse: 88 (11/26/19 1137)  Resp: 18 (11/26/19 1137)  BP: (!) 170/76 (11/26/19 1137)  SpO2: 98 % (11/26/19 1137) Vital Signs (24h Range):  Temp:  [98.4 °F (36.9 °C)-99.7 °F (37.6 °C)] 98.6 °F (37 °C)  Pulse:  [81-94] 88  Resp:  [15-18] 18  SpO2:  [92 %-100 %] 98 %  BP: ()/(58-76) 170/76                          Urethral Catheter 11/26/19 1201 Coude 16 Fr. (Active)       Neurosurgery Physical Exam    General: well developed, no distress. Cachectic with generalized muscle wasting  Head: normocephalic, atraumatic  Neck: No tracheal deviation. No palpable masses. Full ROM.   Neurologic: Alert and oriented. Thought content appropriate.  GCS: Motor: 6/Verbal: 5/Eyes: 4 GCS Total: 15  Mental Status: Awake, Alert, Oriented x 4  Language: No aphasia  Speech: No dysarthria  Cranial nerves: face symmetric, tongue midline, CN II-XII grossly intact.   Eyes: pupils equal, round, reactive to light with accomodation, EOMI.   Ears: No drainage.   Pulmonary: normal respirations, no signs of respiratory distress  Abdomen: soft, not tender to palpation. Distended    Sensory: Decreased sensation throughout BLE and perineal area, no complete loss of sensation    Motor Strength: Full strength upper extremities. No abnormal movements seen.      Strength  Deltoids Triceps Biceps Wrist Extension Wrist Flexion Hand    Upper: R 5/5 5/5 5/5 5/5 5/5 5/5    L 5/5 5/5 5/5 5/5 5/5 5/5     Iliopsoas Quadriceps Knee  Flexion Tibialis  anterior Gastro- cnemius EHL   Lower: R 1/5 1/5 1/5 0/5 0/5 0/5    L 1/5 1/5 1/5 0/5 0/5 0/5     DTR's - 2 + and symmetric in UE and LE  Pronator Drift: no drift noted  Finger-to-nose: Intact bilaterally  Garrett: absent  Clonus: absent  Babinski: absent  Vascular: Pulses 2+ and symmetric radial and  dorsalis pedis. No LE edema.   Skin: Skin is warm, dry and intact.    Rectal: normal sphincter tone      Significant Labs:  Recent Labs   Lab 11/25/19  1153 11/26/19  0428   GLU 87 88   * 132*   K 4.4 3.9    101   CO2 22* 21*   BUN 11 11   CREATININE 1.0 0.9   CALCIUM 8.9 8.6*   MG 1.6 1.5*     Recent Labs   Lab 11/25/19  1153 11/26/19  0428   WBC 4.74 5.40   HGB 11.2* 9.4*   HCT 34.8* 30.3*    327     Recent Labs   Lab 11/25/19  1153 11/26/19  0428   INR 1.4* 1.4*   APTT  --  26.5     Microbiology Results (last 7 days)     ** No results found for the last 168 hours. **        All pertinent labs from the last 24 hours have been reviewed.    Significant Diagnostics:  I have reviewed and interpreted all pertinent imaging results/findings within the past 24 hours.    Assessment/Plan:     * Spinal cord compression  64F pmh alcoholic cirrhosis, hepatocellular carcinoma presents with one month of progressive back pain and weakness, now with 5 days of severe BLE weakness and loss of sensation. Pt denies bowel or bladder incontinence. 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    -Admitted to   -MRI L-spine w/wo 11/25 personally reviewed/interpreted: acute pathologic compression fracture of T11 with ~25% loss of height. There is associated retropulsion into the spinal canal causing severe cord compression.  -Neuro exam stable without acute worsening since admission  -No immediate surgical intervention necessary at this time  -F/u full axis MRI C/T/L spine w and w/o contrast 24 hrs after last contrasted scan. Will obtain CT T/L spine as well to evaluate osseous structures for surgical planning  -Continue neuro checks q4h  -Continue Dexamethasone 4mg IV q6h  -Preoperative clearance: appreciate  assistance.    -Surgery is Interemediate risk   -RCRI Calculator Class and Risk percentage:  1 predictor,  which is a 6.0% risk of  adverse cardiac event in 30 DAYS.   -ECG and echocardiogram completed, normal diastolic and systolic function at this time.  No further cardiac testing indicated.   -Cirrhosis well compensated at this time, MELD 11   -Anticoagulation: on Eliquis long-term for history of PE's. Last dose 11/22, continue to hold in preop setting.   -Metastatic Workup: CT chest/abdomen/pelvis w/ contrast pending to evaluate for mets prior to surgery  -Chronic Pulmonary Embolism: Continue to hold home Eliquis for possible surgical intervention  -HCC: Follows with Heme/Onc outpt, last visit 11/8 with Dr. Roberts. Not a transplant candidate. Previously on immunotherapy, currently held 2/2 severe PRACHI (now resolved).  -Alcoholic Cirrhosis: Well compensated. MELD score 11.  -Surgical plan pending completion of additional imaging for workup and planning, will update recs as imaging completed.  -NSGY will continue to follow. Please call with any questions or changes in neuro exam.    Discussed with Dr. Carlos Alberto Gu PARenC  Neurosurgery  [facility]

## 2019-11-26 NOTE — ASSESSMENT & PLAN NOTE
Surgical Risk Assessment   Active cardiac issues:  Active decompensated heart failure? No   Unstable angina?  No   Significant uncontrolled arrhythmias? No   Severe valvular heart disease-Aortic or Mitral Stenosis? No   Recent MI or coronary revascularization < 30 days? No     Cardiac Risk Factors  History of CAD/ischemic heart disease? No   History of cerebrovascular disease? No   History of compensated heart failure? No   Type 2 diabetes requiring insulin? No   Serum Creatinine > 2? No   Total cardiac risk factors 0     Assessment/Plan:   Cardiovascular Risk Assessment:  Non-emergent surgery.  No active cardiac problems (such as unstable angina, decompensated heart failure, significant uncontrolled arrhythmias or severe valvular disease).  Surgery is _Interemediate risk  Functional Status: Functional mets  < 4 METS. Poor functional status, unable to perform ADLS at this time.    Revised Cardiac Risk Index   1 predictor = 6.0%  RCRI Calculator Class and Risk percentage:  1 predictor,  which is a 6.0% risk of adverse cardiac event in 30 DAYS.    Anticoagulation: On Apixaban for b/l  Pulmonary embolism.

## 2019-11-26 NOTE — HPI
"Ms. Marks is a 63-year-old  female with Hepatocellular carcinoma, alcoholic cirrhosis( dx 2015), portal HTN, ascites, hx of variceal bleeding in January 2018 s/p banding,B/l pulmonary embolism,  hx of ETOH abuse (quit Jan 2018) who presents to the ED with back pain and b/l lower extremity weakness. Patient states that symptoms have been on going for the past week and half and have progressed to the point where she cannot lift her leg against gravity or walk. She decribes "electric shocklike" sensations down her legs bilaterally. She denies any falls or trauma. She complains of back pain and LUQ & LLQ abdominal pain that radiates to her back. She denies any bladder/bowel incontinence. She denies fever, chills, vomiting. She    In ED, MRI with Acute compression fracture at T11 with almost 25% height loss and retropulsion of the posterior cortex into the spinal canal consistent with severe canal stenosis and posterior cord displacement  "

## 2019-11-26 NOTE — HPI
64F pmh alcoholic cirrhosis, hepatocellular carcinoma presents with one month of progressive back pain and weakness, now with 5 days of severe BLE weakness and loss of sensation. Pt denies bowel or bladder incontinence. MRI L spine shows a large pathologic fracture in T10 vertebrae retropulsing into central canal causing severe cord compression.

## 2019-11-26 NOTE — ASSESSMENT & PLAN NOTE
64F pmh alcoholic cirrhosis, hepatocellular carcinoma presents with one month of progressive back pain and weakness, now with 5 days of severe BLE weakness and loss of sensation. Pt denies bowel or bladder incontinence. MRI L spine shows a large pathologic fracture in T11 vertebrae retropulsing into central canal causing severe cord compression.    Plan:  No immediate surgical intervention tonight  Admit [facility] medicine  Dexamethasone 4q6  F/u full axis MRI C/T/L spine w and w/o contrast when appropriate  Obtain medical clearance for surgery early tomorrow.  NPO midnight

## 2019-11-26 NOTE — ASSESSMENT & PLAN NOTE
65 yo female with Hepatocellular carcinoma, alcoholic cirrhosis, ascites, b/l pulmonary PE presents with b/l lower extremity weakness and back pain. Found to have compression fracture of T11 with canal stenosis and posterior cord displacement. Patient with sensation intact but 0/5 muscle strength in b/l lower ext.    Plan  - Consult to neurosurgery, reccs appreciated  - Continue Dexamethasone 4mg IV q6h   - Per neurosurgery patient will need surgery, but will round on patient in the a.m for further reccs. Medical clearance requested.  - NPO  - Dilaudid 0.5mg prn pain, Oxy IR q6h prn pain  - MRI cervical, thoracic spine

## 2019-11-27 PROBLEM — I82.220 IVC THROMBOSIS: Status: ACTIVE | Noted: 2019-11-27

## 2019-11-27 PROBLEM — N13.9 ACUTE URINARY OBSTRUCTION: Status: ACTIVE | Noted: 2019-11-27

## 2019-11-27 PROBLEM — C79.51 SPINAL CORD COMPRESSION DUE TO MALIGNANT NEOPLASM METASTATIC TO SPINE: Status: ACTIVE | Noted: 2019-11-25

## 2019-11-27 PROBLEM — J90 PLEURAL EFFUSION, RIGHT: Status: ACTIVE | Noted: 2019-11-27

## 2019-11-27 PROBLEM — R91.8 PULMONARY NODULES/LESIONS, MULTIPLE: Status: ACTIVE | Noted: 2019-11-27

## 2019-11-27 PROBLEM — E27.8 ADRENAL NODULE: Status: ACTIVE | Noted: 2019-11-27

## 2019-11-27 PROBLEM — E83.42 HYPOMAGNESEMIA: Status: ACTIVE | Noted: 2019-11-27

## 2019-11-27 PROBLEM — M84.58XA PATHOLOGICAL FRACTURE OF VERTEBRA DUE TO NEOPLASTIC DISEASE: Status: ACTIVE | Noted: 2019-11-26

## 2019-11-27 PROBLEM — G95.29 SPINAL CORD COMPRESSION DUE TO MALIGNANT NEOPLASM METASTATIC TO SPINE: Status: ACTIVE | Noted: 2019-11-25

## 2019-11-27 LAB
ABO + RH BLD: NORMAL
ALBUMIN SERPL BCP-MCNC: 2.4 G/DL (ref 3.5–5.2)
ALP SERPL-CCNC: 119 U/L (ref 55–135)
ALT SERPL W/O P-5'-P-CCNC: 44 U/L (ref 10–44)
ANION GAP SERPL CALC-SCNC: 8 MMOL/L (ref 8–16)
AST SERPL-CCNC: 64 U/L (ref 10–40)
BASOPHILS # BLD AUTO: 0.01 K/UL (ref 0–0.2)
BASOPHILS NFR BLD: 0.2 % (ref 0–1.9)
BILIRUB SERPL-MCNC: 0.9 MG/DL (ref 0.1–1)
BLD GP AB SCN CELLS X3 SERPL QL: NORMAL
BUN SERPL-MCNC: 14 MG/DL (ref 8–23)
CALCIUM SERPL-MCNC: 8.2 MG/DL (ref 8.7–10.5)
CHLORIDE SERPL-SCNC: 100 MMOL/L (ref 95–110)
CO2 SERPL-SCNC: 23 MMOL/L (ref 23–29)
CREAT SERPL-MCNC: 0.9 MG/DL (ref 0.5–1.4)
DIFFERENTIAL METHOD: ABNORMAL
EOSINOPHIL # BLD AUTO: 0 K/UL (ref 0–0.5)
EOSINOPHIL NFR BLD: 0 % (ref 0–8)
ERYTHROCYTE [DISTWIDTH] IN BLOOD BY AUTOMATED COUNT: 20.9 % (ref 11.5–14.5)
EST. GFR  (AFRICAN AMERICAN): >60 ML/MIN/1.73 M^2
EST. GFR  (NON AFRICAN AMERICAN): >60 ML/MIN/1.73 M^2
GLUCOSE SERPL-MCNC: 206 MG/DL (ref 70–110)
HCT VFR BLD AUTO: 29.6 % (ref 37–48.5)
HGB BLD-MCNC: 9.4 G/DL (ref 12–16)
IMM GRANULOCYTES # BLD AUTO: 0.04 K/UL (ref 0–0.04)
IMM GRANULOCYTES NFR BLD AUTO: 0.9 % (ref 0–0.5)
INR PPP: 1.4 (ref 0.8–1.2)
LYMPHOCYTES # BLD AUTO: 1.2 K/UL (ref 1–4.8)
LYMPHOCYTES NFR BLD: 24.5 % (ref 18–48)
MAGNESIUM SERPL-MCNC: 1.5 MG/DL (ref 1.6–2.6)
MCH RBC QN AUTO: 22.2 PG (ref 27–31)
MCHC RBC AUTO-ENTMCNC: 31.8 G/DL (ref 32–36)
MCV RBC AUTO: 70 FL (ref 82–98)
MONOCYTES # BLD AUTO: 0.3 K/UL (ref 0.3–1)
MONOCYTES NFR BLD: 6.2 % (ref 4–15)
NEUTROPHILS # BLD AUTO: 3.2 K/UL (ref 1.8–7.7)
NEUTROPHILS NFR BLD: 68.2 % (ref 38–73)
NRBC BLD-RTO: 0 /100 WBC
PHOSPHATE SERPL-MCNC: 2.9 MG/DL (ref 2.7–4.5)
PLATELET # BLD AUTO: 308 K/UL (ref 150–350)
PMV BLD AUTO: 10.2 FL (ref 9.2–12.9)
POTASSIUM SERPL-SCNC: 4 MMOL/L (ref 3.5–5.1)
PROT SERPL-MCNC: 6.4 G/DL (ref 6–8.4)
PROTHROMBIN TIME: 14 SEC (ref 9–12.5)
RBC # BLD AUTO: 4.23 M/UL (ref 4–5.4)
SODIUM SERPL-SCNC: 131 MMOL/L (ref 136–145)
WBC # BLD AUTO: 4.7 K/UL (ref 3.9–12.7)

## 2019-11-27 PROCEDURE — A9585 GADOBUTROL INJECTION: HCPCS | Performed by: INTERNAL MEDICINE

## 2019-11-27 PROCEDURE — 25500020 PHARM REV CODE 255: Performed by: INTERNAL MEDICINE

## 2019-11-27 PROCEDURE — 84100 ASSAY OF PHOSPHORUS: CPT

## 2019-11-27 PROCEDURE — 63600175 PHARM REV CODE 636 W HCPCS: Performed by: STUDENT IN AN ORGANIZED HEALTH CARE EDUCATION/TRAINING PROGRAM

## 2019-11-27 PROCEDURE — 86850 RBC ANTIBODY SCREEN: CPT

## 2019-11-27 PROCEDURE — 99233 PR SUBSEQUENT HOSPITAL CARE,LEVL III: ICD-10-PCS | Mod: ,,, | Performed by: NEUROLOGICAL SURGERY

## 2019-11-27 PROCEDURE — 85025 COMPLETE CBC W/AUTO DIFF WBC: CPT

## 2019-11-27 PROCEDURE — 11000001 HC ACUTE MED/SURG PRIVATE ROOM

## 2019-11-27 PROCEDURE — 25000003 PHARM REV CODE 250: Performed by: STUDENT IN AN ORGANIZED HEALTH CARE EDUCATION/TRAINING PROGRAM

## 2019-11-27 PROCEDURE — 83735 ASSAY OF MAGNESIUM: CPT

## 2019-11-27 PROCEDURE — 99233 SBSQ HOSP IP/OBS HIGH 50: CPT | Mod: ,,, | Performed by: NEUROLOGICAL SURGERY

## 2019-11-27 PROCEDURE — 36415 COLL VENOUS BLD VENIPUNCTURE: CPT

## 2019-11-27 PROCEDURE — 63600175 PHARM REV CODE 636 W HCPCS: Performed by: INTERNAL MEDICINE

## 2019-11-27 PROCEDURE — 99233 SBSQ HOSP IP/OBS HIGH 50: CPT | Mod: ,,, | Performed by: INTERNAL MEDICINE

## 2019-11-27 PROCEDURE — 99233 PR SUBSEQUENT HOSPITAL CARE,LEVL III: ICD-10-PCS | Mod: ,,, | Performed by: INTERNAL MEDICINE

## 2019-11-27 PROCEDURE — 80053 COMPREHEN METABOLIC PANEL: CPT

## 2019-11-27 PROCEDURE — 85610 PROTHROMBIN TIME: CPT

## 2019-11-27 RX ORDER — INSULIN ASPART 100 [IU]/ML
0-5 INJECTION, SOLUTION INTRAVENOUS; SUBCUTANEOUS EVERY 6 HOURS PRN
Status: DISCONTINUED | OUTPATIENT
Start: 2019-11-27 | End: 2019-12-06 | Stop reason: HOSPADM

## 2019-11-27 RX ORDER — NALOXONE HCL 0.4 MG/ML
0.4 VIAL (ML) INJECTION
Status: DISCONTINUED | OUTPATIENT
Start: 2019-11-27 | End: 2019-12-06 | Stop reason: HOSPADM

## 2019-11-27 RX ORDER — LANOLIN ALCOHOL/MO/W.PET/CERES
400 CREAM (GRAM) TOPICAL DAILY
Status: DISCONTINUED | OUTPATIENT
Start: 2019-11-28 | End: 2019-12-05

## 2019-11-27 RX ORDER — MAGNESIUM SULFATE HEPTAHYDRATE 40 MG/ML
2 INJECTION, SOLUTION INTRAVENOUS ONCE
Status: COMPLETED | OUTPATIENT
Start: 2019-11-27 | End: 2019-11-27

## 2019-11-27 RX ORDER — GLUCAGON 1 MG
1 KIT INJECTION
Status: DISCONTINUED | OUTPATIENT
Start: 2019-11-27 | End: 2019-12-06 | Stop reason: HOSPADM

## 2019-11-27 RX ADMIN — DEXAMETHASONE SODIUM PHOSPHATE 4 MG: 4 INJECTION, SOLUTION INTRAMUSCULAR; INTRAVENOUS at 05:11

## 2019-11-27 RX ADMIN — FUROSEMIDE 40 MG: 40 TABLET ORAL at 09:11

## 2019-11-27 RX ADMIN — PROPRANOLOL HYDROCHLORIDE 10 MG: 10 TABLET ORAL at 08:11

## 2019-11-27 RX ADMIN — DEXAMETHASONE SODIUM PHOSPHATE 4 MG: 4 INJECTION, SOLUTION INTRAMUSCULAR; INTRAVENOUS at 11:11

## 2019-11-27 RX ADMIN — HYDROMORPHONE HYDROCHLORIDE 1 MG: 1 INJECTION, SOLUTION INTRAMUSCULAR; INTRAVENOUS; SUBCUTANEOUS at 12:11

## 2019-11-27 RX ADMIN — PROPRANOLOL HYDROCHLORIDE 10 MG: 10 TABLET ORAL at 09:11

## 2019-11-27 RX ADMIN — MAGNESIUM SULFATE IN WATER 2 G: 40 INJECTION, SOLUTION INTRAVENOUS at 04:11

## 2019-11-27 RX ADMIN — FERROUS SULFATE TAB EC 325 MG (65 MG FE EQUIVALENT) 325 MG: 325 (65 FE) TABLET DELAYED RESPONSE at 09:11

## 2019-11-27 RX ADMIN — FAMOTIDINE 20 MG: 20 TABLET ORAL at 09:11

## 2019-11-27 RX ADMIN — SODIUM CHLORIDE: 0.9 INJECTION, SOLUTION INTRAVENOUS at 06:11

## 2019-11-27 RX ADMIN — OXYCODONE HYDROCHLORIDE 10 MG: 10 TABLET ORAL at 11:11

## 2019-11-27 RX ADMIN — DEXAMETHASONE SODIUM PHOSPHATE 4 MG: 4 INJECTION, SOLUTION INTRAMUSCULAR; INTRAVENOUS at 06:11

## 2019-11-27 RX ADMIN — HYDROMORPHONE HYDROCHLORIDE 1 MG: 1 INJECTION, SOLUTION INTRAMUSCULAR; INTRAVENOUS; SUBCUTANEOUS at 08:11

## 2019-11-27 NOTE — SUBJECTIVE & OBJECTIVE
Interval History: Pt to OR today for class D T10 lateral corpectomy with posterior T8-T12 percutaneous instrumentation.    Medications:  Continuous Infusions:   sodium chloride 0.9% 100 mL/hr at 11/27/19 0605     Scheduled Meds:   dexamethasone  4 mg Intravenous Q6H    famotidine  20 mg Oral Daily    ferrous sulfate  325 mg Oral Daily    furosemide  40 mg Oral Daily    [START ON 11/28/2019] magnesium oxide  400 mg Oral Daily    magnesium sulfate IVPB  2 g Intravenous Once    propranolol  10 mg Oral BID     PRN Meds:Dextrose 10% Bolus, Dextrose 10% Bolus, glucagon (human recombinant), glucose, glucose, HYDROmorphone, insulin aspart U-100, naloxone, ondansetron, oxyCODONE, sodium chloride 0.9%     Review of Systems   Constitutional: Negative for chills, diaphoresis and fever.   HENT: Negative for rhinorrhea and trouble swallowing.    Eyes: Negative for photophobia and visual disturbance.   Respiratory: Negative for cough and shortness of breath.    Cardiovascular: Negative for chest pain and palpitations.   Gastrointestinal: Positive for abdominal distention. Negative for abdominal pain, nausea and vomiting.   Genitourinary: Negative for difficulty urinating and dysuria.   Musculoskeletal: Positive for back pain and gait problem. Negative for neck pain and neck stiffness.   Neurological: Positive for weakness and numbness. Negative for dizziness, tremors, seizures, speech difficulty and headaches.   Psychiatric/Behavioral: Negative for behavioral problems and confusion.     Objective:     Weight: 44 kg (97 lb)  Body mass index is 22.45 kg/m².  Vital Signs (Most Recent):  Temp: 98.3 °F (36.8 °C) (11/27/19 1610)  Pulse: 64 (11/27/19 1610)  Resp: 18 (11/27/19 1610)  BP: (!) 98/56 (11/27/19 1610)  SpO2: 96 % (11/27/19 1610) Vital Signs (24h Range):  Temp:  [97.9 °F (36.6 °C)-99 °F (37.2 °C)] 98.3 °F (36.8 °C)  Pulse:  [64-78] 64  Resp:  [18-20] 18  SpO2:  [95 %-100 %] 96 %  BP: ()/(56-66) 98/56                           Urethral Catheter 11/26/19 1201 Coude 16 Fr. (Active)       Neurosurgery Physical Exam      General: well developed, no distress. Cachectic with generalized muscle wasting  Head: normocephalic, atraumatic  Neck: No tracheal deviation. No palpable masses. Full ROM.   Neurologic: Alert and oriented. Thought content appropriate.  GCS: Motor: 6/Verbal: 5/Eyes: 4 GCS Total: 15  Mental Status: Awake, Alert, Oriented x 4  Language: No aphasia  Speech: No dysarthria  Cranial nerves: face symmetric, tongue midline, CN II-XII grossly intact.   Eyes: pupils equal, round, reactive to light with accomodation, EOMI.   Ears: No drainage.   Pulmonary: normal respirations, no signs of respiratory distress  Abdomen: soft, not tender to palpation. Distended    Sensory: Decreased sensation throughout BLE and perineal area, no complete loss of sensation    Motor Strength: Full strength upper extremities. No abnormal movements seen.      Strength  Deltoids Triceps Biceps Wrist Extension Wrist Flexion Hand    Upper: R 5/5 5/5 5/5 5/5 5/5 5/5    L 5/5 5/5 5/5 5/5 5/5 5/5     Iliopsoas Quadriceps Knee  Flexion Tibialis  anterior Gastro- cnemius EHL   Lower: R 1/5 1/5 1/5 0/5 0/5 0/5    L 1/5 1/5 1/5 0/5 0/5 0/5     DTR's - 2 + and symmetric in UE and LE  Pronator Drift: no drift noted  Finger-to-nose: Intact bilaterally  Garrett: absent  Clonus: absent  Babinski: absent  Vascular: Pulses 2+ and symmetric radial and dorsalis pedis. No LE edema.   Skin: Skin is warm, dry and intact.    Rectal: normal sphincter tone      Significant Labs:  Recent Labs   Lab 11/26/19 0428 11/27/19  0416   GLU 88 206*   * 131*   K 3.9 4.0    100   CO2 21* 23   BUN 11 14   CREATININE 0.9 0.9   CALCIUM 8.6* 8.2*   MG 1.5* 1.5*     Recent Labs   Lab 11/26/19 0428 11/27/19  0416   WBC 5.40 4.70   HGB 9.4* 9.4*   HCT 30.3* 29.6*    308     Recent Labs   Lab 11/26/19 0428 11/27/19  0416   INR 1.4* 1.4*   APTT 26.5  --       Microbiology Results (last 7 days)     ** No results found for the last 168 hours. **        All pertinent labs from the last 24 hours have been reviewed.    Significant Diagnostics:  I have reviewed and interpreted all pertinent imaging results/findings within the past 24 hours.

## 2019-11-27 NOTE — PT/OT/SLP PROGRESS
Occupational Therapy      Patient Name:  Neena Marks   MRN:  [id number]    Patient not evaluated today secondary to MD hold (Comment)(Bedrest orders). Will follow-up Friday 11/29.    Sharif Valiente, OT  11/27/2019

## 2019-11-27 NOTE — SUBJECTIVE & OBJECTIVE
Interval History: no acute events overnight. Pain is controlled with pain meds.   Review of Systems   Constitutional: Negative for activity change, appetite change, chills, fatigue and fever.   HENT: Negative for congestion, facial swelling, sore throat and trouble swallowing.    Respiratory: Negative for apnea, cough, chest tightness, shortness of breath and wheezing.    Cardiovascular: Negative for chest pain, palpitations and leg swelling.   Gastrointestinal: Positive for abdominal distention and abdominal pain. Negative for blood in stool, diarrhea, nausea and vomiting.   Genitourinary: Negative for difficulty urinating, dysuria, flank pain and hematuria.   Musculoskeletal: Positive for back pain. Negative for arthralgias, myalgias and neck pain.   Skin: Negative for color change and rash.   Neurological: Positive for weakness. Negative for dizziness, light-headedness, numbness and headaches.   Hematological: Negative for adenopathy.   Psychiatric/Behavioral: Negative for agitation and behavioral problems.     Objective:     Vital Signs (Most Recent):  Temp: 98.3 °F (36.8 °C) (11/27/19 1610)  Pulse: 64 (11/27/19 1610)  Resp: 18 (11/27/19 1610)  BP: (!) 98/56 (11/27/19 1610)  SpO2: 96 % (11/27/19 1610) Vital Signs (24h Range):  Temp:  [97.9 °F (36.6 °C)-99 °F (37.2 °C)] 98.3 °F (36.8 °C)  Pulse:  [64-78] 64  Resp:  [18-20] 18  SpO2:  [95 %-100 %] 96 %  BP: ()/(56-66) 98/56     Weight: 44 kg (97 lb)  Body mass index is 22.45 kg/m².    Intake/Output Summary (Last 24 hours) at 11/27/2019 1748  Last data filed at 11/26/2019 1900  Gross per 24 hour   Intake --   Output 900 ml   Net -900 ml      Physical Exam    Significant Labs:   CBC:   Recent Labs   Lab 11/26/19 0428 11/27/19 0416   WBC 5.40 4.70   HGB 9.4* 9.4*   HCT 30.3* 29.6*    308     CMP:   Recent Labs   Lab 11/26/19 0428 11/27/19 0416   * 131*   K 3.9 4.0    100   CO2 21* 23   GLU 88 206*   BUN 11 14   CREATININE 0.9 0.9    CALCIUM 8.6* 8.2*   PROT 6.7 6.4   ALBUMIN 2.6* 2.4*   BILITOT 1.1* 0.9   ALKPHOS 121 119   AST 70* 64*   ALT 42 44   ANIONGAP 10 8   EGFRNONAA >60.0 >60.0       Significant Imaging: I have reviewed and interpreted all pertinent imaging results/findings within the past 24 hours.

## 2019-11-27 NOTE — ASSESSMENT & PLAN NOTE
65 yo female with Hepatocellular carcinoma, alcoholic cirrhosis, ascites, b/l pulmonary PE presents with b/l lower extremity weakness and back pain. Found to have compression fracture of T11 with canal stenosis and posterior cord displacement. Patient with sensation intact but 0/5 muscle strength in b/l lower ext.    Plan  - Consult to neurosurgery, reccs appreciated  - Continue Dexamethasone 4mg IV q6h   - Per neurosurgery patient will need surgery, but will round on patient in the a.m for further reccs. Medical clearance requested.  - NPO  - Dilaudid 0.5mg prn pain, Oxy IR q6h prn pain  - MRI cervical, thoracic spine  - NSGY recommend CT C/T/L  - she went for D T10 lateral corpectomy with posterior T8-T12 percutaneous instrumentation.

## 2019-11-27 NOTE — HOSPITAL COURSE
11/27: REY. Pt scheduled for OR today for class D T10 lateral corpectomy with posterior T8-T12 percutaneous instrumentation.  11/28: Surgery cancelled yesterday. Patient has opted against surgical treatment of fracture at this time.

## 2019-11-27 NOTE — PT/OT/SLP PROGRESS
Physical Therapy      Patient Name:  Neena Marks   MRN:  [id number]    Patient not seen today secondary to pt going to OR this date per MD, please re-consult following surgery.      Ashia Robles, PT, DPT

## 2019-11-27 NOTE — HOSPITAL COURSE
In ED, MRI with acute compression fracture at T11 with almost 25% height loss and retropulsion of the posterior cortex into the spinal canal. This results in severe canal stenosis and posterior cord displacement. Patient is admitted to hospital medicine on 11/25 for pain management and cirrhosis. Patient in significant amount of pain and started on oxycodone and dilaudid PRN. She has a MELD score of 11 on admission and was continued on home lasix and propanolol and started on lactulose. NSGY saw the patient and ordered CT T/L and CT chest and MRI C/L/Tfor staging. CT chest showed multiple pulmonary brandi which could be potionaly metastasis and pathological fracture in T 11 with retropulsion and with spinal cord compression.  NSGY decided not top do surgery and recommended treating the pt with radiation therapy. Pt seen by radiotherapy for palliative radiotherapy.     On 12/2, patient reports she is very ready to leave hospital and finish her palliative radiation outpatient. Radiation/oncology scheduled next radiation therapy for outpatient. Spoke with neurosurgery who recommended an oral dexamethasone taper upon discharge. Spoke with patient's daughter over the phone on 12/2, and she states she is eager for her mother to be discharged and that she will have no problem taking care of her mother as they live together and she will be able to take patient to and from appointments. Also reports her cousin would like to help as well. Discussed plan of care with patient late afternoon, and she was excited about her discharge today. In the evening of 12/2, received call from nurse that patient's daughter cannot take patient to next appointment. Met with patient and her daughter in person in evening and it seemed that the barrier to discharge was that patient required IV dilaudid for pain control at the end of the day and is concerned about her pain being controlled outside of the hospital. Reassured patient and daughter  that we will work on a plan that all three of us feel is safe and in the best interest of the patient tomorrow. Discharge cancelled for tonight.        Patient received round 5 of 5 of radiation on 12/5. Patient reports back pain controlled with oxycodone 15 q6 and 0.5 hydromorphone x1 overnight. Patient deferred lactulose x3 yesterday and is confused with asterixis on 12/5 (MELD Na 15). Palliative recommending consideration for morphine and remeron upon discharge. Patient ready for discharge pending hospice placement tomorrow (12/6). Per palliative, LaPost reviewed with patient, who elects DNR, comfort focused treatment, no tube feeding. LaPost placed in patient's chart for physician review and signature.

## 2019-11-27 NOTE — ASSESSMENT & PLAN NOTE
64F pmh alcoholic cirrhosis, hepatocellular carcinoma presents with one month of progressive back pain and weakness, now with 5 days of severe BLE weakness and loss of sensation. Pt denies bowel or bladder incontinence. 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    -Admitted to   -MRI L-spine w/wo 11/25 personally reviewed/interpreted: acute pathologic compression fracture of T11 with ~25% loss of height. There is associated retropulsion into the spinal canal causing severe cord compression.  -Neuro exam stable without acute worsening since admission  -Continue neuro checks q4h  -Continue Dexamethasone 4mg IV q6h  -Preoperative clearance: appreciate  assistance.    -Surgery is Interemediate risk   -RCRI Calculator Class and Risk percentage:  1 predictor,  which is a 6.0% risk of adverse cardiac event in 30 DAYS.   -ECG and echocardiogram completed, normal diastolic and systolic function at this time.  No further cardiac testing indicated.   -Cirrhosis well compensated at this time, MELD 11   -Anticoagulation: on Eliquis long-term for history of PE's. Last dose 11/22, continue to hold in preop setting.   -Metastatic Workup: -MRI C/T/L spine demonstrates known HCC of right hepatic lobe with IVC and right atrium extension as well as multiple mets to lungs and right adrenal gland.

## 2019-11-27 NOTE — PROGRESS NOTES
[facility]  Neurosurgery  Progress Note    Subjective:     History of Present Illness: 64F pmh alcoholic cirrhosis, hepatocellular carcinoma presents with one month of progressive back pain and weakness, now with 5 days of severe BLE weakness and loss of sensation. Pt denies bowel or bladder incontinence. MRI L spine shows a large pathologic fracture in T10 vertebrae retropulsing into central canal causing severe cord compression.    Post-Op Info:  Procedure(s) (LRB):  CORPECTOMY, SPINE, THORACIC, T10, with T8-T12 percutaneous screws (N/A)   * Surgery Date in Future *     Interval History: Pt to OR today for class D T10 lateral corpectomy with posterior T8-T12 percutaneous instrumentation.    Medications:  Continuous Infusions:   sodium chloride 0.9% 100 mL/hr at 11/27/19 0605     Scheduled Meds:   dexamethasone  4 mg Intravenous Q6H    famotidine  20 mg Oral Daily    ferrous sulfate  325 mg Oral Daily    furosemide  40 mg Oral Daily    [START ON 11/28/2019] magnesium oxide  400 mg Oral Daily    magnesium sulfate IVPB  2 g Intravenous Once    propranolol  10 mg Oral BID     PRN Meds:Dextrose 10% Bolus, Dextrose 10% Bolus, glucagon (human recombinant), glucose, glucose, HYDROmorphone, insulin aspart U-100, naloxone, ondansetron, oxyCODONE, sodium chloride 0.9%     Review of Systems   Constitutional: Negative for chills, diaphoresis and fever.   HENT: Negative for rhinorrhea and trouble swallowing.    Eyes: Negative for photophobia and visual disturbance.   Respiratory: Negative for cough and shortness of breath.    Cardiovascular: Negative for chest pain and palpitations.   Gastrointestinal: Positive for abdominal distention. Negative for abdominal pain, nausea and vomiting.   Genitourinary: Negative for difficulty urinating and dysuria.   Musculoskeletal: Positive for back pain and gait problem. Negative for neck pain and neck stiffness.   Neurological: Positive for weakness and numbness.  Negative for dizziness, tremors, seizures, speech difficulty and headaches.   Psychiatric/Behavioral: Negative for behavioral problems and confusion.     Objective:     Weight: 44 kg (97 lb)  Body mass index is 22.45 kg/m².  Vital Signs (Most Recent):  Temp: 98.3 °F (36.8 °C) (11/27/19 1610)  Pulse: 64 (11/27/19 1610)  Resp: 18 (11/27/19 1610)  BP: (!) 98/56 (11/27/19 1610)  SpO2: 96 % (11/27/19 1610) Vital Signs (24h Range):  Temp:  [97.9 °F (36.6 °C)-99 °F (37.2 °C)] 98.3 °F (36.8 °C)  Pulse:  [64-78] 64  Resp:  [18-20] 18  SpO2:  [95 %-100 %] 96 %  BP: ()/(56-66) 98/56                          Urethral Catheter 11/26/19 1201 Coude 16 Fr. (Active)       Neurosurgery Physical Exam      General: well developed, no distress. Cachectic with generalized muscle wasting  Head: normocephalic, atraumatic  Neck: No tracheal deviation. No palpable masses. Full ROM.   Neurologic: Alert and oriented. Thought content appropriate.  GCS: Motor: 6/Verbal: 5/Eyes: 4 GCS Total: 15  Mental Status: Awake, Alert, Oriented x 4  Language: No aphasia  Speech: No dysarthria  Cranial nerves: face symmetric, tongue midline, CN II-XII grossly intact.   Eyes: pupils equal, round, reactive to light with accomodation, EOMI.   Ears: No drainage.   Pulmonary: normal respirations, no signs of respiratory distress  Abdomen: soft, not tender to palpation. Distended    Sensory: Decreased sensation throughout BLE and perineal area, no complete loss of sensation    Motor Strength: Full strength upper extremities. No abnormal movements seen.      Strength  Deltoids Triceps Biceps Wrist Extension Wrist Flexion Hand    Upper: R 5/5 5/5 5/5 5/5 5/5 5/5    L 5/5 5/5 5/5 5/5 5/5 5/5     Iliopsoas Quadriceps Knee  Flexion Tibialis  anterior Gastro- cnemius EHL   Lower: R 1/5 1/5 1/5 0/5 0/5 0/5    L 1/5 1/5 1/5 0/5 0/5 0/5     DTR's - 2 + and symmetric in UE and LE  Pronator Drift: no drift noted  Finger-to-nose: Intact bilaterally  Garrett:  absent  Clonus: absent  Babinski: absent  Vascular: Pulses 2+ and symmetric radial and dorsalis pedis. No LE edema.   Skin: Skin is warm, dry and intact.    Rectal: normal sphincter tone      Significant Labs:  Recent Labs   Lab 11/26/19 0428 11/27/19 0416   GLU 88 206*   * 131*   K 3.9 4.0    100   CO2 21* 23   BUN 11 14   CREATININE 0.9 0.9   CALCIUM 8.6* 8.2*   MG 1.5* 1.5*     Recent Labs   Lab 11/26/19 0428 11/27/19 0416   WBC 5.40 4.70   HGB 9.4* 9.4*   HCT 30.3* 29.6*    308     Recent Labs   Lab 11/26/19 0428 11/27/19 0416   INR 1.4* 1.4*   APTT 26.5  --      Microbiology Results (last 7 days)     ** No results found for the last 168 hours. **        All pertinent labs from the last 24 hours have been reviewed.    Significant Diagnostics:  I have reviewed and interpreted all pertinent imaging results/findings within the past 24 hours.    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 Pt denies bowel or bladder incontinence. 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    -Admitted to   -MRI L-spine w/wo 11/25 personally reviewed/interpreted: acute pathologic compression fracture of T11 with ~25% loss of height. There is associated retropulsion into the spinal canal causing severe cord compression.  -Neuro exam stable without acute worsening since admission  -Continue neuro checks q4h  -Continue Dexamethasone 4mg IV q6h  -Preoperative clearance: appreciate  assistance.    -Surgery is Interemediate risk   -RCRI Calculator Class and Risk percentage:  1 predictor,  which is a 6.0% risk of adverse cardiac event in 30 DAYS.   -ECG and echocardiogram completed, normal diastolic and  systolic function at this time.  No further cardiac testing indicated.   -Cirrhosis well compensated at this time, MELD 11   -Anticoagulation: on Eliquis long-term for history of PE's. Last dose 11/22, continue to hold in preop setting.   -Metastatic Workup: -MRI C/T/L spine demonstrates known HCC of right hepatic lobe with IVC and right atrium extension as well as multiple mets to lungs and right adrenal gland.        Gerald Nino MD  Neurosurgery  [facility]

## 2019-11-28 LAB
ALBUMIN SERPL BCP-MCNC: 2.6 G/DL (ref 3.5–5.2)
ALP SERPL-CCNC: 138 U/L (ref 55–135)
ALT SERPL W/O P-5'-P-CCNC: 43 U/L (ref 10–44)
ANION GAP SERPL CALC-SCNC: 12 MMOL/L (ref 8–16)
AST SERPL-CCNC: 61 U/L (ref 10–40)
BASOPHILS # BLD AUTO: 0.01 K/UL (ref 0–0.2)
BASOPHILS NFR BLD: 0.1 % (ref 0–1.9)
BILIRUB SERPL-MCNC: 0.9 MG/DL (ref 0.1–1)
BUN SERPL-MCNC: 14 MG/DL (ref 8–23)
CALCIUM SERPL-MCNC: 8.8 MG/DL (ref 8.7–10.5)
CHLORIDE SERPL-SCNC: 102 MMOL/L (ref 95–110)
CO2 SERPL-SCNC: 20 MMOL/L (ref 23–29)
CREAT SERPL-MCNC: 0.9 MG/DL (ref 0.5–1.4)
DIFFERENTIAL METHOD: ABNORMAL
EOSINOPHIL # BLD AUTO: 0 K/UL (ref 0–0.5)
EOSINOPHIL NFR BLD: 0 % (ref 0–8)
ERYTHROCYTE [DISTWIDTH] IN BLOOD BY AUTOMATED COUNT: 20.7 % (ref 11.5–14.5)
EST. GFR  (AFRICAN AMERICAN): >60 ML/MIN/1.73 M^2
EST. GFR  (NON AFRICAN AMERICAN): >60 ML/MIN/1.73 M^2
GLUCOSE SERPL-MCNC: 157 MG/DL (ref 70–110)
HCT VFR BLD AUTO: 30.6 % (ref 37–48.5)
HGB BLD-MCNC: 9.6 G/DL (ref 12–16)
IMM GRANULOCYTES # BLD AUTO: 0.08 K/UL (ref 0–0.04)
IMM GRANULOCYTES NFR BLD AUTO: 0.9 % (ref 0–0.5)
INR PPP: 1.3 (ref 0.8–1.2)
LYMPHOCYTES # BLD AUTO: 1 K/UL (ref 1–4.8)
LYMPHOCYTES NFR BLD: 11.5 % (ref 18–48)
MAGNESIUM SERPL-MCNC: 2.1 MG/DL (ref 1.6–2.6)
MCH RBC QN AUTO: 22.4 PG (ref 27–31)
MCHC RBC AUTO-ENTMCNC: 31.4 G/DL (ref 32–36)
MCV RBC AUTO: 71 FL (ref 82–98)
MONOCYTES # BLD AUTO: 0.6 K/UL (ref 0.3–1)
MONOCYTES NFR BLD: 6.6 % (ref 4–15)
NEUTROPHILS # BLD AUTO: 7 K/UL (ref 1.8–7.7)
NEUTROPHILS NFR BLD: 80.9 % (ref 38–73)
NRBC BLD-RTO: 0 /100 WBC
PHOSPHATE SERPL-MCNC: 2.7 MG/DL (ref 2.7–4.5)
PLATELET # BLD AUTO: 345 K/UL (ref 150–350)
PMV BLD AUTO: 10.3 FL (ref 9.2–12.9)
POCT GLUCOSE: 147 MG/DL (ref 70–110)
POCT GLUCOSE: 164 MG/DL (ref 70–110)
POCT GLUCOSE: 206 MG/DL (ref 70–110)
POCT GLUCOSE: 218 MG/DL (ref 70–110)
POCT GLUCOSE: 305 MG/DL (ref 70–110)
POTASSIUM SERPL-SCNC: 3.9 MMOL/L (ref 3.5–5.1)
PROT SERPL-MCNC: 7 G/DL (ref 6–8.4)
PROTHROMBIN TIME: 12.9 SEC (ref 9–12.5)
RBC # BLD AUTO: 4.29 M/UL (ref 4–5.4)
SODIUM SERPL-SCNC: 134 MMOL/L (ref 136–145)
WBC # BLD AUTO: 8.63 K/UL (ref 3.9–12.7)

## 2019-11-28 PROCEDURE — 25000003 PHARM REV CODE 250: Performed by: STUDENT IN AN ORGANIZED HEALTH CARE EDUCATION/TRAINING PROGRAM

## 2019-11-28 PROCEDURE — 99233 PR SUBSEQUENT HOSPITAL CARE,LEVL III: ICD-10-PCS | Mod: ,,, | Performed by: INTERNAL MEDICINE

## 2019-11-28 PROCEDURE — 99233 SBSQ HOSP IP/OBS HIGH 50: CPT | Mod: ,,, | Performed by: RADIOLOGY

## 2019-11-28 PROCEDURE — 80053 COMPREHEN METABOLIC PANEL: CPT

## 2019-11-28 PROCEDURE — 63600175 PHARM REV CODE 636 W HCPCS: Performed by: STUDENT IN AN ORGANIZED HEALTH CARE EDUCATION/TRAINING PROGRAM

## 2019-11-28 PROCEDURE — 85025 COMPLETE CBC W/AUTO DIFF WBC: CPT

## 2019-11-28 PROCEDURE — 25000003 PHARM REV CODE 250: Performed by: INTERNAL MEDICINE

## 2019-11-28 PROCEDURE — 83735 ASSAY OF MAGNESIUM: CPT

## 2019-11-28 PROCEDURE — 99499 NO LOS: ICD-10-PCS | Mod: ,,, | Performed by: NEUROLOGICAL SURGERY

## 2019-11-28 PROCEDURE — 11000001 HC ACUTE MED/SURG PRIVATE ROOM

## 2019-11-28 PROCEDURE — 99233 PR SUBSEQUENT HOSPITAL CARE,LEVL III: ICD-10-PCS | Mod: ,,, | Performed by: RADIOLOGY

## 2019-11-28 PROCEDURE — 84100 ASSAY OF PHOSPHORUS: CPT

## 2019-11-28 PROCEDURE — 94761 N-INVAS EAR/PLS OXIMETRY MLT: CPT

## 2019-11-28 PROCEDURE — 36415 COLL VENOUS BLD VENIPUNCTURE: CPT

## 2019-11-28 PROCEDURE — 99232 SBSQ HOSP IP/OBS MODERATE 35: CPT | Mod: ,,, | Performed by: NEUROLOGICAL SURGERY

## 2019-11-28 PROCEDURE — 99232 PR SUBSEQUENT HOSPITAL CARE,LEVL II: ICD-10-PCS | Mod: ,,, | Performed by: NEUROLOGICAL SURGERY

## 2019-11-28 PROCEDURE — 99499 UNLISTED E&M SERVICE: CPT | Mod: ,,, | Performed by: NEUROLOGICAL SURGERY

## 2019-11-28 PROCEDURE — 99233 SBSQ HOSP IP/OBS HIGH 50: CPT | Mod: ,,, | Performed by: INTERNAL MEDICINE

## 2019-11-28 PROCEDURE — 85610 PROTHROMBIN TIME: CPT

## 2019-11-28 RX ORDER — TALC
6 POWDER (GRAM) TOPICAL NIGHTLY
Status: DISCONTINUED | OUTPATIENT
Start: 2019-11-28 | End: 2019-12-06 | Stop reason: HOSPADM

## 2019-11-28 RX ORDER — LACTULOSE 10 G/15ML
20 SOLUTION ORAL 3 TIMES DAILY
Status: DISCONTINUED | OUTPATIENT
Start: 2019-11-28 | End: 2019-12-01

## 2019-11-28 RX ORDER — ENOXAPARIN SODIUM 100 MG/ML
66 INJECTION SUBCUTANEOUS
Status: DISCONTINUED | OUTPATIENT
Start: 2019-11-28 | End: 2019-11-29

## 2019-11-28 RX ADMIN — ENOXAPARIN SODIUM 70 MG: 100 INJECTION SUBCUTANEOUS at 05:11

## 2019-11-28 RX ADMIN — FAMOTIDINE 20 MG: 20 TABLET ORAL at 09:11

## 2019-11-28 RX ADMIN — LACTULOSE 20 G: 20 SOLUTION ORAL at 12:11

## 2019-11-28 RX ADMIN — PROPRANOLOL HYDROCHLORIDE 10 MG: 10 TABLET ORAL at 08:11

## 2019-11-28 RX ADMIN — HYDROMORPHONE HYDROCHLORIDE 1 MG: 1 INJECTION, SOLUTION INTRAMUSCULAR; INTRAVENOUS; SUBCUTANEOUS at 03:11

## 2019-11-28 RX ADMIN — HYDROMORPHONE HYDROCHLORIDE 1 MG: 1 INJECTION, SOLUTION INTRAMUSCULAR; INTRAVENOUS; SUBCUTANEOUS at 02:11

## 2019-11-28 RX ADMIN — DEXAMETHASONE SODIUM PHOSPHATE 4 MG: 4 INJECTION, SOLUTION INTRAMUSCULAR; INTRAVENOUS at 05:11

## 2019-11-28 RX ADMIN — DEXAMETHASONE SODIUM PHOSPHATE 4 MG: 4 INJECTION, SOLUTION INTRAMUSCULAR; INTRAVENOUS at 12:11

## 2019-11-28 RX ADMIN — MAGNESIUM OXIDE TAB 400 MG (241.3 MG ELEMENTAL MG) 400 MG: 400 (241.3 MG) TAB at 09:11

## 2019-11-28 RX ADMIN — OXYCODONE HYDROCHLORIDE 10 MG: 10 TABLET ORAL at 05:11

## 2019-11-28 RX ADMIN — PROPRANOLOL HYDROCHLORIDE 10 MG: 10 TABLET ORAL at 09:11

## 2019-11-28 RX ADMIN — OXYCODONE HYDROCHLORIDE 10 MG: 10 TABLET ORAL at 12:11

## 2019-11-28 RX ADMIN — LACTULOSE 20 G: 20 SOLUTION ORAL at 03:11

## 2019-11-28 RX ADMIN — FUROSEMIDE 40 MG: 40 TABLET ORAL at 09:11

## 2019-11-28 RX ADMIN — Medication 6 MG: at 08:11

## 2019-11-28 RX ADMIN — FERROUS SULFATE TAB EC 325 MG (65 MG FE EQUIVALENT) 325 MG: 325 (65 FE) TABLET DELAYED RESPONSE at 09:11

## 2019-11-28 NOTE — ASSESSMENT & PLAN NOTE
65 yo female with Hepatocellular carcinoma, alcoholic cirrhosis, ascites, b/l pulmonary PE presents with b/l lower extremity weakness and back pain. Found to have compression fracture of T11 with canal stenosis and posterior cord displacement. Patient with sensation intact but 0/5 muscle strength in b/l lower ext.    Plan  - Consult to neurosurgery, reccs appreciated  - Continue Dexamethasone 4mg IV q6h   - Per neurosurgery patient will need surgery, but will round on patient in the a.m for further reccs. Medical clearance requested.  - NPO  - Dilaudid 0.5mg prn pain, Oxy IR q6h prn pain  - MRI cervical, thoracic spine  - NSGY recommend CT C/T/L  - NSGY decided not to go for surgery and recommend treating with radiotherapy.  - Radiation oncology is consulted, appreciate their recs.

## 2019-11-28 NOTE — ASSESSMENT & PLAN NOTE
Hepatocellular carcinoma, alcoholic cirrhosis( dx 2015), portal HTN, ascites, hx of variceal bleeding in January 2018 s/p banding,B/l pulmonary embolism,  hx of ETOH abuse (quit Jan 2018)    - she has mild abdominal destination.  - MELD  Na is 17 today.  - lasix 40 PO.  - f/u volume status and MELD score daily.

## 2019-11-28 NOTE — PROGRESS NOTES
[facility]  Neurosurgery  Progress Note    Subjective:     History of Present Illness: 64F pmh alcoholic cirrhosis, hepatocellular carcinoma presents with one month of progressive back pain and weakness, now with 5 days of severe BLE weakness and loss of sensation. Pt denies bowel or bladder incontinence. MRI L spine shows a large pathologic fracture in T10 vertebrae retropulsing into central canal causing severe cord compression.    Post-Op Info:  Procedure(s) (LRB):  CORPECTOMY, SPINE, THORACIC, T10, with T8-T12 percutaneous screws (N/A)   Day of Surgery     Interval History: Surgery cancelled yesterday. Patient has opted against surgical treatment of fracture at this time.     Medications:  Continuous Infusions:    Scheduled Meds:   dexamethasone  4 mg Intravenous Q6H    enoxaparin  70 mg Subcutaneous Q24H    famotidine  20 mg Oral Daily    ferrous sulfate  325 mg Oral Daily    furosemide  40 mg Oral Daily    lactulose  20 g Oral TID    magnesium oxide  400 mg Oral Daily    melatonin  6 mg Oral Nightly    propranolol  10 mg Oral BID     PRN Meds:Dextrose 10% Bolus, Dextrose 10% Bolus, glucagon (human recombinant), glucose, glucose, HYDROmorphone, insulin aspart U-100, naloxone, ondansetron, oxyCODONE, sodium chloride 0.9%     Review of Systems  Objective:     Weight: 44 kg (97 lb)  Body mass index is 22.45 kg/m².  Vital Signs (Most Recent):  Temp: 97.9 °F (36.6 °C) (11/28/19 1144)  Pulse: 67 (11/28/19 1144)  Resp: 18 (11/28/19 1144)  BP: 118/64 (11/28/19 1144)  SpO2: 98 % (11/28/19 1144) Vital Signs (24h Range):  Temp:  [96.9 °F (36.1 °C)-98.3 °F (36.8 °C)] 97.9 °F (36.6 °C)  Pulse:  [64-70] 67  Resp:  [17-18] 18  SpO2:  [95 %-98 %] 98 %  BP: ()/(56-72) 118/64                          Urethral Catheter 11/26/19 1201 Coude 16 Fr. (Active)       Neurosurgery Physical Exam      General: well developed, no distress. Cachectic with generalized muscle wasting  Head: normocephalic,  atraumatic  Neck: No tracheal deviation. No palpable masses. Full ROM.   Neurologic: Alert and oriented. Thought content appropriate.  GCS: Motor: 6/Verbal: 5/Eyes: 4 GCS Total: 15  Mental Status: Awake, Alert, Oriented x 4  Language: No aphasia  Speech: No dysarthria  Cranial nerves: face symmetric, tongue midline, CN II-XII grossly intact.   Eyes: pupils equal, round, reactive to light with accomodation, EOMI.   Ears: No drainage.   Pulmonary: normal respirations, no signs of respiratory distress  Abdomen: soft, not tender to palpation. Distended    Sensory: Decreased sensation throughout BLE and perineal area, no complete loss of sensation    Motor Strength: Full strength upper extremities. No abnormal movements seen.      Strength  Deltoids Triceps Biceps Wrist Extension Wrist Flexion Hand    Upper: R 5/5 5/5 5/5 5/5 5/5 5/5    L 5/5 5/5 5/5 5/5 5/5 5/5     Iliopsoas Quadriceps Knee  Flexion Tibialis  anterior Gastro- cnemius EHL   Lower: R 1/5 1/5 1/5 0/5 0/5 0/5    L 1/5 1/5 1/5 0/5 0/5 0/5     DTR's - 2 + and symmetric in UE and LE  Pronator Drift: no drift noted  Finger-to-nose: Intact bilaterally  Garrett: absent  Clonus: absent  Babinski: absent  Vascular: Pulses 2+ and symmetric radial and dorsalis pedis. No LE edema.   Skin: Skin is warm, dry and intact.    Rectal: normal sphincter tone      Significant Labs:  Recent Labs   Lab 11/27/19 0416 11/28/19 0407   * 157*   * 134*   K 4.0 3.9    102   CO2 23 20*   BUN 14 14   CREATININE 0.9 0.9   CALCIUM 8.2* 8.8   MG 1.5* 2.1     Recent Labs   Lab 11/27/19 0416 11/28/19 0407   WBC 4.70 8.63   HGB 9.4* 9.6*   HCT 29.6* 30.6*    345     Recent Labs   Lab 11/27/19 0416 11/28/19 0407   INR 1.4* 1.3*     Microbiology Results (last 7 days)     ** No results found for the last 168 hours. **        All pertinent labs from the last 24 hours have been reviewed.    Significant Diagnostics:  I have reviewed and interpreted all pertinent  imaging results/findings within the past 24 hours.    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 Pt denies bowel or bladder incontinence. 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    -MRI L-spine w/wo 11/25 personally reviewed and interpreted: acute pathologic compression fracture of T11 with ~25% loss of height. There is associated retropulsion into the spinal canal causing severe cord compression.  -Neuro exam stable without acute worsening since admission  -Continue neuro checks q4h  -Continue Dexamethasone 4mg IV q6h  -Preoperative clearance: appreciate HM assistance.   -Surgery is Intermediate risk   -RCRI Calculator Class and Risk percentage:  1 predictor,  which is a 6.0% risk of adverse cardiac event in 30 DAYS.   -ECG and echocardiogram completed, normal diastolic and systolic function at this time.  No further cardiac testing indicated.   -Cirrhosis well compensated at this time, MELD 11   -Anticoagulation: on Eliquis long-term for history of PE's. Last dose 11/22, continue to hold in preop setting.   -Metastatic Workup: -MRI C/T/L spine demonstrates known HCC of right hepatic lobe with IVC and right atrium extension as well as multiple mets to lungs and right adrenal gland.  -Patient declines surgical intervention at this time. Recommend radiation oncology consult for evaluation of radiation treatment for T10 fracture 2/2 metastatic lesion.  -No acute neurosurgical intervention at this time. Please contact us with any additional questions.          Gerald Nino MD  Neurosurgery  [facility]

## 2019-11-28 NOTE — ASSESSMENT & PLAN NOTE
64F pmh alcoholic cirrhosis, hepatocellular carcinoma presents with one month of progressive back pain and weakness, now with 5 days of severe BLE weakness and loss of sensation. Pt denies bowel or bladder incontinence. 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    -MRI L-spine w/wo 11/25 personally reviewed and interpreted: acute pathologic compression fracture of T11 with ~25% loss of height. There is associated retropulsion into the spinal canal causing severe cord compression.  -Neuro exam stable without acute worsening since admission  -Continue neuro checks q4h  -Continue Dexamethasone 4mg IV q6h  -Preoperative clearance: appreciate HM assistance.   -Surgery is Intermediate risk   -RCRI Calculator Class and Risk percentage:  1 predictor,  which is a 6.0% risk of adverse cardiac event in 30 DAYS.   -ECG and echocardiogram completed, normal diastolic and systolic function at this time.  No further cardiac testing indicated.   -Cirrhosis well compensated at this time, MELD 11   -Anticoagulation: on Eliquis long-term for history of PE's. Last dose 11/22, continue to hold in preop setting.   -Metastatic Workup: -MRI C/T/L spine demonstrates known HCC of right hepatic lobe with IVC and right atrium extension as well as multiple mets to lungs and right adrenal gland.  -Patient declines surgical intervention at this time. Recommend radiation oncology consult for evaluation of radiation treatment for T10 fracture 2/2 metastatic lesion.  -No acute neurosurgical intervention at this time. Please contact us with any additional questions.

## 2019-11-28 NOTE — PROGRESS NOTES
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 Marks is a 63-year-old  female with Hepatocellular carcinoma, alcoholic cirrhosis( dx 2015), portal HTN, ascites, hx of variceal bleeding in January 2018 s/p banding,B/l pulmonary embolism,  hx of ETOH abuse (quit Jan 2018) who presents to the ED with back pain and b/l lower extremity weakness. Patient states that symptoms have been on going for the past week and half and have progressed to the point where she cannot lift her leg against gravity or walk. She decribes "electric shocklike" sensations down her legs bilaterally. She denies any falls or trauma. She complains of back pain and LUQ & LLQ abdominal pain that radiates to her back. She denies any bladder/bowel incontinence. She denies fever, chills, vomiting. She    In ED, MRI with Acute compression fracture at T11 with almost 25% height loss and retropulsion of the posterior cortex into the spinal canal.  This results in severe canal stenosis and posterior cord displacement.     Overview/Hospital Course:  In ED, MRI with Acute compression fracture at T11 with almost 25% height loss and retropulsion of the posterior cortex into the spinal canal.  This results in severe canal stenosis and posterior cord displacement. Pt is admitted to hospital medicine for pain management and cirrhosis. Pt in significant amount of pain started on oxy/dilaudid. She has a MELD score of 11 on admission and was continued on home lasix and propanolol and started on lactulose.  NSGY seen the pt and " ordered CT T/L and CT chest and MRI C/L/Tfor staging. CT chest showed multiple pulmonary brandi which could be potionaly metastasis and pathological fracture in T 11 with retropulsion and with spinal cord compression.  NSGY decided not top do surgery and recommended treating the pt with radiation therapy.    Interval History: I see the pt and examine him at the bedside. Pt still in pain and has not but feels the pain is controlled with pain meds.  Review of Systems   Constitutional: Positive for activity change and fatigue. Negative for appetite change, chills and fever.   HENT: Negative for congestion, facial swelling, sore throat and trouble swallowing.    Respiratory: Negative for apnea, cough, chest tightness, shortness of breath and wheezing.    Cardiovascular: Negative for chest pain, palpitations and leg swelling.   Gastrointestinal: Positive for abdominal distention and abdominal pain. Negative for blood in stool, diarrhea, nausea and vomiting.   Genitourinary: Negative for difficulty urinating, dysuria, flank pain and hematuria.   Musculoskeletal: Positive for back pain and myalgias. Negative for arthralgias and neck pain.   Skin: Negative for color change and rash.   Neurological: Positive for weakness (b/l lower extremities weakness). Negative for dizziness, light-headedness, numbness and headaches.   Hematological: Negative for adenopathy.   Psychiatric/Behavioral: Negative for agitation and behavioral problems.     Objective:     Vital Signs (Most Recent):  Temp: 98.3 °F (36.8 °C) (11/28/19 1628)  Pulse: (!) 59 (11/28/19 1628)  Resp: 20 (11/28/19 1628)  BP: 128/60 (11/28/19 1628)  SpO2: 97 % (11/28/19 1628) Vital Signs (24h Range):  Temp:  [96.9 °F (36.1 °C)-98.3 °F (36.8 °C)] 98.3 °F (36.8 °C)  Pulse:  [59-70] 59  Resp:  [17-20] 20  SpO2:  [95 %-98 %] 97 %  BP: (118-138)/(60-72) 128/60     Weight: 44 kg (97 lb)  Body mass index is 22.45 kg/m².    Intake/Output Summary (Last 24 hours) at 11/28/2019  1719  Last data filed at 11/28/2019 0600  Gross per 24 hour   Intake 240 ml   Output 1250 ml   Net -1010 ml      Physical Exam   Constitutional: She is oriented to person, place, and time. She appears cachectic. No distress.   Pt is severely malnourished.    HENT:   Head: Normocephalic and atraumatic.   Eyes: Pupils are equal, round, and reactive to light. Conjunctivae and EOM are normal.   Neck: Normal range of motion. Neck supple.   Cardiovascular: Normal rate, regular rhythm and normal heart sounds.   No murmur heard.  Pulmonary/Chest: Effort normal and breath sounds normal. No respiratory distress. She has no wheezes. She has no rales.   Abdominal: Soft. Bowel sounds are normal. She exhibits distension. There is tenderness (LLQ).   Musculoskeletal: She exhibits no edema or deformity.   Lymphadenopathy:     She has no cervical adenopathy.   Neurological: She is alert and oriented to person, place, and time. Flapping tremor. She displays normal reflexes. No cranial nerve deficit or sensory deficit. She exhibits normal muscle tone.   Skin: Skin is warm. Capillary refill takes less than 2 seconds. She is not diaphoretic.   Psychiatric: She has a normal mood and affect. Her behavior is normal.       Significant Labs:   CBC:   Recent Labs   Lab 11/27/19 0416 11/28/19  0407   WBC 4.70 8.63   HGB 9.4* 9.6*   HCT 29.6* 30.6*    345     CMP:   Recent Labs   Lab 11/27/19 0416 11/28/19  0407   * 134*   K 4.0 3.9    102   CO2 23 20*   * 157*   BUN 14 14   CREATININE 0.9 0.9   CALCIUM 8.2* 8.8   PROT 6.4 7.0   ALBUMIN 2.4* 2.6*   BILITOT 0.9 0.9   ALKPHOS 119 138*   AST 64* 61*   ALT 44 43   ANIONGAP 8 12   EGFRNONAA >60.0 >60.0       Significant Imaging: I have reviewed and interpreted all pertinent imaging results/findings within the past 24 hours.      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 Found to have compression fracture of T11 with canal stenosis and posterior cord displacement. Patient with sensation intact but 0/5 muscle strength in b/l lower ext.    Plan  - Consult to neurosurgery, reccs appreciated  - Continue Dexamethasone 4mg IV q6h   - Per neurosurgery patient will need surgery, but will round on patient in the a.m for further reccs. Medical clearance requested.  - NPO  - Dilaudid 0.5mg prn pain, Oxy IR q6h prn pain  - MRI cervical, thoracic spine  - NSGY recommend CT C/T/L  - NSGY decided not to go for surgery and recommend treating with radiotherapy.  - Radiation oncology is consulted, appreciate their recs.      Ascites due to alcoholic cirrhosis  Last paracentesis was 2 weeks ago. Patient with distended abdomen states no significant increase since last paracentesis  Supposed to be on Lasix 40mg, but currenlty held by heme/onc due to recent PRACHI. Denies SOB.    Plan  - Continue Lasix 40mg daily  - started on lactulose     Chronic pulmonary embolism  - Patient on Eliquis 5mg BID  - Hold pending reccs from neurosurgery  - staring the pt on enoxaparin.      HCC (hepatocellular carcinoma)  Follows with heme/onc not a transplant candidate. Previously on Nivolumab immunotherapy but currently held due to recent PRACHI.    Plan  - F/u outpatient with heme/onc      Esophageal varices in alcoholic cirrhosis  Denies hematemesis    Plan  - Continue home propanolol 10mg BID      Alcoholic cirrhosis  Hepatocellular carcinoma, alcoholic cirrhosis( dx 2015), portal HTN, ascites, hx of variceal bleeding in January 2018 s/p banding,B/l pulmonary embolism,  hx of ETOH abuse (quit Jan 2018)    - she has mild abdominal destination.   - MELD  Na is 14 today.  - lasix 40 PO.  - f/u volume status and MELD score daily.  - started on lactulose 30 ml/ BID      VTE Risk Mitigation (From admission, onward)          Ordered     enoxaparin injection 70 mg  Every 24 hours (non-standard times)      11/28/19 1306     Place sequential compression device  Until discontinued      11/25/19 2243     Reason for No Pharmacological VTE Prophylaxis  Once     Question:  Reasons:  Answer:  Already adequately anticoagulated on oral Anticoagulants    11/25/19 2243     IP VTE HIGH RISK PATIENT  Once      11/25/19 2243                      Saúl Noguera MD  Department of Hospital Medicine   [facility]

## 2019-11-28 NOTE — SUBJECTIVE & OBJECTIVE
Interval History: Surgery cancelled yesterday. Patient has opted against surgical treatment of fracture at this time.     Medications:  Continuous Infusions:    Scheduled Meds:   dexamethasone  4 mg Intravenous Q6H    enoxaparin  70 mg Subcutaneous Q24H    famotidine  20 mg Oral Daily    ferrous sulfate  325 mg Oral Daily    furosemide  40 mg Oral Daily    lactulose  20 g Oral TID    magnesium oxide  400 mg Oral Daily    melatonin  6 mg Oral Nightly    propranolol  10 mg Oral BID     PRN Meds:Dextrose 10% Bolus, Dextrose 10% Bolus, glucagon (human recombinant), glucose, glucose, HYDROmorphone, insulin aspart U-100, naloxone, ondansetron, oxyCODONE, sodium chloride 0.9%     Review of Systems  Objective:     Weight: 44 kg (97 lb)  Body mass index is 22.45 kg/m².  Vital Signs (Most Recent):  Temp: 97.9 °F (36.6 °C) (11/28/19 1144)  Pulse: 67 (11/28/19 1144)  Resp: 18 (11/28/19 1144)  BP: 118/64 (11/28/19 1144)  SpO2: 98 % (11/28/19 1144) Vital Signs (24h Range):  Temp:  [96.9 °F (36.1 °C)-98.3 °F (36.8 °C)] 97.9 °F (36.6 °C)  Pulse:  [64-70] 67  Resp:  [17-18] 18  SpO2:  [95 %-98 %] 98 %  BP: ()/(56-72) 118/64                          Urethral Catheter 11/26/19 1201 Coude 16 Fr. (Active)       Neurosurgery Physical Exam      General: well developed, no distress. Cachectic with generalized muscle wasting  Head: normocephalic, atraumatic  Neck: No tracheal deviation. No palpable masses. Full ROM.   Neurologic: Alert and oriented. Thought content appropriate.  GCS: Motor: 6/Verbal: 5/Eyes: 4 GCS Total: 15  Mental Status: Awake, Alert, Oriented x 4  Language: No aphasia  Speech: No dysarthria  Cranial nerves: face symmetric, tongue midline, CN II-XII grossly intact.   Eyes: pupils equal, round, reactive to light with accomodation, EOMI.   Ears: No drainage.   Pulmonary: normal respirations, no signs of respiratory distress  Abdomen: soft, not tender to palpation. Distended    Sensory: Decreased sensation  throughout BLE and perineal area, no complete loss of sensation    Motor Strength: Full strength upper extremities. No abnormal movements seen.      Strength  Deltoids Triceps Biceps Wrist Extension Wrist Flexion Hand    Upper: R 5/5 5/5 5/5 5/5 5/5 5/5    L 5/5 5/5 5/5 5/5 5/5 5/5     Iliopsoas Quadriceps Knee  Flexion Tibialis  anterior Gastro- cnemius EHL   Lower: R 1/5 1/5 1/5 0/5 0/5 0/5    L 1/5 1/5 1/5 0/5 0/5 0/5     DTR's - 2 + and symmetric in UE and LE  Pronator Drift: no drift noted  Finger-to-nose: Intact bilaterally  Garrett: absent  Clonus: absent  Babinski: absent  Vascular: Pulses 2+ and symmetric radial and dorsalis pedis. No LE edema.   Skin: Skin is warm, dry and intact.    Rectal: normal sphincter tone      Significant Labs:  Recent Labs   Lab 11/27/19 0416 11/28/19  0407   * 157*   * 134*   K 4.0 3.9    102   CO2 23 20*   BUN 14 14   CREATININE 0.9 0.9   CALCIUM 8.2* 8.8   MG 1.5* 2.1     Recent Labs   Lab 11/27/19 0416 11/28/19  0407   WBC 4.70 8.63   HGB 9.4* 9.6*   HCT 29.6* 30.6*    345     Recent Labs   Lab 11/27/19 0416 11/28/19  0407   INR 1.4* 1.3*     Microbiology Results (last 7 days)     ** No results found for the last 168 hours. **        All pertinent labs from the last 24 hours have been reviewed.    Significant Diagnostics:  I have reviewed and interpreted all pertinent imaging results/findings within the past 24 hours.

## 2019-11-28 NOTE — SUBJECTIVE & OBJECTIVE
Interval History: I see the pt and examine him at the bedside. Pt still in pain and has not but feels the pain is controlled with pain meds.  Review of Systems   Constitutional: Positive for activity change and fatigue. Negative for appetite change, chills and fever.   HENT: Negative for congestion, facial swelling, sore throat and trouble swallowing.    Respiratory: Negative for apnea, cough, chest tightness, shortness of breath and wheezing.    Cardiovascular: Negative for chest pain, palpitations and leg swelling.   Gastrointestinal: Positive for abdominal distention and abdominal pain. Negative for blood in stool, diarrhea, nausea and vomiting.   Genitourinary: Negative for difficulty urinating, dysuria, flank pain and hematuria.   Musculoskeletal: Positive for back pain and myalgias. Negative for arthralgias and neck pain.   Skin: Negative for color change and rash.   Neurological: Positive for weakness (b/l lower extremities weakness). Negative for dizziness, light-headedness, numbness and headaches.   Hematological: Negative for adenopathy.   Psychiatric/Behavioral: Negative for agitation and behavioral problems.     Objective:     Vital Signs (Most Recent):  Temp: 98.3 °F (36.8 °C) (11/28/19 1628)  Pulse: (!) 59 (11/28/19 1628)  Resp: 20 (11/28/19 1628)  BP: 128/60 (11/28/19 1628)  SpO2: 97 % (11/28/19 1628) Vital Signs (24h Range):  Temp:  [96.9 °F (36.1 °C)-98.3 °F (36.8 °C)] 98.3 °F (36.8 °C)  Pulse:  [59-70] 59  Resp:  [17-20] 20  SpO2:  [95 %-98 %] 97 %  BP: (118-138)/(60-72) 128/60     Weight: 44 kg (97 lb)  Body mass index is 22.45 kg/m².    Intake/Output Summary (Last 24 hours) at 11/28/2019 1719  Last data filed at 11/28/2019 0600  Gross per 24 hour   Intake 240 ml   Output 1250 ml   Net -1010 ml      Physical Exam   Constitutional: She is oriented to person, place, and time. She appears cachectic. No distress.   Pt is severely malnourished.    HENT:   Head: Normocephalic and atraumatic.   Eyes:  Pupils are equal, round, and reactive to light. Conjunctivae and EOM are normal.   Neck: Normal range of motion. Neck supple.   Cardiovascular: Normal rate, regular rhythm and normal heart sounds.   No murmur heard.  Pulmonary/Chest: Effort normal and breath sounds normal. No respiratory distress. She has no wheezes. She has no rales.   Abdominal: Soft. Bowel sounds are normal. She exhibits distension. There is tenderness (LLQ).   Musculoskeletal: She exhibits no edema or deformity.   Lymphadenopathy:     She has no cervical adenopathy.   Neurological: She is alert and oriented to person, place, and time. She displays normal reflexes. No cranial nerve deficit or sensory deficit. She exhibits normal muscle tone.   Skin: Skin is warm. Capillary refill takes less than 2 seconds. She is not diaphoretic.   Psychiatric: She has a normal mood and affect. Her behavior is normal.       Significant Labs:   CBC:   Recent Labs   Lab 11/27/19 0416 11/28/19  0407   WBC 4.70 8.63   HGB 9.4* 9.6*   HCT 29.6* 30.6*    345     CMP:   Recent Labs   Lab 11/27/19 0416 11/28/19  0407   * 134*   K 4.0 3.9    102   CO2 23 20*   * 157*   BUN 14 14   CREATININE 0.9 0.9   CALCIUM 8.2* 8.8   PROT 6.4 7.0   ALBUMIN 2.4* 2.6*   BILITOT 0.9 0.9   ALKPHOS 119 138*   AST 64* 61*   ALT 44 43   ANIONGAP 8 12   EGFRNONAA >60.0 >60.0       Significant Imaging: I have reviewed and interpreted all pertinent imaging results/findings within the past 24 hours.

## 2019-11-28 NOTE — PLAN OF CARE
Problem: Fall Injury Risk  Goal: Absence of Fall and Fall-Related Injury  Outcome: Ongoing, Progressing     Problem: Adult Inpatient Plan of Care  Goal: Absence of Hospital-Acquired Illness or Injury  Outcome: Ongoing, Progressing     Problem: Adult Inpatient Plan of Care  Goal: Readiness for Transition of Care  Outcome: Ongoing, Progressing     Problem: Skin Injury Risk Increased  Goal: Skin Health and Integrity  Outcome: Ongoing, Progressing     PT is AAOX2, no acute changes noted during shift, pt is  on bedrest and is repositioned q2, no skin breakdown noted, hourly rounds made during shift,  VSS. No falls noted. Fall precautions remain. Pain assessed. Pain controlled with po and iv meds, Pt resting comfortably in bed. Call light in reach. Will continue to monitor.

## 2019-11-28 NOTE — ASSESSMENT & PLAN NOTE
Hepatocellular carcinoma, alcoholic cirrhosis( dx 2015), portal HTN, ascites, hx of variceal bleeding in January 2018 s/p banding,B/l pulmonary embolism,  hx of ETOH abuse (quit Jan 2018)    - she has mild abdominal destination.   - MELD  Na is 14 today.  - lasix 40 PO.  - f/u volume status and MELD score daily.  - started on lactulose 30 ml/ BID

## 2019-11-29 PROBLEM — G47.00 INSOMNIA: Status: ACTIVE | Noted: 2019-11-29

## 2019-11-29 PROBLEM — M54.9 BACK PAIN: Status: ACTIVE | Noted: 2019-11-29

## 2019-11-29 PROBLEM — Z51.5 PALLIATIVE CARE ENCOUNTER: Status: ACTIVE | Noted: 2019-11-29

## 2019-11-29 PROBLEM — Z71.89 COUNSELING REGARDING ADVANCED CARE PLANNING AND GOALS OF CARE: Status: ACTIVE | Noted: 2019-11-29

## 2019-11-29 LAB
ALBUMIN SERPL BCP-MCNC: 2.8 G/DL (ref 3.5–5.2)
ALP SERPL-CCNC: 142 U/L (ref 55–135)
ALT SERPL W/O P-5'-P-CCNC: 50 U/L (ref 10–44)
ANION GAP SERPL CALC-SCNC: 11 MMOL/L (ref 8–16)
AST SERPL-CCNC: 71 U/L (ref 10–40)
BASOPHILS # BLD AUTO: 0 K/UL (ref 0–0.2)
BASOPHILS NFR BLD: 0 % (ref 0–1.9)
BILIRUB SERPL-MCNC: 0.9 MG/DL (ref 0.1–1)
BUN SERPL-MCNC: 16 MG/DL (ref 8–23)
CALCIUM SERPL-MCNC: 9.4 MG/DL (ref 8.7–10.5)
CHLORIDE SERPL-SCNC: 102 MMOL/L (ref 95–110)
CO2 SERPL-SCNC: 24 MMOL/L (ref 23–29)
CREAT SERPL-MCNC: 1.1 MG/DL (ref 0.5–1.4)
DIFFERENTIAL METHOD: ABNORMAL
EOSINOPHIL # BLD AUTO: 0 K/UL (ref 0–0.5)
EOSINOPHIL NFR BLD: 0 % (ref 0–8)
ERYTHROCYTE [DISTWIDTH] IN BLOOD BY AUTOMATED COUNT: 20.5 % (ref 11.5–14.5)
EST. GFR  (AFRICAN AMERICAN): >60 ML/MIN/1.73 M^2
EST. GFR  (NON AFRICAN AMERICAN): 53.9 ML/MIN/1.73 M^2
GLUCOSE SERPL-MCNC: 131 MG/DL (ref 70–110)
HCT VFR BLD AUTO: 34 % (ref 37–48.5)
HGB BLD-MCNC: 10.5 G/DL (ref 12–16)
IMM GRANULOCYTES # BLD AUTO: 0.07 K/UL (ref 0–0.04)
IMM GRANULOCYTES NFR BLD AUTO: 0.9 % (ref 0–0.5)
INR PPP: 1.3 (ref 0.8–1.2)
LYMPHOCYTES # BLD AUTO: 0.9 K/UL (ref 1–4.8)
LYMPHOCYTES NFR BLD: 10.6 % (ref 18–48)
MAGNESIUM SERPL-MCNC: 1.9 MG/DL (ref 1.6–2.6)
MCH RBC QN AUTO: 22.2 PG (ref 27–31)
MCHC RBC AUTO-ENTMCNC: 30.9 G/DL (ref 32–36)
MCV RBC AUTO: 72 FL (ref 82–98)
MONOCYTES # BLD AUTO: 0.8 K/UL (ref 0.3–1)
MONOCYTES NFR BLD: 9.8 % (ref 4–15)
NEUTROPHILS # BLD AUTO: 6.5 K/UL (ref 1.8–7.7)
NEUTROPHILS NFR BLD: 78.7 % (ref 38–73)
NRBC BLD-RTO: 0 /100 WBC
PHOSPHATE SERPL-MCNC: 3.2 MG/DL (ref 2.7–4.5)
PLATELET # BLD AUTO: 333 K/UL (ref 150–350)
PMV BLD AUTO: 10 FL (ref 9.2–12.9)
POCT GLUCOSE: 159 MG/DL (ref 70–110)
POCT GLUCOSE: 177 MG/DL (ref 70–110)
POCT GLUCOSE: 184 MG/DL (ref 70–110)
POCT GLUCOSE: 185 MG/DL (ref 70–110)
POCT GLUCOSE: 188 MG/DL (ref 70–110)
POCT GLUCOSE: 262 MG/DL (ref 70–110)
POTASSIUM SERPL-SCNC: 3.7 MMOL/L (ref 3.5–5.1)
PROT SERPL-MCNC: 7.3 G/DL (ref 6–8.4)
PROTHROMBIN TIME: 12.7 SEC (ref 9–12.5)
RBC # BLD AUTO: 4.72 M/UL (ref 4–5.4)
SODIUM SERPL-SCNC: 137 MMOL/L (ref 136–145)
WBC # BLD AUTO: 8.23 K/UL (ref 3.9–12.7)

## 2019-11-29 PROCEDURE — 77295 3-D RADIOTHERAPY PLAN: CPT | Mod: TC | Performed by: RADIOLOGY

## 2019-11-29 PROCEDURE — 85025 COMPLETE CBC W/AUTO DIFF WBC: CPT

## 2019-11-29 PROCEDURE — 77300 RADIATION THERAPY DOSE PLAN: CPT | Mod: TC | Performed by: RADIOLOGY

## 2019-11-29 PROCEDURE — 77300 PR RADIATION THERAPY,DOSIMETRY PLAN: ICD-10-PCS | Mod: 26,,, | Performed by: RADIOLOGY

## 2019-11-29 PROCEDURE — 77295 3-D RADIOTHERAPY PLAN: CPT | Mod: 26,,, | Performed by: RADIOLOGY

## 2019-11-29 PROCEDURE — 25000003 PHARM REV CODE 250: Performed by: STUDENT IN AN ORGANIZED HEALTH CARE EDUCATION/TRAINING PROGRAM

## 2019-11-29 PROCEDURE — 77014 HC CT GUIDANCE RADIATION THERAPY FLDS PLACEMENT: CPT | Mod: TC | Performed by: RADIOLOGY

## 2019-11-29 PROCEDURE — 77334 RADIATION TREATMENT AID(S): CPT | Mod: 26,,, | Performed by: RADIOLOGY

## 2019-11-29 PROCEDURE — 77261 PR  RADIATION THERAPY PLAN SIMPLE: ICD-10-PCS | Mod: ,,, | Performed by: RADIOLOGY

## 2019-11-29 PROCEDURE — 77261 THER RADIOLOGY TX PLNG SMPL: CPT | Mod: ,,, | Performed by: RADIOLOGY

## 2019-11-29 PROCEDURE — 63600175 PHARM REV CODE 636 W HCPCS: Performed by: STUDENT IN AN ORGANIZED HEALTH CARE EDUCATION/TRAINING PROGRAM

## 2019-11-29 PROCEDURE — 84100 ASSAY OF PHOSPHORUS: CPT

## 2019-11-29 PROCEDURE — 99233 SBSQ HOSP IP/OBS HIGH 50: CPT | Mod: ,,, | Performed by: NURSE PRACTITIONER

## 2019-11-29 PROCEDURE — 77334 PR  RADN TREATMENT AID(S) COMPLX: ICD-10-PCS | Mod: 26,,, | Performed by: RADIOLOGY

## 2019-11-29 PROCEDURE — 77332 RADIATION TREATMENT AID(S): CPT | Mod: TC | Performed by: RADIOLOGY

## 2019-11-29 PROCEDURE — 83735 ASSAY OF MAGNESIUM: CPT

## 2019-11-29 PROCEDURE — 25000003 PHARM REV CODE 250: Performed by: INTERNAL MEDICINE

## 2019-11-29 PROCEDURE — 77280 THER RAD SIMULAJ FIELD SMPL: CPT | Mod: TC | Performed by: RADIOLOGY

## 2019-11-29 PROCEDURE — 77334 RADIATION TREATMENT AID(S): CPT | Mod: TC | Performed by: RADIOLOGY

## 2019-11-29 PROCEDURE — 85610 PROTHROMBIN TIME: CPT

## 2019-11-29 PROCEDURE — 77300 RADIATION THERAPY DOSE PLAN: CPT | Mod: 26,,, | Performed by: RADIOLOGY

## 2019-11-29 PROCEDURE — 11000001 HC ACUTE MED/SURG PRIVATE ROOM

## 2019-11-29 PROCEDURE — 80053 COMPREHEN METABOLIC PANEL: CPT

## 2019-11-29 PROCEDURE — 99232 SBSQ HOSP IP/OBS MODERATE 35: CPT | Mod: ,,, | Performed by: INTERNAL MEDICINE

## 2019-11-29 PROCEDURE — 99233 PR SUBSEQUENT HOSPITAL CARE,LEVL III: ICD-10-PCS | Mod: ,,, | Performed by: NURSE PRACTITIONER

## 2019-11-29 PROCEDURE — 99232 PR SUBSEQUENT HOSPITAL CARE,LEVL II: ICD-10-PCS | Mod: ,,, | Performed by: INTERNAL MEDICINE

## 2019-11-29 PROCEDURE — 36415 COLL VENOUS BLD VENIPUNCTURE: CPT

## 2019-11-29 PROCEDURE — 77295 PR 3D RADIOTHERAPY PLAN: ICD-10-PCS | Mod: 26,,, | Performed by: RADIOLOGY

## 2019-11-29 RX ORDER — SPIRONOLACTONE 100 MG/1
100 TABLET, FILM COATED ORAL DAILY
Status: DISCONTINUED | OUTPATIENT
Start: 2019-11-30 | End: 2019-12-03

## 2019-11-29 RX ORDER — OXYCODONE HYDROCHLORIDE 5 MG/1
5 TABLET ORAL EVERY 4 HOURS PRN
Status: DISCONTINUED | OUTPATIENT
Start: 2019-11-29 | End: 2019-12-01

## 2019-11-29 RX ORDER — OXYCODONE HYDROCHLORIDE 10 MG/1
10 TABLET ORAL EVERY 4 HOURS PRN
Status: DISCONTINUED | OUTPATIENT
Start: 2019-11-29 | End: 2019-11-29

## 2019-11-29 RX ORDER — OXYCODONE HYDROCHLORIDE 10 MG/1
10 TABLET ORAL EVERY 4 HOURS PRN
Status: DISCONTINUED | OUTPATIENT
Start: 2019-11-29 | End: 2019-12-01

## 2019-11-29 RX ADMIN — FAMOTIDINE 20 MG: 20 TABLET ORAL at 08:11

## 2019-11-29 RX ADMIN — PROPRANOLOL HYDROCHLORIDE 10 MG: 10 TABLET ORAL at 08:11

## 2019-11-29 RX ADMIN — OXYCODONE HYDROCHLORIDE 10 MG: 10 TABLET ORAL at 08:11

## 2019-11-29 RX ADMIN — DEXAMETHASONE SODIUM PHOSPHATE 4 MG: 4 INJECTION, SOLUTION INTRAMUSCULAR; INTRAVENOUS at 11:11

## 2019-11-29 RX ADMIN — MAGNESIUM OXIDE TAB 400 MG (241.3 MG ELEMENTAL MG) 400 MG: 400 (241.3 MG) TAB at 08:11

## 2019-11-29 RX ADMIN — OXYCODONE HYDROCHLORIDE 10 MG: 10 TABLET ORAL at 10:11

## 2019-11-29 RX ADMIN — DEXAMETHASONE SODIUM PHOSPHATE 4 MG: 4 INJECTION, SOLUTION INTRAMUSCULAR; INTRAVENOUS at 12:11

## 2019-11-29 RX ADMIN — FERROUS SULFATE TAB EC 325 MG (65 MG FE EQUIVALENT) 325 MG: 325 (65 FE) TABLET DELAYED RESPONSE at 08:11

## 2019-11-29 RX ADMIN — ONDANSETRON 4 MG: 4 TABLET, ORALLY DISINTEGRATING ORAL at 11:11

## 2019-11-29 RX ADMIN — FUROSEMIDE 40 MG: 40 TABLET ORAL at 08:11

## 2019-11-29 RX ADMIN — OXYCODONE HYDROCHLORIDE 10 MG: 10 TABLET ORAL at 12:11

## 2019-11-29 RX ADMIN — DEXAMETHASONE SODIUM PHOSPHATE 4 MG: 4 INJECTION, SOLUTION INTRAMUSCULAR; INTRAVENOUS at 06:11

## 2019-11-29 RX ADMIN — Medication 6 MG: at 08:11

## 2019-11-29 RX ADMIN — OXYCODONE HYDROCHLORIDE 10 MG: 10 TABLET ORAL at 06:11

## 2019-11-29 RX ADMIN — DEXAMETHASONE SODIUM PHOSPHATE 4 MG: 4 INJECTION, SOLUTION INTRAMUSCULAR; INTRAVENOUS at 05:11

## 2019-11-29 RX ADMIN — HYDROMORPHONE HYDROCHLORIDE 1 MG: 1 INJECTION, SOLUTION INTRAMUSCULAR; INTRAVENOUS; SUBCUTANEOUS at 03:11

## 2019-11-29 RX ADMIN — APIXABAN 5 MG: 5 TABLET, FILM COATED ORAL at 08:11

## 2019-11-29 NOTE — PLAN OF CARE
TAYO faxed referral to [facility] via  for review. TAYO will continue to follow.      11/29/19 4373   Post-Acute Status   Post-Acute Authorization Placement   Post-Acute Placement Status Referrals Sent     Yolette Baltazar LMSW   - [facility]  Ext. [phone]

## 2019-11-29 NOTE — PLAN OF CARE
11/29/19 1746   Discharge Reassessment   Assessment Type Discharge Planning Reassessment   Provided patient/caregiver education on the expected discharge date and the discharge plan Yes   Do you have any problems affording any of your prescribed medications? No   Discharge Plan A Rehab   Discharge Plan B Rehab   DME Needed Upon Discharge  none   Anticipated Discharge Disposition Rehab   Post-Acute Status   Post-Acute Authorization Placement   Post-Acute Placement Status Awaiting Internal Medical Clearance

## 2019-11-29 NOTE — CONSULTS
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 Aguilar.      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 Patient is alert and denies pain at present, although she reports significant back pain earlier today prior to receiving Oxycodone. She has both Oxycodone and Hydrocodone ordered Q4H PRN but has only taken Hydrocodone once and Oxycodone 3 times in the last 24 hours. Discussed the importance of taking pain medication early rather than waiting for pain to become severe. Patient did not sleep well for several nights but slept better last night after her first dose of Melatonin. She is eager to start palliative radiation to help with pain control and feels frustrated that she has "been here for days" and "nothing is being done." Patient is adamant that she does not want surgery at any point and understands that she is not expected to walk again without surgery, although she says "anything's possible." She wants to get home to her family as soon as possible and reports that they would be able to provide transportation if " "palliative radiation is continued as an outpatient. She is currently full code and states that she "wants them to try" if her heart stopped or she stopped breathing. However, she would not want to be left on machines long term if recovery was not possible.    Plan/Recommendations:  1. See goals of care discussion below.  2. Continue current medication regimen. Agree with plan to start palliative radiation therapy during this admission.  3. Inpatient palliative care will follow up with patient to complete advanced directives.  4. Consider referral to [facility] upon discharge for symptom management during radiation therapy and to assist with transition to hospice in the future if desired by patient after radiation therapy is complete.    Goals of Care/Advance Care Planning:  Conference conducted by this APRN with patient to discuss pt's current clinical status, goals of care, code status, long term expected outcomes and prognostic awareness. After a discussion concerning the pt's values and wishes, patient verbalized fair understanding and insight as it relates to her prognostic awareness. She is currently full code and states that she "wants them to try" if her heart stopped or she stopped breathing. However, she would not want to be left on machines long term if recovery was not possible. Patient is adamant that she does not want surgery at any point and understands that she is not expected to walk again without surgery, although she says "anything's possible." Her goal is to return home to her family but she wants to start palliative radiation during this admission and continue as outpatient.        Thank you for your consult. I will follow-up with patient. Please contact us if you have any additional questions.    Subjective:     HPI:   Palliative care consulted for this 64 y/o female with hepatocellular carcinoma, alcoholic cirrhosis (dx 2015), portal HTN, ascites, hx of variceal " "bleeding in January 2018 s/p banding, bilateral pulmonary embolism on chronic apixaban, hx of ETOH abuse (quit Jan 2018) who presented to the ED on 11/25/19 with back pain and bilateral lower extremity weakness. Patient stated that symptoms had been on going for the past week and a half and progressed to the point where she could not walk or lift her legs against gravity. She decribed "electric shock-like" sensations down her legs bilaterally. She complained of back pain and LUQ & LLQ abdominal pain with radiation to her back. She denied any falls, trauma, or bladder/bowel incontinence. In ED, MRI with acute compression fracture at T11 with almost 25% height loss and retropulsion of the posterior cortex into the central canal causing severe cord compression. On 11/26/19, she additionally endorsed urinary retention and Garcia was placed. Neurosurgery was consulted and planned to take her to the OR on 11/27/19 for T10 lateral corpectomy with posterior T8-T12 percutaneous instrumentation, but patient opted not to proceed with surgery. Radiation oncology has been consulted for palliative radiotherapy to the thoracic spine for pain control.    Hospital Course:  No notes on file      Past Medical History:   Diagnosis Date    Alcohol abuse     Anemia     Cancer     liver    Cirrhosis        Past Surgical History:   Procedure Laterality Date    ESOPHAGOGASTRODUODENOSCOPY Left 1/9/2019    Procedure: EGD (ESOPHAGOGASTRODUODENOSCOPY);  Surgeon: Madyson Farrar MD;  Location: [facility] ENDO;  Service: Endoscopy;  Laterality: Left;    PERITONEOCENTESIS N/A 1/9/2019    Procedure: PARACENTESIS, ABDOMINAL;  Surgeon: Everett Fitzpatrick MD;  Location: [facility] CATH LAB;  Service: Radiology;  Laterality: N/A;       Review of patient's allergies indicates:  No Known Allergies    Medications:  Continuous Infusions:  Scheduled Meds:   dexamethasone  4 mg Intravenous Q6H    enoxaparin  70 mg Subcutaneous Q24H    famotidine  20 mg Oral Daily "    ferrous sulfate  325 mg Oral Daily    furosemide  40 mg Oral Daily    lactulose  20 g Oral TID    magnesium oxide  400 mg Oral Daily    melatonin  6 mg Oral Nightly    propranolol  10 mg Oral BID     PRN Meds:Dextrose 10% Bolus, Dextrose 10% Bolus, glucagon (human recombinant), glucose, glucose, HYDROmorphone, insulin aspart U-100, naloxone, ondansetron, oxyCODONE, sodium chloride 0.9%    Family History     None        Tobacco Use    Smoking status: Current Every Day Smoker     Packs/day: 0.50     Years: 30.00     Pack years: 15.00     Types: Cigarettes    Smokeless tobacco: Never Used    Tobacco comment: 2 cigarettes a day   Substance and Sexual Activity    Alcohol use: No     Frequency: Never     Comment: Previously drank 1 pint a day    Drug use: Not Currently     Types: Cocaine     Comment: last use was 3 years ago    Sexual activity: Not Currently       Review of Systems   Constitutional: Positive for activity change and fatigue. Negative for appetite change.   HENT: Negative for congestion, sore throat and trouble swallowing.    Respiratory: Negative for cough, shortness of breath and wheezing.    Cardiovascular: Negative for chest pain and leg swelling.   Gastrointestinal: Positive for abdominal distention. Negative for constipation, nausea and vomiting.   Musculoskeletal: Positive for back pain and gait problem. Negative for joint swelling and neck pain.   Skin: Negative for rash and wound.   Neurological: Positive for weakness (lower extremities). Negative for dizziness and light-headedness.   Psychiatric/Behavioral: Positive for sleep disturbance. Negative for agitation, behavioral problems and dysphoric mood. The patient is not nervous/anxious.      Objective:     Vital Signs (Most Recent):  Temp: 98 °F (36.7 °C) (11/29/19 1220)  Pulse: (!) 59 (11/29/19 1220)  Resp: 17 (11/29/19 1220)  BP: (!) 105/59 (11/29/19 1220)  SpO2: 99 % (11/29/19 1220) Vital Signs (24h Range):  Temp:  [96.7 °F (35.9  °C)-98.5 °F (36.9 °C)] 98 °F (36.7 °C)  Pulse:  [59-66] 59  Resp:  [17-20] 17  SpO2:  [94 %-99 %] 99 %  BP: (101-143)/(56-70) 105/59     Weight: 44 kg (97 lb)  Body mass index is 22.45 kg/m².    Review of Symptoms  Symptom Assessment (ESAS 0-10 scale)   ESAS 0 1 2 3 4 5 6 7 8 9 10   Pain X             Dyspnea X             Anxiety X             Nausea X             Depression  X             Anorexia X             Fatigue  X            Insomnia  X            Restlessness  X             Agitation X             CAM / Delirium _X_ --  ___+   Constipation     _X_ --  ___+   Diarrhea           _X_ --  ___+  Bowel Management Plan (BMP): Yes    Comments: Lactulose    Pain Assessment: intermittent back pain, denies pain at present    OME in 24 hours: 50    Performance Status: 40    ECOG Performance Status Grade: 3 - Confined to bed or chair >50% of waking hours    Physical Exam   Constitutional: She is oriented to person, place, and time. She appears cachectic. No distress.   HENT:   Head: Normocephalic and atraumatic.   Eyes: Conjunctivae and EOM are normal.   Neck: Normal range of motion. Neck supple.   Cardiovascular: Regular rhythm. Bradycardia present.   Pulmonary/Chest: Effort normal and breath sounds normal.   Abdominal: Bowel sounds are normal. She exhibits distension.   Musculoskeletal: She exhibits no edema or deformity.   Neurological: She is alert and oriented to person, place, and time.   BLE weakness   Skin: Skin is warm and dry.   Psychiatric: She has a normal mood and affect. Her behavior is normal. Judgment and thought content normal.       Significant Labs: All pertinent labs within the past 24 hours have been reviewed.  CBC:   Recent Labs   Lab 11/29/19 0524   WBC 8.23   HGB 10.5*   HCT 34.0*   MCV 72*        BMP:  Recent Labs   Lab 11/29/19 0524   *      K 3.7      CO2 24   BUN 16   CREATININE 1.1   CALCIUM 9.4   MG 1.9     LFT:  Lab Results   Component Value Date    AST 71 (H)  "11/29/2019    ALKPHOS 142 (H) 11/29/2019    BILITOT 0.9 11/29/2019     Albumin:   Albumin   Date Value Ref Range Status   11/29/2019 2.8 (L) 3.5 - 5.2 g/dL Final     Protein:   Total Protein   Date Value Ref Range Status   11/29/2019 7.3 6.0 - 8.4 g/dL Final     Lactic acid:   Lab Results   Component Value Date    LACTATE 1.6 11/25/2019    LACTATE 2.1 01/28/2018       Significant Imaging: I have reviewed all pertinent imaging results/findings within the past 24 hours.    Advance Care Planning   Advanced Directives::  Living Will: No  LaPOST: No  Do Not Resuscitate Status: No  Medical Power of : No. Daughter Yuridia is surrogate decision maker.    Decision-Making Capacity: Patient answered questions       Living Arrangements: Lives with family    Psychosocial/Cultural:  Patient is unmarried and has 2 living children, daughter Yuridia and son Chico who is currently incarcerated until May 2020. She had another son who was killed in 2003. She lives at home with Josue's 7 year old daughter who she has cared for since she was 3 weeks ago. Yuridia and her 14 year old daughter have also been staying in the home. 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 Her goal is to get back home so she can "do things" around the house. She understands that she is not expected to walk again without surgery but says "anything's possible." She worries about her 7 year old granddaughter but says that Yuridia is taking good care of her.    Spiritual:     F- Mandy and Belief: Yazidism    I - Importance: very important  .  C - Community: belongs to [facility]    A - Address in Care:  visits, sacr[facility] care      > 50% of 70 min visit spent in chart review, face to face discussion of goals of care,  symptom assessment, coordination of care and emotional support.    Rosalba Mak NP  Palliative Medicine  [facility]            "

## 2019-11-29 NOTE — PLAN OF CARE
Patient AAOx4. She's able to voice concerns. Today she left unit for radiation. Pain is being managed with PRN pain meds. Garcia catheter intact draining clear, yellow urine to  bag. Call light in reach. Encouraged to call for assistance as needed.

## 2019-11-29 NOTE — CONSULTS
Patient Name: Neena Marks  MRN: [id number]  Admission Date: 11/25/2019   Hospital Length of Stay: 3     Inpatient consultation to Radiation Oncology    Chief complaint:   Chief Complaint   Patient presents with    Back Pain     Patient reports back pain and numbness to BLE since 11/22. Patient reports being tx on 11/22 and receiving shot in hip and then developed numbness to BLE.         HPI: Ms. Marks is a 63 year old woman with history of metastatic hepatocellular carcinoma, presenting to [facility] on 11/25/19 after 2 week history of bilateral lower extremity paralysis and paresthesias, found to have a T10 pathologic fracture with retropulsion into the central canal, causing severe cord compression. Neurosurgery consulted but patient declined surgical intervention. Our team consulted for additional treatment recommendations.    Prior Radiation History: Denies    Past Medical History:   Diagnosis Date    Alcohol abuse     Anemia     Cancer     liver    Cirrhosis        Past Surgical History:   Procedure Laterality Date    ESOPHAGOGASTRODUODENOSCOPY Left 1/9/2019    Procedure: EGD (ESOPHAGOGASTRODUODENOSCOPY);  Surgeon: Madyson Farrar MD;  Location: [facility] ENDO;  Service: Endoscopy;  Laterality: Left;    PERITONEOCENTESIS N/A 1/9/2019    Procedure: PARACENTESIS, ABDOMINAL;  Surgeon: Everett Fitzpatrick MD;  Location: [facility] CATH LAB;  Service: Radiology;  Laterality: N/A;       Social History     Tobacco Use    Smoking status: Current Every Day Smoker     Packs/day: 0.50     Years: 30.00     Pack years: 15.00     Types: Cigarettes    Smokeless tobacco: Never Used    Tobacco comment: 2 cigarettes a day   Substance Use Topics    Alcohol use: No     Frequency: Never     Comment: Previously drank 1 pint a day    Drug use: Not Currently     Types: Cocaine     Comment: last use was 3 years ago       Cancer-related family history is not on file.    No current facility-administered medications on file prior to encounter.   "    Current Outpatient Medications on File Prior to Encounter   Medication Sig Dispense Refill    apixaban (ELIQUIS) 5 mg Tab Take 1 tablet (5 mg total) by mouth 2 (two) times daily. 60 tablet 4    ferrous sulfate (FEOSOL) 325 mg (65 mg iron) Tab tablet Take 1 tablet (325 mg total) by mouth 2 (two) times daily. 30 tablet 5    oxyCODONE (ROXICODONE) 5 MG immediate release tablet Take 1 tablet (5 mg total) by mouth every 6 (six) hours as needed for Pain. 8 tablet 0    promethazine (PHENERGAN) 25 MG tablet Take 1 tablet (25 mg total) by mouth every 6 (six) hours as needed for Nausea. 25 tablet 0    propranolol (INDERAL) 10 MG tablet Take 1 tablet (10 mg total) by mouth 2 (two) times daily. (Patient taking differently: Take 10 mg by mouth once daily. ) 60 tablet 2       Review of patient's allergies indicates:  No Known Allergies    Review of Systems   Constitutional: Positive for fatigue. Negative for unexpected weight change.   HENT: Negative for congestion and trouble swallowing.    Eyes: Negative for visual disturbance.   Respiratory: Negative for cough and wheezing.    Cardiovascular: Negative for chest pain and palpitations.   Gastrointestinal: Positive for nausea. Negative for abdominal distention, diarrhea and vomiting.   Genitourinary: Positive for difficulty urinating.   Musculoskeletal: Positive for back pain.   Skin: Negative for color change.   Neurological: Positive for weakness and numbness. Negative for dizziness and headaches.   Hematological: Negative for adenopathy.   Psychiatric/Behavioral: Negative for decreased concentration. The patient is not nervous/anxious.         Vital Signs: /60 (BP Location: Left arm, Patient Position: Lying)   Pulse (!) 59   Temp 98.3 °F (36.8 °C) (Oral)   Resp 20   Ht 4' 7.12" (1.4 m)   Wt 44 kg (97 lb)   SpO2 97%   Breastfeeding? No   BMI 22.45 kg/m²     ECOG Performance Status: ECOG 3    Physical exam:    Constitutional: Cachectic, ill-appearing " woman.  Head: Normocephalic, atraumatic.    Eyes: Sclerae are non-icteric.  Pupils are equal and round.  Extraocular movements are intact.  ENMT: Oral cavity and oropharynx are without lesions. Mucous membranes are moist.  Neck: Supple.  No palpable cervical or supraclavicular adenopathy.   Pulmonary: Clear to auscultation, bilaterally.  No rhonchi, rales or wheezes.  Cardiovascular: Regular rate and rhythm.   No murmurs heard.  No edema.  GI: Soft, nontender and nondistended.  No palpable masses or hepatosplenomegaly.  Musculoskeletal: Range of motion appears normal in all bilateral upper extremities, no motion in bilateral lower extremities.  Lymphatics: No palpable cervical, supraclavicular, axillary, or inguino-femoral adenopathy.  Extremities: No clubbing, cyanosis, or edema.    Skin: No visible lesions.  Neurologic: Motor strength is 5/5 in bilateral upper and 1/5 in bilateral lower extremities.    Sensory exam is grossly intact to touch in bilateral upper extremities but decreased sensation in bilateral lower extremities.  Gait not tested.    Psychiatric: Patient is alert and oriented x 3.  Normal mood and affect.    Labs: I have personally reviewed the patient's available labs and reports and summarized pertinent findings above in HPI.     Imaging: I have personally reviewed the patient's available images and reports and summarized pertinent findings above in HPI.     Pathology: I have personally reviewed the patient's available pathology and summarized pertinent findings above in HPI.     Assessment: 64 year old woman with metastatic hepatocellular carcinoma with bony T10 retropulsion causing cord compression and bilateral lower extremity paralysis.    Plan:  Ms. Marks has declined surgical intervention and our team is consulted for consideration of palliative radiotherapy to the thoracic spine. Due to her ongoing paralysis for the last three weeks, I do not expect radiotherapy to improve her current lower  extremity paralysis and the patient understands this. However, she has severe back pain associated with this lesion and palliative radiotherapy is an appropriate option for pain control.     I reviewed the rationale and goal of the proposed treatment course. The radiotherapeutic procedure with associated side effects and potential complications were explained. Neena Marks had the opportunity to ask questions which were answered to the best of my knowledge. The patient voiced understanding of the above and has elected to proceed with treatment. Consent form was signed. She will be scheduled for CT simulation tomorrow and will start treatment thereafter. I will plan for 5-10 fractions of palliative treatment to the thoracic spine, pending additional treatment planning.    Thank you for allowing me to participate in the care of this patient. Please feel free to contact me with any questions or concerns.    Thiago Guzmán MD  Radiation Oncology  [facility]

## 2019-11-29 NOTE — HPI
"Palliative care consulted for this 64 y/o female with hepatocellular carcinoma, alcoholic cirrhosis (dx 2015), portal HTN, ascites, hx of variceal bleeding in January 2018 s/p banding, bilateral pulmonary embolism on chronic apixaban, hx of ETOH abuse (quit Jan 2018) who presented to the ED on 11/25/19 with back pain and bilateral lower extremity weakness. Patient stated that symptoms had been on going for the past week and a half and progressed to the point where she could not walk or lift her legs against gravity. She decribed "electric shock-like" sensations down her legs bilaterally. She complained of back pain and LUQ & LLQ abdominal pain with radiation to her back. She denied any falls, trauma, or bladder/bowel incontinence. In ED, MRI with acute compression fracture at T11 with almost 25% height loss and retropulsion of the posterior cortex into the central canal causing severe cord compression. On 11/26/19, she additionally endorsed urinary retention and Garcia was placed. Neurosurgery was consulted and planned to take her to the OR on 11/27/19 for T10 lateral corpectomy with posterior T8-T12 percutaneous instrumentation, but patient opted not to proceed with surgery. Radiation oncology was consulted for palliative radiotherapy to the thoracic spine for pain control. Palliative radiotherapy started over the weekend.  "

## 2019-11-29 NOTE — ASSESSMENT & PLAN NOTE
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 Patient is alert and denies pain at present, although she reports significant back pain earlier today prior to receiving Oxycodone. She has both Oxycodone and Hydrocodone ordered Q4H PRN but has only taken Hydrocodone once and Oxycodone 3 times in the last 24 hours. Discussed the importance of taking pain medication early rather than waiting for pain to become severe. Patient did not sleep well for several nights but slept better last night after her first dose of Melatonin. She is eager to start palliative radiation to help with pain control and feels frustrated that she has "been here for days" and "nothing is being done." Patient is adamant that she does not want surgery at any point and understands that she is not expected to walk again without surgery, although she says "anything's possible." She wants to get home to her family as soon as possible and reports that they would be able to provide transportation if palliative radiation is continued as an outpatient. She is currently full code and states that she "wants them to try" if her heart stopped or she stopped breathing. However, she would not want to be left on machines long term if recovery was not possible.    Plan/Recommendations:  1. See goals of care discussion below.  2. Continue current medication regimen. Agree with plan to start palliative radiation therapy during this admission.  3. Inpatient palliative care will follow up with patient to complete advanced directives.  4. Consider referral to [facility] " "Care upon discharge for symptom management during radiation therapy and to assist with transition to hospice in the future if desired by patient after radiation therapy is complete.    Goals of Care/Advance Care Planning:  Conference conducted by this APRN with patient to discuss pt's current clinical status, goals of care, code status, long term expected outcomes and prognostic awareness. After a discussion concerning the pt's values and wishes, patient verbalized fair understanding and insight as it relates to her prognostic awareness. She is currently full code and states that she "wants them to try" if her heart stopped or she stopped breathing. However, she would not want to be left on machines long term if recovery was not possible. Patient is adamant that she does not want surgery at any point and understands that she is not expected to walk again without surgery, although she says "anything's possible." Her goal is to return home to her family but she wants to start palliative radiation during this admission and continue as outpatient.  "

## 2019-11-29 NOTE — SUBJECTIVE & OBJECTIVE
Past Medical History:   Diagnosis Date    Alcohol abuse     Anemia     Cancer     liver    Cirrhosis        Past Surgical History:   Procedure Laterality Date    ESOPHAGOGASTRODUODENOSCOPY Left 1/9/2019    Procedure: EGD (ESOPHAGOGASTRODUODENOSCOPY);  Surgeon: Madyson Farrar MD;  [place] [facility], [place] by [facility];  Service: Endoscopy;  Laterality: Left;    PERITONEOCENTESIS N/A 1/9/2019    Procedure: PARACENTESIS, ABDOMINAL;  Surgeon: Everett Fitzpatrick MD;  [place] [facility], [place], [place] by [facility] CATH LAB;  Service: Radiology;  Laterality: N/A;       Review of patient's allergies indicates:  No Known Allergies    Medications:  Continuous Infusions:  Scheduled Meds:   dexamethasone  4 mg Intravenous Q6H    enoxaparin  70 mg Subcutaneous Q24H    famotidine  20 mg Oral Daily    ferrous sulfate  325 mg Oral Daily    furosemide  40 mg Oral Daily    lactulose  20 g Oral TID    magnesium oxide  400 mg Oral Daily    melatonin  6 mg Oral Nightly    propranolol  10 mg Oral BID     PRN Meds:Dextrose 10% Bolus, Dextrose 10% Bolus, glucagon (human recombinant), glucose, glucose, HYDROmorphone, insulin aspart U-100, naloxone, ondansetron, oxyCODONE, sodium chloride 0.9%    Family History     None        Tobacco Use    Smoking status: Current Every Day Smoker     Packs/day: 0.50     Years: 30.00     Pack years: 15.00     Types: Cigarettes    Smokeless tobacco: Never Used    Tobacco comment: 2 cigarettes a day   Substance and Sexual Activity    Alcohol use: No     Frequency: Never     Comment: Previously drank 1 pint a day    Drug use: Not Currently     Types: Cocaine     Comment: last use was 3 years ago    Sexual activity: Not Currently       Review of Systems   Constitutional: Positive for activity change and fatigue. Negative for appetite change.   HENT: Negative for congestion, sore throat and trouble swallowing.    Respiratory: Negative for cough, shortness of breath and wheezing.    Cardiovascular: Negative for chest pain and leg swelling.    Gastrointestinal: Positive for abdominal distention. Negative for constipation, nausea and vomiting.   Musculoskeletal: Positive for back pain and gait problem. Negative for joint swelling and neck pain.   Skin: Negative for rash and wound.   Neurological: Positive for weakness (lower extremities). Negative for dizziness and light-headedness.   Psychiatric/Behavioral: Positive for sleep disturbance. Negative for agitation, behavioral problems and dysphoric mood. The patient is not nervous/anxious.      Objective:     Vital Signs (Most Recent):  Temp: 98 °F (36.7 °C) (11/29/19 1220)  Pulse: (!) 59 (11/29/19 1220)  Resp: 17 (11/29/19 1220)  BP: (!) 105/59 (11/29/19 1220)  SpO2: 99 % (11/29/19 1220) Vital Signs (24h Range):  Temp:  [96.7 °F (35.9 °C)-98.5 °F (36.9 °C)] 98 °F (36.7 °C)  Pulse:  [59-66] 59  Resp:  [17-20] 17  SpO2:  [94 %-99 %] 99 %  BP: (101-143)/(56-70) 105/59     Weight: 44 kg (97 lb)  Body mass index is 22.45 kg/m².    Review of Symptoms  Symptom Assessment (ESAS 0-10 scale)   ESAS 0 1 2 3 4 5 6 7 8 9 10   Pain X             Dyspnea X             Anxiety X             Nausea X             Depression  X             Anorexia X             Fatigue  X            Insomnia  X            Restlessness  X             Agitation X             CAM / Delirium _X_ --  ___+   Constipation     _X_ --  ___+   Diarrhea           _X_ --  ___+  Bowel Management Plan (BMP): Yes    Comments: Lactulose    Pain Assessment: intermittent back pain, denies pain at present    OME in 24 hours: 50    Performance Status: 40    ECOG Performance Status Grade: 3 - Confined to bed or chair >50% of waking hours    Physical Exam   Constitutional: She is oriented to person, place, and time. She appears cachectic. No distress.   HENT:   Head: Normocephalic and atraumatic.   Eyes: Conjunctivae and EOM are normal.   Neck: Normal range of motion. Neck supple.   Cardiovascular: Regular rhythm. Bradycardia present.   Pulmonary/Chest: Effort  normal and breath sounds normal.   Abdominal: Bowel sounds are normal. She exhibits distension.   Musculoskeletal: She exhibits no edema or deformity.   Neurological: She is alert and oriented to person, place, and time.   BLE weakness   Skin: Skin is warm and dry.   Psychiatric: She has a normal mood and affect. Her behavior is normal. Judgment and thought content normal.       Significant Labs: All pertinent labs within the past 24 hours have been reviewed.  CBC:   Recent Labs   Lab 11/29/19 0524   WBC 8.23   HGB 10.5*   HCT 34.0*   MCV 72*        BMP:  Recent Labs   Lab 11/29/19 0524   *      K 3.7      CO2 24   BUN 16   CREATININE 1.1   CALCIUM 9.4   MG 1.9     LFT:  Lab Results   Component Value Date    AST 71 (H) 11/29/2019    ALKPHOS 142 (H) 11/29/2019    BILITOT 0.9 11/29/2019     Albumin:   Albumin   Date Value Ref Range Status   11/29/2019 2.8 (L) 3.5 - 5.2 g/dL Final     Protein:   Total Protein   Date Value Ref Range Status   11/29/2019 7.3 6.0 - 8.4 g/dL Final     Lactic acid:   Lab Results   Component Value Date    LACTATE 1.6 11/25/2019    LACTATE 2.1 01/28/2018       Significant Imaging: I have reviewed all pertinent imaging results/findings within the past 24 hours.    Advance Care Planning   Advanced Directives::  Living Will: No  LaPOST: No  Do Not Resuscitate Status: No  Medical Power of : No. Daughter Yuridia is surrogate decision maker.    Decision-Making Capacity: Patient answered questions       Living Arrangements: Lives with family    Psychosocial/Cultural:  Patient is unmarried and has 2 living children, daughter Yuridia and son Chico who is currently incarcerated until May 2020. She had another son who was killed in 2003. She lives at home with Josue's 7 year old daughter who she has cared for since she was 3 weeks ago. Yuridia and her 14 year old daughter have also been staying in the home. 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 Her goal is to get back home so she can "do things" around the house. She understands that she is not expected to walk again without surgery but says "anything's possible." She worries about her 7 year old granddaughter but says that Yuridia is taking good care of her.    Spiritual:     F- Mandy and Belief: Religion    I - Importance: very important  .  C - Community: belongs to [facility]    A - Address in Care:  visits, [facility]  "

## 2019-11-29 NOTE — CONSULTS
Palliative Care Consult.    Consult received and case discussed with Dr. Aguilar. Will review chart prior to seeing patient. Thank you for the consult..    BRIANDA Rodriguez, FNP-C

## 2019-11-30 LAB
ALBUMIN SERPL BCP-MCNC: 2.5 G/DL (ref 3.5–5.2)
ALP SERPL-CCNC: 122 U/L (ref 55–135)
ALT SERPL W/O P-5'-P-CCNC: 52 U/L (ref 10–44)
ANION GAP SERPL CALC-SCNC: 7 MMOL/L (ref 8–16)
AST SERPL-CCNC: 69 U/L (ref 10–40)
BASOPHILS # BLD AUTO: 0.01 K/UL (ref 0–0.2)
BASOPHILS NFR BLD: 0.1 % (ref 0–1.9)
BILIRUB SERPL-MCNC: 0.7 MG/DL (ref 0.1–1)
BUN SERPL-MCNC: 18 MG/DL (ref 8–23)
CALCIUM SERPL-MCNC: 8.6 MG/DL (ref 8.7–10.5)
CHLORIDE SERPL-SCNC: 104 MMOL/L (ref 95–110)
CO2 SERPL-SCNC: 24 MMOL/L (ref 23–29)
CREAT SERPL-MCNC: 0.8 MG/DL (ref 0.5–1.4)
DIFFERENTIAL METHOD: ABNORMAL
EOSINOPHIL # BLD AUTO: 0 K/UL (ref 0–0.5)
EOSINOPHIL NFR BLD: 0 % (ref 0–8)
ERYTHROCYTE [DISTWIDTH] IN BLOOD BY AUTOMATED COUNT: 20.1 % (ref 11.5–14.5)
EST. GFR  (AFRICAN AMERICAN): >60 ML/MIN/1.73 M^2
EST. GFR  (NON AFRICAN AMERICAN): >60 ML/MIN/1.73 M^2
GLUCOSE SERPL-MCNC: 193 MG/DL (ref 70–110)
HCT VFR BLD AUTO: 28.4 % (ref 37–48.5)
HGB BLD-MCNC: 9 G/DL (ref 12–16)
IMM GRANULOCYTES # BLD AUTO: 0.09 K/UL (ref 0–0.04)
IMM GRANULOCYTES NFR BLD AUTO: 1.2 % (ref 0–0.5)
INR PPP: 1.4 (ref 0.8–1.2)
LYMPHOCYTES # BLD AUTO: 0.8 K/UL (ref 1–4.8)
LYMPHOCYTES NFR BLD: 10.1 % (ref 18–48)
MAGNESIUM SERPL-MCNC: 1.7 MG/DL (ref 1.6–2.6)
MCH RBC QN AUTO: 22.1 PG (ref 27–31)
MCHC RBC AUTO-ENTMCNC: 31.7 G/DL (ref 32–36)
MCV RBC AUTO: 70 FL (ref 82–98)
MONOCYTES # BLD AUTO: 0.9 K/UL (ref 0.3–1)
MONOCYTES NFR BLD: 12 % (ref 4–15)
NEUTROPHILS # BLD AUTO: 5.7 K/UL (ref 1.8–7.7)
NEUTROPHILS NFR BLD: 76.6 % (ref 38–73)
NRBC BLD-RTO: 0 /100 WBC
PHOSPHATE SERPL-MCNC: 2.8 MG/DL (ref 2.7–4.5)
PLATELET # BLD AUTO: 284 K/UL (ref 150–350)
PMV BLD AUTO: ABNORMAL FL (ref 9.2–12.9)
POCT GLUCOSE: 177 MG/DL (ref 70–110)
POCT GLUCOSE: 185 MG/DL (ref 70–110)
POCT GLUCOSE: 219 MG/DL (ref 70–110)
POCT GLUCOSE: 262 MG/DL (ref 70–110)
POTASSIUM SERPL-SCNC: 3.9 MMOL/L (ref 3.5–5.1)
PROT SERPL-MCNC: 6.2 G/DL (ref 6–8.4)
PROTHROMBIN TIME: 14 SEC (ref 9–12.5)
RBC # BLD AUTO: 4.07 M/UL (ref 4–5.4)
SODIUM SERPL-SCNC: 135 MMOL/L (ref 136–145)
WBC # BLD AUTO: 7.43 K/UL (ref 3.9–12.7)

## 2019-11-30 PROCEDURE — 25000003 PHARM REV CODE 250: Performed by: STUDENT IN AN ORGANIZED HEALTH CARE EDUCATION/TRAINING PROGRAM

## 2019-11-30 PROCEDURE — 36415 COLL VENOUS BLD VENIPUNCTURE: CPT

## 2019-11-30 PROCEDURE — 63600175 PHARM REV CODE 636 W HCPCS: Performed by: STUDENT IN AN ORGANIZED HEALTH CARE EDUCATION/TRAINING PROGRAM

## 2019-11-30 PROCEDURE — 77387 GUIDANCE FOR RADJ TX DLVR: CPT | Mod: ,,, | Performed by: RADIOLOGY

## 2019-11-30 PROCEDURE — 99232 SBSQ HOSP IP/OBS MODERATE 35: CPT | Mod: ,,, | Performed by: INTERNAL MEDICINE

## 2019-11-30 PROCEDURE — 77387 PR GUIDANCE FOR RADIATION TREATMENT DELIVERY: ICD-10-PCS | Mod: ,,, | Performed by: RADIOLOGY

## 2019-11-30 PROCEDURE — 85610 PROTHROMBIN TIME: CPT

## 2019-11-30 PROCEDURE — 99232 PR SUBSEQUENT HOSPITAL CARE,LEVL II: ICD-10-PCS | Mod: ,,, | Performed by: INTERNAL MEDICINE

## 2019-11-30 PROCEDURE — 11000001 HC ACUTE MED/SURG PRIVATE ROOM

## 2019-11-30 PROCEDURE — 25000003 PHARM REV CODE 250: Performed by: INTERNAL MEDICINE

## 2019-11-30 PROCEDURE — 77402 RADIATION TX DELIVERY LVL 1: CPT | Performed by: RADIOLOGY

## 2019-11-30 PROCEDURE — 80053 COMPREHEN METABOLIC PANEL: CPT

## 2019-11-30 PROCEDURE — 85025 COMPLETE CBC W/AUTO DIFF WBC: CPT

## 2019-11-30 PROCEDURE — 77417 THER RADIOLOGY PORT IMAGE(S): CPT | Performed by: RADIOLOGY

## 2019-11-30 PROCEDURE — 84100 ASSAY OF PHOSPHORUS: CPT

## 2019-11-30 PROCEDURE — 83735 ASSAY OF MAGNESIUM: CPT

## 2019-11-30 PROCEDURE — 77387 GUIDANCE FOR RADJ TX DLVR: CPT | Mod: TC | Performed by: RADIOLOGY

## 2019-11-30 PROCEDURE — 94761 N-INVAS EAR/PLS OXIMETRY MLT: CPT

## 2019-11-30 RX ADMIN — DEXAMETHASONE SODIUM PHOSPHATE 4 MG: 4 INJECTION, SOLUTION INTRAMUSCULAR; INTRAVENOUS at 11:11

## 2019-11-30 RX ADMIN — DEXAMETHASONE SODIUM PHOSPHATE 4 MG: 4 INJECTION, SOLUTION INTRAMUSCULAR; INTRAVENOUS at 06:11

## 2019-11-30 RX ADMIN — PROPRANOLOL HYDROCHLORIDE 10 MG: 10 TABLET ORAL at 09:11

## 2019-11-30 RX ADMIN — LACTULOSE 20 G: 20 SOLUTION ORAL at 02:11

## 2019-11-30 RX ADMIN — OXYCODONE HYDROCHLORIDE 10 MG: 10 TABLET ORAL at 09:11

## 2019-11-30 RX ADMIN — INSULIN ASPART 2 UNITS: 100 INJECTION, SOLUTION INTRAVENOUS; SUBCUTANEOUS at 04:11

## 2019-11-30 RX ADMIN — FERROUS SULFATE TAB EC 325 MG (65 MG FE EQUIVALENT) 325 MG: 325 (65 FE) TABLET DELAYED RESPONSE at 08:11

## 2019-11-30 RX ADMIN — OXYCODONE HYDROCHLORIDE 5 MG: 5 TABLET ORAL at 11:11

## 2019-11-30 RX ADMIN — LACTULOSE 20 G: 20 SOLUTION ORAL at 09:11

## 2019-11-30 RX ADMIN — APIXABAN 5 MG: 5 TABLET, FILM COATED ORAL at 08:11

## 2019-11-30 RX ADMIN — FAMOTIDINE 20 MG: 20 TABLET ORAL at 08:11

## 2019-11-30 RX ADMIN — SPIRONOLACTONE 100 MG: 100 TABLET ORAL at 08:11

## 2019-11-30 RX ADMIN — MAGNESIUM OXIDE TAB 400 MG (241.3 MG ELEMENTAL MG) 400 MG: 400 (241.3 MG) TAB at 08:11

## 2019-11-30 RX ADMIN — PROPRANOLOL HYDROCHLORIDE 10 MG: 10 TABLET ORAL at 08:11

## 2019-11-30 RX ADMIN — Medication 6 MG: at 09:11

## 2019-11-30 RX ADMIN — OXYCODONE HYDROCHLORIDE 10 MG: 10 TABLET ORAL at 08:11

## 2019-11-30 RX ADMIN — APIXABAN 5 MG: 5 TABLET, FILM COATED ORAL at 09:11

## 2019-11-30 RX ADMIN — OXYCODONE HYDROCHLORIDE 10 MG: 10 TABLET ORAL at 02:11

## 2019-11-30 RX ADMIN — FUROSEMIDE 40 MG: 40 TABLET ORAL at 08:11

## 2019-11-30 NOTE — PLAN OF CARE
Patient returned to unit today for radiation. Vitals signs stabled. Pain is managed with PRN pain meds. Family at bedside. Call light within reach.

## 2019-11-30 NOTE — PROGRESS NOTES
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 Marks is a 63-year-old  female with Hepatocellular carcinoma, alcoholic cirrhosis( dx 2015), portal HTN, ascites, hx of variceal bleeding in January 2018 s/p banding,B/l pulmonary embolism,  hx of ETOH abuse (quit Jan 2018) who presents to the ED with back pain and b/l lower extremity weakness. Patient states that symptoms have been on going for the past week and half and have progressed to the point where she cannot lift her leg against gravity or walk. She decribes "electric shocklike" sensations down her legs bilaterally. She denies any falls or trauma. She complains of back pain and LUQ & LLQ abdominal pain that radiates to her back. She denies any bladder/bowel incontinence. She denies fever, chills, vomiting. She    In ED, MRI with Acute compression fracture at T11 with almost 25% height loss and retropulsion of the posterior cortex into the spinal canal.  This results in severe canal stenosis and posterior cord displacement.     Overview/Hospital Course:  In ED, MRI with Acute compression fracture at T11 with almost 25% height loss and retropulsion of the posterior cortex into the spinal canal.  This results in severe canal stenosis and posterior cord displacement. Pt is admitted to hospital medicine for pain management and cirrhosis. Pt in significant amount of pain started on oxy/dilaudid. She has a MELD score of 11 on admission and was continued on home lasix and propanolol and started on lactulose.  NSGY seen the pt and " ordered CT T/L and CT chest and MRI C/L/Tfor staging. CT chest showed multiple pulmonary brandi which could be potionaly metastasis and pathological fracture in T 11 with retropulsion and with spinal cord compression.  NSGY decided not top do surgery and recommended treating the pt with radiation therapy. Pt seen by radiotherapy for palliative radiotherapy. Pt started on spironolactone 100 mg with lasix 40 mg.    Interval History: no acute events overnight. Pain is controlled with pain meds.   Review of Systems   Constitutional: Negative for activity change, appetite change, chills, fatigue and fever.   HENT: Negative for congestion, facial swelling, sore throat and trouble swallowing.    Respiratory: Negative for apnea, cough, chest tightness, shortness of breath and wheezing.    Cardiovascular: Negative for chest pain, palpitations and leg swelling.   Gastrointestinal: Positive for abdominal distention and abdominal pain. Negative for blood in stool, diarrhea, nausea and vomiting.   Genitourinary: Negative for difficulty urinating, dysuria, flank pain and hematuria.   Musculoskeletal: Positive for back pain. Negative for arthralgias, myalgias and neck pain.   Skin: Negative for color change and rash.   Neurological: Positive for weakness. Negative for dizziness, light-headedness, numbness and headaches.   Hematological: Negative for adenopathy.   Psychiatric/Behavioral: Negative for agitation and behavioral problems.      Objective:      Vital Signs (Most Recent):  Temp: 98.3 °F (36.8 °C) (11/27/19 1610)  Pulse: 64 (11/27/19 1610)  Resp: 18 (11/27/19 1610)  BP: (!) 98/56 (11/27/19 1610)  SpO2: 96 % (11/27/19 1610) Vital Signs (24h Range):  Temp:  [97.9 °F (36.6 °C)-99 °F (37.2 °C)] 98.3 °F (36.8 °C)  Pulse:  [64-78] 64  Resp:  [18-20] 18  SpO2:  [95 %-100 %] 96 %  BP: ()/(56-66) 98/56     Physical Exam  Review of Systems   Constitutional: Positive for activity change, fatigue and unexpected weight change.  Negative for chills and fever.   HENT: Negative for congestion and sore throat.    Eyes: Negative for photophobia and visual disturbance.   Respiratory: Negative for cough, chest tightness and shortness of breath.    Cardiovascular: Negative for chest pain, palpitations and leg swelling.   Gastrointestinal: Positive for abdominal distention and abdominal pain. Negative for blood in stool, nausea and vomiting.   Genitourinary: Negative for difficulty urinating and dysuria.   Musculoskeletal: Positive for back pain. Negative for arthralgias and joint swelling.   Skin: Negative for color change and rash.   Neurological: Positive for weakness. Negative for dizziness, numbness and headaches.   Psychiatric/Behavioral: Negative for behavioral problems and confusion. The patient is nervous/anxious.      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 Found to have compression fracture of T11 with canal stenosis and posterior cord displacement. Patient with sensation intact but 0/5 muscle strength in b/l lower ext.    Plan  - Consult to neurosurgery, reccs appreciated  - Continue Dexamethasone 4mg IV q6h   - Per neurosurgery patient will need surgery, but will round on patient in the a.m for further reccs. Medical clearance requested.  - NPO  - Dilaudid 0.5mg prn pain, Oxy IR q6h prn pain  - MRI cervical, thoracic spine  - NSGY recommend CT C/T/L  - NSGY decided not to go for surgery and recommend treating with radiotherapy.  - Radiation oncology is consulted, appreciate their recs.      Ascites due to alcoholic cirrhosis  Last paracentesis was 2 weeks ago. Patient with distended abdomen states no significant increase since last paracentesis  Supposed to be on Lasix 40mg, but currenlty held by heme/onc due to recent PRACHI. Denies  SOB.    Plan  - Continue Lasix 40mg daily  - add spironolactone 100 mg    Chronic pulmonary embolism  - Patient on Eliquis 5mg BID  - resume on home dose.      HCC (hepatocellular carcinoma)  Follows with heme/onc not a transplant candidate. Previously on Nivolumab immunotherapy but currently held due to recent PRACHI.    Plan  - F/u outpatient with heme/onc      Esophageal varices in alcoholic cirrhosis  Denies hematemesis    Plan  - Continue home propanolol 10mg BID      Alcoholic cirrhosis  Hepatocellular carcinoma, alcoholic cirrhosis( dx 2015), portal HTN, ascites, hx of variceal bleeding in January 2018 s/p banding,B/l pulmonary embolism,  hx of ETOH abuse (quit Jan 2018)    - she has mild abdominal destination.   - MELD  Na is 13 today.  - lasix 40 PO.  - f/u volume status and MELD score daily.  - started on lactulose 30 ml/ BID  - start spironolactone 100mg.       VTE Risk Mitigation (From admission, onward)         Ordered     apixaban tablet 5 mg  2 times daily      11/29/19 1420     Place sequential compression device  Until discontinued      11/25/19 2243     Reason for No Pharmacological VTE Prophylaxis  Once     Question:  Reasons:  Answer:  Already adequately anticoagulated on oral Anticoagulants    11/25/19 2243     IP VTE HIGH RISK PATIENT  Once      11/25/19 2243                      Saúl Noguera MD  Department of Hospital Medicine   [facility]

## 2019-12-01 LAB
INR PPP: 1.4 (ref 0.8–1.2)
POCT GLUCOSE: 182 MG/DL (ref 70–110)
POCT GLUCOSE: 209 MG/DL (ref 70–110)
POCT GLUCOSE: 221 MG/DL (ref 70–110)
POCT GLUCOSE: 256 MG/DL (ref 70–110)
PROTHROMBIN TIME: 13.8 SEC (ref 9–12.5)

## 2019-12-01 PROCEDURE — 25000003 PHARM REV CODE 250: Performed by: STUDENT IN AN ORGANIZED HEALTH CARE EDUCATION/TRAINING PROGRAM

## 2019-12-01 PROCEDURE — 11000001 HC ACUTE MED/SURG PRIVATE ROOM

## 2019-12-01 PROCEDURE — 85610 PROTHROMBIN TIME: CPT

## 2019-12-01 PROCEDURE — 36415 COLL VENOUS BLD VENIPUNCTURE: CPT

## 2019-12-01 PROCEDURE — 99231 PR SUBSEQUENT HOSPITAL CARE,LEVL I: ICD-10-PCS | Mod: ,,, | Performed by: INTERNAL MEDICINE

## 2019-12-01 PROCEDURE — 63600175 PHARM REV CODE 636 W HCPCS: Performed by: STUDENT IN AN ORGANIZED HEALTH CARE EDUCATION/TRAINING PROGRAM

## 2019-12-01 PROCEDURE — 94761 N-INVAS EAR/PLS OXIMETRY MLT: CPT

## 2019-12-01 PROCEDURE — 25000003 PHARM REV CODE 250: Performed by: INTERNAL MEDICINE

## 2019-12-01 PROCEDURE — 99231 SBSQ HOSP IP/OBS SF/LOW 25: CPT | Mod: ,,, | Performed by: INTERNAL MEDICINE

## 2019-12-01 RX ORDER — LACTULOSE 10 G/15ML
30 SOLUTION ORAL 3 TIMES DAILY
Status: DISCONTINUED | OUTPATIENT
Start: 2019-12-01 | End: 2019-12-06 | Stop reason: HOSPADM

## 2019-12-01 RX ORDER — OXYCODONE HYDROCHLORIDE 10 MG/1
10 TABLET ORAL EVERY 4 HOURS PRN
Status: DISCONTINUED | OUTPATIENT
Start: 2019-12-01 | End: 2019-12-06 | Stop reason: HOSPADM

## 2019-12-01 RX ORDER — HYDROMORPHONE HYDROCHLORIDE 1 MG/ML
0.5 INJECTION, SOLUTION INTRAMUSCULAR; INTRAVENOUS; SUBCUTANEOUS EVERY 6 HOURS PRN
Status: DISCONTINUED | OUTPATIENT
Start: 2019-12-01 | End: 2019-12-06 | Stop reason: HOSPADM

## 2019-12-01 RX ADMIN — LACTULOSE 30 G: 20 SOLUTION ORAL at 08:12

## 2019-12-01 RX ADMIN — APIXABAN 5 MG: 5 TABLET, FILM COATED ORAL at 08:12

## 2019-12-01 RX ADMIN — DEXAMETHASONE SODIUM PHOSPHATE 4 MG: 4 INJECTION, SOLUTION INTRAMUSCULAR; INTRAVENOUS at 05:12

## 2019-12-01 RX ADMIN — FUROSEMIDE 40 MG: 40 TABLET ORAL at 08:12

## 2019-12-01 RX ADMIN — DEXAMETHASONE SODIUM PHOSPHATE 4 MG: 4 INJECTION, SOLUTION INTRAMUSCULAR; INTRAVENOUS at 06:12

## 2019-12-01 RX ADMIN — OXYCODONE HYDROCHLORIDE 10 MG: 10 TABLET ORAL at 11:12

## 2019-12-01 RX ADMIN — LACTULOSE 20 G: 20 SOLUTION ORAL at 08:12

## 2019-12-01 RX ADMIN — OXYCODONE HYDROCHLORIDE 10 MG: 10 TABLET ORAL at 03:12

## 2019-12-01 RX ADMIN — INSULIN ASPART 2 UNITS: 100 INJECTION, SOLUTION INTRAVENOUS; SUBCUTANEOUS at 06:12

## 2019-12-01 RX ADMIN — Medication 6 MG: at 08:12

## 2019-12-01 RX ADMIN — LACTULOSE 30 G: 20 SOLUTION ORAL at 03:12

## 2019-12-01 RX ADMIN — OXYCODONE HYDROCHLORIDE 5 MG: 5 TABLET ORAL at 08:12

## 2019-12-01 RX ADMIN — PROPRANOLOL HYDROCHLORIDE 10 MG: 10 TABLET ORAL at 08:12

## 2019-12-01 RX ADMIN — FAMOTIDINE 20 MG: 20 TABLET ORAL at 08:12

## 2019-12-01 RX ADMIN — FERROUS SULFATE TAB EC 325 MG (65 MG FE EQUIVALENT) 325 MG: 325 (65 FE) TABLET DELAYED RESPONSE at 08:12

## 2019-12-01 RX ADMIN — SPIRONOLACTONE 100 MG: 100 TABLET ORAL at 08:12

## 2019-12-01 RX ADMIN — MAGNESIUM OXIDE TAB 400 MG (241.3 MG ELEMENTAL MG) 400 MG: 400 (241.3 MG) TAB at 08:12

## 2019-12-01 RX ADMIN — DEXAMETHASONE SODIUM PHOSPHATE 4 MG: 4 INJECTION, SOLUTION INTRAMUSCULAR; INTRAVENOUS at 11:12

## 2019-12-01 RX ADMIN — OXYCODONE HYDROCHLORIDE 15 MG: 10 TABLET ORAL at 08:12

## 2019-12-01 RX ADMIN — INSULIN ASPART 1 UNITS: 100 INJECTION, SOLUTION INTRAVENOUS; SUBCUTANEOUS at 11:12

## 2019-12-01 NOTE — SUBJECTIVE & OBJECTIVE
Interval History: pt still having significant amount of back pain.    Review of Systems   Constitutional: Positive for activity change. Negative for appetite change, chills, fatigue and fever.   HENT: Negative for congestion, facial swelling, sore throat and trouble swallowing.    Respiratory: Negative for apnea, cough, chest tightness, shortness of breath and wheezing.    Cardiovascular: Negative for chest pain, palpitations and leg swelling.   Gastrointestinal: Positive for abdominal distention and abdominal pain. Negative for blood in stool, diarrhea, nausea and vomiting.   Genitourinary: Negative for difficulty urinating, dysuria, flank pain and hematuria.   Musculoskeletal: Positive for back pain. Negative for arthralgias, myalgias and neck pain.   Skin: Negative for color change and rash.   Neurological: Negative for dizziness, weakness, light-headedness, numbness and headaches.   Hematological: Negative for adenopathy.   Psychiatric/Behavioral: Negative for agitation and behavioral problems.     Objective:     Vital Signs (Most Recent):  Temp: 98.2 °F (36.8 °C) (12/01/19 1536)  Pulse: 62 (12/01/19 1536)  Resp: 18 (12/01/19 1536)  BP: (!) 127/59 (12/01/19 1536)  SpO2: 99 % (12/01/19 1646) Vital Signs (24h Range):  Temp:  [97.8 °F (36.6 °C)-98.2 °F (36.8 °C)] 98.2 °F (36.8 °C)  Pulse:  [61-70] 62  Resp:  [12-19] 18  SpO2:  [95 %-100 %] 99 %  BP: (111-128)/(59-66) 127/59     Weight: 44 kg (97 lb)  Body mass index is 22.45 kg/m².    Intake/Output Summary (Last 24 hours) at 12/1/2019 1729  Last data filed at 12/1/2019 0500  Gross per 24 hour   Intake --   Output 975 ml   Net -975 ml      Physical Exam   Constitutional: She is oriented to person, place, and time. She appears well-developed and well-nourished. She appears cachectic. No distress.   HENT:   Head: Normocephalic and atraumatic.   Eyes: Pupils are equal, round, and reactive to light. Conjunctivae and EOM are normal.   Neck: Normal range of motion. Neck  supple.   Cardiovascular: Normal rate, regular rhythm and normal heart sounds.   No murmur heard.  Pulmonary/Chest: Effort normal and breath sounds normal. No respiratory distress. She has no wheezes. She has no rales.   Abdominal: Soft. Bowel sounds are normal. She exhibits distension. There is tenderness.   Caput medusae    Musculoskeletal: She exhibits no edema or deformity.   Lymphadenopathy:     She has no cervical adenopathy.   Neurological: She is alert and oriented to person, place, and time. She displays normal reflexes. No cranial nerve deficit or sensory deficit. She exhibits normal muscle tone.   Skin: Skin is warm. Capillary refill takes less than 2 seconds. She is not diaphoretic.   Psychiatric: She has a normal mood and affect. Her behavior is normal.       Significant Labs: None    Significant Imaging: I have reviewed and interpreted all pertinent imaging results/findings within the past 24 hours.

## 2019-12-01 NOTE — PROGRESS NOTES
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 Marks is a 63-year-old  female with Hepatocellular carcinoma, alcoholic cirrhosis( dx 2015), portal HTN, ascites, hx of variceal bleeding in January 2018 s/p banding,B/l pulmonary embolism,  hx of ETOH abuse (quit Jan 2018) who presents to the ED with back pain and b/l lower extremity weakness. Patient states that symptoms have been on going for the past week and half and have progressed to the point where she cannot lift her leg against gravity or walk. She decribes "electric shocklike" sensations down her legs bilaterally. She denies any falls or trauma. She complains of back pain and LUQ & LLQ abdominal pain that radiates to her back. She denies any bladder/bowel incontinence. She denies fever, chills, vomiting. She    In ED, MRI with Acute compression fracture at T11 with almost 25% height loss and retropulsion of the posterior cortex into the spinal canal consistent with severe canal stenosis and posterior cord displacement    Overview/Hospital Course:  In ED, MRI with Acute compression fracture at T11 with almost 25% height loss and retropulsion of the posterior cortex into the spinal canal.  This results in severe canal stenosis and posterior cord displacement. Pt is admitted to hospital medicine for pain management and cirrhosis. Pt in significant amount of pain started on oxy/dilaudid. She has a MELD score of 11 on admission and was continued on home lasix and propanolol and started on lactulose.  NSGY seen the pt and " ordered CT T/L and CT chest and MRI C/L/Tfor staging. CT chest showed multiple pulmonary brandi which could be potionaly metastasis and pathological fracture in T 11 with retropulsion and with spinal cord compression.  NSGY decided not top do surgery and recommended treating the pt with radiation therapy. Pt seen by radiotherapy for palliative radiotherapy.   12:1: pain management and management of cirrhosis. Radiation treatment started for palliation. Palliative Care consulted. Disposition would be home with family. discussing Hospice.    Interval History: pt still having significant amount of back pain.    Review of Systems   Constitutional: Positive for activity change. Negative for appetite change, chills, fatigue and fever.   HENT: Negative for congestion, facial swelling, sore throat and trouble swallowing.    Respiratory: Negative for apnea, cough, chest tightness, shortness of breath and wheezing.    Cardiovascular: Negative for chest pain, palpitations and leg swelling.   Gastrointestinal: Positive for abdominal distention and abdominal pain. Negative for blood in stool, diarrhea, nausea and vomiting.   Genitourinary: Negative for difficulty urinating, dysuria, flank pain and hematuria.   Musculoskeletal: Positive for back pain. Negative for arthralgias, myalgias and neck pain.   Skin: Negative for color change and rash.   Neurological: Negative for dizziness, weakness, light-headedness, numbness and headaches.   Hematological: Negative for adenopathy.   Psychiatric/Behavioral: Negative for agitation and behavioral problems.     Objective:     Vital Signs (Most Recent):  Temp: 98.2 °F (36.8 °C) (12/01/19 1536)  Pulse: 62 (12/01/19 1536)  Resp: 18 (12/01/19 1536)  BP: (!) 127/59 (12/01/19 1536)  SpO2: 99 % (12/01/19 1646) Vital Signs (24h Range):  Temp:  [97.8 °F (36.6 °C)-98.2 °F (36.8 °C)] 98.2 °F (36.8 °C)  Pulse:  [61-70] 62  Resp:  [12-19] 18  SpO2:  [95 %-100 %] 99 %  BP: (111-128)/(59-66) 127/59      Weight: 44 kg (97 lb)  Body mass index is 22.45 kg/m².    Intake/Output Summary (Last 24 hours) at 12/1/2019 1729  Last data filed at 12/1/2019 0500  Gross per 24 hour   Intake --   Output 975 ml   Net -975 ml      Physical Exam   Constitutional: She is oriented to person, place, and time. She appears well-developed and well-nourished. She appears cachectic. No distress.   HENT:   Head: Normocephalic and atraumatic.   Eyes: Pupils are equal, round, and reactive to light. Conjunctivae and EOM are normal.   Neck: Normal range of motion. Neck supple.   Cardiovascular: Normal rate, regular rhythm and normal heart sounds.   No murmur heard.  Pulmonary/Chest: Effort normal and breath sounds normal. No respiratory distress. She has no wheezes. She has no rales.   Abdominal: Soft. Bowel sounds are normal. She exhibits distension. There is tenderness.   Caput medusae    Musculoskeletal: She exhibits no edema or deformity.   Lymphadenopathy:     She has no cervical adenopathy.   Neurological: She is alert and oriented to person, place, and time. She displays normal reflexes. No cranial nerve deficit or sensory deficit. She exhibits normal muscle tone.   Skin: Skin is warm. Capillary refill takes less than 2 seconds. She is not diaphoretic.   Psychiatric: She has a normal mood and affect. Her behavior is normal.       Significant Labs: None    Significant Imaging: I have reviewed and interpreted all pertinent imaging results/findings within the past 24 hours.      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 Found to have compression fracture of T11 with canal stenosis and posterior cord displacement. Patient with sensation intact but 0/5 muscle strength in b/l lower ext.    Plan  - Consult to neurosurgery, reccs appreciated  -  Continue Dexamethasone 4mg IV q6h   - Per neurosurgery patient will need surgery, but will round on patient in the a.m for further reccs. Medical clearance requested.  - NPO  - Dilaudid 0.5mg prn pain, Oxy IR q6h prn pain  - MRI cervical, thoracic spine  - NSGY recommend CT C/T/L  - NSGY decided not to go for surgery and recommend treating with radiotherapy.  - Radiation oncology is consulted, appreciate their recs.      Ascites due to alcoholic cirrhosis  Last paracentesis was 2 weeks ago. Patient with distended abdomen states no significant increase since last paracentesis  Supposed to be on Lasix 40mg, but currenlty held by heme/onc due to recent PRACHI. Denies SOB.    Plan  - Continue Lasix 40mg daily  - spironolactone 100 mg    Chronic pulmonary embolism  - Patient on Eliquis 5mg BID    HCC (hepatocellular carcinoma)  Follows with heme/onc not a transplant candidate. Previously on Nivolumab immunotherapy but currently held due to recent PRACHI.    Plan  - F/u outpatient with heme/onc      Esophageal varices in alcoholic cirrhosis  Denies hematemesis    Plan  - Continue home propanolol 10mg BID      Alcoholic cirrhosis  Hepatocellular carcinoma, alcoholic cirrhosis( dx 2015), portal HTN, ascites, hx of variceal bleeding in January 2018 s/p banding,B/l pulmonary embolism,  hx of ETOH abuse (quit Jan 2018)    - she has mild abdominal destination.   - MELD  Na is 14 today.  - lasix 40 PO.  - f/u volume status and MELD score daily.  - started on lactulose 30 ml/ BID  - spironolactone 100 mg      VTE Risk Mitigation (From admission, onward)         Ordered     apixaban tablet 5 mg  2 times daily      11/29/19 1420     Place sequential compression device  Until discontinued      11/25/19 2243     Reason for No Pharmacological VTE Prophylaxis  Once     Question:  Reasons:  Answer:  Already adequately anticoagulated on oral Anticoagulants    11/25/19 2243     IP VTE HIGH RISK PATIENT  Once      11/25/19 2243                       Saúl Noguera MD  Department of Hospital Medicine   [facility]

## 2019-12-01 NOTE — PLAN OF CARE
Went over plan of care - all questions answered. Turned q 2 . Complaints of pain - see mar. Will continue to monitor. Family at bedside. No falls or injuries

## 2019-12-01 NOTE — NURSING
Patient reports that she wrapped her dentures in a napkin at the bedside on her tray table. Patient is unable to clearly state the last time she seen her dentures, however she did state that she placed them on the bedside tray table in a napkin. Patient room was searched, bedding/linens, and all personal belongings.  Reported to charge nurse on duty. A call was placed to patient relations with regards to patient dentures. Informed patient and daughter that someone should  f/u with regards to patient dentures. MD has changed patient diet to puree until dentures are found.

## 2019-12-01 NOTE — SUBJECTIVE & OBJECTIVE
Interval History    Review of Systems  Objective:     Vital Signs (Most Recent):  Temp: 98.2 °F (36.8 °C) (11/30/19 1600)  Pulse: 62 (11/30/19 1600)  Resp: 18 (11/30/19 1600)  BP: 111/73 (11/30/19 1600)  SpO2: (!) 94 % (11/30/19 1600) Vital Signs (24h Range):  Temp:  [97.5 °F (36.4 °C)-98.5 °F (36.9 °C)] 98.2 °F (36.8 °C)  Pulse:  [59-66] 62  Resp:  [18-19] 18  SpO2:  [94 %-98 %] 94 %  BP: (111-136)/(59-73) 111/73     Weight: 44 kg (97 lb)  Body mass index is 22.45 kg/m².    Intake/Output Summary (Last 24 hours) at 11/30/2019 1925  Last data filed at 11/30/2019 1100  Gross per 24 hour   Intake 220 ml   Output 500 ml   Net -280 ml      Physical Exam    Significant Labs:   CBC:   Recent Labs   Lab 11/29/19  0524 11/30/19  0413   WBC 8.23 7.43   HGB 10.5* 9.0*   HCT 34.0* 28.4*    284     CMP:   Recent Labs   Lab 11/29/19  0524 11/30/19  0413    135*   K 3.7 3.9    104   CO2 24 24   * 193*   BUN 16 18   CREATININE 1.1 0.8   CALCIUM 9.4 8.6*   PROT 7.3 6.2   ALBUMIN 2.8* 2.5*   BILITOT 0.9 0.7   ALKPHOS 142* 122   AST 71* 69*   ALT 50* 52*   ANIONGAP 11 7*   EGFRNONAA 53.9* >60.0       Significant Imaging: I have reviewed all pertinent imaging results/findings within the past 24 hours.

## 2019-12-01 NOTE — PROGRESS NOTES
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 Marks is a 63-year-old  female with Hepatocellular carcinoma, alcoholic cirrhosis( dx 2015), portal HTN, ascites, hx of variceal bleeding in January 2018 s/p banding,B/l pulmonary embolism,  hx of ETOH abuse (quit Jan 2018) who presents to the ED with back pain and b/l lower extremity weakness. Patient states that symptoms have been on going for the past week and half and have progressed to the point where she cannot lift her leg against gravity or walk. She decribes "electric shocklike" sensations down her legs bilaterally. She denies any falls or trauma. She complains of back pain and LUQ & LLQ abdominal pain that radiates to her back. She denies any bladder/bowel incontinence. She denies fever, chills, vomiting. She    In ED, MRI with Acute compression fracture at T11 with almost 25% height loss and retropulsion of the posterior cortex into the spinal canal consistent with severe canal stenosis and posterior cord displacement    Overview/Hospital Course:  In ED, MRI with Acute compression fracture at T11 with almost 25% height loss and retropulsion of the posterior cortex into the spinal canal.  This results in severe canal stenosis and posterior cord displacement. Pt is admitted to hospital medicine for pain management and cirrhosis. Pt in significant amount of pain started on oxy/dilaudid. She has a MELD score of 11 on admission and was continued on home lasix and propanolol and started on lactulose.  NSGY seen the pt and ordered CT " T/L and CT chest and MRI C/L/Tfor staging. CT chest showed multiple pulmonary brandi which could be potionaly metastasis and pathological fracture in T 11 with retropulsion and with spinal cord compression.  NSGY decided not top do surgery and recommended treating the pt with radiation therapy. Pt seen by radiotherapy for palliative radiotherapy.        Interval History (11/30/19): Patient seen and examined at the bedside. No acute events overnight. Patient receiving palliation radiation for metastatic lesion of T spine. Possible disposition home hospice. Tolerating oral diet.     Review of Systems  Constitutional: Negative for activity change, appetite change, chills, fatigue and fever.   HENT: Negative for congestion, facial swelling, sore throat and trouble swallowing.    Respiratory: Negative for apnea, cough, chest tightness, shortness of breath and wheezing.    Cardiovascular: Negative for chest pain, palpitations and leg swelling.   Gastrointestinal: Positive for abdominal distention and abdominal pain. Negative for blood in stool, diarrhea, nausea and vomiting.   Genitourinary: Negative for difficulty urinating, dysuria, flank pain and hematuria.   Musculoskeletal: Positive for back pain. Negative for arthralgias, myalgias and neck pain.   Skin: Negative for color change and rash.   Neurological: Positive for weakness. Negative for dizziness, light-headedness, numbness and headaches.   Hematological: Negative for adenopathy.   Psychiatric/Behavioral: Negative for agitation and behavioral problems.     Objective:     Vital Signs (Most Recent):  Temp: 98.2 °F (36.8 °C) (11/30/19 1600)  Pulse: 62 (11/30/19 1600)  Resp: 18 (11/30/19 1600)  BP: 111/73 (11/30/19 1600)  SpO2: (!) 94 % (11/30/19 1600) Vital Signs (24h Range):  Temp:  [97.5 °F (36.4 °C)-98.5 °F (36.9 °C)] 98.2 °F (36.8 °C)  Pulse:  [59-66] 62  Resp:  [18-19] 18  SpO2:  [94 %-98 %] 94 %  BP: (111-136)/(59-73) 111/73     Weight: 44 kg (97 lb)  Body mass  index is 22.45 kg/m².    Intake/Output Summary (Last 24 hours) at 11/30/2019 1925  Last data filed at 11/30/2019 1100  Gross per 24 hour   Intake 220 ml   Output 500 ml   Net -280 ml      Physical Exam  Constitutional: She is oriented to person, place, and time. She appears cachectic. No distress. Alert and oriented x 3  HENT:   Head: Normocephalic and atraumatic.   Eyes: Pupils are equal, round, and reactive to light. Conjunctivae and EOM are normal.   Neck: Normal range of motion. Neck supple.   Cardiovascular: Normal rate, regular rhythm and normal heart sounds.   No murmur heard.  Pulmonary/Chest: Effort normal and breath sounds normal. No respiratory distress. She has no wheezes. She has no rales.   Abdominal: Soft. Bowel sounds are normal. She exhibits mild distension. There is tenderness (LLQ).   Musculoskeletal: She exhibits no edema or deformity.   Lymphadenopathy:     She has no cervical adenopathy.   Neurological: She is alert and oriented to person, place, and time. She displays normal reflexes. No cranial nerve deficit or sensory deficit. She exhibits normal muscle tone. Point tenderness vertebral thoracic region T8- L1  Skin: Skin is warm. Capillary refill takes less than 2 seconds. She is not diaphoretic    Significant Labs:   CBC:   Recent Labs   Lab 11/29/19  0524 11/30/19 0413   WBC 8.23 7.43   HGB 10.5* 9.0*   HCT 34.0* 28.4*    284     CMP:   Recent Labs   Lab 11/29/19  0524 11/30/19 0413    135*   K 3.7 3.9    104   CO2 24 24   * 193*   BUN 16 18   CREATININE 1.1 0.8   CALCIUM 9.4 8.6*   PROT 7.3 6.2   ALBUMIN 2.8* 2.5*   BILITOT 0.9 0.7   ALKPHOS 142* 122   AST 71* 69*   ALT 50* 52*   ANIONGAP 11 7*   EGFRNONAA 53.9* >60.0     Significant Imaging: I have reviewed all pertinent imaging results/findings within the past 24 hours.    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 Found to have compression fracture of T11 with canal stenosis and posterior cord displacement. Patient with sensation intact but 0/5 muscle strength in b/l lower ext.    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    Ascites due to alcoholic cirrhosis  Last paracentesis was 2 weeks ago. Patient with distended abdomen states no significant increase since last paracentesis  Supposed to be on Lasix 40mg, but currenlty held by heme/onc due to recent PRACHI. Denies SOB.    Plan  - Continue Lasix 40mg daily; started on spirolactone 100 mg daily   - afebrile, normotensive. Will monitor for SBP    Chronic pulmonary embolism  - Patient on Eliquis 5mg BID  - Continue regimen given no surgical intervention for pathologic fracture per NSGY    HCC (hepatocellular carcinoma)  Follows with heme/onc not a transplant candidate. Previously on Nivolumab immunotherapy but currently held due to recent PRACHI.    Plan  - F/u outpatient with heme/onc    Esophageal varices in alcoholic cirrhosis  Denies hematemesis    Plan  - Continue home propanolol 10mg BID    Alcoholic cirrhosis  Hepatocellular carcinoma, alcoholic cirrhosis( dx 2015), portal HTN, ascites, hx of variceal bleeding in January 2018 s/p banding,B/l pulmonary embolism,  hx of ETOH abuse (quit Jan 2018)  - she has mild abdominal destination.   - MELD-Na score: 10 at 11/30/2019  4:13 AM  MELD score: 10 at 11/30/2019  4:13 AM  Calculated from:  Serum Creatinine: 0.8  mg/dL (Rounded to 1 mg/dL) at 11/30/2019  4:13 AM  Serum Sodium: 135 mmol/L at 11/30/2019  4:13 AM  Total Bilirubin: 0.7 mg/dL (Rounded to 1 mg/dL) at 11/30/2019  4:13 AM  INR(ratio): 1.4 at 11/30/2019  4:13 AM  Age: 64 years   Plan:   - lasix 40 PO; and spirolactone 100 mg daily   - f/u volume status and MELD score daily.  - started on lactulose 30 ml/ BID    VTE Risk Mitigation (From admission, onward)         Ordered     apixaban tablet 5 mg  2 times daily      11/29/19 1420     Place sequential compression device  Until discontinued      11/25/19 2243     Reason for No Pharmacological VTE Prophylaxis  Once     Question:  Reasons:  Answer:  Already adequately anticoagulated on oral Anticoagulants    11/25/19 2243     IP VTE HIGH RISK PATIENT  Once      11/25/19 2243              Dispo: Pending final reccs from radiation Oncology. Possible outpatient hospice. Oncology follow up upon discharge  Diet: Regular  DVT ppx: Apixaban    Saúl Aguilar MD   Spectra [id number]  Internal Medicine PGY III  Department of Hospital Medicine   [facility]

## 2019-12-02 ENCOUNTER — DOCUMENTATION ONLY (OUTPATIENT)
Dept: RADIATION ONCOLOGY | Facility: CLINIC | Age: 64
End: 2019-12-02

## 2019-12-02 ENCOUNTER — HOSPITAL ENCOUNTER (OUTPATIENT)
Dept: RADIATION THERAPY | Facility: HOSPITAL | Age: 64
Discharge: HOME | End: 2019-12-02
Attending: RADIOLOGY
Payer: MEDICAID

## 2019-12-02 LAB
ALBUMIN SERPL BCP-MCNC: 2.5 G/DL (ref 3.5–5.2)
ALP SERPL-CCNC: 127 U/L (ref 55–135)
ALT SERPL W/O P-5'-P-CCNC: 66 U/L (ref 10–44)
ANION GAP SERPL CALC-SCNC: 10 MMOL/L (ref 8–16)
AST SERPL-CCNC: 70 U/L (ref 10–40)
BASOPHILS # BLD AUTO: 0.01 K/UL (ref 0–0.2)
BASOPHILS NFR BLD: 0.1 % (ref 0–1.9)
BILIRUB SERPL-MCNC: 0.9 MG/DL (ref 0.1–1)
BUN SERPL-MCNC: 22 MG/DL (ref 8–23)
CALCIUM SERPL-MCNC: 8.9 MG/DL (ref 8.7–10.5)
CHLORIDE SERPL-SCNC: 101 MMOL/L (ref 95–110)
CO2 SERPL-SCNC: 24 MMOL/L (ref 23–29)
CREAT SERPL-MCNC: 1.1 MG/DL (ref 0.5–1.4)
DIFFERENTIAL METHOD: ABNORMAL
EOSINOPHIL # BLD AUTO: 0 K/UL (ref 0–0.5)
EOSINOPHIL NFR BLD: 0 % (ref 0–8)
ERYTHROCYTE [DISTWIDTH] IN BLOOD BY AUTOMATED COUNT: 20.4 % (ref 11.5–14.5)
EST. GFR  (AFRICAN AMERICAN): >60 ML/MIN/1.73 M^2
EST. GFR  (NON AFRICAN AMERICAN): 53.9 ML/MIN/1.73 M^2
GLUCOSE SERPL-MCNC: 255 MG/DL (ref 70–110)
HCT VFR BLD AUTO: 30.1 % (ref 37–48.5)
HGB BLD-MCNC: 9.4 G/DL (ref 12–16)
IMM GRANULOCYTES # BLD AUTO: 0.11 K/UL (ref 0–0.04)
IMM GRANULOCYTES NFR BLD AUTO: 1.4 % (ref 0–0.5)
INR PPP: 1.3 (ref 0.8–1.2)
LYMPHOCYTES # BLD AUTO: 0.7 K/UL (ref 1–4.8)
LYMPHOCYTES NFR BLD: 8.5 % (ref 18–48)
MAGNESIUM SERPL-MCNC: 1.7 MG/DL (ref 1.6–2.6)
MCH RBC QN AUTO: 21.8 PG (ref 27–31)
MCHC RBC AUTO-ENTMCNC: 31.2 G/DL (ref 32–36)
MCV RBC AUTO: 70 FL (ref 82–98)
MONOCYTES # BLD AUTO: 0.7 K/UL (ref 0.3–1)
MONOCYTES NFR BLD: 9 % (ref 4–15)
NEUTROPHILS # BLD AUTO: 6.6 K/UL (ref 1.8–7.7)
NEUTROPHILS NFR BLD: 81 % (ref 38–73)
NRBC BLD-RTO: 0 /100 WBC
PHOSPHATE SERPL-MCNC: 3.3 MG/DL (ref 2.7–4.5)
PLATELET # BLD AUTO: 271 K/UL (ref 150–350)
PMV BLD AUTO: ABNORMAL FL (ref 9.2–12.9)
POCT GLUCOSE: 227 MG/DL (ref 70–110)
POCT GLUCOSE: 239 MG/DL (ref 70–110)
POCT GLUCOSE: 271 MG/DL (ref 70–110)
POCT GLUCOSE: 311 MG/DL (ref 70–110)
POTASSIUM SERPL-SCNC: 4.2 MMOL/L (ref 3.5–5.1)
PROT SERPL-MCNC: 6.5 G/DL (ref 6–8.4)
PROTHROMBIN TIME: 12.8 SEC (ref 9–12.5)
RBC # BLD AUTO: 4.31 M/UL (ref 4–5.4)
SODIUM SERPL-SCNC: 135 MMOL/L (ref 136–145)
WBC # BLD AUTO: 8.11 K/UL (ref 3.9–12.7)

## 2019-12-02 PROCEDURE — 25000003 PHARM REV CODE 250: Performed by: STUDENT IN AN ORGANIZED HEALTH CARE EDUCATION/TRAINING PROGRAM

## 2019-12-02 PROCEDURE — 11000001 HC ACUTE MED/SURG PRIVATE ROOM

## 2019-12-02 PROCEDURE — C9399 UNCLASSIFIED DRUGS OR BIOLOG: HCPCS | Performed by: STUDENT IN AN ORGANIZED HEALTH CARE EDUCATION/TRAINING PROGRAM

## 2019-12-02 PROCEDURE — 99233 PR SUBSEQUENT HOSPITAL CARE,LEVL III: ICD-10-PCS | Mod: ,,, | Performed by: NURSE PRACTITIONER

## 2019-12-02 PROCEDURE — 25000003 PHARM REV CODE 250: Performed by: INTERNAL MEDICINE

## 2019-12-02 PROCEDURE — 84100 ASSAY OF PHOSPHORUS: CPT

## 2019-12-02 PROCEDURE — 63600175 PHARM REV CODE 636 W HCPCS: Performed by: STUDENT IN AN ORGANIZED HEALTH CARE EDUCATION/TRAINING PROGRAM

## 2019-12-02 PROCEDURE — 83735 ASSAY OF MAGNESIUM: CPT

## 2019-12-02 PROCEDURE — 85610 PROTHROMBIN TIME: CPT

## 2019-12-02 PROCEDURE — 77402 RADIATION TX DELIVERY LVL 1: CPT | Performed by: RADIOLOGY

## 2019-12-02 PROCEDURE — 99232 PR SUBSEQUENT HOSPITAL CARE,LEVL II: ICD-10-PCS | Mod: ,,, | Performed by: INTERNAL MEDICINE

## 2019-12-02 PROCEDURE — 77387 GUIDANCE FOR RADJ TX DLVR: CPT | Performed by: RADIOLOGY

## 2019-12-02 PROCEDURE — 99232 SBSQ HOSP IP/OBS MODERATE 35: CPT | Mod: ,,, | Performed by: INTERNAL MEDICINE

## 2019-12-02 PROCEDURE — 99233 SBSQ HOSP IP/OBS HIGH 50: CPT | Mod: ,,, | Performed by: NURSE PRACTITIONER

## 2019-12-02 PROCEDURE — 77387 GUIDANCE FOR RADJ TX DLVR: CPT | Mod: ,,, | Performed by: RADIOLOGY

## 2019-12-02 PROCEDURE — 80053 COMPREHEN METABOLIC PANEL: CPT

## 2019-12-02 PROCEDURE — 85025 COMPLETE CBC W/AUTO DIFF WBC: CPT

## 2019-12-02 PROCEDURE — 77387 PR GUIDANCE FOR RADIATION TREATMENT DELIVERY: ICD-10-PCS | Mod: ,,, | Performed by: RADIOLOGY

## 2019-12-02 PROCEDURE — 36415 COLL VENOUS BLD VENIPUNCTURE: CPT

## 2019-12-02 RX ORDER — LACTULOSE 10 G/15ML
SOLUTION ORAL; RECTAL
Qty: 1350 ML | Refills: 2 | Status: SHIPPED | OUTPATIENT
Start: 2019-12-02 | End: 2019-12-27

## 2019-12-02 RX ORDER — DEXAMETHASONE 2 MG/1
TABLET ORAL
Qty: 75 TABLET | Refills: 0 | Status: SHIPPED | OUTPATIENT
Start: 2019-12-02

## 2019-12-02 RX ORDER — SPIRONOLACTONE 100 MG/1
100 TABLET, FILM COATED ORAL DAILY
Qty: 30 TABLET | Refills: 11 | Status: SHIPPED | OUTPATIENT
Start: 2019-12-03 | End: 2019-12-05 | Stop reason: HOSPADM

## 2019-12-02 RX ORDER — OXYCODONE HYDROCHLORIDE 5 MG/1
10 TABLET ORAL EVERY 6 HOURS PRN
Qty: 28 TABLET | Refills: 0 | Status: SHIPPED | OUTPATIENT
Start: 2019-12-02

## 2019-12-02 RX ORDER — FUROSEMIDE 40 MG/1
40 TABLET ORAL DAILY
Qty: 30 TABLET | Refills: 5 | Status: SHIPPED | OUTPATIENT
Start: 2019-12-02 | End: 2019-12-04 | Stop reason: HOSPADM

## 2019-12-02 RX ORDER — DEXAMETHASONE 2 MG/1
TABLET ORAL
Qty: 75 TABLET | Refills: 0
Start: 2019-12-02 | End: 2019-12-02 | Stop reason: SDUPTHER

## 2019-12-02 RX ORDER — FAMOTIDINE 20 MG/1
20 TABLET, FILM COATED ORAL DAILY
Qty: 30 TABLET | Refills: 0 | Status: SHIPPED | OUTPATIENT
Start: 2019-12-02 | End: 2020-01-04

## 2019-12-02 RX ADMIN — FERROUS SULFATE TAB EC 325 MG (65 MG FE EQUIVALENT) 325 MG: 325 (65 FE) TABLET DELAYED RESPONSE at 10:12

## 2019-12-02 RX ADMIN — MAGNESIUM OXIDE TAB 400 MG (241.3 MG ELEMENTAL MG) 400 MG: 400 (241.3 MG) TAB at 10:12

## 2019-12-02 RX ADMIN — DEXAMETHASONE SODIUM PHOSPHATE 4 MG: 4 INJECTION, SOLUTION INTRAMUSCULAR; INTRAVENOUS at 05:12

## 2019-12-02 RX ADMIN — OXYCODONE HYDROCHLORIDE 15 MG: 10 TABLET ORAL at 11:12

## 2019-12-02 RX ADMIN — PROPRANOLOL HYDROCHLORIDE 10 MG: 10 TABLET ORAL at 10:12

## 2019-12-02 RX ADMIN — INSULIN ASPART 2 UNITS: 100 INJECTION, SOLUTION INTRAVENOUS; SUBCUTANEOUS at 05:12

## 2019-12-02 RX ADMIN — DEXAMETHASONE SODIUM PHOSPHATE 4 MG: 4 INJECTION, SOLUTION INTRAMUSCULAR; INTRAVENOUS at 11:12

## 2019-12-02 RX ADMIN — LACTULOSE 30 G: 20 SOLUTION ORAL at 08:12

## 2019-12-02 RX ADMIN — APIXABAN 5 MG: 5 TABLET, FILM COATED ORAL at 08:12

## 2019-12-02 RX ADMIN — SPIRONOLACTONE 100 MG: 100 TABLET ORAL at 10:12

## 2019-12-02 RX ADMIN — INSULIN DETEMIR 8 UNITS: 100 INJECTION, SOLUTION SUBCUTANEOUS at 12:12

## 2019-12-02 RX ADMIN — LACTULOSE 30 G: 20 SOLUTION ORAL at 10:12

## 2019-12-02 RX ADMIN — Medication 6 MG: at 08:12

## 2019-12-02 RX ADMIN — PROPRANOLOL HYDROCHLORIDE 10 MG: 10 TABLET ORAL at 08:12

## 2019-12-02 RX ADMIN — HYDROMORPHONE HYDROCHLORIDE 0.5 MG: 1 INJECTION, SOLUTION INTRAMUSCULAR; INTRAVENOUS; SUBCUTANEOUS at 05:12

## 2019-12-02 RX ADMIN — INSULIN ASPART 2 UNITS: 100 INJECTION, SOLUTION INTRAVENOUS; SUBCUTANEOUS at 11:12

## 2019-12-02 RX ADMIN — ONDANSETRON 4 MG: 4 TABLET, ORALLY DISINTEGRATING ORAL at 09:12

## 2019-12-02 RX ADMIN — DEXAMETHASONE SODIUM PHOSPHATE 4 MG: 4 INJECTION, SOLUTION INTRAMUSCULAR; INTRAVENOUS at 12:12

## 2019-12-02 RX ADMIN — INSULIN ASPART 2 UNITS: 100 INJECTION, SOLUTION INTRAVENOUS; SUBCUTANEOUS at 12:12

## 2019-12-02 RX ADMIN — FAMOTIDINE 20 MG: 20 TABLET ORAL at 10:12

## 2019-12-02 RX ADMIN — OXYCODONE HYDROCHLORIDE 10 MG: 10 TABLET ORAL at 10:12

## 2019-12-02 RX ADMIN — FUROSEMIDE 40 MG: 40 TABLET ORAL at 10:12

## 2019-12-02 RX ADMIN — OXYCODONE HYDROCHLORIDE 10 MG: 10 TABLET ORAL at 09:12

## 2019-12-02 RX ADMIN — OXYCODONE HYDROCHLORIDE 15 MG: 10 TABLET ORAL at 05:12

## 2019-12-02 RX ADMIN — APIXABAN 5 MG: 5 TABLET, FILM COATED ORAL at 10:12

## 2019-12-02 NOTE — SUBJECTIVE & OBJECTIVE
Interval History: Ms. Marks complains of some back pain today.    Review of Systems   Constitutional: Positive for activity change. Negative for appetite change, chills, fatigue and fever.   HENT: Negative for congestion, facial swelling, sore throat and trouble swallowing.    Respiratory: Negative for apnea, cough, chest tightness, shortness of breath and wheezing.    Cardiovascular: Negative for chest pain, palpitations and leg swelling.   Gastrointestinal: Positive for abdominal distention. Negative for blood in stool, diarrhea, nausea and vomiting.   Genitourinary: Negative for difficulty urinating, dysuria, flank pain and hematuria.   Musculoskeletal: Positive for back pain. Negative for arthralgias, myalgias and neck pain.   Skin: Negative for color change and rash.   Neurological: Negative for dizziness, weakness, light-headedness, numbness and headaches.   Hematological: Negative for adenopathy.   Psychiatric/Behavioral: Negative for agitation and behavioral problems.     Objective:     Vital Signs (Most Recent):  Temp: 98.3 °F (36.8 °C) (12/02/19 1216)  Pulse: 62 (12/02/19 1216)  Resp: 16 (12/02/19 1216)  BP: (!) 125/57 (12/02/19 1216)  SpO2: 97 % (12/02/19 1216) Vital Signs (24h Range):  Temp:  [98.2 °F (36.8 °C)-98.6 °F (37 °C)] 98.3 °F (36.8 °C)  Pulse:  [62-77] 62  Resp:  [12-16] 16  SpO2:  [97 %-100 %] 97 %  BP: (108-128)/(57-64) 125/57     Weight: 44 kg (97 lb)  Body mass index is 22.45 kg/m².    Intake/Output Summary (Last 24 hours) at 12/2/2019 1717  Last data filed at 12/2/2019 1400  Gross per 24 hour   Intake --   Output 1500 ml   Net -1500 ml      Physical Exam   Constitutional: She is oriented to person, place, and time. She appears well-developed and well-nourished. She appears cachectic. No distress.   HENT:   Head: Normocephalic and atraumatic.   Eyes: Pupils are equal, round, and reactive to light. Conjunctivae and EOM are normal.   Neck: Normal range of motion. Neck supple.   Cardiovascular:  Normal rate, regular rhythm and normal heart sounds.   No murmur heard.  Pulmonary/Chest: Effort normal and breath sounds normal. No respiratory distress. She has no wheezes. She has no rales.   Abdominal: Soft. Bowel sounds are normal. She exhibits distension. There is tenderness.   Caput medusae    Musculoskeletal: She exhibits no edema or deformity.   Lymphadenopathy:     She has no cervical adenopathy.   Neurological: She is alert and oriented to person, place, and time. She displays normal reflexes. No cranial nerve deficit or sensory deficit. She exhibits normal muscle tone.   Skin: Skin is warm. Capillary refill takes less than 2 seconds. She is not diaphoretic.   Psychiatric: She has a normal mood and affect. Her behavior is normal.     Significant Labs: All pertinent labs within the past 24 hours have been reviewed.    Significant Imaging: I have reviewed and interpreted all pertinent imaging results/findings within the past 24 hours.

## 2019-12-02 NOTE — PLAN OF CARE
[facility]    HOME HEALTH ORDERS  FACE TO FACE ENCOUNTER    Patient Name: Neena Marks  YOB: 1955    PCP: [facility]   PCP Address: [address] [facility] [address]  PCP Phone Number: [phone]  PCP Fax: [phone]    Encounter Date: 12/02/2019    Admit to Home Health    Diagnoses:  Active Hospital Problems    Diagnosis  POA    *Spinal cord compression due to malignant neoplasm metastatic to spine [G95.29, C79.51]  Yes    Palliative care encounter [Z51.5]  Not Applicable    Counseling regarding advanced care planning and goals of care [Z71.89]  Not Applicable    Back pain [M54.9]  Yes    Insomnia [G47.00]  Yes    Acute urinary obstruction [N13.9]  Yes    Hypomagnesemia [E83.42]  Yes    Pulmonary nodules/lesions, multiple [R91.8]  Yes    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]  Yes    Adrenal nodule [E27.9]  Yes    Pleural effusion, right [J90]  Yes    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 [M84.58XA]  Yes    Encounter for pre-operative cardiovascular clearance [Z01.810]  Not Applicable    Weakness of both lower extremities [R29.898]  Yes    Chronic pulmonary embolism [I27.82]  Yes    Ascites due to alcoholic cirrhosis [K70.31]  Yes    HCC (hepatocellular carcinoma) [C22.0]  Yes    Portal hypertensive gastropathy [K76.6, K31.89]  Yes     Chronic    Esophageal varices in alcoholic cirrhosis [K70.30, I85.10]  Yes     Chronic    Alcoholic cirrhosis [K70.30]  Yes     Chronic    Severe malnutrition [E43]  Yes    Iron deficiency anemia due to chronic blood loss [D50.0]  Yes      Resolved Hospital Problems   No resolved problems to display.       Future Appointments   Date Time Provider Department Center   12/19/2019  2:00 PM Nicolasa Combs MD [facility]           I have seen and examined this patient face to face today. My clinical findings that support the need for the home health skilled services and  home bound status are the following:  Weakness/numbness causing balance and gait disturbance due to Weakness/Debility making it taxing to leave home.  Requiring assistive device to leave home due to unsteady gait caused by  Malignancy/Cancer.    Allergies:Review of patient's allergies indicates:  No Known Allergies    Diet: 2 gram sodium diet    Activities: activity as tolerated    Nursing:   SN to complete comprehensive assessment including routine vital signs. Instruct on disease process and s/s of complications to report to MD. Review/verify medication list sent home with the patient at time of discharge  and instruct patient/caregiver as needed. Frequency may be adjusted depending on start of care date.    Notify MD if SBP > 160 or < 90; DBP > 90 or < 50; HR > 120 or < 50; Temp > 101      CONSULTS:    Physical Therapy to evaluate and treat. Evaluate for home safety and equipment needs; Establish/upgrade home exercise program. Perform / instruct on therapeutic exercises, gait training, transfer training, and Range of Motion.  Occupational Therapy to evaluate and treat. Evaluate home environment for safety and equipment needs. Perform/Instruct on transfers, ADL training, ROM, and therapeutic exercises.   to evaluate for community resources/long-range planning.    MISCELLANEOUS CARE:  Routine Skin for Bedridden Patients: Instruct patient/caregiver to apply moisture barrier cream to all skin folds and wet areas in perineal area daily and after baths and all bowel movements.    WOUND CARE ORDERS  n/a      Medications: Review discharge medications with patient and family and provide education.      Current Discharge Medication List      START taking these medications    Details   dexAMETHasone (DECADRON) 2 MG tablet Take 2 tablets (4mg) every 6 hours x 3 days, then 2 tabs (4mg) every 8 hours x 3 days, then 2 tabs (4mg) every 12 hours x 3 days, then 1 tab (2mg) every 8 hours x 3 days, then 1 tab (2mg)  every 12 hours x 3 days, then 1 tab (2mg) daily x 6 days  Qty: 75 tablet, Refills: 0      lactulose (CHRONULAC) 10 gram/15 mL solution Take 30 mL by mouth three times daily. Try to accomplish 3-4 bowel movements per day.  Qty: 1350 mL, Refills: 2      spironolactone (ALDACTONE) 100 MG tablet Take 1 tablet (100 mg total) by mouth once daily.  Qty: 30 tablet, Refills: 11         CONTINUE these medications which have CHANGED    Details   famotidine (PEPCID) 20 MG tablet Take 1 tablet (20 mg total) by mouth once daily.  Qty: 30 tablet, Refills: 0    Associated Diagnoses: Gastroesophageal reflux disease, esophagitis presence not specified      furosemide (LASIX) 40 MG tablet Take 1 tablet (40 mg total) by mouth once daily.  Qty: 30 tablet, Refills: 5    Associated Diagnoses: Iron deficiency anemia due to chronic blood loss; Alcoholic cirrhosis of liver with ascites      oxyCODONE (ROXICODONE) 5 MG immediate release tablet Take 2 tablets (10 mg total) by mouth every 6 (six) hours as needed for Pain.  Qty: 28 tablet, Refills: 0    Comments: Quantity prescribed more than 7 day supply? No  Associated Diagnoses: HCC (hepatocellular carcinoma); Cancer associated pain; Alcoholic cirrhosis of liver with ascites         CONTINUE these medications which have NOT CHANGED    Details   apixaban (ELIQUIS) 5 mg Tab Take 1 tablet (5 mg total) by mouth 2 (two) times daily.  Qty: 60 tablet, Refills: 4    Associated Diagnoses: HCC (hepatocellular carcinoma); Cancer associated pain; Hand foot syndrome; Iron deficiency anemia due to chronic blood loss; Severe malnutrition; Esophageal varices in alcoholic cirrhosis; Pulmonary embolism      ferrous sulfate (FEOSOL) 325 mg (65 mg iron) Tab tablet Take 1 tablet (325 mg total) by mouth 2 (two) times daily.  Qty: 30 tablet, Refills: 5    Associated Diagnoses: Iron deficiency anemia due to chronic blood loss; Alcoholic cirrhosis of liver with ascites      promethazine (PHENERGAN) 25 MG tablet Take  1 tablet (25 mg total) by mouth every 6 (six) hours as needed for Nausea.  Qty: 25 tablet, Refills: 0      propranolol (INDERAL) 10 MG tablet Take 1 tablet (10 mg total) by mouth 2 (two) times daily.  Qty: 60 tablet, Refills: 2    Associated Diagnoses: HCC (hepatocellular carcinoma); Iron deficiency anemia due to chronic blood loss; Alcoholic cirrhosis of liver with ascites; Esophageal varices in alcoholic cirrhosis         STOP taking these medications       ondansetron (ZOFRAN-ODT) 4 MG TbDL Comments:   Reason for Stopping:               I certify that this patient is confined to her home and needs intermittent skilled nursing care, physical therapy and occupational therapy.

## 2019-12-02 NOTE — ASSESSMENT & PLAN NOTE
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 Rounded on patient today, no family members at bedside. Patient is awake and alert, reports aching back pain rated 8/10 that improves somewhat with Oxycodone. Also has PRN Hydrocodone ordered but has not taken any in the last 24 hours. Discussed the importance of taking pain medication early rather than waiting for pain to become severe. Patient did not sleep well for several nights but slept a little better after starting Melatonin. She started palliative radiation over the weekend to help with pain control. Patient is adamant that she does not want neurosurgery at any point and understands that she is not expected to walk again without surgery, although she says "anything's possible." She wants to get home to her family as soon as possible and reports that they would be able to provide transportation to palliative radiation as an outpatient. She is currently full code and states that she "wants them to try" if her heart stopped or she stopped breathing. However, she would not want to be left on machines long term if recovery was not possible.    Today patient is eager to be discharged home and states that she does not want to come back to the hospital anymore. Discussed possible option of hospice in allowing all care to be provided at home. Patient expresses interest in hospice but says that "everything needs to be done through my daughter" who will not be at the hospital until later today. Spoke to admissions coordinator Oma Ontiveros with [facility] who says that they do accept " "patients who are undergoing outpatient palliative radiotherapy.    Plan/Recommendations:  1. See goals of care discussion below.  2. Continue current medication regimen. Agree with continuing palliative radiation therapy for pain control.  3. Inpatient palliative care will continue to follow up with patient during hospitalization.  4. Consider referral to [facility] if daughter agrees to discharge home with hospice; per admissions coordinator patient could continue to receive outpatient palliative radiotherapy.  4. If daughter is not ready to pursue hospice, consider referral to [facility] upon discharge for symptom management during radiation therapy and to assist with transition to hospice in the future if desired by patient.    Goals of Care/Advance Care Plannin19 Conference conducted by this APRN with patient to discuss pt's current clinical status, goals of care, code status, long term expected outcomes and prognostic awareness. After a discussion concerning the pt's values and wishes, patient verbalized fair understanding and insight as it relates to her prognostic awareness. She is currently full code and has stated that she "wants them to try" if her heart stopped or she stopped breathing. However, she would not want to be left on machines long term if recovery was not possible. Patient is adamant that she does not want neurosurgery at any point and understands that she is not expected to walk again without surgery, although she says "anything's possible." Her goal is to return home to her family and continue palliative radiotherapy as outpatient. She does not want to return to the hospital and is receptive to the idea of discharging home with hospice as long as she would be allowed to continue palliative radiotherapy. She wants to leave the decision up to her daughter who will be at the hospital later today. Per [facility] admissions coordinator Oma" "Rama, they do accept patients who are undergoing outpatient palliative radiotherapy and they can send out a representative to meet with family if they would like to pursue hospice.    11/29/19 Conference conducted by this APRN with patient to discuss pt's current clinical status, goals of care, code status, long term expected outcomes and prognostic awareness. After a discussion concerning the pt's values and wishes, patient verbalized fair understanding and insight as it relates to her prognostic awareness. She is currently full code and states that she "wants them to try" if her heart stopped or she stopped breathing. However, she would not want to be left on machines long term if recovery was not possible. Patient is adamant that she does not want surgery at any point and understands that she is not expected to walk again without surgery, although she says "anything's possible." Her goal is to return home to her family but she wants to start palliative radiation during this admission and continue as outpatient.  "

## 2019-12-02 NOTE — PLAN OF CARE
Problem: Adult Inpatient Plan of Care  Goal: Plan of Care Review  Outcome: Ongoing, Progressing     Problem: Adult Inpatient Plan of Care  Goal: Optimal Comfort and Wellbeing  Outcome: Ongoing, Progressing     Problem: Infection  Goal: Infection Symptom Resolution  Outcome: Ongoing, Progressing     Problem: Coping Ineffective  Goal: Effective Coping  Outcome: Ongoing, Progressing     Problem: Adult Inpatient Plan of Care  Goal: Readiness for Transition of Care  Outcome: Ongoing, Progressing

## 2019-12-02 NOTE — PLAN OF CARE
12/02/19 1613   Final Note   Assessment Type Final Discharge Note   Anticipated Discharge Disposition   ([facility])   What phone number can be called within the next 1-3 days to see how you are doing after discharge? [phone]   Hospital Follow Up  Appt(s) scheduled? Yes   Discharge plans and expectations educations in teach back method with documentation complete? Yes     Patyient to call for a post hospital follow up appointment with PCP [facility]

## 2019-12-02 NOTE — PLAN OF CARE
Went over plan of  care - all questions  from pt. Pain remains to stay at an 8 or 9 unless pt is asleep. Turned q 2. No complaints voiced, no falls or injuries. Family at bedside. Will continue to monitor

## 2019-12-02 NOTE — SUBJECTIVE & OBJECTIVE
Past Medical History:   Diagnosis Date    Alcohol abuse     Anemia     Cancer     liver    Cirrhosis        Past Surgical History:   Procedure Laterality Date    ESOPHAGOGASTRODUODENOSCOPY Left 1/9/2019    Procedure: EGD (ESOPHAGOGASTRODUODENOSCOPY);  Surgeon: Madyson Farrar MD;  Location: [facility] ENDO;  Service: Endoscopy;  Laterality: Left;    PERITONEOCENTESIS N/A 1/9/2019    Procedure: PARACENTESIS, ABDOMINAL;  Surgeon: Everett Fitzpatrick MD;  Location: [facility] CATH LAB;  Service: Radiology;  Laterality: N/A;       Review of patient's allergies indicates:  No Known Allergies    Medications:  Continuous Infusions:  Scheduled Meds:   apixaban  5 mg Oral BID    dexamethasone  4 mg Intravenous Q6H    famotidine  20 mg Oral Daily    ferrous sulfate  325 mg Oral Daily    furosemide  40 mg Oral Daily    lactulose  30 g Oral TID    magnesium oxide  400 mg Oral Daily    melatonin  6 mg Oral Nightly    propranolol  10 mg Oral BID    spironolactone  100 mg Oral Daily     PRN Meds:Dextrose 10% Bolus, Dextrose 10% Bolus, glucagon (human recombinant), glucose, glucose, HYDROmorphone, insulin aspart U-100, naloxone, ondansetron, oxyCODONE, oxyCODONE, sodium chloride 0.9%    Family History     None        Tobacco Use    Smoking status: Current Every Day Smoker     Packs/day: 0.50     Years: 30.00     Pack years: 15.00     Types: Cigarettes    Smokeless tobacco: Never Used    Tobacco comment: 2 cigarettes a day   Substance and Sexual Activity    Alcohol use: No     Frequency: Never     Comment: Previously drank 1 pint a day    Drug use: Not Currently     Types: Cocaine     Comment: last use was 3 years ago    Sexual activity: Not Currently       Review of Systems   Constitutional: Positive for activity change and fatigue. Negative for appetite change.   HENT: Negative for congestion, sore throat and trouble swallowing.    Respiratory: Negative for cough, shortness of breath and wheezing.    Cardiovascular:  Negative for chest pain and leg swelling.   Gastrointestinal: Positive for abdominal distention. Negative for constipation, nausea and vomiting.   Musculoskeletal: Positive for back pain and gait problem. Negative for joint swelling and neck pain.   Skin: Negative for rash and wound.   Neurological: Positive for weakness (lower extremities). Negative for dizziness and light-headedness.   Psychiatric/Behavioral: Positive for sleep disturbance. Negative for agitation, behavioral problems and dysphoric mood. The patient is not nervous/anxious.      Objective:     Vital Signs (Most Recent):  Temp: 98.3 °F (36.8 °C) (12/02/19 1216)  Pulse: 62 (12/02/19 1216)  Resp: 16 (12/02/19 1216)  BP: (!) 125/57 (12/02/19 1216)  SpO2: 97 % (12/02/19 1216) Vital Signs (24h Range):  Temp:  [98.2 °F (36.8 °C)-98.6 °F (37 °C)] 98.3 °F (36.8 °C)  Pulse:  [62-77] 62  Resp:  [12-18] 16  SpO2:  [97 %-100 %] 97 %  BP: (108-128)/(57-64) 125/57     Weight: 44 kg (97 lb)  Body mass index is 22.45 kg/m².    Review of Symptoms  Symptom Assessment (ESAS 0-10 scale)   ESAS 0 1 2 3 4 5 6 7 8 9 10   Pain         X     Dyspnea X             Anxiety X             Nausea X             Depression  X             Anorexia X             Fatigue  X            Insomnia  X            Restlessness  X             Agitation X             CAM / Delirium _X_ --  ___+   Constipation     _X_ --  ___+   Diarrhea           _X_ --  ___+  Bowel Management Plan (BMP): Yes    Comments: Lactulose    Pain Assessment: intermittent aching back pain, currently rated 8/10, improves with Oxycodone    OME in 24 hours: 45    Performance Status: 40    ECOG Performance Status Grade: 3 - Confined to bed or chair >50% of waking hours    Physical Exam   Constitutional: She is oriented to person, place, and time. She appears cachectic. No distress.   HENT:   Head: Normocephalic and atraumatic.   Eyes: Conjunctivae and EOM are normal.   Neck: Normal range of motion. Neck supple.    Cardiovascular: Regular rhythm. Bradycardia present.   Pulmonary/Chest: Effort normal and breath sounds normal.   Abdominal: Bowel sounds are normal. She exhibits distension.   Musculoskeletal: She exhibits no edema or deformity.   Neurological: She is alert and oriented to person, place, and time.   BLE weakness   Skin: Skin is warm and dry.   Psychiatric: She has a normal mood and affect. Her behavior is normal. Judgment and thought content normal.       Significant Labs: All pertinent labs within the past 24 hours have been reviewed.  CBC:   Recent Labs   Lab 12/02/19  0730   WBC 8.11   HGB 9.4*   HCT 30.1*   MCV 70*        BMP:  Recent Labs   Lab 12/02/19  0730   *   *   K 4.2      CO2 24   BUN 22   CREATININE 1.1   CALCIUM 8.9   MG 1.7     LFT:  Lab Results   Component Value Date    AST 70 (H) 12/02/2019    ALKPHOS 127 12/02/2019    BILITOT 0.9 12/02/2019     Albumin:   Albumin   Date Value Ref Range Status   12/02/2019 2.5 (L) 3.5 - 5.2 g/dL Final     Protein:   Total Protein   Date Value Ref Range Status   12/02/2019 6.5 6.0 - 8.4 g/dL Final     Lactic acid:   Lab Results   Component Value Date    LACTATE 1.6 11/25/2019    LACTATE 2.1 01/28/2018       Significant Imaging: I have reviewed all pertinent imaging results/findings within the past 24 hours.    Advance Care Planning   Advanced Directives::  Living Will: No  LaPOST: No  Do Not Resuscitate Status: No  Medical Power of : No. Daughter Yuridia is surrogate decision maker.    Decision-Making Capacity: Patient answered questions       Living Arrangements: Lives with family    Psychosocial/Cultural:  Patient is unmarried and has 2 living children, daughter Yuridia and son Chico who is currently incarcerated until May 2020. She had another son who was killed in 2003. She lives at home with Josue's 7 year old daughter who she has cared for since she was 3 weeks ago. Yuridia and her 14 year old daughter have also been staying  "in the home. 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 Her goal is to get back home so she can "do things" around the house. She understands that she is not expected to walk again without surgery but says "anything's possible." She worries about her 7 year old granddaughter but says that Yuridia is taking good care of her.    Spiritual:     F- Mandy and Belief: [employer]    I - Importance: very important  .  C - Community: belongs to [facility]    A - Address in Care:  visits, [facility]  "

## 2019-12-02 NOTE — PROGRESS NOTES
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 Scott.      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 Rounded on patient today, no family members at bedside. Patient is awake and alert, reports aching back pain rated 8/10 that improves somewhat with Oxycodone. Also has PRN Hydrocodone ordered but has not taken any in the last 24 hours. Discussed the importance of taking pain medication early rather than waiting for pain to become severe. Patient did not sleep well for several nights but slept a little better after starting Melatonin. She started palliative radiation over the weekend to help with pain control. Patient is adamant that she does not want neurosurgery at any point and understands that she is not expected to walk again without surgery, although she says "anything's possible." She wants to get home to her family as soon as possible and reports that they would be able to provide transportation to palliative radiation as an outpatient. She is currently full code and states that she "wants them to try" if her heart stopped or " "she stopped breathing. However, she would not want to be left on machines long term if recovery was not possible.    Today patient is eager to be discharged home and states that she does not want to come back to the hospital anymore. Discussed possible option of hospice in allowing all care to be provided at home. Patient expresses interest in hospice but says that "everything needs to be done through my daughter" who will not be at the hospital until later today. Spoke to admissions coordinator Oma Ontiveros with [facility] who says that they do accept patients who are undergoing outpatient palliative radiotherapy.    Plan/Recommendations:  1. See goals of care discussion below.  2. Continue current medication regimen. Agree with continuing palliative radiation therapy for pain control.  3. Inpatient palliative care will continue to follow up with patient during hospitalization.  4. Consider referral to [facility] if daughter agrees to discharge home with hospice; per admissions coordinator patient could continue to receive outpatient palliative radiotherapy.  4. If daughter is not ready to pursue hospice, consider referral to [facility] upon discharge for symptom management during radiation therapy and to assist with transition to hospice in the future if desired by patient.    Goals of Care/Advance Care Plannin19 Conference conducted by this APRN with patient to discuss pt's current clinical status, goals of care, code status, long term expected outcomes and prognostic awareness. After a discussion concerning the pt's values and wishes, patient verbalized fair understanding and insight as it relates to her prognostic awareness. She is currently full code and has stated that she "wants them to try" if her heart stopped or she stopped breathing. However, she would not want to be left on machines long term if recovery was not possible. Patient is adamant that she " "does not want neurosurgery at any point and understands that she is not expected to walk again without surgery, although she says "anything's possible." Her goal is to return home to her family and continue palliative radiotherapy as outpatient. She does not want to return to the hospital and is receptive to the idea of discharging home with hospice as long as she would be allowed to continue palliative radiotherapy. She wants to leave the decision up to her daughter who will be at the hospital later today. Per [facility] admissions coordinator Oma Ontiveros, they do accept patients who are undergoing outpatient palliative radiotherapy and they can send out a representative to meet with family if they would like to pursue hospice.    11/29/19 Conference conducted by this APRN with patient to discuss pt's current clinical status, goals of care, code status, long term expected outcomes and prognostic awareness. After a discussion concerning the pt's values and wishes, patient verbalized fair understanding and insight as it relates to her prognostic awareness. She is currently full code and states that she "wants them to try" if her heart stopped or she stopped breathing. However, she would not want to be left on machines long term if recovery was not possible. Patient is adamant that she does not want surgery at any point and understands that she is not expected to walk again without surgery, although she says "anything's possible." Her goal is to return home to her family but she wants to start palliative radiation during this admission and continue as outpatient.        I will follow-up with patient. Please contact us if you have any additional questions.    Subjective:     Chief Complaint:   Chief Complaint   Patient presents with    Back Pain     Patient reports back pain and numbness to BLE since 11/22. Patient reports being tx on 11/22 and receiving shot in hip and then developed numbness to BLE.  " "      HPI:   Palliative care consulted for this 64 y/o female with hepatocellular carcinoma, alcoholic cirrhosis (dx 2015), portal HTN, ascites, hx of variceal bleeding in January 2018 s/p banding, bilateral pulmonary embolism on chronic apixaban, hx of ETOH abuse (quit Jan 2018) who presented to the ED on 11/25/19 with back pain and bilateral lower extremity weakness. Patient stated that symptoms had been on going for the past week and a half and progressed to the point where she could not walk or lift her legs against gravity. She decribed "electric shock-like" sensations down her legs bilaterally. She complained of back pain and LUQ & LLQ abdominal pain with radiation to her back. She denied any falls, trauma, or bladder/bowel incontinence. In ED, MRI with acute compression fracture at T11 with almost 25% height loss and retropulsion of the posterior cortex into the central canal causing severe cord compression. On 11/26/19, she additionally endorsed urinary retention and Garcia was placed. Neurosurgery was consulted and planned to take her to the OR on 11/27/19 for T10 lateral corpectomy with posterior T8-T12 percutaneous instrumentation, but patient opted not to proceed with surgery. Radiation oncology was consulted for palliative radiotherapy to the thoracic spine for pain control. Palliative radiotherapy started over the weekend.    Hospital Course:  No notes on file      Past Medical History:   Diagnosis Date    Alcohol abuse     Anemia     Cancer     liver    Cirrhosis        Past Surgical History:   Procedure Laterality Date    ESOPHAGOGASTRODUODENOSCOPY Left 1/9/2019    Procedure: EGD (ESOPHAGOGASTRODUODENOSCOPY);  Surgeon: Madyson Farrar MD;  Location: [facility], [place], operated by [facility] ENDO;  Service: Endoscopy;  Laterality: Left;    PERITONEOCENTESIS N/A 1/9/2019    Procedure: PARACENTESIS, ABDOMINAL;  Surgeon: Everett Fitzpatrick MD;  Location: [facility], [place], operated by [facility] CATH LAB;  Service: Radiology;  Laterality: N/A;       Review of patient's " allergies indicates:  No Known Allergies    Medications:  Continuous Infusions:  Scheduled Meds:   apixaban  5 mg Oral BID    dexamethasone  4 mg Intravenous Q6H    famotidine  20 mg Oral Daily    ferrous sulfate  325 mg Oral Daily    furosemide  40 mg Oral Daily    lactulose  30 g Oral TID    magnesium oxide  400 mg Oral Daily    melatonin  6 mg Oral Nightly    propranolol  10 mg Oral BID    spironolactone  100 mg Oral Daily     PRN Meds:Dextrose 10% Bolus, Dextrose 10% Bolus, glucagon (human recombinant), glucose, glucose, HYDROmorphone, insulin aspart U-100, naloxone, ondansetron, oxyCODONE, oxyCODONE, sodium chloride 0.9%    Family History     None        Tobacco Use    Smoking status: Current Every Day Smoker     Packs/day: 0.50     Years: 30.00     Pack years: 15.00     Types: Cigarettes    Smokeless tobacco: Never Used    Tobacco comment: 2 cigarettes a day   Substance and Sexual Activity    Alcohol use: No     Frequency: Never     Comment: Previously drank 1 pint a day    Drug use: Not Currently     Types: Cocaine     Comment: last use was 3 years ago    Sexual activity: Not Currently       Review of Systems   Constitutional: Positive for activity change and fatigue. Negative for appetite change.   HENT: Negative for congestion, sore throat and trouble swallowing.    Respiratory: Negative for cough, shortness of breath and wheezing.    Cardiovascular: Negative for chest pain and leg swelling.   Gastrointestinal: Positive for abdominal distention. Negative for constipation, nausea and vomiting.   Musculoskeletal: Positive for back pain and gait problem. Negative for joint swelling and neck pain.   Skin: Negative for rash and wound.   Neurological: Positive for weakness (lower extremities). Negative for dizziness and light-headedness.   Psychiatric/Behavioral: Positive for sleep disturbance. Negative for agitation, behavioral problems and dysphoric mood. The patient is not nervous/anxious.       Objective:     Vital Signs (Most Recent):  Temp: 98.3 °F (36.8 °C) (12/02/19 1216)  Pulse: 62 (12/02/19 1216)  Resp: 16 (12/02/19 1216)  BP: (!) 125/57 (12/02/19 1216)  SpO2: 97 % (12/02/19 1216) Vital Signs (24h Range):  Temp:  [98.2 °F (36.8 °C)-98.6 °F (37 °C)] 98.3 °F (36.8 °C)  Pulse:  [62-77] 62  Resp:  [12-18] 16  SpO2:  [97 %-100 %] 97 %  BP: (108-128)/(57-64) 125/57     Weight: 44 kg (97 lb)  Body mass index is 22.45 kg/m².    Review of Symptoms  Symptom Assessment (ESAS 0-10 scale)   ESAS 0 1 2 3 4 5 6 7 8 9 10   Pain         X     Dyspnea X             Anxiety X             Nausea X             Depression  X             Anorexia X             Fatigue  X            Insomnia  X            Restlessness  X             Agitation X             CAM / Delirium _X_ --  ___+   Constipation     _X_ --  ___+   Diarrhea           _X_ --  ___+  Bowel Management Plan (BMP): Yes    Comments: Lactulose    Pain Assessment: intermittent aching back pain, currently rated 8/10, improves with Oxycodone    OME in 24 hours: 45    Performance Status: 40    ECOG Performance Status Grade: 3 - Confined to bed or chair >50% of waking hours    Physical Exam   Constitutional: She is oriented to person, place, and time. She appears cachectic. No distress.   HENT:   Head: Normocephalic and atraumatic.   Eyes: Conjunctivae and EOM are normal.   Neck: Normal range of motion. Neck supple.   Cardiovascular: Regular rate. Regular rhythm.   Pulmonary/Chest: Effort normal and breath sounds normal.   Abdominal: Bowel sounds are normal. She exhibits distension.   Musculoskeletal: She exhibits no edema or deformity.   Neurological: She is alert and oriented to person, place, and time.   BLE weakness   Skin: Skin is warm and dry.   Psychiatric: She has a normal mood and affect. Her behavior is normal. Judgment and thought content normal.       Significant Labs: All pertinent labs within the past 24 hours have been reviewed.  CBC:   Recent Labs  "  Lab 12/02/19  0730   WBC 8.11   HGB 9.4*   HCT 30.1*   MCV 70*        BMP:  Recent Labs   Lab 12/02/19  0730   *   *   K 4.2      CO2 24   BUN 22   CREATININE 1.1   CALCIUM 8.9   MG 1.7     LFT:  Lab Results   Component Value Date    AST 70 (H) 12/02/2019    ALKPHOS 127 12/02/2019    BILITOT 0.9 12/02/2019     Albumin:   Albumin   Date Value Ref Range Status   12/02/2019 2.5 (L) 3.5 - 5.2 g/dL Final     Protein:   Total Protein   Date Value Ref Range Status   12/02/2019 6.5 6.0 - 8.4 g/dL Final     Lactic acid:   Lab Results   Component Value Date    LACTATE 1.6 11/25/2019    LACTATE 2.1 01/28/2018       Significant Imaging: I have reviewed all pertinent imaging results/findings within the past 24 hours.    Advance Care Planning   Advanced Directives::  Living Will: No  LaPOST: No  Do Not Resuscitate Status: No  Medical Power of : No. Daughter Yuridia is surrogate decision maker.    Decision-Making Capacity: Patient answered questions       Living Arrangements: Lives with family    Psychosocial/Cultural:  Patient is unmarried and has 2 living children, daughter Yuridia and son [place] who is currently incarcerated until May 2020. She had another son who was killed in 2003. She lives at home with Josue's 7 year old daughter who she has cared for since she was 3 weeks ago. Yuridia and her 14 year old daughter have also been staying in the home. 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 Her goal is to get back home so she can "do things" around the house. She understands that she is not expected to walk again without surgery but says "anything's possible." She worries about her 7 year old granddaughter but says that Yuridia is taking good care of her.    Spiritual:     F- Mandy and Belief: Jehovah's witness    I - Importance: very important  .  C - Community: belongs to [facility]    A - Address in Care:  visits, sacramental care      > 50% " of 35 min visit spent in chart review, face to face discussion of goals of care,  symptom assessment, coordination of care and emotional support.    Rosalba Mak NP  Palliative Medicine  [facility]

## 2019-12-03 ENCOUNTER — DOCUMENTATION ONLY (OUTPATIENT)
Dept: RADIATION THERAPY | Facility: HOSPITAL | Age: 64
End: 2019-12-03

## 2019-12-03 PROBLEM — R73.9 STEROID-INDUCED HYPERGLYCEMIA: Status: ACTIVE | Noted: 2019-12-03

## 2019-12-03 PROBLEM — T38.0X5A STEROID-INDUCED HYPERGLYCEMIA: Status: ACTIVE | Noted: 2019-12-03

## 2019-12-03 LAB
ALBUMIN SERPL BCP-MCNC: 2.7 G/DL (ref 3.5–5.2)
ALP SERPL-CCNC: 128 U/L (ref 55–135)
ALT SERPL W/O P-5'-P-CCNC: 77 U/L (ref 10–44)
ANION GAP SERPL CALC-SCNC: 12 MMOL/L (ref 8–16)
AST SERPL-CCNC: 79 U/L (ref 10–40)
BASOPHILS # BLD AUTO: 0.01 K/UL (ref 0–0.2)
BASOPHILS NFR BLD: 0.1 % (ref 0–1.9)
BILIRUB SERPL-MCNC: 1 MG/DL (ref 0.1–1)
BUN SERPL-MCNC: 25 MG/DL (ref 8–23)
CALCIUM SERPL-MCNC: 8.8 MG/DL (ref 8.7–10.5)
CHLORIDE SERPL-SCNC: 98 MMOL/L (ref 95–110)
CO2 SERPL-SCNC: 25 MMOL/L (ref 23–29)
CREAT SERPL-MCNC: 1.1 MG/DL (ref 0.5–1.4)
DIFFERENTIAL METHOD: ABNORMAL
EOSINOPHIL # BLD AUTO: 0 K/UL (ref 0–0.5)
EOSINOPHIL NFR BLD: 0 % (ref 0–8)
ERYTHROCYTE [DISTWIDTH] IN BLOOD BY AUTOMATED COUNT: 20.7 % (ref 11.5–14.5)
EST. GFR  (AFRICAN AMERICAN): >60 ML/MIN/1.73 M^2
EST. GFR  (NON AFRICAN AMERICAN): 53.9 ML/MIN/1.73 M^2
GLUCOSE SERPL-MCNC: 206 MG/DL (ref 70–110)
HCT VFR BLD AUTO: 31.6 % (ref 37–48.5)
HGB BLD-MCNC: 10.2 G/DL (ref 12–16)
IMM GRANULOCYTES # BLD AUTO: 0.12 K/UL (ref 0–0.04)
IMM GRANULOCYTES NFR BLD AUTO: 1.4 % (ref 0–0.5)
INR PPP: 1.3 (ref 0.8–1.2)
LYMPHOCYTES # BLD AUTO: 0.5 K/UL (ref 1–4.8)
LYMPHOCYTES NFR BLD: 6.1 % (ref 18–48)
MAGNESIUM SERPL-MCNC: 1.8 MG/DL (ref 1.6–2.6)
MCH RBC QN AUTO: 22.4 PG (ref 27–31)
MCHC RBC AUTO-ENTMCNC: 32.3 G/DL (ref 32–36)
MCV RBC AUTO: 69 FL (ref 82–98)
MONOCYTES # BLD AUTO: 0.7 K/UL (ref 0.3–1)
MONOCYTES NFR BLD: 8.4 % (ref 4–15)
NEUTROPHILS # BLD AUTO: 7.3 K/UL (ref 1.8–7.7)
NEUTROPHILS NFR BLD: 84 % (ref 38–73)
NRBC BLD-RTO: 0 /100 WBC
PHOSPHATE SERPL-MCNC: 3.2 MG/DL (ref 2.7–4.5)
PLATELET # BLD AUTO: 270 K/UL (ref 150–350)
PMV BLD AUTO: ABNORMAL FL (ref 9.2–12.9)
POCT GLUCOSE: 198 MG/DL (ref 70–110)
POCT GLUCOSE: 211 MG/DL (ref 70–110)
POCT GLUCOSE: 247 MG/DL (ref 70–110)
POCT GLUCOSE: 317 MG/DL (ref 70–110)
POTASSIUM SERPL-SCNC: 3.5 MMOL/L (ref 3.5–5.1)
PROT SERPL-MCNC: 7.1 G/DL (ref 6–8.4)
PROTHROMBIN TIME: 13.3 SEC (ref 9–12.5)
RBC # BLD AUTO: 4.56 M/UL (ref 4–5.4)
SODIUM SERPL-SCNC: 135 MMOL/L (ref 136–145)
WBC # BLD AUTO: 8.65 K/UL (ref 3.9–12.7)

## 2019-12-03 PROCEDURE — 77387 GUIDANCE FOR RADJ TX DLVR: CPT | Mod: ,,, | Performed by: RADIOLOGY

## 2019-12-03 PROCEDURE — 99233 PR SUBSEQUENT HOSPITAL CARE,LEVL III: ICD-10-PCS | Mod: ,,, | Performed by: NURSE PRACTITIONER

## 2019-12-03 PROCEDURE — 25000003 PHARM REV CODE 250: Performed by: STUDENT IN AN ORGANIZED HEALTH CARE EDUCATION/TRAINING PROGRAM

## 2019-12-03 PROCEDURE — 77387 PR GUIDANCE FOR RADIATION TREATMENT DELIVERY: ICD-10-PCS | Mod: ,,, | Performed by: RADIOLOGY

## 2019-12-03 PROCEDURE — 99232 PR SUBSEQUENT HOSPITAL CARE,LEVL II: ICD-10-PCS | Mod: ,,, | Performed by: INTERNAL MEDICINE

## 2019-12-03 PROCEDURE — 63600175 PHARM REV CODE 636 W HCPCS: Performed by: STUDENT IN AN ORGANIZED HEALTH CARE EDUCATION/TRAINING PROGRAM

## 2019-12-03 PROCEDURE — 94761 N-INVAS EAR/PLS OXIMETRY MLT: CPT

## 2019-12-03 PROCEDURE — 36415 COLL VENOUS BLD VENIPUNCTURE: CPT

## 2019-12-03 PROCEDURE — 99233 SBSQ HOSP IP/OBS HIGH 50: CPT | Mod: ,,, | Performed by: NURSE PRACTITIONER

## 2019-12-03 PROCEDURE — 85610 PROTHROMBIN TIME: CPT

## 2019-12-03 PROCEDURE — 99232 SBSQ HOSP IP/OBS MODERATE 35: CPT | Mod: ,,, | Performed by: INTERNAL MEDICINE

## 2019-12-03 PROCEDURE — 63600175 PHARM REV CODE 636 W HCPCS: Performed by: INTERNAL MEDICINE

## 2019-12-03 PROCEDURE — 77402 RADIATION TX DELIVERY LVL 1: CPT | Performed by: RADIOLOGY

## 2019-12-03 PROCEDURE — 84100 ASSAY OF PHOSPHORUS: CPT

## 2019-12-03 PROCEDURE — 85025 COMPLETE CBC W/AUTO DIFF WBC: CPT

## 2019-12-03 PROCEDURE — 25000003 PHARM REV CODE 250: Performed by: INTERNAL MEDICINE

## 2019-12-03 PROCEDURE — 83735 ASSAY OF MAGNESIUM: CPT

## 2019-12-03 PROCEDURE — 77387 GUIDANCE FOR RADJ TX DLVR: CPT | Performed by: RADIOLOGY

## 2019-12-03 PROCEDURE — 11000001 HC ACUTE MED/SURG PRIVATE ROOM

## 2019-12-03 PROCEDURE — 80053 COMPREHEN METABOLIC PANEL: CPT

## 2019-12-03 RX ORDER — DEXAMETHASONE 4 MG/1
4 TABLET ORAL
Status: DISCONTINUED | OUTPATIENT
Start: 2019-12-03 | End: 2019-12-06 | Stop reason: HOSPADM

## 2019-12-03 RX ORDER — SPIRONOLACTONE 25 MG/1
50 TABLET ORAL DAILY
Status: DISCONTINUED | OUTPATIENT
Start: 2019-12-04 | End: 2019-12-06 | Stop reason: HOSPADM

## 2019-12-03 RX ORDER — INSULIN ASPART 100 [IU]/ML
3 INJECTION, SOLUTION INTRAVENOUS; SUBCUTANEOUS
Status: DISCONTINUED | OUTPATIENT
Start: 2019-12-03 | End: 2019-12-06 | Stop reason: HOSPADM

## 2019-12-03 RX ORDER — FUROSEMIDE 20 MG/1
20 TABLET ORAL DAILY
Status: DISCONTINUED | OUTPATIENT
Start: 2019-12-04 | End: 2019-12-06 | Stop reason: HOSPADM

## 2019-12-03 RX ADMIN — APIXABAN 5 MG: 5 TABLET, FILM COATED ORAL at 01:12

## 2019-12-03 RX ADMIN — FERROUS SULFATE TAB EC 325 MG (65 MG FE EQUIVALENT) 325 MG: 325 (65 FE) TABLET DELAYED RESPONSE at 01:12

## 2019-12-03 RX ADMIN — DEXAMETHASONE 4 MG: 4 TABLET ORAL at 02:12

## 2019-12-03 RX ADMIN — PROPRANOLOL HYDROCHLORIDE 10 MG: 10 TABLET ORAL at 01:12

## 2019-12-03 RX ADMIN — APIXABAN 5 MG: 5 TABLET, FILM COATED ORAL at 10:12

## 2019-12-03 RX ADMIN — LACTULOSE 30 G: 20 SOLUTION ORAL at 02:12

## 2019-12-03 RX ADMIN — MAGNESIUM OXIDE TAB 400 MG (241.3 MG ELEMENTAL MG) 400 MG: 400 (241.3 MG) TAB at 01:12

## 2019-12-03 RX ADMIN — INSULIN ASPART 2 UNITS: 100 INJECTION, SOLUTION INTRAVENOUS; SUBCUTANEOUS at 05:12

## 2019-12-03 RX ADMIN — Medication 6 MG: at 10:12

## 2019-12-03 RX ADMIN — DEXAMETHASONE SODIUM PHOSPHATE 4 MG: 4 INJECTION, SOLUTION INTRAMUSCULAR; INTRAVENOUS at 05:12

## 2019-12-03 RX ADMIN — INSULIN ASPART 3 UNITS: 100 INJECTION, SOLUTION INTRAVENOUS; SUBCUTANEOUS at 01:12

## 2019-12-03 RX ADMIN — OXYCODONE HYDROCHLORIDE 15 MG: 10 TABLET ORAL at 08:12

## 2019-12-03 RX ADMIN — OXYCODONE HYDROCHLORIDE 15 MG: 10 TABLET ORAL at 01:12

## 2019-12-03 RX ADMIN — INSULIN ASPART 4 UNITS: 100 INJECTION, SOLUTION INTRAVENOUS; SUBCUTANEOUS at 05:12

## 2019-12-03 RX ADMIN — DEXAMETHASONE 4 MG: 4 TABLET ORAL at 10:12

## 2019-12-03 RX ADMIN — HYDROMORPHONE HYDROCHLORIDE 0.5 MG: 1 INJECTION, SOLUTION INTRAMUSCULAR; INTRAVENOUS; SUBCUTANEOUS at 10:12

## 2019-12-03 RX ADMIN — INSULIN ASPART 1 UNITS: 100 INJECTION, SOLUTION INTRAVENOUS; SUBCUTANEOUS at 09:12

## 2019-12-03 RX ADMIN — INSULIN ASPART 3 UNITS: 100 INJECTION, SOLUTION INTRAVENOUS; SUBCUTANEOUS at 05:12

## 2019-12-03 RX ADMIN — OXYCODONE HYDROCHLORIDE 15 MG: 10 TABLET ORAL at 05:12

## 2019-12-03 RX ADMIN — LACTULOSE 30 G: 20 SOLUTION ORAL at 01:12

## 2019-12-03 RX ADMIN — PROPRANOLOL HYDROCHLORIDE 10 MG: 10 TABLET ORAL at 10:12

## 2019-12-03 RX ADMIN — FAMOTIDINE 20 MG: 20 TABLET ORAL at 01:12

## 2019-12-03 NOTE — PROGRESS NOTES
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 Scott and Dr. Jackson.      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 Rounded on patient today, no family members at bedside. Patient is awake and alert, reports aching back pain rated 8/10 despite increase in Oxycodone to 15 mg PO Q4H PRN pain. Also took PRN Hydrocodone 0.5 mg IV yesterday evening. Patient does not feel that she is sleeping well with Melatonin and is asking for a sleeping pill. She is agreeable to completing 5 cycles of inpatient palliative radiotherapy prior to discharge. She is adamant that she does not want neurosurgery at any point and understands that she is not expected to walk again without surgery, although she says "anything's possible." Patient is currently full code and states that she "wants them to try" if her heart stopped or she stopped breathing. However, she would not want to be left on machines long term if recovery was not possible. Discussed completion of advanced directives and left packet for patient to review and discuss with her daughter.    Today " "patient is disappointed that she could not go home yesterday but agrees that it makes sense for her to complete radiotherapy prior to discharge. She again states that she does not want to come back to the hospital anymore. Ongoing discussion with patient regarding home hospice which would allow her to receive all care in the home. Patient is interested in going home with hospice but will leave the decision up to her daughter.    Plan/Recommendations:  1. See goals of care discussion below.  2. Consider long acting Morphine 15 mg PO BID for better pain control.  3. Consider Remeron 7.5 mg PO QHS for insomnia.  4. Inpatient palliative care will continue to follow up with patient during hospitalization.  5. Recommend discharge home with hospice following completion of palliative radiotherapy.  6. If daughter is not ready to pursue hospice, consider referral to [facility] upon discharge for symptom management and to assist with transition to hospice in the future if desired by patient.    Goals of Care/Advance Care Plannin/3/19 Conference conducted by this APRN with patient to discuss pt's current clinical status, goals of care, code status, long term expected outcomes and prognostic awareness. After a discussion concerning the pt's values and wishes, patient verbalized fair understanding and insight as it relates to her prognostic awareness. She is currently full code and has stated that she "wants them to try" if her heart stopped or she stopped breathing. However, she would not want to be left on machines long term if recovery was not possible. Discussed completion of advanced directives and left packet for patient to review and discuss with her daughter. Patient's goal is now to complete inpatient palliative radiotherapy and then return home to her family. She again states that she does not want to come back to the hospital anymore. Ongoing discussion with patient regarding home " "hospice which would allow her to receive all care in the home. Patient is interested in going home with hospice but will leave the decision up to her daughter.    12/2/19 Conference conducted by this APRN with patient to discuss pt's current clinical status, goals of care, code status, long term expected outcomes and prognostic awareness. After a discussion concerning the pt's values and wishes, patient verbalized fair understanding and insight as it relates to her prognostic awareness. She is currently full code and has stated that she "wants them to try" if her heart stopped or she stopped breathing. However, she would not want to be left on machines long term if recovery was not possible. Patient is adamant that she does not want neurosurgery at any point and understands that she is not expected to walk again without surgery, although she says "anything's possible." Her goal is to return home to her family and continue palliative radiotherapy as outpatient. She does not want to return to the hospital and is receptive to the idea of discharging home with hospice as long as she would be allowed to continue palliative radiotherapy. She wants to leave the decision up to her daughter who will be at the hospital later today. Per [facility] admissions coordinator Oma Ontiveros, they do accept patients who are undergoing outpatient palliative radiotherapy and they can send out a representative to meet with family if they would like to pursue hospice.    11/29/19 Conference conducted by this APRN with patient to discuss pt's current clinical status, goals of care, code status, long term expected outcomes and prognostic awareness. After a discussion concerning the pt's values and wishes, patient verbalized fair understanding and insight as it relates to her prognostic awareness. She is currently full code and states that she "wants them to try" if her heart stopped or she stopped breathing. However, she would not " "want to be left on machines long term if recovery was not possible. Patient is adamant that she does not want surgery at any point and understands that she is not expected to walk again without surgery, although she says "anything's possible." Her goal is to return home to her family but she wants to start palliative radiation during this admission and continue as outpatient.        I will follow-up with patient. Please contact us if you have any additional questions.    Subjective:     Chief Complaint:   Chief Complaint   Patient presents with    Back Pain     Patient reports back pain and numbness to BLE since 11/22. Patient reports being tx on 11/22 and receiving shot in hip and then developed numbness to BLE.        HPI:   Palliative care consulted for this 62 y/o female with hepatocellular carcinoma, alcoholic cirrhosis (dx 2015), portal HTN, ascites, hx of variceal bleeding in January 2018 s/p banding, bilateral pulmonary embolism on chronic apixaban, hx of ETOH abuse (quit Jan 2018) who presented to the ED on 11/25/19 with back pain and bilateral lower extremity weakness. Patient stated that symptoms had been on going for the past week and a half and progressed to the point where she could not walk or lift her legs against gravity. She decribed "electric shock-like" sensations down her legs bilaterally. She complained of back pain and LUQ & LLQ abdominal pain with radiation to her back. She denied any falls, trauma, or bladder/bowel incontinence. In ED, MRI with acute compression fracture at T11 with almost 25% height loss and retropulsion of the posterior cortex into the central canal causing severe cord compression. On 11/26/19, she additionally endorsed urinary retention and Garcia was placed. Neurosurgery was consulted and planned to take her to the OR on 11/27/19 for T10 lateral corpectomy with posterior T8-T12 percutaneous instrumentation, but patient opted not to proceed with surgery. Radiation " oncology was consulted for palliative radiotherapy to the thoracic spine for pain control. Palliative radiotherapy started over the weekend.    Hospital Course:  No notes on file    Interval History: Patient was scheduled for discharge last night but discharge was held because family does not have the ability to bring her to outpatient radiation therapy. Plan is now to complete 5 cycles of palliative radiation in the hospital and discharge home on 12/5.    Past Medical History:   Diagnosis Date    Alcohol abuse     Anemia     Cancer     liver    Cirrhosis        Past Surgical History:   Procedure Laterality Date    ESOPHAGOGASTRODUODENOSCOPY Left 1/9/2019    Procedure: EGD (ESOPHAGOGASTRODUODENOSCOPY);  Surgeon: Madyson Farrar MD;  Location: [facility] ENDO;  Service: Endoscopy;  Laterality: Left;    PERITONEOCENTESIS N/A 1/9/2019    Procedure: PARACENTESIS, ABDOMINAL;  Surgeon: Everett Fitzpatrick MD;  Location: [facility] CATH LAB;  Service: Radiology;  Laterality: N/A;       Review of patient's allergies indicates:  No Known Allergies    Medications:  Continuous Infusions:  Scheduled Meds:   apixaban  5 mg Oral BID    dexAMETHasone  4 mg Oral Q6H WAKE    famotidine  20 mg Oral Daily    ferrous sulfate  325 mg Oral Daily    [START ON 12/4/2019] furosemide  20 mg Oral Daily    insulin aspart U-100  3 Units Subcutaneous TIDWM    lactulose  30 g Oral TID    magnesium oxide  400 mg Oral Daily    melatonin  6 mg Oral Nightly    propranolol  10 mg Oral BID    [START ON 12/4/2019] spironolactone  50 mg Oral Daily     PRN Meds:Dextrose 10% Bolus, Dextrose 10% Bolus, glucagon (human recombinant), glucose, glucose, HYDROmorphone, insulin aspart U-100, naloxone, ondansetron, oxyCODONE, oxyCODONE, sodium chloride 0.9%    Family History     None        Tobacco Use    Smoking status: Current Every Day Smoker     Packs/day: 0.50     Years: 30.00     Pack years: 15.00     Types: Cigarettes    Smokeless tobacco: Never Used     Tobacco comment: 2 cigarettes a day   Substance and Sexual Activity    Alcohol use: No     Frequency: Never     Comment: Previously drank 1 pint a day    Drug use: Not Currently     Types: Cocaine     Comment: last use was 3 years ago    Sexual activity: Not Currently       Review of Systems   Constitutional: Positive for activity change and fatigue. Negative for appetite change.   HENT: Negative for congestion, sore throat and trouble swallowing.    Respiratory: Negative for cough, shortness of breath and wheezing.    Cardiovascular: Negative for chest pain and leg swelling.   Gastrointestinal: Positive for abdominal distention. Negative for constipation, nausea and vomiting.   Musculoskeletal: Positive for back pain and gait problem. Negative for joint swelling and neck pain.   Skin: Negative for rash and wound.   Neurological: Positive for weakness (lower extremities). Negative for dizziness and light-headedness.   Psychiatric/Behavioral: Positive for sleep disturbance. Negative for agitation, behavioral problems and dysphoric mood. The patient is not nervous/anxious.      Objective:     Vital Signs (Most Recent):  Temp: 98 °F (36.7 °C) (12/03/19 1330)  Pulse: 66 (12/03/19 1330)  Resp: 16 (12/03/19 1330)  BP: (!) 153/71 (12/03/19 1330)  SpO2: 99 % (12/03/19 1330) Vital Signs (24h Range):  Temp:  [97.7 °F (36.5 °C)-98 °F (36.7 °C)] 98 °F (36.7 °C)  Pulse:  [59-66] 66  Resp:  [12-16] 16  SpO2:  [94 %-99 %] 99 %  BP: (107-153)/(60-77) 153/71     Weight: 44 kg (97 lb)  Body mass index is 22.45 kg/m².    Review of Symptoms  Symptom Assessment (ESAS 0-10 scale)   ESAS 0 1 2 3 4 5 6 7 8 9 10   Pain         X     Dyspnea X             Anxiety X             Nausea X             Depression  X             Anorexia X             Fatigue  X            Insomnia  X            Restlessness  X             Agitation X             CAM / Delirium _X_ --  ___+   Constipation     _X_ --  ___+   Diarrhea           _X_ --   ___+  Bowel Management Plan (BMP): Yes    Comments: Lactulose    Pain Assessment: intermittent aching back pain, currently rated 8/10, Oxycodone increased to 15 mg Q4H PRN    OME in 24 hours: 80    Performance Status: 40    ECOG Performance Status Grade: 3 - Confined to bed or chair >50% of waking hours    Physical Exam   Constitutional: She is oriented to person, place, and time. She appears cachectic. No distress.   HENT:   Head: Normocephalic and atraumatic.   Eyes: Conjunctivae and EOM are normal.   Neck: Normal range of motion. Neck supple.   Cardiovascular: Normal rate and regular rhythm.   Pulmonary/Chest: Effort normal and breath sounds normal.   Abdominal: Bowel sounds are normal. She exhibits distension.   Musculoskeletal: She exhibits no edema or deformity.   Neurological: She is alert and oriented to person, place, and time.   BLE weakness   Skin: Skin is warm and dry.   Psychiatric: She has a normal mood and affect. Her behavior is normal. Judgment and thought content normal.       Significant Labs: All pertinent labs within the past 24 hours have been reviewed.  CBC:   Recent Labs   Lab 12/03/19  0432   WBC 8.65   HGB 10.2*   HCT 31.6*   MCV 69*        BMP:  Recent Labs   Lab 12/03/19  0432   *   *   K 3.5   CL 98   CO2 25   BUN 25*   CREATININE 1.1   CALCIUM 8.8   MG 1.8     LFT:  Lab Results   Component Value Date    AST 79 (H) 12/03/2019    ALKPHOS 128 12/03/2019    BILITOT 1.0 12/03/2019     Albumin:   Albumin   Date Value Ref Range Status   12/03/2019 2.7 (L) 3.5 - 5.2 g/dL Final     Protein:   Total Protein   Date Value Ref Range Status   12/03/2019 7.1 6.0 - 8.4 g/dL Final     Lactic acid:   Lab Results   Component Value Date    LACTATE 1.6 11/25/2019    LACTATE 2.1 01/28/2018       Significant Imaging: I have reviewed all pertinent imaging results/findings within the past 24 hours.    Advance Care Planning   Advanced Directives::  Living Will: No  LaPOST: No  Do Not  "Resuscitate Status: No  Medical Power of : No. Daughter Yuridia is surrogate decision maker.    Decision-Making Capacity: Patient answered questions       Living Arrangements: Lives with family    Psychosocial/Cultural:  Patient is unmarried and has 2 living children, daughter Yuridia and son [place] who is currently incarcerated until May 2020. She had another son who was killed in 2003. She lives at home with Josue's 7 year old daughter who she has cared for since she was 3 weeks ago. Yuridia and her 14 year old daughter have also been staying in the home. 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 Her goal is to get back home so she can "do things" around the house. She understands that she is not expected to walk again without surgery but says "anything's possible." She worries about her 7 year old granddaughter but says that Yuridia is taking good care of her.    Spiritual:     F- Mandy and Belief: Alevism    I - Importance: very important  .  C - Community: belongs to [facility]    A - Address in Care:  visits, sacramental care      > 50% of 35 min visit spent in chart review, face to face discussion of goals of care,  symptom assessment, coordination of care and emotional support.    Rosalba Mak NP  Palliative Medicine  [facility]            "

## 2019-12-03 NOTE — PROGRESS NOTES
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 Marks is a 63-year-old  female with Hepatocellular carcinoma, alcoholic cirrhosis( dx 2015), portal HTN, ascites, hx of variceal bleeding in January 2018 s/p banding,B/l pulmonary embolism,  hx of ETOH abuse (quit Jan 2018) who presents to the ED with back pain and b/l lower extremity weakness. Patient states that symptoms have been on going for the past week and half and have progressed to the point where she cannot lift her leg against gravity or walk. She decribes "electric shocklike" sensations down her legs bilaterally. She denies any falls or trauma. She complains of back pain and LUQ & LLQ abdominal pain that radiates to her back. She denies any bladder/bowel incontinence. She denies fever, chills, vomiting. She    In ED, MRI with Acute compression fracture at T11 with almost 25% height loss and retropulsion of the posterior cortex into the spinal canal consistent with severe canal stenosis and posterior cord displacement    Overview/Hospital Course:   In ED, MRI with acute compression fracture at T11 with almost 25% height loss and retropulsion of the posterior cortex into the spinal canal. This results in severe canal stenosis and posterior cord displacement. Patient is admitted to hospital medicine on 11/25 for pain management and cirrhosis. Patient in significant amount of pain and started on oxycodone and dilaudid PRN. She has a MELD score of 11 on admission and was continued on home lasix and propanolol and started on " lactulose. NSGY saw the patient and ordered CT T/L and CT chest and MRI C/L/Tfor staging. CT chest showed multiple pulmonary brandi which could be potionaly metastasis and pathological fracture in T 11 with retropulsion and with spinal cord compression.  NSGY decided not top do surgery and recommended treating the pt with radiation therapy. Pt seen by radiotherapy for palliative radiotherapy.     On 12/2, patient reports she is very ready to leave hospital and finish her palliative radiation outpatient. Radiation/oncology scheduled next radiation therapy for outpatient. Spoke with neurosurgery who recommended an oral dexamethasone taper upon discharge. Spoke with patient's daughter over the phone on 12/2, and she states she is eager for her mother to be discharged and that she will have no problem taking care of her mother as they live together and she will be able to take patient to and from appointments. Also reports her cousin would like to help as well. Discussed plan of care with patient late afternoon, and she was excited about her discharge today. In the evening of 12/2, received call from nurse that patient's daughter cannot take patient to next appointment. Met with patient and her daughter in person in evening and it seemed that the barrier to discharge was that patient required IV dilaudid for pain control at the end of the day and is concerned about her pain being controlled outside of the hospital. Reassured patient and daughter that we will work on a plan that all three of us feel is safe and in the best interest of the patient tomorrow. Discharge cancelled for tonight.        Interval History: Ms. Marks complains of some back pain today.    Review of Systems   Constitutional: Positive for activity change. Negative for appetite change, chills, fatigue and fever.   HENT: Negative for congestion, facial swelling, sore throat and trouble swallowing.    Respiratory: Negative for apnea, cough, chest  tightness, shortness of breath and wheezing.    Cardiovascular: Negative for chest pain, palpitations and leg swelling.   Gastrointestinal: Positive for abdominal distention. Negative for blood in stool, diarrhea, nausea and vomiting.   Genitourinary: Negative for difficulty urinating, dysuria, flank pain and hematuria.   Musculoskeletal: Positive for back pain. Negative for arthralgias, myalgias and neck pain.   Skin: Negative for color change and rash.   Neurological: Negative for dizziness, weakness, light-headedness, numbness and headaches.   Hematological: Negative for adenopathy.   Psychiatric/Behavioral: Negative for agitation and behavioral problems.     Objective:     Vital Signs (Most Recent):  Temp: 98.3 °F (36.8 °C) (12/02/19 1216)  Pulse: 62 (12/02/19 1216)  Resp: 16 (12/02/19 1216)  BP: (!) 125/57 (12/02/19 1216)  SpO2: 97 % (12/02/19 1216) Vital Signs (24h Range):  Temp:  [98.2 °F (36.8 °C)-98.6 °F (37 °C)] 98.3 °F (36.8 °C)  Pulse:  [62-77] 62  Resp:  [12-16] 16  SpO2:  [97 %-100 %] 97 %  BP: (108-128)/(57-64) 125/57     Weight: 44 kg (97 lb)  Body mass index is 22.45 kg/m².    Intake/Output Summary (Last 24 hours) at 12/2/2019 1717  Last data filed at 12/2/2019 1400  Gross per 24 hour   Intake --   Output 1500 ml   Net -1500 ml      Physical Exam   Constitutional: She is oriented to person, place, and time. She appears well-developed and well-nourished. She appears cachectic. No distress.   HENT:   Head: Normocephalic and atraumatic.   Eyes: Pupils are equal, round, and reactive to light. Conjunctivae and EOM are normal.   Neck: Normal range of motion. Neck supple.   Cardiovascular: Normal rate, regular rhythm and normal heart sounds.   No murmur heard.  Pulmonary/Chest: Effort normal and breath sounds normal. No respiratory distress. She has no wheezes. She has no rales.   Abdominal: Soft. Bowel sounds are normal. She exhibits distension. There is tenderness.   Dhara seth    Musculoskeletal: She  exhibits no edema or deformity.   Lymphadenopathy:     She has no cervical adenopathy.   Neurological: She is alert and oriented to person, place, and time. She displays normal reflexes. No cranial nerve deficit or sensory deficit. She exhibits normal muscle tone.   Skin: Skin is warm. Capillary refill takes less than 2 seconds. She is not diaphoretic.   Psychiatric: She has a normal mood and affect. Her behavior is normal.     Significant Labs: All pertinent labs within the past 24 hours have been reviewed.    Significant Imaging: I have reviewed and interpreted all pertinent imaging results/findings within the past 24 hours.      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 Found to have compression fracture of T11 with canal stenosis and posterior cord displacement. Patient with sensation intact but 0/5 muscle strength in b/l lower ext.    Plan  - Neurosurgery consult, recs appreciated  - Continue Dexamethasone 4mg IV q6h   - Oxycodone 10 q4 PRN moderate pain, oxy 15 q4 PRN severe pain, hydromorphone 0.5 mg IV q6 PRN breakthrough pain  - NSGY recommend treating with radiotherapy rather than surgery  - Radiation oncology is consulted, appreciate their recs - completed round 2 of 5        Encounter for pre-operative cardiovascular clearance    Surgical Risk Assessment   Active cardiac issues:  Active decompensated heart failure? No   Unstable angina?  No   Significant uncontrolled arrhythmias? No   Severe valvular heart disease-Aortic or Mitral Stenosis? No   Recent MI or coronary revascularization < 30 days? No     Cardiac Risk Factors  History of CAD/ischemic heart disease? No   History of cerebrovascular disease? No   History of compensated heart failure? No   Type 2 diabetes requiring insulin? No   Serum Creatinine  > 2? No   Total cardiac risk factors 0     Assessment/Plan:   Cardiovascular Risk Assessment:  Non-emergent surgery.  No active cardiac problems (such as unstable angina, decompensated heart failure, significant uncontrolled arrhythmias or severe valvular disease).  Surgery is _Interemediate risk  Functional Status: Functional mets  < 4 METS. Poor functional status, unable to perform ADLS at this time.    Revised Cardiac Risk Index   1 predictor = 6.0%  RCRI Calculator Class and Risk percentage:  1 predictor,  which is a 6.0% risk of adverse cardiac event in 30 DAYS.    Anticoagulation: On Apixaban for b/l  Pulmonary embolism.                          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  See note on spinal cord compression.      Ascites due to alcoholic cirrhosis  Last paracentesis was 2 weeks ago. Patient with distended abdomen states no significant increase since last paracentesis  Supposed to be on Lasix 40mg, but currenlty held by heme/onc due to recent PRACHI. Denies SOB.    - Continue Lasix 40mg daily    Chronic pulmonary embolism  - Patient on Eliquis 5mg BID      HCC (hepatocellular carcinoma)  Follows with heme/onc not a transplant candidate. Previously on Nivolumab immunotherapy but currently held due to recent PRACHI.    - F/u outpatient with heme/onc      Esophageal varices in alcoholic cirrhosis  Denies hematemesis    - Continue home propanolol 10mg BID      Alcoholic cirrhosis  Hepatocellular carcinoma, alcoholic cirrhosis( dx 2015), portal HTN, ascites, hx of variceal bleeding in January 2018 s/p banding,B/l pulmonary embolism,  hx of ETOH abuse (quit Jan 2018)    - she has abdominal destination   - MELD Na is 14 today  - lasix 40 PO  - f/u volume status and MELD score daily  - started on lactulose 30 ml/ BID      VTE Risk Mitigation (From admission, onward)         Ordered     apixaban tablet 5 mg  2 times daily      11/29/19 1420     Place sequential compression device  Until  discontinued      11/25/19 2243     Reason for No Pharmacological VTE Prophylaxis  Once     Question:  Reasons:  Answer:  Already adequately anticoagulated on oral Anticoagulants    11/25/19 2243     IP VTE HIGH RISK PATIENT  Once      11/25/19 2243                      Charla Jackson MD  Department of Hospital Medicine   [facility]

## 2019-12-03 NOTE — PLAN OF CARE
Plan of care discussed with patient. Patient able to make needs known. Pt AAOx4. Pain c/o pain PRN paon meds administered per MD order. Patient is free of fall/trauma/injury. Denies CP and/or SOB. All questions addressed. Will continue to monitor

## 2019-12-03 NOTE — ASSESSMENT & PLAN NOTE
Hepatocellular carcinoma, alcoholic cirrhosis( dx 2015), portal HTN, ascites, hx of variceal bleeding in January 2018 s/p banding,B/l pulmonary embolism,  hx of ETOH abuse (quit Jan 2018)    - she has abdominal destination   - MELD Na is 14 today  - lasix 40 PO  - f/u volume status and MELD score daily  - started on lactulose 30 ml/ BID

## 2019-12-03 NOTE — ASSESSMENT & PLAN NOTE
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 Rounded on patient today, no family members at bedside. Patient is awake and alert, reports aching back pain rated 8/10 despite increase in Oxycodone to 15 mg PO Q4H PRN pain. Also took PRN Hydrocodone 0.5 mg IV yesterday evening. Patient does not feel that she is sleeping well with Melatonin and is asking for a sleeping pill. She is agreeable to completing 5 cycles of inpatient palliative radiotherapy prior to discharge. She is adamant that she does not want neurosurgery at any point and understands that she is not expected to walk again without surgery, although she says "anything's possible." Patient is currently full code and states that she "wants them to try" if her heart stopped or she stopped breathing. However, she would not want to be left on machines long term if recovery was not possible. Discussed completion of advanced directives and left packet for patient to review and discuss with her daughter.    Today patient is disappointed that she could not go home yesterday but agrees that it makes sense for her to complete radiotherapy prior to discharge. She again states that she does not want to come back to the hospital anymore. Ongoing discussion with patient regarding home hospice which would allow her to receive all care in the home. Patient is interested in going home with hospice but will leave the decision up to her daughter.    Plan/Recommendations:  1. See goals of care discussion below.  2. Consider long acting Morphine 15 mg PO BID for better pain control.  3. Consider " "Remeron 7.5 mg PO QHS for insomnia.  4. Inpatient palliative care will continue to follow up with patient during hospitalization.  5. Recommend discharge home with hospice following completion of palliative radiotherapy.  6. If daughter is not ready to pursue hospice, consider referral to [facility] upon discharge for symptom management and to assist with transition to hospice in the future if desired by patient.    Goals of Care/Advance Care Plannin/3/19 Conference conducted by this APRN with patient to discuss pt's current clinical status, goals of care, code status, long term expected outcomes and prognostic awareness. After a discussion concerning the pt's values and wishes, patient verbalized fair understanding and insight as it relates to her prognostic awareness. She is currently full code and has stated that she "wants them to try" if her heart stopped or she stopped breathing. However, she would not want to be left on machines long term if recovery was not possible. Discussed completion of advanced directives and left packet for patient to review and discuss with her daughter. Patient's goal is now to complete inpatient palliative radiotherapy and then return home to her family. She again states that she does not want to come back to the hospital anymore. Ongoing discussion with patient regarding home hospice which would allow her to receive all care in the home. Patient is interested in going home with hospice but will leave the decision up to her daughter.    19 Conference conducted by this APRN with patient to discuss pt's current clinical status, goals of care, code status, long term expected outcomes and prognostic awareness. After a discussion concerning the pt's values and wishes, patient verbalized fair understanding and insight as it relates to her prognostic awareness. She is currently full code and has stated that she "wants them to try" if her heart stopped " "or she stopped breathing. However, she would not want to be left on machines long term if recovery was not possible. Patient is adamant that she does not want neurosurgery at any point and understands that she is not expected to walk again without surgery, although she says "anything's possible." Her goal is to return home to her family and continue palliative radiotherapy as outpatient. She does not want to return to the hospital and is receptive to the idea of discharging home with hospice as long as she would be allowed to continue palliative radiotherapy. She wants to leave the decision up to her daughter who will be at the hospital later today. Per [facility] admissions coordinator Oma Ontiveros, they do accept patients who are undergoing outpatient palliative radiotherapy and they can send out a representative to meet with family if they would like to pursue hospice.    11/29/19 Conference conducted by this APRN with patient to discuss pt's current clinical status, goals of care, code status, long term expected outcomes and prognostic awareness. After a discussion concerning the pt's values and wishes, patient verbalized fair understanding and insight as it relates to her prognostic awareness. She is currently full code and states that she "wants them to try" if her heart stopped or she stopped breathing. However, she would not want to be left on machines long term if recovery was not possible. Patient is adamant that she does not want surgery at any point and understands that she is not expected to walk again without surgery, although she says "anything's possible." Her goal is to return home to her family but she wants to start palliative radiation during this admission and continue as outpatient.  "

## 2019-12-03 NOTE — ASSESSMENT & PLAN NOTE
Follows with heme/onc not a transplant candidate. Previously on Nivolumab immunotherapy but currently held due to recent PRACHI.    - F/u outpatient with heme/onc

## 2019-12-03 NOTE — SUBJECTIVE & OBJECTIVE
Interval History: Patient was scheduled for discharge last night but discharge was held because family does not have the ability to bring her to outpatient radiation therapy. Plan is now to complete 5 cycles of palliative radiation in the hospital and discharge home on 12/5.    Past Medical History:   Diagnosis Date    Alcohol abuse     Anemia     Cancer     liver    Cirrhosis        Past Surgical History:   Procedure Laterality Date    ESOPHAGOGASTRODUODENOSCOPY Left 1/9/2019    Procedure: EGD (ESOPHAGOGASTRODUODENOSCOPY);  Surgeon: Madyson Farrar MD;  Location: [facility] ENDO;  Service: Endoscopy;  Laterality: Left;    PERITONEOCENTESIS N/A 1/9/2019    Procedure: PARACENTESIS, ABDOMINAL;  Surgeon: Everett Fitzpatrick MD;  Location: [facility] CATH LAB;  Service: Radiology;  Laterality: N/A;       Review of patient's allergies indicates:  No Known Allergies    Medications:  Continuous Infusions:  Scheduled Meds:   apixaban  5 mg Oral BID    dexAMETHasone  4 mg Oral Q6H WAKE    famotidine  20 mg Oral Daily    ferrous sulfate  325 mg Oral Daily    [START ON 12/4/2019] furosemide  20 mg Oral Daily    insulin aspart U-100  3 Units Subcutaneous TIDWM    lactulose  30 g Oral TID    magnesium oxide  400 mg Oral Daily    melatonin  6 mg Oral Nightly    propranolol  10 mg Oral BID    [START ON 12/4/2019] spironolactone  50 mg Oral Daily     PRN Meds:Dextrose 10% Bolus, Dextrose 10% Bolus, glucagon (human recombinant), glucose, glucose, HYDROmorphone, insulin aspart U-100, naloxone, ondansetron, oxyCODONE, oxyCODONE, sodium chloride 0.9%    Family History     None        Tobacco Use    Smoking status: Current Every Day Smoker     Packs/day: 0.50     Years: 30.00     Pack years: 15.00     Types: Cigarettes    Smokeless tobacco: Never Used    Tobacco comment: 2 cigarettes a day   Substance and Sexual Activity    Alcohol use: No     Frequency: Never     Comment: Previously drank 1 pint a day    Drug use: Not Currently      Types: Cocaine     Comment: last use was 3 years ago    Sexual activity: Not Currently       Review of Systems   Constitutional: Positive for activity change and fatigue. Negative for appetite change.   HENT: Negative for congestion, sore throat and trouble swallowing.    Respiratory: Negative for cough, shortness of breath and wheezing.    Cardiovascular: Negative for chest pain and leg swelling.   Gastrointestinal: Positive for abdominal distention. Negative for constipation, nausea and vomiting.   Musculoskeletal: Positive for back pain and gait problem. Negative for joint swelling and neck pain.   Skin: Negative for rash and wound.   Neurological: Positive for weakness (lower extremities). Negative for dizziness and light-headedness.   Psychiatric/Behavioral: Positive for sleep disturbance. Negative for agitation, behavioral problems and dysphoric mood. The patient is not nervous/anxious.      Objective:     Vital Signs (Most Recent):  Temp: 98 °F (36.7 °C) (12/03/19 1330)  Pulse: 66 (12/03/19 1330)  Resp: 16 (12/03/19 1330)  BP: (!) 153/71 (12/03/19 1330)  SpO2: 99 % (12/03/19 1330) Vital Signs (24h Range):  Temp:  [97.7 °F (36.5 °C)-98 °F (36.7 °C)] 98 °F (36.7 °C)  Pulse:  [59-66] 66  Resp:  [12-16] 16  SpO2:  [94 %-99 %] 99 %  BP: (107-153)/(60-77) 153/71     Weight: 44 kg (97 lb)  Body mass index is 22.45 kg/m².    Review of Symptoms  Symptom Assessment (ESAS 0-10 scale)   ESAS 0 1 2 3 4 5 6 7 8 9 10   Pain         X     Dyspnea X             Anxiety X             Nausea X             Depression  X             Anorexia X             Fatigue  X            Insomnia  X            Restlessness  X             Agitation X             CAM / Delirium _X_ --  ___+   Constipation     _X_ --  ___+   Diarrhea           _X_ --  ___+  Bowel Management Plan (BMP): Yes    Comments: Lactulose    Pain Assessment: intermittent aching back pain, currently rated 8/10, Oxycodone increased to 15 mg Q4H PRN    OME in 24  hours: 80    Performance Status: 40    ECOG Performance Status Grade: 3 - Confined to bed or chair >50% of waking hours    Physical Exam   Constitutional: She is oriented to person, place, and time. She appears cachectic. No distress.   HENT:   Head: Normocephalic and atraumatic.   Eyes: Conjunctivae and EOM are normal.   Neck: Normal range of motion. Neck supple.   Cardiovascular: Normal rate and regular rhythm.   Pulmonary/Chest: Effort normal and breath sounds normal.   Abdominal: Bowel sounds are normal. She exhibits distension.   Musculoskeletal: She exhibits no edema or deformity.   Neurological: She is alert and oriented to person, place, and time.   BLE weakness   Skin: Skin is warm and dry.   Psychiatric: She has a normal mood and affect. Her behavior is normal. Judgment and thought content normal.       Significant Labs: All pertinent labs within the past 24 hours have been reviewed.  CBC:   Recent Labs   Lab 12/03/19  0432   WBC 8.65   HGB 10.2*   HCT 31.6*   MCV 69*        BMP:  Recent Labs   Lab 12/03/19  0432   *   *   K 3.5   CL 98   CO2 25   BUN 25*   CREATININE 1.1   CALCIUM 8.8   MG 1.8     LFT:  Lab Results   Component Value Date    AST 79 (H) 12/03/2019    ALKPHOS 128 12/03/2019    BILITOT 1.0 12/03/2019     Albumin:   Albumin   Date Value Ref Range Status   12/03/2019 2.7 (L) 3.5 - 5.2 g/dL Final     Protein:   Total Protein   Date Value Ref Range Status   12/03/2019 7.1 6.0 - 8.4 g/dL Final     Lactic acid:   Lab Results   Component Value Date    LACTATE 1.6 11/25/2019    LACTATE 2.1 01/28/2018       Significant Imaging: I have reviewed all pertinent imaging results/findings within the past 24 hours.    Advance Care Planning   Advanced Directives::  Living Will: No  LaPOST: No  Do Not Resuscitate Status: No  Medical Power of : No. Daughter Yuridia is surrogate decision maker.    Decision-Making Capacity: Patient answered questions       Living Arrangements: Lives  "with family    Psychosocial/Cultural:  Patient is unmarried and has 2 living children, daughter Yuridia and son [place]ico who is currently incarcerated until May 2020. She had another son who was killed in 2003. She lives at home with Josue's 7 year old daughter who she has cared for since she was 3 weeks ago. Yuridia and her 14 year old daughter have also been staying in the home. 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 Her goal is to get back home so she can "do things" around the house. She understands that she is not expected to walk again without surgery but says "anything's possible." She worries about her 7 year old granddaughter but says that Yuridia is taking good care of her.    Spiritual:     F- Mandy and Belief: Mu-ism    I - Importance: very important  .  C - Community: belongs to [facility]    A - Address in Care:  visits, [facility]  "

## 2019-12-03 NOTE — ASSESSMENT & PLAN NOTE
65 yo female with Hepatocellular carcinoma, alcoholic cirrhosis, ascites, b/l pulmonary PE presents with b/l lower extremity weakness and back pain. Found to have compression fracture of T11 with canal stenosis and posterior cord displacement. Patient with sensation intact but 0/5 muscle strength in b/l lower ext.    Plan  - Neurosurgery consult, recs appreciated  - Continue Dexamethasone 4mg IV q6h   - Oxycodone 10 q4 PRN moderate pain, oxy 15 q4 PRN severe pain, hydromorphone 0.5 mg IV q6 PRN breakthrough pain  - NSGY recommend treating with radiotherapy rather than surgery  - Radiation oncology is consulted, appreciate their recs - completed round 2 of 5

## 2019-12-03 NOTE — ASSESSMENT & PLAN NOTE
Last paracentesis was 2 weeks ago. Patient with distended abdomen states no significant increase since last paracentesis  Supposed to be on Lasix 40mg, but currenlty held by heme/onc due to recent PRACHI. Denies SOB.    - Continue Lasix 40mg daily

## 2019-12-03 NOTE — PLAN OF CARE
Went over plan of care - all questions answered. Complaints of pain -see mar. Pain level is never reported below an 8. Turned q 2. No falls or injuries. Friend at bedside. Will continue to monitor

## 2019-12-03 NOTE — PLAN OF CARE
Plan of care discussed with patient. Pt able to make needs known. Pending discharge, discharge canceled on 12/02/19. Patient turned q2h. BG monitored. Pain medication administered per MD order. Patient is free of fall/trauma/injury. Denies CP, SOB, or discomfort. All questions addressed. Will continue to monitor

## 2019-12-04 LAB
ALBUMIN SERPL BCP-MCNC: 2.5 G/DL (ref 3.5–5.2)
ALP SERPL-CCNC: 116 U/L (ref 55–135)
ALT SERPL W/O P-5'-P-CCNC: 98 U/L (ref 10–44)
ANION GAP SERPL CALC-SCNC: 9 MMOL/L (ref 8–16)
AST SERPL-CCNC: 87 U/L (ref 10–40)
BASOPHILS # BLD AUTO: 0.01 K/UL (ref 0–0.2)
BASOPHILS NFR BLD: 0.1 % (ref 0–1.9)
BILIRUB SERPL-MCNC: 1.2 MG/DL (ref 0.1–1)
BUN SERPL-MCNC: 28 MG/DL (ref 8–23)
CALCIUM SERPL-MCNC: 8.8 MG/DL (ref 8.7–10.5)
CHLORIDE SERPL-SCNC: 102 MMOL/L (ref 95–110)
CO2 SERPL-SCNC: 24 MMOL/L (ref 23–29)
CREAT SERPL-MCNC: 0.9 MG/DL (ref 0.5–1.4)
DIFFERENTIAL METHOD: ABNORMAL
EOSINOPHIL # BLD AUTO: 0 K/UL (ref 0–0.5)
EOSINOPHIL NFR BLD: 0 % (ref 0–8)
ERYTHROCYTE [DISTWIDTH] IN BLOOD BY AUTOMATED COUNT: 20.9 % (ref 11.5–14.5)
EST. GFR  (AFRICAN AMERICAN): >60 ML/MIN/1.73 M^2
EST. GFR  (NON AFRICAN AMERICAN): >60 ML/MIN/1.73 M^2
GLUCOSE SERPL-MCNC: 188 MG/DL (ref 70–110)
HCT VFR BLD AUTO: 30.8 % (ref 37–48.5)
HGB BLD-MCNC: 9.6 G/DL (ref 12–16)
IMM GRANULOCYTES # BLD AUTO: 0.11 K/UL (ref 0–0.04)
IMM GRANULOCYTES NFR BLD AUTO: 1.1 % (ref 0–0.5)
INR PPP: 1.3 (ref 0.8–1.2)
LYMPHOCYTES # BLD AUTO: 0.4 K/UL (ref 1–4.8)
LYMPHOCYTES NFR BLD: 4.6 % (ref 18–48)
MAGNESIUM SERPL-MCNC: 2.1 MG/DL (ref 1.6–2.6)
MCH RBC QN AUTO: 21.4 PG (ref 27–31)
MCHC RBC AUTO-ENTMCNC: 31.2 G/DL (ref 32–36)
MCV RBC AUTO: 69 FL (ref 82–98)
MONOCYTES # BLD AUTO: 0.9 K/UL (ref 0.3–1)
MONOCYTES NFR BLD: 9.3 % (ref 4–15)
NEUTROPHILS # BLD AUTO: 8.2 K/UL (ref 1.8–7.7)
NEUTROPHILS NFR BLD: 84.9 % (ref 38–73)
NRBC BLD-RTO: 0 /100 WBC
PHOSPHATE SERPL-MCNC: 2.9 MG/DL (ref 2.7–4.5)
PLATELET # BLD AUTO: 267 K/UL (ref 150–350)
PMV BLD AUTO: ABNORMAL FL (ref 9.2–12.9)
POCT GLUCOSE: 185 MG/DL (ref 70–110)
POCT GLUCOSE: 190 MG/DL (ref 70–110)
POCT GLUCOSE: 239 MG/DL (ref 70–110)
POCT GLUCOSE: 287 MG/DL (ref 70–110)
POTASSIUM SERPL-SCNC: 4.3 MMOL/L (ref 3.5–5.1)
PROT SERPL-MCNC: 6.4 G/DL (ref 6–8.4)
PROTHROMBIN TIME: 13.3 SEC (ref 9–12.5)
RBC # BLD AUTO: 4.49 M/UL (ref 4–5.4)
SODIUM SERPL-SCNC: 135 MMOL/L (ref 136–145)
WBC # BLD AUTO: 9.66 K/UL (ref 3.9–12.7)

## 2019-12-04 PROCEDURE — 11000001 HC ACUTE MED/SURG PRIVATE ROOM

## 2019-12-04 PROCEDURE — 25000003 PHARM REV CODE 250: Performed by: STUDENT IN AN ORGANIZED HEALTH CARE EDUCATION/TRAINING PROGRAM

## 2019-12-04 PROCEDURE — 99232 PR SUBSEQUENT HOSPITAL CARE,LEVL II: ICD-10-PCS | Mod: ,,, | Performed by: INTERNAL MEDICINE

## 2019-12-04 PROCEDURE — 80053 COMPREHEN METABOLIC PANEL: CPT

## 2019-12-04 PROCEDURE — 83735 ASSAY OF MAGNESIUM: CPT

## 2019-12-04 PROCEDURE — 25000003 PHARM REV CODE 250: Performed by: INTERNAL MEDICINE

## 2019-12-04 PROCEDURE — 99232 SBSQ HOSP IP/OBS MODERATE 35: CPT | Mod: ,,, | Performed by: INTERNAL MEDICINE

## 2019-12-04 PROCEDURE — 63600175 PHARM REV CODE 636 W HCPCS: Performed by: INTERNAL MEDICINE

## 2019-12-04 PROCEDURE — 99233 SBSQ HOSP IP/OBS HIGH 50: CPT | Mod: ,,, | Performed by: NURSE PRACTITIONER

## 2019-12-04 PROCEDURE — 97802 MEDICAL NUTRITION INDIV IN: CPT

## 2019-12-04 PROCEDURE — 84100 ASSAY OF PHOSPHORUS: CPT

## 2019-12-04 PROCEDURE — 99356 PR PROLONGED SERV,INPATIENT,1ST HR: ICD-10-PCS | Mod: ,,, | Performed by: NURSE PRACTITIONER

## 2019-12-04 PROCEDURE — 77402 RADIATION TX DELIVERY LVL 1: CPT | Performed by: RADIOLOGY

## 2019-12-04 PROCEDURE — 77387 GUIDANCE FOR RADJ TX DLVR: CPT | Mod: ,,, | Performed by: RADIOLOGY

## 2019-12-04 PROCEDURE — 94761 N-INVAS EAR/PLS OXIMETRY MLT: CPT

## 2019-12-04 PROCEDURE — 36415 COLL VENOUS BLD VENIPUNCTURE: CPT

## 2019-12-04 PROCEDURE — 77387 GUIDANCE FOR RADJ TX DLVR: CPT | Performed by: RADIOLOGY

## 2019-12-04 PROCEDURE — 99356 PR PROLONGED SERV,INPATIENT,1ST HR: CPT | Mod: ,,, | Performed by: NURSE PRACTITIONER

## 2019-12-04 PROCEDURE — 99233 PR SUBSEQUENT HOSPITAL CARE,LEVL III: ICD-10-PCS | Mod: ,,, | Performed by: NURSE PRACTITIONER

## 2019-12-04 PROCEDURE — 85610 PROTHROMBIN TIME: CPT

## 2019-12-04 PROCEDURE — 77387 PR GUIDANCE FOR RADIATION TREATMENT DELIVERY: ICD-10-PCS | Mod: ,,, | Performed by: RADIOLOGY

## 2019-12-04 PROCEDURE — 85025 COMPLETE CBC W/AUTO DIFF WBC: CPT

## 2019-12-04 RX ORDER — FUROSEMIDE 20 MG/1
20 TABLET ORAL DAILY
Qty: 30 TABLET | Refills: 11 | Status: SHIPPED | OUTPATIENT
Start: 2019-12-05 | End: 2019-12-27

## 2019-12-04 RX ORDER — SPIRONOLACTONE 50 MG/1
50 TABLET, FILM COATED ORAL DAILY
Qty: 30 TABLET | Refills: 11 | Status: SHIPPED | OUTPATIENT
Start: 2019-12-05 | End: 2020-12-04

## 2019-12-04 RX ADMIN — FUROSEMIDE 20 MG: 20 TABLET ORAL at 09:12

## 2019-12-04 RX ADMIN — INSULIN ASPART 2 UNITS: 100 INJECTION, SOLUTION INTRAVENOUS; SUBCUTANEOUS at 05:12

## 2019-12-04 RX ADMIN — DEXAMETHASONE 4 MG: 4 TABLET ORAL at 03:12

## 2019-12-04 RX ADMIN — OXYCODONE HYDROCHLORIDE 15 MG: 10 TABLET ORAL at 12:12

## 2019-12-04 RX ADMIN — PROPRANOLOL HYDROCHLORIDE 10 MG: 10 TABLET ORAL at 09:12

## 2019-12-04 RX ADMIN — SPIRONOLACTONE 50 MG: 25 TABLET ORAL at 09:12

## 2019-12-04 RX ADMIN — APIXABAN 5 MG: 5 TABLET, FILM COATED ORAL at 08:12

## 2019-12-04 RX ADMIN — APIXABAN 5 MG: 5 TABLET, FILM COATED ORAL at 09:12

## 2019-12-04 RX ADMIN — DEXAMETHASONE 4 MG: 4 TABLET ORAL at 08:12

## 2019-12-04 RX ADMIN — OXYCODONE HYDROCHLORIDE 15 MG: 10 TABLET ORAL at 08:12

## 2019-12-04 RX ADMIN — FERROUS SULFATE TAB EC 325 MG (65 MG FE EQUIVALENT) 325 MG: 325 (65 FE) TABLET DELAYED RESPONSE at 09:12

## 2019-12-04 RX ADMIN — FAMOTIDINE 20 MG: 20 TABLET ORAL at 09:12

## 2019-12-04 RX ADMIN — INSULIN ASPART 3 UNITS: 100 INJECTION, SOLUTION INTRAVENOUS; SUBCUTANEOUS at 12:12

## 2019-12-04 RX ADMIN — DEXAMETHASONE 4 MG: 4 TABLET ORAL at 09:12

## 2019-12-04 RX ADMIN — INSULIN ASPART 3 UNITS: 100 INJECTION, SOLUTION INTRAVENOUS; SUBCUTANEOUS at 09:12

## 2019-12-04 RX ADMIN — Medication 6 MG: at 08:12

## 2019-12-04 RX ADMIN — PROPRANOLOL HYDROCHLORIDE 10 MG: 10 TABLET ORAL at 08:12

## 2019-12-04 RX ADMIN — MAGNESIUM OXIDE TAB 400 MG (241.3 MG ELEMENTAL MG) 400 MG: 400 (241.3 MG) TAB at 09:12

## 2019-12-04 RX ADMIN — INSULIN ASPART 3 UNITS: 100 INJECTION, SOLUTION INTRAVENOUS; SUBCUTANEOUS at 05:12

## 2019-12-04 RX ADMIN — OXYCODONE HYDROCHLORIDE 15 MG: 10 TABLET ORAL at 11:12

## 2019-12-04 NOTE — SUBJECTIVE & OBJECTIVE
Interval History: See hospital course above. MELD Na 14, unchanged from yesterday. BG elevated above range with 14 units of insulin total administered yesterday.     Review of Systems   Constitutional: Positive for activity change. Negative for appetite change, chills, fatigue and fever.   HENT: Negative for congestion, facial swelling, sore throat and trouble swallowing.    Respiratory: Negative for apnea, cough, chest tightness, shortness of breath and wheezing.    Cardiovascular: Negative for chest pain, palpitations and leg swelling.   Gastrointestinal: Positive for abdominal distention. Negative for blood in stool, diarrhea, nausea and vomiting.   Genitourinary: Negative for difficulty urinating, dysuria, flank pain and hematuria.   Musculoskeletal: Positive for back pain. Negative for arthralgias, myalgias and neck pain.   Skin: Negative for color change and rash.   Neurological: Negative for dizziness, weakness, light-headedness, numbness and headaches.   Hematological: Negative for adenopathy.   Psychiatric/Behavioral: Negative for agitation and behavioral problems.     Objective:     Vital Signs (Most Recent):  Temp: 98.5 °F (36.9 °C) (12/03/19 2009)  Pulse: 63 (12/03/19 2009)  Resp: 12 (12/03/19 2009)  BP: 139/71 (12/03/19 2009)  SpO2: 96 % (12/03/19 2009) Vital Signs (24h Range):  Temp:  [97.7 °F (36.5 °C)-98.5 °F (36.9 °C)] 98.5 °F (36.9 °C)  Pulse:  [59-66] 63  Resp:  [12-18] 12  SpO2:  [94 %-99 %] 96 %  BP: (121-153)/(58-77) 139/71     Weight: 44 kg (97 lb)  Body mass index is 22.45 kg/m².    Intake/Output Summary (Last 24 hours) at 12/3/2019 2342  Last data filed at 12/3/2019 0500  Gross per 24 hour   Intake 280 ml   Output 1700 ml   Net -1420 ml      Physical Exam   Constitutional: She is oriented to person, place, and time. She appears well-developed and well-nourished. She appears cachectic. No distress.   HENT:   Head: Normocephalic and atraumatic.   Eyes: Pupils are equal, round, and reactive to  light. Conjunctivae and EOM are normal.   Neck: Normal range of motion. Neck supple.   Cardiovascular: Normal rate, regular rhythm and normal heart sounds.   No murmur heard.  Pulmonary/Chest: Effort normal and breath sounds normal. No respiratory distress. She has no wheezes. She has no rales.   Abdominal: Soft. Bowel sounds are normal. She exhibits distension. There is no tenderness.   Caput medusae    Musculoskeletal: She exhibits no edema or deformity.   Lymphadenopathy:     She has no cervical adenopathy.   Neurological: She is alert and oriented to person, place, and time. She displays normal reflexes. No cranial nerve deficit or sensory deficit. She exhibits normal muscle tone.   Skin: Skin is warm. Capillary refill takes less than 2 seconds. She is not diaphoretic.   Psychiatric: She has a normal mood and affect. Her behavior is normal.       Significant Labs: All pertinent labs within the past 24 hours have been reviewed.    Significant Imaging: I have reviewed and interpreted all pertinent imaging results/findings within the past 24 hours.

## 2019-12-04 NOTE — PLAN OF CARE
12/04/19 1555   Post-Acute Status   Post-Acute Authorization Home Health/Hospice   Home Health/Hospice Status Pending Payor Review     Yolette Baltazar LMSW   - [facility]  Ext. [phone]

## 2019-12-04 NOTE — PLAN OF CARE
TAYO spoke with patient's daughter Ena [phone] to discuss Home Hospice d/c plans. SW discussed several Home Hospice agencies. Per Ena, she would like a referral sent to [facility].     TAYO faxed referral to [facility] via  for review. TAYO will continue to follow.      12/04/19 1530   Post-Acute Status   Post-Acute Authorization Home Health/Hospice   Post-Acute Placement Status Referrals Sent     Yolette Baltazar LMSW   - [facility]  Ext. [phone]

## 2019-12-04 NOTE — PLAN OF CARE
Problem: Malnutrition  Goal: Improved Nutritional Intake  Outcome: Ongoing, Progressing   Recommendations    Recommendation:   1. Continue low Na/cardiac diet    -Encourage good PO intake at meals >50%.   2. Continue boost glucose TID to increase intake   3. Suggest MVI   RD to monitor and follow up     Goals: 1. pt to consume  >85% of EEN/EPN by RD follow up 2. wt gain of 1-2 lbs per week  Nutrition Goal Status: new  Communication of RD Recs: (POC)

## 2019-12-04 NOTE — PLAN OF CARE
[facility]  Department of Hospital Medicine  [address] [place] [address]  [phone] after hours  [phone] fax    HOSPICE  ORDERS    12/04/2019    Admit to Hospice:  Home Service Outpatient Service     Diagnoses:   Active Hospital Problems    Diagnosis  POA    *Spinal cord compression due to malignant neoplasm metastatic to spine [G95.29, C79.51]  Yes    Steroid-induced hyperglycemia [R73.9, T38.0X5A]  Unknown    Palliative care encounter [Z51.5]  Not Applicable    Counseling regarding advanced care planning and goals of care [Z71.89]  Not Applicable    Back pain [M54.9]  Yes    Insomnia [G47.00]  Yes    Acute urinary obstruction [N13.9]  Yes    Hypomagnesemia [E83.42]  Yes    Pulmonary nodules/lesions, multiple [R91.8]  Yes    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]  Yes    Adrenal nodule [E27.9]  Yes    Pleural effusion, right [J90]  Yes    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 [M84.58XA]  Yes    Encounter for pre-operative cardiovascular clearance [Z01.810]  Not Applicable    Weakness of both lower extremities [R29.898]  Yes    Chronic pulmonary embolism [I27.82]  Yes    Ascites due to alcoholic cirrhosis [K70.31]  Yes    HCC (hepatocellular carcinoma) [C22.0]  Yes    Portal hypertensive gastropathy [K76.6, K31.89]  Yes     Chronic    Esophageal varices in alcoholic cirrhosis [K70.30, I85.10]  Yes     Chronic    Alcoholic cirrhosis [K70.30]  Yes     Chronic    Severe malnutrition [E43]  Yes    Iron deficiency anemia due to chronic blood loss [D50.0]  Yes      Resolved Hospital Problems   No resolved problems to display.       Hospice Qualifying Diagnoses:        Patient has a life expectancy < 6 months due to:  1) Primary Hospice Diagnosis:  Hepatocellular carcinoma with metastasis   Comorbid Conditions Contributing to Decline: alcoholic liver cirrhosis     Vital Signs: Routine per Hospice Protocol.    Code Status:  Full    Allergies: Review of patient's allergies indicates:  No Known Allergies    Diet: mechanical soft diet     Activities: As tolerated    Nursing: Per Hospice Routine.      Garcia Care: Empty Garcia bag Q shift and PRN.  Change Garcia every month.    Routine Skin for Bedridden Patients: Apply moisture barrier cream to all skin folds and   wet areas in perineal area daily and after baths and all bowel movements.       Neena Marks   Home Medication Instructions CROW:[id number]    Printed on:12/04/19 6203   Medication Information                      apixaban (ELIQUIS) 5 mg Tab  Take 1 tablet (5 mg total) by mouth 2 (two) times daily.             dexAMETHasone (DECADRON) 2 MG tablet  Take 2 tablets (4mg) every 6 hours x 3 days, then 2 tabs (4mg) every 8 hours x 3 days, then 2 tabs (4mg) every 12 hours x 3 days, then 1 tab (2mg) every 8 hours x 3 days, then 1 tab (2mg) every 12 hours x 3 days, then 1 tab (2mg) daily x 6 days             famotidine (PEPCID) 20 MG tablet  Take 1 tablet (20 mg total) by mouth once daily.             ferrous sulfate (FEOSOL) 325 mg (65 mg iron) Tab tablet  Take 1 tablet (325 mg total) by mouth 2 (two) times daily.             furosemide (LASIX) 20 MG tablet  Take 1 tablet (20 mg total) by mouth once daily.             lactulose (CHRONULAC) 10 gram/15 mL solution  Take 30 mL by mouth three times daily. Try to accomplish 3-4 bowel movements per day.             oxyCODONE (ROXICODONE) 5 MG immediate release tablet  Take 2 tablets (10 mg total) by mouth every 6 (six) hours as needed for Pain.             promethazine (PHENERGAN) 25 MG tablet  Take 1 tablet (25 mg total) by mouth every 6 (six) hours as needed for Nausea.             propranolol (INDERAL) 10 MG tablet  Take 1 tablet (10 mg total) by mouth 2 (two) times daily.             spironolactone (ALDACTONE) 100 MG tablet  Take 1 tablet (100 mg total) by mouth once daily.             spironolactone (ALDACTONE) 50 MG tablet  Take 1  tablet (50 mg total) by mouth once daily.                 DIABETES CARE: Nurse to perform and educate diabetic management with blood glucose monitoring:      Fingerstick blood sugar a.m. and p.m.     Fingerstick blood sugar AC and HS      Report CBG < 60 or > 350 to physician.         Insulin Sliding Scale         Glucose  Novolog Insulin Subcutaneous        0 - 60   Orange juice or glucose tablet      No insulin   201-250  2 units   251-300  4 units   301-350  6 units   351-400  8 units   >400   10 units then call physician      Future Orders:  Hospice Medical Director may dictate new orders for comfortable care measures & sign death certificate.        _________________________________  Saúl Aguilar MD  12/04/2019

## 2019-12-04 NOTE — ASSESSMENT & PLAN NOTE
Last paracentesis was 2 weeks ago. Patient with distended abdomen states no significant increase since last paracentesis  Supposed to be on Lasix 40mg, but currenlty held by heme/onc due to recent PRACHI. Denies SOB.    - Change lasix from 40 to 20 mg PO daily  - change spironolactone from 100 to 50 daily

## 2019-12-04 NOTE — SUBJECTIVE & OBJECTIVE
Interval History: Patient experiencing 9/10 back pain this morning. She reports a burning heartburn sensation when she swallows. She required oxycodone 15 q4 overnight Prn and dilaudid x1. She seemed to be speaking a little slowly today and was alert and oriented but initially thought Frank was the president. She deferred one dose of lactulose overnight but will continue to get TID.     Review of Systems   Constitutional: Positive for activity change. Negative for appetite change, chills, fatigue and fever.   HENT: Negative for congestion, facial swelling, sore throat and trouble swallowing.    Respiratory: Negative for apnea, cough, chest tightness, shortness of breath and wheezing.    Cardiovascular: Positive for chest pain (burning discomfort when swallowing). Negative for palpitations and leg swelling.   Gastrointestinal: Positive for abdominal distention. Negative for blood in stool, diarrhea, nausea and vomiting.   Genitourinary: Negative for difficulty urinating, dysuria, flank pain and hematuria.   Musculoskeletal: Positive for back pain. Negative for arthralgias, myalgias and neck pain.   Skin: Negative for color change and rash.   Neurological: Negative for dizziness, weakness, light-headedness, numbness and headaches.   Hematological: Negative for adenopathy.   Psychiatric/Behavioral: Negative for agitation and behavioral problems.     Objective:     Vital Signs (Most Recent):  Temp: 98.3 °F (36.8 °C) (12/04/19 1645)  Pulse: (!) 57 (12/04/19 1645)  Resp: 16 (12/04/19 1645)  BP: (!) 128/57 (12/04/19 1645)  SpO2: 98 % (12/04/19 1645) Vital Signs (24h Range):  Temp:  [97.9 °F (36.6 °C)-98.5 °F (36.9 °C)] 98.3 °F (36.8 °C)  Pulse:  [57-63] 57  Resp:  [12-20] 16  SpO2:  [95 %-100 %] 98 %  BP: (125-184)/(57-85) 128/57     Weight: 44 kg (97 lb)  Body mass index is 22.45 kg/m².    Intake/Output Summary (Last 24 hours) at 12/4/2019 6003  Last data filed at 12/4/2019 0035  Gross per 24 hour   Intake 480 ml   Output  --   Net 480 ml      Physical Exam   Constitutional: She is oriented to person, place, and time. She appears well-developed and well-nourished. She appears cachectic. No distress.   HENT:   Head: Normocephalic and atraumatic.   Eyes: Pupils are equal, round, and reactive to light. Conjunctivae and EOM are normal.   Neck: Normal range of motion. Neck supple.   Cardiovascular: Normal rate, regular rhythm and normal heart sounds.   No murmur heard.  Pulmonary/Chest: Effort normal and breath sounds normal. No respiratory distress. She has no wheezes. She has no rales.   Abdominal: Soft. Bowel sounds are normal. She exhibits distension. There is no tenderness.   Caput medusae    Musculoskeletal: She exhibits no edema or deformity.   Lymphadenopathy:     She has no cervical adenopathy.   Neurological: She is alert and oriented to person, place, and time. She displays normal reflexes. No cranial nerve deficit or sensory deficit. She exhibits normal muscle tone.   Thinks Marie is president today. Slight asterixis on exam.    Skin: Skin is warm. Capillary refill takes less than 2 seconds. She is not diaphoretic.   Psychiatric: She has a normal mood and affect. Her behavior is normal.       Significant Labs: All pertinent labs within the past 24 hours have been reviewed.    Significant Imaging: I have reviewed and interpreted all pertinent imaging results/findings within the past 24 hours.

## 2019-12-04 NOTE — ASSESSMENT & PLAN NOTE
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 Rounded on patient today, no family members at bedside. Patient is awake and alert, appears anxious and upset. Complains of severe aching back pain rated 9/10 and says that no one will give her anything for pain. Noted that Oxycodone 15 mg was last administered at 0030, called patient's nurse who brought her a dose of Oxycodone. Patient says that they are making her wait too long between doses, explained to patient that pain meds are ordered PRN and she needs to request medication. Encouraged her to ask for pain meds regularly rather than waiting for pain to become severe. Patient is still not sleeping well on Melatonin and says her bed at home is much more comfortable than the one here. She is scheduled to complete 5 cycles of inpatient palliative radiotherapy prior to discharge but she is frustrated that her pain is not being well controlled in the hospital. She states that once she goes home, she is going to stay home and she does not want to come back to the hospital again. Patient is currently full code and has previously stated that she "wants them to try" if her heart stopped or she stopped breathing; however, she would not want to be left on machines long term if recovery was not possible. Today patient expresses a desire to die at home in her bed when her times comes. Discussed how DNR status would be more in line with her goals. Patient is interested in transitioning to home hospice but is unsure what her daughter would think. Will reach out to daughter with patient's permission to " "arrange a family meeting.    Plan/Recommendations:  · See goals of care discussion below.  · Explained to patient that she needs to request PRN pain meds from her nurse; consider long acting Morphine 15 mg PO BID if pain does not improve with increased frequency of PRN med administration  · Consider Remeron 7.5 mg PO QHS for insomnia.  · Inpatient palliative care will continue to follow up with patient during hospitalization.  · Recommend discharge home with hospice following completion of palliative radiotherapy.  · If daughter is not ready to pursue hospice, consider referral to [facility] upon discharge for symptom management and to assist with transition to hospice in the future if desired.    Goals of Care/Advance Care Plannin19 Conference conducted by this APRN with patient to discuss pt's current clinical status, goals of care, code status, long term expected outcomes and prognostic awareness. After a discussion concerning the pt's values and wishes, patient verbalized fair understanding and insight as it relates to her prognostic awareness. She states that once she goes home, she is going to stay home and she does not want to come back to the hospital again. Patient is currently full code and has previously stated that she "wants them to try" if her heart stopped or she stopped breathing; however, she would not want to be left on machines long term if recovery was not possible. Today patient expresses a desire to die at home in her bed when her times comes. Discussed how DNR status would be more in line with her goals. Patient is interested in transitioning to home hospice but is unsure what her daughter would think. Will reach out to daughter with patient's permission to arrange a family meeting.    12/3/19 Conference conducted by this APRN with patient to discuss pt's current clinical status, goals of care, code status, long term expected outcomes and prognostic " "awareness. After a discussion concerning the pt's values and wishes, patient verbalized fair understanding and insight as it relates to her prognostic awareness. She states that once she goes home, she is going to stay home and she does not want to come back to the hospital again. Patient is currently full code and has previously stated that she "wants them to try" if her heart stopped or she stopped breathing; however, she would not want to be left on machines long term if recovery was not possible. Today patient expresses a desire to die at home in her bed when her times comes. Discussed how DNR status would be more in line with her goals. Patient is interested in transitioning to home hospice but is unsure what her daughter would think. Will reach out to daughter with patient's permission to arrange a family meeting.    12/2/19 Conference conducted by this APRN with patient to discuss pt's current clinical status, goals of care, code status, long term expected outcomes and prognostic awareness. After a discussion concerning the pt's values and wishes, patient verbalized fair understanding and insight as it relates to her prognostic awareness. She is currently full code and has stated that she "wants them to try" if her heart stopped or she stopped breathing. However, she would not want to be left on machines long term if recovery was not possible. Patient is adamant that she does not want neurosurgery at any point and understands that she is not expected to walk again without surgery, although she says "anything's possible." Her goal is to return home to her family and continue palliative radiotherapy as outpatient. She does not want to return to the hospital and is receptive to the idea of discharging home with hospice as long as she would be allowed to continue palliative radiotherapy. She wants to leave the decision up to her daughter who will be at the hospital later today. Per [facility] " "coordinator Oma Ontiveros, they do accept patients who are undergoing outpatient palliative radiotherapy and they can send out a representative to meet with family if they would like to pursue hospice.    11/29/19 Conference conducted by this APRN with patient to discuss pt's current clinical status, goals of care, code status, long term expected outcomes and prognostic awareness. After a discussion concerning the pt's values and wishes, patient verbalized fair understanding and insight as it relates to her prognostic awareness. She is currently full code and states that she "wants them to try" if her heart stopped or she stopped breathing. However, she would not want to be left on machines long term if recovery was not possible. Patient is adamant that she does not want surgery at any point and understands that she is not expected to walk again without surgery, although she says "anything's possible." Her goal is to return home to her family but she wants to start palliative radiation during this admission and continue as outpatient.  "

## 2019-12-04 NOTE — PROGRESS NOTES
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 Marks is a 63-year-old  female with Hepatocellular carcinoma, alcoholic cirrhosis( dx 2015), portal HTN, ascites, hx of variceal bleeding in January 2018 s/p banding,B/l pulmonary embolism,  hx of ETOH abuse (quit Jan 2018) who presents to the ED with back pain and b/l lower extremity weakness. Patient states that symptoms have been on going for the past week and half and have progressed to the point where she cannot lift her leg against gravity or walk. She decribes "electric shocklike" sensations down her legs bilaterally. She denies any falls or trauma. She complains of back pain and LUQ & LLQ abdominal pain that radiates to her back. She denies any bladder/bowel incontinence. She denies fever, chills, vomiting. She    In ED, MRI with Acute compression fracture at T11 with almost 25% height loss and retropulsion of the posterior cortex into the spinal canal consistent with severe canal stenosis and posterior cord displacement    Overview/Hospital Course:   In ED, MRI with acute compression fracture at T11 with almost 25% height loss and retropulsion of the posterior cortex into the spinal canal. This results in severe canal stenosis and posterior cord displacement. Patient is admitted to hospital medicine on 11/25 for pain management and cirrhosis. Patient in significant amount of pain and started on oxycodone and dilaudid PRN. She has a MELD score of 11 on admission and was continued on home lasix and propanolol and started on " lactulose. NSGY saw the patient and ordered CT T/L and CT chest and MRI C/L/Tfor staging. CT chest showed multiple pulmonary brandi which could be potionaly metastasis and pathological fracture in T 11 with retropulsion and with spinal cord compression.  NSGY decided not top do surgery and recommended treating the pt with radiation therapy. Pt seen by radiotherapy for palliative radiotherapy.     On 12/2, patient reports she is very ready to leave hospital and finish her palliative radiation outpatient. Radiation/oncology scheduled next radiation therapy for outpatient. Spoke with neurosurgery who recommended an oral dexamethasone taper upon discharge. Spoke with patient's daughter over the phone on 12/2, and she states she is eager for her mother to be discharged and that she will have no problem taking care of her mother as they live together and she will be able to take patient to and from appointments. Also reports her cousin would like to help as well. Discussed plan of care with patient late afternoon, and she was excited about her discharge today. In the evening of 12/2, received call from nurse that patient's daughter cannot take patient to next appointment. Met with patient and her daughter in person in evening and it seemed that the barrier to discharge was that patient required IV dilaudid for pain control at the end of the day and is concerned about her pain being controlled outside of the hospital. Reassured patient and daughter that we will work on a plan that all three of us feel is safe and in the best interest of the patient tomorrow. Discharge cancelled for tonight.        Patient received round 3 of 5 of radiation on 12/3. Patient reports back pain well controlled with oxycodone 15 q6 and 0.5 hydromorphone x1 overnight. Attempted to contact patient's daughter about a family meeting with hospital medicine and palliative tomorrow regarding placement/dispo after final round of radiation on 12/5 but  did not get an answer. Per patient, daughter will be at hospital around 3 pm tomorrow.     Interval History: See hospital course above. MELD Na 14, unchanged from yesterday. BG elevated above range with 14 units of insulin total administered yesterday.     Review of Systems   Constitutional: Positive for activity change. Negative for appetite change, chills, fatigue and fever.   HENT: Negative for congestion, facial swelling, sore throat and trouble swallowing.    Respiratory: Negative for apnea, cough, chest tightness, shortness of breath and wheezing.    Cardiovascular: Negative for chest pain, palpitations and leg swelling.   Gastrointestinal: Positive for abdominal distention. Negative for blood in stool, diarrhea, nausea and vomiting.   Genitourinary: Negative for difficulty urinating, dysuria, flank pain and hematuria.   Musculoskeletal: Positive for back pain. Negative for arthralgias, myalgias and neck pain.   Skin: Negative for color change and rash.   Neurological: Negative for dizziness, weakness, light-headedness, numbness and headaches.   Hematological: Negative for adenopathy.   Psychiatric/Behavioral: Negative for agitation and behavioral problems.     Objective:     Vital Signs (Most Recent):  Temp: 98.5 °F (36.9 °C) (12/03/19 2009)  Pulse: 63 (12/03/19 2009)  Resp: 12 (12/03/19 2009)  BP: 139/71 (12/03/19 2009)  SpO2: 96 % (12/03/19 2009) Vital Signs (24h Range):  Temp:  [97.7 °F (36.5 °C)-98.5 °F (36.9 °C)] 98.5 °F (36.9 °C)  Pulse:  [59-66] 63  Resp:  [12-18] 12  SpO2:  [94 %-99 %] 96 %  BP: (121-153)/(58-77) 139/71     Weight: 44 kg (97 lb)  Body mass index is 22.45 kg/m².    Intake/Output Summary (Last 24 hours) at 12/3/2019 2342  Last data filed at 12/3/2019 0500  Gross per 24 hour   Intake 280 ml   Output 1700 ml   Net -1420 ml      Physical Exam   Constitutional: She is oriented to person, place, and time. She appears well-developed and well-nourished. She appears cachectic. No distress.    HENT:   Head: Normocephalic and atraumatic.   Eyes: Pupils are equal, round, and reactive to light. Conjunctivae and EOM are normal.   Neck: Normal range of motion. Neck supple.   Cardiovascular: Normal rate, regular rhythm and normal heart sounds.   No murmur heard.  Pulmonary/Chest: Effort normal and breath sounds normal. No respiratory distress. She has no wheezes. She has no rales.   Abdominal: Soft. Bowel sounds are normal. She exhibits distension. There is no tenderness.   Caput medusae    Musculoskeletal: She exhibits no edema or deformity.   Lymphadenopathy:     She has no cervical adenopathy.   Neurological: She is alert and oriented to person, place, and time. She displays normal reflexes. No cranial nerve deficit or sensory deficit. She exhibits normal muscle tone.   Skin: Skin is warm. Capillary refill takes less than 2 seconds. She is not diaphoretic.   Psychiatric: She has a normal mood and affect. Her behavior is normal.       Significant Labs: All pertinent labs within the past 24 hours have been reviewed.    Significant Imaging: I have reviewed and interpreted all pertinent imaging results/findings within the past 24 hours.      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 Found to have compression fracture of T11 with canal stenosis and posterior cord displacement. Patient with sensation intact but 0/5 muscle strength in b/l lower ext.    Plan  - Neurosurgery consult, recs appreciated  - Transition to Dexamethasone 4mg PO q6h   - Oxycodone 10 q4 PRN moderate pain, oxy 15 q4 PRN severe pain, hydromorphone 0.5 mg IV q6 PRN breakthrough pain  - NSGY recommend treating with radiotherapy rather than surgery  - Radiation oncology is consulted, appreciate their recs - completed round 3 of  5        Steroid-induced hyperglycemia  - basal insulin 9 units, bolus 3 units TID  - POCT QACHS  - adjust as appropriate with steroid taper in mind      Encounter for pre-operative cardiovascular clearance    Surgical Risk Assessment   Active cardiac issues:  Active decompensated heart failure? No   Unstable angina?  No   Significant uncontrolled arrhythmias? No   Severe valvular heart disease-Aortic or Mitral Stenosis? No   Recent MI or coronary revascularization < 30 days? No     Cardiac Risk Factors  History of CAD/ischemic heart disease? No   History of cerebrovascular disease? No   History of compensated heart failure? No   Type 2 diabetes requiring insulin? No   Serum Creatinine > 2? No   Total cardiac risk factors 0     Assessment/Plan:   Cardiovascular Risk Assessment:  Non-emergent surgery.  No active cardiac problems (such as unstable angina, decompensated heart failure, significant uncontrolled arrhythmias or severe valvular disease).  Surgery is _Interemediate risk  Functional Status: Functional mets  < 4 METS. Poor functional status, unable to perform ADLS at this time.    Revised Cardiac Risk Index   1 predictor = 6.0%  RCRI Calculator Class and Risk percentage:  1 predictor,  which is a 6.0% risk of adverse cardiac event in 30 DAYS.    Anticoagulation: On Apixaban for b/l  Pulmonary embolism.                          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  See note on spinal cord compression.      Ascites due to alcoholic cirrhosis  Last paracentesis was 2 weeks ago. Patient with distended abdomen states no significant increase since last paracentesis  Supposed to be on Lasix 40mg, but currenlty held by heme/onc due to recent PRACHI. Denies SOB.    - Change lasix from 40 to 20 mg PO daily  - change spironolactone from 100 to 50 daily    Chronic pulmonary embolism  - Patient on Eliquis 5mg BID      HCC (hepatocellular carcinoma)  Follows with heme/onc not a transplant candidate.  Previously on Nivolumab immunotherapy but currently held due to recent PRACHI.    - F/u outpatient with heme/onc      Esophageal varices in alcoholic cirrhosis  Denies hematemesis    - Continue home propanolol 10mg BID      Alcoholic cirrhosis  Hepatocellular carcinoma, alcoholic cirrhosis( dx 2015), portal HTN, ascites, hx of variceal bleeding in January 2018 s/p banding,B/l pulmonary embolism,  hx of ETOH abuse (quit Jan 2018)    - she has abdominal destination   - MELD Na is 14 today  - lasix 40 PO  - f/u volume status and MELD score daily  - started on lactulose 30 ml/ BID      VTE Risk Mitigation (From admission, onward)         Ordered     apixaban tablet 5 mg  2 times daily      11/29/19 1420     Place sequential compression device  Until discontinued      11/25/19 2243     Reason for No Pharmacological VTE Prophylaxis  Once     Question:  Reasons:  Answer:  Already adequately anticoagulated on oral Anticoagulants    11/25/19 2243     IP VTE HIGH RISK PATIENT  Once      11/25/19 2243                      Charla Jackson MD  Department of Hospital Medicine   [facility]

## 2019-12-04 NOTE — SUBJECTIVE & OBJECTIVE
Interval History: Patient was scheduled for discharge last night but discharge was held because family does not have the ability to bring her to outpatient radiation therapy. Plan is now to complete 5 cycles of palliative radiation in the hospital and discharge home on 12/5.    Past Medical History:   Diagnosis Date    Alcohol abuse     Anemia     Cancer     liver    Cirrhosis        Past Surgical History:   Procedure Laterality Date    ESOPHAGOGASTRODUODENOSCOPY Left 1/9/2019    Procedure: EGD (ESOPHAGOGASTRODUODENOSCOPY);  Surgeon: Madyson Farrar MD;  Location: [facility] ENDO;  Service: Endoscopy;  Laterality: Left;    PERITONEOCENTESIS N/A 1/9/2019    Procedure: PARACENTESIS, ABDOMINAL;  Surgeon: Everett Fitzpatrick MD;  Location: [facility] CATH LAB;  Service: Radiology;  Laterality: N/A;       Review of patient's allergies indicates:  No Known Allergies    Medications:  Continuous Infusions:  Scheduled Meds:   apixaban  5 mg Oral BID    dexAMETHasone  4 mg Oral Q6H WAKE    famotidine  20 mg Oral Daily    ferrous sulfate  325 mg Oral Daily    furosemide  20 mg Oral Daily    insulin aspart U-100  3 Units Subcutaneous TIDWM    lactulose  30 g Oral TID    magnesium oxide  400 mg Oral Daily    melatonin  6 mg Oral Nightly    propranolol  10 mg Oral BID    spironolactone  50 mg Oral Daily     PRN Meds:Dextrose 10% Bolus, Dextrose 10% Bolus, glucagon (human recombinant), glucose, glucose, HYDROmorphone, insulin aspart U-100, naloxone, ondansetron, oxyCODONE, oxyCODONE, sodium chloride 0.9%    Family History     None        Tobacco Use    Smoking status: Current Every Day Smoker     Packs/day: 0.50     Years: 30.00     Pack years: 15.00     Types: Cigarettes    Smokeless tobacco: Never Used    Tobacco comment: 2 cigarettes a day   Substance and Sexual Activity    Alcohol use: No     Frequency: Never     Comment: Previously drank 1 pint a day    Drug use: Not Currently     Types: Cocaine     Comment: last use  was 3 years ago    Sexual activity: Not Currently       Review of Systems   Constitutional: Positive for activity change and fatigue. Negative for appetite change.   HENT: Negative for congestion, sore throat and trouble swallowing.    Respiratory: Negative for cough, shortness of breath and wheezing.    Cardiovascular: Negative for chest pain and leg swelling.   Gastrointestinal: Positive for abdominal distention. Negative for constipation, nausea and vomiting.   Musculoskeletal: Positive for back pain and gait problem. Negative for joint swelling and neck pain.   Skin: Negative for rash and wound.   Neurological: Positive for weakness (lower extremities). Negative for dizziness and light-headedness.   Psychiatric/Behavioral: Positive for sleep disturbance. Negative for agitation, behavioral problems and dysphoric mood. The patient is nervous/anxious.      Objective:     Vital Signs (Most Recent):  Temp: 98.5 °F (36.9 °C) (12/04/19 0945)  Pulse: 61 (12/04/19 1042)  Resp: 16 (12/04/19 0945)  BP: 132/69 (12/04/19 0945)  SpO2: 96 % (12/04/19 1042) Vital Signs (24h Range):  Temp:  [97.9 °F (36.6 °C)-98.5 °F (36.9 °C)] 98.5 °F (36.9 °C)  Pulse:  [60-66] 61  Resp:  [12-20] 16  SpO2:  [95 %-100 %] 96 %  BP: (121-184)/(58-85) 132/69     Weight: 44 kg (97 lb)  Body mass index is 22.45 kg/m².    Review of Symptoms  Symptom Assessment (ESAS 0-10 scale)   ESAS 0 1 2 3 4 5 6 7 8 9 10   Pain          X    Dyspnea X             Anxiety X             Nausea X             Depression  X             Anorexia X             Fatigue  X            Insomnia  X            Restlessness  X             Agitation X             CAM / Delirium _X_ --  ___+   Constipation     _X_ --  ___+   Diarrhea           _X_ --  ___+  Bowel Management Plan (BMP): Yes    Comments: Lactulose    Pain Assessment: intermittent aching back pain, currently rated 9/10, last dose of Oxycodone was >10 hours ago    OME in 24 hours: 55    Performance Status: 40    ECOG  Performance Status Grade: 3 - Confined to bed or chair >50% of waking hours    Physical Exam   Constitutional: She is oriented to person, place, and time. She appears cachectic. No distress.   HENT:   Head: Normocephalic and atraumatic.   Eyes: Conjunctivae and EOM are normal.   Neck: Normal range of motion. Neck supple.   Cardiovascular: Normal rate and regular rhythm.   Pulmonary/Chest: Effort normal and breath sounds normal.   Abdominal: Bowel sounds are normal. She exhibits distension.   Musculoskeletal: She exhibits no edema or deformity.   Neurological: She is alert and oriented to person, place, and time.   BLE weakness   Skin: Skin is warm and dry.   Psychiatric: Her behavior is normal. Judgment and thought content normal. Her mood appears anxious.       Significant Labs: All pertinent labs within the past 24 hours have been reviewed.  CBC:   Recent Labs   Lab 12/04/19  0803   WBC 9.66   HGB 9.6*   HCT 30.8*   MCV 69*        BMP:  Recent Labs   Lab 12/04/19  0803   *   *   K 4.3      CO2 24   BUN 28*   CREATININE 0.9   CALCIUM 8.8   MG 2.1     LFT:  Lab Results   Component Value Date    AST 87 (H) 12/04/2019    ALKPHOS 116 12/04/2019    BILITOT 1.2 (H) 12/04/2019     Albumin:   Albumin   Date Value Ref Range Status   12/04/2019 2.5 (L) 3.5 - 5.2 g/dL Final     Protein:   Total Protein   Date Value Ref Range Status   12/04/2019 6.4 6.0 - 8.4 g/dL Final     Lactic acid:   Lab Results   Component Value Date    LACTATE 1.6 11/25/2019    LACTATE 2.1 01/28/2018       Significant Imaging: I have reviewed all pertinent imaging results/findings within the past 24 hours.    Advance Care Planning   Advanced Directives::  Living Will: No  LaPOST: No  Do Not Resuscitate Status: No  Medical Power of : No. Daughter Yuridia is surrogate decision maker.    Decision-Making Capacity: Patient answered questions       Living Arrangements: Lives with family    Psychosocial/Cultural:  Patient is  "unmarried and has 2 living children, daughter Yuridia and son Chico who is currently incarcerated until May 2020. She had another son who was killed in 2003. She lives at home with Josue's 7 year old daughter who she has cared for since she was 3 weeks ago. Yuridia and her 14 year old daughter have also been staying in the home. 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 Her goal is to get back home so she can "do things" around the house. She understands that she is not expected to walk again without surgery but says "anything's possible." She worries about her 7 year old granddaughter but says that Yuridia is taking good care of her.    Spiritual:     F- Mandy and Belief: Oriental orthodox    I - Importance: very important  .  C - Community: belongs to [facility]    A - Address in Care:  visits, sacramental care  "

## 2019-12-04 NOTE — PROGRESS NOTES
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 Scott and Dr. Jackson.      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 Rounded on patient today, no family members at bedside. Patient is awake and alert, appears anxious and upset. Complains of severe aching back pain rated 9/10 and says that no one will give her anything for pain. Noted that Oxycodone 15 mg was last administered at 0030, called patient's nurse who brought her a dose of Oxycodone. Patient says that they are making her wait too long between doses, explained to patient that pain meds are ordered PRN and she needs to request medication. Encouraged her to ask for pain meds regularly rather than waiting for pain to become severe. Patient is still not sleeping well on Melatonin and says her bed at home is much more comfortable than the one here. She is scheduled to complete 5 cycles of inpatient palliative radiotherapy prior to discharge but she is frustrated that her pain is not being well controlled in the hospital. She states that once she goes home, she is going to stay home  "and she does not want to come back to the hospital again. Patient is currently full code and has previously stated that she "wants them to try" if her heart stopped or she stopped breathing; however, she would not want to be left on machines long term if recovery was not possible. Today patient expresses a desire to die at home in her bed when her times comes. Discussed how DNR status would be more in line with her goals. Patient is interested in transitioning to home hospice but is unsure what her daughter would think. Will reach out to daughter with patient's permission to arrange a family meeting.    Plan/Recommendations:  · See goals of care discussion below.  · Explained to patient that she needs to request PRN pain meds from her nurse; consider long acting Morphine 15 mg PO BID if pain does not improve with increased frequency of PRN med administration  · Consider Remeron 7.5 mg PO QHS for insomnia.  · Inpatient palliative care will continue to follow up with patient during hospitalization.  · Recommend discharge home with hospice following completion of palliative radiotherapy.  · If daughter is not ready to pursue hospice, consider referral to [facility] upon discharge for symptom management and to assist with transition to hospice in the future if desired.    Goals of Care/Advance Care Plannin19 Conference conducted by this APRN with patient to discuss pt's current clinical status, goals of care, code status, long term expected outcomes and prognostic awareness. After a discussion concerning the pt's values and wishes, patient verbalized fair understanding and insight as it relates to her prognostic awareness. She states that once she goes home, she is going to stay home and she does not want to come back to the hospital again. Patient is currently full code and has previously stated that she "wants them to try" if her heart stopped or she stopped breathing; however, she " "would not want to be left on machines long term if recovery was not possible. Today patient expresses a desire to die at home in her bed when her times comes. Discussed how DNR status would be more in line with her goals. Patient is interested in transitioning to home hospice but is unsure what her daughter would think. Will reach out to daughter with patient's permission to arrange a family meeting.    12/3/19 Conference conducted by this APRN with patient to discuss pt's current clinical status, goals of care, code status, long term expected outcomes and prognostic awareness. After a discussion concerning the pt's values and wishes, patient verbalized fair understanding and insight as it relates to her prognostic awareness. She is currently full code and has stated that she "wants them to try" if her heart stopped or she stopped breathing. However, she would not want to be left on machines long term if recovery was not possible. Discussed completion of advanced directives and left packet for patient to review and discuss with her daughter. Patient's goal is now to complete inpatient palliative radiotherapy and then return home to her family. She again states that she does not want to come back to the hospital anymore. Ongoing discussion with patient regarding home hospice which would allow her to receive all care in the home. Patient is interested in going home with hospice but will leave the decision up to her daughter.    12/2/19 Conference conducted by this APRN with patient to discuss pt's current clinical status, goals of care, code status, long term expected outcomes and prognostic awareness. After a discussion concerning the pt's values and wishes, patient verbalized fair understanding and insight as it relates to her prognostic awareness. She is currently full code and has stated that she "wants them to try" if her heart stopped or she stopped breathing. However, she would not want to be left on machines " "long term if recovery was not possible. Patient is adamant that she does not want neurosurgery at any point and understands that she is not expected to walk again without surgery, although she says "anything's possible." Her goal is to return home to her family and continue palliative radiotherapy as outpatient. She does not want to return to the hospital and is receptive to the idea of discharging home with hospice as long as she would be allowed to continue palliative radiotherapy. She wants to leave the decision up to her daughter who will be at the hospital later today. Per [facility] admissions coordinator Oma Ontiveros, they do accept patients who are undergoing outpatient palliative radiotherapy and they can send out a representative to meet with family if they would like to pursue hospice.    11/29/19 Conference conducted by this APRN with patient to discuss pt's current clinical status, goals of care, code status, long term expected outcomes and prognostic awareness. After a discussion concerning the pt's values and wishes, patient verbalized fair understanding and insight as it relates to her prognostic awareness. She is currently full code and states that she "wants them to try" if her heart stopped or she stopped breathing. However, she would not want to be left on machines long term if recovery was not possible. Patient is adamant that she does not want surgery at any point and understands that she is not expected to walk again without surgery, although she says "anything's possible." Her goal is to return home to her family but she wants to start palliative radiation during this admission and continue as outpatient.        I will follow-up with patient. Please contact us if you have any additional questions.    Subjective:     Chief Complaint:   Chief Complaint   Patient presents with    Back Pain     Patient reports back pain and numbness to BLE since 11/22. Patient reports being tx on " "11/22 and receiving shot in hip and then developed numbness to BLE.        HPI:   Palliative care consulted for this 62 y/o female with hepatocellular carcinoma, alcoholic cirrhosis (dx 2015), portal HTN, ascites, hx of variceal bleeding in January 2018 s/p banding, bilateral pulmonary embolism on chronic apixaban, hx of ETOH abuse (quit Jan 2018) who presented to the ED on 11/25/19 with back pain and bilateral lower extremity weakness. Patient stated that symptoms had been on going for the past week and a half and progressed to the point where she could not walk or lift her legs against gravity. She decribed "electric shock-like" sensations down her legs bilaterally. She complained of back pain and LUQ & LLQ abdominal pain with radiation to her back. She denied any falls, trauma, or bladder/bowel incontinence. In ED, MRI with acute compression fracture at T11 with almost 25% height loss and retropulsion of the posterior cortex into the central canal causing severe cord compression. On 11/26/19, she additionally endorsed urinary retention and Garcia was placed. Neurosurgery was consulted and planned to take her to the OR on 11/27/19 for T10 lateral corpectomy with posterior T8-T12 percutaneous instrumentation, but patient opted not to proceed with surgery. Radiation oncology was consulted for palliative radiotherapy to the thoracic spine for pain control. Palliative radiotherapy started over the weekend.    Hospital Course:  No notes on file    Interval History: Patient was scheduled for discharge last night but discharge was held because family does not have the ability to bring her to outpatient radiation therapy. Plan is now to complete 5 cycles of palliative radiation in the hospital and discharge home on 12/5.    Past Medical History:   Diagnosis Date    Alcohol abuse     Anemia     Cancer     liver    Cirrhosis        Past Surgical History:   Procedure Laterality Date    ESOPHAGOGASTRODUODENOSCOPY Left " 1/9/2019    Procedure: EGD (ESOPHAGOGASTRODUODENOSCOPY);  Surgeon: Madyson Farrar MD;  Location: [facility] ENDO;  Service: Endoscopy;  Laterality: Left;    PERITONEOCENTESIS N/A 1/9/2019    Procedure: PARACENTESIS, ABDOMINAL;  Surgeon: Everett Fitzpatrick MD;  Location: [facility] CATH LAB;  Service: Radiology;  Laterality: N/A;       Review of patient's allergies indicates:  No Known Allergies    Medications:  Continuous Infusions:  Scheduled Meds:   apixaban  5 mg Oral BID    dexAMETHasone  4 mg Oral Q6H WAKE    famotidine  20 mg Oral Daily    ferrous sulfate  325 mg Oral Daily    furosemide  20 mg Oral Daily    insulin aspart U-100  3 Units Subcutaneous TIDWM    lactulose  30 g Oral TID    magnesium oxide  400 mg Oral Daily    melatonin  6 mg Oral Nightly    propranolol  10 mg Oral BID    spironolactone  50 mg Oral Daily     PRN Meds:Dextrose 10% Bolus, Dextrose 10% Bolus, glucagon (human recombinant), glucose, glucose, HYDROmorphone, insulin aspart U-100, naloxone, ondansetron, oxyCODONE, oxyCODONE, sodium chloride 0.9%    Family History     None        Tobacco Use    Smoking status: Current Every Day Smoker     Packs/day: 0.50     Years: 30.00     Pack years: 15.00     Types: Cigarettes    Smokeless tobacco: Never Used    Tobacco comment: 2 cigarettes a day   Substance and Sexual Activity    Alcohol use: No     Frequency: Never     Comment: Previously drank 1 pint a day    Drug use: Not Currently     Types: Cocaine     Comment: last use was 3 years ago    Sexual activity: Not Currently       Review of Systems   Constitutional: Positive for activity change and fatigue. Negative for appetite change.   HENT: Negative for congestion, sore throat and trouble swallowing.    Respiratory: Negative for cough, shortness of breath and wheezing.    Cardiovascular: Negative for chest pain and leg swelling.   Gastrointestinal: Positive for abdominal distention. Negative for constipation, nausea and vomiting.    Musculoskeletal: Positive for back pain and gait problem. Negative for joint swelling and neck pain.   Skin: Negative for rash and wound.   Neurological: Positive for weakness (lower extremities). Negative for dizziness and light-headedness.   Psychiatric/Behavioral: Positive for sleep disturbance. Negative for agitation, behavioral problems and dysphoric mood. The patient is nervous/anxious.      Objective:     Vital Signs (Most Recent):  Temp: 98.5 °F (36.9 °C) (12/04/19 0945)  Pulse: 61 (12/04/19 1042)  Resp: 16 (12/04/19 0945)  BP: 132/69 (12/04/19 0945)  SpO2: 96 % (12/04/19 1042) Vital Signs (24h Range):  Temp:  [97.9 °F (36.6 °C)-98.5 °F (36.9 °C)] 98.5 °F (36.9 °C)  Pulse:  [60-66] 61  Resp:  [12-20] 16  SpO2:  [95 %-100 %] 96 %  BP: (121-184)/(58-85) 132/69     Weight: 44 kg (97 lb)  Body mass index is 22.45 kg/m².    Review of Symptoms  Symptom Assessment (ESAS 0-10 scale)   ESAS 0 1 2 3 4 5 6 7 8 9 10   Pain          X    Dyspnea X             Anxiety X             Nausea X             Depression  X             Anorexia X             Fatigue  X            Insomnia  X            Restlessness  X             Agitation X             CAM / Delirium _X_ --  ___+   Constipation     _X_ --  ___+   Diarrhea           _X_ --  ___+  Bowel Management Plan (BMP): Yes    Comments: Lactulose    Pain Assessment: intermittent aching back pain, currently rated 9/10, last dose of Oxycodone was >10 hours ago    OME in 24 hours: 55    Performance Status: 40    ECOG Performance Status Grade: 3 - Confined to bed or chair >50% of waking hours    Physical Exam   Constitutional: She is oriented to person, place, and time. She appears cachectic. No distress.   HENT:   Head: Normocephalic and atraumatic.   Eyes: Conjunctivae and EOM are normal.   Neck: Normal range of motion. Neck supple.   Cardiovascular: Normal rate and regular rhythm.   Pulmonary/Chest: Effort normal and breath sounds normal.   Abdominal: Bowel sounds are  "normal. She exhibits distension.   Musculoskeletal: She exhibits no edema or deformity.   Neurological: She is alert and oriented to person, place, and time.   BLE weakness   Skin: Skin is warm and dry.   Psychiatric: Her behavior is normal. Judgment and thought content normal. Her mood appears anxious.       Significant Labs: All pertinent labs within the past 24 hours have been reviewed.  CBC:   Recent Labs   Lab 12/04/19  0803   WBC 9.66   HGB 9.6*   HCT 30.8*   MCV 69*        BMP:  Recent Labs   Lab 12/04/19  0803   *   *   K 4.3      CO2 24   BUN 28*   CREATININE 0.9   CALCIUM 8.8   MG 2.1     LFT:  Lab Results   Component Value Date    AST 87 (H) 12/04/2019    ALKPHOS 116 12/04/2019    BILITOT 1.2 (H) 12/04/2019     Albumin:   Albumin   Date Value Ref Range Status   12/04/2019 2.5 (L) 3.5 - 5.2 g/dL Final     Protein:   Total Protein   Date Value Ref Range Status   12/04/2019 6.4 6.0 - 8.4 g/dL Final     Lactic acid:   Lab Results   Component Value Date    LACTATE 1.6 11/25/2019    LACTATE 2.1 01/28/2018       Significant Imaging: I have reviewed all pertinent imaging results/findings within the past 24 hours.    Advance Care Planning   Advanced Directives::  Living Will: No  LaPOST: No  Do Not Resuscitate Status: No  Medical Power of : No. Daughter Yuridia is surrogate decision maker.    Decision-Making Capacity: Patient answered questions       Living Arrangements: Lives with family    Psychosocial/Cultural:  Patient is unmarried and has 2 living children, daughter Yuridia and son Chico who is currently incarcerated until May 2020. She had another son who was killed in 2003. She lives at home with Josue's 7 year old daughter who she has cared for since she was 3 weeks ago. Yuridia and her 14 year old daughter have also been staying in the home. 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 Her goal is to get back home so she can "do things" around the " "house. She understands that she is not expected to walk again without surgery but says "anything's possible." She worries about her 7 year old granddaughter but says that Yuridia is taking good care of her.    Spiritual:     F- Mandy and Belief: Voodoo    I - Importance: very important  .  C - Community: belongs to [facility]    A - Address in Care:  visits, sacramental care      > 50% of 35 min visit spent in chart review, face to face discussion of goals of care,  symptom assessment, coordination of care and emotional support.    Rosalba Mak NP  Palliative Medicine  [facility]            "

## 2019-12-04 NOTE — PROGRESS NOTES
"[facility]  Adult Nutrition  Progress Note    SUMMARY       Recommendations    Recommendation:   1. Continue low Na/cardiac diet    -Encourage good PO intake at meals >50%.   2. Continue boost glucose TID to increase intake   3. Suggest MVI   RD to monitor and follow up     Goals: 1. pt to consume  >85% of EEN/EPN by RD follow up 2. wt gain of 1-2 lbs per week  Nutrition Goal Status: new  Communication of RD Recs: (POC)    Reason for Assessment    Reason For Assessment: length of stay  Diagnosis: (spinal cord compression due to malignant neoplasm)  Relevant Medical History: hepatocellular carcinoma; alcoholic cirrhosis; HTN; ascities  Interdisciplinary Rounds: did not attend  General Information Comments: Pt endorses fair appetite, PO ~50-75% of meals. States when food taste better, intake increased. Pt states PTA good PO intake and drinking boost plus at home. Snacks in room to increase intake. C/o diarrhea but no other complaints at this time. Pt unsure of recent wt loss, UBW ~100-105 lbs per pt. NFPE completed on , pt with moderate muslce and fat loss, meets criteria for moderate malnutrition at this time.   Nutrition Discharge Planning: adequate intake via regular diet     Nutrition Risk Screen    Nutrition Risk Screen: no indicators present    Nutrition/Diet History    Patient Reported Diet/Restrictions/Preferences: general  Spiritual, Cultural Beliefs, Anglican Practices, Values that Affect Care: no  Food Allergies: NKFA  Factors Affecting Nutritional Intake: None identified at this time    Anthropometrics    Temp: 98.5 °F (36.9 °C)  Height: 4' 7.12" (140 cm)  Height (inches): 55.12 in  Weight: 44 kg (97 lb)  Weight (lb): 97 lb  Ideal Body Weight (IBW), Female: 75.6 lb  % Ideal Body Weight, Female (lb): 128.31 lb  BMI (Calculated): 22.4  BMI Grade: 18.5-24.9 - normal  Usual Body Weight (UBW), k.73 kg  % Usual Body Weight: 92.38    Lab/Procedures/Meds    Pertinent Labs Reviewed: " reviewed  Pertinent Labs Comments: Na 135; BUN 28; Glucose 188  Pertinent Medications Reviewed: reviewed  Pertinent Medications Comments: dexamethasone; ferrous sulfate; insulin; magnesium oxide; propranolol     Estimated/Assessed Needs    Weight Used For Calorie Calculations: 44 kg (97 lb)  Energy Calorie Requirements (kcal): 9671-2695 kcal/d  Energy Need Method: Kcal/kg  Protein Requirements: 57-66 g/day (1.3-1.5 g/kg)   Weight Used For Protein Calculations: 44 kg (97 lb)  Fluid Requirements (mL): 1 mL/kcal or per MD  RDA Method (mL): 1320    Nutrition Prescription Ordered    Current Diet Order: Cardiac; low Na diet   Oral Nutrition Supplement: boost glucose     Evaluation of Received Nutrient/Fluid Intake    I/O: -7.3 L since admit  Energy Calories Required: meeting needs  Protein Required: meeting needs  Comments: LBM 12/3  Tolerance: tolerating  % Intake of Estimated Energy Needs: 75 - 100 %  % Meal Intake: 75 - 100 %    Nutrition Risk    Level of Risk/Frequency of Follow-up: low(1x/week)     Assessment and Plan    Moderate malnutrition in the context of Chronic Illness/Injury    Related to (etiology):  Increased energy needs    Signs and Symptoms (as evidenced by):  Body Fat Depletion: mild and moderate depletion of orbitals and triceps   Muscle Mass Depletion: mild and moderate depletion of temples, clavicle region, scapular region, interosseous muscle and lower extremities   Weight Loss: 9% x 1 year (per chart)      Interventions (treatment strategy):  Collaboration of care with other providers  Commercial beverage  High calorie high protein diet     Nutrition Diagnosis Status:  New    Monitor and Evaluation    Food and Nutrient Intake: energy intake, food and beverage intake  Food and Nutrient Adminstration: diet order  Knowledge/Beliefs/Attitudes: food and nutrition knowledge/skill  Anthropometric Measurements: weight, weight change  Biochemical Data, Medical Tests and Procedures: electrolyte and renal  panel, inflammatory profile, gastrointestinal profile, lipid profile, glucose/endocrine profile  Nutrition-Focused Physical Findings: overall appearance     Malnutrition Assessment  Malnutrition Type: chronic illness  Weight Loss (Malnutrition): (9% x 1 year per chart)  Subcutaneous Fat (Malnutrition): moderate depletion  Muscle Mass (Malnutrition): moderate depletion   Orbital Region (Subcutaneous Fat Loss): moderate depletion  Upper Arm Region (Subcutaneous Fat Loss): mild depletion   Mandaen Region (Muscle Loss): moderate depletion  Clavicle Bone Region (Muscle Loss): moderate depletion  Clavicle and Acromion Bone Region (Muscle Loss): moderate depletion  Dorsal Hand (Muscle Loss): mild depletion  Anterior Thigh Region (Muscle Loss): mild depletion  Posterior Calf Region (Muscle Loss): mild depletion       Nutrition Follow-Up    RD Follow-up?: Yes

## 2019-12-04 NOTE — ASSESSMENT & PLAN NOTE
- basal insulin 9 units, bolus 3 units TID  - POCT QACHS  - adjust as appropriate with steroid taper in mind

## 2019-12-04 NOTE — PLAN OF CARE
Pt aaox4. V/s stable. No complaints at this time. Family at bedside. Weight shift assistance provided. Will continue to monitor and interventions as appropriate.

## 2019-12-04 NOTE — PROGRESS NOTES
Patient had indicated earlier today that daughter Yuridia would be coming to the hospital this afternoon after work, returned to patient's room and found Yuridia at the bedside. Conference conducted by this APRN with patient and daughter to discuss pt's current clinical status, goals of care, code status, long term expected outcomes and prognostic awareness. After a discussion concerning the pt's values and wishes, patient verbalized good understanding and insight as it relates to her prognostic awareness. She understands that her condition is not curable and is clear that she does not want to return to the hospital after discharge. She wants to finish her last palliative radiotherapy treatment tomorrow and then go home to be with her family. Her goal is to remain in her home for the duration of her life and die at home in her bed when her time comes. Discussed home hospice as an option in keeping with the patient's wishes and answered patient and daughter's questions regarding hospice. Patient and daughter are in agreement that they would like to proceed with home hospice after last radiotherapy treatment tomorrow. They do not have a preference regarding hospice agency. Spoke with Dr. Aguilar who is in agreement with family's decision. Notified  Papa who will start working on hospice arrangements.        35 minute visit spent in face to face discussion of goals of care, coordination of care and emotional support.    Rosalba Mak NP  Palliative Medicine  [facility]

## 2019-12-04 NOTE — ASSESSMENT & PLAN NOTE
65 yo female with Hepatocellular carcinoma, alcoholic cirrhosis, ascites, b/l pulmonary PE presents with b/l lower extremity weakness and back pain. Found to have compression fracture of T11 with canal stenosis and posterior cord displacement. Patient with sensation intact but 0/5 muscle strength in b/l lower ext.    Plan  - Neurosurgery consult, recs appreciated  - Transition to Dexamethasone 4mg PO q6h   - Oxycodone 10 q4 PRN moderate pain, oxy 15 q4 PRN severe pain, hydromorphone 0.5 mg IV q6 PRN breakthrough pain  - NSGY recommend treating with radiotherapy rather than surgery  - Radiation oncology is consulted, appreciate their recs - completed round 3 of 5

## 2019-12-05 ENCOUNTER — DOCUMENTATION ONLY (OUTPATIENT)
Dept: RADIATION ONCOLOGY | Facility: CLINIC | Age: 64
End: 2019-12-05

## 2019-12-05 LAB
ALBUMIN SERPL BCP-MCNC: 2.7 G/DL (ref 3.5–5.2)
ALP SERPL-CCNC: 125 U/L (ref 55–135)
ALT SERPL W/O P-5'-P-CCNC: 137 U/L (ref 10–44)
ANION GAP SERPL CALC-SCNC: 9 MMOL/L (ref 8–16)
AST SERPL-CCNC: 91 U/L (ref 10–40)
BASOPHILS # BLD AUTO: 0.03 K/UL (ref 0–0.2)
BASOPHILS NFR BLD: 0.3 % (ref 0–1.9)
BILIRUB SERPL-MCNC: 1.4 MG/DL (ref 0.1–1)
BUN SERPL-MCNC: 31 MG/DL (ref 8–23)
CALCIUM SERPL-MCNC: 9 MG/DL (ref 8.7–10.5)
CHLORIDE SERPL-SCNC: 100 MMOL/L (ref 95–110)
CO2 SERPL-SCNC: 25 MMOL/L (ref 23–29)
CREAT SERPL-MCNC: 1 MG/DL (ref 0.5–1.4)
DIFFERENTIAL METHOD: ABNORMAL
EOSINOPHIL # BLD AUTO: 0 K/UL (ref 0–0.5)
EOSINOPHIL NFR BLD: 0 % (ref 0–8)
ERYTHROCYTE [DISTWIDTH] IN BLOOD BY AUTOMATED COUNT: 21.9 % (ref 11.5–14.5)
EST. GFR  (AFRICAN AMERICAN): >60 ML/MIN/1.73 M^2
EST. GFR  (NON AFRICAN AMERICAN): >60 ML/MIN/1.73 M^2
GLUCOSE SERPL-MCNC: 225 MG/DL (ref 70–110)
HCT VFR BLD AUTO: 33.1 % (ref 37–48.5)
HGB BLD-MCNC: 10.1 G/DL (ref 12–16)
IMM GRANULOCYTES # BLD AUTO: 0.1 K/UL (ref 0–0.04)
IMM GRANULOCYTES NFR BLD AUTO: 1 % (ref 0–0.5)
INR PPP: 1.3 (ref 0.8–1.2)
LYMPHOCYTES # BLD AUTO: 0.3 K/UL (ref 1–4.8)
LYMPHOCYTES NFR BLD: 3.1 % (ref 18–48)
MAGNESIUM SERPL-MCNC: 2 MG/DL (ref 1.6–2.6)
MCH RBC QN AUTO: 21.9 PG (ref 27–31)
MCHC RBC AUTO-ENTMCNC: 30.5 G/DL (ref 32–36)
MCV RBC AUTO: 72 FL (ref 82–98)
MONOCYTES # BLD AUTO: 0.9 K/UL (ref 0.3–1)
MONOCYTES NFR BLD: 8.4 % (ref 4–15)
NEUTROPHILS # BLD AUTO: 8.9 K/UL (ref 1.8–7.7)
NEUTROPHILS NFR BLD: 87.2 % (ref 38–73)
NRBC BLD-RTO: 0 /100 WBC
PHOSPHATE SERPL-MCNC: 3 MG/DL (ref 2.7–4.5)
PLATELET # BLD AUTO: 224 K/UL (ref 150–350)
PMV BLD AUTO: ABNORMAL FL (ref 9.2–12.9)
POCT GLUCOSE: 216 MG/DL (ref 70–110)
POCT GLUCOSE: 221 MG/DL (ref 70–110)
POCT GLUCOSE: 330 MG/DL (ref 70–110)
POTASSIUM SERPL-SCNC: 4.6 MMOL/L (ref 3.5–5.1)
PROT SERPL-MCNC: 6.7 G/DL (ref 6–8.4)
PROTHROMBIN TIME: 13.2 SEC (ref 9–12.5)
RBC # BLD AUTO: 4.61 M/UL (ref 4–5.4)
SODIUM SERPL-SCNC: 134 MMOL/L (ref 136–145)
WBC # BLD AUTO: 10.17 K/UL (ref 3.9–12.7)

## 2019-12-05 PROCEDURE — 80053 COMPREHEN METABOLIC PANEL: CPT

## 2019-12-05 PROCEDURE — C9399 UNCLASSIFIED DRUGS OR BIOLOG: HCPCS | Performed by: STUDENT IN AN ORGANIZED HEALTH CARE EDUCATION/TRAINING PROGRAM

## 2019-12-05 PROCEDURE — 11000001 HC ACUTE MED/SURG PRIVATE ROOM

## 2019-12-05 PROCEDURE — 77387 GUIDANCE FOR RADJ TX DLVR: CPT | Performed by: RADIOLOGY

## 2019-12-05 PROCEDURE — 77387 GUIDANCE FOR RADJ TX DLVR: CPT | Mod: ,,, | Performed by: RADIOLOGY

## 2019-12-05 PROCEDURE — 99232 SBSQ HOSP IP/OBS MODERATE 35: CPT | Mod: ,,, | Performed by: INTERNAL MEDICINE

## 2019-12-05 PROCEDURE — 85025 COMPLETE CBC W/AUTO DIFF WBC: CPT

## 2019-12-05 PROCEDURE — 63600175 PHARM REV CODE 636 W HCPCS: Performed by: STUDENT IN AN ORGANIZED HEALTH CARE EDUCATION/TRAINING PROGRAM

## 2019-12-05 PROCEDURE — 84100 ASSAY OF PHOSPHORUS: CPT

## 2019-12-05 PROCEDURE — 63600175 PHARM REV CODE 636 W HCPCS: Performed by: INTERNAL MEDICINE

## 2019-12-05 PROCEDURE — 85610 PROTHROMBIN TIME: CPT

## 2019-12-05 PROCEDURE — 99232 PR SUBSEQUENT HOSPITAL CARE,LEVL II: ICD-10-PCS | Mod: ,,, | Performed by: INTERNAL MEDICINE

## 2019-12-05 PROCEDURE — 77336 RADIATION PHYSICS CONSULT: CPT | Performed by: RADIOLOGY

## 2019-12-05 PROCEDURE — 36415 COLL VENOUS BLD VENIPUNCTURE: CPT

## 2019-12-05 PROCEDURE — 25000003 PHARM REV CODE 250: Performed by: INTERNAL MEDICINE

## 2019-12-05 PROCEDURE — 25000003 PHARM REV CODE 250: Performed by: STUDENT IN AN ORGANIZED HEALTH CARE EDUCATION/TRAINING PROGRAM

## 2019-12-05 PROCEDURE — 77402 RADIATION TX DELIVERY LVL 1: CPT | Performed by: RADIOLOGY

## 2019-12-05 PROCEDURE — 77387 PR GUIDANCE FOR RADIATION TREATMENT DELIVERY: ICD-10-PCS | Mod: ,,, | Performed by: RADIOLOGY

## 2019-12-05 PROCEDURE — 83735 ASSAY OF MAGNESIUM: CPT

## 2019-12-05 PROCEDURE — 99233 SBSQ HOSP IP/OBS HIGH 50: CPT | Mod: ,,, | Performed by: NURSE PRACTITIONER

## 2019-12-05 PROCEDURE — 99233 PR SUBSEQUENT HOSPITAL CARE,LEVL III: ICD-10-PCS | Mod: ,,, | Performed by: NURSE PRACTITIONER

## 2019-12-05 RX ORDER — PEN NEEDLE, DIABETIC 30 GX3/16"
NEEDLE, DISPOSABLE MISCELLANEOUS
Qty: 100 EACH | Refills: 11 | COMMUNITY
Start: 2019-12-05

## 2019-12-05 RX ORDER — TALC
6 POWDER (GRAM) TOPICAL NIGHTLY
Refills: 0 | COMMUNITY
Start: 2019-12-05

## 2019-12-05 RX ORDER — INSULIN GLARGINE 100 [IU]/ML
7 INJECTION, SOLUTION SUBCUTANEOUS NIGHTLY
Qty: 3 ML | Refills: 2 | Status: SHIPPED | OUTPATIENT
Start: 2019-12-05

## 2019-12-05 RX ORDER — MAG HYDROX/ALUMINUM HYD/SIMETH 200-200-20
30 SUSPENSION, ORAL (FINAL DOSE FORM) ORAL ONCE
Status: COMPLETED | OUTPATIENT
Start: 2019-12-05 | End: 2019-12-05

## 2019-12-05 RX ADMIN — DEXAMETHASONE 4 MG: 4 TABLET ORAL at 04:12

## 2019-12-05 RX ADMIN — DEXAMETHASONE 4 MG: 4 TABLET ORAL at 08:12

## 2019-12-05 RX ADMIN — SPIRONOLACTONE 50 MG: 25 TABLET ORAL at 09:12

## 2019-12-05 RX ADMIN — Medication 6 MG: at 09:12

## 2019-12-05 RX ADMIN — INSULIN ASPART 2 UNITS: 100 INJECTION, SOLUTION INTRAVENOUS; SUBCUTANEOUS at 09:12

## 2019-12-05 RX ADMIN — PROPRANOLOL HYDROCHLORIDE 10 MG: 10 TABLET ORAL at 09:12

## 2019-12-05 RX ADMIN — ALUMINUM HYDROXIDE, MAGNESIUM HYDROXIDE, AND SIMETHICONE 30 ML: 200; 200; 20 SUSPENSION ORAL at 10:12

## 2019-12-05 RX ADMIN — LACTULOSE 30 G: 20 SOLUTION ORAL at 09:12

## 2019-12-05 RX ADMIN — FERROUS SULFATE TAB EC 325 MG (65 MG FE EQUIVALENT) 325 MG: 325 (65 FE) TABLET DELAYED RESPONSE at 09:12

## 2019-12-05 RX ADMIN — OXYCODONE HYDROCHLORIDE 15 MG: 10 TABLET ORAL at 03:12

## 2019-12-05 RX ADMIN — OXYCODONE HYDROCHLORIDE 15 MG: 10 TABLET ORAL at 10:12

## 2019-12-05 RX ADMIN — INSULIN ASPART 3 UNITS: 100 INJECTION, SOLUTION INTRAVENOUS; SUBCUTANEOUS at 09:12

## 2019-12-05 RX ADMIN — APIXABAN 5 MG: 5 TABLET, FILM COATED ORAL at 09:12

## 2019-12-05 RX ADMIN — OXYCODONE HYDROCHLORIDE 10 MG: 10 TABLET ORAL at 09:12

## 2019-12-05 RX ADMIN — INSULIN DETEMIR 9 UNITS: 100 INJECTION, SOLUTION SUBCUTANEOUS at 04:12

## 2019-12-05 RX ADMIN — DEXAMETHASONE 4 MG: 4 TABLET ORAL at 09:12

## 2019-12-05 RX ADMIN — INSULIN ASPART 4 UNITS: 100 INJECTION, SOLUTION INTRAVENOUS; SUBCUTANEOUS at 06:12

## 2019-12-05 RX ADMIN — FUROSEMIDE 20 MG: 20 TABLET ORAL at 09:12

## 2019-12-05 RX ADMIN — INSULIN ASPART 3 UNITS: 100 INJECTION, SOLUTION INTRAVENOUS; SUBCUTANEOUS at 06:12

## 2019-12-05 RX ADMIN — FAMOTIDINE 20 MG: 20 TABLET ORAL at 09:12

## 2019-12-05 RX ADMIN — OXYCODONE HYDROCHLORIDE 10 MG: 10 TABLET ORAL at 10:12

## 2019-12-05 RX ADMIN — MAGNESIUM OXIDE TAB 400 MG (241.3 MG ELEMENTAL MG) 400 MG: 400 (241.3 MG) TAB at 09:12

## 2019-12-05 NOTE — SUBJECTIVE & OBJECTIVE
Interval History: Patient was scheduled for discharge on 12/3 but discharge was held because family did not have the ability to bring her to outpatient radiation therapy. Plan is now to complete 5 cycles of palliative radiation in the hospital and discharge home on 12/5.    Past Medical History:   Diagnosis Date    Alcohol abuse     Anemia     Cancer     liver    Cirrhosis        Past Surgical History:   Procedure Laterality Date    ESOPHAGOGASTRODUODENOSCOPY Left 1/9/2019    Procedure: EGD (ESOPHAGOGASTRODUODENOSCOPY);  Surgeon: Madyson Farrar MD;  Location: [facility], operated by [facility] ENDO;  Service: Endoscopy;  Laterality: Left;    PERITONEOCENTESIS N/A 1/9/2019    Procedure: PARACENTESIS, ABDOMINAL;  Surgeon: Everett Fitzpatrick MD;  Location: [facility], operated by [facility] CATH LAB;  Service: Radiology;  Laterality: N/A;       Review of patient's allergies indicates:  No Known Allergies    Medications:  Continuous Infusions:  Scheduled Meds:   dexAMETHasone  4 mg Oral Q6H WAKE    famotidine  20 mg Oral Daily    furosemide  20 mg Oral Daily    insulin aspart U-100  3 Units Subcutaneous TIDWM    insulin detemir U-100  9 Units Subcutaneous Daily    lactulose  30 g Oral TID    melatonin  6 mg Oral Nightly    spironolactone  50 mg Oral Daily     PRN Meds:Dextrose 10% Bolus, Dextrose 10% Bolus, glucagon (human recombinant), glucose, glucose, HYDROmorphone, insulin aspart U-100, naloxone, ondansetron, oxyCODONE, oxyCODONE, sodium chloride 0.9%    Family History     None        Tobacco Use    Smoking status: Current Every Day Smoker     Packs/day: 0.50     Years: 30.00     Pack years: 15.00     Types: Cigarettes    Smokeless tobacco: Never Used    Tobacco comment: 2 cigarettes a day   Substance and Sexual Activity    Alcohol use: No     Frequency: Never     Comment: Previously drank 1 pint a day    Drug use: Not Currently     Types: Cocaine     Comment: last use was 3 years ago    Sexual activity: Not Currently       Review of Systems    Constitutional: Positive for activity change and fatigue. Negative for appetite change.   HENT: Negative for congestion, sore throat and trouble swallowing.    Respiratory: Negative for cough, shortness of breath and wheezing.    Cardiovascular: Negative for chest pain and leg swelling.   Gastrointestinal: Positive for abdominal distention. Negative for constipation, nausea and vomiting.   Musculoskeletal: Positive for back pain and gait problem. Negative for joint swelling and neck pain.   Skin: Negative for rash and wound.   Neurological: Positive for weakness (lower extremities). Negative for dizziness and light-headedness.   Psychiatric/Behavioral: Positive for sleep disturbance. Negative for agitation, behavioral problems and dysphoric mood. The patient is not nervous/anxious.      Objective:     Vital Signs (Most Recent):  Temp: 98.6 °F (37 °C) (12/05/19 1200)  Pulse: (!) 58 (12/05/19 1200)  Resp: 18 (12/05/19 1200)  BP: 118/66 (12/05/19 1200)  SpO2: 99 % (12/05/19 1200) Vital Signs (24h Range):  Temp:  [98 °F (36.7 °C)-98.6 °F (37 °C)] 98.6 °F (37 °C)  Pulse:  [55-61] 58  Resp:  [16-20] 18  SpO2:  [98 %-100 %] 99 %  BP: (113-135)/(56-71) 118/66     Weight: 44 kg (97 lb)  Body mass index is 22.45 kg/m².    Review of Symptoms  Symptom Assessment (ESAS 0-10 scale)   ESAS 0 1 2 3 4 5 6 7 8 9 10   Pain         X     Dyspnea X             Anxiety X             Nausea X             Depression  X             Anorexia X             Fatigue  X            Insomnia  X            Restlessness  X             Agitation X             CAM / Delirium _X_ --  ___+   Constipation     _X_ --  ___+   Diarrhea           _X_ --  ___+  Bowel Management Plan (BMP): Yes    Comments: Lactulose    Pain Assessment: intermittent aching back pain, currently rated 8/10, improves with Oxycodone    OME in 24 hours: 55    Performance Status: 40    ECOG Performance Status Grade: 3 - Confined to bed or chair >50% of waking hours    Physical  Exam   Constitutional: She is oriented to person, place, and time. She appears cachectic. No distress.   HENT:   Head: Normocephalic and atraumatic.   Eyes: Conjunctivae and EOM are normal.   Neck: Normal range of motion. Neck supple.   Cardiovascular: Normal rate and regular rhythm.   Pulmonary/Chest: Effort normal and breath sounds normal.   Abdominal: Bowel sounds are normal. She exhibits distension.   Musculoskeletal: She exhibits no edema or deformity.   Neurological: She is alert and oriented to person, place, and time.   BLE weakness   Skin: Skin is warm and dry.   Psychiatric: Her behavior is normal. Judgment and thought content normal.       Significant Labs: All pertinent labs within the past 24 hours have been reviewed.  CBC:   Recent Labs   Lab 12/05/19 0443   WBC 10.17   HGB 10.1*   HCT 33.1*   MCV 72*        BMP:  Recent Labs   Lab 12/05/19 0443   *   *   K 4.6      CO2 25   BUN 31*   CREATININE 1.0   CALCIUM 9.0   MG 2.0     LFT:  Lab Results   Component Value Date    AST 91 (H) 12/05/2019    ALKPHOS 125 12/05/2019    BILITOT 1.4 (H) 12/05/2019     Albumin:   Albumin   Date Value Ref Range Status   12/05/2019 2.7 (L) 3.5 - 5.2 g/dL Final     Protein:   Total Protein   Date Value Ref Range Status   12/05/2019 6.7 6.0 - 8.4 g/dL Final     Lactic acid:   Lab Results   Component Value Date    LACTATE 1.6 11/25/2019    LACTATE 2.1 01/28/2018       Significant Imaging: I have reviewed all pertinent imaging results/findings within the past 24 hours.    Advance Care Planning   Advanced Directives::  Living Will: No  LaPOST: Yes  Do Not Resuscitate Status: Yes  Medical Power of : No. Daughter Yuridia is surrogate decision maker.    Decision-Making Capacity: Patient answered questions       Living Arrangements: Lives with family    Psychosocial/Cultural:  Patient is unmarried and has 2 living children, daughter Yuridia and son Ch[place] who is currently incarcerated until May  "2020. She had another son who was killed in 2003. She lives at home with Josue's 7 year old daughter who she has cared for since she was 3 weeks ago. Yuridia and her 14 year old daughter have also been staying in the home. 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 Her goal is to get back home so she can "do things" around the house. She understands that she is not expected to walk again without surgery but says "anything's possible." She worries about her 7 year old granddaughter but says that Yuridia is taking good care of her.    Spiritual:     F- Mandy and Belief: Oriental orthodox    I - Importance: very important  .  C - Community: belongs to [facility]    A - Address in Care:  visits, [facility]  "

## 2019-12-05 NOTE — ASSESSMENT & PLAN NOTE
Impression:  Palliative care consulted for goals of care/advanced care planning for this 63 y/o female being followed by hospital medicine, neurosurgery, and radiation oncology for Spinal cord compression due to malignant neoplasm metastatic to spine, 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, HCC (hepatocellular carcinoma), Alcoholic cirrhosis, Esophageal varices, Ascites, and Chronic pulmonary embolism. Rounded on patient today, no family members at bedside. Patient is awake and alert, calm and smiling. She reports aching back pain rated 8/10 but says that her pain is improving since she just took PRN Oxycodone. Patient is looking forward to going home with hospice today after completing last palliative radiotherapy treatment and is excited to see her grandchildren. Discussed code status with patient who expresses a desire to die a natural death at home. LaPost reviewed and signed by patient, who elects DNR, comfort focused treatment, no tube feeding. LaPost placed in patient's chart for physician review and signature, primary team notified.    Plan/Recommendations:  · See goals of care discussion below.  · Anticipate discharge home with hospice today following completion of palliative radiotherapy.  · Inpatient palliative care will continue to follow up with patient until discharge.    Goals of Care/Advance Care Plannin19 Conference conducted by this APRN with patient. Patient is looking forward to going home with hospice today after completing last palliative radiotherapy treatment and is excited to see her grandchildren. Discussed code status with patient who expresses a desire to die a natural death at home. LaPost reviewed with patient, who elects DNR, comfort focused treatment, no tube feeding. LaPost placed in patient's chart for physician review and signature, primary team notified.    19 Conference conducted by this APRN with patient and daughter Yuridia to discuss pt's  "current clinical status, goals of care, code status, long term expected outcomes and prognostic awareness. After a discussion concerning the pt's values and wishes, patient and daughter verbalized good understanding and insight as it relates to her prognostic awareness. Patient understands that her condition is not curable and is clear that she does not want to return to the hospital after discharge. She wants to finish her last palliative radiotherapy treatment tomorrow and then go home to be with her family. Her goal is to remain in her home for the duration of her life and die at home in her bed when her time comes. Discussed home hospice as an option in keeping with the patient's wishes and patient and daughter are in agreement. They would like to proceed with discharge to home hospice after last radiotherapy treatment tomorrow.    12/4/19 Conference conducted by this APRN with patient to discuss pt's current clinical status, goals of care, code status, long term expected outcomes and prognostic awareness. After a discussion concerning the pt's values and wishes, patient verbalized fair understanding and insight as it relates to her prognostic awareness. She states that once she goes home, she is going to stay home and she does not want to come back to the hospital again. Patient is currently full code and has previously stated that she "wants them to try" if her heart stopped or she stopped breathing; however, she would not want to be left on machines long term if recovery was not possible. Today patient expresses a desire to die at home in her bed when her times comes. Discussed how DNR status would be more in line with her goals. Patient is interested in transitioning to home hospice but is unsure what her daughter would think. Will reach out to daughter with patient's permission to arrange a family meeting.    12/3/19 Conference conducted by this APRN with patient to discuss pt's current clinical status, " "goals of care, code status, long term expected outcomes and prognostic awareness. After a discussion concerning the pt's values and wishes, patient verbalized fair understanding and insight as it relates to her prognostic awareness. She states that once she goes home, she is going to stay home and she does not want to come back to the hospital again. Patient is currently full code and has previously stated that she "wants them to try" if her heart stopped or she stopped breathing; however, she would not want to be left on machines long term if recovery was not possible. Today patient expresses a desire to die at home in her bed when her times comes. Discussed how DNR status would be more in line with her goals. Patient is interested in transitioning to home hospice but is unsure what her daughter would think. Will reach out to daughter with patient's permission to arrange a family meeting.    12/2/19 Conference conducted by this APRN with patient to discuss pt's current clinical status, goals of care, code status, long term expected outcomes and prognostic awareness. After a discussion concerning the pt's values and wishes, patient verbalized fair understanding and insight as it relates to her prognostic awareness. She is currently full code and has stated that she "wants them to try" if her heart stopped or she stopped breathing. However, she would not want to be left on machines long term if recovery was not possible. Patient is adamant that she does not want neurosurgery at any point and understands that she is not expected to walk again without surgery, although she says "anything's possible." Her goal is to return home to her family and continue palliative radiotherapy as outpatient. She does not want to return to the hospital and is receptive to the idea of discharging home with hospice as long as she would be allowed to continue palliative radiotherapy. She wants to leave the decision up to her daughter who " "will be at the hospital later today. Per [facility] admissions coordinator Oma Ontiveros, they do accept patients who are undergoing outpatient palliative radiotherapy and they can send out a representative to meet with family if they would like to pursue hospice.    11/29/19 Conference conducted by this APRN with patient to discuss pt's current clinical status, goals of care, code status, long term expected outcomes and prognostic awareness. After a discussion concerning the pt's values and wishes, patient verbalized fair understanding and insight as it relates to her prognostic awareness. She is currently full code and states that she "wants them to try" if her heart stopped or she stopped breathing. However, she would not want to be left on machines long term if recovery was not possible. Patient is adamant that she does not want surgery at any point and understands that she is not expected to walk again without surgery, although she says "anything's possible." Her goal is to return home to her family but she wants to start palliative radiation during this admission and continue as outpatient.  "

## 2019-12-05 NOTE — PLAN OF CARE
[facility]  Department of Hospital Medicine  [address] [place] [address]  [phone] after hours  [phone] fax    HOSPICE  ORDERS    12/05/2019    Admit to Hospice:  Home Service    Diagnoses:   Active Hospital Problems    Diagnosis  POA    *Spinal cord compression due to malignant neoplasm metastatic to spine [G95.29, C79.51]  Yes    Steroid-induced hyperglycemia [R73.9, T38.0X5A]  Unknown    Palliative care encounter [Z51.5]  Not Applicable    Counseling regarding advanced care planning and goals of care [Z71.89]  Not Applicable    Back pain [M54.9]  Yes    Insomnia [G47.00]  Yes    Acute urinary obstruction [N13.9]  Yes    Hypomagnesemia [E83.42]  Yes    Pulmonary nodules/lesions, multiple [R91.8]  Yes    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]  Yes    Adrenal nodule [E27.9]  Yes    Pleural effusion, right [J90]  Yes    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 [M84.58XA]  Yes    Encounter for pre-operative cardiovascular clearance [Z01.810]  Not Applicable    Weakness of both lower extremities [R29.898]  Yes    Chronic pulmonary embolism [I27.82]  Yes    Ascites due to alcoholic cirrhosis [K70.31]  Yes    HCC (hepatocellular carcinoma) [C22.0]  Yes    Portal hypertensive gastropathy [K76.6, K31.89]  Yes     Chronic    Esophageal varices in alcoholic cirrhosis [K70.30, I85.10]  Yes     Chronic    Alcoholic cirrhosis [K70.30]  Yes     Chronic    Severe malnutrition [E43]  Yes    Iron deficiency anemia due to chronic blood loss [D50.0]  Yes      Resolved Hospital Problems   No resolved problems to display.     Hospice Qualifying Diagnoses:       Patient has a life expectancy < 6 months due to:  1) Primary Hospice Diagnosis: Hepatocellular Carcinoma with bone metastasis   Comorbid Conditions Contributing to Decline: Pathologic fracture of vertebra; spinal cord compression, Liver cirrhosis with esophageal varices    Vital Signs:  Routine per Hospice Protocol.    Code Status: DNR/DNI    Allergies: Review of patient's allergies indicates:  No Known Allergies    Diet: Regular    Activities: As tolerated    Nursing: Per Hospice Routine.    Garcia Care: Empty Garcia bag Q shift and PRN.  Change Garcia every month.    Routine Skin for Bedridden Patients: Apply moisture barrier cream to all skin folds and   wet areas in perineal area daily and after baths and all bowel movements     Neena Marksu   Home Medication Instructions CROW:[id number]    Printed on:12/05/19 9193   Medication Information                      apixaban (ELIQUIS) 5 mg Tab  Take 1 tablet (5 mg total) by mouth 2 (two) times daily.             dexAMETHasone (DECADRON) 2 MG tablet  Take 2 tablets (4mg) every 6 hours x 3 days, then 2 tabs (4mg) every 8 hours x 3 days, then 2 tabs (4mg) every 12 hours x 3 days, then 1 tab (2mg) every 8 hours x 3 days, then 1 tab (2mg) every 12 hours x 3 days, then 1 tab (2mg) daily x 6 days             famotidine (PEPCID) 20 MG tablet  Take 1 tablet (20 mg total) by mouth once daily.             ferrous sulfate (FEOSOL) 325 mg (65 mg iron) Tab tablet  Take 1 tablet (325 mg total) by mouth 2 (two) times daily.             furosemide (LASIX) 20 MG tablet  Take 1 tablet (20 mg total) by mouth once daily.             lactulose (CHRONULAC) 10 gram/15 mL solution  Take 30 mL by mouth three times daily. Try to accomplish 3-4 bowel movements per day.             oxyCODONE (ROXICODONE) 5 MG immediate release tablet  Take 2 tablets (10 mg total) by mouth every 6 (six) hours as needed for Pain.             promethazine (PHENERGAN) 25 MG tablet  Take 1 tablet (25 mg total) by mouth every 6 (six) hours as needed for Nausea.             propranolol (INDERAL) 10 MG tablet  Take 1 tablet (10 mg total) by mouth 2 (two) times daily.             spironolactone (ALDACTONE) 100 MG tablet  Take 1 tablet (100 mg total) by mouth once daily.             spironolactone  (ALDACTONE) 50 MG tablet  Take 1 tablet (50 mg total) by mouth once daily.               DIABETES CARE:    Nurse to perform and educate diabetic management with blood glucose monitoring:      Fingerstick blood sugar a.m. and p.m.    Report CBG < 60 or > 350 to physician.         Insulin Sliding Scale         Glucose  Novolog Insulin Subcutaneous        0 - 60   Orange juice or glucose tablet      No insulin   201-250  2 units   251-300  4 units   301-350  6 units   351-400  8 units   >400   10 units then call physician      Future Orders:  Hospice Medical Director may dictate new orders for comfortable care measures & sign death certificate.        _________________________________  Saúl Aguilar MD  12/05/2019

## 2019-12-05 NOTE — PROGRESS NOTES
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 Scott, Dr. Jackson, Dr. Aguilar, and nursing staff.    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 Rounded on patient today, no family members at bedside. Patient is awake and alert, calm and smiling. She reports aching back pain rated 8/10 but says that her pain is improving since she just took PRN Oxycodone. Patient is looking forward to going home with hospice today after completing last palliative radiotherapy treatment and is excited to see her grandchildren. Discussed code status with patient who expresses a desire to die a natural death at home. LaPost reviewed and signed by patient, who elects DNR, comfort focused treatment, no tube feeding. LaPost placed in patient's chart for physician review and signature, primary team notified.    Plan/Recommendations:  · See goals of care discussion below.  · Anticipate discharge home with hospice today following completion of palliative radiotherapy.  · Inpatient palliative care will continue to follow up with patient until discharge.    Goals of  Care/Advance Care Plannin19 Conference conducted by this APRN with patient. Patient is looking forward to going home with hospice today after completing last palliative radiotherapy treatment and is excited to see her grandchildren. Discussed code status with patient who expresses a desire to die a natural death at home. LaPost reviewed with patient, who elects DNR, comfort focused treatment, no tube feeding. LaPost placed in patient's chart for physician review and signature, primary team notified.    19 Conference conducted by this APRN with patient and daughter Yuridia to discuss pt's current clinical status, goals of care, code status, long term expected outcomes and prognostic awareness. After a discussion concerning the pt's values and wishes, patient and daughter verbalized good understanding and insight as it relates to her prognostic awareness. Patient understands that her condition is not curable and is clear that she does not want to return to the hospital after discharge. She wants to finish her last palliative radiotherapy treatment tomorrow and then go home to be with her family. Her goal is to remain in her home for the duration of her life and die at home in her bed when her time comes. Discussed home hospice as an option in keeping with the patient's wishes and patient and daughter are in agreement. They would like to proceed with discharge to home hospice after last radiotherapy treatment tomorrow.    19 Conference conducted by this APRN with patient to discuss pt's current clinical status, goals of care, code status, long term expected outcomes and prognostic awareness. After a discussion concerning the pt's values and wishes, patient verbalized fair understanding and insight as it relates to her prognostic awareness. She states that once she goes home, she is going to stay home and she does not want to come back to the hospital again. Patient is currently full code and has  "previously stated that she "wants them to try" if her heart stopped or she stopped breathing; however, she would not want to be left on machines long term if recovery was not possible. Today patient expresses a desire to die at home in her bed when her times comes. Discussed how DNR status would be more in line with her goals. Patient is interested in transitioning to home hospice but is unsure what her daughter would think. Will reach out to daughter with patient's permission to arrange a family meeting.    12/3/19 Conference conducted by this APRN with patient to discuss pt's current clinical status, goals of care, code status, long term expected outcomes and prognostic awareness. After a discussion concerning the pt's values and wishes, patient verbalized fair understanding and insight as it relates to her prognostic awareness. She states that once she goes home, she is going to stay home and she does not want to come back to the hospital again. Patient is currently full code and has previously stated that she "wants them to try" if her heart stopped or she stopped breathing; however, she would not want to be left on machines long term if recovery was not possible. Today patient expresses a desire to die at home in her bed when her times comes. Discussed how DNR status would be more in line with her goals. Patient is interested in transitioning to home hospice but is unsure what her daughter would think. Will reach out to daughter with patient's permission to arrange a family meeting.    12/2/19 Conference conducted by this APRN with patient to discuss pt's current clinical status, goals of care, code status, long term expected outcomes and prognostic awareness. After a discussion concerning the pt's values and wishes, patient verbalized fair understanding and insight as it relates to her prognostic awareness. She is currently full code and has stated that she "wants them to try" if her heart stopped or she " "stopped breathing. However, she would not want to be left on machines long term if recovery was not possible. Patient is adamant that she does not want neurosurgery at any point and understands that she is not expected to walk again without surgery, although she says "anything's possible." Her goal is to return home to her family and continue palliative radiotherapy as outpatient. She does not want to return to the hospital and is receptive to the idea of discharging home with hospice as long as she would be allowed to continue palliative radiotherapy. She wants to leave the decision up to her daughter who will be at the hospital later today. Per [facility] admissions coordinator Oma Ontiveros, they do accept patients who are undergoing outpatient palliative radiotherapy and they can send out a representative to meet with family if they would like to pursue hospice.    11/29/19 Conference conducted by this APRN with patient to discuss pt's current clinical status, goals of care, code status, long term expected outcomes and prognostic awareness. After a discussion concerning the pt's values and wishes, patient verbalized fair understanding and insight as it relates to her prognostic awareness. She is currently full code and states that she "wants them to try" if her heart stopped or she stopped breathing. However, she would not want to be left on machines long term if recovery was not possible. Patient is adamant that she does not want surgery at any point and understands that she is not expected to walk again without surgery, although she says "anything's possible." Her goal is to return home to her family but she wants to start palliative radiation during this admission and continue as outpatient.        I will follow-up with patient. Please contact us if you have any additional questions.    Subjective:     Chief Complaint:   Chief Complaint   Patient presents with    Back Pain     Patient reports back " "pain and numbness to BLE since 11/22. Patient reports being tx on 11/22 and receiving shot in hip and then developed numbness to BLE.        HPI:   Palliative care consulted for this 62 y/o female with hepatocellular carcinoma, alcoholic cirrhosis (dx 2015), portal HTN, ascites, hx of variceal bleeding in January 2018 s/p banding, bilateral pulmonary embolism on chronic apixaban, hx of ETOH abuse (quit Jan 2018) who presented to the ED on 11/25/19 with back pain and bilateral lower extremity weakness. Patient stated that symptoms had been on going for the past week and a half and progressed to the point where she could not walk or lift her legs against gravity. She decribed "electric shock-like" sensations down her legs bilaterally. She complained of back pain and LUQ & LLQ abdominal pain with radiation to her back. She denied any falls, trauma, or bladder/bowel incontinence. In ED, MRI with acute compression fracture at T11 with almost 25% height loss and retropulsion of the posterior cortex into the central canal causing severe cord compression. On 11/26/19, she additionally endorsed urinary retention and Garcia was placed. Neurosurgery was consulted and planned to take her to the OR on 11/27/19 for T10 lateral corpectomy with posterior T8-T12 percutaneous instrumentation, but patient opted not to proceed with surgery. Radiation oncology was consulted for palliative radiotherapy to the thoracic spine for pain control. Palliative radiotherapy started over the weekend.    Hospital Course:  No notes on file    Interval History: Patient was scheduled for discharge on 12/3 but discharge was held because family did not have the ability to bring her to outpatient radiation therapy. Plan is now to complete 5 cycles of palliative radiation in the hospital and discharge home on 12/5.    Past Medical History:   Diagnosis Date    Alcohol abuse     Anemia     Cancer     liver    Cirrhosis        Past Surgical History: "   Procedure Laterality Date    ESOPHAGOGASTRODUODENOSCOPY Left 1/9/2019    Procedure: EGD (ESOPHAGOGASTRODUODENOSCOPY);  Surgeon: Madyson Farrar MD;  Location: [facility];  Service: Endoscopy;  Laterality: Left;    PERITONEOCENTESIS N/A 1/9/2019    Procedure: PARACENTESIS, ABDOMINAL;  Surgeon: Everett Fitzpatrick MD;  Location: [facility] CATH LAB;  Service: Radiology;  Laterality: N/A;       Review of patient's allergies indicates:  No Known Allergies    Medications:  Continuous Infusions:  Scheduled Meds:   dexAMETHasone  4 mg Oral Q6H WAKE    famotidine  20 mg Oral Daily    furosemide  20 mg Oral Daily    insulin aspart U-100  3 Units Subcutaneous TIDWM    insulin detemir U-100  9 Units Subcutaneous Daily    lactulose  30 g Oral TID    melatonin  6 mg Oral Nightly    spironolactone  50 mg Oral Daily     PRN Meds:Dextrose 10% Bolus, Dextrose 10% Bolus, glucagon (human recombinant), glucose, glucose, HYDROmorphone, insulin aspart U-100, naloxone, ondansetron, oxyCODONE, oxyCODONE, sodium chloride 0.9%    Family History     None        Tobacco Use    Smoking status: Current Every Day Smoker     Packs/day: 0.50     Years: 30.00     Pack years: 15.00     Types: Cigarettes    Smokeless tobacco: Never Used    Tobacco comment: 2 cigarettes a day   Substance and Sexual Activity    Alcohol use: No     Frequency: Never     Comment: Previously drank 1 pint a day    Drug use: Not Currently     Types: Cocaine     Comment: last use was 3 years ago    Sexual activity: Not Currently       Review of Systems   Constitutional: Positive for activity change and fatigue. Negative for appetite change.   HENT: Negative for congestion, sore throat and trouble swallowing.    Respiratory: Negative for cough, shortness of breath and wheezing.    Cardiovascular: Negative for chest pain and leg swelling.   Gastrointestinal: Positive for abdominal distention. Negative for constipation, nausea and vomiting.   Musculoskeletal: Positive  for back pain and gait problem. Negative for joint swelling and neck pain.   Skin: Negative for rash and wound.   Neurological: Positive for weakness (lower extremities). Negative for dizziness and light-headedness.   Psychiatric/Behavioral: Positive for sleep disturbance. Negative for agitation, behavioral problems and dysphoric mood. The patient is not nervous/anxious.      Objective:     Vital Signs (Most Recent):  Temp: 98.6 °F (37 °C) (12/05/19 1200)  Pulse: (!) 58 (12/05/19 1200)  Resp: 18 (12/05/19 1200)  BP: 118/66 (12/05/19 1200)  SpO2: 99 % (12/05/19 1200) Vital Signs (24h Range):  Temp:  [98 °F (36.7 °C)-98.6 °F (37 °C)] 98.6 °F (37 °C)  Pulse:  [55-61] 58  Resp:  [16-20] 18  SpO2:  [98 %-100 %] 99 %  BP: (113-135)/(56-71) 118/66     Weight: 44 kg (97 lb)  Body mass index is 22.45 kg/m².    Review of Symptoms  Symptom Assessment (ESAS 0-10 scale)   ESAS 0 1 2 3 4 5 6 7 8 9 10   Pain         X     Dyspnea X             Anxiety X             Nausea X             Depression  X             Anorexia X             Fatigue  X            Insomnia  X            Restlessness  X             Agitation X             CAM / Delirium _X_ --  ___+   Constipation     _X_ --  ___+   Diarrhea           _X_ --  ___+  Bowel Management Plan (BMP): Yes    Comments: Lactulose    Pain Assessment: intermittent aching back pain, currently rated 8/10, improves with Oxycodone    OME in 24 hours: 55    Performance Status: 40    ECOG Performance Status Grade: 3 - Confined to bed or chair >50% of waking hours    Physical Exam   Constitutional: She is oriented to person, place, and time. She appears cachectic. No distress.   HENT:   Head: Normocephalic and atraumatic.   Eyes: Conjunctivae and EOM are normal.   Neck: Normal range of motion. Neck supple.   Cardiovascular: Normal rate and regular rhythm.   Pulmonary/Chest: Effort normal and breath sounds normal.   Abdominal: Bowel sounds are normal. She exhibits distension.  "  Musculoskeletal: She exhibits no edema or deformity.   Neurological: She is alert and oriented to person, place, and time.   BLE weakness   Skin: Skin is warm and dry.   Psychiatric: Her behavior is normal. Judgment and thought content normal.       Significant Labs: All pertinent labs within the past 24 hours have been reviewed.  CBC:   Recent Labs   Lab 12/05/19  0443   WBC 10.17   HGB 10.1*   HCT 33.1*   MCV 72*        BMP:  Recent Labs   Lab 12/05/19  0443   *   *   K 4.6      CO2 25   BUN 31*   CREATININE 1.0   CALCIUM 9.0   MG 2.0     LFT:  Lab Results   Component Value Date    AST 91 (H) 12/05/2019    ALKPHOS 125 12/05/2019    BILITOT 1.4 (H) 12/05/2019     Albumin:   Albumin   Date Value Ref Range Status   12/05/2019 2.7 (L) 3.5 - 5.2 g/dL Final     Protein:   Total Protein   Date Value Ref Range Status   12/05/2019 6.7 6.0 - 8.4 g/dL Final     Lactic acid:   Lab Results   Component Value Date    LACTATE 1.6 11/25/2019    LACTATE 2.1 01/28/2018       Significant Imaging: I have reviewed all pertinent imaging results/findings within the past 24 hours.    Advance Care Planning   Advanced Directives::  Living Will: No  LaPOST: Yes  Do Not Resuscitate Status: Yes  Medical Power of : No. Daughter Yuridia is surrogate decision maker.    Decision-Making Capacity: Patient answered questions       Living Arrangements: Lives with family    Psychosocial/Cultural:  Patient is unmarried and has 2 living children, daughter Yuridia and son [place] who is currently incarcerated until May 2020. She had another son who was killed in 2003. She lives at home with Josue's 7 year old daughter who she has cared for since she was 3 weeks ago. Yuridia and her 14 year old daughter have also been staying in the home. 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 Her goal is to get back home so she can "do things" around the house. She understands that she is not expected to walk " "again without surgery but says "anything's possible." She worries about her 7 year old granddaughter but says that Yuridia is taking good care of her.    Spiritual:     F- Mandy and Belief: Hindu    I - Importance: very important  .  C - Community: belongs to [facility]    A - Address in Care:  visits, sacramental care      > 50% of 35 min visit spent in chart review, face to face discussion of goals of care,  symptom assessment, coordination of care and emotional support.    Rosalba Mak, NP  Palliative Medicine  [facility]            "

## 2019-12-05 NOTE — PROGRESS NOTES
"[facility] Hospital Medicine  Progress Note    Patient Name: Neena Marks  MRN: [id number]  Patient Class: IP- Inpatient   Admission Date: 11/25/2019  Length of Stay: 9 days  Attending Physician: Pina Scott MD  Primary Care Provider: [facility] Medicine Team: 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 Marks is a 63-year-old  female with Hepatocellular carcinoma, alcoholic cirrhosis( dx 2015), portal HTN, ascites, hx of variceal bleeding in January 2018 s/p banding,B/l pulmonary embolism,  hx of ETOH abuse (quit Jan 2018) who presents to the ED with back pain and b/l lower extremity weakness. Patient states that symptoms have been on going for the past week and half and have progressed to the point where she cannot lift her leg against gravity or walk. She decribes "electric shocklike" sensations down her legs bilaterally. She denies any falls or trauma. She complains of back pain and LUQ & LLQ abdominal pain that radiates to her back. She denies any bladder/bowel incontinence. She denies fever, chills, vomiting. She    In ED, MRI with Acute compression fracture at T11 with almost 25% height loss and retropulsion of the posterior cortex into the spinal canal consistent with severe canal stenosis and posterior cord displacement    Overview/Hospital Course:   In ED, MRI with acute compression fracture at T11 with almost 25% height loss and retropulsion of the posterior cortex into the spinal canal. This results in severe canal stenosis and posterior cord displacement. Patient is admitted to hospital medicine on 11/25 for pain management and cirrhosis. Patient in significant amount of pain and started on oxycodone and dilaudid PRN. She has a MELD score of 11 on admission and was continued on home lasix and propanolol and started on " lactulose. NSGY saw the patient and ordered CT T/L and CT chest and MRI C/L/Tfor staging. CT chest showed multiple pulmonary brandi which could be potionaly metastasis and pathological fracture in T 11 with retropulsion and with spinal cord compression.  NSGY decided not top do surgery and recommended treating the pt with radiation therapy. Pt seen by radiotherapy for palliative radiotherapy.     On 12/2, patient reports she is very ready to leave hospital and finish her palliative radiation outpatient. Radiation/oncology scheduled next radiation therapy for outpatient. Spoke with neurosurgery who recommended an oral dexamethasone taper upon discharge. Spoke with patient's daughter over the phone on 12/2, and she states she is eager for her mother to be discharged and that she will have no problem taking care of her mother as they live together and she will be able to take patient to and from appointments. Also reports her cousin would like to help as well. Discussed plan of care with patient late afternoon, and she was excited about her discharge today. In the evening of 12/2, received call from nurse that patient's daughter cannot take patient to next appointment. Met with patient and her daughter in person in evening and it seemed that the barrier to discharge was that patient required IV dilaudid for pain control at the end of the day and is concerned about her pain being controlled outside of the hospital. Reassured patient and daughter that we will work on a plan that all three of us feel is safe and in the best interest of the patient tomorrow. Discharge cancelled for tonight.        Patient received round 3 of 5 of radiation on 12/3. Patient reports back pain well controlled with oxycodone 15 q6 and 0.5 hydromorphone x1 overnight. Attempted to contact patient's daughter about a family meeting with hospital medicine and palliative tomorrow regarding placement/dispo after final round of radiation on 12/5 but  did not get an answer. Per patient, daughter will be at hospital around 3 pm tomorrow. Dispo pending 5th and final round of radiation on 12/5.     Interval History: Patient experiencing 9/10 back pain this morning. She reports a burning heartburn sensation when she swallows. She required oxycodone 15 q4 overnight Prn and dilaudid x1. She seemed to be speaking a little slowly today and was alert and oriented but initially thought Frank was the president. She deferred one dose of lactulose overnight but will continue to get TID. MELD Na 14 today (unchanged).    Review of Systems   Constitutional: Positive for activity change. Negative for appetite change, chills, fatigue and fever.   HENT: Negative for congestion, facial swelling, sore throat and trouble swallowing.    Respiratory: Negative for apnea, cough, chest tightness, shortness of breath and wheezing.    Cardiovascular: Positive for chest pain (burning discomfort when swallowing). Negative for palpitations and leg swelling.   Gastrointestinal: Positive for abdominal distention. Negative for blood in stool, diarrhea, nausea and vomiting.   Genitourinary: Negative for difficulty urinating, dysuria, flank pain and hematuria.   Musculoskeletal: Positive for back pain. Negative for arthralgias, myalgias and neck pain.   Skin: Negative for color change and rash.   Neurological: Negative for dizziness, weakness, light-headedness, numbness and headaches.   Hematological: Negative for adenopathy.   Psychiatric/Behavioral: Negative for agitation and behavioral problems.     Objective:     Vital Signs (Most Recent):  Temp: 98.3 °F (36.8 °C) (12/04/19 1645)  Pulse: (!) 57 (12/04/19 1645)  Resp: 16 (12/04/19 1645)  BP: (!) 128/57 (12/04/19 1645)  SpO2: 98 % (12/04/19 1645) Vital Signs (24h Range):  Temp:  [97.9 °F (36.6 °C)-98.5 °F (36.9 °C)] 98.3 °F (36.8 °C)  Pulse:  [57-63] 57  Resp:  [12-20] 16  SpO2:  [95 %-100 %] 98 %  BP: (125-184)/(57-85) 128/57     Weight: 44 kg (97  lb)  Body mass index is 22.45 kg/m².    Intake/Output Summary (Last 24 hours) at 12/4/2019 1754  Last data filed at 12/4/2019 0035  Gross per 24 hour   Intake 480 ml   Output --   Net 480 ml      Physical Exam   Constitutional: She is oriented to person, place, and time. She appears well-developed and well-nourished. She appears cachectic. No distress.   HENT:   Head: Normocephalic and atraumatic.   Eyes: Pupils are equal, round, and reactive to light. Conjunctivae and EOM are normal.   Neck: Normal range of motion. Neck supple.   Cardiovascular: Normal rate, regular rhythm and normal heart sounds.   No murmur heard.  Pulmonary/Chest: Effort normal and breath sounds normal. No respiratory distress. She has no wheezes. She has no rales.   Abdominal: Soft. Bowel sounds are normal. She exhibits distension. There is no tenderness.   Caput medusae    Musculoskeletal: She exhibits no edema or deformity.   Lymphadenopathy:     She has no cervical adenopathy.   Neurological: She is alert and oriented to person, place, and time. She displays normal reflexes. No cranial nerve deficit or sensory deficit. She exhibits normal muscle tone.   Thinks Frank is president today. Slight asterixis on exam.    Skin: Skin is warm. Capillary refill takes less than 2 seconds. She is not diaphoretic.   Psychiatric: She has a normal mood and affect. Her behavior is normal.       Significant Labs: All pertinent labs within the past 24 hours have been reviewed.    Significant Imaging: I have reviewed and interpreted all pertinent imaging results/findings within the past 24 hours.      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 Found to have compression fracture of T11 with canal stenosis and posterior cord displacement. Patient with sensation  intact but 0/5 muscle strength in b/l lower ext.    Plan  - Neurosurgery consult, recs appreciated  - Transition to Dexamethasone 4mg PO q6h   - Oxycodone 10 q4 PRN moderate pain, oxy 15 q4 PRN severe pain, hydromorphone 0.5 mg IV q6 PRN breakthrough pain  - NSGY recommend treating with radiotherapy rather than surgery  - Radiation oncology is consulted, appreciate their recs - completed round 3 of 5  - palliative recommending long acting morphine 15 PO BID for pain and remeron 7.5 mg PO QHS for insomnia        Steroid-induced hyperglycemia  - basal insulin 9 units, bolus 3 units TID  - POCT QACHS  - adjust as appropriate with steroid taper in mind      Encounter for pre-operative cardiovascular clearance    Surgical Risk Assessment   Active cardiac issues:  Active decompensated heart failure? No   Unstable angina?  No   Significant uncontrolled arrhythmias? No   Severe valvular heart disease-Aortic or Mitral Stenosis? No   Recent MI or coronary revascularization < 30 days? No     Cardiac Risk Factors  History of CAD/ischemic heart disease? No   History of cerebrovascular disease? No   History of compensated heart failure? No   Type 2 diabetes requiring insulin? No   Serum Creatinine > 2? No   Total cardiac risk factors 0     Assessment/Plan:   Cardiovascular Risk Assessment:  Non-emergent surgery.  No active cardiac problems (such as unstable angina, decompensated heart failure, significant uncontrolled arrhythmias or severe valvular disease).  Surgery is _Interemediate risk  Functional Status: Functional mets  < 4 METS. Poor functional status, unable to perform ADLS at this time.    Revised Cardiac Risk Index   1 predictor = 6.0%  RCRI Calculator Class and Risk percentage:  1 predictor,  which is a 6.0% risk of adverse cardiac event in 30 DAYS.    Anticoagulation: On Apixaban for b/l  Pulmonary embolism.                          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  See note on spinal  cord compression.      Ascites due to alcoholic cirrhosis  Last paracentesis was 2 weeks ago. Patient with distended abdomen states no significant increase since last paracentesis  Supposed to be on Lasix 40mg, but currenlty held by heme/onc due to recent PRACHI. Denies SOB.    - Change lasix from 40 to 20 mg PO daily  - change spironolactone from 100 to 50 daily    Chronic pulmonary embolism  - Patient on Eliquis 5mg BID      HCC (hepatocellular carcinoma)  Follows with heme/onc not a transplant candidate. Previously on Nivolumab immunotherapy but currently held due to recent PRACHI.    - F/u outpatient with heme/onc      Esophageal varices in alcoholic cirrhosis  Denies hematemesis    - Continue home propanolol 10mg BID      Alcoholic cirrhosis  Hepatocellular carcinoma, alcoholic cirrhosis( dx 2015), portal HTN, ascites, hx of variceal bleeding in January 2018 s/p banding,B/l pulmonary embolism,  hx of ETOH abuse (quit Jan 2018). Slightly encephalopathic on 12/4 after deferring her nighttime dose of lactulose.    - she has abdominal destination   - MELD Na is 14 today  - lasix 40 PO  - f/u volume status and MELD score daily  - continue lactulose 30 ml/ BID and encourage use      VTE Risk Mitigation (From admission, onward)         Ordered     apixaban tablet 5 mg  2 times daily      11/29/19 1420     Place sequential compression device  Until discontinued      11/25/19 2243     Reason for No Pharmacological VTE Prophylaxis  Once     Question:  Reasons:  Answer:  Already adequately anticoagulated on oral Anticoagulants    11/25/19 2243     IP VTE HIGH RISK PATIENT  Once      11/25/19 2243                      Charla Jackson MD  Department of Hospital Medicine   [facility]

## 2019-12-05 NOTE — ASSESSMENT & PLAN NOTE
65 yo female with Hepatocellular carcinoma, alcoholic cirrhosis, ascites, b/l pulmonary PE presents with b/l lower extremity weakness and back pain. Found to have compression fracture of T11 with canal stenosis and posterior cord displacement. Patient with sensation intact but 0/5 muscle strength in b/l lower ext.    Plan  - Neurosurgery consult, recs appreciated  - Transition to Dexamethasone 4mg PO q6h   - Oxycodone 10 q4 PRN moderate pain, oxy 15 q4 PRN severe pain, hydromorphone 0.5 mg IV q6 PRN breakthrough pain  - NSGY recommend treating with radiotherapy rather than surgery  - Radiation oncology is consulted, appreciate their recs - completed round 3 of 5  - palliative recommending long acting morphine 15 PO BID for pain and remeron 7.5 mg PO QHS for insomnia

## 2019-12-05 NOTE — PROGRESS NOTES
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 Marks is a 63-year-old  female with Hepatocellular carcinoma, alcoholic cirrhosis( dx 2015), portal HTN, ascites, hx of variceal bleeding in January 2018 s/p banding,B/l pulmonary embolism,  hx of ETOH abuse (quit Jan 2018) who presents to the ED with back pain and b/l lower extremity weakness. Patient states that symptoms have been on going for the past week and half and have progressed to the point where she cannot lift her leg against gravity or walk. She decribes "electric shocklike" sensations down her legs bilaterally. She denies any falls or trauma. She complains of back pain and LUQ & LLQ abdominal pain that radiates to her back. She denies any bladder/bowel incontinence. She denies fever, chills, vomiting. She    In ED, MRI with Acute compression fracture at T11 with almost 25% height loss and retropulsion of the posterior cortex into the spinal canal consistent with severe canal stenosis and posterior cord displacement    Overview/Hospital Course:   In ED, MRI with acute compression fracture at T11 with almost 25% height loss and retropulsion of the posterior cortex into the spinal canal. This results in severe canal stenosis and posterior cord displacement. Patient is admitted to hospital medicine on 11/25 for pain management and cirrhosis. Patient in significant amount of pain and started on oxycodone and dilaudid PRN. She has a MELD score of 11 on admission and was continued on home lasix and propanolol and started on " lactulose. NSGY saw the patient and ordered CT T/L and CT chest and MRI C/L/Tfor staging. CT chest showed multiple pulmonary brandi which could be potionaly metastasis and pathological fracture in T 11 with retropulsion and with spinal cord compression.  NSGY decided not top do surgery and recommended treating the pt with radiation therapy. Pt seen by radiotherapy for palliative radiotherapy.     On 12/2, patient reports she is very ready to leave hospital and finish her palliative radiation outpatient. Radiation/oncology scheduled next radiation therapy for outpatient. Spoke with neurosurgery who recommended an oral dexamethasone taper upon discharge. Spoke with patient's daughter over the phone on 12/2, and she states she is eager for her mother to be discharged and that she will have no problem taking care of her mother as they live together and she will be able to take patient to and from appointments. Also reports her cousin would like to help as well. Discussed plan of care with patient late afternoon, and she was excited about her discharge today. In the evening of 12/2, received call from nurse that patient's daughter cannot take patient to next appointment. Met with patient and her daughter in person in evening and it seemed that the barrier to discharge was that patient required IV dilaudid for pain control at the end of the day and is concerned about her pain being controlled outside of the hospital. Reassured patient and daughter that we will work on a plan that all three of us feel is safe and in the best interest of the patient tomorrow. Discharge cancelled for tonight.        Patient received round 5 of 5 of radiation on 12/5. Patient reports back pain controlled with oxycodone 15 q6 and 0.5 hydromorphone x1 overnight. Patient deferred lactulose x3 yesterday and is confused with asterixis on 12/5 (MELD Na 15). Palliative recommending consideration for morphine and remeron upon discharge. Patient ready  for discharge pending hospice placement tomorrow (12/6). Per palliative, LaPost reviewed with patient, who elects DNR, comfort focused treatment, no tube feeding. LaPost placed in patient's chart for physician review and signature.    Interval History: Complaining of some burning discomfort in her chest when she swallows. Reports back pain 9/10 though appears less than a 9. Deferred lactulose x3 yesterday and thinks Frank is president, it's April, and has asterixis on exam. MELD Na 15 (up from 14 yesterday). Patient reports she doesn't like having so many uncomfortable bowel movements with the lactulose. We discussed the benefits and disadvantages of lactulose. Had discussion with daughter and patient about plan for hospice tomorrow as well as discussion with daughter to answer her questions about her mother's different medical problems and physical limitations moving forward.     Review of Systems   Constitutional: Positive for activity change. Negative for appetite change, chills, fatigue and fever.   HENT: Negative for congestion, facial swelling, sore throat and trouble swallowing.    Respiratory: Negative for apnea, cough, chest tightness, shortness of breath and wheezing.    Cardiovascular: Positive for chest pain (burning discomfort when swallowing). Negative for palpitations and leg swelling.   Gastrointestinal: Positive for abdominal distention. Negative for blood in stool, diarrhea, nausea and vomiting.   Genitourinary: Negative for difficulty urinating, dysuria, flank pain and hematuria.   Musculoskeletal: Positive for back pain. Negative for arthralgias, myalgias and neck pain.   Skin: Negative for color change and rash.   Neurological: Negative for dizziness, weakness, light-headedness, numbness and headaches.   Hematological: Negative for adenopathy.   Psychiatric/Behavioral: Negative for agitation and behavioral problems.     Objective:     Vital Signs (Most Recent):  Temp: 97.6 °F (36.4 °C) (12/05/19  1603)  Pulse: 60 (12/05/19 1603)  Resp: 14 (12/05/19 1603)  BP: (!) 109/57 (12/05/19 1603)  SpO2: 98 % (12/05/19 1603) Vital Signs (24h Range):  Temp:  [97.6 °F (36.4 °C)-98.6 °F (37 °C)] 97.6 °F (36.4 °C)  Pulse:  [55-61] 60  Resp:  [14-20] 14  SpO2:  [98 %-100 %] 98 %  BP: (109-135)/(56-71) 109/57     Weight: 44 kg (97 lb)  Body mass index is 22.45 kg/m².    Intake/Output Summary (Last 24 hours) at 12/5/2019 1615  Last data filed at 12/4/2019 1800  Gross per 24 hour   Intake --   Output 700 ml   Net -700 ml      Physical Exam   Constitutional: She appears well-developed and well-nourished. She appears cachectic. No distress.   HENT:   Head: Normocephalic and atraumatic.   Eyes: Pupils are equal, round, and reactive to light. Conjunctivae and EOM are normal.   Neck: Normal range of motion. Neck supple.   Cardiovascular: Normal rate, regular rhythm and normal heart sounds.   No murmur heard.  Pulmonary/Chest: Effort normal and breath sounds normal. No respiratory distress. She has no wheezes. She has no rales.   Abdominal: Soft. Bowel sounds are normal. She exhibits distension. There is no tenderness.   Caput medusae    Musculoskeletal: She exhibits no edema or deformity.   Lymphadenopathy:     She has no cervical adenopathy.   Neurological: She is alert. She displays normal reflexes. No cranial nerve deficit or sensory deficit. She exhibits normal muscle tone.   Oriented to person, not place (thinks at [facility]), or time (thinks it's April of 2019). Thinks Frank is president today. Asterixis on exam.    Skin: Skin is warm. Capillary refill takes less than 2 seconds. She is not diaphoretic.   Psychiatric: She has a normal mood and affect. Her behavior is normal.       Significant Labs: All pertinent labs within the past 24 hours have been reviewed.    Significant Imaging: I have reviewed and interpreted all pertinent imaging results/findings within the past 24 hours.      Assessment/Plan:      * Spinal cord compression  due to malignant neoplasm metastatic to spine  65 yo female with Hepatocellular carcinoma, alcoholic cirrhosis, ascites, b/l pulmonary PE presents with b/l lower extremity weakness and back pain. Found to have compression fracture of T11 with canal stenosis and posterior cord displacement. Patient with sensation intact but 0/5 muscle strength in b/l lower ext.    Plan  - Neurosurgery consult, recs appreciated  - Transition to Dexamethasone 4mg PO q6h   - Oxycodone 10 q4 PRN moderate pain, oxy 15 q4 PRN severe pain, hydromorphone 0.5 mg IV q6 PRN breakthrough pain  - NSGY recommend treating with radiotherapy rather than surgery  - Radiation oncology is consulted, appreciate their recs - completed round 5 of 5  - palliative recommending long acting morphine 15 PO BID for pain and remeron 7.5 mg PO QHS for insomnia        Steroid-induced hyperglycemia  - basal insulin 9 units, bolus 3 units TID  - POCT QACHS  - adjust as appropriate with steroid taper in mind      Encounter for pre-operative cardiovascular clearance    Surgical Risk Assessment   Active cardiac issues:  Active decompensated heart failure? No   Unstable angina?  No   Significant uncontrolled arrhythmias? No   Severe valvular heart disease-Aortic or Mitral Stenosis? No   Recent MI or coronary revascularization < 30 days? No     Cardiac Risk Factors  History of CAD/ischemic heart disease? No   History of cerebrovascular disease? No   History of compensated heart failure? No   Type 2 diabetes requiring insulin? No   Serum Creatinine > 2? No   Total cardiac risk factors 0     Assessment/Plan:   Cardiovascular Risk Assessment:  Non-emergent surgery.  No active cardiac problems (such as unstable angina, decompensated heart failure, significant uncontrolled arrhythmias or severe valvular disease).  Surgery is _Interemediate risk  Functional Status: Functional mets  < 4 METS. Poor functional status, unable to perform ADLS at this time.    Revised Cardiac Risk  Index   1 predictor = 6.0%  RCRI Calculator Class and Risk percentage:  1 predictor,  which is a 6.0% risk of adverse cardiac event in 30 DAYS.    Anticoagulation: On Apixaban for b/l  Pulmonary embolism.                          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  See note on spinal cord compression.      Ascites due to alcoholic cirrhosis  Last paracentesis was 2 weeks ago. Patient with distended abdomen states no significant increase since last paracentesis  Supposed to be on Lasix 40mg, but currenlty held by heme/onc due to recent PRACHI. Denies SOB.    - Change lasix from 40 to 20 mg PO daily  - change spironolactone from 100 to 50 daily    Chronic pulmonary embolism  - Patient on Eliquis 5mg BID      HCC (hepatocellular carcinoma)  Follows with heme/onc not a transplant candidate. Previously on Nivolumab immunotherapy but currently held due to recent PRACHI.    - F/u outpatient with heme/onc      Esophageal varices in alcoholic cirrhosis  Denies hematemesis    - Continue home propanolol 10mg BID      Alcoholic cirrhosis  Hepatocellular carcinoma, alcoholic cirrhosis( dx 2015), portal HTN, ascites, hx of variceal bleeding in January 2018 s/p banding,B/l pulmonary embolism,  hx of ETOH abuse (quit Jan 2018). Slightly encephalopathic on 12/4 after deferring her nighttime dose of lactulose with worsened encephalopathy on 12/5.    - she has abdominal destination   - MELD Na is 15 today  - lasix 20 PO  - f/u volume status and MELD score daily  - continue lactulose 30 ml/ BID and encourage use      VTE Risk Mitigation (From admission, onward)         Ordered     Place sequential compression device  Until discontinued      11/25/19 2243     Reason for No Pharmacological VTE Prophylaxis  Once     Question:  Reasons:  Answer:  Already adequately anticoagulated on oral Anticoagulants    11/25/19 2243     IP VTE HIGH RISK PATIENT  Once      11/25/19 2243                      Charla Jackson,  MD  Department of Hospital Medicine   [facility]

## 2019-12-05 NOTE — PLAN OF CARE
Pt AAOX4. VSS. No complaints at this time. Family at bedside. Safety maintained. Bed in lowest and locked position. Will continue to monitor.  Problem: Fall Injury Risk  Goal: Absence of Fall and Fall-Related Injury  Outcome: Ongoing, Progressing     Problem: Adult Inpatient Plan of Care  Goal: Plan of Care Review  Outcome: Ongoing, Progressing     Problem: Adult Inpatient Plan of Care  Goal: Patient-Specific Goal (Individualization)  Outcome: Ongoing, Progressing     Problem: Adult Inpatient Plan of Care  Goal: Absence of Hospital-Acquired Illness or Injury  Outcome: Ongoing, Progressing     Problem: Adult Inpatient Plan of Care  Goal: Optimal Comfort and Wellbeing  Outcome: Ongoing, Progressing     Problem: Adult Inpatient Plan of Care  Goal: Readiness for Transition of Care  Outcome: Ongoing, Progressing

## 2019-12-05 NOTE — ASSESSMENT & PLAN NOTE
63 yo female with Hepatocellular carcinoma, alcoholic cirrhosis, ascites, b/l pulmonary PE presents with b/l lower extremity weakness and back pain. Found to have compression fracture of T11 with canal stenosis and posterior cord displacement. Patient with sensation intact but 0/5 muscle strength in b/l lower ext.    Plan  - Neurosurgery consult, recs appreciated  - Transition to Dexamethasone 4mg PO q6h   - Oxycodone 10 q4 PRN moderate pain, oxy 15 q4 PRN severe pain, hydromorphone 0.5 mg IV q6 PRN breakthrough pain  - NSGY recommend treating with radiotherapy rather than surgery  - Radiation oncology is consulted, appreciate their recs - completed round 5 of 5  - palliative recommending long acting morphine 15 PO BID for pain and remeron 7.5 mg PO QHS for insomnia

## 2019-12-05 NOTE — ASSESSMENT & PLAN NOTE
Hepatocellular carcinoma, alcoholic cirrhosis( dx 2015), portal HTN, ascites, hx of variceal bleeding in January 2018 s/p banding,B/l pulmonary embolism,  hx of ETOH abuse (quit Jan 2018). Slightly encephalopathic on 12/4 after deferring her nighttime dose of lactulose with worsened encephalopathy on 12/5.    - she has abdominal destination   - MELD Na is 15 today  - lasix 20 PO  - f/u volume status and MELD score daily  - continue lactulose 30 ml/ BID and encourage use

## 2019-12-05 NOTE — SUBJECTIVE & OBJECTIVE
Interval History: Complaining of some burning discomfort in her chest when she swallows. Reports back pain 9/10 though appears less than a 9. Deferred lactulose x3 yesterday and thinks Frank is president, it's April, and has asterixis on exam. MELD Na 15 (up from 14 yesterday). Patient reports she doesn't like having so many uncomfortable bowel movements with the lactulose. We discussed the benefits and disadvantages of lactulose. Had discussion with daughter and patient about plan for hospice tomorrow as well as discussion with daughter to answer her questions about her mother's different medical problems and physical limitations moving forward.     Review of Systems   Constitutional: Positive for activity change. Negative for appetite change, chills, fatigue and fever.   HENT: Negative for congestion, facial swelling, sore throat and trouble swallowing.    Respiratory: Negative for apnea, cough, chest tightness, shortness of breath and wheezing.    Cardiovascular: Positive for chest pain (burning discomfort when swallowing). Negative for palpitations and leg swelling.   Gastrointestinal: Positive for abdominal distention. Negative for blood in stool, diarrhea, nausea and vomiting.   Genitourinary: Negative for difficulty urinating, dysuria, flank pain and hematuria.   Musculoskeletal: Positive for back pain. Negative for arthralgias, myalgias and neck pain.   Skin: Negative for color change and rash.   Neurological: Negative for dizziness, weakness, light-headedness, numbness and headaches.   Hematological: Negative for adenopathy.   Psychiatric/Behavioral: Negative for agitation and behavioral problems.     Objective:     Vital Signs (Most Recent):  Temp: 97.6 °F (36.4 °C) (12/05/19 1603)  Pulse: 60 (12/05/19 1603)  Resp: 14 (12/05/19 1603)  BP: (!) 109/57 (12/05/19 1603)  SpO2: 98 % (12/05/19 1603) Vital Signs (24h Range):  Temp:  [97.6 °F (36.4 °C)-98.6 °F (37 °C)] 97.6 °F (36.4 °C)  Pulse:  [55-61] 60  Resp:   [14-20] 14  SpO2:  [98 %-100 %] 98 %  BP: (109-135)/(56-71) 109/57     Weight: 44 kg (97 lb)  Body mass index is 22.45 kg/m².    Intake/Output Summary (Last 24 hours) at 12/5/2019 1615  Last data filed at 12/4/2019 1800  Gross per 24 hour   Intake --   Output 700 ml   Net -700 ml      Physical Exam   Constitutional: She appears well-developed and well-nourished. She appears cachectic. No distress.   HENT:   Head: Normocephalic and atraumatic.   Eyes: Pupils are equal, round, and reactive to light. Conjunctivae and EOM are normal.   Neck: Normal range of motion. Neck supple.   Cardiovascular: Normal rate, regular rhythm and normal heart sounds.   No murmur heard.  Pulmonary/Chest: Effort normal and breath sounds normal. No respiratory distress. She has no wheezes. She has no rales.   Abdominal: Soft. Bowel sounds are normal. She exhibits distension. There is no tenderness.   Caput medusae    Musculoskeletal: She exhibits no edema or deformity.   Lymphadenopathy:     She has no cervical adenopathy.   Neurological: She is alert. She displays normal reflexes. No cranial nerve deficit or sensory deficit. She exhibits normal muscle tone.   Oriented to person, not place (thinks at [facility]), or time (thinks it's April of 2019). Thinks Frank is president today. Asterixis on exam.    Skin: Skin is warm. Capillary refill takes less than 2 seconds. She is not diaphoretic.   Psychiatric: She has a normal mood and affect. Her behavior is normal.       Significant Labs: All pertinent labs within the past 24 hours have been reviewed.    Significant Imaging: I have reviewed and interpreted all pertinent imaging results/findings within the past 24 hours.

## 2019-12-05 NOTE — PLAN OF CARE
Patient has been accepted to [facility] and is scheduled to be d/c on tomorrow. SW will continue to follow.     Yolette Baltazar LMSW   - [facility]  Ext. [phone]

## 2019-12-05 NOTE — ASSESSMENT & PLAN NOTE
Hepatocellular carcinoma, alcoholic cirrhosis( dx 2015), portal HTN, ascites, hx of variceal bleeding in January 2018 s/p banding,B/l pulmonary embolism,  hx of ETOH abuse (quit Jan 2018). Slightly encephalopathic on 12/4 after deferring her nighttime dose of lactulose.    - she has abdominal destination   - MELD Na is 14 today  - lasix 40 PO  - f/u volume status and MELD score daily  - continue lactulose 30 ml/ BID and encourage use

## 2019-12-06 VITALS
HEIGHT: 55 IN | WEIGHT: 97 LBS | RESPIRATION RATE: 18 BRPM | BODY MASS INDEX: 22.45 KG/M2 | DIASTOLIC BLOOD PRESSURE: 61 MMHG | HEART RATE: 65 BPM | SYSTOLIC BLOOD PRESSURE: 131 MMHG | TEMPERATURE: 99 F | OXYGEN SATURATION: 98 %

## 2019-12-06 LAB — POCT GLUCOSE: 146 MG/DL (ref 70–110)

## 2019-12-06 PROCEDURE — 63600175 PHARM REV CODE 636 W HCPCS: Performed by: INTERNAL MEDICINE

## 2019-12-06 PROCEDURE — 99239 PR HOSPITAL DISCHARGE DAY,>30 MIN: ICD-10-PCS | Mod: ,,, | Performed by: INTERNAL MEDICINE

## 2019-12-06 PROCEDURE — 25000003 PHARM REV CODE 250: Performed by: STUDENT IN AN ORGANIZED HEALTH CARE EDUCATION/TRAINING PROGRAM

## 2019-12-06 PROCEDURE — 63600175 PHARM REV CODE 636 W HCPCS: Performed by: STUDENT IN AN ORGANIZED HEALTH CARE EDUCATION/TRAINING PROGRAM

## 2019-12-06 PROCEDURE — 99239 HOSP IP/OBS DSCHRG MGMT >30: CPT | Mod: ,,, | Performed by: INTERNAL MEDICINE

## 2019-12-06 RX ORDER — MORPHINE SULFATE 15 MG/1
15 TABLET, FILM COATED, EXTENDED RELEASE ORAL 2 TIMES DAILY
Refills: 0
Start: 2019-12-06

## 2019-12-06 RX ORDER — HYDROMORPHONE HYDROCHLORIDE 1 MG/ML
1 INJECTION, SOLUTION INTRAMUSCULAR; INTRAVENOUS; SUBCUTANEOUS ONCE
Status: COMPLETED | OUTPATIENT
Start: 2019-12-06 | End: 2019-12-06

## 2019-12-06 RX ADMIN — LACTULOSE 30 G: 20 SOLUTION ORAL at 10:12

## 2019-12-06 RX ADMIN — FAMOTIDINE 20 MG: 20 TABLET ORAL at 10:12

## 2019-12-06 RX ADMIN — OXYCODONE HYDROCHLORIDE 15 MG: 10 TABLET ORAL at 06:12

## 2019-12-06 RX ADMIN — FUROSEMIDE 20 MG: 20 TABLET ORAL at 10:12

## 2019-12-06 RX ADMIN — DEXAMETHASONE 4 MG: 4 TABLET ORAL at 06:12

## 2019-12-06 RX ADMIN — SPIRONOLACTONE 50 MG: 25 TABLET ORAL at 10:12

## 2019-12-06 RX ADMIN — HYDROMORPHONE HYDROCHLORIDE 1 MG: 1 INJECTION, SOLUTION INTRAMUSCULAR; INTRAVENOUS; SUBCUTANEOUS at 01:12

## 2019-12-06 NOTE — PLAN OF CARE
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 [G95.29, C79.51]  Yes    Steroid-induced hyperglycemia [R73.9, T38.0X5A]  Unknown    Palliative care encounter [Z51.5]  Not Applicable    Counseling regarding advanced care planning and goals of care [Z71.89]  Not Applicable    Back pain [M54.9]  Yes    Insomnia [G47.00]  Yes    Acute urinary obstruction [N13.9]  Yes    Hypomagnesemia [E83.42]  Yes    Pulmonary nodules/lesions, multiple [R91.8]  Yes    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]  Yes    Adrenal nodule [E27.9]  Yes    Pleural effusion, right [J90]  Yes    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 [M84.58XA]  Yes    Encounter for pre-operative cardiovascular clearance [Z01.810]  Not Applicable    Weakness of both lower extremities [R29.898]  Yes    Chronic pulmonary embolism [I27.82]  Yes    Ascites due to alcoholic cirrhosis [K70.31]  Yes    HCC (hepatocellular carcinoma) [C22.0]  Yes    Portal hypertensive gastropathy [K76.6, K31.89]  Yes     Chronic    Esophageal varices in alcoholic cirrhosis [K70.30, I85.10]  Yes     Chronic    Alcoholic cirrhosis [K70.30]  Yes     Chronic    Severe malnutrition [E43]  Yes    Iron deficiency anemia due to chronic blood loss [D50.0]  Yes      Resolved Hospital Problems   No resolved problems to display.       Hospice Qualifying Diagnoses:       Hospice Qualifying Diagnoses:       Patient has a life expectancy < 6 months due to:  Primary Hospice Diagnosis: Hepatocellular Carcinoma with bone metastasis   Comorbid Conditions Contributing to Decline: Pathologic fracture of vertebra; spinal cord compression, Liver  "cirrhosis with esophageal varices     Vital Signs: Routine per Hospice Protocol.     Code Status: DNR/DNI     Allergies: Review of patient's allergies indicates:  No Known Allergies     Diet: Regular     Activities: As tolerated     Nursing: Per Hospice Routine.     Garcia Care: Empty Garcia bag Q shift and PRN.  Change Garcia every month.     Routine Skin for Bedridden Patients: Apply moisture barrier cream to all skin folds and   wet areas in perineal area daily and after baths and all bowel movements       KendrickGeno alexandera Palmira   Home Medication Instructions CROW:[id number]    Printed on:12/06/19 7502   Medication Information                      dexAMETHasone (DECADRON) 2 MG tablet  Take 2 tablets (4mg) every 6 hours x 3 days, then 2 tabs (4mg) every 8 hours x 3 days, then 2 tabs (4mg) every 12 hours x 3 days, then 1 tab (2mg) every 8 hours x 3 days, then 1 tab (2mg) every 12 hours x 3 days, then 1 tab (2mg) daily x 6 days             famotidine (PEPCID) 20 MG tablet  Take 1 tablet (20 mg total) by mouth once daily.             furosemide (LASIX) 20 MG tablet  Take 1 tablet (20 mg total) by mouth once daily.             insulin glargine 100 units/mL (3mL) SubQ pen  Inject 7 Units into the skin every evening.             lactulose (CHRONULAC) 10 gram/15 mL solution  Take 30 mL by mouth three times daily. Try to accomplish 3-4 bowel movements per day.             melatonin (MELATIN)  Take 2 tablets (6 mg total) by mouth nightly.             morphine (MS CONTIN) 15 MG 12 hr tablet  Take 1 tablet (15 mg total) by mouth 2 (two) times daily.             oxyCODONE (ROXICODONE) 5 MG immediate release tablet  Take 2 tablets (10 mg total) by mouth every 6 (six) hours as needed for Pain.             pen needle, diabetic 32 gauge x 5/32" Ndle  Once daily with insulin pens             promethazine (PHENERGAN) 25 MG tablet  Take 1 tablet (25 mg total) by mouth every 6 (six) hours as needed for Nausea.             spironolactone " (ALDACTONE) 50 MG tablet  Take 1 tablet (50 mg total) by mouth once daily.                   DIABETES CARE:      Fingerstick blood sugar a.m. and p.m.      Report CBG < 60 or > 350 to physician.    Future Orders:  Hospice Medical Director may dictate new orders for comfortable care measures & sign death certificate.        _________________________________  Saúl Aguilar MD  12/06/2019

## 2019-12-06 NOTE — PLAN OF CARE
12/06/19 1132   Final Note   Assessment Type Final Discharge Note   Anticipated Discharge Disposition HospiceHome   What phone number can be called within the next 1-3 days to see how you are doing after discharge? [phone]   Hospital Follow Up  Appt(s) scheduled? Yes   Discharge plans and expectations educations in teach back method with documentation complete? Yes   Right Care Referral Info   Post Acute Recommendation IRF   Referral Type Hospice   Facility Name [address] [place] [address]

## 2019-12-06 NOTE — PLAN OF CARE
12/06/19 1125   Post-Acute Status   Post-Acute Authorization Home Health/Hospice   Home Health/Hospice Status Set-up Complete       Pt has been accepted by [facility].  Transport setup by EMS for 1pm.  Talked with Barry ([phone]) with Hospice and patient is ready to be D/C home. SW in contact with CM and Medical staff. Will continue to follow and offer support as needed.     Doron Washington, IMAN  [facility]   Ext. [phone]

## 2019-12-07 NOTE — DISCHARGE SUMMARY
"[facility] Hospital Medicine  Discharge Summary      Patient Name: Neena Marks  MRN: [id number]  Admission Date: 11/25/2019  Hospital Length of Stay: 11 days  Discharge Date and Time:  12/07/2019 6:54 AM  Attending Physician: No att. providers found   Discharging Provider: Saúl Aguilar MD  Primary Care Provider: [facility] Medicine Team: [facility] 2 Saúl Aguilar MD    HPI:   Ms. Marks is a 63-year-old  female with Hepatocellular carcinoma, alcoholic cirrhosis( dx 2015), portal HTN, ascites, hx of variceal bleeding in January 2018 s/p banding,B/l pulmonary embolism,  hx of ETOH abuse (quit Jan 2018) who presents to the ED with back pain and b/l lower extremity weakness. Patient states that symptoms have been on going for the past week and half and have progressed to the point where she cannot lift her leg against gravity or walk. She decribes "electric shocklike" sensations down her legs bilaterally. She denies any falls or trauma. She complains of back pain and LUQ & LLQ abdominal pain that radiates to her back. She denies any bladder/bowel incontinence. She denies fever, chills, vomiting. She    In ED, MRI with Acute compression fracture at T11 with almost 25% height loss and retropulsion of the posterior cortex into the spinal canal consistent with severe canal stenosis and posterior cord displacement    Procedure(s) (LRB):  CORPECTOMY, SPINE, THORACIC, T10, with T8-T12 percutaneous screws (N/A)      Hospital Course:    In ED, MRI with acute compression fracture at T11 with almost 25% height loss and retropulsion of the posterior cortex into the spinal canal. This results in severe canal stenosis and posterior cord displacement. Patient is admitted to hospital medicine on 11/25 for pain management and cirrhosis. Patient in significant amount of pain and started on oxycodone and dilaudid PRN. She has a MELD score of 11 on admission and was " continued on home lasix and propanolol and started on lactulose. NSGY saw the patient and ordered CT T/L and CT chest and MRI C/L/Tfor staging. CT chest showed multiple pulmonary brandi which could be potionaly metastasis and pathological fracture in T 11 with retropulsion and with spinal cord compression.  NSGY decided not top do surgery and recommended treating the pt with radiation therapy. Pt seen by radiotherapy for palliative radiotherapy.     On 12/2, patient reports she is very ready to leave hospital and finish her palliative radiation outpatient. Radiation/oncology scheduled next radiation therapy for outpatient. Spoke with neurosurgery who recommended an oral dexamethasone taper upon discharge. Spoke with patient's daughter over the phone on 12/2, and she states she is eager for her mother to be discharged and that she will have no problem taking care of her mother as they live together and she will be able to take patient to and from appointments. Also reports her cousin would like to help as well. Discussed plan of care with patient late afternoon, and she was excited about her discharge today. In the evening of 12/2, received call from nurse that patient's daughter cannot take patient to next appointment. Met with patient and her daughter in person in evening and it seemed that the barrier to discharge was that patient required IV dilaudid for pain control at the end of the day and is concerned about her pain being controlled outside of the hospital. Reassured patient and daughter that we will work on a plan that all three of us feel is safe and in the best interest of the patient tomorrow. Discharge cancelled for tonight.        Patient received round 5 of 5 of radiation on 12/5. Patient reports back pain controlled with oxycodone 15 q6 and 0.5 hydromorphone x1 overnight. Patient deferred lactulose x3 yesterday and is confused with asterixis on 12/5 (MELD Na 15). Palliative recommending consideration  "for morphine and remeron upon discharge. Patient ready for discharge pending hospice placement tomorrow (12/6). Per palliative, LaPost reviewed with patient, who elects DNR, comfort focused treatment, no tube feeding. LaPost placed in patient's chart for physician review and signature.     12/06/2019: Patient seen and examined at the bedside. No acute events overnight. Patient had mild indigestion relieved with famotidine. Home health Hospice orders confirmed by case management and transport arranged for home. Patient's sister cousin and daughter updated with developments at the bedside.     Review of Systems   Constitutional: Positive for activity change. Negative for appetite change, chills, fatigue and fever.   HENT: Negative for congestion, facial swelling, sore throat and trouble swallowing.    Respiratory: Negative for apnea, cough, chest tightness, shortness of breath and wheezing.    Cardiovascular: Positive for epigastric discomfort   Negative for palpitations and leg swelling.   Gastrointestinal. Negative for blood in stool, diarrhea, nausea and vomiting.   Genitourinary: Negative for difficulty urinating, dysuria, flank pain and hematuria.   Musculoskeletal: Positive for back pain. Negative for arthralgias, myalgias and neck pain.   Skin: Negative for color change and rash.   Neurological: Negative for dizziness, weakness, light-headedness, numbness and headaches.   Hematological: Negative for adenopathy.   Psychiatric/Behavioral: Negative for agitation and behavioral problems.     /61 (Patient Position: Lying)   Pulse 65   Temp 99 °F (37.2 °C) (Oral)   Resp 18   Ht 4' 7.12" (1.4 m)   Wt 44 kg (97 lb)   SpO2 98%   Breastfeeding? No   BMI 22.45 kg/m²     Physical Exam   Constitutional: She is oriented to person, place, and time. She appears well-developed and well-nourished. She appears cachectic with temporal wasting. No distress.   HENT:   Head: Normocephalic and atraumatic.   Eyes: Pupils " are equal, round, and reactive to light. Conjunctivae and EOM are normal.   Neck: Normal range of motion. Neck supple.   Cardiovascular: Normal rate, regular rhythm and normal heart sounds.   No murmur heard.  Pulmonary/Chest: Effort normal and breath sounds normal. No respiratory distress. She has no wheezes. She has no rales.   Abdominal: Soft. Bowel sounds are normal. She exhibits no distention. There is no tenderness.   Caput medusae    Musculoskeletal: She exhibits no edema or deformity.   Lymphadenopathy:     She has no cervical adenopathy.   Neurological: She is alert and oriented to person, place, and time. She displays normal reflexes. No cranial nerve deficit or sensory deficit. She exhibits normal muscle tone. asterixis present  Skin: Skin is warm. Capillary refill takes less than 2 seconds. She is not diaphoretic.   Psychiatric: She has a normal mood and affect. Her behavior is normal.     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 [G95.29, C79.51] 11/25/2019 Yes    Steroid-induced hyperglycemia [R73.9, T38.0X5A] 12/03/2019 Unknown    Palliative care encounter [Z51.5] 11/29/2019 Not Applicable    Counseling regarding advanced care planning and goals of care [Z71.89] 11/29/2019 Not Applicable    Back pain [M54.9] 11/29/2019 Yes    Insomnia [G47.00] 11/29/2019 Yes    Acute urinary obstruction [N13.9] 11/27/2019 Yes    Hypomagnesemia [E83.42] 11/27/2019 Yes    Pulmonary nodules/lesions, multiple  [R91.8] 11/27/2019 Yes    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] 11/27/2019 Yes    Adrenal nodule [E27.9] 11/27/2019 Yes    Pleural effusion, right [J90] 11/27/2019 Yes    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 [M84.58XA] 11/26/2019 Yes    Encounter for pre-operative cardiovascular clearance [Z01.810] 11/26/2019 Not Applicable    Weakness of both lower extremities [R29.898]  Yes    Chronic pulmonary embolism [I27.82] 08/29/2019 Yes    Ascites due to alcoholic cirrhosis [K70.31]  Yes    HCC (hepatocellular carcinoma) [C22.0] 02/22/2019 Yes    Portal hypertensive gastropathy [K76.6, K31.89] 01/10/2019 Yes     Chronic    Esophageal varices in alcoholic cirrhosis [K70.30, I85.10] 01/08/2019 Yes     Chronic    Alcoholic cirrhosis [K70.30] 01/08/2019 Yes     Chronic    Severe malnutrition [E43] 01/26/2018 Yes    Iron deficiency anemia due to chronic blood loss [D50.0] 02/04/2015 Yes      Problems Resolved During this Admission:     * 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 Found to have compression fracture of T11 with canal stenosis and posterior cord displacement. Patient with sensation intact but 0/5 muscle strength in b/l lower ext.     Plan  - No surgical intervention per NSGY  - Continue Dexamethasone 4mg IV q6h   - Regular diet; resume anticoagulation for history of PE/DVT  -  Oxy 10/5 IR q4h prn for severe and moderate pain  - Neurosurgery recommended treating the pathologic fracture with radiotherapy rather than surgery  - Radiation oncology completed round 5 of 5  - palliative care/family discussions code status changed to DNR/DNI and home hospice arranged.   - navi acting morphine 15 PO BID for pain and remeron 7.5 mg PO QHS for insomnia     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  See  "note on spinal cord compression.     Ascites due to alcoholic cirrhosis  Last paracentesis was 2 weeks ago. no significant increase since last paracentesis     Plan  - Continue Lasix 20mg daily; started on spirolactone 50 mg daily   - afebrile, normotensive.      HCC (hepatocellular carcinoma)  Follows with heme/onc not a transplant candidate. Previously on Nivolumab immunotherapy but currently held due to recent PRACHI.      - see spinal cord compression regarding prognosis stratification     Esophageal varices in alcoholic cirrhosis  Denies hematemesis   will hold further medical therapy     Alcoholic cirrhosis  Hepatocellular carcinoma, alcoholic cirrhosis( dx 2015), portal HTN, ascites, hx of variceal bleeding in January 2018 s/p banding,B/l pulmonary embolism,  hx of ETOH abuse (quit Jan 2018)  - MELD-Na score: 11 at 12/5/2019  5:02 AM  MELD score: 11 at 12/5/2019  5:02 AM  Calculated from:  Serum Creatinine: 1.0 mg/dL at 12/5/2019  4:43 AM  Serum Sodium: 134 mmol/L at 12/5/2019  4:43 AM  Total Bilirubin: 1.4 mg/dL at 12/5/2019  4:43 AM  INR(ratio): 1.3 at 12/5/2019  5:02 AM  Age: 64 years    Plan:   - lasix 20 PO; and spirolactone 50 mg daily   -  lactulose 30 ml/ TID     GERD:  - Continue famotidine BID upon discharge    Discharged Condition: fair    Disposition: Hospice/Home    Follow Up:  Follow-up Information     [facility]. Schedule an appointment as soon as possible for a visit in 1 week.    Why:  for Hospital follow up  Contact information:  [address] [place], [place] [address]  [phone]                 Patient Instructions:      COMMODE FOR HOME USE     Order Specific Question Answer Comments   Type: Standard    Height: 4' 7.12" (1.4 m)    Weight: 44 kg (97 lb)    Does patient have medical equipment at home? wheelchair    Does patient have medical equipment at home? rollator    Length of need (1-99 months): 99    Vendor: Other (use comments) Hospice   Expected Date of Delivery: " 12/5/2019      Ambulatory Referral to [facility]   Referral Priority: Routine Referral Type: Consultation   Referral Reason: Specialty Services Required   Number of Visits Requested: 1     Diet Cardiac   Order Comments: Low sodium diet     Diet Adult Regular     Notify your health care provider if you experience any of the following:  temperature >100.4     Notify your health care provider if you experience any of the following:  persistent nausea and vomiting or diarrhea     Notify your health care provider if you experience any of the following:  severe uncontrolled pain     Notify your health care provider if you experience any of the following:  redness, tenderness, or signs of infection (pain, swelling, redness, odor or green/yellow discharge around incision site)     Notify your health care provider if you experience any of the following:  difficulty breathing or increased cough     Notify your health care provider if you experience any of the following:  severe persistent headache     Notify your health care provider if you experience any of the following:  worsening rash     Notify your health care provider if you experience any of the following:  persistent dizziness, light-headedness, or visual disturbances     Notify your health care provider if you experience any of the following:  increased confusion or weakness     Notify your health care provider if you experience any of the following:     Notify your health care provider if you experience any of the following:  temperature >100.4     Notify your health care provider if you experience any of the following:  persistent nausea and vomiting or diarrhea     Notify your health care provider if you experience any of the following:  severe uncontrolled pain     Notify your health care provider if you experience any of the following:  redness, tenderness, or signs of infection (pain, swelling, redness, odor or green/yellow  discharge around incision site)     Notify your health care provider if you experience any of the following:  difficulty breathing or increased cough     Notify your health care provider if you experience any of the following:  persistent dizziness, light-headedness, or visual disturbances     Activity as tolerated     Activity as tolerated       Significant Diagnostic Studies: Labs:   BMP: No results for input(s): GLU, NA, K, CL, CO2, BUN, CREATININE, CALCIUM, MG in the last 48 hours., CMP No results for input(s): NA, K, CL, CO2, GLU, BUN, CREATININE, CALCIUM, PROT, ALBUMIN, BILITOT, ALKPHOS, AST, ALT, ANIONGAP, ESTGFRAFRICA, EGFRNONAA in the last 48 hours., CBC No results for input(s): WBC, HGB, HCT, PLT in the last 48 hours., INR   Lab Results   Component Value Date    INR 1.3 (H) 12/05/2019    INR 1.3 (H) 12/04/2019    INR 1.3 (H) 12/03/2019   , Lipid Panel No results found for: CHOL, HDL, LDLCALC, TRIG, CHOLHDL, Troponin No results for input(s): TROPONINI in the last 168 hours., A1C: No results for input(s): HGBA1C in the last 4320 hours. and All labs within the past 24 hours have been reviewed  Microbiology:   Blood Culture   Lab Results   Component Value Date    LABBLOO No growth after 5 days. 01/25/2018    and Urine Culture    Lab Results   Component Value Date    LABURIN (A) 10/19/2019     STREPTOCOCCUS AGALACTIAE (GROUP B)  > 100,000 cfu/ml  Beta-hemolytic streptococci are routinely susceptible to   penicillins,cephalosporins and carbapenems.      LABURIN (A) 10/19/2019     KLEBSIELLA PNEUMONIAE  10,000 - 49,999 cfu/ml  No other significant isolate       Results for orders placed or performed during the hospital encounter of 11/25/19   Echo Color Flow Doppler? Yes   Result Value Ref Range    BSA 1.31 m2    TDI SEPTAL 0.08 m/s    LV LATERAL E/E' RATIO 9.38 m/s    LV SEPTAL E/E' RATIO 9.38 m/s    LA WIDTH 3.70 cm    TDI LATERAL 0.08 m/s    LVIDD 4.15 3.5 - 6.0 cm    IVS 0.47 (A) 0.6 - 1.1 cm    PW 0.60 0.6 -  1.1 cm    LVIDS 2.71 2.1 - 4.0 cm    FS 35 28 - 44 %    LA volume 58.18 cm3    Sinus 2.51 cm    STJ 2.08 cm    LV mass 59.48 g    LA size 3.79 cm    RVDD 3.52 cm    TAPSE 1.79 cm    Left Ventricle Relative Wall Thickness 0.29 cm    AV mean gradient 5 mmHg    AV valve area 1.18 cm2    AV Velocity Ratio 0.62     AV index (prosthetic) 0.62     E/A ratio 0.97     Mean e' 0.08 m/s    E wave decelartion time 223.67 msec    LVOT diameter 1.56 cm    LVOT area 1.9 cm2    LVOT peak josé miguel 0.97 m/s    LVOT peak VTI 20.75 cm    Ao peak josé miguel 1.57 m/s    Ao VTI 33.52 cm    LVOT stroke volume 39.64 cm3    AV peak gradient 10 mmHg    E/E' ratio 9.38 m/s    MV Peak E José Miguel 0.75 m/s    MV Peak A José Miguel 0.77 m/s    LV Systolic Volume 27.32 mL    LV Systolic Volume Index 21.2 mL/m2    LV Diastolic Volume 76.17 mL    LV Diastolic Volume Index 59.02 mL/m2    LA Volume Index 45.1 mL/m2    LV Mass Index 46 g/m2    RA Major Axis 4.70 cm    Left Atrium Minor Axis 4.68 cm    Left Atrium Major Axis 5.10 cm    RA Width 3.50 cm    Narrative    · Normal left ventricular systolic function. The estimated ejection   fraction is 65%  · Indeterminate left ventricular diastolic function.  · Biatrial enlargement.  · Low normal right ventricular systolic function.  · Ascites present.  · There is an elongated, mobile mass filling IVC and extending from IVC to   the mid RA. In RA it measures 2.1x 1.4cm. DDx includes tumor or thrombus.            Pending Diagnostic Studies:     None         Medications:  Reconciled Home Medications:      Medication List      START taking these medications    dexAMETHasone 2 MG tablet  Commonly known as:  DECADRON  Take 2 tablets (4mg) every 6 hours x 3 days, then 2 tabs (4mg) every 8 hours x 3 days, then 2 tabs (4mg) every 12 hours x 3 days, then 1 tab (2mg) every 8 hours x 3 days, then 1 tab (2mg) every 12 hours x 3 days, then 1 tab (2mg) daily x 6 days     furosemide 20 MG tablet  Commonly known as:  LASIX  Take 1 tablet (20 mg  "total) by mouth once daily.     lactulose 10 gram/15 mL solution  Commonly known as:  CHRONULAC  Take 30 mL by mouth three times daily. Try to accomplish 3-4 bowel movements per day.     Lantus Solostar U-100 Insulin glargine 100 units/mL (3mL) SubQ pen  Generic drug:  insulin  Inject 7 Units into the skin every evening.     melatonin  Commonly known as:  MELATIN  Take 2 tablets (6 mg total) by mouth nightly.     morphine 15 MG 12 hr tablet  Commonly known as:  MS Contin  Take 1 tablet (15 mg total) by mouth 2 (two) times daily.     pen needle, diabetic 32 gauge x 5/32" Ndle  Once daily with insulin pens     spironolactone 50 MG tablet  Commonly known as:  ALDACTONE  Take 1 tablet (50 mg total) by mouth once daily.        CHANGE how you take these medications    oxyCODONE 5 MG immediate release tablet  Commonly known as:  ROXICODONE  Take 2 tablets (10 mg total) by mouth every 6 (six) hours as needed for Pain.  What changed:  how much to take        CONTINUE taking these medications    famotidine 20 MG tablet  Commonly known as:  PEPCID  Take 1 tablet (20 mg total) by mouth once daily.     promethazine 25 MG tablet  Commonly known as:  PHENERGAN  Take 1 tablet (25 mg total) by mouth every 6 (six) hours as needed for Nausea.        STOP taking these medications    apixaban 5 mg Tab  Commonly known as:  ELIQUIS     ferrous sulfate 325 mg (65 mg iron) Tab tablet  Commonly known as:  FEOSOL     propranolol 10 MG tablet  Commonly known as:  INDERAL          Indwelling Lines/Drains at time of discharge:   Lines/Drains/Airways     Drain                 Urethral Catheter 11/26/19 1201 Coude 16 Fr. 10 days              Time spent on the discharge of patient: 45 minutes  Patient was seen and examined on the date of discharge and determined to be suitable for discharge.    Saúl Aguilar MD  Department of Hospital Medicine  [facility]  "

## 2019-12-10 NOTE — PHYSICIAN QUERY
PT Name: Neena Marks  MR #: [id number]    CDS: Chandrika Sheikh RN, CCDS       Contact information: [email or web address]    This form is a permanent document in the medical record.     Query Date: December 10, 2019    Physician Query - Neurological Condition Clarification    By submitting this query, we are merely seeking further clarification of documentation to reflect the severity of illness of your patient. Please utilize your independent clinical judgment when addressing the question(s) below.    The Medical record reflects the following:     Indicators   Supporting Clinical Findings Location in Medical Record   X AMS, Confusion,  LOC, etc.  Slightly encephalopathic on 12/4 after deferring her nighttime dose of lactulose.   12/4-Hosp Med PN   X Acute / Chronic Illness Alcoholic cirrhosis  Hepatocellular carcinoma, alcoholic cirrhosis( dx 2015), portal HTN, ascites, hx of variceal bleeding in January 2018 s/p banding,B/l pulmonary embolism,  hx of ETOH abuse (quit Jan 2018).     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 Treatment of the cognitive dysfunction varies but is ultimately dependent on the treatment of the underlying condition.    Major Symptoms of Encephalopathy - Decreased level of consciousness, fluctuating alertness/concentration, confusion, agitation, lethargy, somnolence, drowsiness,  obtundation, stupor, or coma.         References: National Institutes of Healths (NIH) National [place] of Neurological Disorders and Strokes;  HCPro 2016; Advisory Board     Clinical Guidelines:   These guidelines will set system standards to assist providers in managing, documentation, and coding of encephalopathy. The intent of this document is to serve as a system guideline, not replace the providers clinical judgment:  Provider, please specify the diagnosis or diagnoses associated with above clinical findings.  [   ] Alcoholic Encephalopathy - Degeneration of the nervous system due to alcohol    [ X  ] Hepatic Encephalopathy - Due to liver disease/failure   [   ] Metabolic Encephalopathy - Due to electrolye imbalance, metabolic derangements, or infections processes, includes Septic Encephalopathy   [   ] Other Encephalopathy - Includes uremic encephalopathy   [   ] Other Neurological Condition-  Includes Post-ictal altered mental status. (please specify condition): _________   [   ] Clinically Undetermined     Please document in your progress notes daily for the duration of treatment until resolved, and include in your discharge summary.

## 2019-12-13 ENCOUNTER — TELEPHONE (OUTPATIENT)
Dept: HEMATOLOGY/ONCOLOGY | Facility: CLINIC | Age: 64
End: 2019-12-13

## 2019-12-13 NOTE — TELEPHONE ENCOUNTER
Called Ms. Neena Marks to check how she is doing, left voicemail. Spoke to Ms. Miko Marks, patients daughter and spoke in length about her mothers medical condition. Patients daughter is wondering why her mom is not getting treatment for her HCC. She and her mom are unaware that, once her mother/family chose to be on hospice she will not not get her cancer directed treatments (chemotherapy/Immunotherapy) anymore, but will be focussed only on symptom control. She stated no one really explained all that information. Currently her mom is getting morphine for pain control which is making her confused, so she is only getting oxycodone for pain control. Patient daughter wants to make an appointment with us in the clinic on Dec 27, 2019 at 10 AM to talk about further plan of care- hospice vs getting back on immunotherapy treatments. Answered all questions to her satisfaction.

## 2019-12-27 ENCOUNTER — DOCUMENTATION ONLY (OUTPATIENT)
Dept: HEMATOLOGY/ONCOLOGY | Facility: CLINIC | Age: 64
End: 2019-12-27

## 2019-12-27 ENCOUNTER — OFFICE VISIT (OUTPATIENT)
Dept: HEMATOLOGY/ONCOLOGY | Facility: CLINIC | Age: 64
End: 2019-12-27
Payer: MEDICAID

## 2019-12-27 VITALS
HEART RATE: 78 BPM | OXYGEN SATURATION: 98 % | DIASTOLIC BLOOD PRESSURE: 56 MMHG | RESPIRATION RATE: 16 BRPM | SYSTOLIC BLOOD PRESSURE: 103 MMHG | TEMPERATURE: 98 F | HEIGHT: 59 IN | BODY MASS INDEX: 19.59 KG/M2

## 2019-12-27 DIAGNOSIS — I26.99 PULMONARY EMBOLISM, UNSPECIFIED CHRONICITY, UNSPECIFIED PULMONARY EMBOLISM TYPE, UNSPECIFIED WHETHER ACUTE COR PULMONALE PRESENT: ICD-10-CM

## 2019-12-27 DIAGNOSIS — N17.9 AKI (ACUTE KIDNEY INJURY): ICD-10-CM

## 2019-12-27 DIAGNOSIS — G89.3 CANCER ASSOCIATED PAIN: ICD-10-CM

## 2019-12-27 DIAGNOSIS — R26.2 AMBULATORY DYSFUNCTION: ICD-10-CM

## 2019-12-27 DIAGNOSIS — K70.31 ALCOHOLIC CIRRHOSIS OF LIVER WITH ASCITES: ICD-10-CM

## 2019-12-27 DIAGNOSIS — C22.0 HCC (HEPATOCELLULAR CARCINOMA): Primary | ICD-10-CM

## 2019-12-27 PROCEDURE — 99213 PR OFFICE/OUTPT VISIT, EST, LEVL III, 20-29 MIN: ICD-10-PCS | Mod: S$PBB,,, | Performed by: STUDENT IN AN ORGANIZED HEALTH CARE EDUCATION/TRAINING PROGRAM

## 2019-12-27 PROCEDURE — 99999 PR PBB SHADOW E&M-EST. PATIENT-LVL V: ICD-10-PCS | Mod: PBBFAC,,, | Performed by: STUDENT IN AN ORGANIZED HEALTH CARE EDUCATION/TRAINING PROGRAM

## 2019-12-27 PROCEDURE — 99215 OFFICE O/P EST HI 40 MIN: CPT | Mod: PBBFAC | Performed by: STUDENT IN AN ORGANIZED HEALTH CARE EDUCATION/TRAINING PROGRAM

## 2019-12-27 PROCEDURE — 99213 OFFICE O/P EST LOW 20 MIN: CPT | Mod: S$PBB,,, | Performed by: STUDENT IN AN ORGANIZED HEALTH CARE EDUCATION/TRAINING PROGRAM

## 2019-12-27 PROCEDURE — 99999 PR PBB SHADOW E&M-EST. PATIENT-LVL V: CPT | Mod: PBBFAC,,, | Performed by: STUDENT IN AN ORGANIZED HEALTH CARE EDUCATION/TRAINING PROGRAM

## 2019-12-27 RX ORDER — LORAZEPAM 0.5 MG/1
0.5 TABLET ORAL EVERY 6 HOURS PRN
Refills: 0 | COMMUNITY
Start: 2019-12-06

## 2019-12-27 RX ORDER — HYOSCYAMINE SULFATE 0.12 MG/1
TABLET SUBLINGUAL
Refills: 0 | COMMUNITY
Start: 2019-12-06

## 2019-12-27 RX ORDER — HALOPERIDOL 2 MG/ML
SOLUTION ORAL
Refills: 0 | COMMUNITY
Start: 2019-12-06

## 2019-12-27 RX ORDER — ACETAMINOPHEN 650 MG/1
SUPPOSITORY RECTAL
Refills: 0 | COMMUNITY
Start: 2019-12-06

## 2019-12-27 RX ORDER — PROCHLORPERAZINE MALEATE 10 MG
TABLET ORAL
Refills: 0 | COMMUNITY
Start: 2019-12-06

## 2019-12-27 RX ORDER — DOCUSATE SODIUM 50 MG AND SENNOSIDES 8.6 MG 8.6; 5 MG/1; MG/1
TABLET, FILM COATED ORAL
Refills: 0 | COMMUNITY
Start: 2019-12-06

## 2019-12-27 RX ORDER — BISACODYL 10 MG
SUPPOSITORY, RECTAL RECTAL
Refills: 0 | COMMUNITY
Start: 2019-12-06

## 2019-12-27 NOTE — PROGRESS NOTES
PATIENT: Neena Marks  MRN: [id number]  DATE: 12/29/2019      Diagnosis:   1. HCC (hepatocellular carcinoma)    2. Cancer associated pain    3. PRACHI (acute kidney injury)    4. Alcoholic cirrhosis of liver with ascites    5. Pulmonary embolism, unspecified chronicity, unspecified pulmonary embolism type, unspecified whether acute cor pulmonale present    6. Ambulatory dysfunction        Chief Complaint: Follow-up    Ms. Marks is 64-year-old female diagnosed with HCC started on sorafenib in May 2019 and stopped in August 2019 secondary to hand-foot syndrome and progression of the disease and started on nivolumab in September 2019, unfortunately got complicated by severe PRACHI (unsure if it is immunotherapy related) and immunotherapy currently on hold.     She presented to our office in wheel chair, performance status is at least 3. Lost significant weight likely from poor appetite  Her hospice is discontinued on December 19, 2019 by her daughter in view of anticipation for more treatments  Patient c/o back pain controlled with oxycodone. She also complains of chronic abdominal pain, however her abdomen is less distended now  On her last scans done around November 25th, 2019 while she was admitted to the hospital revealed numerous new pulmonary nodules measuring up to 0.4 cm primarily throughout bilateral upper lobes, Pathologic T11 compression fracture, New 1.0 cm right adrenal gland nodule, concerning for metastatic disease.    Oncologic history  Neena Marks is a 63 y.o. female with PMHx of alcoholic cirrhosis since 2015, Portal HTN, ascites, variceal bleeding, newly diagnosed with HCC now presented to oncology clinic for further evaluation.     Patient was a chronic alcoholic for the last 40 years and was diagnosed with alcoholic cirrhosis in 2015 however she continued to drink alcohol until January 2018 and has quit since then. She had history of upper GI bleeding with hematochezia in January 2018 s/p banding. She  also had ascites since the last 1.5 to 2 years as is getting paracentesis q 2-3 months. She had no history of SBP in the past. She is currently on lasix and spironolactone for ascites and lower extremity edema.  She does not have jaundice but complaints of severe pruritis which is moderately controlled with hydroxyzine.      She currently has abdominal distention and abdominal discomfort.     CMP normal bilirubin and creatinine. Albumin of 2.8. Alk phos 138, AST 58. CBC- anemia with Hb of 8.7  AFP tumor marker is normal  Hep C and B testing on 4/27/2018- negative  Endoscopy- 1/9/19 - Grade II esophageal varices. Completely eradicated. Banded, Small hiatal hernia., Portal hypertensive gastropathy. Treated with argon plasma coagulation (APC).  Patient had an appointment on 2/15/2019, IR consult for Y90 but she missed her appointment.   - She had IR mapping and Hepatic angiography demonstrates a large hypervascular lesion in the right hepatic lobe supplied by branches of the right hepatic artery and accessory left hepatic artery.  Technetium MAA was injected aorta determined lung shunt fraction.  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  - Nuclear medicine scan revealed that majority of tracer goes to the lungs with a liver lung shunt fraction of 68.9%.   is not a candidate for Y90 or other liver directed therapies due to liver lung shunt     5/9/2019- Started taking Sorafenib 400mg BID from  7/6/2019- Have cut down to 200 mg BID due to hand foot syndrome  8/16/2019- Stopped Sorafenib on  due to persistent desquamation of hand foot syndrome  08/28/2019- CT Chest abdomen and pelvis done revealed disease progression and also new bilateral pulmonary embolism. She was admitted to [facility] main [facility] and discharged on Eliquis (Patient declined Lovenox)   9/17/2019- fall and  followed up in ED and Xray right knee revealed no acute osseous abnormality  9/27/2019- Started C 1 D1 Nivolumab q 4 weeks  10/15, 10/19 and 10/20/2019 ED visits for flank pain. UA/Urine culture positive for Klebsiella and treated with ciprofloxacin.  10/25/2019- Office visit and then admitted to hospital for creatinine of 7.1 s/p HD on 10/26 and 10/28. Nephrology recommending renal biopsy, currently held off while continuing steroids for a duration one of week  11/2/2019- therapeutic paracentesis was perfromed with 4L of serous fluid removed.   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  Her creatinine at the time of discharge was 1.9    Subjective:    Initial History: Ms. Marks is a 64 y.o. female who returns for follow up.     Past Medical History:   Past Medical History:   Diagnosis Date    Alcohol abuse     Anemia     Cancer     liver    Cirrhosis        Past Surgical HIstory:   Past Surgical History:   Procedure Laterality Date    ESOPHAGOGASTRODUODENOSCOPY Left 1/9/2019    Procedure: EGD (ESOPHAGOGASTRODUODENOSCOPY);  Surgeon: Madyson Farrar MD;  Location: [facility] ENDO;  Service: Endoscopy;  Laterality: Left;    PERITONEOCENTESIS N/A 1/9/2019    Procedure: PARACENTESIS, ABDOMINAL;  Surgeon: Everett Fitzpatrick MD;  Location: [facility] CATH LAB;  Service: Radiology;  Laterality: N/A;       Family History: History reviewed. No pertinent family history.    Social History:  reports that she has been smoking cigarettes. She has a 15.00 pack-year smoking history. She has never used smokeless tobacco. She reports that she has current or past drug history. Drug: Cocaine. She reports that she does not drink alcohol.    Allergies:  Review of patient's allergies indicates:  No Known Allergies    Medications:  Current Outpatient Medications   Medication Sig Dispense Refill    dexAMETHasone (DECADRON) 2 MG tablet Take 2  tablets (4mg) every 6 hours x 3 days, then 2 tabs (4mg) every 8 hours x 3 days, then 2 tabs (4mg) every 12 hours x 3 days, then 1 tab (2mg) every 8 hours x 3 days, then 1 tab (2mg) every 12 hours x 3 days, then 1 tab (2mg) daily x 6 days 75 tablet 0    insulin glargine,hum.rec.anlog (LANTUS SOLOSTAR U-100 INSULIN SUBQ) Inject into the skin.      LORazepam (ATIVAN) 0.5 MG tablet Take 0.5 mg by mouth every 6 (six) hours as needed.  0    melatonin (MELATIN) Take 2 tablets (6 mg total) by mouth nightly.  0    oxyCODONE (ROXICODONE) 5 MG immediate release tablet Take 2 tablets (10 mg total) by mouth every 6 (six) hours as needed for Pain. 28 tablet 0    prochlorperazine (COMPAZINE) 10 MG tablet TAKE 1 TABLET BY MOUTH / RECTALLY every 6 hours as needed FOR NAUSEA AND VOMITING  0    promethazine (PHENERGAN) 25 MG tablet Take 1 tablet (25 mg total) by mouth every 6 (six) hours as needed for Nausea. 25 tablet 0    spironolactone (ALDACTONE) 50 MG tablet Take 1 tablet (50 mg total) by mouth once daily. 30 tablet 11    STIMULANT LAXATIVE PLUS 8.6-50 mg per tablet TAKE 2 TABLETS every night at bedtime; Increase to 2 tablets twice a day as needed FOR CONSTIPATION ;if no results increase to 4 tablets twi  0    acetaminophen (TYLENOL) 650 MG Supp UNWRAP AND INSERT 1 SUPP. RECTALLY every 6 hours AS NEEDED FOR mild pain or elevated TEMPERATURE  0    bisacodyl (DULCOLAX) 10 mg Supp UNWRAP AND INSERT 1 SUPP. RECTALLY daily as needed FOR CONSTIPATION  0    famotidine (PEPCID) 20 MG tablet Take 1 tablet (20 mg total) by mouth once daily. 30 tablet 0    haloperidol (HALDOL) 2 mg/mL solution TAKE 0.5ml BY MOUTH / UNDER THE TONGUE every 6 hours as needed FOR AGITATION / nausea / vomiting  0    hyoscyamine (LEVSIN/SL) 0.125 mg Subl PLACE 1 TABLET UNDER THE TONGUE every 4 hours AS NEEDED FOR EXCESSIVE SECRETIONS  0    insulin glargine 100 units/mL (3mL) SubQ pen Inject 7 Units into the skin every evening. (Patient not taking:  "Reported on 12/27/2019) 3 mL 2    morphine (MS CONTIN) 15 MG 12 hr tablet Take 1 tablet (15 mg total) by mouth 2 (two) times daily. (Patient not taking: Reported on 12/27/2019)  0    pen needle, diabetic 32 gauge x 5/32" Ndle Once daily with insulin pens (Patient not taking: Reported on 12/27/2019) 100 each 11     No current facility-administered medications for this visit.        Review of Systems   Constitutional: Positive for activity change, appetite change, fatigue and unexpected weight change. Negative for chills and diaphoresis.   HENT: Negative for nosebleeds and trouble swallowing.    Respiratory: Negative for cough and shortness of breath.    Cardiovascular: Negative for chest pain.   Gastrointestinal: Positive for abdominal distention, abdominal pain and nausea. Negative for blood in stool, diarrhea and vomiting.   Genitourinary: Negative for dysuria, flank pain and hematuria.   Musculoskeletal: Positive for back pain. Negative for joint swelling and neck pain.   Skin: Negative for rash.   Neurological: Negative for seizures, syncope and headaches.   Hematological: Negative for adenopathy.   Psychiatric/Behavioral: Negative for agitation.       ECOG Performance Status: 3   Objective:      Vitals:   Vitals:    12/27/19 1017   BP: (!) 103/56   BP Location: Left arm   Patient Position: Sitting   BP Method: Large (Automatic)   Pulse: 78   Resp: 16   Temp: 98.2 °F (36.8 °C)   TempSrc: Oral   SpO2: 98%   Height: 4' 11" (1.499 m)     BMI: Body mass index is 19.59 kg/m².    Physical Exam   Constitutional: She is oriented to person, place, and time.   In wheel chair   HENT:   Head: Normocephalic and atraumatic.   Eyes: Pupils are equal, round, and reactive to light. EOM are normal. No scleral icterus.   Neck: Normal range of motion. Neck supple.   Cardiovascular: Normal rate and regular rhythm.   Murmur heard.  Pulmonary/Chest: Effort normal and breath sounds normal. She has no wheezes.   Abdominal: Soft. Bowel " sounds are normal. She exhibits distension. There is tenderness.   Musculoskeletal: Normal range of motion. She exhibits no edema or tenderness.   Lymphadenopathy:     She has no cervical adenopathy.   Neurological: She is alert and oriented to person, place, and time. No cranial nerve deficit.   Skin: Skin is warm and dry. Capillary refill takes less than 2 seconds. No pallor.   Psychiatric: She has a normal mood and affect. Her behavior is normal.       Laboratory Data:  No visits with results within 1 Week(s) from this visit.   Latest known visit with results is:   No results displayed because visit has over 200 results.            Imaging:       CT Chest Abdomen and pelvis- 8/28/2019  Slight increased size of the heterogeneous enhancing hepatic mass with washout today measuring 9 x 8.4 cm compared 8.9 x 7.6 prior.  There is increase in the hypodensity extending into the IVC now extending into the right atrium.  This either represents bland or tumor thrombus.  Multiple hyperenhancing small nodule lesions in the liver.  At least 1 does washout concerning for but not diagnostic of hepatocellular cancer.  Bilateral pulmonary artery emboli now evident.  Cholelithiasis.    CT chest abdomen and pelvis- 11/26/2019  Interval development of numerous pulmonary nodules measuring up to 0.4 cm primarily throughout bilateral upper lobes, right greater than left, concerning for metastatic disease.    Pathologic T11 compression fracture with an enhancing soft tissue component and posterior retropulsion into the spinal canal resulting in probable severe spinal canal stenosis.  Please refer to concomitantly performed MRI of the spine for more dedicated evaluation.  Lytic lesion lateral aspect of L1 noted.    Cirrhosis with redemonstration of the large heterogeneous enhancing mass within the right hepatic lobe, previously characterized as HCC, with continued extension into the IVC and right atrium, either bland or tumor thrombus.   Difficult to identify additional focal hepatic lesions secondary to marked parenchymal heterogeneity though there may be developing nodules in the left lobe laterally..    New 1.0 cm right adrenal gland nodule, concerning for metastatic disease.    Bilateral pulmonary thromboemboli, stable from prior CT 08/28/2019.    Significant large volume of ascites, similar to slightly increased from prior exam.    Cholelithiasis.    Trace right pleural effusion.    Heterogeneous soft tissue right pelvis series 2, image 188 may be peritoneal implant or related to the right adnexa.     MRI lumbar spine- 11/26/2019  1. Persistent pathologic fracture of the T11 vertebral body with large anterior epidural component that compresses the adjacent spinal cord, similar when compared to lumbar spine MRI of 11/25/2019 noting slightly increased conspicuity of the known cord signal hyperintensity suggesting evolving edema versus myelomalacia.  2. Focal areas of marrow signal abnormality involving the T8 and T9 vertebral bodies and right L1 pedicle/transverse process, likely additional osseous metastases.    Assessment:       1. HCC (hepatocellular carcinoma)    2. Cancer associated pain    3. PRACHI (acute kidney injury)    4. Alcoholic cirrhosis of liver with ascites    5. Pulmonary embolism, unspecified chronicity, unspecified pulmonary embolism type, unspecified whether acute cor pulmonale present    6. Ambulatory dysfunction      Ms. Marks is 64-year-old female diagnosed with HCC s/p sorafenib in May 2019 and stopped in August 2019 secondary to hand-foot syndrome and progression of the disease and was started on nivolumab in September 2019, unfortunately got complicated by severe PRACHI (unsure if it is immunotherapy related) and immunotherapy currently on hold.  Currently patient has poor performance status with significant progression of the disease     05/09/2019 Started taking Sorafenib. She took TID (6 pills of sorafenib/day) for 2 days  and then took BID dosing since then. She said she just want to get medication in the system  07/06/2019- developed painful desquamation of her bilateral foot. She has at least grade 2- moderate foot skin reaction - skin changes include hyperkeratosis, blisters (healed) seen on the plantar surface of her bilateral foot with pain limiting her activities of daily living.  We cut down her sorafenib to 200 mg b.i.d.  08/16/2019- persistence of desquamation of hand foot syndrome.  Stopped sorafenib  08/28/2019- CT Chest abdomen and pelvis done on 08/28/2019 revealed disease progression and also new bilateral pulmonary embolism. She was admitted to [facility] and discharged on Eliquis (Patient declined Lovenox)   09/06/2019- patient presented to the clinic.  In view of her intolerance to sorafenib we will change her treatment to nivolumab every 4 weeks.  Consent signed, pending insurance approval. she is not a  candidate for [place] trial, cohort E. spoke to Diana Robison (ext [phone])  9/12/2019- Received Rollator Walker  09/27/2019- cycle 1 day 1 of nivolumab  10/15, 10/19 and 10/20/2019 ED visits for flank pain. UA/Urine culture positive for Klebsiella and treated with ciprofloxacin.  10/25/2019- Office visit and then admitted to hospital for creatinine of 7.1 s/p HD on 10/26 and 10/28. Nephrology recommending renal biopsy, currently held off while continuing steroids for a duration one of week  11/2/2019- therapeutic paracentesis was perfromed with 4L of serous fluid removed.   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  Her creatinine at the time of discharge was 1.9  11/08/2019- her creatinine is 1.1, back to baseline  11/25/2019- admitted to the hospital for back pain. scans done around November 25th, 2019 while she was admitted to the hospital revealed numerous new pulmonary nodules measuring up to 0.4 cm  primarily throughout bilateral upper lobes, Pathologic T11 compression fracture, New 1.0 cm right adrenal gland nodule, concerning for metastatic disease.        2. PRACHI likely secondary to contrast induced nephropathy vs allergic interstitial nephritis from ciprofloxacin vs immunotherapy induced nephritis.  Resolved  3. Pulmonary embolism on Eliquis  4. Ambulatory dysfunction, uses walker at home  5. Cancer related pain on oxycodone 5 mg q.6 hours p.r.n.  6. Abdominal ascites likely secondary to hepatocellular carcinoma, alcoholic cirrhosis of the liver  7. Lumbosacral strain     Plan:     1.  Had an extensive discussion with the patient, patient's daughter about further plan of care.  In view of significant progression of the disease, poor performance status of >3 and reviewing the benefits versus risks with further treatment, patient and her daughter agreed to go back on hospice and work on symptom control and quality of life issues.  Consult to hospice placed  2. Consult placed to oncology social worker.  Personally spoke to Vivi Camacho who will be working to get back Ms. Marks into hospice, [facility].  3. All questions answered to the patient and her daughter to their satisfaction.  Patient and daughter appreciative of the care.    Plan of care discussed with Dr. Dewey Roberts MD PGY-5  Hematology and Oncology  Pager:[phone]

## 2019-12-30 ENCOUNTER — DOCUMENTATION ONLY (OUTPATIENT)
Dept: HEMATOLOGY/ONCOLOGY | Facility: CLINIC | Age: 64
End: 2019-12-30

## 2019-12-30 NOTE — PROGRESS NOTES
Referral received to assist pt with readmission to hospice services.  Pt seen by her oncologist today and based on visit plan is for her to return to home hospice services.  Contacted [facility] who agreed to contact patient for readmission visit.   hospice rep Nicanor Younger [phone].  Nicanor to advise this SWer of readmission plan.

## 2019-12-30 NOTE — PROGRESS NOTES
Received call from Nicanor with [facility] advising that pt/family requested for pt to readmit visit today as opposed to visit over the past weekend.  Pt to be admitted to hospice services today.

## 2021-01-02 NOTE — PROGRESS NOTES
[facility] Hospital Medicine  Progress Note    Patient Name: Neena Marks  MRN: [id number]  Patient Class: IP- Inpatient   Admission Date: 1/8/2019  Length of Stay: 1 days  Attending Physician: LIANA Taylor MD  Primary Care Provider: [facility]        Subjective:     Principal Problem:Acute GI bleeding    HPI:  Ms. Marks is a 63/F with PMH alcoholic cirrhosis with grade II varices, h/o GI bleeding (s/p EGD 01/2018) who presented to ED 01/08 with a one-day history of bright red blood per rectum and fatigue. She states that she noted blood in her stool the morning of admission, but reports having had some darker stools closer to [place] as well. With worsening fatigue and blood noted, she presented to ED for further evaluation. Initially denied hematemesis but during CT in ED had coffee-ground emesis and subsequently true hematemesis as well. Labwork performed in ED demonstrated Hgb of 3.6. She was subsequently admitted for further evaluation.    Hospital Course:  No notes on file    Interval History: No further hematemesis overnight; several episodes of blood in stool. Feeling well this morning.     Review of Systems   Constitutional: Negative for chills and fever.   Respiratory: Negative for cough and shortness of breath.    Cardiovascular: Negative for chest pain and palpitations.   Gastrointestinal: Positive for abdominal pain and blood in stool. Negative for nausea and vomiting.     Objective:     Vital Signs (Most Recent):  Temp: 98.4 °F (36.9 °C) (01/09/19 1500)  Pulse: 68 (01/09/19 1500)  Resp: 11 (01/09/19 1500)  BP: 133/71 (01/09/19 1500)  SpO2: 98 % (01/09/19 1500) Vital Signs (24h Range):  Temp:  [98.2 °F (36.8 °C)-98.6 °F (37 °C)] 98.4 °F (36.9 °C)  Pulse:  [64-84] 68  Resp:  [10-40] 11  SpO2:  [87 %-100 %] 98 %  BP: ()/(54-85) 133/71     Weight: 45.9 kg (101 lb 3.1 oz)  Body mass index is 20.44 kg/m².    Intake/Output Summary (Last 24 hours) at 1/9/2019  1653  Last data filed at 1/9/2019 1359  Gross per 24 hour   Intake 1612.17 ml   Output 1300 ml   Net 312.17 ml      Physical Exam   Constitutional: She is oriented to person, place, and time. She appears well-developed and well-nourished. No distress.   Chronically ill-appearing. Thin.   HENT:   Head: Normocephalic and atraumatic.   Eyes: Conjunctivae and EOM are normal.   Cardiovascular: Normal rate, regular rhythm, S1 normal, S2 normal and intact distal pulses.   Pulmonary/Chest: Effort normal and breath sounds normal.   Abdominal: Soft. She exhibits no distension. Bowel sounds are increased. There is tenderness.   Musculoskeletal: Normal range of motion. She exhibits no edema.   Neurological: She is alert and oriented to person, place, and time.   Skin: Skin is warm and dry.   Nursing note and vitals reviewed.    Significant Labs:   CBC:  Recent Labs   Lab 01/08/19  1926 01/09/19  0347 01/09/19  1135   WBC 9.98 11.47 14.08*   HGB 8.2* 10.3* 10.0*   HCT 24.7* 30.8* 29.6*    209 194   GRAN 86.0* 73.0  CANCELED 67.6  9.3*   LYMPH 8.0*  CANCELED 20.0  CANCELED 20.5  2.9   MONO 6.0  CANCELED 3.0*  CANCELED 10.9  1.5*   EOS CANCELED CANCELED 0.1   BASO CANCELED CANCELED 0.04      BMP:  Recent Labs   Lab 01/08/19  0712 01/09/19  0718   * 136   K 5.0 5.0    106   CO2 18* 22*   BUN 38* 40*   CREATININE 2.6* 2.0*   * 116*   CALCIUM 8.7 8.5*   MG 2.3  --      Significant Imaging:   No new imaging this morning.    Assessment/Plan:      * Acute GI bleeding    - Acute GI bleeding, suspect from upper source. History of varices on prior endoscopy; may be bleeding from varices, giovanni. with hematemesis as well.   - EGD this morning to further evaluate bleed source.  - Hgb improved; monitor q12hr.  - Continuing pantoprazole gtt; octreotide gtt.  - Continuing ceftriaxone 1g IV q24hr as SBP prophylaxis in the setting of suspected variceal bleed.  - Appreciate GI assistance.     Esophageal varices in  alcoholic cirrhosis    - As under GIB.     Alcoholic cirrhosis    - Cirrhosis with h/o alcohol abuse.  - CT performed in ED demonstrating large mass in posterior R hepatic lobe concerning for HCC.  - Pending MRI, paracentesis to further evaluate.  - Appreciate GI assistance.     PRACHI (acute kidney injury)    - PRACHI suspect secondary to blood loss anemia in the setting of GI bleed.  - Improving with control of bleeding.     Acute blood loss anemia    - As under GIB.       VTE Risk Mitigation (From admission, onward)        Ordered     IP VTE LOW RISK PATIENT  Once      01/08/19 1015     Place sequential compression device  Until discontinued      01/08/19 1015        LIANA Osullivan MD  Department of Hospital Medicine   [facility]  [phone]   Prophylactic measure

## 2021-05-05 NOTE — PROGRESS NOTES
Erroneous note:   Follow up call to [facility] regarding status of DME delivery. Per Ivy with [facility] hospital bed is out for delivery to pt today.   
05-May-2021 15:16

## 2021-05-13 NOTE — ASSESSMENT & PLAN NOTE
- continue home does propanolol as above  - consider discontinuing should patient become hypotensive    forehead

## 2022-08-31 NOTE — PROGRESS NOTES
MD notified that pt began bleeding at site again. VSS. Nurse to hold pressure for 15-20 mins. Will continue to monitor.   No

## 2022-11-03 NOTE — NURSING
1/- Pts temp spiked to 103.1 orally. Made Amira Rodrigues PA-C aware. Ordered to give pts prn Ibuprofen and recheck.  1/- spoke with Ravi again about pts temp. This time it was 103.4 axillary. New orders received to increase NS to 200ml/hr and give ordered IV tylenol. Lactic acid ordered as well.  0216- recheck temp was 101.2 axillary. Lactic was 2.1. Will continue to monitor.  
2u PRBC completed.  VSS, pt tolerated well.     Labs being drawn per order.  
Discharge paperwork reviewed with pt at bedside. All instructions on new medications provided. Discussed and stressed the importance to follow up at [facility] and at [facility]. Pt verbalized understanding. PICC removed, pressure held for 5 minutes, tegaderm applied. Daughter arrived and walked pt out   
Paracentesis performed at bedside by interventional radiology.  Specimens sent to lab per order.  IV Antibiotics given after paracentesis per MD order.    2800 mL lina colored fluid drained from left paracentesis site.  
Patient awake, alert, and oriented with  at bedside.  Remains afebrile with stable vitals.  Remains free from injury or distress.  Patient had one small loose brown BM.  Order received to transfer to floor.  Still waiting on bed at shift change.  
Pt arrived from ED.  PRBCs unit 1 of 2 currently infusing.  VSS, pt without complaint.  MD notified of pt arrival.  GI consult called.    Pt belongings at bedside:   One clear bag of clothing  One black bag of clothing  One [facility] bag of perscriptions and 2 OTC meds  One black purse    Dentures in place    Pt chose to keep belongings at bedside, refused offer to send home with family or place in security.  
Pt back to unit from endoscopy. VSS. Family at bedside.   
Pt c/o room being too cold.  Thermastat already on 90 degrees, however no heat coming from vent.  Maintainence called to adjust heat.  
Pt off of unit to endoscopy.   
electronic

## 2023-12-10 NOTE — TELEPHONE ENCOUNTER
Speech Language Pathology      Radha Abbott  MRN: [id number]    Attempted to see patient 3x this date. Upon initial attempt, psych at bedside. Upon second attempt, team rounding at bedside and upon 3rd attempt MD at bedside performing ultrasound. Continue with alternative means via NGT at this time, SLP will follow up next service date to complete bedside swallow evaluation.      Spoke with radiology tech and pt. Pt said she went to ER and it was busy so she tried to get scans outpt today. Dr. Roberts does not feel comfortable with that course of action. Explained to patient and radiology department.   She is calling to have patient transported to ER at [facility].    Will notify doctor.

## 2024-04-12 NOTE — PT/OT/SLP EVAL
Occupational Therapy   Evaluation and Discharge Note    Name: Neena Marks  MRN: [id number]  Admitting Diagnosis:  Acute kidney injury      Recommendations:     Discharge Recommendations: home with home health  Discharge Equipment Recommendations:  none  Barriers to discharge:  None    Assessment:     Neena Marks is a 64 y.o. female with a medical diagnosis of Acute kidney injury. At this time, patient is functioning at their prior level of function and does not require further acute OT services.     Plan:     During this hospitalization, patient does not require further acute OT services.  Please re-consult if situation changes.    · Plan of Care Reviewed with: patient    Subjective     Chief Complaint: No complaints   Patient/Family Comments/goals: return home.     Occupational Profile:  Living Environment: Pt lives in a1st floor apt w/ dtr w/ ramp access.   Previous level of function: Pt is a questionable historian. Indep w/ ADLs and MOD I for mobility (rollator)  Roles and Routines: N/A  Equipment Used at home:  rollator  Assistance upon Discharge: Pt has assistance upon D/C.     Pain/Comfort:  · Pain Rating 1: 0/10  · Pain Rating Post-Intervention 1: 0/10    Patients cultural, spiritual, Orthodox conflicts given the current situation:      Objective:     Communicated with: RN prior to session.  Patient found HOB elevated with telemetry, peripheral IV upon OT entry to room.    General Precautions: Standard, fall   Orthopedic Precautions:N/A   Braces: N/A     Occupational Performance:    Bed Mobility:    · Patient completed Scooting/Bridging with stand by assistance  · Patient completed Supine to Sit with stand by assistance    Functional Mobility/Transfers:  · Patient completed Sit <> Stand Transfer with stand by assistance  with  rolling walker   · Patient completed Bed <> Chair Transfer using Step Transfer technique with stand by assistance with rolling walker  · Functional Mobility: Pt ambulated down  Ángel Baig. Pt is requesting to schedule surgery, but we don't have any surgery form fill out for him.    hallway w/ PT at cga to sba w/ rollator.     Activities of Daily Living:  · Upper Body Dressing: stand by assistance donfern bunnwn as kye    Cognitive/Visual Perceptual:  Cognitive/Psychosocial Skills:     -       Oriented to: Person, Place, Time and Situation   -       Follows Commands/attention:Follows multistep  commands  -       Communication: clear/fluent  -       Memory: No Deficits noted  -       Safety awareness/insight to disability: impaired   -       Mood/Affect/Coping skills/emotional control: Appropriate to situation  Visual/Perceptual:      -Intact      Physical Exam:  Balance:    -       Pt reuqiored cga to sba for ambulation  Postural examination/scapula alignment:    -       Rounded shoulders  Skin integrity: Visible skin intact  Upper Extremity Range of Motion:     -       Right Upper Extremity: WFL  -       Left Upper Extremity: WFL  Upper Extremity Strength:    -       Right Upper Extremity: WFL  -       Left Upper Extremity: WFL   Strength:    -       Right Upper Extremity: WFL  -       Left Upper Extremity: WFL  Fine Motor Coordination:    -       Intact  Gross motor coordination:   WFL    AMPAC 6 Click ADL:  AMPAC Total Score: 24    Treatment & Education:  Pt educated on POC.   Education:    Patient left up in chair with all lines intact and call button in reach    GOALS:   Multidisciplinary Problems     Occupational Therapy Goals     Not on file          Multidisciplinary Problems (Resolved)        Problem: Occupational Therapy Goal    Goal Priority Disciplines Outcome Interventions   Occupational Therapy Goal   (Resolved)     OT, PT/OT Met    Description:  Pt is not currently displaying a need for acute OT services. D/C acute OT services and recommend pt D/C home.                    History:     Past Medical History:   Diagnosis Date    Alcohol abuse     Anemia     Cancer     liver    Cirrhosis        Past Surgical History:   Procedure Laterality Date    ESOPHAGOGASTRODUODENOSCOPY  Left 1/9/2019    Procedure: EGD (ESOPHAGOGASTRODUODENOSCOPY);  Surgeon: Madyson Farrar MD;  Location: [facility] ENDO;  Service: Endoscopy;  Laterality: Left;    PERITONEOCENTESIS N/A 1/9/2019    Procedure: PARACENTESIS, ABDOMINAL;  Surgeon: Everett Fitzpatrick MD;  Location: [facility] CATH LAB;  Service: Radiology;  Laterality: N/A;       Time Tracking:     OT Date of Treatment: 11/01/19  OT Start Time: 0908  OT Stop Time: 0924  OT Total Time (min): 16 min    Billable Minutes:Evaluation 16 minutes    Jason Ortiz, OT  11/1/2019

## 2024-11-06 NOTE — Clinical Note
See Dr. Roberts in a week with labs for chemo if approval has been obtained. Detail Level: Detailed Depth Of Biopsy: dermis Was A Bandage Applied: Yes Size Of Lesion In Cm: 0 Biopsy Type: H and E Biopsy Method: Dermablade Anesthesia Type: 1% lidocaine with epinephrine Anesthesia Volume In Cc: 0.5 Hemostasis: Drysol Wound Care: Petrolatum Dressing: bandage Destruction After The Procedure: No Type Of Destruction Used: Curettage Curettage Text: The wound bed was treated with curettage after the biopsy was performed. Cryotherapy Text: The wound bed was treated with cryotherapy after the biopsy was performed. Electrodesiccation Text: The wound bed was treated with electrodesiccation after the biopsy was performed. Electrodesiccation And Curettage Text: The wound bed was treated with electrodesiccation and curettage after the biopsy was performed. Silver Nitrate Text: The wound bed was treated with silver nitrate after the biopsy was performed. Lab: [id number] Lab Facility: [id number] Consent: Written consent was obtained and risks were reviewed including but not limited to scarring, infection, bleeding, scabbing, incomplete removal, nerve damage and allergy to anesthesia. Post-Care Instructions: I reviewed with the patient in detail post-care instructions. Patient is to keep the biopsy site dry overnight, and then apply bacitracin twice daily until healed. Patient may apply hydrogen peroxide soaks to remove any crusting. Notification Instructions: Patient will be notified of biopsy results. However, patient instructed to call the office if not contacted within 2 weeks. Billing Type: Third-Party Bill Information: Selecting Yes will display possible errors in your note based on the variables you have selected. This validation is only offered as a suggestion for you. PLEASE NOTE THAT THE VALIDATION TEXT WILL BE REMOVED WHEN YOU FINALIZE YOUR NOTE. IF YOU WANT TO FAX A PRELIMINARY NOTE YOU WILL NEED TO TOGGLE THIS TO 'NO' IF YOU DO NOT WANT IT IN YOUR FAXED NOTE.

## (undated) DEVICE — STAPLER SKIN PROXIMATE WIDE

## (undated) DEVICE — DRAPE STERI INSTRUMENT 1018

## (undated) DEVICE — BLADE ELECTRO EDGE INSULATED

## (undated) DEVICE — SUT VICRYL PLUS 2-0 CT1 18

## (undated) DEVICE — CARTRIDGE OIL

## (undated) DEVICE — DURAPREP SURG SCRUB 26ML

## (undated) DEVICE — TRAY PARACENTESIS 8FR

## (undated) DEVICE — GLOVE PROTEXIS PI CLASSIC 7.5

## (undated) DEVICE — DRAPE C ARM 42 X 120 10/BX

## (undated) DEVICE — TRAY FOLEY 16FR INFECTION CONT

## (undated) DEVICE — PACK SET UP CONVERTORS

## (undated) DEVICE — DRESSING AQUACEL FOAM 5 X 5

## (undated) DEVICE — DRAPE OPMI STERILE

## (undated) DEVICE — SEE MEDLINE ITEM 156905

## (undated) DEVICE — TUBE FRAZIER 5MM 2FT SOFT TIP

## (undated) DEVICE — PENCIL ROCKER SWITCH 10FT CORD

## (undated) DEVICE — SUT VICRYL PLUS 0 CT1 18IN

## (undated) DEVICE — MARKER SKIN STND TIP BLUE BARR

## (undated) DEVICE — NDL HYPO REG 25G X 1 1/2

## (undated) DEVICE — TOWEL OR NONABSORB ADH 17X26

## (undated) DEVICE — SEE MEDLINE ITEM 157117

## (undated) DEVICE — SEE MEDLINE ITEM 157131

## (undated) DEVICE — GAUZE SPONGE 4X4 12PLY

## (undated) DEVICE — DRESSING MEPILEX BORDER 4 X 4

## (undated) DEVICE — DRAPE ABDOMINAL TIBURON 14X11

## (undated) DEVICE — ELECTRODE REM PLYHSV RETURN 9

## (undated) DEVICE — SUT MCRYL PLUS 4-0 PS2 27IN

## (undated) DEVICE — BOTTLE  EVOLUTION EVAC SUC

## (undated) DEVICE — SPONGE GAUZE 16PLY 4X4

## (undated) DEVICE — CATH CENTESIS ONESTEP 5FRX10CM

## (undated) DEVICE — DRAPE STERI-DRAPE 1000 17X11IN

## (undated) DEVICE — CORD BIPOLAR 12 FOOT

## (undated) DEVICE — SUT VICRYL PLUS 3-0 SH 18IN

## (undated) DEVICE — DIFFUSER